# Patient Record
Sex: MALE | Race: WHITE | NOT HISPANIC OR LATINO
[De-identification: names, ages, dates, MRNs, and addresses within clinical notes are randomized per-mention and may not be internally consistent; named-entity substitution may affect disease eponyms.]

---

## 2017-01-04 ENCOUNTER — APPOINTMENT (OUTPATIENT)
Dept: CARDIOLOGY | Facility: CLINIC | Age: 57
End: 2017-01-04

## 2017-01-04 VITALS
BODY MASS INDEX: 40.65 KG/M2 | HEIGHT: 65 IN | HEART RATE: 93 BPM | WEIGHT: 244 LBS | SYSTOLIC BLOOD PRESSURE: 130 MMHG | DIASTOLIC BLOOD PRESSURE: 80 MMHG

## 2017-04-17 ENCOUNTER — APPOINTMENT (OUTPATIENT)
Dept: CARDIOLOGY | Facility: CLINIC | Age: 57
End: 2017-04-17

## 2017-04-24 ENCOUNTER — OUTPATIENT (OUTPATIENT)
Dept: OUTPATIENT SERVICES | Facility: HOSPITAL | Age: 57
LOS: 1 days | Discharge: HOME | End: 2017-04-24

## 2017-05-04 ENCOUNTER — APPOINTMENT (OUTPATIENT)
Dept: CARDIOLOGY | Facility: CLINIC | Age: 57
End: 2017-05-04

## 2017-05-15 ENCOUNTER — APPOINTMENT (OUTPATIENT)
Dept: CARDIOLOGY | Facility: CLINIC | Age: 57
End: 2017-05-15

## 2017-05-15 VITALS
HEIGHT: 65 IN | HEART RATE: 107 BPM | SYSTOLIC BLOOD PRESSURE: 122 MMHG | WEIGHT: 245 LBS | DIASTOLIC BLOOD PRESSURE: 78 MMHG | BODY MASS INDEX: 40.82 KG/M2

## 2017-06-27 DIAGNOSIS — I10 ESSENTIAL (PRIMARY) HYPERTENSION: ICD-10-CM

## 2017-06-27 DIAGNOSIS — E78.4 OTHER HYPERLIPIDEMIA: ICD-10-CM

## 2017-06-27 DIAGNOSIS — E11.65 TYPE 2 DIABETES MELLITUS WITH HYPERGLYCEMIA: ICD-10-CM

## 2017-08-29 ENCOUNTER — OUTPATIENT (OUTPATIENT)
Dept: OUTPATIENT SERVICES | Facility: HOSPITAL | Age: 57
LOS: 1 days | Discharge: HOME | End: 2017-08-29

## 2017-08-29 DIAGNOSIS — E78.4 OTHER HYPERLIPIDEMIA: ICD-10-CM

## 2017-08-29 DIAGNOSIS — E11.65 TYPE 2 DIABETES MELLITUS WITH HYPERGLYCEMIA: ICD-10-CM

## 2017-08-29 DIAGNOSIS — I10 ESSENTIAL (PRIMARY) HYPERTENSION: ICD-10-CM

## 2017-09-28 ENCOUNTER — APPOINTMENT (OUTPATIENT)
Dept: CARDIOLOGY | Facility: CLINIC | Age: 57
End: 2017-09-28

## 2017-09-28 VITALS
SYSTOLIC BLOOD PRESSURE: 104 MMHG | DIASTOLIC BLOOD PRESSURE: 60 MMHG | HEIGHT: 65 IN | HEART RATE: 90 BPM | BODY MASS INDEX: 41.48 KG/M2 | WEIGHT: 249 LBS

## 2017-09-28 RX ORDER — LOSARTAN POTASSIUM AND HYDROCHLOROTHIAZIDE 12.5; 5 MG/1; MG/1
50-12.5 TABLET ORAL TWICE DAILY
Refills: 0 | Status: ACTIVE | COMMUNITY

## 2017-09-29 ENCOUNTER — OUTPATIENT (OUTPATIENT)
Dept: OUTPATIENT SERVICES | Facility: HOSPITAL | Age: 57
LOS: 1 days | Discharge: HOME | End: 2017-09-29

## 2017-09-29 DIAGNOSIS — M79.606 PAIN IN LEG, UNSPECIFIED: ICD-10-CM

## 2017-09-29 DIAGNOSIS — M54.5 LOW BACK PAIN: ICD-10-CM

## 2017-10-20 ENCOUNTER — OUTPATIENT (OUTPATIENT)
Dept: OUTPATIENT SERVICES | Facility: HOSPITAL | Age: 57
LOS: 1 days | Discharge: HOME | End: 2017-10-20

## 2017-10-20 DIAGNOSIS — M25.559 PAIN IN UNSPECIFIED HIP: ICD-10-CM

## 2017-10-20 DIAGNOSIS — R05 COUGH: ICD-10-CM

## 2017-10-26 ENCOUNTER — OUTPATIENT (OUTPATIENT)
Dept: OUTPATIENT SERVICES | Facility: HOSPITAL | Age: 57
LOS: 1 days | Discharge: HOME | End: 2017-10-26

## 2017-10-26 DIAGNOSIS — M16.12 UNILATERAL PRIMARY OSTEOARTHRITIS, LEFT HIP: ICD-10-CM

## 2017-10-26 DIAGNOSIS — Z01.812 ENCOUNTER FOR PREPROCEDURAL LABORATORY EXAMINATION: ICD-10-CM

## 2017-10-26 DIAGNOSIS — D68.9 COAGULATION DEFECT, UNSPECIFIED: ICD-10-CM

## 2017-11-01 ENCOUNTER — OTHER (OUTPATIENT)
Age: 57
End: 2017-11-01

## 2017-11-07 ENCOUNTER — OUTPATIENT (OUTPATIENT)
Dept: OUTPATIENT SERVICES | Facility: HOSPITAL | Age: 57
LOS: 1 days | Discharge: HOME | End: 2017-11-07

## 2017-11-07 DIAGNOSIS — I35.0 NONRHEUMATIC AORTIC (VALVE) STENOSIS: ICD-10-CM

## 2017-11-16 ENCOUNTER — OUTPATIENT (OUTPATIENT)
Dept: OUTPATIENT SERVICES | Facility: HOSPITAL | Age: 57
LOS: 1 days | Discharge: HOME | End: 2017-11-16

## 2017-11-16 DIAGNOSIS — Z79.01 LONG TERM (CURRENT) USE OF ANTICOAGULANTS: ICD-10-CM

## 2017-11-16 DIAGNOSIS — R79.9 ABNORMAL FINDING OF BLOOD CHEMISTRY, UNSPECIFIED: ICD-10-CM

## 2017-11-18 ENCOUNTER — OUTPATIENT (OUTPATIENT)
Dept: OUTPATIENT SERVICES | Facility: HOSPITAL | Age: 57
LOS: 1 days | Discharge: HOME | End: 2017-11-18

## 2017-11-18 DIAGNOSIS — I48.91 UNSPECIFIED ATRIAL FIBRILLATION: ICD-10-CM

## 2017-11-21 ENCOUNTER — OUTPATIENT (OUTPATIENT)
Dept: OUTPATIENT SERVICES | Facility: HOSPITAL | Age: 57
LOS: 1 days | Discharge: HOME | End: 2017-11-21

## 2017-11-21 DIAGNOSIS — Z79.01 LONG TERM (CURRENT) USE OF ANTICOAGULANTS: ICD-10-CM

## 2017-11-24 ENCOUNTER — OUTPATIENT (OUTPATIENT)
Dept: OUTPATIENT SERVICES | Facility: HOSPITAL | Age: 57
LOS: 1 days | Discharge: HOME | End: 2017-11-24

## 2017-11-24 DIAGNOSIS — Z79.01 LONG TERM (CURRENT) USE OF ANTICOAGULANTS: ICD-10-CM

## 2017-11-28 ENCOUNTER — OUTPATIENT (OUTPATIENT)
Dept: OUTPATIENT SERVICES | Facility: HOSPITAL | Age: 57
LOS: 1 days | Discharge: HOME | End: 2017-11-28

## 2017-11-28 DIAGNOSIS — Z79.01 LONG TERM (CURRENT) USE OF ANTICOAGULANTS: ICD-10-CM

## 2017-12-05 ENCOUNTER — OUTPATIENT (OUTPATIENT)
Dept: OUTPATIENT SERVICES | Facility: HOSPITAL | Age: 57
LOS: 1 days | Discharge: HOME | End: 2017-12-05

## 2017-12-05 DIAGNOSIS — Z79.01 LONG TERM (CURRENT) USE OF ANTICOAGULANTS: ICD-10-CM

## 2017-12-12 ENCOUNTER — OUTPATIENT (OUTPATIENT)
Dept: OUTPATIENT SERVICES | Facility: HOSPITAL | Age: 57
LOS: 1 days | Discharge: HOME | End: 2017-12-12

## 2017-12-12 DIAGNOSIS — Z79.01 LONG TERM (CURRENT) USE OF ANTICOAGULANTS: ICD-10-CM

## 2017-12-19 ENCOUNTER — OUTPATIENT (OUTPATIENT)
Dept: OUTPATIENT SERVICES | Facility: HOSPITAL | Age: 57
LOS: 1 days | Discharge: HOME | End: 2017-12-19

## 2017-12-19 DIAGNOSIS — Z79.01 LONG TERM (CURRENT) USE OF ANTICOAGULANTS: ICD-10-CM

## 2017-12-22 ENCOUNTER — OUTPATIENT (OUTPATIENT)
Dept: OUTPATIENT SERVICES | Facility: HOSPITAL | Age: 57
LOS: 1 days | Discharge: HOME | End: 2017-12-22

## 2017-12-22 DIAGNOSIS — Z79.01 LONG TERM (CURRENT) USE OF ANTICOAGULANTS: ICD-10-CM

## 2017-12-26 ENCOUNTER — OUTPATIENT (OUTPATIENT)
Dept: OUTPATIENT SERVICES | Facility: HOSPITAL | Age: 57
LOS: 1 days | Discharge: HOME | End: 2017-12-26

## 2017-12-26 DIAGNOSIS — E78.4 OTHER HYPERLIPIDEMIA: ICD-10-CM

## 2017-12-26 DIAGNOSIS — E11.65 TYPE 2 DIABETES MELLITUS WITH HYPERGLYCEMIA: ICD-10-CM

## 2017-12-26 DIAGNOSIS — I10 ESSENTIAL (PRIMARY) HYPERTENSION: ICD-10-CM

## 2018-01-11 ENCOUNTER — APPOINTMENT (OUTPATIENT)
Dept: CARDIOLOGY | Facility: CLINIC | Age: 58
End: 2018-01-11

## 2018-01-11 VITALS
DIASTOLIC BLOOD PRESSURE: 84 MMHG | HEART RATE: 100 BPM | SYSTOLIC BLOOD PRESSURE: 132 MMHG | HEIGHT: 65 IN | WEIGHT: 256 LBS | BODY MASS INDEX: 42.65 KG/M2

## 2018-03-20 ENCOUNTER — OUTPATIENT (OUTPATIENT)
Dept: OUTPATIENT SERVICES | Facility: HOSPITAL | Age: 58
LOS: 1 days | Discharge: HOME | End: 2018-03-20

## 2018-03-20 DIAGNOSIS — E55.9 VITAMIN D DEFICIENCY, UNSPECIFIED: ICD-10-CM

## 2018-03-20 DIAGNOSIS — M06.9 RHEUMATOID ARTHRITIS, UNSPECIFIED: ICD-10-CM

## 2018-03-20 DIAGNOSIS — A69.20 LYME DISEASE, UNSPECIFIED: ICD-10-CM

## 2018-03-20 DIAGNOSIS — E11.9 TYPE 2 DIABETES MELLITUS WITHOUT COMPLICATIONS: ICD-10-CM

## 2018-03-20 DIAGNOSIS — N42.9 DISORDER OF PROSTATE, UNSPECIFIED: ICD-10-CM

## 2018-03-20 DIAGNOSIS — I10 ESSENTIAL (PRIMARY) HYPERTENSION: ICD-10-CM

## 2018-03-28 ENCOUNTER — APPOINTMENT (OUTPATIENT)
Dept: CARDIOLOGY | Facility: CLINIC | Age: 58
End: 2018-03-28

## 2018-03-28 VITALS — HEART RATE: 94 BPM | OXYGEN SATURATION: 95 %

## 2018-03-28 VITALS
HEART RATE: 96 BPM | BODY MASS INDEX: 42.65 KG/M2 | WEIGHT: 256 LBS | DIASTOLIC BLOOD PRESSURE: 84 MMHG | HEIGHT: 65 IN | SYSTOLIC BLOOD PRESSURE: 142 MMHG

## 2018-04-02 ENCOUNTER — OUTPATIENT (OUTPATIENT)
Dept: OUTPATIENT SERVICES | Facility: HOSPITAL | Age: 58
LOS: 1 days | Discharge: HOME | End: 2018-04-02

## 2018-04-02 VITALS
HEART RATE: 80 BPM | WEIGHT: 262.35 LBS | DIASTOLIC BLOOD PRESSURE: 76 MMHG | TEMPERATURE: 98 F | OXYGEN SATURATION: 97 % | SYSTOLIC BLOOD PRESSURE: 150 MMHG | RESPIRATION RATE: 20 BRPM

## 2018-04-02 DIAGNOSIS — S02.30XA FRACTURE OF ORBITAL FLOOR, UNSPECIFIED SIDE, INITIAL ENCOUNTER FOR CLOSED FRACTURE: Chronic | ICD-10-CM

## 2018-04-02 DIAGNOSIS — Z01.818 ENCOUNTER FOR OTHER PREPROCEDURAL EXAMINATION: ICD-10-CM

## 2018-04-02 DIAGNOSIS — R07.9 CHEST PAIN, UNSPECIFIED: ICD-10-CM

## 2018-04-02 DIAGNOSIS — Z86.018 PERSONAL HISTORY OF OTHER BENIGN NEOPLASM: Chronic | ICD-10-CM

## 2018-04-02 DIAGNOSIS — I35.0 NONRHEUMATIC AORTIC (VALVE) STENOSIS: ICD-10-CM

## 2018-04-02 DIAGNOSIS — Z12.11 ENCOUNTER FOR SCREENING FOR MALIGNANT NEOPLASM OF COLON: Chronic | ICD-10-CM

## 2018-04-02 DIAGNOSIS — Z96.643 PRESENCE OF ARTIFICIAL HIP JOINT, BILATERAL: Chronic | ICD-10-CM

## 2018-04-02 LAB
ALBUMIN SERPL ELPH-MCNC: 4 G/DL — SIGNIFICANT CHANGE UP (ref 3.5–5.2)
ALP SERPL-CCNC: 46 U/L — SIGNIFICANT CHANGE UP (ref 30–115)
ALT FLD-CCNC: 17 U/L — SIGNIFICANT CHANGE UP (ref 0–41)
ANION GAP SERPL CALC-SCNC: 15 MMOL/L — HIGH (ref 7–14)
APTT BLD: 29.9 SEC — SIGNIFICANT CHANGE UP (ref 27–39.2)
AST SERPL-CCNC: 14 U/L — SIGNIFICANT CHANGE UP (ref 0–41)
BASOPHILS # BLD AUTO: 0.04 K/UL — SIGNIFICANT CHANGE UP (ref 0–0.2)
BASOPHILS NFR BLD AUTO: 0.6 % — SIGNIFICANT CHANGE UP (ref 0–1)
BILIRUB SERPL-MCNC: 0.3 MG/DL — SIGNIFICANT CHANGE UP (ref 0.2–1.2)
BUN SERPL-MCNC: 20 MG/DL — SIGNIFICANT CHANGE UP (ref 10–20)
CALCIUM SERPL-MCNC: 8.9 MG/DL — SIGNIFICANT CHANGE UP (ref 8.5–10.1)
CHLORIDE SERPL-SCNC: 97 MMOL/L — LOW (ref 98–110)
CO2 SERPL-SCNC: 28 MMOL/L — SIGNIFICANT CHANGE UP (ref 17–32)
CREAT SERPL-MCNC: 1 MG/DL — SIGNIFICANT CHANGE UP (ref 0.7–1.5)
EOSINOPHIL # BLD AUTO: 0.15 K/UL — SIGNIFICANT CHANGE UP (ref 0–0.7)
EOSINOPHIL NFR BLD AUTO: 2.3 % — SIGNIFICANT CHANGE UP (ref 0–8)
GLUCOSE SERPL-MCNC: 131 MG/DL — HIGH (ref 70–99)
HCT VFR BLD CALC: 40.2 % — LOW (ref 42–52)
HGB BLD-MCNC: 12.9 G/DL — LOW (ref 14–18)
IMM GRANULOCYTES NFR BLD AUTO: 0.3 % — SIGNIFICANT CHANGE UP (ref 0.1–0.3)
INR BLD: 0.9 RATIO — SIGNIFICANT CHANGE UP (ref 0.65–1.3)
LYMPHOCYTES # BLD AUTO: 1.73 K/UL — SIGNIFICANT CHANGE UP (ref 1.2–3.4)
LYMPHOCYTES # BLD AUTO: 26.1 % — SIGNIFICANT CHANGE UP (ref 20.5–51.1)
MCHC RBC-ENTMCNC: 27.9 PG — SIGNIFICANT CHANGE UP (ref 27–31)
MCHC RBC-ENTMCNC: 32.1 G/DL — SIGNIFICANT CHANGE UP (ref 32–37)
MCV RBC AUTO: 87 FL — SIGNIFICANT CHANGE UP (ref 80–94)
MONOCYTES # BLD AUTO: 0.64 K/UL — HIGH (ref 0.1–0.6)
MONOCYTES NFR BLD AUTO: 9.7 % — HIGH (ref 1.7–9.3)
NEUTROPHILS # BLD AUTO: 4.04 K/UL — SIGNIFICANT CHANGE UP (ref 1.4–6.5)
NEUTROPHILS NFR BLD AUTO: 61 % — SIGNIFICANT CHANGE UP (ref 42.2–75.2)
NRBC # BLD: 0 /100 WBCS — SIGNIFICANT CHANGE UP (ref 0–0)
PLATELET # BLD AUTO: 174 K/UL — SIGNIFICANT CHANGE UP (ref 130–400)
POTASSIUM SERPL-MCNC: 4.1 MMOL/L — SIGNIFICANT CHANGE UP (ref 3.5–5)
POTASSIUM SERPL-SCNC: 4.1 MMOL/L — SIGNIFICANT CHANGE UP (ref 3.5–5)
PROT SERPL-MCNC: 6.5 G/DL — SIGNIFICANT CHANGE UP (ref 6–8)
PROTHROM AB SERPL-ACNC: 9.7 SEC — LOW (ref 9.95–12.87)
RBC # BLD: 4.62 M/UL — LOW (ref 4.7–6.1)
RBC # FLD: 15.8 % — HIGH (ref 11.5–14.5)
SODIUM SERPL-SCNC: 140 MMOL/L — SIGNIFICANT CHANGE UP (ref 135–146)
WBC # BLD: 6.62 K/UL — SIGNIFICANT CHANGE UP (ref 4.8–10.8)
WBC # FLD AUTO: 6.62 K/UL — SIGNIFICANT CHANGE UP (ref 4.8–10.8)

## 2018-04-02 NOTE — H&P PST ADULT - REASON FOR ADMISSION
57 year old male here for left heart catherization 57 year old male here for left heart catheterization, pt states he has a problem with his valves, + sob, + tadeo and fatigue, needs procedure, pt denies cp or palpitations  pt has urinary frequency secondary to diuretic, no urgency or burning  ex marci 1 fos slowly

## 2018-04-02 NOTE — H&P PST ADULT - MALLAMPATI CLASS
Class III - visualization of the soft palate and the base of the uvula/large uvula + redundant neck tissue, large lipoma posterior neck

## 2018-04-02 NOTE — H&P PST ADULT - FAMILY HISTORY
Mother  Still living? No  Family history of diabetes mellitus, Age at diagnosis: 61-70  CAD (coronary artery disease), Age at diagnosis: 61-70     Sibling  Still living? No  Family history of diabetes mellitus, Age at diagnosis: 51-60     Father  Still living? Unknown  CAD (coronary artery disease), Age at diagnosis: 71-80

## 2018-04-02 NOTE — H&P PST ADULT - PSH
Encounter for screening colonoscopy    Fracture of orbital floor    H/O lipoma  times 2-3  History of hip replacement, total, bilateral  two separate times '17, '07

## 2018-04-02 NOTE — H&P PST ADULT - PMH
Asthma    Diabetes  20 yrs  HTN (hypertension)    Obstructive sleep apnea on CPAP    Obstructive sleep apnea on CPAP Arthritis    Asthma    Diabetes  20 yrs  HTN (hypertension)    Obesity    Obstructive sleep apnea on CPAP    Peripheral neuropathy  related to diabetes

## 2018-04-10 ENCOUNTER — OUTPATIENT (OUTPATIENT)
Dept: OUTPATIENT SERVICES | Facility: HOSPITAL | Age: 58
LOS: 1 days | Discharge: HOME | End: 2018-04-10

## 2018-04-10 DIAGNOSIS — S02.30XA FRACTURE OF ORBITAL FLOOR, UNSPECIFIED SIDE, INITIAL ENCOUNTER FOR CLOSED FRACTURE: Chronic | ICD-10-CM

## 2018-04-10 DIAGNOSIS — Z12.11 ENCOUNTER FOR SCREENING FOR MALIGNANT NEOPLASM OF COLON: Chronic | ICD-10-CM

## 2018-04-10 DIAGNOSIS — Z86.018 PERSONAL HISTORY OF OTHER BENIGN NEOPLASM: Chronic | ICD-10-CM

## 2018-04-10 DIAGNOSIS — Z96.643 PRESENCE OF ARTIFICIAL HIP JOINT, BILATERAL: Chronic | ICD-10-CM

## 2018-04-10 NOTE — PROGRESS NOTE ADULT - SUBJECTIVE AND OBJECTIVE BOX
PRE-OPERATIVE DAY OF PROCEDURE EVALUATION NOTE:  I have personally seen and examined the patient. I agree with the history and physicial which I have reviewed and noted any changes below. signed electronically by Dr Robbi Argueta 04-10-18 @ 08:20                                         POST OPERATIVE PROCEDURAL DOCUMENTATION   Preliminary Cardiac Catherization Post-Procedure Report:    PRE-OP DIAGNOSIS:     PROCEDURE:     Left Heart Catheterization     LV Angiogram     Selective Coronary Angiogram     Peripheral Angiogram    Primary Physician:  Robbi Argueta M.D.  Cardiac Fellow:      ANESTHESIA TYPE: Sedation/Local/Regional    ESTIMATED BLOOD LOSS:  Less than 10 cc    CONDITION: Good    SPECIMENS REMOVED (IF APPLICABLE): Not Applicable    IMPLANTS (IF APPLICABLE): SHAWN    DESCRIPTION OF PROCEDURRE:  The right and left femoral groins, as well as the right radial artery were prepped and draped in the usual sterile fashion. Following local instillation of lidocane, a 6Fr introducer was inserted percutaneously into the ______ using the seldinger technique. Following this, multiple guidewires and pre shaped catheters were used to adequately opacifiy the coronary arteries and perform left heart catheterization as well as an LV angiogram, using the Judkin's approach. Left heart catheterization and left ventricular angiogram was performed in the BLACK projection. Following this, selective coronary angiography was performed in multiple obliquities. At the end of the procedure, all guidewires and catheters were removed. Homostasis was obtained by manual compression. There were no complications.                                 FINDINGS OF PROCEDURE:  1. Left Heart Catheterization: LVEDP:                                                        LV Pressure:                                                        CA Pressure:                   2. LV Angiogram:  The LV is _______ in size.                                    Overall, there is normal LV systolic function.                                   No significant segmental wall contraction abnormalities seen.                                   The EF is approx. _______                                   No mitral valve prolapse, mitral insufficiency or mitral annular calcification seen.   3. SELECTIVE CORONARY ANGIOGRAM:                           LEFT MAIN: Normal                           LAD: Normal                           DIAGONAL: Normal                           LCX: Normal                           OBTUSE MARGINAL: Normal                           RCA: Normal                           PDA: Normal                           PLV: Normal  4. The coronary circulation was _____ dominant.    5.  PERIPHERAL ANGIOGRAM was performed, which revealed no significant aorta-illiac disease. A closure device using ___ was done.        Post Procedure Diagnosis/Impression:                         Complications:  None    PLAN OF CARE:  D/C Home today                               D/C in AM                               Return to In-patient bed                               Return for staged procedure:                              CT Surgery consult called                               Continue DAPT, B-blocker, Statin therapy and ace inhibitor                              Continue medical therapy                              Office visit in 2-3 weeks PRE-OPERATIVE DAY OF PROCEDURE EVALUATION NOTE:  I have personally seen and examined the patient. I agree with the history and physicial which I have reviewed and noted any changes below. signed electronically by Dr Robbi Argueta 04-10-18 @ 08:20                                         POST OPERATIVE PROCEDURAL DOCUMENTATION   Preliminary Cardiac Catherization Post-Procedure Report:    PRE-OP DIAGNOSIS: Aortic Stenosis    PROCEDURE:     Right Heart Catheterization     Left Heart Catheterization     LV Angiogram     Selective Coronary Angiogram    Primary Physician:  Robbi Argueta M.D.  Cardiac Fellow:  Dr. Wren    ANESTHESIA TYPE: Sedation/Local/Regional    ESTIMATED BLOOD LOSS:  Less than 10 cc    CONDITION: Good    SPECIMENS REMOVED (IF APPLICABLE): Not Applicable    IMPLANTS (IF APPLICABLE): NA    DESCRIPTION OF PROCEDURE  The right and left femoral groins, as well as the right radial artery were prepped and draped in the usual sterile fashion. Following local instillation of lidocaine a 6Fr introducer was inserted percutaneously into the right radial artery using the Seldinger technique. Following this, multiple guidewires and pre shaped catheters were used to adequately opacify the coronary arteries and perform left heart catheterization as well as an LV angiogram, using the Daija approach. A supra aortic angiogram was done in the Tongan projection. Left heart catheterization and left ventricular angiogram was performed in the BLACK projection. Right heart catheterization was then performed.  Following this, selective coronary angiography was performed in multiple obliquities. At the end of the procedure, all guidewires and catheters were removed. Homo stasis was obtained by manual compression. There were no complications.                                 FINDINGS OF PROCEDURE:  1. Left Heart Catheterization:            LVEDP: 20-24                                                       LV Pressure: 168-170/24                                                       CA Pressure: 130/80                                                       Mean aortic gradient of approx. 35-40mmHg, using a Elmendorf catheter  2. LV Angiogram:  The LV is normal in size.                                    Overall, there is normal LV systolic function.                                   No significant segmental wall contraction abnormalities seen.                                   The EF is approx. 60%                                   No mitral valve prolapse, mitral insufficiency or mitral annular calcification seen.   3. SELECTIVE CORONARY ANGIOGRAM:                           LEFT MAIN: Normal                           LAD: Normal                           DIAGONAL: Normal                           LCX: Normal                           OBTUSE MARGINAL: Normal                           RCA: Normal                           PDA: Normal                           PLV: Normal  4. The coronary circulation was right dominant.    5.   Right Heart Catheterization showed moderate pulmonary hypertension with a PA pressure of 45-50/24    Post Procedure Diagnosis/Impression: Moderate to severe Aortic Stenosis                                                           No CAD                                                           Moderate Pulmonary Hypertension    Complications:  None    PLAN OF CARE         D/C Home today                               Return to In-patient bed                              Continue medical therapy                              Office visit in 2-3 weeks

## 2018-04-16 DIAGNOSIS — I10 ESSENTIAL (PRIMARY) HYPERTENSION: ICD-10-CM

## 2018-04-16 DIAGNOSIS — R07.89 OTHER CHEST PAIN: ICD-10-CM

## 2018-04-16 DIAGNOSIS — E11.9 TYPE 2 DIABETES MELLITUS WITHOUT COMPLICATIONS: ICD-10-CM

## 2018-04-16 DIAGNOSIS — I35.0 NONRHEUMATIC AORTIC (VALVE) STENOSIS: ICD-10-CM

## 2018-04-16 DIAGNOSIS — R06.02 SHORTNESS OF BREATH: ICD-10-CM

## 2018-04-16 DIAGNOSIS — I27.20 PULMONARY HYPERTENSION, UNSPECIFIED: ICD-10-CM

## 2018-05-07 ENCOUNTER — APPOINTMENT (OUTPATIENT)
Dept: CARDIOLOGY | Facility: CLINIC | Age: 58
End: 2018-05-07

## 2018-06-18 ENCOUNTER — APPOINTMENT (OUTPATIENT)
Dept: SURGERY | Facility: CLINIC | Age: 58
End: 2018-06-18
Payer: COMMERCIAL

## 2018-06-18 VITALS
DIASTOLIC BLOOD PRESSURE: 89 MMHG | BODY MASS INDEX: 42.49 KG/M2 | SYSTOLIC BLOOD PRESSURE: 140 MMHG | HEART RATE: 98 BPM | HEIGHT: 65 IN | TEMPERATURE: 98.2 F | WEIGHT: 255 LBS

## 2018-06-18 DIAGNOSIS — E88.89 OTHER SPECIFIED METABOLIC DISORDERS: ICD-10-CM

## 2018-06-18 PROCEDURE — 99204 OFFICE O/P NEW MOD 45 MIN: CPT

## 2018-06-20 ENCOUNTER — OUTPATIENT (OUTPATIENT)
Dept: OUTPATIENT SERVICES | Facility: HOSPITAL | Age: 58
LOS: 1 days | Discharge: HOME | End: 2018-06-20

## 2018-06-20 DIAGNOSIS — Z12.11 ENCOUNTER FOR SCREENING FOR MALIGNANT NEOPLASM OF COLON: Chronic | ICD-10-CM

## 2018-06-20 DIAGNOSIS — Z96.643 PRESENCE OF ARTIFICIAL HIP JOINT, BILATERAL: Chronic | ICD-10-CM

## 2018-06-20 DIAGNOSIS — M19.90 UNSPECIFIED OSTEOARTHRITIS, UNSPECIFIED SITE: ICD-10-CM

## 2018-06-20 DIAGNOSIS — Z86.018 PERSONAL HISTORY OF OTHER BENIGN NEOPLASM: Chronic | ICD-10-CM

## 2018-06-20 DIAGNOSIS — S02.30XA FRACTURE OF ORBITAL FLOOR, UNSPECIFIED SIDE, INITIAL ENCOUNTER FOR CLOSED FRACTURE: Chronic | ICD-10-CM

## 2018-06-20 DIAGNOSIS — Z96.642 PRESENCE OF LEFT ARTIFICIAL HIP JOINT: ICD-10-CM

## 2018-08-02 ENCOUNTER — LABORATORY RESULT (OUTPATIENT)
Age: 58
End: 2018-08-02

## 2018-08-02 ENCOUNTER — OUTPATIENT (OUTPATIENT)
Dept: OUTPATIENT SERVICES | Facility: HOSPITAL | Age: 58
LOS: 1 days | Discharge: HOME | End: 2018-08-02

## 2018-08-02 DIAGNOSIS — I10 ESSENTIAL (PRIMARY) HYPERTENSION: ICD-10-CM

## 2018-08-02 DIAGNOSIS — E11.40 TYPE 2 DIABETES MELLITUS WITH DIABETIC NEUROPATHY, UNSPECIFIED: ICD-10-CM

## 2018-08-02 DIAGNOSIS — Z86.018 PERSONAL HISTORY OF OTHER BENIGN NEOPLASM: Chronic | ICD-10-CM

## 2018-08-02 DIAGNOSIS — Z12.11 ENCOUNTER FOR SCREENING FOR MALIGNANT NEOPLASM OF COLON: Chronic | ICD-10-CM

## 2018-08-02 DIAGNOSIS — E11.65 TYPE 2 DIABETES MELLITUS WITH HYPERGLYCEMIA: ICD-10-CM

## 2018-08-02 DIAGNOSIS — Z96.643 PRESENCE OF ARTIFICIAL HIP JOINT, BILATERAL: Chronic | ICD-10-CM

## 2018-08-02 DIAGNOSIS — E78.4 OTHER HYPERLIPIDEMIA: ICD-10-CM

## 2018-08-02 DIAGNOSIS — S02.30XA FRACTURE OF ORBITAL FLOOR, UNSPECIFIED SIDE, INITIAL ENCOUNTER FOR CLOSED FRACTURE: Chronic | ICD-10-CM

## 2018-08-02 PROBLEM — G62.9 POLYNEUROPATHY, UNSPECIFIED: Chronic | Status: ACTIVE | Noted: 2018-04-02

## 2018-08-02 PROBLEM — G47.33 OBSTRUCTIVE SLEEP APNEA (ADULT) (PEDIATRIC): Chronic | Status: ACTIVE | Noted: 2018-04-02

## 2018-08-02 PROBLEM — E66.9 OBESITY, UNSPECIFIED: Chronic | Status: ACTIVE | Noted: 2018-04-02

## 2018-08-02 PROBLEM — E11.9 TYPE 2 DIABETES MELLITUS WITHOUT COMPLICATIONS: Chronic | Status: ACTIVE | Noted: 2018-04-02

## 2018-08-02 PROBLEM — J45.909 UNSPECIFIED ASTHMA, UNCOMPLICATED: Chronic | Status: ACTIVE | Noted: 2018-04-02

## 2018-08-02 PROBLEM — M19.90 UNSPECIFIED OSTEOARTHRITIS, UNSPECIFIED SITE: Chronic | Status: ACTIVE | Noted: 2018-04-02

## 2018-08-02 PROBLEM — E78.00 PURE HYPERCHOLESTEROLEMIA, UNSPECIFIED: Chronic | Status: ACTIVE | Noted: 2018-04-02

## 2018-08-08 PROBLEM — E11.65 CONTROLLED DIABETES MELLITUS WITH HYPERGLYCEMIA: Status: ACTIVE | Noted: 2018-08-08

## 2018-09-03 ENCOUNTER — RX RENEWAL (OUTPATIENT)
Age: 58
End: 2018-09-03

## 2018-10-01 ENCOUNTER — OUTPATIENT (OUTPATIENT)
Dept: OUTPATIENT SERVICES | Facility: HOSPITAL | Age: 58
LOS: 1 days | Discharge: HOME | End: 2018-10-01

## 2018-10-01 DIAGNOSIS — Z96.643 PRESENCE OF ARTIFICIAL HIP JOINT, BILATERAL: Chronic | ICD-10-CM

## 2018-10-01 DIAGNOSIS — Z86.018 PERSONAL HISTORY OF OTHER BENIGN NEOPLASM: Chronic | ICD-10-CM

## 2018-10-01 DIAGNOSIS — S02.30XA FRACTURE OF ORBITAL FLOOR, UNSPECIFIED SIDE, INITIAL ENCOUNTER FOR CLOSED FRACTURE: Chronic | ICD-10-CM

## 2018-10-01 DIAGNOSIS — Z12.11 ENCOUNTER FOR SCREENING FOR MALIGNANT NEOPLASM OF COLON: Chronic | ICD-10-CM

## 2018-10-01 DIAGNOSIS — T56.2X4A TOXIC EFFECT OF CHROMIUM AND ITS COMPOUNDS, UNDETERMINED, INITIAL ENCOUNTER: ICD-10-CM

## 2018-10-01 DIAGNOSIS — T56.894A TOXIC EFFECT OF OTHER METALS, UNDETERMINED, INITIAL ENCOUNTER: ICD-10-CM

## 2018-10-26 ENCOUNTER — OUTPATIENT (OUTPATIENT)
Dept: OUTPATIENT SERVICES | Facility: HOSPITAL | Age: 58
LOS: 1 days | Discharge: HOME | End: 2018-10-26

## 2018-10-26 DIAGNOSIS — Z86.018 PERSONAL HISTORY OF OTHER BENIGN NEOPLASM: Chronic | ICD-10-CM

## 2018-10-26 DIAGNOSIS — D50.9 IRON DEFICIENCY ANEMIA, UNSPECIFIED: ICD-10-CM

## 2018-10-26 DIAGNOSIS — Z96.643 PRESENCE OF ARTIFICIAL HIP JOINT, BILATERAL: Chronic | ICD-10-CM

## 2018-10-26 DIAGNOSIS — S02.30XA FRACTURE OF ORBITAL FLOOR, UNSPECIFIED SIDE, INITIAL ENCOUNTER FOR CLOSED FRACTURE: Chronic | ICD-10-CM

## 2018-10-26 DIAGNOSIS — K71.0 TOXIC LIVER DISEASE WITH CHOLESTASIS: ICD-10-CM

## 2018-10-26 DIAGNOSIS — I10 ESSENTIAL (PRIMARY) HYPERTENSION: ICD-10-CM

## 2018-10-26 DIAGNOSIS — Z12.11 ENCOUNTER FOR SCREENING FOR MALIGNANT NEOPLASM OF COLON: Chronic | ICD-10-CM

## 2018-11-01 ENCOUNTER — OUTPATIENT (OUTPATIENT)
Dept: OUTPATIENT SERVICES | Facility: HOSPITAL | Age: 58
LOS: 1 days | Discharge: HOME | End: 2018-11-01

## 2018-11-01 DIAGNOSIS — Z12.11 ENCOUNTER FOR SCREENING FOR MALIGNANT NEOPLASM OF COLON: Chronic | ICD-10-CM

## 2018-11-01 DIAGNOSIS — R10.9 UNSPECIFIED ABDOMINAL PAIN: ICD-10-CM

## 2018-11-01 DIAGNOSIS — Z86.018 PERSONAL HISTORY OF OTHER BENIGN NEOPLASM: Chronic | ICD-10-CM

## 2018-11-01 DIAGNOSIS — Z96.643 PRESENCE OF ARTIFICIAL HIP JOINT, BILATERAL: Chronic | ICD-10-CM

## 2018-11-01 DIAGNOSIS — S02.30XA FRACTURE OF ORBITAL FLOOR, UNSPECIFIED SIDE, INITIAL ENCOUNTER FOR CLOSED FRACTURE: Chronic | ICD-10-CM

## 2018-11-13 ENCOUNTER — OUTPATIENT (OUTPATIENT)
Dept: OUTPATIENT SERVICES | Facility: HOSPITAL | Age: 58
LOS: 1 days | Discharge: HOME | End: 2018-11-13

## 2018-11-13 DIAGNOSIS — R42 DIZZINESS AND GIDDINESS: ICD-10-CM

## 2018-11-13 DIAGNOSIS — R51 HEADACHE: ICD-10-CM

## 2018-11-13 DIAGNOSIS — Z86.018 PERSONAL HISTORY OF OTHER BENIGN NEOPLASM: Chronic | ICD-10-CM

## 2018-11-13 DIAGNOSIS — M54.5 LOW BACK PAIN: ICD-10-CM

## 2018-11-13 DIAGNOSIS — S02.30XA FRACTURE OF ORBITAL FLOOR, UNSPECIFIED SIDE, INITIAL ENCOUNTER FOR CLOSED FRACTURE: Chronic | ICD-10-CM

## 2018-11-13 DIAGNOSIS — Z96.643 PRESENCE OF ARTIFICIAL HIP JOINT, BILATERAL: Chronic | ICD-10-CM

## 2018-11-13 DIAGNOSIS — Z12.11 ENCOUNTER FOR SCREENING FOR MALIGNANT NEOPLASM OF COLON: Chronic | ICD-10-CM

## 2018-12-29 ENCOUNTER — RX RENEWAL (OUTPATIENT)
Age: 58
End: 2018-12-29

## 2019-01-28 ENCOUNTER — RX RENEWAL (OUTPATIENT)
Age: 59
End: 2019-01-28

## 2019-02-08 ENCOUNTER — RX RENEWAL (OUTPATIENT)
Age: 59
End: 2019-02-08

## 2019-05-28 ENCOUNTER — OUTPATIENT (OUTPATIENT)
Dept: OUTPATIENT SERVICES | Facility: HOSPITAL | Age: 59
LOS: 1 days | Discharge: HOME | End: 2019-05-28

## 2019-05-28 DIAGNOSIS — Z86.018 PERSONAL HISTORY OF OTHER BENIGN NEOPLASM: Chronic | ICD-10-CM

## 2019-05-28 DIAGNOSIS — E11.40 TYPE 2 DIABETES MELLITUS WITH DIABETIC NEUROPATHY, UNSPECIFIED: ICD-10-CM

## 2019-05-28 DIAGNOSIS — Z96.643 PRESENCE OF ARTIFICIAL HIP JOINT, BILATERAL: Chronic | ICD-10-CM

## 2019-05-28 DIAGNOSIS — S02.30XA FRACTURE OF ORBITAL FLOOR, UNSPECIFIED SIDE, INITIAL ENCOUNTER FOR CLOSED FRACTURE: Chronic | ICD-10-CM

## 2019-05-28 DIAGNOSIS — Z12.11 ENCOUNTER FOR SCREENING FOR MALIGNANT NEOPLASM OF COLON: Chronic | ICD-10-CM

## 2019-06-12 ENCOUNTER — APPOINTMENT (OUTPATIENT)
Dept: CARDIOLOGY | Facility: CLINIC | Age: 59
End: 2019-06-12
Payer: COMMERCIAL

## 2019-06-12 VITALS
WEIGHT: 264 LBS | HEIGHT: 65 IN | HEART RATE: 79 BPM | DIASTOLIC BLOOD PRESSURE: 78 MMHG | BODY MASS INDEX: 43.99 KG/M2 | SYSTOLIC BLOOD PRESSURE: 138 MMHG

## 2019-06-12 PROCEDURE — 93000 ELECTROCARDIOGRAM COMPLETE: CPT

## 2019-06-12 PROCEDURE — 99214 OFFICE O/P EST MOD 30 MIN: CPT

## 2019-06-17 NOTE — HISTORY OF PRESENT ILLNESS
[FreeTextEntry1] : JAZ GAVIN is being seen for a clinic follow-up of aortic stenosis, HTN and chest pain. \par The patient has been experiencing increase in exertional SOB as an anginal equivalent.\par In addition, he c/o chest pressure with exertion relieved by rest.\par He has aortic stenosis by physical exam and by 2d echo. \par However, due to his body habitus, difficult to adequately assess the severity of his aortic stenosis\par Because of the above, cardiac cath. recommended.\par The risks and benefits explained to the patient, and he is in total agreement.\par 4/2018 cardiac cath revealed no cad, moderate aortic stenosis and mild pulmonary htn\par since his last visit, his cardiac status is unchanged\par class 2 dyspnea, no angina and no syncope\par \par The patient is compliant with his medications. \par Patient is obese. \par \par s/p hip[ replacement\par \par

## 2019-06-17 NOTE — REASON FOR VISIT
[Follow-Up - Clinic] : a clinic follow-up of [Aortic Stenosis] : aortic stenosis [Chest Pain] : chest pain [Hypertension] : hypertension [FreeTextEntry1] : chest pain

## 2019-06-17 NOTE — ASSESSMENT
[FreeTextEntry1] : Chest Pain\par htn \par exertional dyspnea\par aortic stenosis - moderate to severe severe

## 2019-06-17 NOTE — PHYSICAL EXAM
[General Appearance - Well Developed] : well developed [Normal Appearance] : normal appearance [Well Groomed] : well groomed [No Deformities] : no deformities [General Appearance - Well Nourished] : well nourished [Normal Conjunctiva] : the conjunctiva exhibited no abnormalities [General Appearance - In No Acute Distress] : no acute distress [Eyelids - No Xanthelasma] : the eyelids demonstrated no xanthelasmas [Normal Oral Mucosa] : normal oral mucosa [No Oral Pallor] : no oral pallor [No Oral Cyanosis] : no oral cyanosis [Normal Jugular Venous V Waves Present] : normal jugular venous V waves present [Normal Jugular Venous A Waves Present] : normal jugular venous A waves present [No Jugular Venous Beasley A Waves] : no jugular venous beasley A waves [Exaggerated Use Of Accessory Muscles For Inspiration] : no accessory muscle use [Respiration, Rhythm And Depth] : normal respiratory rhythm and effort [Auscultation Breath Sounds / Voice Sounds] : lungs were clear to auscultation bilaterally [Abdomen Soft] : soft [Abdomen Tenderness] : non-tender [Abdomen Mass (___ Cm)] : no abdominal mass palpated [Gait - Sufficient For Exercise Testing] : the gait was sufficient for exercise testing [Abnormal Walk] : normal gait [Cyanosis, Localized] : no localized cyanosis [Nail Clubbing] : no clubbing of the fingernails [Petechial Hemorrhages (___cm)] : no petechial hemorrhages [Skin Color & Pigmentation] : normal skin color and pigmentation [No Skin Ulcers] : no skin ulcer [Skin Lesions] : no skin lesions [] : no rash [No Venous Stasis] : no venous stasis [No Xanthoma] : no  xanthoma was observed [Oriented To Time, Place, And Person] : oriented to person, place, and time [Mood] : the mood was normal [Affect] : the affect was normal [No Anxiety] : not feeling anxious [FreeTextEntry1] : no pedal edema

## 2019-09-29 ENCOUNTER — INPATIENT (INPATIENT)
Facility: HOSPITAL | Age: 59
LOS: 0 days | Discharge: HOME | End: 2019-09-30
Attending: INTERNAL MEDICINE | Admitting: INTERNAL MEDICINE
Payer: COMMERCIAL

## 2019-09-29 VITALS
WEIGHT: 250 LBS | TEMPERATURE: 98 F | HEIGHT: 60 IN | OXYGEN SATURATION: 98 % | DIASTOLIC BLOOD PRESSURE: 85 MMHG | SYSTOLIC BLOOD PRESSURE: 146 MMHG | HEART RATE: 100 BPM | RESPIRATION RATE: 20 BRPM

## 2019-09-29 DIAGNOSIS — S02.30XA FRACTURE OF ORBITAL FLOOR, UNSPECIFIED SIDE, INITIAL ENCOUNTER FOR CLOSED FRACTURE: Chronic | ICD-10-CM

## 2019-09-29 DIAGNOSIS — Z12.11 ENCOUNTER FOR SCREENING FOR MALIGNANT NEOPLASM OF COLON: Chronic | ICD-10-CM

## 2019-09-29 DIAGNOSIS — Z86.018 PERSONAL HISTORY OF OTHER BENIGN NEOPLASM: Chronic | ICD-10-CM

## 2019-09-29 DIAGNOSIS — Z96.643 PRESENCE OF ARTIFICIAL HIP JOINT, BILATERAL: Chronic | ICD-10-CM

## 2019-09-29 PROCEDURE — 71046 X-RAY EXAM CHEST 2 VIEWS: CPT | Mod: 26

## 2019-09-29 PROCEDURE — 93010 ELECTROCARDIOGRAM REPORT: CPT

## 2019-09-29 PROCEDURE — 99285 EMERGENCY DEPT VISIT HI MDM: CPT

## 2019-09-29 RX ORDER — VANCOMYCIN HCL 1 G
2000 VIAL (EA) INTRAVENOUS ONCE
Refills: 0 | Status: COMPLETED | OUTPATIENT
Start: 2019-09-29 | End: 2019-09-29

## 2019-09-29 RX ORDER — TETANUS TOXOID, REDUCED DIPHTHERIA TOXOID AND ACELLULAR PERTUSSIS VACCINE, ADSORBED 5; 2.5; 8; 8; 2.5 [IU]/.5ML; [IU]/.5ML; UG/.5ML; UG/.5ML; UG/.5ML
0.5 SUSPENSION INTRAMUSCULAR ONCE
Refills: 0 | Status: COMPLETED | OUTPATIENT
Start: 2019-09-29 | End: 2019-09-29

## 2019-09-29 RX ORDER — CEFEPIME 1 G/1
2000 INJECTION, POWDER, FOR SOLUTION INTRAMUSCULAR; INTRAVENOUS ONCE
Refills: 0 | Status: COMPLETED | OUTPATIENT
Start: 2019-09-29 | End: 2019-09-29

## 2019-09-29 RX ORDER — SODIUM CHLORIDE 9 MG/ML
1000 INJECTION, SOLUTION INTRAVENOUS ONCE
Refills: 0 | Status: DISCONTINUED | OUTPATIENT
Start: 2019-09-29 | End: 2019-09-30

## 2019-09-29 NOTE — ED PROVIDER NOTE - CLINICAL SUMMARY MEDICAL DECISION MAKING FREE TEXT BOX
No air on XR, labs unremarkable, patient remains non toxic in appearance. Will admit for IV abx, ID assessment.

## 2019-09-29 NOTE — ED PROVIDER NOTE - ATTENDING CONTRIBUTION TO CARE
58 yo M, history of DM II, asthma, HTN, HLD, mobid obesity, JIMMY, OA presents for pain, swelling, redness and warmth to L arm after using leeches as treatment for  OA pain. Sx rather rapid in onset, spreading from leech innoculation site on dorsum of hand up arm.     No fever, chills.    VS notable for slightly elevated BP, , afebrile orally.     Moderate swelling from R forearm to distal hand, soft compartments, generally warm and red, 3 puncture sites to dorsum of hand. Patient can flex digits and wrist but has pain with this movement.   Good cap refill, intact pulses.    Exam otherwise unremarkable.    Sx suggestive of extensive cellulitis, will get XR to rule out gas, cover with broad spectrum abx including pseudomonas coverage given patient is diabetic and wounds are from a leech. Will reassess.

## 2019-09-29 NOTE — ED PROVIDER NOTE - PROGRESS NOTE DETAILS
60 yo M with PMHx of DM, asthma, HTN, HLD, obesity, JIMMY on CPAP, peripheral neuropathy 2/2 DM, arthritis who presents with rapid onset of swelling, erythema, pain to LUE after applying leeches to hand yesterday. No systemic sx. Mildly tachycardic 100 here. No s/sx of compartment syndrome at this time. Ordered sepsis labs, xrays to r/o osteomyelitis or nec fasc. Given IVF, cefepime, vancomycin. Updated tdap. Will reassess. 58 yo M with PMHx of DM, asthma, HTN, HLD, obesity, JIMMY on CPAP, peripheral neuropathy 2/2 DM, arthritis who presents with rapid onset of swelling, erythema, pain to LUE after applying leeches to hand yesterday. No systemic sx. Mildly tachycardic 100 here. No s/sx of compartment syndrome or abscess at this time. Ordered sepsis labs, xrays to r/o osteomyelitis or nec fasc. Given IVF, cefepime, vancomycin. Updated tdap. Will reassess. Labs wnl including normal lactate. Xrays negative for subQ gas or evidence of osteomyelitis. Given rapid progression of cellulitis and hx of DM, will admit for IV abx.

## 2019-09-29 NOTE — ED PROVIDER NOTE - OBJECTIVE STATEMENT
60 yo M with PMHx of DM, asthma, HTN, HLD, obesity, JIMMY on CPAP, peripheral neuropathy 2/2 DM, arthritis who presents with gradual onset of constant, moderate/severe, tight pain to L hand radiating up arm since yesterday after placing leeches on his hand for arthritis pain. Associated with swelling, redness, blisters. No alleviating/aggravating factors. No fever, chills, nausea, vomiting, numbness, tingling. Last tetanus unknown. 58 yo M with PMHx of DM, asthma, HTN, HLD, obesity, JIMMY on CPAP, peripheral neuropathy 2/2 DM, arthritis who presents with rapid onset of constant, moderate/severe, tight pain to L hand radiating up arm since yesterday after placing leeches on his hand for arthritis pain. Associated with swelling, redness, blisters. No alleviating/aggravating factors. No fever, chills, nausea, vomiting, numbness, tingling. Last tetanus unknown.

## 2019-09-29 NOTE — ED PROVIDER NOTE - NS ED ROS FT
GEN:  no fever, no chills  NEURO:  no headache, no dizziness  ENT: no sore throat, no runny nose  CV:  no chest pain, no SOB  RESP:  no dyspnea, no cough  GI:  no nausea, no vomiting, no abdominal pain  :  no dysuria, no urinary frequency, no hematuria  MSK:  + joint pain, + edema  SKIN:  + rash, no bruising  HEME: no hematochezia, no melena

## 2019-09-29 NOTE — ED ADULT TRIAGE NOTE - CHIEF COMPLAINT QUOTE
Pt with c/o of L hand swelling and redness/ after applying leche Pt with c/o of L hand swelling and redness/ after applying leech therapy

## 2019-09-29 NOTE — ED PROVIDER NOTE - PHYSICAL EXAMINATION
CONSTITUTIONAL: obese, well developed, nontoxic appearing, in no acute distress, speaking in full sentences  SKIN: 3 punctate wounds to dorsum of L hand with surrounding bullae, erythema/swelling to L hand extending to L elbow, no streaking, soft compartment, no crepitus, no axillary lymphadenopathy, cap refill < 2 seconds  HEENT: normocephalic, atraumatic, no conjunctival erythema, moist mucous membranes, patent airway  NECK: supple, no masses  CV:  mildly tachycardic rate, regular rhythm, 2+ radial pulses bilaterally  RESP: no wheezes, no rales, no rhonchi, normal work of breathing  ABD: soft, nontender, nondistended, no rebound, no guarding  MSK: normal thumbs up sign, normal OK sign, normal  strength, sensation intact to touch   NEURO: alert, oriented, grossly unremarkable  PSYCH: cooperative, appropriate CONSTITUTIONAL: obese, well developed, nontoxic appearing, in no acute distress, speaking in full sentences  SKIN: 3 punctate wounds to dorsum of L hand with surrounding bullae, erythema/swelling to L hand extending to L elbow with induration, no streaking, soft compartment, no crepitus, no axillary lymphadenopathy, no fluctuance, cap refill < 2 seconds  HEENT: normocephalic, atraumatic, no conjunctival erythema, moist mucous membranes, patent airway  NECK: supple, no masses  CV:  mildly tachycardic rate, regular rhythm, 2+ radial pulses bilaterally  RESP: no wheezes, no rales, no rhonchi, normal work of breathing  ABD: soft, nontender, nondistended, no rebound, no guarding  MSK: normal thumbs up sign, normal OK sign, normal  strength, sensation intact to touch   NEURO: alert, oriented, grossly unremarkable  PSYCH: cooperative, appropriate

## 2019-09-29 NOTE — ED PROVIDER NOTE - PMH
Arthritis    Asthma    Diabetes  20 yrs  HTN (hypertension)    Hypercholesteremia    Obesity    Obstructive sleep apnea on CPAP    Peripheral neuropathy  related to diabetes

## 2019-09-30 ENCOUNTER — TRANSCRIPTION ENCOUNTER (OUTPATIENT)
Age: 59
End: 2019-09-30

## 2019-09-30 VITALS
DIASTOLIC BLOOD PRESSURE: 64 MMHG | OXYGEN SATURATION: 96 % | HEART RATE: 89 BPM | RESPIRATION RATE: 18 BRPM | TEMPERATURE: 98 F | SYSTOLIC BLOOD PRESSURE: 133 MMHG

## 2019-09-30 LAB
ALBUMIN SERPL ELPH-MCNC: 4.2 G/DL — SIGNIFICANT CHANGE UP (ref 3.5–5.2)
ALBUMIN SERPL ELPH-MCNC: 4.5 G/DL — SIGNIFICANT CHANGE UP (ref 3.5–5.2)
ALP SERPL-CCNC: 48 U/L — SIGNIFICANT CHANGE UP (ref 30–115)
ALP SERPL-CCNC: 51 U/L — SIGNIFICANT CHANGE UP (ref 30–115)
ALT FLD-CCNC: 13 U/L — SIGNIFICANT CHANGE UP (ref 0–41)
ALT FLD-CCNC: 14 U/L — SIGNIFICANT CHANGE UP (ref 0–41)
ANION GAP SERPL CALC-SCNC: 12 MMOL/L — SIGNIFICANT CHANGE UP (ref 7–14)
ANION GAP SERPL CALC-SCNC: 14 MMOL/L — SIGNIFICANT CHANGE UP (ref 7–14)
APPEARANCE UR: CLEAR — SIGNIFICANT CHANGE UP
APTT BLD: 31.9 SEC — SIGNIFICANT CHANGE UP (ref 27–39.2)
AST SERPL-CCNC: 10 U/L — SIGNIFICANT CHANGE UP (ref 0–41)
AST SERPL-CCNC: 24 U/L — SIGNIFICANT CHANGE UP (ref 0–41)
BACTERIA # UR AUTO: NEGATIVE — SIGNIFICANT CHANGE UP
BASOPHILS # BLD AUTO: 0.02 K/UL — SIGNIFICANT CHANGE UP (ref 0–0.2)
BASOPHILS # BLD AUTO: 0.03 K/UL — SIGNIFICANT CHANGE UP (ref 0–0.2)
BASOPHILS NFR BLD AUTO: 0.2 % — SIGNIFICANT CHANGE UP (ref 0–1)
BASOPHILS NFR BLD AUTO: 0.4 % — SIGNIFICANT CHANGE UP (ref 0–1)
BILIRUB SERPL-MCNC: 0.7 MG/DL — SIGNIFICANT CHANGE UP (ref 0.2–1.2)
BILIRUB SERPL-MCNC: 0.7 MG/DL — SIGNIFICANT CHANGE UP (ref 0.2–1.2)
BILIRUB UR-MCNC: NEGATIVE — SIGNIFICANT CHANGE UP
BUN SERPL-MCNC: 19 MG/DL — SIGNIFICANT CHANGE UP (ref 10–20)
BUN SERPL-MCNC: 20 MG/DL — SIGNIFICANT CHANGE UP (ref 10–20)
CALCIUM SERPL-MCNC: 10 MG/DL — SIGNIFICANT CHANGE UP (ref 8.5–10.1)
CALCIUM SERPL-MCNC: 9.5 MG/DL — SIGNIFICANT CHANGE UP (ref 8.5–10.1)
CHLORIDE SERPL-SCNC: 101 MMOL/L — SIGNIFICANT CHANGE UP (ref 98–110)
CHLORIDE SERPL-SCNC: 99 MMOL/L — SIGNIFICANT CHANGE UP (ref 98–110)
CK SERPL-CCNC: 154 U/L — SIGNIFICANT CHANGE UP (ref 0–225)
CO2 SERPL-SCNC: 26 MMOL/L — SIGNIFICANT CHANGE UP (ref 17–32)
CO2 SERPL-SCNC: 29 MMOL/L — SIGNIFICANT CHANGE UP (ref 17–32)
COLOR SPEC: SIGNIFICANT CHANGE UP
CREAT SERPL-MCNC: 1 MG/DL — SIGNIFICANT CHANGE UP (ref 0.7–1.5)
CREAT SERPL-MCNC: 1.2 MG/DL — SIGNIFICANT CHANGE UP (ref 0.7–1.5)
DIFF PNL FLD: NEGATIVE — SIGNIFICANT CHANGE UP
EOSINOPHIL # BLD AUTO: 0.18 K/UL — SIGNIFICANT CHANGE UP (ref 0–0.7)
EOSINOPHIL # BLD AUTO: 0.2 K/UL — SIGNIFICANT CHANGE UP (ref 0–0.7)
EOSINOPHIL NFR BLD AUTO: 2.2 % — SIGNIFICANT CHANGE UP (ref 0–8)
EOSINOPHIL NFR BLD AUTO: 2.2 % — SIGNIFICANT CHANGE UP (ref 0–8)
EPI CELLS # UR: 0 /HPF — SIGNIFICANT CHANGE UP (ref 0–5)
GLUCOSE BLDC GLUCOMTR-MCNC: 110 MG/DL — HIGH (ref 70–99)
GLUCOSE BLDC GLUCOMTR-MCNC: 144 MG/DL — HIGH (ref 70–99)
GLUCOSE BLDC GLUCOMTR-MCNC: 180 MG/DL — HIGH (ref 70–99)
GLUCOSE SERPL-MCNC: 137 MG/DL — HIGH (ref 70–99)
GLUCOSE SERPL-MCNC: 171 MG/DL — HIGH (ref 70–99)
GLUCOSE UR QL: ABNORMAL
HCT VFR BLD CALC: 45 % — SIGNIFICANT CHANGE UP (ref 42–52)
HCT VFR BLD CALC: 46.3 % — SIGNIFICANT CHANGE UP (ref 42–52)
HGB BLD-MCNC: 14.7 G/DL — SIGNIFICANT CHANGE UP (ref 14–18)
HGB BLD-MCNC: 14.8 G/DL — SIGNIFICANT CHANGE UP (ref 14–18)
HYALINE CASTS # UR AUTO: 0 /LPF — SIGNIFICANT CHANGE UP (ref 0–7)
IMM GRANULOCYTES NFR BLD AUTO: 0.3 % — SIGNIFICANT CHANGE UP (ref 0.1–0.3)
IMM GRANULOCYTES NFR BLD AUTO: 0.5 % — HIGH (ref 0.1–0.3)
INR BLD: 0.94 RATIO — SIGNIFICANT CHANGE UP (ref 0.65–1.3)
KETONES UR-MCNC: NEGATIVE — SIGNIFICANT CHANGE UP
LACTATE SERPL-SCNC: 1.6 MMOL/L — SIGNIFICANT CHANGE UP (ref 0.5–2.2)
LACTATE SERPL-SCNC: 1.7 MMOL/L — SIGNIFICANT CHANGE UP (ref 0.5–2.2)
LEUKOCYTE ESTERASE UR-ACNC: NEGATIVE — SIGNIFICANT CHANGE UP
LYMPHOCYTES # BLD AUTO: 1.01 K/UL — LOW (ref 1.2–3.4)
LYMPHOCYTES # BLD AUTO: 1.44 K/UL — SIGNIFICANT CHANGE UP (ref 1.2–3.4)
LYMPHOCYTES # BLD AUTO: 12.5 % — LOW (ref 20.5–51.1)
LYMPHOCYTES # BLD AUTO: 15.7 % — LOW (ref 20.5–51.1)
MAGNESIUM SERPL-MCNC: 1.8 MG/DL — SIGNIFICANT CHANGE UP (ref 1.8–2.4)
MAGNESIUM SERPL-MCNC: 1.9 MG/DL — SIGNIFICANT CHANGE UP (ref 1.8–2.4)
MCHC RBC-ENTMCNC: 28.5 PG — SIGNIFICANT CHANGE UP (ref 27–31)
MCHC RBC-ENTMCNC: 29.2 PG — SIGNIFICANT CHANGE UP (ref 27–31)
MCHC RBC-ENTMCNC: 31.7 G/DL — LOW (ref 32–37)
MCHC RBC-ENTMCNC: 32.9 G/DL — SIGNIFICANT CHANGE UP (ref 32–37)
MCV RBC AUTO: 88.9 FL — SIGNIFICANT CHANGE UP (ref 80–94)
MCV RBC AUTO: 89.7 FL — SIGNIFICANT CHANGE UP (ref 80–94)
MONOCYTES # BLD AUTO: 0.66 K/UL — HIGH (ref 0.1–0.6)
MONOCYTES # BLD AUTO: 0.81 K/UL — HIGH (ref 0.1–0.6)
MONOCYTES NFR BLD AUTO: 8.2 % — SIGNIFICANT CHANGE UP (ref 1.7–9.3)
MONOCYTES NFR BLD AUTO: 8.9 % — SIGNIFICANT CHANGE UP (ref 1.7–9.3)
NEUTROPHILS # BLD AUTO: 6.14 K/UL — SIGNIFICANT CHANGE UP (ref 1.4–6.5)
NEUTROPHILS # BLD AUTO: 6.65 K/UL — HIGH (ref 1.4–6.5)
NEUTROPHILS NFR BLD AUTO: 72.7 % — SIGNIFICANT CHANGE UP (ref 42.2–75.2)
NEUTROPHILS NFR BLD AUTO: 76.2 % — HIGH (ref 42.2–75.2)
NITRITE UR-MCNC: NEGATIVE — SIGNIFICANT CHANGE UP
NRBC # BLD: 0 /100 WBCS — SIGNIFICANT CHANGE UP (ref 0–0)
NRBC # BLD: 0 /100 WBCS — SIGNIFICANT CHANGE UP (ref 0–0)
PH UR: 6 — SIGNIFICANT CHANGE UP (ref 5–8)
PHOSPHATE SERPL-MCNC: 3.9 MG/DL — SIGNIFICANT CHANGE UP (ref 2.1–4.9)
PLATELET # BLD AUTO: 141 K/UL — SIGNIFICANT CHANGE UP (ref 130–400)
PLATELET # BLD AUTO: 176 K/UL — SIGNIFICANT CHANGE UP (ref 130–400)
POTASSIUM SERPL-MCNC: 3.8 MMOL/L — SIGNIFICANT CHANGE UP (ref 3.5–5)
POTASSIUM SERPL-MCNC: 5 MMOL/L — SIGNIFICANT CHANGE UP (ref 3.5–5)
POTASSIUM SERPL-SCNC: 3.8 MMOL/L — SIGNIFICANT CHANGE UP (ref 3.5–5)
POTASSIUM SERPL-SCNC: 5 MMOL/L — SIGNIFICANT CHANGE UP (ref 3.5–5)
PROT SERPL-MCNC: 6.8 G/DL — SIGNIFICANT CHANGE UP (ref 6–8)
PROT SERPL-MCNC: 7.1 G/DL — SIGNIFICANT CHANGE UP (ref 6–8)
PROT UR-MCNC: ABNORMAL
PROTHROM AB SERPL-ACNC: 10.8 SEC — SIGNIFICANT CHANGE UP (ref 9.95–12.87)
RBC # BLD: 5.06 M/UL — SIGNIFICANT CHANGE UP (ref 4.7–6.1)
RBC # BLD: 5.16 M/UL — SIGNIFICANT CHANGE UP (ref 4.7–6.1)
RBC # FLD: 15.3 % — HIGH (ref 11.5–14.5)
RBC # FLD: 15.4 % — HIGH (ref 11.5–14.5)
RBC CASTS # UR COMP ASSIST: 1 /HPF — SIGNIFICANT CHANGE UP (ref 0–4)
SODIUM SERPL-SCNC: 139 MMOL/L — SIGNIFICANT CHANGE UP (ref 135–146)
SODIUM SERPL-SCNC: 142 MMOL/L — SIGNIFICANT CHANGE UP (ref 135–146)
SP GR SPEC: 1.02 — SIGNIFICANT CHANGE UP (ref 1.01–1.02)
UROBILINOGEN FLD QL: SIGNIFICANT CHANGE UP
WBC # BLD: 8.06 K/UL — SIGNIFICANT CHANGE UP (ref 4.8–10.8)
WBC # BLD: 9.15 K/UL — SIGNIFICANT CHANGE UP (ref 4.8–10.8)
WBC # FLD AUTO: 8.06 K/UL — SIGNIFICANT CHANGE UP (ref 4.8–10.8)
WBC # FLD AUTO: 9.15 K/UL — SIGNIFICANT CHANGE UP (ref 4.8–10.8)
WBC UR QL: 0 /HPF — SIGNIFICANT CHANGE UP (ref 0–5)

## 2019-09-30 PROCEDURE — 73090 X-RAY EXAM OF FOREARM: CPT | Mod: 26,LT

## 2019-09-30 PROCEDURE — 99223 1ST HOSP IP/OBS HIGH 75: CPT

## 2019-09-30 PROCEDURE — 73130 X-RAY EXAM OF HAND: CPT | Mod: 26,LT

## 2019-09-30 RX ORDER — CHLORHEXIDINE GLUCONATE 213 G/1000ML
1 SOLUTION TOPICAL
Refills: 0 | Status: DISCONTINUED | OUTPATIENT
Start: 2019-09-30 | End: 2019-09-30

## 2019-09-30 RX ORDER — ROSUVASTATIN CALCIUM 5 MG/1
1 TABLET ORAL
Qty: 0 | Refills: 0 | DISCHARGE

## 2019-09-30 RX ORDER — HYDROCHLOROTHIAZIDE 25 MG
12.5 TABLET ORAL
Refills: 0 | Status: DISCONTINUED | OUTPATIENT
Start: 2019-09-30 | End: 2019-09-30

## 2019-09-30 RX ORDER — CEFEPIME 1 G/1
1000 INJECTION, POWDER, FOR SOLUTION INTRAMUSCULAR; INTRAVENOUS EVERY 8 HOURS
Refills: 0 | Status: DISCONTINUED | OUTPATIENT
Start: 2019-09-30 | End: 2019-09-30

## 2019-09-30 RX ORDER — LOSARTAN POTASSIUM 100 MG/1
50 TABLET, FILM COATED ORAL
Refills: 0 | Status: DISCONTINUED | OUTPATIENT
Start: 2019-09-30 | End: 2019-09-30

## 2019-09-30 RX ORDER — ALBUTEROL 90 UG/1
2 AEROSOL, METERED ORAL
Qty: 0 | Refills: 0 | DISCHARGE

## 2019-09-30 RX ORDER — METFORMIN HYDROCHLORIDE 850 MG/1
0 TABLET ORAL
Qty: 0 | Refills: 0 | DISCHARGE

## 2019-09-30 RX ORDER — HEPARIN SODIUM 5000 [USP'U]/ML
5000 INJECTION INTRAVENOUS; SUBCUTANEOUS EVERY 8 HOURS
Refills: 0 | Status: DISCONTINUED | OUTPATIENT
Start: 2019-09-30 | End: 2019-09-30

## 2019-09-30 RX ORDER — ALBUTEROL 90 UG/1
2 AEROSOL, METERED ORAL
Refills: 0 | Status: DISCONTINUED | OUTPATIENT
Start: 2019-09-30 | End: 2019-09-30

## 2019-09-30 RX ORDER — SEMAGLUTIDE 0.68 MG/ML
0 INJECTION, SOLUTION SUBCUTANEOUS
Qty: 0 | Refills: 0 | DISCHARGE

## 2019-09-30 RX ORDER — SODIUM CHLORIDE 9 MG/ML
1000 INJECTION INTRAMUSCULAR; INTRAVENOUS; SUBCUTANEOUS ONCE
Refills: 0 | Status: COMPLETED | OUTPATIENT
Start: 2019-09-30 | End: 2019-09-30

## 2019-09-30 RX ORDER — GABAPENTIN 400 MG/1
0 CAPSULE ORAL
Qty: 0 | Refills: 0 | DISCHARGE

## 2019-09-30 RX ORDER — ASPIRIN/CALCIUM CARB/MAGNESIUM 324 MG
81 TABLET ORAL DAILY
Refills: 0 | Status: DISCONTINUED | OUTPATIENT
Start: 2019-09-30 | End: 2019-09-30

## 2019-09-30 RX ORDER — INSULIN GLARGINE 100 [IU]/ML
80 INJECTION, SOLUTION SUBCUTANEOUS AT BEDTIME
Refills: 0 | Status: DISCONTINUED | OUTPATIENT
Start: 2019-09-30 | End: 2019-09-30

## 2019-09-30 RX ORDER — PIOGLITAZONE HYDROCHLORIDE 15 MG/1
1 TABLET ORAL
Qty: 0 | Refills: 0 | DISCHARGE

## 2019-09-30 RX ORDER — ASPIRIN/CALCIUM CARB/MAGNESIUM 324 MG
1 TABLET ORAL
Qty: 0 | Refills: 0 | DISCHARGE

## 2019-09-30 RX ORDER — ATORVASTATIN CALCIUM 80 MG/1
80 TABLET, FILM COATED ORAL AT BEDTIME
Refills: 0 | Status: DISCONTINUED | OUTPATIENT
Start: 2019-09-30 | End: 2019-09-30

## 2019-09-30 RX ORDER — EMPAGLIFLOZIN AND LINAGLIPTIN 10; 5 MG/1; MG/1
1 TABLET, FILM COATED ORAL
Qty: 0 | Refills: 0 | DISCHARGE

## 2019-09-30 RX ORDER — GABAPENTIN 400 MG/1
100 CAPSULE ORAL THREE TIMES A DAY
Refills: 0 | Status: DISCONTINUED | OUTPATIENT
Start: 2019-09-30 | End: 2019-09-30

## 2019-09-30 RX ORDER — VANCOMYCIN HCL 1 G
1750 VIAL (EA) INTRAVENOUS EVERY 12 HOURS
Refills: 0 | Status: DISCONTINUED | OUTPATIENT
Start: 2019-09-30 | End: 2019-09-30

## 2019-09-30 RX ORDER — LOSARTAN/HYDROCHLOROTHIAZIDE 100MG-25MG
0 TABLET ORAL
Qty: 0 | Refills: 0 | DISCHARGE

## 2019-09-30 RX ORDER — AZTREONAM 2 G
2 VIAL (EA) INJECTION
Qty: 28 | Refills: 0
Start: 2019-09-30 | End: 2019-10-06

## 2019-09-30 RX ORDER — ACETAMINOPHEN 500 MG
975 TABLET ORAL ONCE
Refills: 0 | Status: COMPLETED | OUTPATIENT
Start: 2019-09-30 | End: 2019-09-30

## 2019-09-30 RX ORDER — ALBIGLUTIDE 30 MG/.5ML
1 INJECTION, POWDER, LYOPHILIZED, FOR SOLUTION SUBCUTANEOUS
Qty: 0 | Refills: 0 | DISCHARGE

## 2019-09-30 RX ADMIN — GABAPENTIN 100 MILLIGRAM(S): 400 CAPSULE ORAL at 11:53

## 2019-09-30 RX ADMIN — Medication 250 MILLIGRAM(S): at 16:51

## 2019-09-30 RX ADMIN — LOSARTAN POTASSIUM 50 MILLIGRAM(S): 100 TABLET, FILM COATED ORAL at 16:51

## 2019-09-30 RX ADMIN — Medication 81 MILLIGRAM(S): at 11:53

## 2019-09-30 RX ADMIN — Medication 250 MILLIGRAM(S): at 01:00

## 2019-09-30 RX ADMIN — GABAPENTIN 100 MILLIGRAM(S): 400 CAPSULE ORAL at 06:17

## 2019-09-30 RX ADMIN — CEFEPIME 100 MILLIGRAM(S): 1 INJECTION, POWDER, FOR SOLUTION INTRAMUSCULAR; INTRAVENOUS at 06:00

## 2019-09-30 RX ADMIN — TETANUS TOXOID, REDUCED DIPHTHERIA TOXOID AND ACELLULAR PERTUSSIS VACCINE, ADSORBED 0.5 MILLILITER(S): 5; 2.5; 8; 8; 2.5 SUSPENSION INTRAMUSCULAR at 01:33

## 2019-09-30 RX ADMIN — Medication 250 MILLIGRAM(S): at 06:17

## 2019-09-30 RX ADMIN — CEFEPIME 100 MILLIGRAM(S): 1 INJECTION, POWDER, FOR SOLUTION INTRAMUSCULAR; INTRAVENOUS at 00:53

## 2019-09-30 RX ADMIN — LOSARTAN POTASSIUM 50 MILLIGRAM(S): 100 TABLET, FILM COATED ORAL at 11:55

## 2019-09-30 RX ADMIN — CEFEPIME 100 MILLIGRAM(S): 1 INJECTION, POWDER, FOR SOLUTION INTRAMUSCULAR; INTRAVENOUS at 11:53

## 2019-09-30 RX ADMIN — Medication 12.5 MILLIGRAM(S): at 16:51

## 2019-09-30 RX ADMIN — Medication 12.5 MILLIGRAM(S): at 06:17

## 2019-09-30 RX ADMIN — CEFEPIME 2000 MILLIGRAM(S): 1 INJECTION, POWDER, FOR SOLUTION INTRAMUSCULAR; INTRAVENOUS at 06:18

## 2019-09-30 RX ADMIN — Medication 975 MILLIGRAM(S): at 01:31

## 2019-09-30 RX ADMIN — HEPARIN SODIUM 5000 UNIT(S): 5000 INJECTION INTRAVENOUS; SUBCUTANEOUS at 06:17

## 2019-09-30 RX ADMIN — SODIUM CHLORIDE 1000 MILLILITER(S): 9 INJECTION INTRAMUSCULAR; INTRAVENOUS; SUBCUTANEOUS at 05:15

## 2019-09-30 RX ADMIN — Medication 975 MILLIGRAM(S): at 06:15

## 2019-09-30 RX ADMIN — SODIUM CHLORIDE 2000 MILLILITER(S): 9 INJECTION INTRAMUSCULAR; INTRAVENOUS; SUBCUTANEOUS at 01:14

## 2019-09-30 RX ADMIN — Medication 2000 MILLIGRAM(S): at 05:15

## 2019-09-30 NOTE — DISCHARGE NOTE PROVIDER - CARE PROVIDER_API CALL
Kleiner, Morton J (MD)  Internal Medicine; Nephrology  [address] [place] [address]  Phone: [phone]  Fax: [phone]  Follow Up Time:     Truong Morris)  Infectious Disease; Internal Medicine  [address] [place] [address]  Phone: [phone]  Fax: [phone]  Follow Up Time:

## 2019-09-30 NOTE — ED ADULT NURSE NOTE - OBJECTIVE STATEMENT
pt presents to ed with left hand swelling, pain, and tightness radiating up the arm since yesterday. pt placed leeches and his hand to try to alleviate arthritis pain. pt stated he has used leeches in the past and nothing like this had ever happened. associated symptoms include swelling, redness, and blisters to left hand and arm. No alleviating/aggravating factors. pt denies fever, chills, nausea, vomiting, numbness, tingling.

## 2019-09-30 NOTE — DISCHARGE NOTE NURSING/CASE MANAGEMENT/SOCIAL WORK - PATIENT PORTAL LINK FT
You can access the FollowMyHealth Patient Portal offered by [facility] by registering at the following website: [email or web address]. By joining [facility]’s FollowMyHealth portal, you will also be able to view your health information using other applications (apps) compatible with our system.

## 2019-09-30 NOTE — CONSULT NOTE ADULT - SUBJECTIVE AND OBJECTIVE BOX
MAXIMEJAZ CABRERA    59y Male presenting to the ED for severe swelling and pruritis of his left wrist after leech therapy 2 days ago. Pt had three leeched placed on the dorsum of his left hand 2 days ago. He experienced bleeding and pruritis immediately after the therapy--as per pt this is normal after leech therapy. The site of therapy was covered with gauze and tape. The next morning the pt noticed his LE was severely swollen, painful, and the pruritis had worsened. Currently pt is complaining of 9/10 pruritis, and 7/10 pain. It is associated with redness, warmth, tense feeling in his hand, and vesicles. Pt has a history of OA in his hands, knees and hips. Pt has done leech therapy multiple times for hx of chronic pain related to OA. Previous leech therapy was 8 months ago. Pt has never experienced a similar reaction after leech therapy.  Pt denies fevers, chills, or any other associated symptoms. ROS is otherwise negative.     From ID standpoint, pt is currently on cefepime   IVPB 1000 milliGRAM(s) IV Intermittent every 8 hours and vancomycin  IVPB 1750 milliGRAM(s) IV Intermittent every 12 hours. pts tdap was updated. And x-ray did not demonstrate OM. bcx are pending.    Allergy: No Known Allergies      CHIEF COMPLAINT: pain with swelling and redness of left hand (30 Sep 2019 04:02)      HPI:  58 yo M with PMHx of DM type 2, Mod aortic stenosis, asthma, HTN, HLD, obesity, JIMMY on CPAP, peripheral neuropathy 2/2 DM, Madelung disease, arthritis who presents with rapid onset of constant, moderate/severe, tight pain to L hand radiating up arm since yesterday after placing leeches on his hand for arthritis pain. Pain is associated with swelling, redness, blisters. No alleviating/aggravating factors. No fever, chills, nausea, vomiting, numbness, tingling. He can move his fingers but cannot make a fist. Pt states that his girlfriend is an expert in leech therapy and he tried it to relieve his arthritis. Pt states that w/up has been neg previously for inflammatory arthritis. He denied any hx of fever, chills, nausea, vomiting, diarrhea, constipation, melena, pain in abdomen, sob, chest pain, cough, increased urinary frequency, dysuria, headache, lightheadedness, dizziness, vertigo. (30 Sep 2019 04:02)    FAMILY HISTORY:  CAD (coronary artery disease) (Mother, Father)  Family history of diabetes mellitus (Mother, Sibling)    PAST MEDICAL & SURGICAL HISTORY:  Hypercholesteremia  Arthritis  Peripheral neuropathy: related to diabetes  Obesity  Asthma  Obstructive sleep apnea on CPAP  HTN (hypertension)  Diabetes: 20 yrs  Encounter for screening colonoscopy  Fracture of orbital floor  History of hip replacement, total, bilateral: two separate times &#x27;17, &#x27;07  H/O lipoma: times 2-3      ROS  General: Denies fevers, chills, nightsweats, weight loss  HEENT: Denies headache, rhinorrhea, sore throat, eye pain  CV: Denies CP, palpitations  PULM: Denies SOB, cough  GI: Denies abdominal pain, diarrhea  : Denies dysuria, hematuria  MSK: hx of OA  SKIN: as above   NEURO: hx of diabetic neuropathy      VITALS:  T(F): 96.1, Max: 98.1 (19 @ 22:35)  HR: 80  BP: 139/69  RR: 18Vital Signs Last 24 Hrs  T(C): 35.6 (30 Sep 2019 07:22), Max: 36.7 (29 Sep 2019 22:35)  T(F): 96.1 (30 Sep 2019 07:22), Max: 98.1 (29 Sep 2019 22:35)  HR: 80 (30 Sep 2019 07:22) (79 - 100)  BP: 139/69 (30 Sep 2019 07:22) (139/69 - 166/86)  BP(mean): --  RR: 18 (30 Sep 2019 07:22) (18 - 20)  SpO2: 97% (30 Sep 2019 07:22) (97% - 98%)    PHYSICAL EXAM:  Gen: NAD, resting in bed  HEENT: Normocephalic, atraumatic  Neck: supple, multiple large lipomas felt in the neck anteriorly and posteriorly  CV: Regular rate & regular rhythm, systolic murmur loudest in aortic area.  Lungs: clear to auscultation bilaterally  Abdomen: Soft, BS present  Ext: 2+ pulses b/l. 3 punctate lesions on dorsum of left wrist where leech therapy was performed. Linear vesicles around the therapy area. Multiple excoriations extending from the dorsal hand to the forearm. Tense hand/arm compared to the right. No lymphadenopathy appreciated.       TESTS & MEASUREMENTS:                        14.8   9.15  )-----------( 176      ( 29 Sep 2019 23:50 )             45.0         142  |  101  |  20  ----------------------------<  137<H>  5.0   |  29  |  1.2    Ca    10.0      29 Sep 2019 23:50  Phos  3.9       Mg     1.9         TPro  7.1  /  Alb  4.5  /  TBili  0.7  /  DBili  x   /  AST  24  /  ALT  14  /  AlkPhos  51      eGFR if Non African American: 65 mL/min/1.73M2 (19 @ 23:50)  eGFR if : 75 mL/min/1.73M2 (19 @ 23:50)    LIVER FUNCTIONS - ( 29 Sep 2019 23:50 )  Alb: 4.5 g/dL / Pro: 7.1 g/dL / ALK PHOS: 51 U/L / ALT: 14 U/L / AST: 24 U/L / GGT: x           Urinalysis Basic - ( 30 Sep 2019 00:00 )    Color: Light Yellow / Appearance: Clear / S.020 / pH: x  Gluc: x / Ketone: Negative  / Bili: Negative / Urobili: <2 mg/dL   Blood: x / Protein: 30 mg/dL / Nitrite: Negative   Leuk Esterase: Negative / RBC: 1 /HPF / WBC 0 /HPF   Sq Epi: x / Non Sq Epi: 0 /HPF / Bacteria: Negative          Lactate, Blood: 1.7 mmol/L (19 @ 23:50)      INFECTIOUS DISEASES TESTING      RADIOLOGY & ADDITIONAL TESTS:        CARDIOLOGY TESTING  12 Lead ECG:   Ventricular Rate 97 BPM    Atrial Rate 97 BPM    P-R Interval 154 ms    QRS Duration 82 ms    Q-T Interval 318 ms    QTC Calculation(Bezet) 403 ms    P Axis 29 degrees    R Axis -27 degrees    T Axis 58 degrees    Diagnosis Line Normal sinus rhythm  Normal ECG    Confirmed by Kiki Argueta MD ([id number]) on 2019 8:00:16 AM (19 @ 23:44)      MEDICATIONS  aspirin enteric coated 81  atorvastatin 80  cefepime   IVPB 1000  chlorhexidine 4% Liquid 1  gabapentin 100  heparin  Injectable 5000  hydrochlorothiazide 12.5  losartan 50  vancomycin  IVPB 1750      ANTIBIOTICS:  cefepime   IVPB 1000 milliGRAM(s) IV Intermittent every 8 hours  vancomycin  IVPB 1750 milliGRAM(s) IV Intermittent every 12 hours JAZ GAVIN    59y Male presenting to the ED for severe swelling and pruritis of his left wrist after leech therapy 2 days ago. Pt had three leeches placed on the dorsum of his left hand 2 days ago. He experienced bleeding and pruritis immediately after the therapy--as per pt this is normal after leech therapy. The site of therapy was covered with gauze and tape. The next morning the pt noticed his LE was severely swollen, painful, and the pruritis had worsened. Pt took 500mg of azithromycin prescribed to his girlfriend before presenting to the ED. Currently pt is complaining of 9/10 pruritis, and 7/10 pain. It is associated with redness, warmth, tense feeling in his hand, and vesicles. Pt has a history of OA in his hands, knees and hips. Pt has done leech therapy multiple times for hx of chronic pain related to OA. Previous leech therapy was 8 months ago. Pt has never experienced a similar reaction after leech therapy.  Pt denies fevers, chills, or any other associated symptoms. ROS is otherwise negative.     From ID standpoint, pt is currently on cefepime   IVPB 1000 milliGRAM(s) IV Intermittent every 8 hours and vancomycin  IVPB 1750 milliGRAM(s) IV Intermittent every 12 hours. pts tdap was updated. And x-ray did not demonstrate OM. bcx are pending.    Allergy: No Known Allergies      CHIEF COMPLAINT: pain with swelling and redness of left hand (30 Sep 2019 04:02)      HPI:  58 yo M with PMHx of DM type 2, Mod aortic stenosis, asthma, HTN, HLD, obesity, JIMMY on CPAP, peripheral neuropathy 2/2 DM, Madelung disease, arthritis who presents with rapid onset of constant, moderate/severe, tight pain to L hand radiating up arm since yesterday after placing leeches on his hand for arthritis pain. Pain is associated with swelling, redness, blisters. No alleviating/aggravating factors. No fever, chills, nausea, vomiting, numbness, tingling. He can move his fingers but cannot make a fist. Pt states that his girlfriend is an expert in leech therapy and he tried it to relieve his arthritis. Pt states that w/up has been neg previously for inflammatory arthritis. He denied any hx of fever, chills, nausea, vomiting, diarrhea, constipation, melena, pain in abdomen, sob, chest pain, cough, increased urinary frequency, dysuria, headache, lightheadedness, dizziness, vertigo. (30 Sep 2019 04:02)    FAMILY HISTORY:  CAD (coronary artery disease) (Mother, Father)  Family history of diabetes mellitus (Mother, Sibling)    PAST MEDICAL & SURGICAL HISTORY:  Hypercholesteremia  Arthritis  Peripheral neuropathy: related to diabetes  Obesity  Asthma  Obstructive sleep apnea on CPAP  HTN (hypertension)  Diabetes: 20 yrs  Encounter for screening colonoscopy  Fracture of orbital floor  History of hip replacement, total, bilateral: two separate times &#x27;17, &#x27;07  H/O lipoma: times 2-3      ROS  General: Denies fevers, chills, nightsweats, weight loss  HEENT: Denies headache, rhinorrhea, sore throat, eye pain  CV: Denies CP, palpitations  PULM: Denies SOB, cough  GI: Denies abdominal pain, diarrhea  : Denies dysuria, hematuria  MSK: hx of OA  SKIN: as above   NEURO: hx of diabetic neuropathy      VITALS:  T(F): 96.1, Max: 98.1 (19 @ 22:35)  HR: 80  BP: 139/69  RR: 18Vital Signs Last 24 Hrs  T(C): 35.6 (30 Sep 2019 07:22), Max: 36.7 (29 Sep 2019 22:35)  T(F): 96.1 (30 Sep 2019 07:22), Max: 98.1 (29 Sep 2019 22:35)  HR: 80 (30 Sep 2019 07:22) (79 - 100)  BP: 139/69 (30 Sep 2019 07:22) (139/69 - 166/86)  BP(mean): --  RR: 18 (30 Sep 2019 07:22) (18 - 20)  SpO2: 97% (30 Sep 2019 07:22) (97% - 98%)    PHYSICAL EXAM:  Gen: NAD, resting in bed  HEENT: Normocephalic, atraumatic  Neck: supple, multiple large lipomas felt in the neck anteriorly and posteriorly  CV: Regular rate & regular rhythm, systolic murmur loudest in aortic area.  Lungs: clear to auscultation bilaterally  Abdomen: Soft, BS present  Ext: 2+ pulses b/l. 3 punctate lesions on dorsum of left wrist where leech therapy was performed. Linear vesicles around the therapy area. Multiple excoriations extending from the dorsal hand to the forearm. Tense hand/arm compared to the right. No lymphadenopathy appreciated.       TESTS & MEASUREMENTS:                        14.8   9.15  )-----------( 176      ( 29 Sep 2019 23:50 )             45.0         142  |  101  |  20  ----------------------------<  137<H>  5.0   |  29  |  1.2    Ca    10.0      29 Sep 2019 23:50  Phos  3.9       Mg     1.9         TPro  7.1  /  Alb  4.5  /  TBili  0.7  /  DBili  x   /  AST  24  /  ALT  14  /  AlkPhos  51      eGFR if Non African American: 65 mL/min/1.73M2 (19 @ 23:50)  eGFR if : 75 mL/min/1.73M2 (19 @ 23:50)    LIVER FUNCTIONS - ( 29 Sep 2019 23:50 )  Alb: 4.5 g/dL / Pro: 7.1 g/dL / ALK PHOS: 51 U/L / ALT: 14 U/L / AST: 24 U/L / GGT: x           Urinalysis Basic - ( 30 Sep 2019 00:00 )    Color: Light Yellow / Appearance: Clear / S.020 / pH: x  Gluc: x / Ketone: Negative  / Bili: Negative / Urobili: <2 mg/dL   Blood: x / Protein: 30 mg/dL / Nitrite: Negative   Leuk Esterase: Negative / RBC: 1 /HPF / WBC 0 /HPF   Sq Epi: x / Non Sq Epi: 0 /HPF / Bacteria: Negative          Lactate, Blood: 1.7 mmol/L (19 @ 23:50)      INFECTIOUS DISEASES TESTING      RADIOLOGY & ADDITIONAL TESTS:        CARDIOLOGY TESTING  12 Lead ECG:   Ventricular Rate 97 BPM    Atrial Rate 97 BPM    P-R Interval 154 ms    QRS Duration 82 ms    Q-T Interval 318 ms    QTC Calculation(Bezet) 403 ms    P Axis 29 degrees    R Axis -27 degrees    T Axis 58 degrees    Diagnosis Line Normal sinus rhythm  Normal ECG    Confirmed by Kiki Argueta MD ([id number]) on 2019 8:00:16 AM (19 @ 23:44)      MEDICATIONS  aspirin enteric coated 81  atorvastatin 80  cefepime   IVPB 1000  chlorhexidine 4% Liquid 1  gabapentin 100  heparin  Injectable 5000  hydrochlorothiazide 12.5  losartan 50  vancomycin  IVPB 1750      ANTIBIOTICS:  cefepime   IVPB 1000 milliGRAM(s) IV Intermittent every 8 hours  vancomycin  IVPB 1750 milliGRAM(s) IV Intermittent every 12 hours

## 2019-09-30 NOTE — H&P ADULT - NSICDXPASTSURGICALHX_GEN_ALL_CORE_FT
PAST SURGICAL HISTORY:  Encounter for screening colonoscopy     Fracture of orbital floor     H/O lipoma times 2-3    History of hip replacement, total, bilateral two separate times '17, '07

## 2019-09-30 NOTE — H&P ADULT - ATTENDING COMMENTS
Patient seen and examined independently. I agree with the resident's note, physical exam, and plan except as below.  Vital Signs Last 24 Hrs  T(C): 35.6 (30 Sep 2019 07:22), Max: 36.7 (29 Sep 2019 22:35)  T(F): 96.1 (30 Sep 2019 07:22), Max: 98.1 (29 Sep 2019 22:35)  HR: 80 (30 Sep 2019 07:22) (79 - 100)  BP: 139/69 (30 Sep 2019 07:22) (139/69 - 166/86)  BP(mean): --  RR: 18 (30 Sep 2019 07:22) (18 - 20)  SpO2: 97% (30 Sep 2019 07:22) (97% - 98%)  PE  nad  morbidly obese  [id number]  ctabl  multiple lipomas - post neck  obese, soft ntnd+bs  no cce  Left dorsal hand 3 puture sites with edema that radiates to mid arm , mild surrounding erythema - no pus visualized    #left hand/arm cellulitis with likely lymphangitic spread sp LEECH therapy at wound sites  elevate arm  need gram negative coverage for Aeromonas - common pathogen with leech tx (he is using it to tx OA)  check blood culture  on cefepimine - when clinically improved can likely use bactrim on dc - follow up bcx  ID eval pending - can likely dc vanco - follow up recs    #DM II - insulin basal bolus - hold oral antidiabetics  CAPILLARY BLOOD GLUCOSE      POCT Blood Glucose.: 180 mg/dL (30 Sep 2019 11:34)  POCT Blood Glucose.: 144 mg/dL (30 Sep 2019 08:39)    #shirin dz - lipomatosis - outpt follow up with MRI and sx    #JIMMY on cpap - pt has his own cpap here    dc planning in 24 - 48 hrs -follow up ID recs

## 2019-09-30 NOTE — H&P ADULT - NSICDXPASTMEDICALHX_GEN_ALL_CORE_FT
PAST MEDICAL HISTORY:  Arthritis     Asthma     Diabetes 20 yrs    HTN (hypertension)     Hypercholesteremia     Obesity     Obstructive sleep apnea on CPAP     Peripheral neuropathy related to diabetes

## 2019-09-30 NOTE — DISCHARGE NOTE PROVIDER - NSDCFUSCHEDAPPT_GEN_ALL_CORE_FT
JAZ GAVIN ; 10/17/2019 ; [facility] [address] JAZ GAVIN ; 10/17/2019 ; [facility] [address]

## 2019-09-30 NOTE — DISCHARGE NOTE PROVIDER - NSDCCAREPROVSEEN_GEN_ALL_CORE_FT
FOLLOW-UP:  Pt. was informed of results & all MD's written orders/directives.  He denies taking any laxatives, stated he didn't even take the stool softener (Dulcolax) prior to the colonoscopy.  Reviewed his labs also as requested & informed him to watch the diet & continue with daily exercise. He verbalized understanding.   Benito Squires

## 2019-09-30 NOTE — H&P ADULT - HISTORY OF PRESENT ILLNESS
58 yo M with PMHx of DM type 2, Mod aortic stenosis, asthma, HTN, HLD, obesity, JIMMY on CPAP, peripheral neuropathy 2/2 DM, Madelung disease, arthritis who presents with rapid onset of constant, moderate/severe, tight pain to L hand radiating up arm since yesterday after placing leeches on his hand for arthritis pain. Pain is associated with swelling, redness, blisters. No alleviating/aggravating factors. No fever, chills, nausea, vomiting, numbness, tingling. He can move his fingers but cannot make a fist. Pt states that his girlfriend is an expert in leech therapy and he tried it to relieve his arthritis. Pt states that w/up has been neg previously for inflammatory arthritis. He denied any hx of fever, chills, nausea, vomiting, diarrhea, constipation, melena, pain in abdomen, sob, chest pain, cough, increased urinary frequency, dysuria, headache, lightheadedness, dizziness, vertigo.

## 2019-09-30 NOTE — ED ADULT NURSE REASSESSMENT NOTE - NS ED NURSE REASSESS COMMENT FT1
received pt from previous nurse Adryan , pt no SOB , AO x 4 , denies any pain this pain, left hand redness noted with swelling , MD aware

## 2019-09-30 NOTE — H&P ADULT - NSHPPHYSICALEXAM_GEN_ALL_CORE
PHYSICAL EXAM:  GENERAL: NAD, obese  HEAD:  Atraumatic, Normocephalic  EYES: EOMI, PERRLA, conjunctiva and sclera clear  ENT: Moist mucous membranes  NECK: swelling and distention of neck, No JVD  CHEST/LUNG: Clear to auscultation bilaterally; No rales, rhonchi, wheezing, or rubs. Unlabored respirations  HEART: Regular rate and rhythm; systolic murmur loudest in aortic area, no rubs, or gallops  ABDOMEN: Bowel sounds present; Soft, Nontender, Nondistended. No hepatomegaly  EXTREMITIES:  + Peripheral Pulses, brisk capillary refill. Left hand swelling with erythematous spots corresponding to leech bite marks, redness and swelling or surrounding tissues up to the fore arm.  NERVOUS SYSTEM:  Alert & Oriented X3, speech clear. No deficits   MSK: FROM all 4 extremities, full and equal strength  SKIN: No rashes or lesions

## 2019-09-30 NOTE — H&P ADULT - NSICDXFAMILYHX_GEN_ALL_CORE_FT
FAMILY HISTORY:  Father  Still living? Unknown  CAD (coronary artery disease), Age at diagnosis: 71-80    Mother  Still living? No  CAD (coronary artery disease), Age at diagnosis: 61-70  Family history of diabetes mellitus, Age at diagnosis: 61-70    Sibling  Still living? No  Family history of diabetes mellitus, Age at diagnosis: 51-60

## 2019-09-30 NOTE — H&P ADULT - NSHPLABSRESULTS_GEN_ALL_CORE
14.8   9.15  )-----------( 176      ( 29 Sep 2019 23:50 )             45.0     09-29    142  |  101  |  20  ----------------------------<  137<H>  5.0   |  29  |  1.2    Ca    10.0      29 Sep 2019 23:50  Phos  3.9     09-29  Mg     1.9     09-29    TPro  7.1  /  Alb  4.5  /  TBili  0.7  /  DBili  x   /  AST  24  /  ALT  14  /  AlkPhos  51  09-29  CARDIAC MARKERS ( 29 Sep 2019 23:50 )  x     / x     / 154 U/L / x     / x

## 2019-09-30 NOTE — DISCHARGE NOTE PROVIDER - NSDCCPCAREPLAN_GEN_ALL_CORE_FT
PRINCIPAL DISCHARGE DIAGNOSIS  Diagnosis: Cellulitis  Assessment and Plan of Treatment: due to leech therapy - finish antibioitcs

## 2019-09-30 NOTE — DISCHARGE NOTE PROVIDER - HOSPITAL COURSE
HPI:    60 yo M with PMHx of DM type 2, Mod aortic stenosis, asthma, HTN, HLD, obesity, JIMMY on CPAP, peripheral neuropathy 2/2 DM, Madelung disease, arthritis who presents with rapid onset of constant, moderate/severe, tight pain to L hand radiating up arm since yesterday after placing leeches on his hand for arthritis pain. Pain is associated with swelling, redness, blisters. No alleviating/aggravating factors. No fever, chills, nausea, vomiting, numbness, tingling. He can move his fingers but cannot make a fist. Pt states that his girlfriend is an expert in leech therapy and he tried it to relieve his arthritis. Pt states that w/up has been neg previously for inflammatory arthritis. He denied any hx of fever, chills, nausea, vomiting, diarrhea, constipation, melena, pain in abdomen, sob, chest pain, cough, increased urinary frequency, dysuria, headache, lightheadedness, dizziness, vertigo. (30 Sep 2019 04:02)    pt was started on empiric abxs - cefepime and vanco - no signs of sepsis - left arm is edematous with puncture wounds seen - no pus pr abscess or signs of fascitis     seen by infectious disease - clinically improved - changed to  po bactrim ds 2 tabs q12 and levaquin 750 daily for 7 days with     outpt follow up with pmd- advised to stop leech therapy . return to ER if high fevers, severe pain

## 2019-09-30 NOTE — H&P ADULT - ASSESSMENT
58 yo M with PMHx of DM type 2, Mod aortic stenosis, asthma, HTN, HLD, obesity, JIMMY on CPAP, peripheral neuropathy 2/2 DM, Madelung disease, arthritis who presents with rapid onset of constant, moderate/severe, tight pain to L hand radiating up arm since yesterday after placing leeches on his hand for arthritis pain. Pain is associated with swelling, redness, blisters. No alleviating/aggravating factors.     # Cellulitis dorsum of left hand  - s/p Vanc and Cefepime in ED, continue for now  - will get ID eval and burn eval  - pt might need debridement  - f.u blood cultures    # DM type 2  - hold oral meds;  check fs;  start insulin basal, bolus, s/s prn if finger stick glucose >180 mg persistently    # HTN  - c/w home meds, losartan/HCTZ 50/12.5 BID    # Mod Aortic Stenosis  - outpt f/u    # JIMMY  - c/w CPAP    # Madelung disease  - outp f/u for MRI neck and surgery eval.    # DLD  - c/w statins    DVT PPx: Hep 5000 s/c   GI PPX: not indicated  Diet: DASH/Carb consistent  Activity: Increase as tolerated  Dispo: from home, no needs  Code Status: full code

## 2019-09-30 NOTE — CONSULT NOTE ADULT - ASSESSMENT
ASSESSMENT  59y M admitted with CELLULITIS s/p leech therapy.    No signs of Sepsis (i.e., T<96.8F, T>101F, Pulse>90, Resp Rate>20, WBC>12, wbc<4, Bands>10%), or lactic acidosis (1.7), or FLAKITO (1.2)     Impression:    PLAN  -Attending recommendation to follow ASSESSMENT  59y M admitted with CELLULITIS s/p leech therapy.    No signs of Sepsis (i.e., T<96.8F, T>101F, Pulse>90, Resp Rate>20, WBC>12, wbc<4, Bands>10%), or lactic acidosis (1.7), or FLAKITO (1.2)     Impression:  -Cellulitis with an inflammatory component that is larger than infectious component     ID recommendation  -PO Bactrim 2 double strength every 12 hours  -PO Levaquin 750mg once daily  -re-consult prn ASSESSMENT  59y M admitted with CELLULITIS s/p leech therapy.    No signs of Sepsis (i.e., T<96.8F, T>101F, Pulse>90, Resp Rate>20, WBC>12, wbc<4, Bands>10%), or lactic acidosis (1.7), or FLAKITO (1.2)     Impression:  -Cellulitis with an inflammatory component that is larger than infectious component   -no fascitis/ abscess/ sepsis    ID recommendation  -PO Bactrim 2 double strength every 12 hours  -PO Levaquin 750mg once daily  -re-consult prn

## 2019-09-30 NOTE — ED ADULT NURSE NOTE - NSIMPLEMENTINTERV_GEN_ALL_ED
Implemented All Universal Safety Interventions:  [place] to call system. Call bell, personal items and telephone within reach. Instruct patient to call for assistance. Room bathroom lighting operational. Non-slip footwear when patient is off stretcher. Physically safe environment: no spills, clutter or unnecessary equipment. Stretcher in lowest position, wheels locked, appropriate side rails in place.

## 2019-10-01 LAB
CULTURE RESULTS: SIGNIFICANT CHANGE UP
SPECIMEN SOURCE: SIGNIFICANT CHANGE UP

## 2019-10-03 DIAGNOSIS — E66.9 OBESITY, UNSPECIFIED: ICD-10-CM

## 2019-10-03 DIAGNOSIS — L03.114 CELLULITIS OF LEFT UPPER LIMB: ICD-10-CM

## 2019-10-03 DIAGNOSIS — G47.33 OBSTRUCTIVE SLEEP APNEA (ADULT) (PEDIATRIC): ICD-10-CM

## 2019-10-03 DIAGNOSIS — E11.42 TYPE 2 DIABETES MELLITUS WITH DIABETIC POLYNEUROPATHY: ICD-10-CM

## 2019-10-03 DIAGNOSIS — E78.5 HYPERLIPIDEMIA, UNSPECIFIED: ICD-10-CM

## 2019-10-03 DIAGNOSIS — Z79.4 LONG TERM (CURRENT) USE OF INSULIN: ICD-10-CM

## 2019-10-03 DIAGNOSIS — M19.90 UNSPECIFIED OSTEOARTHRITIS, UNSPECIFIED SITE: ICD-10-CM

## 2019-10-03 DIAGNOSIS — J45.909 UNSPECIFIED ASTHMA, UNCOMPLICATED: ICD-10-CM

## 2019-10-03 DIAGNOSIS — Z99.89 DEPENDENCE ON OTHER ENABLING MACHINES AND DEVICES: ICD-10-CM

## 2019-10-03 DIAGNOSIS — I35.0 NONRHEUMATIC AORTIC (VALVE) STENOSIS: ICD-10-CM

## 2019-10-05 LAB
CULTURE RESULTS: SIGNIFICANT CHANGE UP
CULTURE RESULTS: SIGNIFICANT CHANGE UP
SPECIMEN SOURCE: SIGNIFICANT CHANGE UP
SPECIMEN SOURCE: SIGNIFICANT CHANGE UP

## 2019-10-07 ENCOUNTER — OUTPATIENT (OUTPATIENT)
Dept: OUTPATIENT SERVICES | Facility: HOSPITAL | Age: 59
LOS: 1 days | Discharge: HOME | End: 2019-10-07

## 2019-10-07 DIAGNOSIS — R22.1 LOCALIZED SWELLING, MASS AND LUMP, NECK: ICD-10-CM

## 2019-10-07 DIAGNOSIS — S02.30XA FRACTURE OF ORBITAL FLOOR, UNSPECIFIED SIDE, INITIAL ENCOUNTER FOR CLOSED FRACTURE: Chronic | ICD-10-CM

## 2019-10-07 DIAGNOSIS — E88.89 OTHER SPECIFIED METABOLIC DISORDERS: ICD-10-CM

## 2019-10-07 DIAGNOSIS — Z96.643 PRESENCE OF ARTIFICIAL HIP JOINT, BILATERAL: Chronic | ICD-10-CM

## 2019-10-07 DIAGNOSIS — Z86.018 PERSONAL HISTORY OF OTHER BENIGN NEOPLASM: Chronic | ICD-10-CM

## 2019-10-07 DIAGNOSIS — Z12.11 ENCOUNTER FOR SCREENING FOR MALIGNANT NEOPLASM OF COLON: Chronic | ICD-10-CM

## 2019-10-17 ENCOUNTER — APPOINTMENT (OUTPATIENT)
Dept: CARDIOLOGY | Facility: CLINIC | Age: 59
End: 2019-10-17
Payer: COMMERCIAL

## 2019-10-17 VITALS
DIASTOLIC BLOOD PRESSURE: 70 MMHG | HEART RATE: 81 BPM | SYSTOLIC BLOOD PRESSURE: 134 MMHG | HEIGHT: 65 IN | WEIGHT: 258 LBS | BODY MASS INDEX: 42.99 KG/M2

## 2019-10-17 PROCEDURE — 99214 OFFICE O/P EST MOD 30 MIN: CPT

## 2019-10-17 PROCEDURE — 93000 ELECTROCARDIOGRAM COMPLETE: CPT

## 2019-10-17 NOTE — HISTORY OF PRESENT ILLNESS
[FreeTextEntry1] : JAZ GAVIN is being seen for a clinic follow-up of aortic stenosis, HTN and chest pain. \par The patient has been experiencing increase in exertional SOB as an anginal equivalent.\par In addition, he c/o chest pressure with exertion relieved by rest.\par He has aortic stenosis by physical exam and by 2d echo. \par However, due to his body habitus, difficult to adequately assess the severity of his aortic stenosis\par Because of the above, cardiac cath. recommended.\par The risks and benefits explained to the patient, and he is in total agreement.\par This will be scheduled in a week or two.\par \par The patient is compliant with his medications. \par Patient is obese. \par \par s/p hip[ replacement\par doing well since his last visit\par no new complaints\par \par

## 2019-10-17 NOTE — PHYSICAL EXAM
[General Appearance - Well Developed] : well developed [Normal Appearance] : normal appearance [Well Groomed] : well groomed [General Appearance - Well Nourished] : well nourished [General Appearance - In No Acute Distress] : no acute distress [No Deformities] : no deformities [Normal Conjunctiva] : the conjunctiva exhibited no abnormalities [Eyelids - No Xanthelasma] : the eyelids demonstrated no xanthelasmas [Normal Oral Mucosa] : normal oral mucosa [No Oral Pallor] : no oral pallor [No Oral Cyanosis] : no oral cyanosis [Normal Jugular Venous V Waves Present] : normal jugular venous V waves present [Normal Jugular Venous A Waves Present] : normal jugular venous A waves present [No Jugular Venous Beasley A Waves] : no jugular venous beasley A waves [Heart Rate And Rhythm] : heart rate and rhythm were normal [Murmurs] : no murmurs present [Heart Sounds] : normal S1 and S2 [Respiration, Rhythm And Depth] : normal respiratory rhythm and effort [Exaggerated Use Of Accessory Muscles For Inspiration] : no accessory muscle use [Auscultation Breath Sounds / Voice Sounds] : lungs were clear to auscultation bilaterally [Abdomen Soft] : soft [Abdomen Tenderness] : non-tender [Abdomen Mass (___ Cm)] : no abdominal mass palpated [Abnormal Walk] : normal gait [Gait - Sufficient For Exercise Testing] : the gait was sufficient for exercise testing [Nail Clubbing] : no clubbing of the fingernails [Cyanosis, Localized] : no localized cyanosis [Petechial Hemorrhages (___cm)] : no petechial hemorrhages [] : no rash [Skin Color & Pigmentation] : normal skin color and pigmentation [No Venous Stasis] : no venous stasis [No Xanthoma] : no  xanthoma was observed [Skin Lesions] : no skin lesions [No Skin Ulcers] : no skin ulcer [Affect] : the affect was normal [Oriented To Time, Place, And Person] : oriented to person, place, and time [No Anxiety] : not feeling anxious [Mood] : the mood was normal [FreeTextEntry1] : no pedal edema

## 2019-11-04 ENCOUNTER — OUTPATIENT (OUTPATIENT)
Dept: OUTPATIENT SERVICES | Facility: HOSPITAL | Age: 59
LOS: 1 days | Discharge: HOME | End: 2019-11-04

## 2019-11-04 DIAGNOSIS — S02.30XA FRACTURE OF ORBITAL FLOOR, UNSPECIFIED SIDE, INITIAL ENCOUNTER FOR CLOSED FRACTURE: Chronic | ICD-10-CM

## 2019-11-04 DIAGNOSIS — E06.3 AUTOIMMUNE THYROIDITIS: ICD-10-CM

## 2019-11-04 DIAGNOSIS — Z12.11 ENCOUNTER FOR SCREENING FOR MALIGNANT NEOPLASM OF COLON: Chronic | ICD-10-CM

## 2019-11-04 DIAGNOSIS — Z86.018 PERSONAL HISTORY OF OTHER BENIGN NEOPLASM: Chronic | ICD-10-CM

## 2019-11-04 DIAGNOSIS — Z96.643 PRESENCE OF ARTIFICIAL HIP JOINT, BILATERAL: Chronic | ICD-10-CM

## 2019-11-04 DIAGNOSIS — E11.65 TYPE 2 DIABETES MELLITUS WITH HYPERGLYCEMIA: ICD-10-CM

## 2019-11-10 ENCOUNTER — OUTPATIENT (OUTPATIENT)
Dept: OUTPATIENT SERVICES | Facility: HOSPITAL | Age: 59
LOS: 1 days | Discharge: HOME | End: 2019-11-10
Payer: COMMERCIAL

## 2019-11-10 DIAGNOSIS — E88.89 OTHER SPECIFIED METABOLIC DISORDERS: ICD-10-CM

## 2019-11-10 DIAGNOSIS — Z12.11 ENCOUNTER FOR SCREENING FOR MALIGNANT NEOPLASM OF COLON: Chronic | ICD-10-CM

## 2019-11-10 DIAGNOSIS — R22.1 LOCALIZED SWELLING, MASS AND LUMP, NECK: ICD-10-CM

## 2019-11-10 DIAGNOSIS — Z86.018 PERSONAL HISTORY OF OTHER BENIGN NEOPLASM: Chronic | ICD-10-CM

## 2019-11-10 DIAGNOSIS — S02.30XA FRACTURE OF ORBITAL FLOOR, UNSPECIFIED SIDE, INITIAL ENCOUNTER FOR CLOSED FRACTURE: Chronic | ICD-10-CM

## 2019-11-10 DIAGNOSIS — Z96.643 PRESENCE OF ARTIFICIAL HIP JOINT, BILATERAL: Chronic | ICD-10-CM

## 2019-11-10 PROCEDURE — 70491 CT SOFT TISSUE NECK W/DYE: CPT | Mod: 26

## 2019-11-13 ENCOUNTER — RX RENEWAL (OUTPATIENT)
Age: 59
End: 2019-11-13

## 2019-12-18 ENCOUNTER — OUTPATIENT (OUTPATIENT)
Dept: OUTPATIENT SERVICES | Facility: HOSPITAL | Age: 59
LOS: 1 days | Discharge: HOME | End: 2019-12-18
Payer: COMMERCIAL

## 2019-12-18 DIAGNOSIS — Z86.018 PERSONAL HISTORY OF OTHER BENIGN NEOPLASM: Chronic | ICD-10-CM

## 2019-12-18 DIAGNOSIS — S02.30XA FRACTURE OF ORBITAL FLOOR, UNSPECIFIED SIDE, INITIAL ENCOUNTER FOR CLOSED FRACTURE: Chronic | ICD-10-CM

## 2019-12-18 DIAGNOSIS — Z12.11 ENCOUNTER FOR SCREENING FOR MALIGNANT NEOPLASM OF COLON: Chronic | ICD-10-CM

## 2019-12-18 DIAGNOSIS — I10 ESSENTIAL (PRIMARY) HYPERTENSION: ICD-10-CM

## 2019-12-18 DIAGNOSIS — E11.40 TYPE 2 DIABETES MELLITUS WITH DIABETIC NEUROPATHY, UNSPECIFIED: ICD-10-CM

## 2019-12-18 DIAGNOSIS — Z96.643 PRESENCE OF ARTIFICIAL HIP JOINT, BILATERAL: Chronic | ICD-10-CM

## 2019-12-18 PROCEDURE — 76700 US EXAM ABDOM COMPLETE: CPT | Mod: 26

## 2019-12-24 ENCOUNTER — RX RENEWAL (OUTPATIENT)
Age: 59
End: 2019-12-24

## 2019-12-27 ENCOUNTER — RX RENEWAL (OUTPATIENT)
Age: 59
End: 2019-12-27

## 2019-12-30 ENCOUNTER — RX RENEWAL (OUTPATIENT)
Age: 59
End: 2019-12-30

## 2020-02-04 ENCOUNTER — RX RENEWAL (OUTPATIENT)
Age: 60
End: 2020-02-04

## 2020-03-18 DIAGNOSIS — T56.2X1A TOXIC EFFECT OF CHROMIUM AND ITS COMPOUNDS, ACCIDENTAL (UNINTENTIONAL), INITIAL ENCOUNTER: ICD-10-CM

## 2020-03-18 DIAGNOSIS — R79.0 ABNORMAL LVL OF BLOOD MINERAL: ICD-10-CM

## 2020-05-20 ENCOUNTER — APPOINTMENT (OUTPATIENT)
Dept: CARDIOLOGY | Facility: CLINIC | Age: 60
End: 2020-05-20
Payer: COMMERCIAL

## 2020-05-20 VITALS
BODY MASS INDEX: 44.27 KG/M2 | TEMPERATURE: 97.4 F | DIASTOLIC BLOOD PRESSURE: 82 MMHG | SYSTOLIC BLOOD PRESSURE: 130 MMHG | HEART RATE: 90 BPM | WEIGHT: 266 LBS

## 2020-05-20 PROCEDURE — 93000 ELECTROCARDIOGRAM COMPLETE: CPT

## 2020-05-20 PROCEDURE — 99214 OFFICE O/P EST MOD 30 MIN: CPT

## 2020-05-20 NOTE — PHYSICAL EXAM
[General Appearance - Well Developed] : well developed [Normal Appearance] : normal appearance [Well Groomed] : well groomed [General Appearance - Well Nourished] : well nourished [No Deformities] : no deformities [General Appearance - In No Acute Distress] : no acute distress [Eyelids - No Xanthelasma] : the eyelids demonstrated no xanthelasmas [Normal Oral Mucosa] : normal oral mucosa [Normal Conjunctiva] : the conjunctiva exhibited no abnormalities [No Oral Pallor] : no oral pallor [No Oral Cyanosis] : no oral cyanosis [Normal Jugular Venous A Waves Present] : normal jugular venous A waves present [No Jugular Venous Beasley A Waves] : no jugular venous beasley A waves [Normal Jugular Venous V Waves Present] : normal jugular venous V waves present [Respiration, Rhythm And Depth] : normal respiratory rhythm and effort [Exaggerated Use Of Accessory Muscles For Inspiration] : no accessory muscle use [Auscultation Breath Sounds / Voice Sounds] : lungs were clear to auscultation bilaterally [Murmurs] : no murmurs present [Heart Sounds] : normal S1 and S2 [Heart Rate And Rhythm] : heart rate and rhythm were normal [Abdomen Tenderness] : non-tender [Abdomen Soft] : soft [Abdomen Mass (___ Cm)] : no abdominal mass palpated [Abnormal Walk] : normal gait [Gait - Sufficient For Exercise Testing] : the gait was sufficient for exercise testing [Nail Clubbing] : no clubbing of the fingernails [Cyanosis, Localized] : no localized cyanosis [Petechial Hemorrhages (___cm)] : no petechial hemorrhages [] : no rash [Skin Color & Pigmentation] : normal skin color and pigmentation [No Venous Stasis] : no venous stasis [Skin Lesions] : no skin lesions [No Skin Ulcers] : no skin ulcer [No Xanthoma] : no  xanthoma was observed [Oriented To Time, Place, And Person] : oriented to person, place, and time [Affect] : the affect was normal [Mood] : the mood was normal [No Anxiety] : not feeling anxious [FreeTextEntry1] : no pedal edema

## 2020-10-15 ENCOUNTER — APPOINTMENT (OUTPATIENT)
Dept: CARDIOLOGY | Facility: CLINIC | Age: 60
End: 2020-10-15
Payer: COMMERCIAL

## 2020-10-15 VITALS
TEMPERATURE: 96.7 F | HEART RATE: 92 BPM | WEIGHT: 267 LBS | BODY MASS INDEX: 44.48 KG/M2 | SYSTOLIC BLOOD PRESSURE: 126 MMHG | DIASTOLIC BLOOD PRESSURE: 82 MMHG | HEIGHT: 65 IN

## 2020-10-15 PROCEDURE — 93000 ELECTROCARDIOGRAM COMPLETE: CPT

## 2020-10-15 PROCEDURE — 99214 OFFICE O/P EST MOD 30 MIN: CPT

## 2020-10-15 NOTE — PHYSICAL EXAM
[General Appearance - Well Developed] : well developed [Normal Appearance] : normal appearance [Well Groomed] : well groomed [General Appearance - Well Nourished] : well nourished [No Deformities] : no deformities [Eyelids - No Xanthelasma] : the eyelids demonstrated no xanthelasmas [Normal Conjunctiva] : the conjunctiva exhibited no abnormalities [General Appearance - In No Acute Distress] : no acute distress [No Oral Pallor] : no oral pallor [Normal Oral Mucosa] : normal oral mucosa [Normal Jugular Venous A Waves Present] : normal jugular venous A waves present [No Oral Cyanosis] : no oral cyanosis [No Jugular Venous Beasley A Waves] : no jugular venous beasley A waves [Normal Jugular Venous V Waves Present] : normal jugular venous V waves present [Exaggerated Use Of Accessory Muscles For Inspiration] : no accessory muscle use [Respiration, Rhythm And Depth] : normal respiratory rhythm and effort [Auscultation Breath Sounds / Voice Sounds] : lungs were clear to auscultation bilaterally [Heart Rate And Rhythm] : heart rate and rhythm were normal [Heart Sounds] : normal S1 and S2 [Abdomen Soft] : soft [Murmurs] : no murmurs present [Abdomen Tenderness] : non-tender [Abnormal Walk] : normal gait [Abdomen Mass (___ Cm)] : no abdominal mass palpated [Nail Clubbing] : no clubbing of the fingernails [Gait - Sufficient For Exercise Testing] : the gait was sufficient for exercise testing [Petechial Hemorrhages (___cm)] : no petechial hemorrhages [Cyanosis, Localized] : no localized cyanosis [] : no rash [FreeTextEntry1] : no pedal edema [Skin Color & Pigmentation] : normal skin color and pigmentation [No Venous Stasis] : no venous stasis [Skin Lesions] : no skin lesions [No Skin Ulcers] : no skin ulcer [No Xanthoma] : no  xanthoma was observed [Oriented To Time, Place, And Person] : oriented to person, place, and time [Affect] : the affect was normal [Mood] : the mood was normal [No Anxiety] : not feeling anxious

## 2020-11-19 ENCOUNTER — APPOINTMENT (OUTPATIENT)
Dept: CARDIOLOGY | Facility: CLINIC | Age: 60
End: 2020-11-19

## 2021-04-07 ENCOUNTER — APPOINTMENT (OUTPATIENT)
Dept: CARDIOLOGY | Facility: CLINIC | Age: 61
End: 2021-04-07
Payer: COMMERCIAL

## 2021-04-07 VITALS
WEIGHT: 269 LBS | HEIGHT: 65 IN | DIASTOLIC BLOOD PRESSURE: 82 MMHG | SYSTOLIC BLOOD PRESSURE: 124 MMHG | TEMPERATURE: 97.4 F | HEART RATE: 94 BPM | BODY MASS INDEX: 44.82 KG/M2

## 2021-04-07 PROCEDURE — 99072 ADDL SUPL MATRL&STAF TM PHE: CPT

## 2021-04-07 PROCEDURE — 99214 OFFICE O/P EST MOD 30 MIN: CPT

## 2021-04-07 PROCEDURE — 93000 ELECTROCARDIOGRAM COMPLETE: CPT

## 2021-04-07 NOTE — ASSESSMENT
[FreeTextEntry1] : Chest Pain\par htn \par exertional dyspnea\par aortic stenosis  How Severe Are Your Spot(S)?: mild Have Your Spot(S) Been Treated In The Past?: has not been treated Hpi Title: Evaluation of a Skin Lesion

## 2021-04-07 NOTE — PHYSICAL EXAM
[General Appearance - Well Developed] : well developed [Normal Appearance] : normal appearance [Well Groomed] : well groomed [General Appearance - Well Nourished] : well nourished [No Deformities] : no deformities [General Appearance - In No Acute Distress] : no acute distress [Normal Conjunctiva] : the conjunctiva exhibited no abnormalities [Eyelids - No Xanthelasma] : the eyelids demonstrated no xanthelasmas [Normal Oral Mucosa] : normal oral mucosa [No Oral Pallor] : no oral pallor [No Oral Cyanosis] : no oral cyanosis [Normal Jugular Venous A Waves Present] : normal jugular venous A waves present [Normal Jugular Venous V Waves Present] : normal jugular venous V waves present [No Jugular Venous Beasley A Waves] : no jugular venous beasley A waves [Respiration, Rhythm And Depth] : normal respiratory rhythm and effort [Exaggerated Use Of Accessory Muscles For Inspiration] : no accessory muscle use [Auscultation Breath Sounds / Voice Sounds] : lungs were clear to auscultation bilaterally [Heart Rate And Rhythm] : heart rate and rhythm were normal [Heart Sounds] : normal S1 and S2 [Murmurs] : no murmurs present [Abdomen Soft] : soft [Abdomen Tenderness] : non-tender [Abdomen Mass (___ Cm)] : no abdominal mass palpated [Abnormal Walk] : normal gait [Gait - Sufficient For Exercise Testing] : the gait was sufficient for exercise testing [Nail Clubbing] : no clubbing of the fingernails [Cyanosis, Localized] : no localized cyanosis [Petechial Hemorrhages (___cm)] : no petechial hemorrhages [Skin Color & Pigmentation] : normal skin color and pigmentation [] : no rash [No Venous Stasis] : no venous stasis [Skin Lesions] : no skin lesions [No Skin Ulcers] : no skin ulcer [No Xanthoma] : no  xanthoma was observed [Oriented To Time, Place, And Person] : oriented to person, place, and time [Affect] : the affect was normal [Mood] : the mood was normal [No Anxiety] : not feeling anxious [FreeTextEntry1] : no pedal edema

## 2021-04-21 ENCOUNTER — APPOINTMENT (OUTPATIENT)
Dept: CARDIOLOGY | Facility: CLINIC | Age: 61
End: 2021-04-21
Payer: COMMERCIAL

## 2021-04-21 PROCEDURE — 99072 ADDL SUPL MATRL&STAF TM PHE: CPT

## 2021-04-21 PROCEDURE — 93306 TTE W/DOPPLER COMPLETE: CPT

## 2021-05-06 ENCOUNTER — APPOINTMENT (OUTPATIENT)
Age: 61
End: 2021-05-06

## 2021-05-17 ENCOUNTER — APPOINTMENT (OUTPATIENT)
Dept: PLASTIC SURGERY | Facility: CLINIC | Age: 61
End: 2021-05-17

## 2021-06-28 ENCOUNTER — APPOINTMENT (OUTPATIENT)
Dept: ORTHOPEDIC SURGERY | Facility: CLINIC | Age: 61
End: 2021-06-28

## 2021-07-21 ENCOUNTER — RX RENEWAL (OUTPATIENT)
Age: 61
End: 2021-07-21

## 2021-08-18 ENCOUNTER — APPOINTMENT (OUTPATIENT)
Dept: CARDIOLOGY | Facility: CLINIC | Age: 61
End: 2021-08-18
Payer: COMMERCIAL

## 2021-08-18 VITALS
DIASTOLIC BLOOD PRESSURE: 80 MMHG | BODY MASS INDEX: 43.99 KG/M2 | SYSTOLIC BLOOD PRESSURE: 140 MMHG | HEART RATE: 95 BPM | WEIGHT: 264 LBS | TEMPERATURE: 97.8 F | HEIGHT: 65 IN

## 2021-08-18 DIAGNOSIS — D17.1 BENIGN LIPOMATOUS NEOPLASM OF SKIN AND SUBCUTANEOUS TISSUE OF TRUNK: ICD-10-CM

## 2021-08-18 PROCEDURE — 93000 ELECTROCARDIOGRAM COMPLETE: CPT

## 2021-08-18 PROCEDURE — 99214 OFFICE O/P EST MOD 30 MIN: CPT

## 2021-08-18 NOTE — PHYSICAL EXAM
[Well Developed] : well developed [Well Nourished] : well nourished [No Acute Distress] : no acute distress [Normal Venous Pressure] : normal venous pressure [No Carotid Bruit] : no carotid bruit [Normal S1, S2] : normal S1, S2 [No Murmur] : no murmur [No Rub] : no rub [No Gallop] : no gallop [Clear Lung Fields] : clear lung fields [Good Air Entry] : good air entry [No Respiratory Distress] : no respiratory distress  [Soft] : abdomen soft [No Masses/organomegaly] : no masses/organomegaly [Non Tender] : non-tender [Normal Bowel Sounds] : normal bowel sounds [Normal Gait] : normal gait [No Edema] : no edema [No Cyanosis] : no cyanosis [No Clubbing] : no clubbing [No Varicosities] : no varicosities [No Rash] : no rash [No Skin Lesions] : no skin lesions [Moves all extremities] : moves all extremities [No Focal Deficits] : no focal deficits [Normal Speech] : normal speech [Alert and Oriented] : alert and oriented [Normal memory] : normal memory [General Appearance - Well Developed] : well developed [Normal Appearance] : normal appearance [Well Groomed] : well groomed [General Appearance - Well Nourished] : well nourished [No Deformities] : no deformities [General Appearance - In No Acute Distress] : no acute distress [Normal Conjunctiva] : the conjunctiva exhibited no abnormalities [Eyelids - No Xanthelasma] : the eyelids demonstrated no xanthelasmas [Normal Oral Mucosa] : normal oral mucosa [No Oral Pallor] : no oral pallor [No Oral Cyanosis] : no oral cyanosis [Normal Jugular Venous A Waves Present] : normal jugular venous A waves present [Normal Jugular Venous V Waves Present] : normal jugular venous V waves present [No Jugular Venous Beasley A Waves] : no jugular venous beasley A waves [Respiration, Rhythm And Depth] : normal respiratory rhythm and effort [Exaggerated Use Of Accessory Muscles For Inspiration] : no accessory muscle use [Auscultation Breath Sounds / Voice Sounds] : lungs were clear to auscultation bilaterally [Heart Rate And Rhythm] : heart rate and rhythm were normal [Heart Sounds] : normal S1 and S2 [Murmurs] : no murmurs present [Abdomen Soft] : soft [Abdomen Tenderness] : non-tender [Abdomen Mass (___ Cm)] : no abdominal mass palpated [Gait - Sufficient For Exercise Testing] : the gait was sufficient for exercise testing [Abnormal Walk] : normal gait [Nail Clubbing] : no clubbing of the fingernails [Cyanosis, Localized] : no localized cyanosis [Petechial Hemorrhages (___cm)] : no petechial hemorrhages [] : no rash [Skin Color & Pigmentation] : normal skin color and pigmentation [No Venous Stasis] : no venous stasis [Skin Lesions] : no skin lesions [No Skin Ulcers] : no skin ulcer [No Xanthoma] : no  xanthoma was observed [Oriented To Time, Place, And Person] : oriented to person, place, and time [Affect] : the affect was normal [Mood] : the mood was normal [No Anxiety] : not feeling anxious [FreeTextEntry1] : no pedal edema

## 2021-12-16 ENCOUNTER — RX RENEWAL (OUTPATIENT)
Age: 61
End: 2021-12-16

## 2021-12-23 ENCOUNTER — APPOINTMENT (OUTPATIENT)
Age: 61
End: 2021-12-23

## 2022-01-12 ENCOUNTER — APPOINTMENT (OUTPATIENT)
Dept: CARDIOLOGY | Facility: CLINIC | Age: 62
End: 2022-01-12
Payer: COMMERCIAL

## 2022-01-12 VITALS
BODY MASS INDEX: 44.82 KG/M2 | WEIGHT: 269 LBS | SYSTOLIC BLOOD PRESSURE: 160 MMHG | TEMPERATURE: 96.9 F | HEART RATE: 90 BPM | HEIGHT: 65 IN | DIASTOLIC BLOOD PRESSURE: 100 MMHG

## 2022-01-12 PROCEDURE — 99214 OFFICE O/P EST MOD 30 MIN: CPT

## 2022-01-12 PROCEDURE — 93000 ELECTROCARDIOGRAM COMPLETE: CPT

## 2022-01-12 NOTE — HISTORY OF PRESENT ILLNESS
[FreeTextEntry1] : JAZ GAVIN is being seen for a clinic follow-up of aortic stenosis, HTN and chest pain. \par The patient has been experiencing increase in exertional SOB as an anginal equivalent.\par In addition, he c/o chest pressure with exertion relieved by rest.\par He has aortic stenosis by physical exam and by 2d echo. \par However, due to his body habitus, difficult to adequately assess the severity of his aortic stenosis\par Because of the above, cardiac cath. recommended.\par The risks and benefits explained to the patient, and he is in total agreement.\par This will be scheduled in a week or two.\par \par The patient is compliant with his medications. \par Patient is obese. \par \par s/p hip[ replacement\par doing well since his last visit\par no new complaints\par \par echo 2021 showed mild aortic stenosis with normal ef\par mild pulmonary htn\par \par

## 2022-01-12 NOTE — PHYSICAL EXAM
[Well Developed] : well developed [Well Nourished] : well nourished [No Acute Distress] : no acute distress [Normal Venous Pressure] : normal venous pressure [No Carotid Bruit] : no carotid bruit [Normal S1, S2] : normal S1, S2 [No Murmur] : no murmur [No Rub] : no rub [No Gallop] : no gallop [Clear Lung Fields] : clear lung fields [Good Air Entry] : good air entry [No Respiratory Distress] : no respiratory distress  [Soft] : abdomen soft [Non Tender] : non-tender [No Masses/organomegaly] : no masses/organomegaly [Normal Bowel Sounds] : normal bowel sounds [Normal Gait] : normal gait [No Edema] : no edema [No Cyanosis] : no cyanosis [No Clubbing] : no clubbing [No Varicosities] : no varicosities [No Rash] : no rash [No Skin Lesions] : no skin lesions [Moves all extremities] : moves all extremities [No Focal Deficits] : no focal deficits [Normal Speech] : normal speech [Alert and Oriented] : alert and oriented [Normal memory] : normal memory [General Appearance - Well Developed] : well developed [Normal Appearance] : normal appearance [Well Groomed] : well groomed [General Appearance - Well Nourished] : well nourished [No Deformities] : no deformities [General Appearance - In No Acute Distress] : no acute distress [Normal Conjunctiva] : the conjunctiva exhibited no abnormalities [Eyelids - No Xanthelasma] : the eyelids demonstrated no xanthelasmas [Normal Oral Mucosa] : normal oral mucosa [No Oral Pallor] : no oral pallor [No Oral Cyanosis] : no oral cyanosis [Normal Jugular Venous A Waves Present] : normal jugular venous A waves present [Normal Jugular Venous V Waves Present] : normal jugular venous V waves present [No Jugular Venous Beasley A Waves] : no jugular venous beasley A waves [Respiration, Rhythm And Depth] : normal respiratory rhythm and effort [Exaggerated Use Of Accessory Muscles For Inspiration] : no accessory muscle use [Auscultation Breath Sounds / Voice Sounds] : lungs were clear to auscultation bilaterally [Heart Rate And Rhythm] : heart rate and rhythm were normal [Heart Sounds] : normal S1 and S2 [Murmurs] : no murmurs present [Abdomen Soft] : soft [Abdomen Tenderness] : non-tender [Abdomen Mass (___ Cm)] : no abdominal mass palpated [Abnormal Walk] : normal gait [Gait - Sufficient For Exercise Testing] : the gait was sufficient for exercise testing [Nail Clubbing] : no clubbing of the fingernails [Cyanosis, Localized] : no localized cyanosis [Petechial Hemorrhages (___cm)] : no petechial hemorrhages [Skin Color & Pigmentation] : normal skin color and pigmentation [] : no rash [No Venous Stasis] : no venous stasis [Skin Lesions] : no skin lesions [No Skin Ulcers] : no skin ulcer [No Xanthoma] : no  xanthoma was observed [Oriented To Time, Place, And Person] : oriented to person, place, and time [Affect] : the affect was normal [Mood] : the mood was normal [No Anxiety] : not feeling anxious [FreeTextEntry1] : no pedal edema

## 2022-01-27 ENCOUNTER — RX RENEWAL (OUTPATIENT)
Age: 62
End: 2022-01-27

## 2022-02-10 ENCOUNTER — APPOINTMENT (OUTPATIENT)
Age: 62
End: 2022-02-10
Payer: COMMERCIAL

## 2022-02-10 VITALS
BODY MASS INDEX: 44.15 KG/M2 | DIASTOLIC BLOOD PRESSURE: 96 MMHG | SYSTOLIC BLOOD PRESSURE: 154 MMHG | HEART RATE: 113 BPM | OXYGEN SATURATION: 94 % | HEIGHT: 65 IN | RESPIRATION RATE: 16 BRPM | WEIGHT: 265 LBS

## 2022-02-10 PROCEDURE — 71046 X-RAY EXAM CHEST 2 VIEWS: CPT

## 2022-02-10 PROCEDURE — 99214 OFFICE O/P EST MOD 30 MIN: CPT | Mod: 25

## 2022-02-10 NOTE — REASON FOR VISIT
[Follow-Up] : a follow-up visit [Asthma] : asthma [Sleep Apnea] : sleep apnea [Obesity] : obesity [TextBox_44] : Patient has a history of obstructive sleep apnea syndrome is doing fairly well from that standpoint.  However he is becoming more short of breath.  He has been diagnosed with Madelungs disease and he feels that this is affecting him.  He is quite a significant amount of upper thoracic fatty depositions.  He also has not been taking his asthma inhaler regularly.  He states that he misses quite frequently.  He uses his albuterol multiple times per week with good effect.

## 2022-02-10 NOTE — PHYSICAL EXAM
[No Acute Distress] : no acute distress [Normal Oropharynx] : normal oropharynx [Normal Appearance] : normal appearance [No Neck Mass] : no neck mass [Normal Rate/Rhythm] : normal rate/rhythm [Normal S1, S2] : normal s1, s2 [No Murmurs] : no murmurs [No Resp Distress] : no resp distress [No Abnormalities] : no abnormalities [Benign] : benign [Normal Gait] : normal gait [No Clubbing] : no clubbing [No Cyanosis] : no cyanosis [No Edema] : no edema [FROM] : FROM [Normal Color/ Pigmentation] : normal color/ pigmentation [No Focal Deficits] : no focal deficits [Oriented x3] : oriented x3 [Normal Affect] : normal affect [TextBox_68] : harsh breath sounds with scattered wheeze

## 2022-02-10 NOTE — ASSESSMENT
[FreeTextEntry1] : Assessment:\par JIMMY\par Obesity\par \par PLAN:\par The patient is benefitting from the PAP device .\par New supplies ordered \par Weight loss discussed\par I stressed the need maintain compliance  with the PAP device.\par The patient is not to use an Ozone or UV sterilizer. \par F/U in 6 months\par \par Assessment:\par Asthma:\par \par plan:\par pred bolus 40mg x 5 days\par Stress compliance with inhalers. Renewed today.\par cont PRN albuterol\par cont ICS/LABA\par needs PFT\par weight loss\par \par F/U 6 months\par

## 2022-02-10 NOTE — PROCEDURE
[FreeTextEntry1] : PA and LAT CXR Report\par \par PA and LAT CXR was ordered to evaluate for causes of dyspnea.\par  \par The films demonstrates:\par The heart and mediastinal structures are normal\par There are no increased interstitial markings.\par There are no infiltrates present\par There are no nodules or  masses present \par \par \par Impression:\par NAD\par

## 2022-04-27 ENCOUNTER — RESULT CHARGE (OUTPATIENT)
Age: 62
End: 2022-04-27

## 2022-04-27 ENCOUNTER — APPOINTMENT (OUTPATIENT)
Dept: CARDIOLOGY | Facility: CLINIC | Age: 62
End: 2022-04-27
Payer: COMMERCIAL

## 2022-04-27 VITALS
HEART RATE: 91 BPM | WEIGHT: 263 LBS | BODY MASS INDEX: 43.82 KG/M2 | SYSTOLIC BLOOD PRESSURE: 126 MMHG | HEIGHT: 65 IN | DIASTOLIC BLOOD PRESSURE: 80 MMHG

## 2022-04-27 DIAGNOSIS — D17.0 BENIGN LIPOMATOUS NEOPLASM OF SKIN AND SUBCUTANEOUS TISSUE OF HEAD, FACE AND NECK: ICD-10-CM

## 2022-04-27 PROCEDURE — 99214 OFFICE O/P EST MOD 30 MIN: CPT

## 2022-04-27 PROCEDURE — 93000 ELECTROCARDIOGRAM COMPLETE: CPT

## 2022-04-27 RX ORDER — PIOGLITAZONE HYDROCHLORIDE 30 MG/1
30 TABLET ORAL DAILY
Refills: 0 | Status: ACTIVE | COMMUNITY

## 2022-04-27 NOTE — HISTORY OF PRESENT ILLNESS
[FreeTextEntry1] : JAZ GAVIN is being seen for a clinic follow-up of aortic stenosis, HTN and chest pain. \par The patient has been experiencing increase in exertional SOB as an anginal equivalent.\par In addition, he c/o chest pressure with exertion relieved by rest.\par He has aortic stenosis by physical exam and by 2d echo. \par However, due to his body habitus, difficult to adequately assess the severity of his aortic stenosis\par Because of the above, cardiac cath. recommended.\par The risks and benefits explained to the patient, and he is in total agreement.\par This will be scheduled in a week or two.\par \par The patient is compliant with his medications. \par Patient is obese. \par \par s/p hip[ replacement\par doing well since his last visit\par no new complaints\par \par echo 2021 showed mild aortic stenosis with normal ef\par mild pulmonary htn\par \par patient is pre-op for knee replacement\par no further work up needed\par \par

## 2022-04-27 NOTE — PHYSICAL EXAM
[Well Developed] : well developed [Well Nourished] : well nourished [No Acute Distress] : no acute distress [Normal Venous Pressure] : normal venous pressure [No Carotid Bruit] : no carotid bruit [Normal S1, S2] : normal S1, S2 [No Murmur] : no murmur [No Rub] : no rub [No Gallop] : no gallop [Clear Lung Fields] : clear lung fields [Good Air Entry] : good air entry [No Respiratory Distress] : no respiratory distress  [Soft] : abdomen soft [Non Tender] : non-tender [No Masses/organomegaly] : no masses/organomegaly [Normal Bowel Sounds] : normal bowel sounds [Normal Gait] : normal gait [No Edema] : no edema [No Cyanosis] : no cyanosis [No Clubbing] : no clubbing [No Varicosities] : no varicosities [No Rash] : no rash [No Skin Lesions] : no skin lesions [Moves all extremities] : moves all extremities [No Focal Deficits] : no focal deficits [Normal Speech] : normal speech [Alert and Oriented] : alert and oriented [Normal memory] : normal memory [General Appearance - Well Developed] : well developed [Normal Appearance] : normal appearance [Well Groomed] : well groomed [General Appearance - Well Nourished] : well nourished [No Deformities] : no deformities [General Appearance - In No Acute Distress] : no acute distress [Normal Conjunctiva] : the conjunctiva exhibited no abnormalities [Eyelids - No Xanthelasma] : the eyelids demonstrated no xanthelasmas [Normal Oral Mucosa] : normal oral mucosa [No Oral Pallor] : no oral pallor [No Oral Cyanosis] : no oral cyanosis [Normal Jugular Venous A Waves Present] : normal jugular venous A waves present [Normal Jugular Venous V Waves Present] : normal jugular venous V waves present [No Jugular Venous Beasley A Waves] : no jugular venous beasley A waves [Respiration, Rhythm And Depth] : normal respiratory rhythm and effort [Exaggerated Use Of Accessory Muscles For Inspiration] : no accessory muscle use [Auscultation Breath Sounds / Voice Sounds] : lungs were clear to auscultation bilaterally [Heart Rate And Rhythm] : heart rate and rhythm were normal [Heart Sounds] : normal S1 and S2 [Murmurs] : no murmurs present [Abdomen Soft] : soft [Abdomen Tenderness] : non-tender [Abdomen Mass (___ Cm)] : no abdominal mass palpated [Abnormal Walk] : normal gait [Gait - Sufficient For Exercise Testing] : the gait was sufficient for exercise testing [Nail Clubbing] : no clubbing of the fingernails [Cyanosis, Localized] : no localized cyanosis [Petechial Hemorrhages (___cm)] : no petechial hemorrhages [FreeTextEntry1] : no pedal edema [Skin Color & Pigmentation] : normal skin color and pigmentation [] : no rash [No Venous Stasis] : no venous stasis [Skin Lesions] : no skin lesions [No Skin Ulcers] : no skin ulcer [No Xanthoma] : no  xanthoma was observed [Oriented To Time, Place, And Person] : oriented to person, place, and time [Affect] : the affect was normal [Mood] : the mood was normal [No Anxiety] : not feeling anxious

## 2022-05-11 ENCOUNTER — APPOINTMENT (OUTPATIENT)
Dept: ORTHOPEDIC SURGERY | Facility: CLINIC | Age: 62
End: 2022-05-11
Payer: COMMERCIAL

## 2022-05-11 DIAGNOSIS — T56.891A TOXIC EFFECT OF OTHER METALS, ACCIDENTAL (UNINTENTIONAL), INITIAL ENCOUNTER: ICD-10-CM

## 2022-05-11 PROCEDURE — 73521 X-RAY EXAM HIPS BI 2 VIEWS: CPT

## 2022-05-11 PROCEDURE — 99203 OFFICE O/P NEW LOW 30 MIN: CPT

## 2022-05-11 PROCEDURE — 73562 X-RAY EXAM OF KNEE 3: CPT | Mod: LT

## 2022-05-11 NOTE — HISTORY OF PRESENT ILLNESS
[de-identified] : 61 year old male s/p left total hip replacement and right hip resurfacing presents to the office today complaining of bilateral knee pain, left knee more painful than right. Patient reports doing well in regard to his hips. He reports buckling in the left knee and clicking in both knees. He states his pain is activity related. There is pain with just a few feet of ambulation and pain and difficulty worse with ascending stairs. Patient also reports difficulty with standing from a seated position. He reports taking Advil PM for pain but is unsure if this is helpful. He has received gel and cortisone injections in the past with only minimal relief with another orthopedist. Patient is here today to discuss his next options for pain relief.

## 2022-05-11 NOTE — REASON FOR VISIT
[Initial Visit] : an initial visit for [Artificial Hip Joint] : artificial hip joint [Knee Pain] : knee pain

## 2022-05-11 NOTE — PHYSICAL EXAM
[de-identified] : \par Left knee\par Inspection: no effusion or erythema.\par Wounds: none\par Alignment: normal.\par Palpation: medial joint line tenderness \par ROM: 0-100 degrees \par Ligamentous laxity: all ligaments appear stable\par Meniscal Test: negative McMurrays\par Muscle Test: good quad strength.\par \par Left hip\par Inspection: No swelling or ecchymosis.\par Wounds: well healed incision \par Palpation: non-tender.\par Stability: no instability.\par Strength: 5/5 all motor groups.\par ROM: Flexion to 60, ER 30, ABD 30 \par Leg length: equal.\par \par Right hip\par Inspection: No swelling or ecchymosis.\par Wounds: well healed incision \par Palpation: non-tender.\par Stability: no instability.\par Strength: 5/5 all motor groups.\par ROM: Flexion to 60, ER 30, ABD 30 \par Leg length: equal.\par \par   [de-identified] : Radiographs done today AP lateral and skyline of knees shows bone on bone in the medial compartment of the left knee , the right knee shows well maintained TF joitn spaces with PF joint space narrowing on skyline view.\par \par Radiographs done today AP pelvis and lateral of hips shows well aligned BHR on the right and well aligned cemented THR on the left.

## 2022-05-11 NOTE — ASSESSMENT
[FreeTextEntry1] : S/p right hip resurfacing , LTH , presents with severe left knee pain due to end stage OA. Pt has gained a significant amount of weight. His BMI is 43. He has DM with hemoglobin A1C of 8. And he has sleep apnea. 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 Before having a knee replacement, he would need to have his blood sugar better controlled and get his hemoglobin A1C below 7.5

## 2022-06-22 ENCOUNTER — APPOINTMENT (OUTPATIENT)
Dept: CARDIOLOGY | Facility: CLINIC | Age: 62
End: 2022-06-22

## 2022-07-29 LAB — COBALT: <1 UG/L

## 2022-08-02 LAB — CR BLD-MCNC: 2.2 UG/L

## 2022-08-25 ENCOUNTER — RESULT CHARGE (OUTPATIENT)
Age: 62
End: 2022-08-25

## 2022-08-25 ENCOUNTER — APPOINTMENT (OUTPATIENT)
Dept: CARDIOLOGY | Facility: CLINIC | Age: 62
End: 2022-08-25

## 2022-08-25 VITALS
WEIGHT: 263 LBS | HEIGHT: 65 IN | SYSTOLIC BLOOD PRESSURE: 134 MMHG | DIASTOLIC BLOOD PRESSURE: 78 MMHG | BODY MASS INDEX: 43.82 KG/M2 | HEART RATE: 99 BPM

## 2022-08-25 DIAGNOSIS — M25.562 PAIN IN LEFT KNEE: ICD-10-CM

## 2022-08-25 PROCEDURE — 93000 ELECTROCARDIOGRAM COMPLETE: CPT

## 2022-08-25 PROCEDURE — 99214 OFFICE O/P EST MOD 30 MIN: CPT | Mod: 25

## 2022-08-25 RX ORDER — ROSUVASTATIN CALCIUM 20 MG/1
20 TABLET, FILM COATED ORAL DAILY
Refills: 0 | Status: ACTIVE | COMMUNITY

## 2022-08-25 RX ORDER — TADALAFIL 20 MG/1
20 TABLET, FILM COATED ORAL
Refills: 0 | Status: ACTIVE | COMMUNITY

## 2022-08-25 RX ORDER — INSULIN GLARGINE 300 U/ML
INJECTION, SOLUTION SUBCUTANEOUS
Refills: 0 | Status: ACTIVE | COMMUNITY

## 2022-08-25 NOTE — DISCUSSION/SUMMARY
[FreeTextEntry1] : Patient is stable for surgery\par Patient is low risk for this surgery at [facility]\par No further cardiac work-up warranted\par

## 2022-08-25 NOTE — ASSESSMENT
[FreeTextEntry1] : Preop clearance for tumor removal at [facility] \par chest Pain\par htn \par exertional dyspnea\par aortic stenosis

## 2022-08-25 NOTE — HISTORY OF PRESENT ILLNESS
[FreeTextEntry1] : JAZ GAVIN is being seen for a clinic follow-up of aortic stenosis, HTN and chest pain. \par The patient has been experiencing increase in exertional SOB as an anginal equivalent.\par In addition, he c/o chest pressure with exertion relieved by rest.\par He has aortic stenosis by physical exam and by 2d echo. \par However, due to his body habitus, difficult to adequately assess the severity of his aortic stenosis\par Because of the above, cardiac cath. recommended.\par The risks and benefits explained to the patient, and he is in total agreement.\par This will be scheduled in a week or two.\par \par The patient is compliant with his medications. \par Patient is obese. \par \par s/p hip[ replacement\par doing well since his last visit\par no new complaints\par \par echo 2021 showed mild aortic stenosis with normal ef\par mild pulmonary htn\par \par patient is pre-op tumor removal at [facility]\par no further work up needed\par \par

## 2022-09-28 ENCOUNTER — APPOINTMENT (OUTPATIENT)
Dept: ORTHOPEDIC SURGERY | Facility: CLINIC | Age: 62
End: 2022-09-28

## 2022-09-28 PROCEDURE — 20610 DRAIN/INJ JOINT/BURSA W/O US: CPT | Mod: LT

## 2022-09-28 RX ADMIN — Medication 0 MG/3ML: at 00:00

## 2022-09-28 NOTE — HISTORY OF PRESENT ILLNESS
[de-identified] : 62 year old male presents to the office today for a gel injection into his arthritic bilateral knees.

## 2023-03-01 ENCOUNTER — APPOINTMENT (OUTPATIENT)
Dept: CARDIOLOGY | Facility: CLINIC | Age: 63
End: 2023-03-01
Payer: COMMERCIAL

## 2023-03-01 VITALS
HEART RATE: 96 BPM | SYSTOLIC BLOOD PRESSURE: 144 MMHG | BODY MASS INDEX: 43.32 KG/M2 | WEIGHT: 260 LBS | HEIGHT: 65 IN | DIASTOLIC BLOOD PRESSURE: 92 MMHG

## 2023-03-01 DIAGNOSIS — R07.9 CHEST PAIN, UNSPECIFIED: ICD-10-CM

## 2023-03-01 PROCEDURE — 99214 OFFICE O/P EST MOD 30 MIN: CPT | Mod: 25

## 2023-03-01 PROCEDURE — 93000 ELECTROCARDIOGRAM COMPLETE: CPT

## 2023-03-01 NOTE — DISCUSSION/SUMMARY
[FreeTextEntry1] : Patient is stable for surgery\par Patient is low risk for this surgery at [facility]\par No further cardiac work-up warranted\par

## 2023-03-01 NOTE — ASSESSMENT
[FreeTextEntry1] : Preop clearance for tumor removal at [facility] \par chest Pain\par htn \par exertional dyspnea\par aortic stenosis

## 2023-03-01 NOTE — HISTORY OF PRESENT ILLNESS
[FreeTextEntry1] : JAZ GAVIN is being seen for a clinic follow-up of aortic stenosis, HTN and chest pain. \par The patient has been experiencing increase in exertional SOB as an anginal equivalent.\par In addition, he c/o chest pressure with exertion relieved by rest.\par He has aortic stenosis by physical exam and by 2d echo. \par However, due to his body habitus, difficult to adequately assess the severity of his aortic stenosis\par Because of the above, cardiac cath. recommended.\par The risks and benefits explained to the patient, and he is in total agreement.\par This will be scheduled in a week or two.\par \par The patient is compliant with his medications. \par Patient is obese. \par \par s/p hip[ replacement\par doing well since his last visit\par no new complaints\par \par echo 2021 showed mild aortic stenosis with normal ef\par mild pulmonary htn\par \par patient is pre-op tumor removal at [facility]\par no further work up needed\par \par

## 2023-03-15 ENCOUNTER — APPOINTMENT (OUTPATIENT)
Dept: ORTHOPEDIC SURGERY | Facility: CLINIC | Age: 63
End: 2023-03-15

## 2023-03-23 ENCOUNTER — OUTPATIENT (OUTPATIENT)
Dept: OUTPATIENT SERVICES | Facility: HOSPITAL | Age: 63
LOS: 1 days | End: 2023-03-23
Payer: COMMERCIAL

## 2023-03-23 ENCOUNTER — RESULT REVIEW (OUTPATIENT)
Age: 63
End: 2023-03-23

## 2023-03-23 DIAGNOSIS — Z96.643 PRESENCE OF ARTIFICIAL HIP JOINT, BILATERAL: Chronic | ICD-10-CM

## 2023-03-23 DIAGNOSIS — R93.2 ABNORMAL FINDINGS ON DIAGNOSTIC IMAGING OF LIVER AND BILIARY TRACT: ICD-10-CM

## 2023-03-23 DIAGNOSIS — Z86.018 PERSONAL HISTORY OF OTHER BENIGN NEOPLASM: Chronic | ICD-10-CM

## 2023-03-23 DIAGNOSIS — S02.30XA FRACTURE OF ORBITAL FLOOR, UNSPECIFIED SIDE, INITIAL ENCOUNTER FOR CLOSED FRACTURE: Chronic | ICD-10-CM

## 2023-03-23 DIAGNOSIS — Z12.11 ENCOUNTER FOR SCREENING FOR MALIGNANT NEOPLASM OF COLON: Chronic | ICD-10-CM

## 2023-03-23 PROCEDURE — 73562 X-RAY EXAM OF KNEE 3: CPT | Mod: 26,50

## 2023-03-23 PROCEDURE — 76700 US EXAM ABDOM COMPLETE: CPT

## 2023-03-23 PROCEDURE — 73522 X-RAY EXAM HIPS BI 3-4 VIEWS: CPT

## 2023-03-23 PROCEDURE — 73562 X-RAY EXAM OF KNEE 3: CPT | Mod: 50

## 2023-03-23 PROCEDURE — 73522 X-RAY EXAM HIPS BI 3-4 VIEWS: CPT | Mod: 26

## 2023-03-23 PROCEDURE — 76700 US EXAM ABDOM COMPLETE: CPT | Mod: 26

## 2023-03-24 DIAGNOSIS — R93.2 ABNORMAL FINDINGS ON DIAGNOSTIC IMAGING OF LIVER AND BILIARY TRACT: ICD-10-CM

## 2023-04-06 ENCOUNTER — APPOINTMENT (OUTPATIENT)
Dept: CARDIOLOGY | Facility: CLINIC | Age: 63
End: 2023-04-06
Payer: COMMERCIAL

## 2023-04-06 PROCEDURE — 93306 TTE W/DOPPLER COMPLETE: CPT

## 2023-05-23 ENCOUNTER — APPOINTMENT (OUTPATIENT)
Dept: UROLOGY | Facility: CLINIC | Age: 63
End: 2023-05-23
Payer: MEDICARE

## 2023-05-23 VITALS
DIASTOLIC BLOOD PRESSURE: 87 MMHG | SYSTOLIC BLOOD PRESSURE: 144 MMHG | TEMPERATURE: 97.4 F | HEIGHT: 65 IN | HEART RATE: 93 BPM | WEIGHT: 245 LBS | OXYGEN SATURATION: 98 % | BODY MASS INDEX: 40.82 KG/M2 | RESPIRATION RATE: 18 BRPM

## 2023-05-23 DIAGNOSIS — N52.01 ERECTILE DYSFUNCTION DUE TO ARTERIAL INSUFFICIENCY: ICD-10-CM

## 2023-05-23 DIAGNOSIS — R35.0 FREQUENCY OF MICTURITION: ICD-10-CM

## 2023-05-23 DIAGNOSIS — R35.1 NOCTURIA: ICD-10-CM

## 2023-05-23 DIAGNOSIS — N52.9 MALE ERECTILE DYSFUNCTION, UNSPECIFIED: ICD-10-CM

## 2023-05-23 PROCEDURE — 99204 OFFICE O/P NEW MOD 45 MIN: CPT

## 2023-05-23 NOTE — HISTORY OF PRESENT ILLNESS
[FreeTextEntry1] : Patient is a 62-year-old male with history of diabetes on multiple medications including Jardiance.  He is also on losartan hydrochlorothiazide for high blood pressure.  Patient also has a history of obstructive sleep apnea and uses CPAP machine.  He presents to office today with chief complaint of nocturia.  Patient states that he wakes up every 1-2 hours to urinate.  He does report drinking 2 to 3 cups of coffee a day and drinks water at night.  He denies any dysuria and gross hematuria.\par \par Recent lab work and imaging:\par Creatinine from April 2023 is 1.1 EGFR 75.  PSA January 2023 0.68 ng/mL.  Abdominal sonogram for liver lesion revealed no hydronephrosis of either kidney bilaterally.\par Patient continues to follow-up with Dr. Doan for liver lesion.\par \par Patient also complains of erectile dysfunction.  He currently uses tadalafil 20 mg as needed with mild to moderate results.  He requested trial sildenafil.  He denies history of heart attack.  He denies being prescribed any nitroglycerin medication.

## 2023-05-23 NOTE — END OF VISIT
[FreeTextEntry3] : I participated in obtaining history, formulated treatment plan which I discussed with the patient agree with the above transcription by the physicians assistant

## 2023-05-23 NOTE — ASSESSMENT
[FreeTextEntry1] : 62-year-old male with history of diabetes, elevated A1c.  Obstructive sleep apnea, and high blood pressure on diuretic.  Chief complaint of nocturia.  He does drink coffee 2 to 3 cups a day and water at night.\par \par Discussed behavioral modifications.  Decrease caffeine and evening water.  Improved blood sugar control.  Patient declines medication for urination\par Recommend bladder sonogram to evaluate PVR and prostate size.\par \par Also with erectile dysfunction.  Discussed causes of erectile dysfunction.  He currently uses tadalafil 20 mg as needed.  He request to trial sildenafil.  Prescriptions for both sent to pharmacy per patient's request.  He understands to not take these medications together due to risks of side effects and risks of priapism.  Patient understands to wait at least 3 days in between taking medications.\par Side effects of PDE 5 inhibitors were reviewed with patient in detail.  Patient verbalized understanding.\par Emergency precautions reviewed with patient in detail.  Patient verbalized understanding.\par \par Follow-up 6 weeks to reassess [Urinary Symptom or Sign (788.99\R39.89)] : implantation

## 2023-06-23 ENCOUNTER — RESULT REVIEW (OUTPATIENT)
Age: 63
End: 2023-06-23

## 2023-06-23 ENCOUNTER — OUTPATIENT (OUTPATIENT)
Dept: OUTPATIENT SERVICES | Facility: HOSPITAL | Age: 63
LOS: 1 days | End: 2023-06-23
Payer: MEDICARE

## 2023-06-23 DIAGNOSIS — M19.072 PRIMARY OSTEOARTHRITIS, LEFT ANKLE AND FOOT: ICD-10-CM

## 2023-06-23 DIAGNOSIS — Z96.643 PRESENCE OF ARTIFICIAL HIP JOINT, BILATERAL: Chronic | ICD-10-CM

## 2023-06-23 DIAGNOSIS — S02.30XA FRACTURE OF ORBITAL FLOOR, UNSPECIFIED SIDE, INITIAL ENCOUNTER FOR CLOSED FRACTURE: Chronic | ICD-10-CM

## 2023-06-23 DIAGNOSIS — Z12.11 ENCOUNTER FOR SCREENING FOR MALIGNANT NEOPLASM OF COLON: Chronic | ICD-10-CM

## 2023-06-23 DIAGNOSIS — Z86.018 PERSONAL HISTORY OF OTHER BENIGN NEOPLASM: Chronic | ICD-10-CM

## 2023-06-23 DIAGNOSIS — Z96.643 PRESENCE OF ARTIFICIAL HIP JOINT, BILATERAL: ICD-10-CM

## 2023-06-23 DIAGNOSIS — Z00.8 ENCOUNTER FOR OTHER GENERAL EXAMINATION: ICD-10-CM

## 2023-06-23 PROCEDURE — 73630 X-RAY EXAM OF FOOT: CPT | Mod: 50

## 2023-06-23 PROCEDURE — 73610 X-RAY EXAM OF ANKLE: CPT | Mod: 50

## 2023-06-23 PROCEDURE — 73721 MRI JNT OF LWR EXTRE W/O DYE: CPT | Mod: 26,RT,MH

## 2023-06-23 PROCEDURE — 73721 MRI JNT OF LWR EXTRE W/O DYE: CPT | Mod: RT

## 2023-06-23 PROCEDURE — 73630 X-RAY EXAM OF FOOT: CPT | Mod: 26,50

## 2023-06-23 PROCEDURE — 73610 X-RAY EXAM OF ANKLE: CPT | Mod: 26,50

## 2023-06-24 DIAGNOSIS — M19.072 PRIMARY OSTEOARTHRITIS, LEFT ANKLE AND FOOT: ICD-10-CM

## 2023-06-24 DIAGNOSIS — Z96.643 PRESENCE OF ARTIFICIAL HIP JOINT, BILATERAL: ICD-10-CM

## 2023-07-12 ENCOUNTER — OUTPATIENT (OUTPATIENT)
Dept: OUTPATIENT SERVICES | Facility: HOSPITAL | Age: 63
LOS: 1 days | End: 2023-07-12
Payer: MEDICARE

## 2023-07-12 DIAGNOSIS — Z86.018 PERSONAL HISTORY OF OTHER BENIGN NEOPLASM: Chronic | ICD-10-CM

## 2023-07-12 DIAGNOSIS — Z00.8 ENCOUNTER FOR OTHER GENERAL EXAMINATION: ICD-10-CM

## 2023-07-12 DIAGNOSIS — Z12.11 ENCOUNTER FOR SCREENING FOR MALIGNANT NEOPLASM OF COLON: Chronic | ICD-10-CM

## 2023-07-12 DIAGNOSIS — Z96.643 PRESENCE OF ARTIFICIAL HIP JOINT, BILATERAL: Chronic | ICD-10-CM

## 2023-07-12 DIAGNOSIS — N20.0 CALCULUS OF KIDNEY: ICD-10-CM

## 2023-07-12 DIAGNOSIS — S02.30XA FRACTURE OF ORBITAL FLOOR, UNSPECIFIED SIDE, INITIAL ENCOUNTER FOR CLOSED FRACTURE: Chronic | ICD-10-CM

## 2023-07-12 PROCEDURE — 76857 US EXAM PELVIC LIMITED: CPT | Mod: 26

## 2023-07-12 PROCEDURE — 76857 US EXAM PELVIC LIMITED: CPT

## 2023-07-13 DIAGNOSIS — N20.0 CALCULUS OF KIDNEY: ICD-10-CM

## 2023-07-19 ENCOUNTER — APPOINTMENT (OUTPATIENT)
Dept: UROLOGY | Facility: CLINIC | Age: 63
End: 2023-07-19
Payer: MEDICARE

## 2023-07-19 VITALS
SYSTOLIC BLOOD PRESSURE: 153 MMHG | RESPIRATION RATE: 18 BRPM | TEMPERATURE: 97.2 F | HEIGHT: 65 IN | WEIGHT: 248 LBS | DIASTOLIC BLOOD PRESSURE: 95 MMHG | HEART RATE: 90 BPM | OXYGEN SATURATION: 99 % | BODY MASS INDEX: 41.32 KG/M2

## 2023-07-19 DIAGNOSIS — N40.1 BENIGN PROSTATIC HYPERPLASIA WITH LOWER URINARY TRACT SYMPMS: ICD-10-CM

## 2023-07-19 DIAGNOSIS — N13.8 BENIGN PROSTATIC HYPERPLASIA WITH LOWER URINARY TRACT SYMPMS: ICD-10-CM

## 2023-07-19 DIAGNOSIS — R39.9 UNSPECIFIED SYMPTOMS AND SIGNS INVOLVING THE GENITOURINARY SYSTEM: ICD-10-CM

## 2023-07-19 PROCEDURE — 99213 OFFICE O/P EST LOW 20 MIN: CPT

## 2023-07-19 NOTE — ASSESSMENT
[FreeTextEntry1] : Nocturia likely multifactorial and may not be improved with prostate medication.  Patient declines trial of prostate medication.\par \par Continue tadalafil as needed for ED. [Urinary Symptom or Sign (788.99\R39.89)] : implantation

## 2023-07-19 NOTE — HISTORY OF PRESENT ILLNESS
[FreeTextEntry1] : 63-year-old with nocturia.  He has sleep apnea and uses a CPAP machine, he is on losartan with hydrochlorothiazide.  He is on Jardiance.  He has decreased his coffee intake to 1/day.  He still has some nocturia x2.\par \par Bladder sonogram shows a 96 cc PVR, 54 cc prostate.\par \par He tried sildenafil for ED but prefers tadalafil secondary to fleshy feeling with Kianna

## 2023-08-17 ENCOUNTER — APPOINTMENT (OUTPATIENT)
Dept: CARDIOLOGY | Facility: CLINIC | Age: 63
End: 2023-08-17

## 2023-09-15 LAB
ALBUMIN SERPL ELPH-MCNC: 4.4 G/DL
ALP BLD-CCNC: 50 U/L
ALT SERPL-CCNC: 14 U/L
ANION GAP SERPL CALC-SCNC: 16 MMOL/L
AST SERPL-CCNC: 14 U/L
BILIRUB SERPL-MCNC: 0.5 MG/DL
BUN SERPL-MCNC: 24 MG/DL
CALCIUM SERPL-MCNC: 9.6 MG/DL
CHLORIDE SERPL-SCNC: 103 MMOL/L
CHOLEST SERPL-MCNC: 159 MG/DL
CO2 SERPL-SCNC: 24 MMOL/L
CREAT SERPL-MCNC: 1.1 MG/DL
CREAT SPEC-SCNC: 63 MG/DL
EGFR: 74 ML/MIN/1.73M2
ESTIMATED AVERAGE GLUCOSE: 148 MG/DL
GLUCOSE SERPL-MCNC: 112 MG/DL
HBA1C MFR BLD HPLC: 6.8 %
HDLC SERPL-MCNC: 47 MG/DL
LDLC SERPL CALC-MCNC: 77 MG/DL
MICROALBUMIN 24H UR DL<=1MG/L-MCNC: 69.9 MG/DL
MICROALBUMIN/CREAT 24H UR-RTO: 1118 MG/G
NONHDLC SERPL-MCNC: 112 MG/DL
POTASSIUM SERPL-SCNC: 4.4 MMOL/L
PROT SERPL-MCNC: 6.8 G/DL
SODIUM SERPL-SCNC: 143 MMOL/L
TRIGL SERPL-MCNC: 174 MG/DL
TSH SERPL-ACNC: 1.98 UIU/ML

## 2023-11-01 ENCOUNTER — APPOINTMENT (OUTPATIENT)
Dept: CARDIOLOGY | Facility: CLINIC | Age: 63
End: 2023-11-01
Payer: MEDICARE

## 2023-11-01 VITALS
BODY MASS INDEX: 44.65 KG/M2 | HEART RATE: 98 BPM | WEIGHT: 268 LBS | SYSTOLIC BLOOD PRESSURE: 118 MMHG | HEIGHT: 65 IN | DIASTOLIC BLOOD PRESSURE: 82 MMHG

## 2023-11-01 DIAGNOSIS — I10 ESSENTIAL (PRIMARY) HYPERTENSION: ICD-10-CM

## 2023-11-01 DIAGNOSIS — I35.0 NONRHEUMATIC AORTIC (VALVE) STENOSIS: ICD-10-CM

## 2023-11-01 PROCEDURE — 93000 ELECTROCARDIOGRAM COMPLETE: CPT

## 2023-11-01 PROCEDURE — 99214 OFFICE O/P EST MOD 30 MIN: CPT

## 2024-01-08 ENCOUNTER — APPOINTMENT (OUTPATIENT)
Dept: PULMONOLOGY | Facility: CLINIC | Age: 64
End: 2024-01-08
Payer: MEDICARE

## 2024-01-08 VITALS
DIASTOLIC BLOOD PRESSURE: 70 MMHG | SYSTOLIC BLOOD PRESSURE: 130 MMHG | RESPIRATION RATE: 12 BRPM | WEIGHT: 260 LBS | HEART RATE: 104 BPM | BODY MASS INDEX: 43.32 KG/M2 | OXYGEN SATURATION: 96 % | HEIGHT: 65 IN

## 2024-01-08 DIAGNOSIS — J45.909 UNSPECIFIED ASTHMA, UNCOMPLICATED: ICD-10-CM

## 2024-01-08 DIAGNOSIS — G47.33 OBSTRUCTIVE SLEEP APNEA (ADULT) (PEDIATRIC): ICD-10-CM

## 2024-01-08 PROCEDURE — 99213 OFFICE O/P EST LOW 20 MIN: CPT

## 2024-01-12 ENCOUNTER — RESULT REVIEW (OUTPATIENT)
Age: 64
End: 2024-01-12

## 2024-01-12 ENCOUNTER — OUTPATIENT (OUTPATIENT)
Dept: OUTPATIENT SERVICES | Facility: HOSPITAL | Age: 64
LOS: 1 days | End: 2024-01-12
Payer: MEDICARE

## 2024-01-12 DIAGNOSIS — Z12.11 ENCOUNTER FOR SCREENING FOR MALIGNANT NEOPLASM OF COLON: Chronic | ICD-10-CM

## 2024-01-12 DIAGNOSIS — M17.0 BILATERAL PRIMARY OSTEOARTHRITIS OF KNEE: ICD-10-CM

## 2024-01-12 DIAGNOSIS — Z86.018 PERSONAL HISTORY OF OTHER BENIGN NEOPLASM: Chronic | ICD-10-CM

## 2024-01-12 DIAGNOSIS — S02.30XA FRACTURE OF ORBITAL FLOOR, UNSPECIFIED SIDE, INITIAL ENCOUNTER FOR CLOSED FRACTURE: Chronic | ICD-10-CM

## 2024-01-12 DIAGNOSIS — Z96.643 PRESENCE OF ARTIFICIAL HIP JOINT, BILATERAL: Chronic | ICD-10-CM

## 2024-01-12 PROCEDURE — 73562 X-RAY EXAM OF KNEE 3: CPT | Mod: 50

## 2024-01-12 PROCEDURE — 73522 X-RAY EXAM HIPS BI 3-4 VIEWS: CPT

## 2024-01-12 PROCEDURE — 73522 X-RAY EXAM HIPS BI 3-4 VIEWS: CPT | Mod: 26

## 2024-01-12 PROCEDURE — 73562 X-RAY EXAM OF KNEE 3: CPT | Mod: 26,50

## 2024-01-13 DIAGNOSIS — M17.0 BILATERAL PRIMARY OSTEOARTHRITIS OF KNEE: ICD-10-CM

## 2024-01-26 PROBLEM — G47.33 OBSTRUCTIVE SLEEP APNEA, ADULT: Status: ACTIVE | Noted: 2022-02-10

## 2024-01-26 PROBLEM — J45.909 ASTHMA: Status: ACTIVE | Noted: 2022-02-10

## 2024-01-26 NOTE — HISTORY OF PRESENT ILLNESS
[TextBox_4] : I reviewed all the previous sleep test that are available on file. I reviewed the previous office notes.

## 2024-01-26 NOTE — REASON FOR VISIT
[Follow-Up] : a follow-up visit [Asthma] : asthma [TextBox_44] : doing OK [Sleep Apnea] : sleep apnea

## 2024-01-26 NOTE — ASSESSMENT
[FreeTextEntry1] : Obstructive sleep apnea, adult (327.23) (G47.33) Obesity (BMI 30-39.9) (278.00) (E66.9) Asthma (493.90) (J45.909) Assessment: JIMMY Obesity PLAN:  The patient is benefitting from the PAP device.  New supplies ordered  Weight loss discussed  I stressed the need maintain compliance with the PAP device.  The patient is not to use an Ozone or UV sterilizer.  F/U in 6 months    Assessment:  Asthma:  plan:  pred bolus 40mg x 5 days Stress compliance with inhalers. Renewed today. cont PRN albuterol cont ICS/LABA needs PFT weight loss    F/U 6 months

## 2024-01-26 NOTE — PHYSICAL EXAM
[No Acute Distress] : no acute distress [Normal Oropharynx] : normal oropharynx [Normal Appearance] : normal appearance [No Neck Mass] : no neck mass [Normal Rate/Rhythm] : normal rate/rhythm [Normal S1, S2] : normal s1, s2 [No Murmurs] : no murmurs [No Resp Distress] : no resp distress [No Abnormalities] : no abnormalities [Benign] : benign [Normal Gait] : normal gait [No Cyanosis] : no cyanosis [No Clubbing] : no clubbing [No Edema] : no edema [FROM] : FROM [Normal Color/ Pigmentation] : normal color/ pigmentation [No Focal Deficits] : no focal deficits [Oriented x3] : oriented x3 [Normal Affect] : normal affect [TextBox_68] : harsh breath sounds with scattered wheeze

## 2024-02-21 ENCOUNTER — RESULT REVIEW (OUTPATIENT)
Age: 64
End: 2024-02-21

## 2024-02-21 ENCOUNTER — OUTPATIENT (OUTPATIENT)
Dept: OUTPATIENT SERVICES | Facility: HOSPITAL | Age: 64
LOS: 1 days | End: 2024-02-21
Payer: MEDICARE

## 2024-02-21 DIAGNOSIS — Z12.11 ENCOUNTER FOR SCREENING FOR MALIGNANT NEOPLASM OF COLON: Chronic | ICD-10-CM

## 2024-02-21 DIAGNOSIS — Z96.643 PRESENCE OF ARTIFICIAL HIP JOINT, BILATERAL: Chronic | ICD-10-CM

## 2024-02-21 DIAGNOSIS — M25.519 PAIN IN UNSPECIFIED SHOULDER: ICD-10-CM

## 2024-02-21 DIAGNOSIS — S02.30XA FRACTURE OF ORBITAL FLOOR, UNSPECIFIED SIDE, INITIAL ENCOUNTER FOR CLOSED FRACTURE: Chronic | ICD-10-CM

## 2024-02-21 DIAGNOSIS — Z86.018 PERSONAL HISTORY OF OTHER BENIGN NEOPLASM: Chronic | ICD-10-CM

## 2024-02-21 PROCEDURE — 73030 X-RAY EXAM OF SHOULDER: CPT | Mod: 26,50

## 2024-02-21 PROCEDURE — 73030 X-RAY EXAM OF SHOULDER: CPT | Mod: 50

## 2024-02-22 DIAGNOSIS — M25.519 PAIN IN UNSPECIFIED SHOULDER: ICD-10-CM

## 2024-04-09 RX ORDER — FLUTICASONE FUROATE AND VILANTEROL TRIFENATATE 200; 25 UG/1; UG/1
200-25 POWDER RESPIRATORY (INHALATION) DAILY
Qty: 3 | Refills: 3 | Status: ACTIVE | COMMUNITY
Start: 2021-12-16 | End: 1900-01-01

## 2024-04-09 RX ORDER — AZELASTINE HYDROCHLORIDE 137 UG/1
0.1 SPRAY, METERED NASAL DAILY
Qty: 3 | Refills: 3 | Status: ACTIVE | COMMUNITY
Start: 2024-04-09 | End: 1900-01-01

## 2024-04-15 ENCOUNTER — RX RENEWAL (OUTPATIENT)
Age: 64
End: 2024-04-15

## 2024-04-15 RX ORDER — ALBUTEROL SULFATE 90 UG/1
108 (90 BASE) INHALANT RESPIRATORY (INHALATION)
Qty: 1 | Refills: 5 | Status: ACTIVE | COMMUNITY
Start: 2021-07-21 | End: 1900-01-01

## 2024-04-24 ENCOUNTER — APPOINTMENT (OUTPATIENT)
Dept: CARDIOLOGY | Facility: CLINIC | Age: 64
End: 2024-04-24
Payer: MEDICARE

## 2024-04-24 VITALS
HEIGHT: 65 IN | BODY MASS INDEX: 44.98 KG/M2 | WEIGHT: 270 LBS | SYSTOLIC BLOOD PRESSURE: 132 MMHG | DIASTOLIC BLOOD PRESSURE: 80 MMHG

## 2024-04-24 DIAGNOSIS — E66.9 OBESITY, UNSPECIFIED: ICD-10-CM

## 2024-04-24 DIAGNOSIS — E78.5 HYPERLIPIDEMIA, UNSPECIFIED: ICD-10-CM

## 2024-04-24 PROCEDURE — 99204 OFFICE O/P NEW MOD 45 MIN: CPT

## 2024-04-24 PROCEDURE — 93000 ELECTROCARDIOGRAM COMPLETE: CPT

## 2024-04-24 PROCEDURE — 99214 OFFICE O/P EST MOD 30 MIN: CPT

## 2024-04-24 RX ORDER — MELOXICAM 10 MG/1
CAPSULE ORAL
Refills: 0 | Status: ACTIVE | COMMUNITY

## 2024-04-24 RX ORDER — TIRZEPATIDE 15 MG/.5ML
15 INJECTION, SOLUTION SUBCUTANEOUS
Refills: 0 | Status: ACTIVE | COMMUNITY

## 2024-04-24 NOTE — PHYSICAL EXAM
[Well Developed] : well developed [Well Nourished] : well nourished [No Acute Distress] : no acute distress [Normal Venous Pressure] : normal venous pressure [No Carotid Bruit] : no carotid bruit [Normal S1, S2] : normal S1, S2 [No Murmur] : no murmur [No Rub] : no rub [No Gallop] : no gallop [Clear Lung Fields] : clear lung fields [Good Air Entry] : good air entry [No Respiratory Distress] : no respiratory distress  [Soft] : abdomen soft [Non Tender] : non-tender [No Masses/organomegaly] : no masses/organomegaly [Normal Bowel Sounds] : normal bowel sounds [Normal Gait] : normal gait [No Edema] : no edema [No Cyanosis] : no cyanosis [No Clubbing] : no clubbing [No Varicosities] : no varicosities [No Rash] : no rash [No Skin Lesions] : no skin lesions [Moves all extremities] : moves all extremities [No Focal Deficits] : no focal deficits [Normal Speech] : normal speech [Alert and Oriented] : alert and oriented [Normal memory] : normal memory [Normal Appearance] : normal appearance [General Appearance - Well Developed] : well developed [Well Groomed] : well groomed [General Appearance - Well Nourished] : well nourished [No Deformities] : no deformities [General Appearance - In No Acute Distress] : no acute distress [Normal Conjunctiva] : the conjunctiva exhibited no abnormalities [Eyelids - No Xanthelasma] : the eyelids demonstrated no xanthelasmas [Normal Oral Mucosa] : normal oral mucosa [No Oral Pallor] : no oral pallor [No Oral Cyanosis] : no oral cyanosis [Normal Jugular Venous A Waves Present] : normal jugular venous A waves present [Normal Jugular Venous V Waves Present] : normal jugular venous V waves present [No Jugular Venous Beasley A Waves] : no jugular venous beasley A waves [Respiration, Rhythm And Depth] : normal respiratory rhythm and effort [Exaggerated Use Of Accessory Muscles For Inspiration] : no accessory muscle use [Auscultation Breath Sounds / Voice Sounds] : lungs were clear to auscultation bilaterally [Heart Rate And Rhythm] : heart rate and rhythm were normal [Heart Sounds] : normal S1 and S2 [Murmurs] : no murmurs present [Abdomen Soft] : soft [Abdomen Tenderness] : non-tender [Abdomen Mass (___ Cm)] : no abdominal mass palpated [Abnormal Walk] : normal gait [Gait - Sufficient For Exercise Testing] : the gait was sufficient for exercise testing [Nail Clubbing] : no clubbing of the fingernails [Cyanosis, Localized] : no localized cyanosis [Petechial Hemorrhages (___cm)] : no petechial hemorrhages [Skin Color & Pigmentation] : normal skin color and pigmentation [] : no rash [No Venous Stasis] : no venous stasis [Skin Lesions] : no skin lesions [No Skin Ulcers] : no skin ulcer [No Xanthoma] : no  xanthoma was observed [Oriented To Time, Place, And Person] : oriented to person, place, and time [Affect] : the affect was normal [Mood] : the mood was normal [No Anxiety] : not feeling anxious [FreeTextEntry1] : no pedal edema

## 2024-04-24 NOTE — ASSESSMENT
[FreeTextEntry1] : Aortic stenosis  Chest pain  Obstructive sleep apnea, adult  chest Pain htn exertional dyspnea aortic stenosis.

## 2024-04-24 NOTE — DISCUSSION/SUMMARY
[FreeTextEntry1] : Patient is stable for surgery\par  Patient is low risk for this surgery at [facility]\par  No further cardiac work-up warranted\par

## 2024-06-17 ENCOUNTER — APPOINTMENT (OUTPATIENT)
Dept: ORTHOPEDIC SURGERY | Facility: CLINIC | Age: 64
End: 2024-06-17
Payer: MEDICARE

## 2024-06-17 VITALS — HEIGHT: 65 IN | BODY MASS INDEX: 42.49 KG/M2 | WEIGHT: 255 LBS

## 2024-06-17 DIAGNOSIS — M17.11 UNILATERAL PRIMARY OSTEOARTHRITIS, RIGHT KNEE: ICD-10-CM

## 2024-06-17 DIAGNOSIS — Z96.641 PRESENCE OF RIGHT ARTIFICIAL HIP JOINT: ICD-10-CM

## 2024-06-17 DIAGNOSIS — Z96.642 PRESENCE OF LEFT ARTIFICIAL HIP JOINT: ICD-10-CM

## 2024-06-17 DIAGNOSIS — Z78.9 OTHER SPECIFIED HEALTH STATUS: ICD-10-CM

## 2024-06-17 DIAGNOSIS — M17.12 UNILATERAL PRIMARY OSTEOARTHRITIS, LEFT KNEE: ICD-10-CM

## 2024-06-17 DIAGNOSIS — S83.281A OTHER TEAR OF LATERAL MENISCUS, CURRENT INJURY, RIGHT KNEE, INITIAL ENCOUNTER: ICD-10-CM

## 2024-06-17 PROCEDURE — 73522 X-RAY EXAM HIPS BI 3-4 VIEWS: CPT

## 2024-06-17 PROCEDURE — 99203 OFFICE O/P NEW LOW 30 MIN: CPT

## 2024-06-17 PROCEDURE — 73564 X-RAY EXAM KNEE 4 OR MORE: CPT | Mod: RT

## 2024-06-17 RX ORDER — SILDENAFIL 100 MG/1
100 TABLET, FILM COATED ORAL
Qty: 30 | Refills: 1 | Status: COMPLETED | COMMUNITY
Start: 2023-05-23 | End: 2024-06-17

## 2024-06-17 RX ORDER — TADALAFIL 20 MG/1
20 TABLET ORAL
Qty: 15 | Refills: 1 | Status: COMPLETED | COMMUNITY
Start: 2023-05-23 | End: 2024-06-17

## 2024-06-17 RX ORDER — SEMAGLUTIDE 1.34 MG/ML
2 INJECTION, SOLUTION SUBCUTANEOUS WEEKLY
Refills: 0 | Status: COMPLETED | COMMUNITY
End: 2024-06-17

## 2024-06-21 NOTE — HISTORY OF PRESENT ILLNESS
[Constant] : ~He/She~ states the symptoms seem to be constant [Acetaminophen] : relieved by acetaminophen [Heat] : relieved by heat [Ice] : relieved by ice [NSAIDs] : relieved by nonsteroidal anti-inflammatory drugs [Rest] : relieved by rest [de-identified] : 64-year-old gentleman presents for follow-up.  He was last in my previous practice.  He was worked up for history of total hip arthroplasty and hip resurfacing.  He was also treated for knee osteoarthritis.  Now he presents with new increase in right knee pain.  Pain is severe with walking, and gets better with ice, rest and medications.  Pain is also worse with standing and stairs.  He now mostly walks indoors with a cane. [de-identified] : standing, stairs

## 2024-06-21 NOTE — PHYSICAL EXAM
[de-identified] : Patient walks with a notable limp.  Bilateral knees demonstrate crepitus on patellar grind test.  There is no instability.  There is severe pain with range of motion range of motion of the right knee. There is positive Zack test. [de-identified] :  bilateral knee xrays were obtained in office today show: Severe osteoarthritis of the left knee and moderate to severe osteoarthritis of the right knee.  There is slight loss of joint space of the medial and lateral compartments of the right knee.  There are osteophytes.  There is bone-on-bone articulation of the patellofemoral compartment of the right knee.  Left knee demonstrates severe osteoarthritis with complete loss of joint space medially.

## 2024-06-21 NOTE — DISCUSSION/SUMMARY
[de-identified] : We discussed that osteoarthritis of the left knee is much more pronounced on the right.  However, pain is mostly present in the right knee and is associated with locking.  I am ordering an MRI to rule out bucket-handle meniscal tear.  I will follow-up with the patient when results are received.

## 2024-07-12 RX ORDER — MELOXICAM 15 MG/1
15 TABLET ORAL DAILY
Qty: 30 | Refills: 1 | Status: ACTIVE | COMMUNITY
Start: 2024-07-12 | End: 1900-01-01

## 2024-08-16 ENCOUNTER — APPOINTMENT (OUTPATIENT)
Dept: ORTHOPEDIC SURGERY | Facility: CLINIC | Age: 64
End: 2024-08-16

## 2024-08-16 DIAGNOSIS — M17.12 UNILATERAL PRIMARY OSTEOARTHRITIS, LEFT KNEE: ICD-10-CM

## 2024-08-16 DIAGNOSIS — M17.11 UNILATERAL PRIMARY OSTEOARTHRITIS, RIGHT KNEE: ICD-10-CM

## 2024-08-16 PROCEDURE — 20610 DRAIN/INJ JOINT/BURSA W/O US: CPT | Mod: 50

## 2024-08-20 NOTE — PROCEDURE
[de-identified] :   Discussed again with the patient the planned euflexxa injection. The risks, benefits, convalescence and alternatives were reviewed. The possible side effects discussed included but were not limited to pain, swelling, heat and redness. There symptoms are generally mild but if they are extensive then contact the office.    After discussion of the risks and benefits, the patient agreed to their euflexxa injection into BILATERAL knees. Confirmed that the patient does not have a history of prior adverse reactions, active infections or relevant allergies. There was no effusion, erythema or warmth present, and the skin was clear. The skin was sterilized with alcohol. Topical anesthesia was achieved with ethyl chloride. Chlorhexidine was applied. A 22-gauge needle was inserted into the RIGHT knee via a lateral approach without ultrasound guidance. The injection was completed without complication and a bandage was applied.  The same procedure was repeated for the LEFT side.   The patient tolerated the procedure well and was instructed to avoid strenuous activities for the next 24-48 hours and to use ice, NSAIDs, or Tylenol for pain as needed. The patient will call immediately with any signs of infection or allergic reaction.   LOT:[id number]  EXP:08/18/2025

## 2024-08-20 NOTE — PROCEDURE
[de-identified] :   Discussed again with the patient the planned euflexxa injection. The risks, benefits, convalescence and alternatives were reviewed. The possible side effects discussed included but were not limited to pain, swelling, heat and redness. There symptoms are generally mild but if they are extensive then contact the office.    After discussion of the risks and benefits, the patient agreed to their euflexxa injection into BILATERAL knees. Confirmed that the patient does not have a history of prior adverse reactions, active infections or relevant allergies. There was no effusion, erythema or warmth present, and the skin was clear. The skin was sterilized with alcohol. Topical anesthesia was achieved with ethyl chloride. Chlorhexidine was applied. A 22-gauge needle was inserted into the RIGHT knee via a lateral approach without ultrasound guidance. The injection was completed without complication and a bandage was applied.  The same procedure was repeated for the LEFT side.   The patient tolerated the procedure well and was instructed to avoid strenuous activities for the next 24-48 hours and to use ice, NSAIDs, or Tylenol for pain as needed. The patient will call immediately with any signs of infection or allergic reaction.   LOT:[id number]  EXP:08/18/2025

## 2024-08-26 ENCOUNTER — APPOINTMENT (OUTPATIENT)
Dept: ORTHOPEDIC SURGERY | Facility: CLINIC | Age: 64
End: 2024-08-26
Payer: MEDICARE

## 2024-08-26 PROCEDURE — 20610 DRAIN/INJ JOINT/BURSA W/O US: CPT | Mod: 50

## 2024-08-26 NOTE — PROCEDURE
[de-identified] :  Discussed again with the patient the planned     EUFLEXXA       injection. The risks, benefits, convalescence and alternatives were reviewed. The possible side effects discussed included but were not limited to: pain, swelling, heat and redness. There symptoms are generally mild but if they are extensive then contact the office.    After discussion of the risks and benefits, the patient agreed to their     EUFLEXXA           injection into the RIGHT knee. Confirmed that the patient does not have a history of prior adverse reactions, active infections or relevant allergies. There was no effusion, erythema or warmth present, and the skin was clear. The skin was sterilized with alcohol. Topical anesthesia was achieved with ethyl chloride. Clorhexidine was applied. A 22 gauge needle was inserted into the RIGHT knee via a lateral approach without ultrasound guidance. The injection was completed without complication and a bandage was applied.  The same was applied to the LEFT side.    The patient tolerated the procedure well and was instructed to avoid strenuous activities for the next 24-48 hours and to use ice, NSAIDs, or Tylenol for pain as needed. The patient will call immediately with any signs of infection or allergic reaction.   Procedure: Injection of the RIGHT knee joint Used to inject     EUFLEXXA         - Specialty Pharmacy Filled. LOT:  [id number]        EXP: 2025-08-18  Procedure: Injection of the LEFT knee joint Used to inject     EUFLEXXA         - Specialty Pharmacy Filled. LOT:  [id number]     EXP: 2025-10-23

## 2024-08-26 NOTE — PROCEDURE
[de-identified] :  Discussed again with the patient the planned     EUFLEXXA       injection. The risks, benefits, convalescence and alternatives were reviewed. The possible side effects discussed included but were not limited to: pain, swelling, heat and redness. There symptoms are generally mild but if they are extensive then contact the office.    After discussion of the risks and benefits, the patient agreed to their     EUFLEXXA           injection into the RIGHT knee. Confirmed that the patient does not have a history of prior adverse reactions, active infections or relevant allergies. There was no effusion, erythema or warmth present, and the skin was clear. The skin was sterilized with alcohol. Topical anesthesia was achieved with ethyl chloride. Clorhexidine was applied. A 22 gauge needle was inserted into the RIGHT knee via a lateral approach without ultrasound guidance. The injection was completed without complication and a bandage was applied.  The same was applied to the LEFT side.    The patient tolerated the procedure well and was instructed to avoid strenuous activities for the next 24-48 hours and to use ice, NSAIDs, or Tylenol for pain as needed. The patient will call immediately with any signs of infection or allergic reaction.   Procedure: Injection of the RIGHT knee joint Used to inject     EUFLEXXA         - Specialty Pharmacy Filled. LOT:  [id number]        EXP: 2025-08-18  Procedure: Injection of the LEFT knee joint Used to inject     EUFLEXXA         - Specialty Pharmacy Filled. LOT:  [id number]     EXP: 2025-10-23

## 2024-08-28 ENCOUNTER — APPOINTMENT (OUTPATIENT)
Dept: CARDIOLOGY | Facility: CLINIC | Age: 64
End: 2024-08-28
Payer: MEDICARE

## 2024-08-28 ENCOUNTER — NON-APPOINTMENT (OUTPATIENT)
Age: 64
End: 2024-08-28

## 2024-08-28 VITALS
SYSTOLIC BLOOD PRESSURE: 128 MMHG | HEIGHT: 65 IN | HEART RATE: 100 BPM | WEIGHT: 260 LBS | BODY MASS INDEX: 43.32 KG/M2 | DIASTOLIC BLOOD PRESSURE: 72 MMHG

## 2024-08-28 DIAGNOSIS — G47.33 OBSTRUCTIVE SLEEP APNEA (ADULT) (PEDIATRIC): ICD-10-CM

## 2024-08-28 PROCEDURE — 99214 OFFICE O/P EST MOD 30 MIN: CPT

## 2024-08-28 PROCEDURE — 93000 ELECTROCARDIOGRAM COMPLETE: CPT

## 2024-08-28 NOTE — OBJECTIVE
[History reviewed] : History reviewed. [Medications and Allergies reviewed] : Medications and allergies reviewed. Griseofulvin Counseling:  I discussed with the patient the risks of griseofulvin including but not limited to photosensitivity, cytopenia, liver damage, nausea/vomiting and severe allergy.  The patient understands that this medication is best absorbed when taken with a fatty meal (e.g., ice cream or french fries).

## 2024-08-28 NOTE — DISCUSSION/SUMMARY
[FreeTextEntry1] : Patient is stable for surgery\par  Patient is low risk for this surgery at [facility]\par  No further cardiac work-up warranted\par

## 2024-08-28 NOTE — HISTORY OF PRESENT ILLNESS
[FreeTextEntry1] : JAZ GAVIN is being seen for a clinic follow-up of aortic stenosis, HTN and chest pain.  The patient has been experiencing increase in exertional SOB as an anginal equivalent. In addition, he c/o chest pressure with exertion relieved by rest. He has aortic stenosis by physical exam and by 2d echo.  However, due to his body habitus, difficult to adequately assess the severity of his aortic stenosis Because of the above, cardiac cath. recommended. The risks and benefits explained to the patient, and he is in total agreement. This will be scheduled in a week or two.  The patient is compliant with his medications.  Patient is obese.   s/p hip[ replacement doing well since his last visit no new complaints  echo 2021 showed mild aortic stenosis with normal ef mild pulmonary htn  patient is pre-op tumor removal at [facility] no further work up needed  echo 2023  1. The left ventricular systolic function is normal with an ejection fraction visually estimated at 60-65 %. 2. There is mild (grade 1) left ventricular diastolic dysfunction with normal left atrial filling pressure. 3. Normal right ventricular cavity size, normal wall thickness and normal systolic function. 4. Aortic valve was not well visualized. 5. Mild aortic stenosis. 6. Trace aortic regurgitation.

## 2024-09-06 ENCOUNTER — APPOINTMENT (OUTPATIENT)
Dept: ORTHOPEDIC SURGERY | Facility: CLINIC | Age: 64
End: 2024-09-06
Payer: MEDICARE

## 2024-09-06 DIAGNOSIS — M17.12 UNILATERAL PRIMARY OSTEOARTHRITIS, LEFT KNEE: ICD-10-CM

## 2024-09-06 DIAGNOSIS — M17.11 UNILATERAL PRIMARY OSTEOARTHRITIS, RIGHT KNEE: ICD-10-CM

## 2024-09-06 PROCEDURE — 20610 DRAIN/INJ JOINT/BURSA W/O US: CPT | Mod: 50

## 2024-09-07 NOTE — PROCEDURE
[de-identified] :   Discussed again with the patient the planned euflexxa injection. The risks, benefits, convalescence and alternatives were reviewed. The possible side effects discussed included but were not limited to pain, swelling, heat and redness. There symptoms are generally mild but if they are extensive then contact the office.    After discussion of the risks and benefits, the patient agreed to their euflexxa injection into BILATERAL knees. Confirmed that the patient does not have a history of prior adverse reactions, active infections or relevant allergies. There was no effusion, erythema or warmth present, and the skin was clear. The skin was sterilized with alcohol. Topical anesthesia was achieved with ethyl chloride. Chlorhexidine was applied. A 22-gauge needle was inserted into the RIGHT knee via a lateral approach without ultrasound guidance. The injection was completed without complication and a bandage was applied.  The same procedure was repeated for the LEFT side.   The patient tolerated the procedure well and was instructed to avoid strenuous activities for the next 24-48 hours and to use ice, NSAIDs, or Tylenol for pain as needed. The patient will call immediately with any signs of infection or allergic reaction.  LOT: [id number] EXP:10-

## 2024-09-07 NOTE — PROCEDURE
[de-identified] :   Discussed again with the patient the planned euflexxa injection. The risks, benefits, convalescence and alternatives were reviewed. The possible side effects discussed included but were not limited to pain, swelling, heat and redness. There symptoms are generally mild but if they are extensive then contact the office.    After discussion of the risks and benefits, the patient agreed to their euflexxa injection into BILATERAL knees. Confirmed that the patient does not have a history of prior adverse reactions, active infections or relevant allergies. There was no effusion, erythema or warmth present, and the skin was clear. The skin was sterilized with alcohol. Topical anesthesia was achieved with ethyl chloride. Chlorhexidine was applied. A 22-gauge needle was inserted into the RIGHT knee via a lateral approach without ultrasound guidance. The injection was completed without complication and a bandage was applied.  The same procedure was repeated for the LEFT side.   The patient tolerated the procedure well and was instructed to avoid strenuous activities for the next 24-48 hours and to use ice, NSAIDs, or Tylenol for pain as needed. The patient will call immediately with any signs of infection or allergic reaction.  LOT: [id number] EXP:10-

## 2024-09-17 NOTE — H&P PST ADULT - DENTITION
Azithromycin Counseling:  I discussed with the patient the risks of azithromycin including but not limited to GI upset, allergic reaction, drug rash, diarrhea, and yeast infections. Spironolactone Pregnancy And Lactation Text: This medication can cause feminization of the male fetus and should be avoided during pregnancy. The active metabolite is also found in breast milk. High Dose Vitamin A Counseling: Side effects reviewed, pt to contact office should one occur. Topical Clindamycin Pregnancy And Lactation Text: This medication is Pregnancy Category B and is considered safe during pregnancy. It is unknown if it is excreted in breast milk. Benzoyl Peroxide Counseling: Patient counseled that medicine may cause skin irritation and bleach clothing.  In the event of skin irritation, the patient was advised to reduce the amount of the drug applied or use it less frequently.   The patient verbalized understanding of the proper use and possible adverse effects of benzoyl peroxide.  All of the patient's questions and concerns were addressed. Doxycycline Pregnancy And Lactation Text: This medication is Pregnancy Category D and not consider safe during pregnancy. It is also excreted in breast milk but is considered safe for shorter treatment courses. normal Bactrim Counseling:  I discussed with the patient the risks of sulfa antibiotics including but not limited to GI upset, allergic reaction, drug rash, diarrhea, dizziness, photosensitivity, and yeast infections.  Rarely, more serious reactions can occur including but not limited to aplastic anemia, agranulocytosis, methemoglobinemia, blood dyscrasias, liver or kidney failure, lung infiltrates or desquamative/blistering drug rashes. Sarecycline Pregnancy And Lactation Text: This medication is Pregnancy Category D and not consider safe during pregnancy. It is also excreted in breast milk. Azelaic Acid Counseling: Patient counseled that medicine may cause skin irritation and to avoid applying near the eyes.  In the event of skin irritation, the patient was advised to reduce the amount of the drug applied or use it less frequently.   The patient verbalized understanding of the proper use and possible adverse effects of azelaic acid.  All of the patient's questions and concerns were addressed. Erythromycin Pregnancy And Lactation Text: This medication is Pregnancy Category B and is considered safe during pregnancy. It is also excreted in breast milk. Tazorac Pregnancy And Lactation Text: This medication is not safe during pregnancy. It is unknown if this medication is excreted in breast milk. Birth Control Pills Counseling: Birth Control Pill Counseling: I discussed with the patient the potential side effects of OCPs including but not limited to increased risk of stroke, heart attack, thrombophlebitis, deep venous thrombosis, hepatic adenomas, breast changes, GI upset, headaches, and depression.  The patient verbalized understanding of the proper use and possible adverse effects of OCPs. All of the patient's questions and concerns were addressed. Topical Retinoid Pregnancy And Lactation Text: This medication is Pregnancy Category C. It is unknown if this medication is excreted in breast milk. Aklief counseling:  Patient advised to apply a pea-sized amount only at bedtime and wait 30 minutes after washing their face before applying.  If too drying, patient may add a non-comedogenic moisturizer.  The most commonly reported side effects including irritation, redness, scaling, dryness, stinging, burning, itching, and increased risk of sunburn.  The patient verbalized understanding of the proper use and possible adverse effects of retinoids.  All of the patient's questions and concerns were addressed. Dapsone Counseling: I discussed with the patient the risks of dapsone including but not limited to hemolytic anemia, agranulocytosis, rashes, methemoglobinemia, kidney failure, peripheral neuropathy, headaches, GI upset, and liver toxicity.  Patients who start dapsone require monitoring including baseline LFTs and weekly CBCs for the first month, then every month thereafter.  The patient verbalized understanding of the proper use and possible adverse effects of dapsone.  All of the patient's questions and concerns were addressed. Isotretinoin Pregnancy And Lactation Text: This medication is Pregnancy Category X and is considered extremely dangerous during pregnancy. It is unknown if it is excreted in breast milk. High Dose Vitamin A Pregnancy And Lactation Text: High dose vitamin A therapy is contraindicated during pregnancy and breast feeding. Winlevi Counseling:  I discussed with the patient the risks of topical clascoterone including but not limited to erythema, scaling, itching, and stinging. Patient voiced their understanding. Benzoyl Peroxide Pregnancy And Lactation Text: This medication is Pregnancy Category C. It is unknown if benzoyl peroxide is excreted in breast milk. Tetracycline Counseling: Patient counseled regarding possible photosensitivity and increased risk for sunburn.  Patient instructed to avoid sunlight, if possible.  When exposed to sunlight, patients should wear protective clothing, sunglasses, and sunscreen.  The patient was instructed to call the office immediately if the following severe adverse effects occur:  hearing changes, easy bruising/bleeding, severe headache, or vision changes.  The patient verbalized understanding of the proper use and possible adverse effects of tetracycline.  All of the patient's questions and concerns were addressed. Patient understands to avoid pregnancy while on therapy due to potential birth defects. Topical Sulfur Applications Counseling: Topical Sulfur Counseling: Patient counseled that this medication may cause skin irritation or allergic reactions.  In the event of skin irritation, the patient was advised to reduce the amount of the drug applied or use it less frequently.   The patient verbalized understanding of the proper use and possible adverse effects of topical sulfur application.  All of the patient's questions and concerns were addressed. Doxycycline Counseling:  Patient counseled regarding possible photosensitivity and increased risk for sunburn.  Patient instructed to avoid sunlight, if possible.  When exposed to sunlight, patients should wear protective clothing, sunglasses, and sunscreen.  The patient was instructed to call the office immediately if the following severe adverse effects occur:  hearing changes, easy bruising/bleeding, severe headache, or vision changes.  The patient verbalized understanding of the proper use and possible adverse effects of doxycycline.  All of the patient's questions and concerns were addressed. Azithromycin Pregnancy And Lactation Text: This medication is considered safe during pregnancy and is also secreted in breast milk. Spironolactone Counseling: Patient advised regarding risks of diarrhea, abdominal pain, hyperkalemia, birth defects (for female patients), liver toxicity and renal toxicity. The patient may need blood work to monitor liver and kidney function and potassium levels while on therapy. The patient verbalized understanding of the proper use and possible adverse effects of spironolactone.  All of the patient's questions and concerns were addressed. Azelaic Acid Pregnancy And Lactation Text: This medication is considered safe during pregnancy and breast feeding. Topical Clindamycin Counseling: Patient counseled that this medication may cause skin irritation or allergic reactions.  In the event of skin irritation, the patient was advised to reduce the amount of the drug applied or use it less frequently.   The patient verbalized understanding of the proper use and possible adverse effects of clindamycin.  All of the patient's questions and concerns were addressed. Include Pregnancy/Lactation Warning?: No Bactrim Pregnancy And Lactation Text: This medication is Pregnancy Category D and is known to cause fetal risk.  It is also excreted in breast milk. Erythromycin Counseling:  I discussed with the patient the risks of erythromycin including but not limited to GI upset, allergic reaction, drug rash, diarrhea, increase in liver enzymes, and yeast infections. Tazorac Counseling:  Patient advised that medication is irritating and drying.  Patient may need to apply sparingly and wash off after an hour before eventually leaving it on overnight.  The patient verbalized understanding of the proper use and possible adverse effects of tazorac.  All of the patient's questions and concerns were addressed. Sarecycline Counseling: Patient advised regarding possible photosensitivity and discoloration of the teeth, skin, lips, tongue and gums.  Patient instructed to avoid sunlight, if possible.  When exposed to sunlight, patients should wear protective clothing, sunglasses, and sunscreen.  The patient was instructed to call the office immediately if the following severe adverse effects occur:  hearing changes, easy bruising/bleeding, severe headache, or vision changes.  The patient verbalized understanding of the proper use and possible adverse effects of sarecycline.  All of the patient's questions and concerns were addressed. Aklief Pregnancy And Lactation Text: It is unknown if this medication is safe to use during pregnancy.  It is unknown if this medication is excreted in breast milk.  Breastfeeding women should use the topical cream on the smallest area of the skin for the shortest time needed while breastfeeding.  Do not apply to nipple and areola. Birth Control Pills Pregnancy And Lactation Text: This medication should be avoided if pregnant and for the first 30 days post-partum. Isotretinoin Counseling: Patient should get monthly blood tests, not donate blood, not drive at night if vision affected, not share medication, and not undergo elective surgery for 6 months after tx completed. Side effects reviewed, pt to contact office should one occur. Detail Level: Detailed Winlevi Pregnancy And Lactation Text: This medication is considered safe during pregnancy and breastfeeding. Topical Sulfur Applications Pregnancy And Lactation Text: This medication is Pregnancy Category C and has an unknown safety profile during pregnancy. It is unknown if this topical medication is excreted in breast milk. Topical Retinoid counseling:  Patient advised to apply a pea-sized amount only at bedtime and wait 30 minutes after washing their face before applying.  If too drying, patient may add a non-comedogenic moisturizer. The patient verbalized understanding of the proper use and possible adverse effects of retinoids.  All of the patient's questions and concerns were addressed. Minocycline Counseling: Patient advised regarding possible photosensitivity and discoloration of the teeth, skin, lips, tongue and gums.  Patient instructed to avoid sunlight, if possible.  When exposed to sunlight, patients should wear protective clothing, sunglasses, and sunscreen.  The patient was instructed to call the office immediately if the following severe adverse effects occur:  hearing changes, easy bruising/bleeding, severe headache, or vision changes.  The patient verbalized understanding of the proper use and possible adverse effects of minocycline.  All of the patient's questions and concerns were addressed. Dapsone Pregnancy And Lactation Text: This medication is Pregnancy Category C and is not considered safe during pregnancy or breast feeding.

## 2024-09-18 ENCOUNTER — APPOINTMENT (OUTPATIENT)
Dept: UROLOGY | Facility: CLINIC | Age: 64
End: 2024-09-18
Payer: MEDICARE

## 2024-09-18 VITALS
SYSTOLIC BLOOD PRESSURE: 135 MMHG | HEART RATE: 100 BPM | WEIGHT: 260 LBS | HEIGHT: 65 IN | DIASTOLIC BLOOD PRESSURE: 84 MMHG | RESPIRATION RATE: 18 BRPM | BODY MASS INDEX: 43.32 KG/M2 | OXYGEN SATURATION: 93 %

## 2024-09-18 DIAGNOSIS — N13.8 BENIGN PROSTATIC HYPERPLASIA WITH LOWER URINARY TRACT SYMPMS: ICD-10-CM

## 2024-09-18 DIAGNOSIS — N40.1 BENIGN PROSTATIC HYPERPLASIA WITH LOWER URINARY TRACT SYMPMS: ICD-10-CM

## 2024-09-18 DIAGNOSIS — R39.9 UNSPECIFIED SYMPTOMS AND SIGNS INVOLVING THE GENITOURINARY SYSTEM: ICD-10-CM

## 2024-09-18 DIAGNOSIS — N52.9 MALE ERECTILE DYSFUNCTION, UNSPECIFIED: ICD-10-CM

## 2024-09-18 LAB
BILIRUB UR QL STRIP: NORMAL
COLLECTION METHOD: NORMAL
GLUCOSE UR-MCNC: 1000
HCG UR QL: 0.2 EU/DL
HGB UR QL STRIP.AUTO: NORMAL
KETONES UR-MCNC: NORMAL
LEUKOCYTE ESTERASE UR QL STRIP: NORMAL
NITRITE UR QL STRIP: NORMAL
PH UR STRIP: 5.5
PROT UR STRIP-MCNC: 100
SP GR UR STRIP: 1.02

## 2024-09-18 PROCEDURE — G2211 COMPLEX E/M VISIT ADD ON: CPT

## 2024-09-18 PROCEDURE — 81003 URINALYSIS AUTO W/O SCOPE: CPT | Mod: QW

## 2024-09-18 PROCEDURE — 99214 OFFICE O/P EST MOD 30 MIN: CPT

## 2024-09-18 NOTE — ASSESSMENT
[FreeTextEntry1] : BPH.  There is chronic condition requiring longitudinal follow-up.  Repeat PSA in 1 year.  Lower urinary tract symptoms.  This is likely multifactorial.  Offered alpha-blocker and risk benefits alternatives discussed including stuffy nose, dry ejaculation, dizziness.  He declines this.  Discussed better control of blood sugar  Continuing tadalafil and sildenafil for erectile dysfunction..   [Urinary Symptom or Sign (788.99\R39.89)] : implantation

## 2024-09-18 NOTE — HISTORY OF PRESENT ILLNESS
[FreeTextEntry1] : History of nocturia which is likely multifactorial.  He has sleep apnea and continues with the CPAP machine.  He is on losartan with hydrochlorothiazide.  He is on Jardiance.  He continues to have nocturia.  Hemoglobin A1c 7.5.  History of BPH.  54 cc prostate on previous ultrasound.  Uses tadalafil and sildenafil intermittently for erectile dysfunction.

## 2024-09-20 ENCOUNTER — APPOINTMENT (OUTPATIENT)
Dept: CARDIOLOGY | Facility: CLINIC | Age: 64
End: 2024-09-20
Payer: MEDICARE

## 2024-09-20 DIAGNOSIS — I35.0 NONRHEUMATIC AORTIC (VALVE) STENOSIS: ICD-10-CM

## 2024-09-20 PROCEDURE — 93306 TTE W/DOPPLER COMPLETE: CPT

## 2024-10-01 ENCOUNTER — APPOINTMENT (OUTPATIENT)
Dept: CARDIOLOGY | Facility: CLINIC | Age: 64
End: 2024-10-01
Payer: MEDICARE

## 2024-10-01 VITALS
WEIGHT: 260 LBS | BODY MASS INDEX: 43.32 KG/M2 | DIASTOLIC BLOOD PRESSURE: 90 MMHG | HEIGHT: 65 IN | SYSTOLIC BLOOD PRESSURE: 140 MMHG

## 2024-10-01 DIAGNOSIS — I08.0 RHEUMATIC DISORDERS OF BOTH MITRAL AND AORTIC VALVES: ICD-10-CM

## 2024-10-01 LAB
ANION GAP SERPL CALC-SCNC: 15 MMOL/L
BASOPHILS # BLD AUTO: 0.04 K/UL
BASOPHILS NFR BLD AUTO: 0.4 %
BUN SERPL-MCNC: 28 MG/DL
CALCIUM SERPL-MCNC: 10.1 MG/DL
CHLORIDE SERPL-SCNC: 100 MMOL/L
CO2 SERPL-SCNC: 24 MMOL/L
CREAT SERPL-MCNC: 1.2 MG/DL
EGFR: 67 ML/MIN/1.73M2
EOSINOPHIL # BLD AUTO: 0.13 K/UL
EOSINOPHIL NFR BLD AUTO: 1.4 %
GLUCOSE SERPL-MCNC: 129 MG/DL
HCT VFR BLD CALC: 47.2 %
HGB BLD-MCNC: 14.7 G/DL
IMM GRANULOCYTES NFR BLD AUTO: 0.6 %
INR PPP: 0.83 RATIO
LYMPHOCYTES # BLD AUTO: 1.97 K/UL
LYMPHOCYTES NFR BLD AUTO: 20.7 %
MAN DIFF?: NORMAL
MCHC RBC-ENTMCNC: 28.4 PG
MCHC RBC-ENTMCNC: 31.1 G/DL
MCV RBC AUTO: 91.3 FL
MONOCYTES # BLD AUTO: 0.88 K/UL
MONOCYTES NFR BLD AUTO: 9.3 %
NEUTROPHILS # BLD AUTO: 6.43 K/UL
NEUTROPHILS NFR BLD AUTO: 67.6 %
PLATELET # BLD AUTO: 198 K/UL
PMV BLD AUTO: 0 /100 WBCS
POTASSIUM SERPL-SCNC: 4.9 MMOL/L
PT BLD: 9.5 SEC
RBC # BLD: 5.17 M/UL
RBC # FLD: 15 %
SODIUM SERPL-SCNC: 139 MMOL/L
WBC # FLD AUTO: 9.51 K/UL

## 2024-10-01 PROCEDURE — 93000 ELECTROCARDIOGRAM COMPLETE: CPT

## 2024-10-01 PROCEDURE — 99213 OFFICE O/P EST LOW 20 MIN: CPT

## 2024-10-01 NOTE — HISTORY OF PRESENT ILLNESS
[FreeTextEntry1] : JAZ GAVIN is being seen for a clinic follow-up of aortic stenosis, HTN and chest pain.  The patient has been experiencing increase in exertional SOB as an anginal equivalent. In addition, he c/o chest pressure with exertion relieved by rest. He has aortic stenosis by physical exam and by 2d echo.  However, due to his body habitus, difficult to adequately assess the severity of his aortic stenosis Because of the above, cardiac cath. recommended. The risks and benefits explained to the patient, and he is in total agreement. This will be scheduled in a week or two.  The patient is compliant with his medications.  Patient is obese.   s/p hip[ replacement doing well since his last visit no new complaints  echo 2021 showed mild aortic stenosis with normal ef mild pulmonary htn  patient is pre-op tumor removal at [facility] no further work up needed  echo 2023  1. The left ventricular systolic function is normal with an ejection fraction visually estimated at 60-65 %. 2. There is mild (grade 1) left ventricular diastolic dysfunction with normal left atrial filling pressure. 3. Normal right ventricular cavity size, normal wall thickness and normal systolic function. 4. Aortic valve was not well visualized. 5. Mild aortic stenosis. 6. Trace aortic regurgitation.  echo 2024 CONCLUSIONS:  1. Technically difficult study. 2. Left ventricular systolic function is normal with an ejection fraction visually estimated at 60 to 65 %. 3. Moderate (grade 2) left ventricular diastolic dysfunction. 4. Left atrial enlargement. 5. Mild aortic stenosis. 6. Anterior mitral leaflet prolapse / possible partial flail. 7. Moderate-to-severe mitral regurgitation. The mitral regurgitant jet is horizontally-directed. 8. Mild tricuspid regurgitation. 9. Borderline pulmonary hypertension. 10. Compared to the transthoracic echocardiogram performed on 4/6/2023, mitral regurgitation is new. 11. Recommend JASMINA for further evaluation.

## 2024-10-01 NOTE — ASSESSMENT
[FreeTextEntry1] : Mitral Regurgitation  - new Aortic stenosis  Chest pain  Obstructive sleep apnea, adult  chest Pain htn exertional dyspnea aortic stenosis.

## 2024-10-04 NOTE — CARDIOLOGY SUMMARY
Fax confirmation received.   [Normal] : normal [___] : [unfilled] [LVEF ___%] : LVEF [unfilled]% [None] : normal LV function

## 2024-10-14 LAB — GLUCOSE BLDC GLUCOMTR-MCNC: 109 MG/DL — HIGH (ref 70–99)

## 2024-10-14 NOTE — H&P CARDIOLOGY - PULMONARY EMBOLUS
Take the medication as directed.   Follow up in 3 days.   Please give with food and if get him on probiotics as well.   
no

## 2024-10-14 NOTE — CHART NOTE - NSCHARTNOTEFT_GEN_A_CORE
On pre-op evaluation, pt reported last use of Mounjaro last Wednesday. Given elevated risk of aspiration, will delay procedure.  Pt rescheduled to Friday, 10/18.

## 2024-10-14 NOTE — ASU PATIENT PROFILE, ADULT - FALL HARM RISK - UNIVERSAL INTERVENTIONS
Bed in lowest position, wheels locked, appropriate side rails in place/Call bell, personal items and telephone in reach/Instruct patient to call for assistance before getting out of bed or chair/Non-slip footwear when patient is out of bed/Elsie to call system/Physically safe environment - no spills, clutter or unnecessary equipment/Purposeful Proactive Rounding/Room/bathroom lighting operational, light cord in reach

## 2024-10-14 NOTE — H&P CARDIOLOGY - HISTORY OF PRESENT ILLNESS
63 yo M with PMHx of  HTN, mild AS presents for JASMINA. Pt with dyspnea on exertion, had findings of moderate to severe mitral regurgitation on TTE with anterior leaflet prolapse, possible flail. 63 yo M with PMHx of HTN, mild AS referred for elective JASMINA to evaluate MR.  Moderate to severe MR on outpatient TTE with anterior leaflet prolapse, possible flail.  + SAAB.  No chest pain.

## 2024-10-16 ENCOUNTER — APPOINTMENT (OUTPATIENT)
Dept: CARDIOLOGY | Facility: CLINIC | Age: 64
End: 2024-10-16

## 2024-10-18 ENCOUNTER — OUTPATIENT (OUTPATIENT)
Dept: OUTPATIENT SERVICES | Facility: HOSPITAL | Age: 64
LOS: 1 days | End: 2024-10-18
Payer: MEDICARE

## 2024-10-18 ENCOUNTER — RESULT REVIEW (OUTPATIENT)
Age: 64
End: 2024-10-18

## 2024-10-18 ENCOUNTER — NON-APPOINTMENT (OUTPATIENT)
Age: 64
End: 2024-10-18

## 2024-10-18 VITALS — WEIGHT: 259.93 LBS | HEIGHT: 65 IN

## 2024-10-18 DIAGNOSIS — Z86.018 PERSONAL HISTORY OF OTHER BENIGN NEOPLASM: Chronic | ICD-10-CM

## 2024-10-18 DIAGNOSIS — Z12.11 ENCOUNTER FOR SCREENING FOR MALIGNANT NEOPLASM OF COLON: Chronic | ICD-10-CM

## 2024-10-18 DIAGNOSIS — I34.0 NONRHEUMATIC MITRAL (VALVE) INSUFFICIENCY: ICD-10-CM

## 2024-10-18 DIAGNOSIS — Z96.643 PRESENCE OF ARTIFICIAL HIP JOINT, BILATERAL: Chronic | ICD-10-CM

## 2024-10-18 DIAGNOSIS — S02.30XA FRACTURE OF ORBITAL FLOOR, UNSPECIFIED SIDE, INITIAL ENCOUNTER FOR CLOSED FRACTURE: Chronic | ICD-10-CM

## 2024-10-18 LAB — GLUCOSE BLDC GLUCOMTR-MCNC: 144 MG/DL — HIGH (ref 70–99)

## 2024-10-18 PROCEDURE — 93312 ECHO TRANSESOPHAGEAL: CPT | Mod: 26,XU

## 2024-10-18 PROCEDURE — 93320 DOPPLER ECHO COMPLETE: CPT | Mod: 26

## 2024-10-18 PROCEDURE — 93312 ECHO TRANSESOPHAGEAL: CPT

## 2024-10-18 PROCEDURE — 93320 DOPPLER ECHO COMPLETE: CPT

## 2024-10-18 PROCEDURE — 76376 3D RENDER W/INTRP POSTPROCES: CPT | Mod: 26

## 2024-10-18 PROCEDURE — 82962 GLUCOSE BLOOD TEST: CPT

## 2024-10-18 PROCEDURE — 93325 DOPPLER ECHO COLOR FLOW MAPG: CPT | Mod: 26

## 2024-10-18 PROCEDURE — 93325 DOPPLER ECHO COLOR FLOW MAPG: CPT

## 2024-10-18 RX ORDER — HYDROCODONE BITARTRATE 15 MG/1
0 CAPSULE, EXTENDED RELEASE ORAL
Refills: 0 | DISCHARGE

## 2024-10-18 NOTE — CHART NOTE - NSCHARTNOTEFT_GEN_A_CORE
Procedure:  JASMINA  Indication:  MR    Pre-procedure Assessment:  Chart reviewed.  No contraindications.  I agree with the assessment and plan and noted any changes below.  10-18-24 @ 07:14

## 2024-10-18 NOTE — CHART NOTE - NSCHARTNOTEFT_GEN_A_CORE
PACU ANESTHESIA ADMISSION NOTE      Procedure:   Post op diagnosis:      ____  Intubated  TV:______       Rate: ______      FiO2: ______    __x__  Patent Airway    __x__  Full return of protective reflexes    __x__  Full recovery from anesthesia / back to baseline status    Vitals:  T(C): --  HR: --  BP: --  RR: --  SpO2: --    Mental Status:  __x__ Awake   ___x__ Alert   _____ Drowsy   _____ Sedated    Nausea/Vomiting:  __x__ NO  ______Yes,   See Post - Op Orders          Pain Scale (0-10):  _____    Treatment: ____ None    __x__ See Post - Op/PCA Orders    Post - Operative Fluids:   ____ Oral   __x__ See Post - Op Orders    Plan: Discharge:   ____Home       _____Floor     _____Critical Care    _____  Other:_________________    Comments: Patient had smooth intraoperative event, no anesthesia complication.  PACU Vital signs: HR:   80 BP:  140/84     RR: 16             O2 Sat:  100     %     Temp 37

## 2024-10-19 DIAGNOSIS — I34.0 NONRHEUMATIC MITRAL (VALVE) INSUFFICIENCY: ICD-10-CM

## 2024-10-23 DIAGNOSIS — I34.0 NONRHEUMATIC MITRAL (VALVE) INSUFFICIENCY: ICD-10-CM

## 2024-10-25 ENCOUNTER — RX RENEWAL (OUTPATIENT)
Age: 64
End: 2024-10-25

## 2024-10-28 VITALS
SYSTOLIC BLOOD PRESSURE: 166 MMHG | HEART RATE: 92 BPM | RESPIRATION RATE: 16 BRPM | WEIGHT: 265 LBS | DIASTOLIC BLOOD PRESSURE: 90 MMHG | OXYGEN SATURATION: 95 % | HEIGHT: 65 IN

## 2024-10-28 NOTE — H&P CARDIOLOGY - HISTORY OF PRESENT ILLNESS
58 yo M with PMHx of DM type 2, asthma, HTN, HLD, obesity, JIMMY on CPAP, peripheral neuropathy, who has been experiencing SOB on minimal exertion, and underwent a recent JASMINA on 10/18/24 revealing severe MR.  Denies any chest pain, dizziness, n/v, diaphoresis, orthopnea, PND, LE edema, palpitations, syncope.   Pt is now referred for [facility] with possible intervention if clinically indicated, prior to possible mitral valve repair.    JASMINA 10/18/24:  Summary:   1. Left ventricular ejection fraction, by visual estimation, is 55 to 60%.   2. Normal global left ventricular systolic function.   3. Left atrial enlargement.   4. P2 flail. Severe mitral regurgitation. Eccentric, anterior-directed jet.   5. Mild tricuspid regurgitation.    Pre cath note:    indication:  [ ] STEMI                [ ] NSTEMI                 [ ] Acute coronary syndrome                     [ ]Unstable Angina   [ ] high risk  [ ] intermediate risk  [ ] low risk                     [ ] Stable Angina     non-invasive testing:  JASMINA               Date:  10/18/24            result: [ ] high risk  [x ] intermediate risk  [ ] low risk    Anti- Anginal medications:                    [ ] not used                       [ ] used                   [ ] not used but strong indication not to use    Ejection Fraction                   [ ] <29            [ ] 30-39%   [ ] 40-49%     [x ]>50%    CHF                   [ ] active (within last 14 days on meds   [ ] Chronic (on meds but no exacerbation)    COPD                   [ ] mild (on chronic bronchodilators)  [ ] moderate (on chronic steroid therapy)      [ ] severe (indication for home O2 or PACO2 >50)    Other risk factors:                       [ ] Previous MI                     [ ] CVA/ stroke                    [ ] carotid stent/ CEA                    [ ] PVD/PAD- (arterial aneurysm, non-palpable pulses, tortuous vessel with inability to insert catheter, infra-renal dissection, renal or subclavian artery stenosis)                    [ ] diabetic                    [ ] previous CABG                    [ ] Renal Failure       Right Len Test:    Adjusted Cath Bleeding Risk: 0.85    Pre-hydration:     58 yo M with PMHx of DM type 2, asthma, HTN, HLD, obesity, JIMMY on CPAP, peripheral neuropathy, who has been experiencing SOB on minimal exertion, and underwent a recent JASMINA on 10/18/24 revealing severe MR.  Denies any chest pain, dizziness, n/v, diaphoresis, orthopnea, PND, LE edema, palpitations, syncope.   Pt is now referred for [facility] with possible intervention if clinically indicated, prior to possible mitral valve repair.    JASMINA 10/18/24:  Summary:   1. Left ventricular ejection fraction, by visual estimation, is 55 to 60%.   2. Normal global left ventricular systolic function.   3. Left atrial enlargement.   4. P2 flail. Severe mitral regurgitation. Eccentric, anterior-directed jet.   5. Mild tricuspid regurgitation.    Pre cath note:    indication:  [ ] STEMI                [ ] NSTEMI                 [ ] Acute coronary syndrome                     [ ]Unstable Angina   [ ] high risk  [ ] intermediate risk  [ ] low risk                     [x ] Stable Angina     non-invasive testing:  JASMINA               Date:  10/18/24            result: [ ] high risk  [x ] intermediate risk  [ ] low risk    Anti- Anginal medications:                    [ x] not used                       [ ] used                   [ ] not used but strong indication not to use  -Metoprolol 12.5mg po x 1  -Amlodipine 2.5mg po x 1    Ejection Fraction                   [ ] <29            [ ] 30-39%   [ ] 40-49%     [x ]>50%    CHF                   [ ] active (within last 14 days on meds   [ ] Chronic (on meds but no exacerbation)    COPD                   [ x] mild (on chronic bronchodilators)  [ ] moderate (on chronic steroid therapy)      [ ] severe (indication for home O2 or PACO2 >50)    Other risk factors:                       [ ] Previous MI                     [ ] CVA/ stroke                    [ ] carotid stent/ CEA                    [ ] PVD/PAD- (arterial aneurysm, non-palpable pulses, tortuous vessel with inability to insert catheter, infra-renal dissection, renal or subclavian artery stenosis)                    [ ] diabetic                    [ ] previous CABG                    [ ] Renal Failure       Right Len Test: positive    Adjusted Cath Bleeding Risk: 0.85    Pre-hydration: NS @ 75/hr due to severe MR and SOB     60 yo M with PMHx of DM type 2, asthma, HTN, HLD, obesity, JIMMY on CPAP, peripheral neuropathy, who has been experiencing SOB on minimal exertion, and underwent a recent JASMINA on 10/18/24 revealing severe MR.  Denies any chest pain, dizziness, n/v, diaphoresis, orthopnea, PND, LE edema, palpitations, syncope.   Pt is now referred for RHC/LHC with possible intervention if clinically indicated, prior to possible mitral valve repair.    JASMINA 10/18/24:  Summary:   1. Left ventricular ejection fraction, by visual estimation, is 55 to 60%.   2. Normal global left ventricular systolic function.   3. Left atrial enlargement.   4. P2 flail. Severe mitral regurgitation. Eccentric, anterior-directed jet.   5. Mild tricuspid regurgitation.    Pre cath note:    indication:  [ ] STEMI                [ ] NSTEMI                 [ ] Acute coronary syndrome                     [ ]Unstable Angina   [ ] high risk  [ ] intermediate risk  [ ] low risk                     [x ] Stable Angina     non-invasive testing:  JASMINA               Date:  10/18/24            result: [ ] high risk  [x ] intermediate risk  [ ] low risk    Anti- Anginal medications:                    [ x] not used                       [ ] used                   [ ] not used but strong indication not to use  -Metoprolol 12.5mg po x 1  -Amlodipine 2.5mg po x 1    Ejection Fraction                   [ ] <29            [ ] 30-39%   [ ] 40-49%     [x ]>50%    CHF                   [ ] active (within last 14 days on meds   [ ] Chronic (on meds but no exacerbation)    COPD                   [ x] mild (on chronic bronchodilators)  [ ] moderate (on chronic steroid therapy)      [ ] severe (indication for home O2 or PACO2 >50)    Other risk factors:                       [ ] Previous MI                     [ ] CVA/ stroke                    [ ] carotid stent/ CEA                    [ ] PVD/PAD- (arterial aneurysm, non-palpable pulses, tortuous vessel with inability to insert catheter, infra-renal dissection, renal or subclavian artery stenosis)                    [ ] diabetic                    [ ] previous CABG                    [ ] Renal Failure       Right Len Test: positive    Adjusted Cath Bleeding Risk: 0.85    Pre-hydration: NS @ 75/hr due to severe MR and SOB

## 2024-10-28 NOTE — H&P CARDIOLOGY - COMMENTS
58 yo M with PMHx of DM type 2, asthma, HTN, HLD, obesity, JIMMY on CPAP, peripheral neuropathy, who has been experiencing SOB on minimal exertion, and underwent a recent JASMINA on 10/18/24 revealing severe MR.  Pt is now referred for [facility] with possible intervention if clinically indicated, prior to possible mitral valve repair. 60 yo M with PMHx of DM type 2, asthma, HTN, HLD, obesity, JIMMY on CPAP, peripheral neuropathy, who has been experiencing SOB on minimal exertion, and underwent a recent JASMINA on 10/18/24 revealing severe MR.  Pt is now referred for RHC/LHC with possible intervention if clinically indicated, prior to possible mitral valve repair.

## 2024-10-31 ENCOUNTER — TRANSCRIPTION ENCOUNTER (OUTPATIENT)
Age: 64
End: 2024-10-31

## 2024-10-31 ENCOUNTER — OUTPATIENT (OUTPATIENT)
Dept: OUTPATIENT SERVICES | Facility: HOSPITAL | Age: 64
LOS: 1 days | Discharge: HOME | End: 2024-10-31
Payer: MEDICARE

## 2024-10-31 ENCOUNTER — APPOINTMENT (OUTPATIENT)
Dept: PULMONOLOGY | Facility: CLINIC | Age: 64
End: 2024-10-31

## 2024-10-31 VITALS
DIASTOLIC BLOOD PRESSURE: 90 MMHG | OXYGEN SATURATION: 97 % | SYSTOLIC BLOOD PRESSURE: 158 MMHG | RESPIRATION RATE: 18 BRPM | HEART RATE: 82 BPM

## 2024-10-31 DIAGNOSIS — Z86.018 PERSONAL HISTORY OF OTHER BENIGN NEOPLASM: Chronic | ICD-10-CM

## 2024-10-31 DIAGNOSIS — S02.30XA FRACTURE OF ORBITAL FLOOR, UNSPECIFIED SIDE, INITIAL ENCOUNTER FOR CLOSED FRACTURE: Chronic | ICD-10-CM

## 2024-10-31 DIAGNOSIS — Z96.643 PRESENCE OF ARTIFICIAL HIP JOINT, BILATERAL: Chronic | ICD-10-CM

## 2024-10-31 DIAGNOSIS — I34.0 NONRHEUMATIC MITRAL (VALVE) INSUFFICIENCY: ICD-10-CM

## 2024-10-31 DIAGNOSIS — Z12.11 ENCOUNTER FOR SCREENING FOR MALIGNANT NEOPLASM OF COLON: Chronic | ICD-10-CM

## 2024-10-31 LAB
ANION GAP SERPL CALC-SCNC: 13 MMOL/L — SIGNIFICANT CHANGE UP (ref 7–14)
BUN SERPL-MCNC: 22 MG/DL — HIGH (ref 10–20)
CALCIUM SERPL-MCNC: 9.4 MG/DL — SIGNIFICANT CHANGE UP (ref 8.4–10.5)
CHLORIDE SERPL-SCNC: 107 MMOL/L — SIGNIFICANT CHANGE UP (ref 98–110)
CO2 SERPL-SCNC: 23 MMOL/L — SIGNIFICANT CHANGE UP (ref 17–32)
CREAT SERPL-MCNC: 0.9 MG/DL — SIGNIFICANT CHANGE UP (ref 0.7–1.5)
EGFR: 94 ML/MIN/1.73M2 — SIGNIFICANT CHANGE UP
GLUCOSE SERPL-MCNC: 104 MG/DL — HIGH (ref 70–99)
HCT VFR BLD CALC: 44 % — SIGNIFICANT CHANGE UP (ref 42–52)
HGB BLD-MCNC: 14.2 G/DL — SIGNIFICANT CHANGE UP (ref 14–18)
MCHC RBC-ENTMCNC: 29.1 PG — SIGNIFICANT CHANGE UP (ref 27–31)
MCHC RBC-ENTMCNC: 32.3 G/DL — SIGNIFICANT CHANGE UP (ref 32–37)
MCV RBC AUTO: 90.2 FL — SIGNIFICANT CHANGE UP (ref 80–94)
NRBC # BLD: 0 /100 WBCS — SIGNIFICANT CHANGE UP (ref 0–0)
PLATELET # BLD AUTO: 176 K/UL — SIGNIFICANT CHANGE UP (ref 130–400)
PMV BLD: 11.6 FL — HIGH (ref 7.4–10.4)
POTASSIUM SERPL-MCNC: 4.9 MMOL/L — SIGNIFICANT CHANGE UP (ref 3.5–5)
POTASSIUM SERPL-SCNC: 4.9 MMOL/L — SIGNIFICANT CHANGE UP (ref 3.5–5)
RBC # BLD: 4.88 M/UL — SIGNIFICANT CHANGE UP (ref 4.7–6.1)
RBC # FLD: 15.1 % — HIGH (ref 11.5–14.5)
SODIUM SERPL-SCNC: 143 MMOL/L — SIGNIFICANT CHANGE UP (ref 135–146)
WBC # BLD: 8.98 K/UL — SIGNIFICANT CHANGE UP (ref 4.8–10.8)
WBC # FLD AUTO: 8.98 K/UL — SIGNIFICANT CHANGE UP (ref 4.8–10.8)

## 2024-10-31 PROCEDURE — 85027 COMPLETE CBC AUTOMATED: CPT

## 2024-10-31 PROCEDURE — C1894: CPT

## 2024-10-31 PROCEDURE — 36415 COLL VENOUS BLD VENIPUNCTURE: CPT

## 2024-10-31 PROCEDURE — 80048 BASIC METABOLIC PNL TOTAL CA: CPT

## 2024-10-31 PROCEDURE — 93460 R&L HRT ART/VENTRICLE ANGIO: CPT | Mod: 26

## 2024-10-31 PROCEDURE — 93460 R&L HRT ART/VENTRICLE ANGIO: CPT

## 2024-10-31 PROCEDURE — C1887: CPT

## 2024-10-31 PROCEDURE — 93005 ELECTROCARDIOGRAM TRACING: CPT

## 2024-10-31 PROCEDURE — 93010 ELECTROCARDIOGRAM REPORT: CPT

## 2024-10-31 PROCEDURE — C1769: CPT

## 2024-10-31 RX ORDER — HYDROCODONE BITARTRATE 40 MG/1
1 CAPSULE, EXTENDED RELEASE ORAL
Refills: 0 | DISCHARGE

## 2024-10-31 RX ORDER — EMPAGLIFLOZIN 25 MG/1
1 TABLET, FILM COATED ORAL
Refills: 0 | DISCHARGE

## 2024-10-31 RX ORDER — MELOXICAM 7.5 MG/1
1 TABLET ORAL
Refills: 0 | DISCHARGE

## 2024-10-31 RX ORDER — ROSUVASTATIN CALCIUM 10 MG
1 TABLET ORAL
Refills: 0 | DISCHARGE

## 2024-10-31 RX ORDER — PIOGLITAZONE 45 MG/1
1 TABLET ORAL
Refills: 0 | DISCHARGE

## 2024-10-31 RX ORDER — CHLORHEXIDINE GLUCONATE 40 MG/ML
1 SOLUTION TOPICAL ONCE
Refills: 0 | Status: DISCONTINUED | OUTPATIENT
Start: 2024-10-31 | End: 2024-10-31

## 2024-10-31 RX ORDER — METOPROLOL TARTRATE 50 MG
12.5 TABLET ORAL ONCE
Refills: 0 | Status: COMPLETED | OUTPATIENT
Start: 2024-10-31 | End: 2024-10-31

## 2024-10-31 RX ORDER — SODIUM CHLORIDE 9 MG/ML
1000 INJECTION, SOLUTION INTRAMUSCULAR; INTRAVENOUS; SUBCUTANEOUS
Refills: 0 | Status: DISCONTINUED | OUTPATIENT
Start: 2024-10-31 | End: 2024-10-31

## 2024-10-31 RX ORDER — FLUTICASONE FUROATE AND VILANTEROL 100; 25 UG/1; UG/1
1 POWDER RESPIRATORY (INHALATION)
Refills: 0 | DISCHARGE

## 2024-10-31 RX ORDER — AMLODIPINE BESYLATE 10 MG
2.5 TABLET ORAL ONCE
Refills: 0 | Status: COMPLETED | OUTPATIENT
Start: 2024-10-31 | End: 2024-10-31

## 2024-10-31 RX ORDER — GABAPENTIN 300 MG/1
1 CAPSULE ORAL
Refills: 0 | DISCHARGE

## 2024-10-31 RX ORDER — EMPAGLIFLOZIN 25 MG/1
20 TABLET, FILM COATED ORAL
Refills: 0 | DISCHARGE

## 2024-10-31 RX ORDER — ASPIRIN/MAG CARB/ALUMINUM AMIN 325 MG
81 TABLET ORAL ONCE
Refills: 0 | Status: COMPLETED | OUTPATIENT
Start: 2024-10-31 | End: 2024-10-31

## 2024-10-31 RX ADMIN — Medication 81 MILLIGRAM(S): at 09:29

## 2024-10-31 RX ADMIN — Medication 12.5 MILLIGRAM(S): at 09:30

## 2024-10-31 RX ADMIN — Medication 2.5 MILLIGRAM(S): at 09:29

## 2024-10-31 NOTE — ASU DISCHARGE PLAN (ADULT/PEDIATRIC) - FINANCIAL ASSISTANCE
[facility] provides services at a reduced cost to those who are determined to be eligible through [facility]’s financial assistance program. Information regarding [facility]’s financial assistance program can be found by going to [email or web address] or by calling [phone].

## 2024-10-31 NOTE — ASU DISCHARGE PLAN (ADULT/PEDIATRIC) - CARE PROVIDER_API CALL
Kiki Argueta  Cardiovascular Disease  [address] [place] [address], [facility] [address]  [place] [address]  Phone: [phone]  Fax: [phone]  Follow Up Time: 2 weeks

## 2024-10-31 NOTE — ASU DISCHARGE PLAN (ADULT/PEDIATRIC) - ASU DC SPECIAL INSTRUCTIONSFT
Discharge Instructions as follows:  - Continue medical regimen as prescribed to prevent chest pain.  - If you are diabetic and taking medication containing Metformin, do not take them for 48 hours after the procedure. Resume on 11/3 in AM.  - Continue dual anti-platelet therapy, beta blocker, Statin   - Instructed to call 911 if chest pain, shortness of breath or bleeding from access site.  - No heavy lifting > 10lbs x 1 week.  - No driving x 24 hours.  - No baths, swimming pools x 1 week, may shower  - Low sodium low fat low cholesterol diet  - Follow-up with Cardiologist in 1-2 weeks after discharge  - Soreness or tenderness at the site is possible, it will diminish over time. You may take Tylenol every 4-6 hours as needed. Nothing stronger is needed. NO Motrin/Ibuprofen  - Any questions call cardiac cath lab [phone]

## 2024-10-31 NOTE — CHART NOTE - NSCHARTNOTEFT_GEN_A_CORE
PRE-OP DIAGNOSIS:        PROCEDURE:     [x] Coronary Angiogram     [x] LHC     [] LVG     [] RHC     [] Intervention (see below)         PHYSICIAN:      ASSISTANT: Dr MINA Hammond     PROCEDURE DESCRIPTION:     Consent:      [x] Patient     [] Family Member     []  Used        Anesthesia:     [] General     [x] Sedation     [x] Local        Access & Closure:     [x] 6 Fr Right Radial Artery (D stat closure)     [] Fr Femoral Artery     [] Fr Femoral Vein     [] Fr Brachial Vein       IV Contrast: mL        Intervention: Desicion for intervention was based on the findings of diagnostic angiography  AUC Score =       Implants:        FINDINGS:     Coronary Dominance: Right      LM:     LAD:   D1:     CX:   OM1:    Ramus:     RCA:   RPDA:    LIMA:     SVG:        LVEDP: mmHg     EF: %        ESTIMATED BLOOD LOSS: < 10 mL        CONDITION:     [x] Good     [] Fair     [] Critical        SPECIMEN REMOVED: N/A       POST-OP DIAGNOSIS:      [] Normal Coronary Angiogram     [] Mild Coronary Artery Disease (< 50% stenosis)     [] Non Obstructive Coronary Artery Disease     [] __ Vessel Coronary Artery Disease        PLAN OF CARE:     [] D/C Home Today     [] Return to In-patient bed     [] Admit for observation     [] Return for Staged Procedure     [] CT Surgery Consult     [x] Medications:     [] IV Fluids: IV Fluids:   3cc/kg/h x 4h (edp < 13)  [] IV Fluids:   2cc/kg/h x 4h (edp 13-18)  [] IV Fluids:   1cc/kg/h x 4h (edp >18)  [] Encourage PO hydration/additional fluid bolus PRE-OP DIAGNOSIS:    Severe MR    PROCEDURE:     [x] Coronary Angiogram     [x] LHC     [] LVG     [] RHC     [] Intervention (see below)         PHYSICIAN:  Dr Up    ASSISTANT: Dr MINA Hammond     PROCEDURE DESCRIPTION:     Consent:      [x] Patient     [] Family Member     []  Used        Anesthesia:     [] General     [x] Sedation     [x] Local        Access & Closure:     [x] 6 Fr Right Radial Artery (D stat closure)     [] Fr Femoral Artery     [] Fr Femoral Vein     [x] 6 Fr Right Brachial Vein manual compression      IV Contrast: 25 mL        Intervention: None      Implants: None       FINDINGS:     Coronary Dominance: Right      LM: Minor luminal irregularities    LAD: Mild atherosclerosis  D1: Mild atherosclerosis    CX: Mild atherosclerosis  OM1: Mild atherosclerosis    RCA: Moderate atherosclerosis with 40% stenosis in the proximal segment  RPDA: Mild atherosclerosis    RHC Findings  HR 92/min  /75    RA 21/17/16  RV 60/19  PA 64/33/47  PCWP 34/43/34    Stephan CO/CI: 4.94/2.75   MVO2: 74     LVEDP: 33 mmHg        ESTIMATED BLOOD LOSS: < 10 mL        CONDITION:     [x] Good     [] Fair     [] Critical        SPECIMEN REMOVED: N/A       POST-OP DIAGNOSIS:      [x] Non Obstructive Coronary Artery Disease     [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    [x] IV Fluids: No IV Fluids due to elevated LVEDP PRE-OP DIAGNOSIS:    Severe MR    PROCEDURE:     [x] Coronary Angiogram     [x] LHC     [] LVG     [] RHC     [] Intervention (see below)         PHYSICIAN:  Dr Up    ASSISTANT: Dr MINA Hammond     PROCEDURE DESCRIPTION:     Consent:      [x] Patient     [] Family Member     []  Used        Anesthesia:     [] General     [x] Sedation     [x] Local        Access & Closure:     [x] 6 Fr Right Radial Artery (D stat closure)     [] Fr Femoral Artery     [] Fr Femoral Vein     [x] 6 Fr Right Brachial Vein manual compression      IV Contrast: 25 mL        Intervention: None      Implants: None       FINDINGS:     Coronary Dominance: Right      LM: Minor luminal irregularities    LAD: Mild atherosclerosis  D1: Mild atherosclerosis    CX: Mild atherosclerosis  OM1: Mild atherosclerosis    RCA: Moderate atherosclerosis with 40% stenosis in the proximal segment  RPDA: Mild atherosclerosis    RHC Findings  HR 92/min  /75    RA 21/17/16  RV 60/19  PA 64/33/47  PCWP 34/43/34    Stephan CO/CI: 4.94/2.75   MVO2: 74     LVEDP: 33 mmHg        ESTIMATED BLOOD LOSS: < 10 mL        CONDITION:     [x] Good     [] Fair     [] Critical        SPECIMEN REMOVED: N/A       POST-OP DIAGNOSIS:      [x] Non Obstructive Coronary Artery Disease     [x] Post Capillary Pulmonary Hypertension        PLAN OF CARE:     [x] D/C Home     [x] CT Surgery/Structural Heart Team f/u for possible MV Repair     [x] Medications: c/w home medications.    [x] IV Fluids: No IV Fluids due to elevated LVEDP

## 2024-11-01 DIAGNOSIS — I25.10 ATHEROSCLEROTIC HEART DISEASE OF NATIVE CORONARY ARTERY WITHOUT ANGINA PECTORIS: ICD-10-CM

## 2024-11-01 DIAGNOSIS — E78.5 HYPERLIPIDEMIA, UNSPECIFIED: ICD-10-CM

## 2024-11-01 DIAGNOSIS — I27.20 PULMONARY HYPERTENSION, UNSPECIFIED: ICD-10-CM

## 2024-11-01 DIAGNOSIS — I10 ESSENTIAL (PRIMARY) HYPERTENSION: ICD-10-CM

## 2024-11-19 DIAGNOSIS — I34.0 NONRHEUMATIC MITRAL (VALVE) INSUFFICIENCY: ICD-10-CM

## 2024-11-19 DIAGNOSIS — I34.1 NONRHEUMATIC MITRAL (VALVE) PROLAPSE: ICD-10-CM

## 2024-11-22 RX ORDER — PREDNISONE 10 MG/1
10 TABLET ORAL
Qty: 60 | Refills: 0 | Status: ACTIVE | COMMUNITY
Start: 1900-01-01 | End: 1900-01-01

## 2024-12-03 ENCOUNTER — NON-APPOINTMENT (OUTPATIENT)
Age: 64
End: 2024-12-03

## 2024-12-03 ENCOUNTER — INPATIENT (INPATIENT)
Facility: HOSPITAL | Age: 64
LOS: 6 days | Discharge: HOME | DRG: 308 | End: 2024-12-10
Attending: THORACIC SURGERY (CARDIOTHORACIC VASCULAR SURGERY) | Admitting: THORACIC SURGERY (CARDIOTHORACIC VASCULAR SURGERY)
Payer: MEDICARE

## 2024-12-03 ENCOUNTER — APPOINTMENT (OUTPATIENT)
Dept: CARDIOTHORACIC SURGERY | Facility: CLINIC | Age: 64
End: 2024-12-03
Payer: MEDICARE

## 2024-12-03 VITALS
SYSTOLIC BLOOD PRESSURE: 122 MMHG | WEIGHT: 255.07 LBS | HEART RATE: 134 BPM | HEIGHT: 65 IN | RESPIRATION RATE: 15 BRPM | TEMPERATURE: 98 F | OXYGEN SATURATION: 98 % | DIASTOLIC BLOOD PRESSURE: 87 MMHG

## 2024-12-03 VITALS
BODY MASS INDEX: 40.98 KG/M2 | HEART RATE: 129 BPM | HEIGHT: 66 IN | OXYGEN SATURATION: 97 % | DIASTOLIC BLOOD PRESSURE: 72 MMHG | TEMPERATURE: 96.9 F | WEIGHT: 255 LBS | SYSTOLIC BLOOD PRESSURE: 150 MMHG

## 2024-12-03 DIAGNOSIS — S02.30XA FRACTURE OF ORBITAL FLOOR, UNSPECIFIED SIDE, INITIAL ENCOUNTER FOR CLOSED FRACTURE: Chronic | ICD-10-CM

## 2024-12-03 DIAGNOSIS — I34.0 NONRHEUMATIC MITRAL (VALVE) INSUFFICIENCY: ICD-10-CM

## 2024-12-03 DIAGNOSIS — E66.9 OBESITY, UNSPECIFIED: ICD-10-CM

## 2024-12-03 DIAGNOSIS — Z86.018 PERSONAL HISTORY OF OTHER BENIGN NEOPLASM: Chronic | ICD-10-CM

## 2024-12-03 DIAGNOSIS — I48.91 UNSPECIFIED ATRIAL FIBRILLATION: ICD-10-CM

## 2024-12-03 DIAGNOSIS — I34.1 NONRHEUMATIC MITRAL (VALVE) PROLAPSE: ICD-10-CM

## 2024-12-03 DIAGNOSIS — Z12.11 ENCOUNTER FOR SCREENING FOR MALIGNANT NEOPLASM OF COLON: Chronic | ICD-10-CM

## 2024-12-03 DIAGNOSIS — Z96.643 PRESENCE OF ARTIFICIAL HIP JOINT, BILATERAL: Chronic | ICD-10-CM

## 2024-12-03 LAB
ALBUMIN SERPL ELPH-MCNC: 4.1 G/DL — SIGNIFICANT CHANGE UP (ref 3.5–5.2)
ALP SERPL-CCNC: 57 U/L — SIGNIFICANT CHANGE UP (ref 30–115)
ALT FLD-CCNC: 26 U/L — SIGNIFICANT CHANGE UP (ref 0–41)
ANION GAP SERPL CALC-SCNC: 15 MMOL/L — HIGH (ref 7–14)
AST SERPL-CCNC: 27 U/L — SIGNIFICANT CHANGE UP (ref 0–41)
BASOPHILS # BLD AUTO: 0.04 K/UL — SIGNIFICANT CHANGE UP (ref 0–0.2)
BASOPHILS NFR BLD AUTO: 0.3 % — SIGNIFICANT CHANGE UP (ref 0–1)
BILIRUB SERPL-MCNC: 0.4 MG/DL — SIGNIFICANT CHANGE UP (ref 0.2–1.2)
BUN SERPL-MCNC: 40 MG/DL — HIGH (ref 10–20)
CALCIUM SERPL-MCNC: 9.3 MG/DL — SIGNIFICANT CHANGE UP (ref 8.4–10.4)
CHLORIDE SERPL-SCNC: 101 MMOL/L — SIGNIFICANT CHANGE UP (ref 98–110)
CO2 SERPL-SCNC: 21 MMOL/L — SIGNIFICANT CHANGE UP (ref 17–32)
CREAT SERPL-MCNC: 1.3 MG/DL — SIGNIFICANT CHANGE UP (ref 0.7–1.5)
EGFR: 61 ML/MIN/1.73M2 — SIGNIFICANT CHANGE UP
EOSINOPHIL # BLD AUTO: 0.01 K/UL — SIGNIFICANT CHANGE UP (ref 0–0.7)
EOSINOPHIL NFR BLD AUTO: 0.1 % — SIGNIFICANT CHANGE UP (ref 0–8)
GLUCOSE BLDC GLUCOMTR-MCNC: 115 MG/DL — HIGH (ref 70–99)
GLUCOSE SERPL-MCNC: 252 MG/DL — HIGH (ref 70–99)
HCT VFR BLD CALC: 47.5 % — SIGNIFICANT CHANGE UP (ref 42–52)
HGB BLD-MCNC: 15.3 G/DL — SIGNIFICANT CHANGE UP (ref 14–18)
IMM GRANULOCYTES NFR BLD AUTO: 1.2 % — HIGH (ref 0.1–0.3)
LYMPHOCYTES # BLD AUTO: 1.17 K/UL — LOW (ref 1.2–3.4)
LYMPHOCYTES # BLD AUTO: 9.4 % — LOW (ref 20.5–51.1)
MCHC RBC-ENTMCNC: 28.8 PG — SIGNIFICANT CHANGE UP (ref 27–31)
MCHC RBC-ENTMCNC: 32.2 G/DL — SIGNIFICANT CHANGE UP (ref 32–37)
MCV RBC AUTO: 89.3 FL — SIGNIFICANT CHANGE UP (ref 80–94)
MONOCYTES # BLD AUTO: 0.36 K/UL — SIGNIFICANT CHANGE UP (ref 0.1–0.6)
MONOCYTES NFR BLD AUTO: 2.9 % — SIGNIFICANT CHANGE UP (ref 1.7–9.3)
NEUTROPHILS # BLD AUTO: 10.72 K/UL — HIGH (ref 1.4–6.5)
NEUTROPHILS NFR BLD AUTO: 86.1 % — HIGH (ref 42.2–75.2)
NRBC # BLD: 0 /100 WBCS — SIGNIFICANT CHANGE UP (ref 0–0)
NT-PROBNP SERPL-SCNC: 550 PG/ML — HIGH (ref 0–300)
PLATELET # BLD AUTO: 212 K/UL — SIGNIFICANT CHANGE UP (ref 130–400)
PMV BLD: 11.5 FL — HIGH (ref 7.4–10.4)
POTASSIUM SERPL-MCNC: 5.7 MMOL/L — HIGH (ref 3.5–5)
POTASSIUM SERPL-SCNC: 5.7 MMOL/L — HIGH (ref 3.5–5)
PROT SERPL-MCNC: 7 G/DL — SIGNIFICANT CHANGE UP (ref 6–8)
RBC # BLD: 5.32 M/UL — SIGNIFICANT CHANGE UP (ref 4.7–6.1)
RBC # FLD: 15.8 % — HIGH (ref 11.5–14.5)
SODIUM SERPL-SCNC: 137 MMOL/L — SIGNIFICANT CHANGE UP (ref 135–146)
TROPONIN T, HIGH SENSITIVITY RESULT: 30 NG/L — HIGH (ref 6–21)
WBC # BLD: 12.45 K/UL — HIGH (ref 4.8–10.8)
WBC # FLD AUTO: 12.45 K/UL — HIGH (ref 4.8–10.8)

## 2024-12-03 PROCEDURE — 81001 URINALYSIS AUTO W/SCOPE: CPT

## 2024-12-03 PROCEDURE — 85027 COMPLETE CBC AUTOMATED: CPT

## 2024-12-03 PROCEDURE — 99285 EMERGENCY DEPT VISIT HI MDM: CPT

## 2024-12-03 PROCEDURE — D0330: CPT

## 2024-12-03 PROCEDURE — 71045 X-RAY EXAM CHEST 1 VIEW: CPT

## 2024-12-03 PROCEDURE — 93880 EXTRACRANIAL BILAT STUDY: CPT

## 2024-12-03 PROCEDURE — 93010 ELECTROCARDIOGRAM REPORT: CPT

## 2024-12-03 PROCEDURE — 83735 ASSAY OF MAGNESIUM: CPT

## 2024-12-03 PROCEDURE — 97110 THERAPEUTIC EXERCISES: CPT | Mod: GP

## 2024-12-03 PROCEDURE — 71045 X-RAY EXAM CHEST 1 VIEW: CPT | Mod: 26

## 2024-12-03 PROCEDURE — 82962 GLUCOSE BLOOD TEST: CPT

## 2024-12-03 PROCEDURE — 87206 SMEAR FLUORESCENT/ACID STAI: CPT

## 2024-12-03 PROCEDURE — 74176 CT ABD & PELVIS W/O CONTRAST: CPT | Mod: MC

## 2024-12-03 PROCEDURE — 80053 COMPREHEN METABOLIC PANEL: CPT

## 2024-12-03 PROCEDURE — 86850 RBC ANTIBODY SCREEN: CPT

## 2024-12-03 PROCEDURE — 85025 COMPLETE CBC W/AUTO DIFF WBC: CPT

## 2024-12-03 PROCEDURE — 85610 PROTHROMBIN TIME: CPT

## 2024-12-03 PROCEDURE — 71250 CT THORAX DX C-: CPT | Mod: MC

## 2024-12-03 PROCEDURE — 84156 ASSAY OF PROTEIN URINE: CPT

## 2024-12-03 PROCEDURE — 93010 ELECTROCARDIOGRAM REPORT: CPT | Mod: 77

## 2024-12-03 PROCEDURE — 87640 STAPH A DNA AMP PROBE: CPT

## 2024-12-03 PROCEDURE — 97162 PT EVAL MOD COMPLEX 30 MIN: CPT | Mod: GP

## 2024-12-03 PROCEDURE — 80048 BASIC METABOLIC PNL TOTAL CA: CPT

## 2024-12-03 PROCEDURE — 36415 COLL VENOUS BLD VENIPUNCTURE: CPT

## 2024-12-03 PROCEDURE — 84443 ASSAY THYROID STIM HORMONE: CPT

## 2024-12-03 PROCEDURE — 99205 OFFICE O/P NEW HI 60 MIN: CPT

## 2024-12-03 PROCEDURE — 83036 HEMOGLOBIN GLYCOSYLATED A1C: CPT

## 2024-12-03 PROCEDURE — 86900 BLOOD TYPING SEROLOGIC ABO: CPT

## 2024-12-03 PROCEDURE — 87641 MR-STAPH DNA AMP PROBE: CPT

## 2024-12-03 PROCEDURE — 87116 MYCOBACTERIA CULTURE: CPT

## 2024-12-03 PROCEDURE — 84100 ASSAY OF PHOSPHORUS: CPT

## 2024-12-03 PROCEDURE — P9047: CPT

## 2024-12-03 PROCEDURE — D0150: CPT

## 2024-12-03 PROCEDURE — 71046 X-RAY EXAM CHEST 2 VIEWS: CPT

## 2024-12-03 PROCEDURE — 94010 BREATHING CAPACITY TEST: CPT

## 2024-12-03 PROCEDURE — 94640 AIRWAY INHALATION TREATMENT: CPT

## 2024-12-03 PROCEDURE — 84300 ASSAY OF URINE SODIUM: CPT

## 2024-12-03 PROCEDURE — 85730 THROMBOPLASTIN TIME PARTIAL: CPT

## 2024-12-03 PROCEDURE — 76770 US EXAM ABDO BACK WALL COMP: CPT

## 2024-12-03 PROCEDURE — 83880 ASSAY OF NATRIURETIC PEPTIDE: CPT

## 2024-12-03 PROCEDURE — 97530 THERAPEUTIC ACTIVITIES: CPT | Mod: GP

## 2024-12-03 PROCEDURE — 93005 ELECTROCARDIOGRAM TRACING: CPT

## 2024-12-03 PROCEDURE — 83935 ASSAY OF URINE OSMOLALITY: CPT

## 2024-12-03 PROCEDURE — 87015 SPECIMEN INFECT AGNT CONCNTJ: CPT

## 2024-12-03 PROCEDURE — 86901 BLOOD TYPING SEROLOGIC RH(D): CPT

## 2024-12-03 PROCEDURE — 82570 ASSAY OF URINE CREATININE: CPT

## 2024-12-03 RX ORDER — AMIODARONE HYDROCHLORIDE 200 MG/1
150 TABLET ORAL ONCE
Refills: 0 | Status: COMPLETED | OUTPATIENT
Start: 2024-12-03 | End: 2024-12-03

## 2024-12-03 RX ORDER — CELECOXIB 200 MG/1
200 CAPSULE ORAL DAILY
Refills: 0 | Status: DISCONTINUED | OUTPATIENT
Start: 2024-12-04 | End: 2024-12-05

## 2024-12-03 RX ORDER — 0.9 % SODIUM CHLORIDE 0.9 %
1000 INTRAVENOUS SOLUTION INTRAVENOUS
Refills: 0 | Status: DISCONTINUED | OUTPATIENT
Start: 2024-12-03 | End: 2024-12-10

## 2024-12-03 RX ORDER — LOSARTAN POTASSIUM 100 MG/1
100 TABLET, FILM COATED ORAL DAILY
Refills: 0 | Status: DISCONTINUED | OUTPATIENT
Start: 2024-12-03 | End: 2024-12-05

## 2024-12-03 RX ORDER — PANTOPRAZOLE SODIUM 40 MG/1
40 TABLET, DELAYED RELEASE ORAL
Refills: 0 | Status: DISCONTINUED | OUTPATIENT
Start: 2024-12-04 | End: 2024-12-10

## 2024-12-03 RX ORDER — SODIUM CHLORIDE 9 MG/ML
3 INJECTION, SOLUTION INTRAMUSCULAR; INTRAVENOUS; SUBCUTANEOUS EVERY 8 HOURS
Refills: 0 | Status: DISCONTINUED | OUTPATIENT
Start: 2024-12-03 | End: 2024-12-10

## 2024-12-03 RX ORDER — HEPARIN SODIUM,PORCINE 1000/ML
5000 VIAL (ML) INJECTION EVERY 8 HOURS
Refills: 0 | Status: DISCONTINUED | OUTPATIENT
Start: 2024-12-03 | End: 2024-12-04

## 2024-12-03 RX ORDER — OXYCODONE HYDROCHLORIDE 30 MG/1
10 TABLET ORAL EVERY 6 HOURS
Refills: 0 | Status: DISCONTINUED | OUTPATIENT
Start: 2024-12-03 | End: 2024-12-10

## 2024-12-03 RX ORDER — FLUTICASONE PROPIONATE AND SALMETEROL XINAFOATE 45; 21 UG/1; UG/1
1 AEROSOL, METERED RESPIRATORY (INHALATION)
Refills: 0 | Status: DISCONTINUED | OUTPATIENT
Start: 2024-12-03 | End: 2024-12-10

## 2024-12-03 RX ORDER — GLUCAGON INJECTION, SOLUTION 0.5 MG/.1ML
1 INJECTION, SOLUTION SUBCUTANEOUS ONCE
Refills: 0 | Status: DISCONTINUED | OUTPATIENT
Start: 2024-12-03 | End: 2024-12-10

## 2024-12-03 RX ORDER — ACETAMINOPHEN 500MG 500 MG/1
1000 TABLET, COATED ORAL ONCE
Refills: 0 | Status: COMPLETED | OUTPATIENT
Start: 2024-12-03 | End: 2024-12-09

## 2024-12-03 RX ORDER — GABAPENTIN 300 MG/1
300 CAPSULE ORAL THREE TIMES A DAY
Refills: 0 | Status: DISCONTINUED | OUTPATIENT
Start: 2024-12-03 | End: 2024-12-10

## 2024-12-03 RX ORDER — INSULIN GLARGINE 100 [IU]/ML
80 INJECTION, SOLUTION SUBCUTANEOUS EVERY MORNING
Refills: 0 | Status: DISCONTINUED | OUTPATIENT
Start: 2024-12-04 | End: 2024-12-06

## 2024-12-03 RX ORDER — ALBUTEROL 90 MCG
2 AEROSOL (GRAM) INHALATION EVERY 6 HOURS
Refills: 0 | Status: DISCONTINUED | OUTPATIENT
Start: 2024-12-03 | End: 2024-12-05

## 2024-12-03 RX ORDER — DILTIAZEM HYDROCHLORIDE 240 MG/1
29 CAPSULE, COATED, EXTENDED RELEASE ORAL ONCE
Refills: 0 | Status: COMPLETED | OUTPATIENT
Start: 2024-12-03 | End: 2024-12-03

## 2024-12-03 RX ORDER — AMIODARONE HYDROCHLORIDE 200 MG/1
1 TABLET ORAL
Qty: 450 | Refills: 0 | Status: DISCONTINUED | OUTPATIENT
Start: 2024-12-03 | End: 2024-12-05

## 2024-12-03 RX ORDER — ROSUVASTATIN CALCIUM 5 MG/1
20 TABLET, FILM COATED ORAL AT BEDTIME
Refills: 0 | Status: DISCONTINUED | OUTPATIENT
Start: 2024-12-03 | End: 2024-12-10

## 2024-12-03 RX ADMIN — Medication 1000 MILLIGRAM(S): at 23:11

## 2024-12-03 RX ADMIN — OXYCODONE HYDROCHLORIDE 10 MILLIGRAM(S): 30 TABLET ORAL at 23:40

## 2024-12-03 RX ADMIN — GABAPENTIN 300 MILLIGRAM(S): 300 CAPSULE ORAL at 23:10

## 2024-12-03 RX ADMIN — DILTIAZEM HYDROCHLORIDE 29 MILLIGRAM(S): 240 CAPSULE, COATED, EXTENDED RELEASE ORAL at 17:06

## 2024-12-03 RX ADMIN — Medication 5000 UNIT(S): at 23:10

## 2024-12-03 RX ADMIN — AMIODARONE HYDROCHLORIDE 600 MILLIGRAM(S): 200 TABLET ORAL at 23:01

## 2024-12-03 RX ADMIN — OXYCODONE HYDROCHLORIDE 10 MILLIGRAM(S): 30 TABLET ORAL at 23:10

## 2024-12-03 RX ADMIN — ROSUVASTATIN CALCIUM 20 MILLIGRAM(S): 5 TABLET, FILM COATED ORAL at 23:10

## 2024-12-03 NOTE — H&P ADULT - NSHPPHYSICALEXAM_GEN_ALL_CORE
General-awake and alert in NAD  HEENTPERRLA, EOMI, no nasal or ear drainage, oral mucosa moist no lesions, large soft masses both side of neck unable to assess for goiter or nodes, good ROM  Chest-bilateral breath sounds, decreased bs at bases, but clear  Coronary- irreg regular rate and rhythme, + 2/6 murmur appreciated  Abd- soft nontender, multiple large soft tissue masses  Extremities: +1 edema, fair ROM lower due to arthritic pain   Neuro: Alert and oriented, sensation and muscle tone intact all extremities  Vascular" radials 2+ bilateral, hands and feet warm, no venous stasis changed    ICU Vital Signs Last 24 Hrs  T(C): 36.7 (03 Dec 2024 16:34), Max: 36.7 (03 Dec 2024 16:34)  T(F): 98 (03 Dec 2024 16:34), Max: 98 (03 Dec 2024 16:34)  HR: 117 (03 Dec 2024 17:30) (92 - 134)  BP: 102/53 (03 Dec 2024 17:30) (100/58 - 122/87)  BP(mean): 66 (03 Dec 2024 17:30) (66 - 66)  RR: 18 (03 Dec 2024 17:30) (15 - 18)  SpO2: 95% (03 Dec 2024 17:30) (95% - 98%)

## 2024-12-03 NOTE — ED PROVIDER NOTE - OBJECTIVE STATEMENT
64-year-old male past medical history notable for hypertension, DM and mitral valve regurgitation, with complaints of atrial fibrillation.  Patient reports that he was at outpatient cardiology getting worked up for a new mitral valve replacement when they noticed that he was in atrial fibrillation with RVR, denies any new symptoms.  Reports has been short of breath after exertion for the past year or so which he always attributed to his MVR. Denies any fever, chills, nausea, vomiting, CP, changes in urination, or changes in bowel movements.

## 2024-12-03 NOTE — ED PROVIDER NOTE - CLINICAL SUMMARY MEDICAL DECISION MAKING FREE TEXT BOX
Labs and EKG were ordered and reviewed. cardizem given for patient's presenting complaints of afib  were ordered and effects were reassessed.  Patient's records (prior hospital, ED visit, and/or nursing home notes if available) were reviewed.  Additional history was obtained from cardiologist . Escalation to admission/observation was considered.  Patient requires inpatient hospitalization - monitored setting.

## 2024-12-03 NOTE — H&P ADULT - NSHPLABSRESULTS_GEN_ALL_CORE
LABS:  CAPILLARY BLOOD GLUCOSE                        15.3   12.45 )-----------( 212      ( 03 Dec 2024 17:00 )             47.5       12-03    137  |  101  |  40[H]  ----------------------------<  252[H]  5.7[H]   |  21  |  1.3    Ca    9.3      03 Dec 2024 17:00    TPro  7.0  /  Alb  4.1  /  TBili  0.4  /  DBili  x   /  AST  27  /  ALT  26  /  AlkPhos  57  12-03      Urinalysis Basic - ( 03 Dec 2024 17:00 )    Color: x / Appearance: x / SG: x / pH: x  Gluc: 252 mg/dL / Ketone: x  / Bili: x / Urobili: x   Blood: x / Protein: x / Nitrite: x   Leuk Esterase: x / RBC: x / WBC x   Sq Epi: x / Non Sq Epi: x / Bacteria: x     10/ 31/24  Left and right Cardiac cath Coronary Dominance: Right  LM: Minor luminal irregularities  LAD: Mild atherosclerosis  D1: Mild atherosclerosis  CX: Mild atherosclerosis  OM1: Mild atherosclerosis  RCA: Moderate atherosclerosis with 40% stenosis in the proximal segment  RPDA: Mild atherosclerosis    RHC Findings  HR 92/min  /75    RA 21/17/16  RV 60/19  PA 64/33/47  PCWP 34/43/34    Stephan CO/CI: 4.94/2.75   MVO2: 74     JASMINA October 2024< from: 12 Lead ECG (12.03.24 @ 16:37) >  LVEDP: 33 mmHg   LVEF >55%  No SHEILA thrombus  Severe MR secondary to P2 flail (EROA 0.53, MR volume 95 ml)  Mild TR  IAS: No R-> L shunt by color doppler    < from: 12 Lead ECG (12.03.24 @ 16:37) >    EKG:  Ventricular Rate 142 BPM    QRS Duration 66 ms    Q-T Interval 278 ms    QTC Calculation(Bazett) 427 ms    R Axis 59 degrees    T Axis 3 degrees    Diagnosis Line Atrial fibrillation with rapid ventricular response  Abnormal ECG

## 2024-12-03 NOTE — ED ADULT NURSE NOTE - NSICDXPASTMEDICALHX_GEN_ALL_CORE_FT
PAST MEDICAL HISTORY:  Arthritis     Asthma     Diabetes 20 yrs    HTN (hypertension)     Hypercholesteremia     Madelung's disease     Obesity     Obstructive sleep apnea on CPAP     Peripheral neuropathy related to diabetes

## 2024-12-03 NOTE — H&P ADULT - ASSESSMENT
64-year-old morbidly obese male non smoker with past medical history of hypertension, DM, peripheral neuropathy, sciatica, bilateral hip replacements, degenerative arthritis, , BPH (nocturia x 6), asthma, JIMMY (on CPAP), HLD, Madelung's disease, and mitral valve regurgitation, was sent to ED from CTS office due to new onset of afib with rapid ventricular response.  Patient reports recent outpatient cardiology work up for recent progression of MR.. Had recent cath and JASMINA which demonstrated NO significant CAD and severe MR from flail P2 with EF of 55-60%. Work up revealed  minimally invasive approach not feasible due to body habitus. Patient denies chest pain an leg swelling but complaints of dyspnea with minimal exertion. Patient ambulates with a can, needs both knees replaced and reop on right hip.  Denies any fever, chills, nausea, vomiting, CP, changes in urination, or changes in bowel movements. Was seen by dentist 3 weeks ago and everything was fine. HR now 130.     Severe MR  New Onset of a fib with rapid ventricular response

## 2024-12-03 NOTE — ED ADULT NURSE NOTE - CAS TRG GENERAL AIRWAY, MLM
Scheduled procedure with Patient at      Telephone Information:   Mobile [phone]      Scheduled Via: Case Request Order for  [facility]  Before selecting Facility was BMI double checked Yes  Did patient request a different provider then the referred to:No  Procedure date: 09-27   Procedure time: NA ; Did patient request this specific time? n/a  -If a MAC pt is scheduled, pt was notified a family member/friend is need to stay with the patient over night after their procedure: Yes            Notified patient about receiving an animated Zoey program? No    Was letter mailed Yes  Was the letter sent thru [employer]? No. If yes was the patient to to look under letters for prep instructions?  No  Was patient told to contact us back if not received  Yes    The following have been confirmed:  Was patients demographics verified, (address,phone number email)  Yes  Insurance name confirmed as [employer], will be the same at time of procedure?: Yes Ins Accepted at Facility? Yes    Does patient take medication bellow:   Semaglutide: Wegovy (SubQ Weekly), Ozempic (SubQ weekly), Rybelsus (Oral tablet daily)  No  Dulaglutide-Trulicity (SubQ weekly)  No  Exenatide: Byetta (SubQ twice daily), Bydureon BCise (SubQ weekly)  No  Liraglutide- Saxenda or Victoza (SubQ daily)  No  Tirzepatide - Mounjaro (SubQ weekly)  No  Combination Products (Contact Prescriber for bridge insulin):   No  Liraglutide and insulin degludec: Xultophy (SubQ daily)  No  Lixisenatide and insulin glargine: Soliqua (SubQ daily)  No      Latex Allergy No  Diabetic Yes  Sleep Apnea Yes if yes CPAP  Yes  Diuretic/Water pill No  Defibrillator/Pacemaker No  MRSA hx No  On Blood thinners and told to hold : Coumadin (Warfarin) or Plavix No   Phentermine (diet pill) No      Prep required? Yes, Pharmacy is [facility]/ The patient did receive the prep instructions in the office from Dr. Luis Alberto Kulkarni/ 2 DAY W/ FRANKY ; was request sent to nurse to send prep to  pharmacy? Yes; Briefly reviewed? Yes;   If procedure is scheduled 7 days or less, patient was told to  prep letter?: HOME     Patent

## 2024-12-03 NOTE — H&P ADULT - HISTORY OF PRESENT ILLNESS
64-year-old morbidly obese male non smoker with past medical history of hypertension, DM, peripheral neuropathy, sciatica, bilateral hip replacements, degenerative arthritis, , BPH (nocturia x 6), asthma, JIMMY (on CPAP), HLD, Madelung's disease, and mitral valve regurgitation, was sent to ED from CTS office due to new onset of afib with rapid ventricular response.  Patient reports recent outpatient cardiology work up for recent progression of MR.. Had recent cath and JASMINA which demonstrated NO significant CAD and severe MR from flail P2 with EF of 55-60%. Work up revealed he is not a candidate for clipping and minimally invasive approach not feasible due to body habitus. Patient denies chest pain an leg swelling but complaints of dyspnea with minimal exertion. Patient ambulates with a can, needs both knees replaced and reop on right hip.  Denies any fever, chills, nausea, vomiting, CP, changes in urination, or changes in bowel movements. Was seen by dentist 3 weeks ago and everything was fine. HR now 130.

## 2024-12-03 NOTE — ED PROVIDER NOTE - PHYSICAL EXAMINATION
CONSTITUTIONAL: NAD obese  SKIN: Warm dry  HEAD: NCAT  EYES: NL inspection  ENT: MMM  CARD: irregularly irregular  RESP: CTAB  ABD: Soft, non tender, no rebound or guarding   EXT: no pedal edema  NEURO: Grossly unremarkable  PSYCH: Cooperative, appropriate.

## 2024-12-04 LAB
A1C WITH ESTIMATED AVERAGE GLUCOSE RESULT: 7.1 % — HIGH (ref 4–5.6)
ALBUMIN SERPL ELPH-MCNC: 3.6 G/DL — SIGNIFICANT CHANGE UP (ref 3.5–5.2)
ALP SERPL-CCNC: 47 U/L — SIGNIFICANT CHANGE UP (ref 30–115)
ALT FLD-CCNC: 19 U/L — SIGNIFICANT CHANGE UP (ref 0–41)
ANION GAP SERPL CALC-SCNC: 12 MMOL/L — SIGNIFICANT CHANGE UP (ref 7–14)
APPEARANCE UR: CLEAR — SIGNIFICANT CHANGE UP
APTT BLD: 28 SEC — SIGNIFICANT CHANGE UP (ref 27–39.2)
APTT BLD: 42.7 SEC — HIGH (ref 27–39.2)
AST SERPL-CCNC: 11 U/L — SIGNIFICANT CHANGE UP (ref 0–41)
BACTERIA # UR AUTO: NEGATIVE /HPF — SIGNIFICANT CHANGE UP
BASOPHILS # BLD AUTO: 0.07 K/UL — SIGNIFICANT CHANGE UP (ref 0–0.2)
BASOPHILS NFR BLD AUTO: 0.6 % — SIGNIFICANT CHANGE UP (ref 0–1)
BILIRUB SERPL-MCNC: 0.3 MG/DL — SIGNIFICANT CHANGE UP (ref 0.2–1.2)
BILIRUB UR-MCNC: NEGATIVE — SIGNIFICANT CHANGE UP
BLD GP AB SCN SERPL QL: SIGNIFICANT CHANGE UP
BUN SERPL-MCNC: 36 MG/DL — HIGH (ref 10–20)
CALCIUM SERPL-MCNC: 8.6 MG/DL — SIGNIFICANT CHANGE UP (ref 8.4–10.4)
CAST: 32 /LPF — HIGH (ref 0–4)
CHLORIDE SERPL-SCNC: 104 MMOL/L — SIGNIFICANT CHANGE UP (ref 98–110)
CO2 SERPL-SCNC: 24 MMOL/L — SIGNIFICANT CHANGE UP (ref 17–32)
COLOR SPEC: YELLOW — SIGNIFICANT CHANGE UP
CREAT SERPL-MCNC: 1 MG/DL — SIGNIFICANT CHANGE UP (ref 0.7–1.5)
DIFF PNL FLD: NEGATIVE — SIGNIFICANT CHANGE UP
EGFR: 83 ML/MIN/1.73M2 — SIGNIFICANT CHANGE UP
EOSINOPHIL # BLD AUTO: 0.13 K/UL — SIGNIFICANT CHANGE UP (ref 0–0.7)
EOSINOPHIL NFR BLD AUTO: 1.1 % — SIGNIFICANT CHANGE UP (ref 0–8)
ESTIMATED AVERAGE GLUCOSE: 157 MG/DL — HIGH (ref 68–114)
GLUCOSE BLDC GLUCOMTR-MCNC: 101 MG/DL — HIGH (ref 70–99)
GLUCOSE BLDC GLUCOMTR-MCNC: 101 MG/DL — HIGH (ref 70–99)
GLUCOSE BLDC GLUCOMTR-MCNC: 117 MG/DL — HIGH (ref 70–99)
GLUCOSE BLDC GLUCOMTR-MCNC: 91 MG/DL — SIGNIFICANT CHANGE UP (ref 70–99)
GLUCOSE SERPL-MCNC: 78 MG/DL — SIGNIFICANT CHANGE UP (ref 70–99)
GLUCOSE UR QL: >=1000 MG/DL
HCT VFR BLD CALC: 43.1 % — SIGNIFICANT CHANGE UP (ref 42–52)
HGB BLD-MCNC: 13.7 G/DL — LOW (ref 14–18)
IMM GRANULOCYTES NFR BLD AUTO: 1 % — HIGH (ref 0.1–0.3)
INR BLD: 0.85 RATIO — SIGNIFICANT CHANGE UP (ref 0.65–1.3)
KETONES UR-MCNC: NEGATIVE MG/DL — SIGNIFICANT CHANGE UP
LEUKOCYTE ESTERASE UR-ACNC: NEGATIVE — SIGNIFICANT CHANGE UP
LYMPHOCYTES # BLD AUTO: 27.9 % — SIGNIFICANT CHANGE UP (ref 20.5–51.1)
LYMPHOCYTES # BLD AUTO: 3.45 K/UL — HIGH (ref 1.2–3.4)
MAGNESIUM SERPL-MCNC: 2 MG/DL — SIGNIFICANT CHANGE UP (ref 1.8–2.4)
MCHC RBC-ENTMCNC: 28.5 PG — SIGNIFICANT CHANGE UP (ref 27–31)
MCHC RBC-ENTMCNC: 31.8 G/DL — LOW (ref 32–37)
MCV RBC AUTO: 89.8 FL — SIGNIFICANT CHANGE UP (ref 80–94)
MONOCYTES # BLD AUTO: 1.01 K/UL — HIGH (ref 0.1–0.6)
MONOCYTES NFR BLD AUTO: 8.2 % — SIGNIFICANT CHANGE UP (ref 1.7–9.3)
MRSA PCR RESULT.: POSITIVE
NEUTROPHILS # BLD AUTO: 7.59 K/UL — HIGH (ref 1.4–6.5)
NEUTROPHILS NFR BLD AUTO: 61.2 % — SIGNIFICANT CHANGE UP (ref 42.2–75.2)
NITRITE UR-MCNC: NEGATIVE — SIGNIFICANT CHANGE UP
NRBC # BLD: 0 /100 WBCS — SIGNIFICANT CHANGE UP (ref 0–0)
NT-PROBNP SERPL-SCNC: 635 PG/ML — HIGH (ref 0–300)
PH UR: 5.5 — SIGNIFICANT CHANGE UP (ref 5–8)
PLATELET # BLD AUTO: 205 K/UL — SIGNIFICANT CHANGE UP (ref 130–400)
PMV BLD: 11.1 FL — HIGH (ref 7.4–10.4)
POTASSIUM SERPL-MCNC: 4 MMOL/L — SIGNIFICANT CHANGE UP (ref 3.5–5)
POTASSIUM SERPL-SCNC: 4 MMOL/L — SIGNIFICANT CHANGE UP (ref 3.5–5)
PROT SERPL-MCNC: 5.9 G/DL — LOW (ref 6–8)
PROT UR-MCNC: 30 MG/DL
PROTHROM AB SERPL-ACNC: 10 SEC — SIGNIFICANT CHANGE UP (ref 9.95–12.87)
RBC # BLD: 4.8 M/UL — SIGNIFICANT CHANGE UP (ref 4.7–6.1)
RBC # FLD: 15.9 % — HIGH (ref 11.5–14.5)
RBC CASTS # UR COMP ASSIST: 0 /HPF — SIGNIFICANT CHANGE UP (ref 0–4)
SODIUM SERPL-SCNC: 140 MMOL/L — SIGNIFICANT CHANGE UP (ref 135–146)
SP GR SPEC: 1.02 — SIGNIFICANT CHANGE UP (ref 1–1.03)
SQUAMOUS # UR AUTO: 1 /HPF — SIGNIFICANT CHANGE UP (ref 0–5)
TSH SERPL-MCNC: 2.72 UIU/ML — SIGNIFICANT CHANGE UP (ref 0.27–4.2)
UROBILINOGEN FLD QL: 0.2 MG/DL — SIGNIFICANT CHANGE UP (ref 0.2–1)
WBC # BLD: 12.37 K/UL — HIGH (ref 4.8–10.8)
WBC # FLD AUTO: 12.37 K/UL — HIGH (ref 4.8–10.8)
WBC UR QL: 1 /HPF — SIGNIFICANT CHANGE UP (ref 0–5)

## 2024-12-04 PROCEDURE — 71045 X-RAY EXAM CHEST 1 VIEW: CPT | Mod: 26

## 2024-12-04 PROCEDURE — 74176 CT ABD & PELVIS W/O CONTRAST: CPT | Mod: 26

## 2024-12-04 PROCEDURE — 99291 CRITICAL CARE FIRST HOUR: CPT

## 2024-12-04 PROCEDURE — 93010 ELECTROCARDIOGRAM REPORT: CPT

## 2024-12-04 PROCEDURE — 71250 CT THORAX DX C-: CPT | Mod: 26

## 2024-12-04 RX ORDER — FUROSEMIDE 40 MG/1
20 TABLET ORAL ONCE
Refills: 0 | Status: COMPLETED | OUTPATIENT
Start: 2024-12-04 | End: 2024-12-04

## 2024-12-04 RX ORDER — AMIODARONE HYDROCHLORIDE 200 MG/1
1 TABLET ORAL
Qty: 450 | Refills: 0 | Status: DISCONTINUED | OUTPATIENT
Start: 2024-12-04 | End: 2024-12-05

## 2024-12-04 RX ORDER — ACETAMINOPHEN, DIPHENHYDRAMINE HCL, PHENYLEPHRINE HCL 325; 25; 5 MG/1; MG/1; MG/1
5 TABLET ORAL AT BEDTIME
Refills: 0 | Status: COMPLETED | OUTPATIENT
Start: 2024-12-04 | End: 2024-12-04

## 2024-12-04 RX ORDER — DILTIAZEM HYDROCHLORIDE 240 MG/1
30 CAPSULE, COATED, EXTENDED RELEASE ORAL EVERY 6 HOURS
Refills: 0 | Status: DISCONTINUED | OUTPATIENT
Start: 2024-12-04 | End: 2024-12-05

## 2024-12-04 RX ORDER — METOPROLOL TARTRATE 100 MG/1
25 TABLET, FILM COATED ORAL
Refills: 0 | Status: DISCONTINUED | OUTPATIENT
Start: 2024-12-04 | End: 2024-12-04

## 2024-12-04 RX ORDER — HEPARIN SODIUM,PORCINE 1000/ML
1000 VIAL (ML) INJECTION
Qty: 25000 | Refills: 0 | Status: DISCONTINUED | OUTPATIENT
Start: 2024-12-04 | End: 2024-12-05

## 2024-12-04 RX ORDER — ALPRAZOLAM 0.5 MG
0.5 TABLET ORAL ONCE
Refills: 0 | Status: DISCONTINUED | OUTPATIENT
Start: 2024-12-04 | End: 2024-12-04

## 2024-12-04 RX ADMIN — Medication 10 UNIT(S)/HR: at 13:47

## 2024-12-04 RX ADMIN — Medication 12 UNIT(S)/HR: at 23:05

## 2024-12-04 RX ADMIN — INSULIN GLARGINE 80 UNIT(S): 100 INJECTION, SOLUTION SUBCUTANEOUS at 07:20

## 2024-12-04 RX ADMIN — SODIUM CHLORIDE 3 MILLILITER(S): 9 INJECTION, SOLUTION INTRAMUSCULAR; INTRAVENOUS; SUBCUTANEOUS at 13:57

## 2024-12-04 RX ADMIN — FLUTICASONE PROPIONATE AND SALMETEROL XINAFOATE 1 DOSE(S): 45; 21 AEROSOL, METERED RESPIRATORY (INHALATION) at 21:33

## 2024-12-04 RX ADMIN — OXYCODONE HYDROCHLORIDE 10 MILLIGRAM(S): 30 TABLET ORAL at 09:50

## 2024-12-04 RX ADMIN — CELECOXIB 200 MILLIGRAM(S): 200 CAPSULE ORAL at 17:04

## 2024-12-04 RX ADMIN — CELECOXIB 200 MILLIGRAM(S): 200 CAPSULE ORAL at 14:41

## 2024-12-04 RX ADMIN — Medication 1000 MILLIGRAM(S): at 09:20

## 2024-12-04 RX ADMIN — GABAPENTIN 300 MILLIGRAM(S): 300 CAPSULE ORAL at 21:20

## 2024-12-04 RX ADMIN — OXYCODONE HYDROCHLORIDE 10 MILLIGRAM(S): 30 TABLET ORAL at 21:20

## 2024-12-04 RX ADMIN — OXYCODONE HYDROCHLORIDE 10 MILLIGRAM(S): 30 TABLET ORAL at 09:20

## 2024-12-04 RX ADMIN — Medication 5000 UNIT(S): at 06:58

## 2024-12-04 RX ADMIN — ROSUVASTATIN CALCIUM 20 MILLIGRAM(S): 5 TABLET, FILM COATED ORAL at 21:21

## 2024-12-04 RX ADMIN — OXYCODONE HYDROCHLORIDE 10 MILLIGRAM(S): 30 TABLET ORAL at 22:10

## 2024-12-04 RX ADMIN — FUROSEMIDE 20 MILLIGRAM(S): 40 TABLET ORAL at 09:20

## 2024-12-04 RX ADMIN — GABAPENTIN 300 MILLIGRAM(S): 300 CAPSULE ORAL at 06:57

## 2024-12-04 RX ADMIN — GABAPENTIN 300 MILLIGRAM(S): 300 CAPSULE ORAL at 13:47

## 2024-12-04 RX ADMIN — AMIODARONE HYDROCHLORIDE 33.3 MG/MIN: 200 TABLET ORAL at 20:08

## 2024-12-04 RX ADMIN — LOSARTAN POTASSIUM 100 MILLIGRAM(S): 100 TABLET, FILM COATED ORAL at 06:57

## 2024-12-04 RX ADMIN — DILTIAZEM HYDROCHLORIDE 30 MILLIGRAM(S): 240 CAPSULE, COATED, EXTENDED RELEASE ORAL at 17:36

## 2024-12-04 RX ADMIN — Medication 81 MILLIGRAM(S): at 13:02

## 2024-12-04 RX ADMIN — ACETAMINOPHEN, DIPHENHYDRAMINE HCL, PHENYLEPHRINE HCL 5 MILLIGRAM(S): 325; 25; 5 TABLET ORAL at 21:06

## 2024-12-04 RX ADMIN — Medication 0.5 MILLIGRAM(S): at 18:48

## 2024-12-04 RX ADMIN — DILTIAZEM HYDROCHLORIDE 30 MILLIGRAM(S): 240 CAPSULE, COATED, EXTENDED RELEASE ORAL at 23:09

## 2024-12-04 RX ADMIN — DILTIAZEM HYDROCHLORIDE 30 MILLIGRAM(S): 240 CAPSULE, COATED, EXTENDED RELEASE ORAL at 13:01

## 2024-12-04 RX ADMIN — PANTOPRAZOLE SODIUM 40 MILLIGRAM(S): 40 TABLET, DELAYED RELEASE ORAL at 06:57

## 2024-12-04 NOTE — PROGRESS NOTE ADULT - SUBJECTIVE AND OBJECTIVE BOX
OPERATIVE PROCEDURE(s):      pre-op MV repair                           64yMale  SURGEON(s): ISELA Blakely  SUBJECTIVE ASSESSMENT: pt seen and examined.   Vital Signs Last 24 Hrs  T(F): 97.6 (04 Dec 2024 04:00), Max: 98.1 (03 Dec 2024 21:30)  HR: 127 (04 Dec 2024 07:00) (92 - 145)  BP: 120/67 (04 Dec 2024 07:00) (99/56 - 145/68)  BP(mean): 87 (04 Dec 2024 07:00) (65 - 104)  RR: 22 (04 Dec 2024 07:00) (11 - 40)  SpO2: 97% (04 Dec 2024 07:00) (91% - 98%)      I&O's Detail    03 Dec 2024 07:01  -  04 Dec 2024 07:00  --------------------------------------------------------  IN:    Amiodarone: 333 mL    IV PiggyBack: 100 mL    Oral Fluid: 360 mL  Total IN: 793 mL    OUT:    Voided (mL): 200 mL  Total OUT: 200 mL        Net:   I&O's Detail    03 Dec 2024 07:01  -  04 Dec 2024 07:00  --------------------------------------------------------  Total NET: 593 mL        CAPILLARY BLOOD GLUCOSE      POCT Blood Glucose.: 101 mg/dL (04 Dec 2024 07:03)  POCT Blood Glucose.: 115 mg/dL (03 Dec 2024 21:45)    Physical Exam:  General: NAD; A&Ox3/Patient is intubated and sedated  Cardiac: S1/S2, RRR, no murmur, no rubs  Lungs: unlabored respirations, CTA b/l, no wheeze, no rales, no crackles  Abdomen: Soft/NT/ND; positive bowel sounds x 4  Sternum: Intact, no click, incision healing well with no drainage  Incisions: Incisions clean/dry/intact  Extremities: No edema b/l lower extremities; good capillary refill; no cyanosis; palpable 1+ pedal pulses b/l    Central Venous Catheter: Yes[]  No[] , If Yes indication:           Day #  Caceres Catheter: Yes  [] , No  [] , If yes indication:                      Day #  NGT: Yes [] No [] ,    If Yes Placement:                                     Day #  EPICARDIAL WIRES:  [] YES [] NO                                              Day #  BOWEL MOVEMENT:  [] YES [] NO, If No, Timing since last BM:      Day #  CHEST TUBE(Left/Right):  [] YES [] NO, If yes -  AIR LEAKS:  [] YES [] NO        LABS:                        13.7[L]  12.37[H] )-----------( 205      ( 04 Dec 2024 04:45 )             43.1                         15.3   12.45[H] )-----------( 212      ( 03 Dec 2024 17:00 )             47.5     12-04    140  |  104  |  36[H]  ----------------------------<  78  4.0   |  24  |  1.0  12-03    137  |  101  |  40[H]  ----------------------------<  252[H]  5.7[H]   |  21  |  1.3    Ca    8.6      04 Dec 2024 04:45  Mg     2.0     12-04    TPro  5.9[L] [6.0 - 8.0]  /  Alb  3.6 [3.5 - 5.2]  /  TBili  0.3 [0.2 - 1.2]  /  DBili  x   /  AST  11 [0 - 41]  /  ALT  19 [0 - 41]  /  AlkPhos  47 [30 - 115]  12-04    PT/INR - ( 04 Dec 2024 04:45 )   PT: ;   INR: 0.85 ratio         PTT - ( 04 Dec 2024 04:45 )  PTT:28.0 sec  Urinalysis Basic - ( 04 Dec 2024 04:45 )    Color: x / Appearance: x / SG: x / pH: x  Gluc: 78 mg/dL / Ketone: x  / Bili: x / Urobili: x   Blood: x / Protein: x / Nitrite: x   Leuk Esterase: x / RBC: x / WBC x   Sq Epi: x / Non Sq Epi: x / Bacteria: x        RADIOLOGY & ADDITIONAL TESTS:  CXR:  EKG:  MEDICATIONS  (STANDING):  aMIOdarone Infusion 1 mG/Min (33.3 mL/Hr) IV Continuous <Continuous>  aspirin enteric coated 81 milliGRAM(s) Oral daily  celecoxib 200 milliGRAM(s) Oral daily  dextrose 5%. 1000 milliLiter(s) (50 mL/Hr) IV Continuous <Continuous>  dextrose 5%. 1000 milliLiter(s) (100 mL/Hr) IV Continuous <Continuous>  dextrose 50% Injectable 25 Gram(s) IV Push once  dextrose 50% Injectable 12.5 Gram(s) IV Push once  dextrose 50% Injectable 25 Gram(s) IV Push once  fluticasone propionate/ salmeterol 250-50 MICROgram(s) Diskus 1 Dose(s) Inhalation two times a day  gabapentin 300 milliGRAM(s) Oral three times a day  glucagon  Injectable 1 milliGRAM(s) IntraMuscular once  heparin   Injectable 5000 Unit(s) SubCutaneous every 8 hours  hydrochlorothiazide 25 milliGRAM(s) Oral daily  insulin glargine Injectable (LANTUS) 80 Unit(s) SubCutaneous every morning  insulin lispro (ADMELOG) corrective regimen sliding scale   SubCutaneous three times a day before meals  insulin lispro (ADMELOG) corrective regimen sliding scale   SubCutaneous at bedtime  insulin lispro Injectable (ADMELOG) 5 Unit(s) SubCutaneous three times a day before meals  losartan 100 milliGRAM(s) Oral daily  metFORMIN 1000 milliGRAM(s) Oral two times a day with meals  pantoprazole    Tablet 40 milliGRAM(s) Oral before breakfast  rosuvastatin 20 milliGRAM(s) Oral at bedtime  sodium chloride 0.9% lock flush 3 milliLiter(s) IV Push every 8 hours    MEDICATIONS  (PRN):  acetaminophen   IVPB .. 1000 milliGRAM(s) IV Intermittent once PRN Moderate Pain (4 - 6)  albuterol    90 MICROgram(s) HFA Inhaler 2 Puff(s) Inhalation every 6 hours PRN Shortness of Breath and/or Wheezing  dextrose Oral Gel 15 Gram(s) Oral once PRN Blood Glucose LESS THAN 70 milliGRAM(s)/deciliter  oxyCODONE    IR 10 milliGRAM(s) Oral every 6 hours PRN Severe Pain (7 - 10)    HEPARIN:  [] YES [] NO  Dose: XX UNITS/HR UNITS Q8H  LOVENOX:[] YES [] NO  Dose: XX mg Q24H  COUMADIN: []  YES [] NO  Dose: XX mg  Q24H  SCD's: YES b/l  GI Prophylaxis: Protonix [], Pepcid [], None [], (Contra-indication:.....)    Post-Op Beta-Blockers: Yes [], No[], If No, then contraindication:  Post-Op Aspirin: Yes [],  No [], If No, then contraindication:  Post-Op Statin: Yes [], No[], If No, then contraindication:  Allergies    No Known Allergies    Intolerances      Ambulation/Activity Status:    Assessment/Plan:  64y Male status-post .....  - Case and plan discussed with CTU Intensivist and CT Surgeon - Dr. Blakely/Mauri/Laron  - Continue CTU supportive care    - Continue DVT/GI prophylaxis  - Incentive Spirometry 10 times an hour  - Continue to advance physical activity as tolerated and continue PT/OT as directed  1. CAD: Continue ASA, statin, BB  2. HTN:   3. A. Fib:   4. COPD/Hypoxia:   5. DM/Glucose Control:     Social Service Disposition:     OPERATIVE PROCEDURE(s):      pre-op MV repair                           64yMale  SURGEON(s): ISELA Blakely  SUBJECTIVE ASSESSMENT: pt seen and examined.   Vital Signs Last 24 Hrs  T(F): 97.6 (04 Dec 2024 04:00), Max: 98.1 (03 Dec 2024 21:30)  HR: 127 (04 Dec 2024 07:00) (92 - 145)  BP: 120/67 (04 Dec 2024 07:00) (99/56 - 145/68)  BP(mean): 87 (04 Dec 2024 07:00) (65 - 104)  RR: 22 (04 Dec 2024 07:00) (11 - 40)  SpO2: 97% (04 Dec 2024 07:00) (91% - 98%)      I&O's Detail    03 Dec 2024 07:01  -  04 Dec 2024 07:00  --------------------------------------------------------  IN:    Amiodarone: 333 mL    IV PiggyBack: 100 mL    Oral Fluid: 360 mL  Total IN: 793 mL    OUT:    Voided (mL): 200 mL  Total OUT: 200 mL        Net:   I&O's Detail    03 Dec 2024 07:01  -  04 Dec 2024 07:00  --------------------------------------------------------  Total NET: 593 mL        CAPILLARY BLOOD GLUCOSE      POCT Blood Glucose.: 101 mg/dL (04 Dec 2024 07:03)  POCT Blood Glucose.: 115 mg/dL (03 Dec 2024 21:45)    Physical Exam:  General: NAD; A&Ox3, no focal deficits   Cardiac: S1/S2, RRR, no murmur, no rubs  Lungs: unlabored respirations, CTA b/l, no wheeze, no rales, no crackles  Abdomen: Soft/NT/ND; positive bowel sounds x 4  Sternum: Intact, no click, incision healing well with no drainage  Incisions: Incisions clean/dry/intact  Extremities: No edema b/l lower extremities; good capillary refill; no cyanosis; palpable 1+ pedal pulses b/l    Central Venous Catheter: Yes[]  No[x] , If Yes indication:           Day #  Caceres Catheter: Yes  [] , No  [x] , If yes indication:                      Day #  NGT: Yes [] No [x] ,    If Yes Placement:                                     Day #  EPICARDIAL WIRES:  [] YES [x] NO                                              Day #  BOWEL MOVEMENT:  [] YES [x] NO, If No, Timing since last BM:      Day #  CHEST TUBE(Left/Right):  [] YES [x] NO, If yes -  AIR LEAKS:  [] YES [] NO        LABS:                        13.7[L]  12.37[H] )-----------( 205      ( 04 Dec 2024 04:45 )             43.1                         15.3   12.45[H] )-----------( 212      ( 03 Dec 2024 17:00 )             47.5     12-04    140  |  104  |  36[H]  ----------------------------<  78  4.0   |  24  |  1.0  12-03    137  |  101  |  40[H]  ----------------------------<  252[H]  5.7[H]   |  21  |  1.3    Ca    8.6      04 Dec 2024 04:45  Mg     2.0     12-04    TPro  5.9[L] [6.0 - 8.0]  /  Alb  3.6 [3.5 - 5.2]  /  TBili  0.3 [0.2 - 1.2]  /  DBili  x   /  AST  11 [0 - 41]  /  ALT  19 [0 - 41]  /  AlkPhos  47 [30 - 115]  12-04    PT/INR - ( 04 Dec 2024 04:45 )   PT: ;   INR: 0.85 ratio         PTT - ( 04 Dec 2024 04:45 )  PTT:28.0 sec  Urinalysis Basic - ( 04 Dec 2024 04:45 )    Color: x / Appearance: x / SG: x / pH: x  Gluc: 78 mg/dL / Ketone: x  / Bili: x / Urobili: x   Blood: x / Protein: x / Nitrite: x   Leuk Esterase: x / RBC: x / WBC x   Sq Epi: x / Non Sq Epi: x / Bacteria: x        RADIOLOGY & ADDITIONAL TESTS:  CXR:  EKG:  MEDICATIONS  (STANDING):  aMIOdarone Infusion 1 mG/Min (33.3 mL/Hr) IV Continuous <Continuous>  aspirin enteric coated 81 milliGRAM(s) Oral daily  celecoxib 200 milliGRAM(s) Oral daily  dextrose 5%. 1000 milliLiter(s) (50 mL/Hr) IV Continuous <Continuous>  dextrose 5%. 1000 milliLiter(s) (100 mL/Hr) IV Continuous <Continuous>  dextrose 50% Injectable 25 Gram(s) IV Push once  dextrose 50% Injectable 12.5 Gram(s) IV Push once  dextrose 50% Injectable 25 Gram(s) IV Push once  fluticasone propionate/ salmeterol 250-50 MICROgram(s) Diskus 1 Dose(s) Inhalation two times a day  gabapentin 300 milliGRAM(s) Oral three times a day  glucagon  Injectable 1 milliGRAM(s) IntraMuscular once  heparin   Injectable 5000 Unit(s) SubCutaneous every 8 hours  hydrochlorothiazide 25 milliGRAM(s) Oral daily  insulin glargine Injectable (LANTUS) 80 Unit(s) SubCutaneous every morning  insulin lispro (ADMELOG) corrective regimen sliding scale   SubCutaneous three times a day before meals  insulin lispro (ADMELOG) corrective regimen sliding scale   SubCutaneous at bedtime  insulin lispro Injectable (ADMELOG) 5 Unit(s) SubCutaneous three times a day before meals  losartan 100 milliGRAM(s) Oral daily  metFORMIN 1000 milliGRAM(s) Oral two times a day with meals  pantoprazole    Tablet 40 milliGRAM(s) Oral before breakfast  rosuvastatin 20 milliGRAM(s) Oral at bedtime  sodium chloride 0.9% lock flush 3 milliLiter(s) IV Push every 8 hours    MEDICATIONS  (PRN):  acetaminophen   IVPB .. 1000 milliGRAM(s) IV Intermittent once PRN Moderate Pain (4 - 6)  albuterol    90 MICROgram(s) HFA Inhaler 2 Puff(s) Inhalation every 6 hours PRN Shortness of Breath and/or Wheezing  dextrose Oral Gel 15 Gram(s) Oral once PRN Blood Glucose LESS THAN 70 milliGRAM(s)/deciliter  oxyCODONE    IR 10 milliGRAM(s) Oral every 6 hours PRN Severe Pain (7 - 10)    HEPARIN:  [x] YES [] NO  Dose: 5000 UNITS/HR UNITS SQ Q8H  LOVENOX:[] YES [x] NO  Dose: XX mg Q24H  COUMADIN: []  YES [x] NO  Dose: XX mg  Q24H  SCD's: YES b/l  GI Prophylaxis: Protonix [x], Pepcid [], None [], (Contra-indication:.....)    Allergies    No Known Allergies    Intolerances      Ambulation/Activity Status:    Assessment/Plan:  64y Male pre op MV repair   - Case and plan discussed with CTU Intensivist and CT Surgeon - Dr. Blakely/Mauri/Laron  - Continue CTU supportive care    - Continue DVT/GI prophylaxis  - continue ASA, statin   - AFIB with RVR - continue amiodarone 450 mg IV   - start Cardizem 30 mg Q6H   - B/L carotid US ordered pre op   - pre op MVR - dental consult pending   4. COPD/Hypoxia: albuterol, advair nebs Q6H   5. DM/Glucose Control: continue Lantus, Metformin, ISS     Social Service Disposition:     OPERATIVE PROCEDURE(s):      pre-op MV repair                           64yMale  SURGEON(s): ISELA Blakely  SUBJECTIVE ASSESSMENT: pt seen and examined. no acute events  Vital Signs Last 24 Hrs  T(F): 97.6 (04 Dec 2024 04:00), Max: 98.1 (03 Dec 2024 21:30)  HR: 127 (04 Dec 2024 07:00) (92 - 145)  BP: 120/67 (04 Dec 2024 07:00) (99/56 - 145/68)  BP(mean): 87 (04 Dec 2024 07:00) (65 - 104)  RR: 22 (04 Dec 2024 07:00) (11 - 40)  SpO2: 97% (04 Dec 2024 07:00) (91% - 98%)      I&O's Detail    03 Dec 2024 07:01  -  04 Dec 2024 07:00  --------------------------------------------------------  IN:    Amiodarone: 333 mL    IV PiggyBack: 100 mL    Oral Fluid: 360 mL  Total IN: 793 mL    OUT:    Voided (mL): 200 mL  Total OUT: 200 mL        Net:   I&O's Detail    03 Dec 2024 07:01  -  04 Dec 2024 07:00  --------------------------------------------------------  Total NET: 593 mL        CAPILLARY BLOOD GLUCOSE      POCT Blood Glucose.: 101 mg/dL (04 Dec 2024 07:03)  POCT Blood Glucose.: 115 mg/dL (03 Dec 2024 21:45)    Physical Exam:  General: NAD; A&Ox3, no focal deficits  Cardiac: S1/S2, RRR, no murmur, no rubs  Lungs: unlabored respirations, CTA b/l, no wheeze, no rales, no crackles  Abdomen: Soft/NT/ND; positive bowel sounds x 4  Extremities: No edema b/l lower extremities; good capillary refill; no cyanosis; palpable 1+ pedal pulses b/l      LABS:                        13.7[L]  12.37[H] )-----------( 205      ( 04 Dec 2024 04:45 )             43.1                         15.3   12.45[H] )-----------( 212      ( 03 Dec 2024 17:00 )             47.5     12-04    140  |  104  |  36[H]  ----------------------------<  78  4.0   |  24  |  1.0  12-03    137  |  101  |  40[H]  ----------------------------<  252[H]  5.7[H]   |  21  |  1.3    Ca    8.6      04 Dec 2024 04:45  Mg     2.0     12-04    TPro  5.9[L] [6.0 - 8.0]  /  Alb  3.6 [3.5 - 5.2]  /  TBili  0.3 [0.2 - 1.2]  /  DBili  x   /  AST  11 [0 - 41]  /  ALT  19 [0 - 41]  /  AlkPhos  47 [30 - 115]  12-04    PT/INR - ( 04 Dec 2024 04:45 )   PT: ;   INR: 0.85 ratio         PTT - ( 04 Dec 2024 04:45 )  PTT:28.0 sec  Urinalysis Basic - ( 04 Dec 2024 04:45 )    Color: x / Appearance: x / SG: x / pH: x  Gluc: 78 mg/dL / Ketone: x  / Bili: x / Urobili: x   Blood: x / Protein: x / Nitrite: x   Leuk Esterase: x / RBC: x / WBC x   Sq Epi: x / Non Sq Epi: x / Bacteria: x        RADIOLOGY & ADDITIONAL TESTS:  CXR: < from: Xray Chest 1 View- PORTABLE-Routine (12.04.24 @ 04:14) >  impression:    EKG leads overlie thorax, obscuring anatomy.    Support devices: None    Cardiac/ Mediastinum: Stable    Lungs/ Pleura: Stable reticular densities left lung base. No   consolidation or pneumothorax.    Skeletal/soft tissues: Stable    < end of copied text >    EKG: < from: 12 Lead ECG (12.03.24 @ 19:25) >    Ventricular Rate 139 BPM    Atrial Rate 127 BPM    QRS Duration 80 ms    Q-T Interval 276 ms    QTC Calculation(Bazett) 419 ms    R Axis -33 degrees    T Axis 66 degrees    Diagnosis Line Atrial fibrillation with rapid ventricular response  Left axis deviation  Anterior infarct , age undetermined  Abnormal ECG    < end of copied text >    MEDICATIONS  (STANDING):  aMIOdarone Infusion 1 mG/Min (33.3 mL/Hr) IV Continuous <Continuous>  aspirin enteric coated 81 milliGRAM(s) Oral daily  celecoxib 200 milliGRAM(s) Oral daily  dextrose 5%. 1000 milliLiter(s) (50 mL/Hr) IV Continuous <Continuous>  dextrose 5%. 1000 milliLiter(s) (100 mL/Hr) IV Continuous <Continuous>  dextrose 50% Injectable 25 Gram(s) IV Push once  dextrose 50% Injectable 12.5 Gram(s) IV Push once  dextrose 50% Injectable 25 Gram(s) IV Push once  fluticasone propionate/ salmeterol 250-50 MICROgram(s) Diskus 1 Dose(s) Inhalation two times a day  gabapentin 300 milliGRAM(s) Oral three times a day  glucagon  Injectable 1 milliGRAM(s) IntraMuscular once  heparin   Injectable 5000 Unit(s) SubCutaneous every 8 hours  hydrochlorothiazide 25 milliGRAM(s) Oral daily  insulin glargine Injectable (LANTUS) 80 Unit(s) SubCutaneous every morning  insulin lispro (ADMELOG) corrective regimen sliding scale   SubCutaneous three times a day before meals  insulin lispro (ADMELOG) corrective regimen sliding scale   SubCutaneous at bedtime  insulin lispro Injectable (ADMELOG) 5 Unit(s) SubCutaneous three times a day before meals  losartan 100 milliGRAM(s) Oral daily  metFORMIN 1000 milliGRAM(s) Oral two times a day with meals  pantoprazole    Tablet 40 milliGRAM(s) Oral before breakfast  rosuvastatin 20 milliGRAM(s) Oral at bedtime  sodium chloride 0.9% lock flush 3 milliLiter(s) IV Push every 8 hours    MEDICATIONS  (PRN):  acetaminophen   IVPB .. 1000 milliGRAM(s) IV Intermittent once PRN Moderate Pain (4 - 6)  albuterol    90 MICROgram(s) HFA Inhaler 2 Puff(s) Inhalation every 6 hours PRN Shortness of Breath and/or Wheezing  dextrose Oral Gel 15 Gram(s) Oral once PRN Blood Glucose LESS THAN 70 milliGRAM(s)/deciliter  oxyCODONE    IR 10 milliGRAM(s) Oral every 6 hours PRN Severe Pain (7 - 10)    HEPARIN:  [x] YES [] NO  Dose: 1000 UNITS/HR   SCD's: YES b/l  GI Prophylaxis: Protonix [x], Pepcid [], None [], (Contra-indication:.....)    Allergies    No Known Allergies    Intolerances      Ambulation/Activity Status: ambulate     Assessment/Plan:  64y Male pre op MV repair presented with rapid afib   - Case and plan discussed with CTU Intensivist and CT Surgeon - Dr. Blakely/Mauri/Laron  - Continue CTU supportive care    - Continue DVT/GI prophylaxis  - continue ASA, statin   - AFIB with RVR - continue amiodarone gtt, hep gtt for ac; start Cardizem 30 mg Q6H   - B/L carotid US ordered pre op   - pre op MVR - dental consult pending   4. COPD/Hypoxia: albuterol, advair nebs Q6H   5. DM/Glucose Control: continue Lantus, Metformin, ISS     Social Service Disposition:  pt will be discharged home once a fib is under control and will come back for elective Mitral Valve Repair

## 2024-12-04 NOTE — PATIENT PROFILE ADULT - TRANSPORTATION
Patient reassessed, NAD, non-toxic appearing. results dw pt/family, questions answered. ambulated pt, hr went form 105 to 115. and from 98% to 95%. pt appeared increasingly dyspnic. endorsed to hospitlaist  - Alonso Frey D.O. PGY2
no

## 2024-12-04 NOTE — PROGRESS NOTE ADULT - SUBJECTIVE AND OBJECTIVE BOX
JAZ GAVIN  MRN-[id number]  Patient is a 64y old  Male who presents with a chief complaint of New onset of atrial fibrillation with rapid ventricular response in setting of severe MR (04 Dec 2024 08:21)    HPI:   64-year-old morbidly obese male non smoker with past medical history of hypertension, DM, peripheral neuropathy, sciatica, bilateral hip replacements, degenerative arthritis, , BPH (nocturia x 6), asthma, JIMMY (on CPAP), HLD, Madelung's disease, and mitral valve regurgitation, was sent to ED from CTS office due to new onset of afib with rapid ventricular response.  Patient reports recent outpatient cardiology work up for recent progression of MR.. Had recent cath and JASMINA which demonstrated NO significant CAD and severe MR from flail P2 with EF of 55-60%. Work up revealed he is not a candidate for clipping and minimally invasive approach not feasible due to body habitus. Patient denies chest pain an leg swelling but complaints of dyspnea with minimal exertion. Patient ambulates with a can, needs both knees replaced and reop on right hip.  Denies any fever, chills, nausea, vomiting, CP, changes in urination, or changes in bowel movements. Was seen by dentist 3 weeks ago and everything was fine. HR now 130.  (03 Dec 2024 18:17)      CHIEF COMPLAINT: Follow up in ICU  for postoperative care of patient who is s/p lung surgery    Surgery/Procedure/Hospital course:  date/OR    day 0 :brought to icu from OR , intubated. full vent support.  sedated on precedex  drips insuline and norepinephrine   BP labile, fluid resuscitated , titrate pressors     post op course :     today:    Physical Exam:  Gen: A,Ox3  / intubated, full vent support  CNS: Non focal / sedated post anesthesia  Neck: no JVD  RES : clear , no wheezing    Chest:   + chest tubes                     CVS: Regular  rhythm. Normal S1/S2  Abd: Soft, non-distended. Bowel sounds present.  Skin: No rash.  Ext:  no edema    Vital Signs Last 24 Hrs  T(C): 36.8 (04 Dec 2024 20:00), Max: 36.8 (04 Dec 2024 20:00)  T(F): 98.2 (04 Dec 2024 20:00), Max: 98.2 (04 Dec 2024 20:00)  HR: 127 (04 Dec 2024 20:00) (105 - 142)  BP: 138/61 (04 Dec 2024 20:00) (81/61 - 145/68)  BP(mean): 88 (04 Dec 2024 20:00) (66 - 103)  RR: 17 (04 Dec 2024 20:00) (11 - 40)  SpO2: 96% (04 Dec 2024 20:00) (91% - 97%)    Parameters below as of 04 Dec 2024 21:00  Patient On (Oxygen Delivery Method): room air      ============================I/O===========================   I&O's Detail    03 Dec 2024 07:01  -  04 Dec 2024 07:00  --------------------------------------------------------  IN:    Amiodarone: 333 mL    IV PiggyBack: 100 mL    Oral Fluid: 360 mL  Total IN: 793 mL    OUT:    Voided (mL): 200 mL  Total OUT: 200 mL    Total NET: 593 mL      04 Dec 2024 07:01  -  04 Dec 2024 22:38  --------------------------------------------------------  IN:    Amiodarone: 399.6 mL    Amiodarone: 33.3 mL    Heparin: 70 mL    Oral Fluid: 900 mL  Total IN: 1402.9 mL    OUT:    Voided (mL): 920 mL  Total OUT: 920 mL    Total NET: 482.9 mL          =====================Rad/Cardio/cultures =================  CXR: Reviewed  Culture: Reviewed    ======================================================  Home Medications:  Aspir 81 oral delayed release tablet: 1 tab(s) orally once a day (31 Oct 2024 09:30)  Breo Ellipta 200 mcg-25 mcg/inh inhalation powder: 1 inhaled once a day (31 Oct 2024 09:30)  Crestor 20 mg oral tablet: 1 tab(s) orally once a day (at bedtime) (31 Oct 2024 09:30)  gabapentin 300 mg oral capsule: 1 cap(s) orally 3 times a day (31 Oct 2024 09:30)  HYDROcodone 10 mg oral capsule, extended release: 1 cap(s) orally 2 times a day (31 Oct 2024 09:30)  Jardiance 25 mg oral tablet: 1 tab(s) orally once a day (31 Oct 2024 09:30)  losartan-hydrochlorothiazide 100mg-12.5mg oral tablet: orally 2 times a day (31 Oct 2024 09:30)  meloxicam 15 mg oral tablet: 1 tab(s) orally once a day (31 Oct 2024 09:30)  metFORMIN 1000 mg oral tablet, extended release: orally 2 times a day (31 Oct 2024 09:30)  Mounjaro 15 mg/0.5 mL subcutaneous solution: 15 milligram(s) subcutaneously once a week on Thur (31 Oct 2024 09:30)  pioglitazone 15 mg oral tablet: 1 tab(s) orally once a day (31 Oct 2024 09:30)  ProAir HFA 90 mcg/inh inhalation aerosol: 2 puff(s) inhaled 2 times a day (31 Oct 2024 09:30)  tujeo: 80 unit(s) subcutaneous once a day (31 Oct 2024 09:30)    PAST MEDICAL & SURGICAL HISTORY:  Diabetes  18 yrs      HTN (hypertension)      Obstructive sleep apnea on CPAP      Asthma      Obesity      Peripheral neuropathy  related to diabetes      Arthritis      Hypercholesteremia      Madelung's disease      H/O lipoma  times 2-3      History of hip replacement, total, bilateral  two separate times '17, '07      Fracture of orbital floor      Encounter for screening colonoscopy        ====================ASSESMENT/MDM ==============  Dx:  OR  at risk for respiratory failure  post op pain  chest tube in place  pneumothorax air leak  pleural effusion    encounter weaning from and management of  respirator    Plan:  ====================== NEUROLOGY============  pain control:     acetaminophen   IVPB .. 1000 milliGRAM(s) IV Intermittent once PRN Moderate Pain (4 - 6)  celecoxib 200 milliGRAM(s) Oral daily  gabapentin 300 milliGRAM(s) Oral three times a day  melatonin 5 milliGRAM(s) Oral at bedtime  oxyCODONE    IR 10 milliGRAM(s) Oral every 6 hours PRN Severe Pain (7 - 10)    ====================== RESPIRATORY============  O2: NC CPAP BIPAP HF VENT / settings    CXR:  Mechanical Ventilation:      incentive spirometry  titrate FiO2 , keep sat above 92%  Monitor chest tube: outputs , Airleak,   continue chest tube to  suction / water seal , f/u cxr  chest tube clamp trial  bronchodilators , inhaled dornase alpha and hypertonic saline    albuterol    90 MICROgram(s) HFA Inhaler 2 Puff(s) Inhalation every 6 hours PRN Shortness of Breath and/or Wheezing  fluticasone propionate/ salmeterol 250-50 MICROgram(s) Diskus 1 Dose(s) Inhalation two times a day    ======================CARDIOVASCULAR =========  hemodynamics :   telemetry:   Lactate :                  , vo2   management for :  pressors: Levophed  Vasopressin Neosynphrine infusion, Midodrine , Droxidopa  inotropes: Epinephrine  Dobutamine  Primacor     aMIOdarone Infusion 1 mG/Min (33.3 mL/Hr) IV Continuous <Continuous>  aMIOdarone Infusion 1 mG/Min (33.3 mL/Hr) IV Continuous <Continuous>  diltiazem    Tablet 30 milliGRAM(s) Oral every 6 hours  hydrochlorothiazide 25 milliGRAM(s) Oral daily  losartan 100 milliGRAM(s) Oral daily    titrate pressors for mean BP above 60  titrate inotropes   Monitor Hemodynamic, Telemetry  =======================Hematology===============                        13.7   12.37 )-----------( 205      ( 04 Dec 2024 04:45 )             43.1        PT/INR - ( 04 Dec 2024 04:45 )   PT: 10.00 sec;   INR: 0.85 ratio         PTT - ( 04 Dec 2024 20:27 )  PTT:42.7 sec   Hct:  Plts:  INR    antiplatets: ASA, Plavix iv Cangelor  Anticoagulation:  Heparin Argatroban Angiomax Lovenox  VTE PPX: Heparin Sq    aspirin enteric coated 81 milliGRAM(s) Oral daily  heparin  Infusion 1000 Unit(s)/Hr (12 mL/Hr) IV Continuous <Continuous>      monitor  Hb/Hct ,plts ,coags     ========================RENAL ===================  12-04    140  |  104  |  36[H]  ----------------------------<  78  4.0   |  24  |  1.0    Ca    8.6      04 Dec 2024 04:45  Mg     2.0     12-04    TPro  5.9[L]  /  Alb  3.6  /  TBili  0.3  /  DBili  x   /  AST  11  /  ALT  19  /  AlkPhos  47  12-04    Diuresis  Iv Fluids:  Monitor I/Os, BUN/Creatinine.   Replete lytes PRN  Caceres Void     ======================== GASTROINTESTINAL========  Diet: NPO / Reg  GI prophylaxis :PPI  Pepcid  Bowel regimen:    LIVER FUNCTIONS - ( 04 Dec 2024 04:45 )  Alb: 3.6 g/dL / Pro: 5.9 g/dL / ALK PHOS: 47 U/L / ALT: 19 U/L / AST: 11 U/L / GGT: x             dextrose 5%. 1000 milliLiter(s) (50 mL/Hr) IV Continuous <Continuous>  dextrose 5%. 1000 milliLiter(s) (100 mL/Hr) IV Continuous <Continuous>  pantoprazole    Tablet 40 milliGRAM(s) Oral before breakfast  sodium chloride 0.9% lock flush 3 milliLiter(s) IV Push every 8 hours    ======================= ENDOCRINE  =============  HbA1c:          TSH:  Glycemic control : insulin drip  , Lantus/NPH, Lispro sliding scale  statin   Hypothyroid : synthroid     dextrose 50% Injectable 25 Gram(s) IV Push once  dextrose 50% Injectable 12.5 Gram(s) IV Push once  dextrose 50% Injectable 25 Gram(s) IV Push once  dextrose Oral Gel 15 Gram(s) Oral once PRN Blood Glucose LESS THAN 70 milliGRAM(s)/deciliter  glucagon  Injectable 1 milliGRAM(s) IntraMuscular once  insulin glargine Injectable (LANTUS) 80 Unit(s) SubCutaneous every morning  insulin lispro (ADMELOG) corrective regimen sliding scale   SubCutaneous three times a day before meals  insulin lispro (ADMELOG) corrective regimen sliding scale   SubCutaneous at bedtime  insulin lispro Injectable (ADMELOG) 5 Unit(s) SubCutaneous three times a day before meals  metFORMIN 1000 milliGRAM(s) Oral two times a day with meals  rosuvastatin 20 milliGRAM(s) Oral at bedtime    ========================INFECTIOUS DISEASE========  No active antibiotic coverage    prophylactic antibiotics:  treatment antibiotics:  blood cx:  sputum cx:  urine cx:        -----------------------------------------------------------------------------------------------------------  ALL MEDICATIONS  MEDICATIONS  (STANDING):  aMIOdarone Infusion 1 mG/Min (33.3 mL/Hr) IV Continuous <Continuous>  aMIOdarone Infusion 1 mG/Min (33.3 mL/Hr) IV Continuous <Continuous>  aspirin enteric coated 81 milliGRAM(s) Oral daily  celecoxib 200 milliGRAM(s) Oral daily  dextrose 5%. 1000 milliLiter(s) (50 mL/Hr) IV Continuous <Continuous>  dextrose 5%. 1000 milliLiter(s) (100 mL/Hr) IV Continuous <Continuous>  dextrose 50% Injectable 25 Gram(s) IV Push once  dextrose 50% Injectable 12.5 Gram(s) IV Push once  dextrose 50% Injectable 25 Gram(s) IV Push once  diltiazem    Tablet 30 milliGRAM(s) Oral every 6 hours  fluticasone propionate/ salmeterol 250-50 MICROgram(s) Diskus 1 Dose(s) Inhalation two times a day  gabapentin 300 milliGRAM(s) Oral three times a day  glucagon  Injectable 1 milliGRAM(s) IntraMuscular once  heparin  Infusion 1000 Unit(s)/Hr (12 mL/Hr) IV Continuous <Continuous>  hydrochlorothiazide 25 milliGRAM(s) Oral daily  insulin glargine Injectable (LANTUS) 80 Unit(s) SubCutaneous every morning  insulin lispro (ADMELOG) corrective regimen sliding scale   SubCutaneous three times a day before meals  insulin lispro (ADMELOG) corrective regimen sliding scale   SubCutaneous at bedtime  insulin lispro Injectable (ADMELOG) 5 Unit(s) SubCutaneous three times a day before meals  losartan 100 milliGRAM(s) Oral daily  melatonin 5 milliGRAM(s) Oral at bedtime  metFORMIN 1000 milliGRAM(s) Oral two times a day with meals  pantoprazole    Tablet 40 milliGRAM(s) Oral before breakfast  rosuvastatin 20 milliGRAM(s) Oral at bedtime  sodium chloride 0.9% lock flush 3 milliLiter(s) IV Push every 8 hours    MEDICATIONS  (PRN):  acetaminophen   IVPB .. 1000 milliGRAM(s) IV Intermittent once PRN Moderate Pain (4 - 6)  albuterol    90 MICROgram(s) HFA Inhaler 2 Puff(s) Inhalation every 6 hours PRN Shortness of Breath and/or Wheezing  dextrose Oral Gel 15 Gram(s) Oral once PRN Blood Glucose LESS THAN 70 milliGRAM(s)/deciliter  oxyCODONE    IR 10 milliGRAM(s) Oral every 6 hours PRN Severe Pain (7 - 10)    ------------------------------------------------------------------------------------------------------    Pertinent clinical, laboratory, radiographic, hemodynamic, echocardiographic, respiratory data, microbiologic data and chart were reviewed and analyzed frequently throughout the course of the day and night. Patient seen, examined and plan discussed with CT Surgeon Dr. Monae  / CTICU team during rounds.  OOB to chair and ambulate with physical therapy as tolerated.     Transfer to floor when bed available    Patient requires continuous monitoring with:  bedside rhythm monitoring, arterial line, pulse oximetry,; intermittent blood gas analysis.Care plan discussed with ICU care team.  complex MDM; required more than usual post op care (including management , monitoring  and  coordinating care in the ICU); I have spent 35 minutes providing non routine post op care, revaluated multiple times.    .crit  continue ICU care

## 2024-12-05 LAB
ALBUMIN SERPL ELPH-MCNC: 3.9 G/DL — SIGNIFICANT CHANGE UP (ref 3.5–5.2)
ALP SERPL-CCNC: 55 U/L — SIGNIFICANT CHANGE UP (ref 30–115)
ALT FLD-CCNC: 23 U/L — SIGNIFICANT CHANGE UP (ref 0–41)
ANION GAP SERPL CALC-SCNC: 11 MMOL/L — SIGNIFICANT CHANGE UP (ref 7–14)
APTT BLD: 44.3 SEC — HIGH (ref 27–39.2)
APTT BLD: 87 SEC — CRITICAL HIGH (ref 27–39.2)
APTT BLD: >200 SEC — CRITICAL HIGH (ref 27–39.2)
AST SERPL-CCNC: 18 U/L — SIGNIFICANT CHANGE UP (ref 0–41)
BASOPHILS # BLD AUTO: 0.06 K/UL — SIGNIFICANT CHANGE UP (ref 0–0.2)
BASOPHILS NFR BLD AUTO: 0.5 % — SIGNIFICANT CHANGE UP (ref 0–1)
BILIRUB SERPL-MCNC: 0.5 MG/DL — SIGNIFICANT CHANGE UP (ref 0.2–1.2)
BUN SERPL-MCNC: 47 MG/DL — HIGH (ref 10–20)
CALCIUM SERPL-MCNC: 8.9 MG/DL — SIGNIFICANT CHANGE UP (ref 8.4–10.5)
CHLORIDE SERPL-SCNC: 102 MMOL/L — SIGNIFICANT CHANGE UP (ref 98–110)
CO2 SERPL-SCNC: 25 MMOL/L — SIGNIFICANT CHANGE UP (ref 17–32)
CREAT SERPL-MCNC: 1.8 MG/DL — HIGH (ref 0.7–1.5)
EGFR: 41 ML/MIN/1.73M2 — LOW
EOSINOPHIL # BLD AUTO: 0.26 K/UL — SIGNIFICANT CHANGE UP (ref 0–0.7)
EOSINOPHIL NFR BLD AUTO: 2.1 % — SIGNIFICANT CHANGE UP (ref 0–8)
GLUCOSE BLDC GLUCOMTR-MCNC: 118 MG/DL — HIGH (ref 70–99)
GLUCOSE BLDC GLUCOMTR-MCNC: 121 MG/DL — HIGH (ref 70–99)
GLUCOSE BLDC GLUCOMTR-MCNC: 129 MG/DL — HIGH (ref 70–99)
GLUCOSE SERPL-MCNC: 73 MG/DL — SIGNIFICANT CHANGE UP (ref 70–99)
HCT VFR BLD CALC: 43.4 % — SIGNIFICANT CHANGE UP (ref 42–52)
HGB BLD-MCNC: 13.8 G/DL — LOW (ref 14–18)
IMM GRANULOCYTES NFR BLD AUTO: 0.9 % — HIGH (ref 0.1–0.3)
INR BLD: 0.84 RATIO — SIGNIFICANT CHANGE UP (ref 0.65–1.3)
LYMPHOCYTES # BLD AUTO: 29.1 % — SIGNIFICANT CHANGE UP (ref 20.5–51.1)
LYMPHOCYTES # BLD AUTO: 3.63 K/UL — HIGH (ref 1.2–3.4)
MAGNESIUM SERPL-MCNC: 2 MG/DL — SIGNIFICANT CHANGE UP (ref 1.8–2.4)
MCHC RBC-ENTMCNC: 28.8 PG — SIGNIFICANT CHANGE UP (ref 27–31)
MCHC RBC-ENTMCNC: 31.8 G/DL — LOW (ref 32–37)
MCV RBC AUTO: 90.6 FL — SIGNIFICANT CHANGE UP (ref 80–94)
MONOCYTES # BLD AUTO: 1 K/UL — HIGH (ref 0.1–0.6)
MONOCYTES NFR BLD AUTO: 8 % — SIGNIFICANT CHANGE UP (ref 1.7–9.3)
NEUTROPHILS # BLD AUTO: 7.42 K/UL — HIGH (ref 1.4–6.5)
NEUTROPHILS NFR BLD AUTO: 59.4 % — SIGNIFICANT CHANGE UP (ref 42.2–75.2)
NRBC # BLD: 0 /100 WBCS — SIGNIFICANT CHANGE UP (ref 0–0)
PLATELET # BLD AUTO: 200 K/UL — SIGNIFICANT CHANGE UP (ref 130–400)
PMV BLD: 11.3 FL — HIGH (ref 7.4–10.4)
POTASSIUM SERPL-MCNC: 4.4 MMOL/L — SIGNIFICANT CHANGE UP (ref 3.5–5)
POTASSIUM SERPL-SCNC: 4.4 MMOL/L — SIGNIFICANT CHANGE UP (ref 3.5–5)
PROT SERPL-MCNC: 5.9 G/DL — LOW (ref 6–8)
PROTHROM AB SERPL-ACNC: 9.9 SEC — LOW (ref 9.95–12.87)
RBC # BLD: 4.79 M/UL — SIGNIFICANT CHANGE UP (ref 4.7–6.1)
RBC # FLD: 16.1 % — HIGH (ref 11.5–14.5)
SODIUM SERPL-SCNC: 138 MMOL/L — SIGNIFICANT CHANGE UP (ref 135–146)
WBC # BLD: 12.48 K/UL — HIGH (ref 4.8–10.8)
WBC # FLD AUTO: 12.48 K/UL — HIGH (ref 4.8–10.8)

## 2024-12-05 PROCEDURE — 93010 ELECTROCARDIOGRAM REPORT: CPT

## 2024-12-05 PROCEDURE — 99223 1ST HOSP IP/OBS HIGH 75: CPT

## 2024-12-05 RX ORDER — LACTULOSE 10 G/15ML
15 SOLUTION ORAL ONCE
Refills: 0 | Status: COMPLETED | OUTPATIENT
Start: 2024-12-05 | End: 2024-12-05

## 2024-12-05 RX ORDER — APIXABAN 2.5 MG/1
5 TABLET, FILM COATED ORAL EVERY 12 HOURS
Refills: 0 | Status: DISCONTINUED | OUTPATIENT
Start: 2024-12-05 | End: 2024-12-06

## 2024-12-05 RX ORDER — AMIODARONE HYDROCHLORIDE 200 MG/1
0.5 TABLET ORAL
Qty: 450 | Refills: 0 | Status: DISCONTINUED | OUTPATIENT
Start: 2024-12-05 | End: 2024-12-07

## 2024-12-05 RX ORDER — LOSARTAN POTASSIUM 100 MG/1
50 TABLET, FILM COATED ORAL DAILY
Refills: 0 | Status: DISCONTINUED | OUTPATIENT
Start: 2024-12-05 | End: 2024-12-05

## 2024-12-05 RX ORDER — METOPROLOL TARTRATE 100 MG/1
25 TABLET, FILM COATED ORAL EVERY 12 HOURS
Refills: 0 | Status: DISCONTINUED | OUTPATIENT
Start: 2024-12-05 | End: 2024-12-07

## 2024-12-05 RX ORDER — AMIODARONE HYDROCHLORIDE 200 MG/1
400 TABLET ORAL EVERY 12 HOURS
Refills: 0 | Status: DISCONTINUED | OUTPATIENT
Start: 2024-12-05 | End: 2024-12-06

## 2024-12-05 RX ORDER — DILTIAZEM HYDROCHLORIDE 240 MG/1
120 CAPSULE, COATED, EXTENDED RELEASE ORAL DAILY
Refills: 0 | Status: DISCONTINUED | OUTPATIENT
Start: 2024-12-05 | End: 2024-12-06

## 2024-12-05 RX ADMIN — GABAPENTIN 300 MILLIGRAM(S): 300 CAPSULE ORAL at 05:05

## 2024-12-05 RX ADMIN — APIXABAN 5 MILLIGRAM(S): 2.5 TABLET, FILM COATED ORAL at 17:01

## 2024-12-05 RX ADMIN — GABAPENTIN 300 MILLIGRAM(S): 300 CAPSULE ORAL at 21:42

## 2024-12-05 RX ADMIN — DILTIAZEM HYDROCHLORIDE 30 MILLIGRAM(S): 240 CAPSULE, COATED, EXTENDED RELEASE ORAL at 05:06

## 2024-12-05 RX ADMIN — LOSARTAN POTASSIUM 100 MILLIGRAM(S): 100 TABLET, FILM COATED ORAL at 05:08

## 2024-12-05 RX ADMIN — METOPROLOL TARTRATE 25 MILLIGRAM(S): 100 TABLET, FILM COATED ORAL at 17:02

## 2024-12-05 RX ADMIN — OXYCODONE HYDROCHLORIDE 10 MILLIGRAM(S): 30 TABLET ORAL at 14:39

## 2024-12-05 RX ADMIN — Medication 5 UNIT(S): at 16:55

## 2024-12-05 RX ADMIN — Medication 1 APPLICATION(S): at 05:07

## 2024-12-05 RX ADMIN — AMIODARONE HYDROCHLORIDE 400 MILLIGRAM(S): 200 TABLET ORAL at 17:04

## 2024-12-05 RX ADMIN — Medication 1 APPLICATION(S): at 17:02

## 2024-12-05 RX ADMIN — OXYCODONE HYDROCHLORIDE 10 MILLIGRAM(S): 30 TABLET ORAL at 05:15

## 2024-12-05 RX ADMIN — GABAPENTIN 300 MILLIGRAM(S): 300 CAPSULE ORAL at 14:39

## 2024-12-05 RX ADMIN — INSULIN GLARGINE 80 UNIT(S): 100 INJECTION, SOLUTION SUBCUTANEOUS at 12:52

## 2024-12-05 RX ADMIN — DILTIAZEM HYDROCHLORIDE 120 MILLIGRAM(S): 240 CAPSULE, COATED, EXTENDED RELEASE ORAL at 13:39

## 2024-12-05 RX ADMIN — PANTOPRAZOLE SODIUM 40 MILLIGRAM(S): 40 TABLET, DELAYED RELEASE ORAL at 05:05

## 2024-12-05 RX ADMIN — Medication 2 PUFF(S): at 06:47

## 2024-12-05 RX ADMIN — OXYCODONE HYDROCHLORIDE 10 MILLIGRAM(S): 30 TABLET ORAL at 22:52

## 2024-12-05 RX ADMIN — LACTULOSE 15 GRAM(S): 10 SOLUTION ORAL at 04:58

## 2024-12-05 RX ADMIN — FLUTICASONE PROPIONATE AND SALMETEROL XINAFOATE 1 DOSE(S): 45; 21 AEROSOL, METERED RESPIRATORY (INHALATION) at 21:45

## 2024-12-05 RX ADMIN — OXYCODONE HYDROCHLORIDE 10 MILLIGRAM(S): 30 TABLET ORAL at 04:58

## 2024-12-05 RX ADMIN — Medication 81 MILLIGRAM(S): at 12:27

## 2024-12-05 RX ADMIN — OXYCODONE HYDROCHLORIDE 10 MILLIGRAM(S): 30 TABLET ORAL at 15:09

## 2024-12-05 RX ADMIN — SODIUM CHLORIDE 3 MILLILITER(S): 9 INJECTION, SOLUTION INTRAMUSCULAR; INTRAVENOUS; SUBCUTANEOUS at 22:02

## 2024-12-05 RX ADMIN — ROSUVASTATIN CALCIUM 20 MILLIGRAM(S): 5 TABLET, FILM COATED ORAL at 21:42

## 2024-12-05 RX ADMIN — OXYCODONE HYDROCHLORIDE 10 MILLIGRAM(S): 30 TABLET ORAL at 23:45

## 2024-12-05 RX ADMIN — Medication 15 UNIT(S)/HR: at 04:58

## 2024-12-05 RX ADMIN — Medication 5 UNIT(S): at 12:30

## 2024-12-05 NOTE — CONSULT NOTE ADULT - SUBJECTIVE AND OBJECTIVE BOX
Date of Admission: 24    HISTORY OF PRESENT ILLNESS:     64-year-old morbidly obese male non smoker with past medical history of hypertension, DM, peripheral neuropathy, sciatica, bilateral hip replacements, degenerative arthritis, , BPH (nocturia x 6), asthma, JIMMY (on CPAP), HLD, Madelung's disease, and mitral valve regurgitation, was sent to ED from CTS office due to new onset of afib with rapid ventricular response.  Patient reports recent outpatient cardiology work up for recent progression of MR.. Had recent cath and JASMINA which demonstrated NO significant CAD and severe MR from flail P2 with EF of 55-60%. Work up revealed he is not a candidate for clipping and minimally invasive approach not feasible due to body habitus. Patient denies chest pain an leg swelling but complaints of dyspnea with minimal exertion. Patient ambulates with a can, needs both knees replaced and reop on right hip.  Denies any fever, chills, nausea, vomiting, CP, changes in urination, or changes in bowel movements. Was seen by dentist 3 weeks ago and everything was fine. HR now 130.  (03 Dec 2024 18:17)    PAST MEDICAL & SURGICAL HISTORY  Diabetes  20 yrs    HTN (hypertension)    Obstructive sleep apnea on CPAP    Asthma    Obesity    Peripheral neuropathy  related to diabetes    Arthritis    Hypercholesteremia    Madelung's disease    H/O lipoma  times 2-3    History of hip replacement, total, bilateral  two separate times '17, 07    Fracture of orbital floor    Encounter for screening colonoscopy        FAMILY HISTORY:  Family history of diabetes mellitus (Mother, Sibling)    CAD (coronary artery disease) (Mother, Father)    SOCIAL HISTORY:   - Non-smoker    REVIEW OF SYMPTOMS:  CONSTITUTIONAL: No fevers or chills.  EYES: No visual changes, eye pain, or discharge  ENT: No vertigo; No ear pain or change in hearing; No sore throat or difficulty swallowing  NECK: No pain or stiffness  RESPIRATORY: No cough, wheezing, or hemoptysis; No shortness of breath  CARDIOVASCULAR: No chest pain, chest pressure or palpitations  GASTROINTESTINAL: No abdominal or epigastric pain; No nausea, vomiting, or hematemesis; No diarrhea or constipation; No melena or hematochezia  GENITOURINARY: No dysuria, frequency or hematuria  MUSCULOSKELETAL: No joint pain, no muscle pain  NEUROLOGICAL: No numbness or weakness  SKIN: No itching or rashes    VITALS:  T(C): 36.9 (24 @ 04:00), Max: 36.9 (24 @ 04:00)  HR: 115 (24 @ 07:00) (101 - 133)  BP: 121/86 (24 @ 06:00) (76/53 - 144/85)  RR: 16 (24 @ 07:00) (15 - 40)  SpO2: 95% (24 @ 07:00) (89% - 96%)  Wt(kg): --  Daily Height in cm: 165.1 (04 Dec 2024 22:37)    Daily Weight in k.7 (05 Dec 2024 06:00)    PHYSICAL EXAM:  GEN: Not in acute distress  HEENT: EOMI  LUNGS: Dec BS   CARDIOVASCULAR: RRR, S1/S2 present, no murmurs, rubs or gallops, no JVD  ABD: Soft, non-tender, non-distended  EXT: No NADIR  SKIN: Warm  NEURO: AAOx3    LABS:	 	                        13.8   12.48 )-----------( 200      ( 05 Dec 2024 04:05 )             43.4         138  |  102  |  47[H]  ----------------------------<  73  4.4   |  25  |  1.8[H]    Ca    8.9      05 Dec 2024 04:05  Mg     2.0         TPro  5.9[L]  /  Alb  3.9  /  TBili  0.5  /  DBili  x   /  AST  18  /  ALT  23  /  AlkPhos  55          PT/INR - ( 05 Dec 2024 04:05 )   PT: 9.90 sec;   INR: 0.84 ratio         PTT - ( 05 Dec 2024 04:05 )  PTT:44.3 sec    Intake and Output:    24 @ 07:01  -  24 @ 07:00  --------------------------------------------------------  IN: 2388.2 mL / OUT: 1570 mL / NET: 818.2 mL        CARDIAC MARKERS:  Troponin T, High Sensitivity Result: 30 ng/L (24 @ 17:00)      TELEMETRY EVENTS:    ECG:    RADIOLOGY:    OTHER:    Echocardiogram:  TTE  CONCLUSIONS:  1. Technically difficult study.  2. Left ventricular systolic function is normal with an ejection fraction visually estimated at 60 to 65 %.  3. Moderate (grade 2) left ventricular diastolic dysfunction.  4. Left atrial enlargement.  5. Mild aortic stenosis.  6. Anterior mitral leaflet prolapse / possible partial flail.  7. Moderate-to-severe mitral regurgitation. The mitral regurgitant jet is horizontally-directed.  8. Mild tricuspid regurgitation.  9. Borderline pulmonary hypertension.  10. Compared to the transthoracic echocardiogram performed on 2023, mitral regurgitation is new.  11. Recommend JASMINA for further evaluation.    < from: Transesophageal Echocardiogram (10.18.24 @ 08:15) >   1. Left ventricular ejection fraction, by visual estimation, is 55 to   60%.   2. Normal global left ventricular systolic function.   3. Left atrial enlargement.   4. P2 flail. Severe mitral regurgitation. Eccentric, anterior-directed   jet.   5. Mild tricuspid regurgitation.    < end of copied text >      Catheterization:    Stress Test: 	    Home Medications:  Aspir 81 oral delayed release tablet: 1 tab(s) orally once a day (31 Oct 2024 09:30)  Breo Ellipta 200 mcg-25 mcg/inh inhalation powder: 1 inhaled once a day (31 Oct 2024 09:30)  Crestor 20 mg oral tablet: 1 tab(s) orally once a day (at bedtime) (31 Oct 2024 09:30)  gabapentin 300 mg oral capsule: 1 cap(s) orally 3 times a day (31 Oct 2024 09:30)  HYDROcodone 10 mg oral capsule, extended release: 1 cap(s) orally 2 times a day (31 Oct 2024 09:30)  Jardiance 25 mg oral tablet: 1 tab(s) orally once a day (31 Oct 2024 09:30)  losartan-hydrochlorothiazide 100mg-12.5mg oral tablet: orally 2 times a day (31 Oct 2024 09:30)  meloxicam 15 mg oral tablet: 1 tab(s) orally once a day (31 Oct 2024 09:30)  metFORMIN 1000 mg oral tablet, extended release: orally 2 times a day (31 Oct 2024 09:30)  Mounjaro 15 mg/0.5 mL subcutaneous solution: 15 milligram(s) subcutaneously once a week on Thur (31 Oct 2024 09:30)  pioglitazone 15 mg oral tablet: 1 tab(s) orally once a day (31 Oct 2024 09:30)  ProAir HFA 90 mcg/inh inhalation aerosol: 2 puff(s) inhaled 2 times a day (31 Oct 2024 09:30)  tujeo: 80 unit(s) subcutaneous once a day (31 Oct 2024 09:30)    MEDICATIONS  (STANDING):  aMIOdarone Infusion 0.5 mG/Min (16.7 mL/Hr) IV Continuous <Continuous>  aspirin enteric coated 81 milliGRAM(s) Oral daily  bisacodyl Suppository 10 milliGRAM(s) Rectal once  dextrose 5%. 1000 milliLiter(s) (50 mL/Hr) IV Continuous <Continuous>  dextrose 5%. 1000 milliLiter(s) (100 mL/Hr) IV Continuous <Continuous>  dextrose 50% Injectable 25 Gram(s) IV Push once  dextrose 50% Injectable 12.5 Gram(s) IV Push once  dextrose 50% Injectable 25 Gram(s) IV Push once  diltiazem    Tablet 30 milliGRAM(s) Oral every 6 hours  fluticasone propionate/ salmeterol 250-50 MICROgram(s) Diskus 1 Dose(s) Inhalation two times a day  gabapentin 300 milliGRAM(s) Oral three times a day  glucagon  Injectable 1 milliGRAM(s) IntraMuscular once  heparin  Infusion 1000 Unit(s)/Hr (15 mL/Hr) IV Continuous <Continuous>  insulin glargine Injectable (LANTUS) 80 Unit(s) SubCutaneous every morning  insulin lispro (ADMELOG) corrective regimen sliding scale   SubCutaneous three times a day before meals  insulin lispro (ADMELOG) corrective regimen sliding scale   SubCutaneous at bedtime  insulin lispro Injectable (ADMELOG) 5 Unit(s) SubCutaneous three times a day before meals  mupirocin 2% Nasal 1 Application(s) Both Nostrils two times a day  pantoprazole    Tablet 40 milliGRAM(s) Oral before breakfast  rosuvastatin 20 milliGRAM(s) Oral at bedtime  sodium chloride 0.9% lock flush 3 milliLiter(s) IV Push every 8 hours    MEDICATIONS  (PRN):  acetaminophen   IVPB .. 1000 milliGRAM(s) IV Intermittent once PRN Moderate Pain (4 - 6)  albuterol    90 MICROgram(s) HFA Inhaler 2 Puff(s) Inhalation every 6 hours PRN Shortness of Breath and/or Wheezing  dextrose Oral Gel 15 Gram(s) Oral once PRN Blood Glucose LESS THAN 70 milliGRAM(s)/deciliter  oxyCODONE    IR 10 milliGRAM(s) Oral every 6 hours PRN Severe Pain (7 - 10)         Date of Admission: 24    HISTORY OF PRESENT ILLNESS:     64-year-old morbidly obese male non smoker with past medical history of hypertension, DM, peripheral neuropathy, sciatica, bilateral hip replacements, degenerative arthritis, , BPH (nocturia x 6), asthma, JIMMY (on CPAP), HLD, Madelung's disease, and mitral valve regurgitation, was sent to ED from CTS office due to new onset of afib with rapid ventricular response.  Patient reports recent outpatient cardiology work up for recent progression of MR.. Had recent cath and JASMINA which demonstrated NO significant CAD and severe MR from flail P2 with EF of 55-60%. Work up revealed he is not a candidate for clipping and minimally invasive approach not feasible due to body habitus. Patient denies chest pain an leg swelling but complaints of dyspnea with minimal exertion. Patient ambulates with a can, needs both knees replaced and reop on right hip.  Denies any fever, chills, nausea, vomiting, CP, changes in urination, or changes in bowel movements. Was seen by dentist 3 weeks ago and everything was fine. HR now 130.  (03 Dec 2024 18:17)    PAST MEDICAL & SURGICAL HISTORY  Diabetes  20 yrs    HTN (hypertension)    Obstructive sleep apnea on CPAP    Asthma    Obesity    Peripheral neuropathy  related to diabetes    Arthritis    Hypercholesteremia    Madelung's disease    H/O lipoma  times 2-3    History of hip replacement, total, bilateral  two separate times '17, 07    Fracture of orbital floor    Encounter for screening colonoscopy        FAMILY HISTORY:  Family history of diabetes mellitus (Mother, Sibling)    CAD (coronary artery disease) (Mother, Father)    SOCIAL HISTORY:   - Non-smoker    REVIEW OF SYMPTOMS:  CONSTITUTIONAL: No fevers or chills.  EYES: No visual changes, eye pain, or discharge  ENT: No vertigo; No ear pain or change in hearing; No sore throat or difficulty swallowing  NECK: No pain or stiffness  RESPIRATORY: Shortness of breath  CARDIOVASCULAR: Palpitations  GASTROINTESTINAL: No abdominal or epigastric pain; No nausea, vomiting, or hematemesis; No diarrhea or constipation; No melena or hematochezia  GENITOURINARY: No dysuria, frequency or hematuria  MUSCULOSKELETAL: No joint pain, no muscle pain  NEUROLOGICAL: Weakness  SKIN: No itching or rashes    VITALS:  T(C): 36.9 (24 @ 04:00), Max: 36.9 (24 @ 04:00)  HR: 115 (24 @ 07:00) (101 - 133)  BP: 121/86 (24 @ 06:00) (76/53 - 144/85)  RR: 16 (24 @ 07:00) (15 - 40)  SpO2: 95% (24 @ 07:00) (89% - 96%)  Wt(kg): --  Daily Height in cm: 165.1 (04 Dec 2024 22:37)    Daily Weight in k.7 (05 Dec 2024 06:00)    PHYSICAL EXAM:  GEN: Not in acute distress  HEENT: EOMI  LUNGS: Dec BS   CARDIOVASCULAR: Irregular. 3/6 systolic murmur  ABD: Soft, non-tender  EXT: No NADIR  SKIN: Warm  NEURO: AAOx3    LABS:	 	                        13.8   12.48 )-----------( 200      ( 05 Dec 2024 04:05 )             43.4         138  |  102  |  47[H]  ----------------------------<  73  4.4   |  25  |  1.8[H]    Ca    8.9      05 Dec 2024 04:05  Mg     2.0         TPro  5.9[L]  /  Alb  3.9  /  TBili  0.5  /  DBili  x   /  AST  18  /  ALT  23  /  AlkPhos  55  12-        PT/INR - ( 05 Dec 2024 04:05 )   PT: 9.90 sec;   INR: 0.84 ratio         PTT - ( 05 Dec 2024 04:05 )  PTT:44.3 sec    Intake and Output:    24 @ 07:01  -  24 @ 07:00  --------------------------------------------------------  IN: 2388.2 mL / OUT: 1570 mL / NET: 818.2 mL        CARDIAC MARKERS:  Troponin T, High Sensitivity Result: 30 ng/L (24 @ 17:00)      TELEMETRY EVENTS:    ECG:    RADIOLOGY:    OTHER:    Echocardiogram:  TTE  CONCLUSIONS:  1. Technically difficult study.  2. Left ventricular systolic function is normal with an ejection fraction visually estimated at 60 to 65 %.  3. Moderate (grade 2) left ventricular diastolic dysfunction.  4. Left atrial enlargement.  5. Mild aortic stenosis.  6. Anterior mitral leaflet prolapse / possible partial flail.  7. Moderate-to-severe mitral regurgitation. The mitral regurgitant jet is horizontally-directed.  8. Mild tricuspid regurgitation.  9. Borderline pulmonary hypertension.  10. Compared to the transthoracic echocardiogram performed on 2023, mitral regurgitation is new.  11. Recommend JASMINA for further evaluation.    < from: Transesophageal Echocardiogram (10.18.24 @ 08:15) >   1. Left ventricular ejection fraction, by visual estimation, is 55 to   60%.   2. Normal global left ventricular systolic function.   3. Left atrial enlargement.   4. P2 flail. Severe mitral regurgitation. Eccentric, anterior-directed   jet.   5. Mild tricuspid regurgitation.    < end of copied text >      Catheterization:    Stress Test: 	    Home Medications:  Aspir 81 oral delayed release tablet: 1 tab(s) orally once a day (31 Oct 2024 09:30)  Breo Ellipta 200 mcg-25 mcg/inh inhalation powder: 1 inhaled once a day (31 Oct 2024 09:30)  Crestor 20 mg oral tablet: 1 tab(s) orally once a day (at bedtime) (31 Oct 2024 09:30)  gabapentin 300 mg oral capsule: 1 cap(s) orally 3 times a day (31 Oct 2024 09:30)  HYDROcodone 10 mg oral capsule, extended release: 1 cap(s) orally 2 times a day (31 Oct 2024 09:30)  Jardiance 25 mg oral tablet: 1 tab(s) orally once a day (31 Oct 2024 09:30)  losartan-hydrochlorothiazide 100mg-12.5mg oral tablet: orally 2 times a day (31 Oct 2024 09:30)  meloxicam 15 mg oral tablet: 1 tab(s) orally once a day (31 Oct 2024 09:30)  metFORMIN 1000 mg oral tablet, extended release: orally 2 times a day (31 Oct 2024 09:30)  Mounjaro 15 mg/0.5 mL subcutaneous solution: 15 milligram(s) subcutaneously once a week on Thur (31 Oct 2024 09:30)  pioglitazone 15 mg oral tablet: 1 tab(s) orally once a day (31 Oct 2024 09:30)  ProAir HFA 90 mcg/inh inhalation aerosol: 2 puff(s) inhaled 2 times a day (31 Oct 2024 09:30)  tujeo: 80 unit(s) subcutaneous once a day (31 Oct 2024 09:30)    MEDICATIONS  (STANDING):  aMIOdarone Infusion 0.5 mG/Min (16.7 mL/Hr) IV Continuous <Continuous>  aspirin enteric coated 81 milliGRAM(s) Oral daily  bisacodyl Suppository 10 milliGRAM(s) Rectal once  dextrose 5%. 1000 milliLiter(s) (50 mL/Hr) IV Continuous <Continuous>  dextrose 5%. 1000 milliLiter(s) (100 mL/Hr) IV Continuous <Continuous>  dextrose 50% Injectable 25 Gram(s) IV Push once  dextrose 50% Injectable 12.5 Gram(s) IV Push once  dextrose 50% Injectable 25 Gram(s) IV Push once  diltiazem    Tablet 30 milliGRAM(s) Oral every 6 hours  fluticasone propionate/ salmeterol 250-50 MICROgram(s) Diskus 1 Dose(s) Inhalation two times a day  gabapentin 300 milliGRAM(s) Oral three times a day  glucagon  Injectable 1 milliGRAM(s) IntraMuscular once  heparin  Infusion 1000 Unit(s)/Hr (15 mL/Hr) IV Continuous <Continuous>  insulin glargine Injectable (LANTUS) 80 Unit(s) SubCutaneous every morning  insulin lispro (ADMELOG) corrective regimen sliding scale   SubCutaneous three times a day before meals  insulin lispro (ADMELOG) corrective regimen sliding scale   SubCutaneous at bedtime  insulin lispro Injectable (ADMELOG) 5 Unit(s) SubCutaneous three times a day before meals  mupirocin 2% Nasal 1 Application(s) Both Nostrils two times a day  pantoprazole    Tablet 40 milliGRAM(s) Oral before breakfast  rosuvastatin 20 milliGRAM(s) Oral at bedtime  sodium chloride 0.9% lock flush 3 milliLiter(s) IV Push every 8 hours    MEDICATIONS  (PRN):  acetaminophen   IVPB .. 1000 milliGRAM(s) IV Intermittent once PRN Moderate Pain (4 - 6)  albuterol    90 MICROgram(s) HFA Inhaler 2 Puff(s) Inhalation every 6 hours PRN Shortness of Breath and/or Wheezing  dextrose Oral Gel 15 Gram(s) Oral once PRN Blood Glucose LESS THAN 70 milliGRAM(s)/deciliter  oxyCODONE    IR 10 milliGRAM(s) Oral every 6 hours PRN Severe Pain (7 - 10)

## 2024-12-05 NOTE — PROGRESS NOTE ADULT - SUBJECTIVE AND OBJECTIVE BOX
OPERATIVE PROCEDURE(s):  Pre-op MVR, AFIB with RVR                       SURGEON(s): ISELA Blakely    HPI:   64-year-old morbidly obese male non smoker with past medical history of hypertension, DM, peripheral neuropathy, sciatica, bilateral hip replacements, degenerative arthritis, , BPH (nocturia x 6), asthma, JIMMY (on CPAP), HLD, Madelung's disease, and mitral valve regurgitation, was sent to ED from CTS office due to new onset of afib with rapid ventricular response.  Patient reports recent outpatient cardiology work up for recent progression of MR.. Had recent cath and JASMINA which demonstrated NO significant CAD and severe MR from flail P2 with EF of 55-60%. Work up revealed he is not a candidate for clipping and minimally invasive approach not feasible due to body habitus. Patient denies chest pain an leg swelling but complaints of dyspnea with minimal exertion. Patient ambulates with a can, needs both knees replaced and reop on right hip.  Denies any fever, chills, nausea, vomiting, CP, changes in urination, or changes in bowel movements. Was seen by dentist 3 weeks ago and everything was fine. HR now 130.  (03 Dec 2024 18:17)      SUBJECTIVE ASSESSMENT:64yMale patient seen and examined at bedside.    Vital Signs Last 24 Hrs  T(F): 98.4 (05 Dec 2024 04:00), Max: 98.4 (05 Dec 2024 04:00)  HR: 115 (05 Dec 2024 07:00) (101 - 133)  BP: 121/86 (05 Dec 2024 06:00) (76/53 - 144/85)  BP(mean): 99 (05 Dec 2024 06:00) (61 - 102)  ABP: --  ABP(mean): --  RR: 16 (05 Dec 2024 07:00) (15 - 40)  SpO2: 95% (05 Dec 2024 07:00) (89% - 96%)  CVP(mm Hg): --  CVP(cm H2O): --  CO: --  CI: --  PA: --  SVR: --    I&O's Detail    04 Dec 2024 07:01  -  05 Dec 2024 07:00  --------------------------------------------------------  IN:    Amiodarone: 399.6 mL    Amiodarone: 399.6 mL    Heparin: 209 mL    Oral Fluid: 1380 mL  Total IN: 2388.2 mL    OUT:    Voided (mL): 1570 mL  Total OUT: 1570 mL        Net: I&O's Detail    03 Dec 2024 07:01  -  04 Dec 2024 07:00  --------------------------------------------------------  Total NET: 593 mL      04 Dec 2024 07:01  -  05 Dec 2024 07:00  --------------------------------------------------------  Total NET: 818.2 mL        CAPILLARY BLOOD GLUCOSE      POCT Blood Glucose.: 121 mg/dL (05 Dec 2024 06:41)  POCT Blood Glucose.: 117 mg/dL (04 Dec 2024 20:02)  POCT Blood Glucose.: 91 mg/dL (04 Dec 2024 16:44)  POCT Blood Glucose.: 101 mg/dL (04 Dec 2024 11:33)    A1C with Estimated Average Glucose Result: 7.1 % (12-04-24 @ 04:45)      Physical Exam:  General: NAD; A&Ox3  Cardiac: S1/S2, RRR, no murmur, no rubs  Lungs: unlabored respirations, CTA b/l, no wheeze, no rales, no crackles  Abdomen: Soft/NT/ND; positive bowel sounds x 4  Sternum: Intact, no click, incision healing well with no drainage  Incisions: Incisions clean/dry/intact  Extremities: No edema b/l lower extremities; good capillary refill; no cyanosis; palpable 1+ pedal pulses b/l      LABS:                        13.8[L]  12.48[H] )-----------( 200      ( 05 Dec 2024 04:05 )             43.4                         13.7[L]  12.37[H] )-----------( 205      ( 04 Dec 2024 04:45 )             43.1     12-05    138  |  102  |  47[H]  ----------------------------<  73  4.4   |  25  |  1.8[H]  12-04    140  |  104  |  36[H]  ----------------------------<  78  4.0   |  24  |  1.0    Ca    8.9      05 Dec 2024 04:05  Mg     2.0     12-05    TPro  5.9[L] [6.0 - 8.0]  /  Alb  3.9 [3.5 - 5.2]  /  TBili  0.5 [0.2 - 1.2]  /  DBili  x   /  AST  18 [0 - 41]  /  ALT  23 [0 - 41]  /  AlkPhos  55 [30 - 115]  12-05    PT/INR - ( 05 Dec 2024 04:05 )   PT: ;   INR: 0.84 ratio         PT/INR - ( 04 Dec 2024 04:45 )   PT: ;   INR: 0.85 ratio         PTT - ( 05 Dec 2024 04:05 )  PTT:44.3 sec, PTT - ( 04 Dec 2024 20:27 )  PTT:42.7 sec  Urinalysis Basic - ( 05 Dec 2024 04:05 )    Color: x / Appearance: x / SG: x / pH: x  Gluc: 73 mg/dL / Ketone: x  / Bili: x / Urobili: x   Blood: x / Protein: x / Nitrite: x   Leuk Esterase: x / RBC: x / WBC x   Sq Epi: x / Non Sq Epi: x / Bacteria: x        RADIOLOGY & ADDITIONAL TESTS:  CXR: < from: Xray Chest 1 View- PORTABLE-Routine (12.04.24 @ 04:14) >  impression:    EKG leads overlie thorax, obscuring anatomy.    Support devices: None    Cardiac/ Mediastinum: Stable    Lungs/ Pleura: Stable reticular densities left lung base. No   consolidation or pneumothorax.    Skeletal/soft tissues: Stable    < end of copied text >    EKG: < from: 12 Lead ECG (12.03.24 @ 19:25) >    Ventricular Rate 139 BPM    Atrial Rate 127 BPM    QRS Duration 80 ms    Q-T Interval 276 ms    QTC Calculation(Bazett) 419 ms    R Axis -33 degrees    T Axis 66 degrees    Diagnosis Line Atrial fibrillation with rapid ventricular response  Left axis deviation  Anterior infarct , age undetermined  Abnormal ECG    < end of copied text >    Allergies    No Known Allergies    Intolerances      MEDICATIONS  (STANDING):  aMIOdarone Infusion 0.5 mG/Min (16.7 mL/Hr) IV Continuous <Continuous>  aspirin enteric coated 81 milliGRAM(s) Oral daily  bisacodyl Suppository 10 milliGRAM(s) Rectal once  dextrose 5%. 1000 milliLiter(s) (50 mL/Hr) IV Continuous <Continuous>  dextrose 5%. 1000 milliLiter(s) (100 mL/Hr) IV Continuous <Continuous>  dextrose 50% Injectable 25 Gram(s) IV Push once  dextrose 50% Injectable 12.5 Gram(s) IV Push once  dextrose 50% Injectable 25 Gram(s) IV Push once  diltiazem    Tablet 30 milliGRAM(s) Oral every 6 hours  fluticasone propionate/ salmeterol 250-50 MICROgram(s) Diskus 1 Dose(s) Inhalation two times a day  gabapentin 300 milliGRAM(s) Oral three times a day  glucagon  Injectable 1 milliGRAM(s) IntraMuscular once  heparin  Infusion 1000 Unit(s)/Hr (15 mL/Hr) IV Continuous <Continuous>  insulin glargine Injectable (LANTUS) 80 Unit(s) SubCutaneous every morning  insulin lispro (ADMELOG) corrective regimen sliding scale   SubCutaneous three times a day before meals  insulin lispro (ADMELOG) corrective regimen sliding scale   SubCutaneous at bedtime  insulin lispro Injectable (ADMELOG) 5 Unit(s) SubCutaneous three times a day before meals  mupirocin 2% Nasal 1 Application(s) Both Nostrils two times a day  pantoprazole    Tablet 40 milliGRAM(s) Oral before breakfast  rosuvastatin 20 milliGRAM(s) Oral at bedtime  sodium chloride 0.9% lock flush 3 milliLiter(s) IV Push every 8 hours    MEDICATIONS  (PRN):  acetaminophen   IVPB .. 1000 milliGRAM(s) IV Intermittent once PRN Moderate Pain (4 - 6)  albuterol    90 MICROgram(s) HFA Inhaler 2 Puff(s) Inhalation every 6 hours PRN Shortness of Breath and/or Wheezing  dextrose Oral Gel 15 Gram(s) Oral once PRN Blood Glucose LESS THAN 70 milliGRAM(s)/deciliter  oxyCODONE    IR 10 milliGRAM(s) Oral every 6 hours PRN Severe Pain (7 - 10)    Home Medications:  Aspir 81 oral delayed release tablet: 1 tab(s) orally once a day (31 Oct 2024 09:30)  Breo Ellipta 200 mcg-25 mcg/inh inhalation powder: 1 inhaled once a day (31 Oct 2024 09:30)  Crestor 20 mg oral tablet: 1 tab(s) orally once a day (at bedtime) (31 Oct 2024 09:30)  gabapentin 300 mg oral capsule: 1 cap(s) orally 3 times a day (31 Oct 2024 09:30)  HYDROcodone 10 mg oral capsule, extended release: 1 cap(s) orally 2 times a day (31 Oct 2024 09:30)  Jardiance 25 mg oral tablet: 1 tab(s) orally once a day (31 Oct 2024 09:30)  losartan-hydrochlorothiazide 100mg-12.5mg oral tablet: orally 2 times a day (31 Oct 2024 09:30)  meloxicam 15 mg oral tablet: 1 tab(s) orally once a day (31 Oct 2024 09:30)  metFORMIN 1000 mg oral tablet, extended release: orally 2 times a day (31 Oct 2024 09:30)  Mounjaro 15 mg/0.5 mL subcutaneous solution: 15 milligram(s) subcutaneously once a week on Thur (31 Oct 2024 09:30)  pioglitazone 15 mg oral tablet: 1 tab(s) orally once a day (31 Oct 2024 09:30)  ProAir HFA 90 mcg/inh inhalation aerosol: 2 puff(s) inhaled 2 times a day (31 Oct 2024 09:30)  tujeo: 80 unit(s) subcutaneous once a day (31 Oct 2024 09:30)      Pharmacologic DVT Prophylaxis [x] YES, [] NO: Contraindication:  SCD's: YES b/l    GI Prophylaxis: Protonix 40 mg PO Q24h     Ambulation/Activity Status: Ambulate     Assessment/Plan:  64y Male pre-op MVR with AFIB with RVR   - Case and plan discussed with CTU Intensivist and CT Surgeon - Dr. Blakely/Mauri/Laron/Rian  - Continue CTU supportive care and ongoing plan of care as per continuing CTU rounds.   - Continue DVT/GI prophylaxis  - Incentive Spirometry 10 times an hour  - Continue to advance physical activity as tolerated and continue PT/OT as directed  1. continue ASA, statin   2. AFIB with RVR - continue amiodarone, heparin for ac- ptt at 12 pm; continue with Cardizem 30 mg Q6H   3. Cr 1.8 -- hold Losartan, lasix, celebrex, hctz, metformin   4. COPD/Hypoxia: albuterol, advair nebs Q6H   5. DM/Glucose Control: continue Lantus 80 U and ISS  6. preop MVR- dental consult pending   7. Pt scheduled for JASMINA/Cardioversion with Dr. Paris- cancelled due to pt on GLP1 (Mounjaro) last dose Tuesday 12/3     Social Service Disposition:     OPERATIVE PROCEDURE(s):  Pre-op MVR, AFIB with RVR                       SURGEON(s): ISELA Blakely    HPI:   64-year-old morbidly obese male non smoker with past medical history of hypertension, DM, peripheral neuropathy, sciatica, bilateral hip replacements, degenerative arthritis, , BPH (nocturia x 6), asthma, JIMMY (on CPAP), HLD, Madelung's disease, and mitral valve regurgitation, was sent to ED from CTS office due to new onset of afib with rapid ventricular response.  Patient reports recent outpatient cardiology work up for recent progression of MR.. Had recent cath and JASMINA which demonstrated NO significant CAD and severe MR from flail P2 with EF of 55-60%. Work up revealed he is not a candidate for clipping and minimally invasive approach not feasible due to body habitus. Patient denies chest pain an leg swelling but complaints of dyspnea with minimal exertion. Patient ambulates with a can, needs both knees replaced and reop on right hip.  Denies any fever, chills, nausea, vomiting, CP, changes in urination, or changes in bowel movements. Was seen by dentist 3 weeks ago and everything was fine. HR now 130.  (03 Dec 2024 18:17)      SUBJECTIVE ASSESSMENT:64yMale patient seen and examined at bedside.    Vital Signs Last 24 Hrs  T(F): 98.4 (05 Dec 2024 04:00), Max: 98.4 (05 Dec 2024 04:00)  HR: 115 (05 Dec 2024 07:00) (101 - 133)  BP: 121/86 (05 Dec 2024 06:00) (76/53 - 144/85)  BP(mean): 99 (05 Dec 2024 06:00) (61 - 102)  ABP: --  ABP(mean): --  RR: 16 (05 Dec 2024 07:00) (15 - 40)  SpO2: 95% (05 Dec 2024 07:00) (89% - 96%)  CVP(mm Hg): --  CVP(cm H2O): --  CO: --  CI: --  PA: --  SVR: --    I&O's Detail    04 Dec 2024 07:01  -  05 Dec 2024 07:00  --------------------------------------------------------  IN:    Amiodarone: 399.6 mL    Amiodarone: 399.6 mL    Heparin: 209 mL    Oral Fluid: 1380 mL  Total IN: 2388.2 mL    OUT:    Voided (mL): 1570 mL  Total OUT: 1570 mL        Net: I&O's Detail    03 Dec 2024 07:01  -  04 Dec 2024 07:00  --------------------------------------------------------  Total NET: 593 mL      04 Dec 2024 07:01  -  05 Dec 2024 07:00  --------------------------------------------------------  Total NET: 818.2 mL        CAPILLARY BLOOD GLUCOSE      POCT Blood Glucose.: 121 mg/dL (05 Dec 2024 06:41)  POCT Blood Glucose.: 117 mg/dL (04 Dec 2024 20:02)  POCT Blood Glucose.: 91 mg/dL (04 Dec 2024 16:44)  POCT Blood Glucose.: 101 mg/dL (04 Dec 2024 11:33)    A1C with Estimated Average Glucose Result: 7.1 % (12-04-24 @ 04:45)      Physical Exam:  General: NAD; A&Ox3  Cardiac: S1/S2, RRR, no murmur, no rubs  Lungs: unlabored respirations, CTA b/l, no wheeze, no rales, no crackles  Abdomen: Soft/NT/ND; positive bowel sounds x 4  Sternum: Intact, no click, incision healing well with no drainage  Incisions: Incisions clean/dry/intact  Extremities: No edema b/l lower extremities; good capillary refill; no cyanosis; palpable 1+ pedal pulses b/l      LABS:                        13.8[L]  12.48[H] )-----------( 200      ( 05 Dec 2024 04:05 )             43.4                         13.7[L]  12.37[H] )-----------( 205      ( 04 Dec 2024 04:45 )             43.1     12-05    138  |  102  |  47[H]  ----------------------------<  73  4.4   |  25  |  1.8[H]  12-04    140  |  104  |  36[H]  ----------------------------<  78  4.0   |  24  |  1.0    Ca    8.9      05 Dec 2024 04:05  Mg     2.0     12-05    TPro  5.9[L] [6.0 - 8.0]  /  Alb  3.9 [3.5 - 5.2]  /  TBili  0.5 [0.2 - 1.2]  /  DBili  x   /  AST  18 [0 - 41]  /  ALT  23 [0 - 41]  /  AlkPhos  55 [30 - 115]  12-05    PT/INR - ( 05 Dec 2024 04:05 )   PT: ;   INR: 0.84 ratio         PT/INR - ( 04 Dec 2024 04:45 )   PT: ;   INR: 0.85 ratio         PTT - ( 05 Dec 2024 04:05 )  PTT:44.3 sec, PTT - ( 04 Dec 2024 20:27 )  PTT:42.7 sec  Urinalysis Basic - ( 05 Dec 2024 04:05 )    Color: x / Appearance: x / SG: x / pH: x  Gluc: 73 mg/dL / Ketone: x  / Bili: x / Urobili: x   Blood: x / Protein: x / Nitrite: x   Leuk Esterase: x / RBC: x / WBC x   Sq Epi: x / Non Sq Epi: x / Bacteria: x        RADIOLOGY & ADDITIONAL TESTS:  CXR: < from: Xray Chest 1 View- PORTABLE-Routine (12.04.24 @ 04:14) >  impression:    EKG leads overlie thorax, obscuring anatomy.    Support devices: None    Cardiac/ Mediastinum: Stable    Lungs/ Pleura: Stable reticular densities left lung base. No   consolidation or pneumothorax.    Skeletal/soft tissues: Stable    < end of copied text >    EKG: < from: 12 Lead ECG (12.03.24 @ 19:25) >    Ventricular Rate 139 BPM    Atrial Rate 127 BPM    QRS Duration 80 ms    Q-T Interval 276 ms    QTC Calculation(Bazett) 419 ms    R Axis -33 degrees    T Axis 66 degrees    Diagnosis Line Atrial fibrillation with rapid ventricular response  Left axis deviation  Anterior infarct , age undetermined  Abnormal ECG    < end of copied text >    Allergies    No Known Allergies    Intolerances      MEDICATIONS  (STANDING):  aMIOdarone Infusion 0.5 mG/Min (16.7 mL/Hr) IV Continuous <Continuous>  aspirin enteric coated 81 milliGRAM(s) Oral daily  bisacodyl Suppository 10 milliGRAM(s) Rectal once  dextrose 5%. 1000 milliLiter(s) (50 mL/Hr) IV Continuous <Continuous>  dextrose 5%. 1000 milliLiter(s) (100 mL/Hr) IV Continuous <Continuous>  dextrose 50% Injectable 25 Gram(s) IV Push once  dextrose 50% Injectable 12.5 Gram(s) IV Push once  dextrose 50% Injectable 25 Gram(s) IV Push once  diltiazem    Tablet 30 milliGRAM(s) Oral every 6 hours  fluticasone propionate/ salmeterol 250-50 MICROgram(s) Diskus 1 Dose(s) Inhalation two times a day  gabapentin 300 milliGRAM(s) Oral three times a day  glucagon  Injectable 1 milliGRAM(s) IntraMuscular once  heparin  Infusion 1000 Unit(s)/Hr (15 mL/Hr) IV Continuous <Continuous>  insulin glargine Injectable (LANTUS) 80 Unit(s) SubCutaneous every morning  insulin lispro (ADMELOG) corrective regimen sliding scale   SubCutaneous three times a day before meals  insulin lispro (ADMELOG) corrective regimen sliding scale   SubCutaneous at bedtime  insulin lispro Injectable (ADMELOG) 5 Unit(s) SubCutaneous three times a day before meals  mupirocin 2% Nasal 1 Application(s) Both Nostrils two times a day  pantoprazole    Tablet 40 milliGRAM(s) Oral before breakfast  rosuvastatin 20 milliGRAM(s) Oral at bedtime  sodium chloride 0.9% lock flush 3 milliLiter(s) IV Push every 8 hours    MEDICATIONS  (PRN):  acetaminophen   IVPB .. 1000 milliGRAM(s) IV Intermittent once PRN Moderate Pain (4 - 6)  albuterol    90 MICROgram(s) HFA Inhaler 2 Puff(s) Inhalation every 6 hours PRN Shortness of Breath and/or Wheezing  dextrose Oral Gel 15 Gram(s) Oral once PRN Blood Glucose LESS THAN 70 milliGRAM(s)/deciliter  oxyCODONE    IR 10 milliGRAM(s) Oral every 6 hours PRN Severe Pain (7 - 10)    Home Medications:  Aspir 81 oral delayed release tablet: 1 tab(s) orally once a day (31 Oct 2024 09:30)  Breo Ellipta 200 mcg-25 mcg/inh inhalation powder: 1 inhaled once a day (31 Oct 2024 09:30)  Crestor 20 mg oral tablet: 1 tab(s) orally once a day (at bedtime) (31 Oct 2024 09:30)  gabapentin 300 mg oral capsule: 1 cap(s) orally 3 times a day (31 Oct 2024 09:30)  HYDROcodone 10 mg oral capsule, extended release: 1 cap(s) orally 2 times a day (31 Oct 2024 09:30)  Jardiance 25 mg oral tablet: 1 tab(s) orally once a day (31 Oct 2024 09:30)  losartan-hydrochlorothiazide 100mg-12.5mg oral tablet: orally 2 times a day (31 Oct 2024 09:30)  meloxicam 15 mg oral tablet: 1 tab(s) orally once a day (31 Oct 2024 09:30)  metFORMIN 1000 mg oral tablet, extended release: orally 2 times a day (31 Oct 2024 09:30)  Mounjaro 15 mg/0.5 mL subcutaneous solution: 15 milligram(s) subcutaneously once a week on Thur (31 Oct 2024 09:30)  pioglitazone 15 mg oral tablet: 1 tab(s) orally once a day (31 Oct 2024 09:30)  ProAir HFA 90 mcg/inh inhalation aerosol: 2 puff(s) inhaled 2 times a day (31 Oct 2024 09:30)  tujeo: 80 unit(s) subcutaneous once a day (31 Oct 2024 09:30)      Pharmacologic DVT Prophylaxis [x] YES, [] NO: Contraindication:  SCD's: YES b/l    GI Prophylaxis: Protonix 40 mg PO Q24h     Ambulation/Activity Status: Ambulate     Assessment/Plan:  64y Male pre-op MVR with AFIB with RVR   - Case and plan discussed with CTU Intensivist and CT Surgeon - Dr. Blakely/Mauri/Laron/Rian  - Continue CTU supportive care and ongoing plan of care as per continuing CTU rounds.   - Continue DVT/GI prophylaxis  - Incentive Spirometry 10 times an hour  - Continue to advance physical activity as tolerated and continue PT/OT as directed  1. continue ASA, statin   2. AFIB with RVR - continue amiodarone, load and change to po; add b blocker for rate control and start anticoagulation with eliquis  3. Cr 1.8 -- hold Losartan, lasix, celebrex, hctz, metformin   4. COPD/Hypoxia: albuterol, advair nebs Q6H   5. DM/Glucose Control: continue Lantus 80 U and ISS  6. preop MVR- dental consult pending   7. Pt scheduled for JASMINA/Cardioversion with Dr. Paris- cancelled due to pt on GLP1 (Mounjaro) last dose Tuesday 12/3     Social Service Disposition:  home once creat improves and will plan for sx in 1-2 weeks

## 2024-12-05 NOTE — PHYSICAL THERAPY INITIAL EVALUATION ADULT - SPECIFY REASON(S)
930 am Pt still has bed rest orders, , currently in CTU, pre op  for MV repair. PT will f/u as appropriate.
Kyra LAZARO cleared pt for ambulation (pt is pre op for MVR). Pt however needed to remain within the room due to
855 am Pending PT initial evaluation due to bed rest orders. Will f/u once pt is cleared for ambulation / out of bed activity.

## 2024-12-05 NOTE — CONSULT NOTE ADULT - ASSESSMENT
**INCOMPLETE NOTE**     65 YO M, morbidly obese (BMI 42.4), non smoker, with PMHx of HTN, DM, peripheral neuropathy, sciatica, bilateral hip replacements, degenerative arthritis, BPH, asthma, JIMMY (on CPAP), Madelung's disease was sent in from CTS office for new onset Afib with RVR. He is following with CTS for severe MR due to flail P2.     TTE 2024: EF 60-65%. GIIDD. Mild AS. Mod-severe MVR. Mild TR.  JASMINA 10/18/24: EF 55-60%. Left atrial enlargement. Flail P2. Severe MR, eccentric anterior directed jet. Mild TR.    IMPRESSION  #New onset Afib with RVR  - Currently on Amio gtt and Cardizem 30mg PO Q6  - TSH 2.72  - [id number] 3  #Severe MR, Flail P2  #Morbid obesity; BMI (kg/m2): 42.4  #JIMMY  #HTN  #DM; HbA1C 7.1 % [12-04-24]    63 YO M, morbidly obese (BMI 42.4), non smoker, with PMHx of HTN, DM, peripheral neuropathy, sciatica, bilateral hip replacements, degenerative arthritis, BPH, asthma, JIMMY (on CPAP), Madelung's disease was sent in from CTS office for new onset Afib with RVR. He is following with CTS for severe MR due to flail P2.     TTE 2024: EF 60-65%. GIIDD. Mild AS. Mod-severe MVR. Mild TR.  JASMINA 10/18/24: EF 55-60%. Left atrial enlargement. Flail P2. Severe MR, eccentric anterior directed jet. Mild TR.    IMPRESSION  #New onset Afib with RVR  - Currently on Amio gtt and Cardizem 30mg PO Q6  - TSH 2.72  - [id number] 3  #Severe MR, Flail P2  #FLAKITO  #Morbid obesity; BMI (kg/m2): 42.4  #JIMMY  #HTN  #DM; HbA1C 7.1 % [12-04-24]     PLAN  - Continue Cardizem.  - Switch to Amiodarone 400mg PO BID x 7 days. Then 200mg PO BID x 7 days. Then 200mg PO daily.   - Start metoprolol tartrate 25mg PO BID with holding parameters; hold for SBP < 100mmHg. Increase as tolerated.   - Can be discharged on Eliquis 5mg PO BID.  - FLAKITO management as per primary team  - Monitor electrolytes. Keep K > 4 and Mg > 2.2.     Outpatient follow up with Dr. Proctor.

## 2024-12-06 LAB
ALBUMIN SERPL ELPH-MCNC: 3.5 G/DL — SIGNIFICANT CHANGE UP (ref 3.5–5.2)
ALP SERPL-CCNC: 44 U/L — SIGNIFICANT CHANGE UP (ref 30–115)
ALT FLD-CCNC: 20 U/L — SIGNIFICANT CHANGE UP (ref 0–41)
ANION GAP SERPL CALC-SCNC: 12 MMOL/L — SIGNIFICANT CHANGE UP (ref 7–14)
ANION GAP SERPL CALC-SCNC: 13 MMOL/L — SIGNIFICANT CHANGE UP (ref 7–14)
APPEARANCE UR: CLEAR — SIGNIFICANT CHANGE UP
APTT BLD: 48.8 SEC — HIGH (ref 27–39.2)
AST SERPL-CCNC: 11 U/L — SIGNIFICANT CHANGE UP (ref 0–41)
BASOPHILS # BLD AUTO: 0.03 K/UL — SIGNIFICANT CHANGE UP (ref 0–0.2)
BASOPHILS NFR BLD AUTO: 0.3 % — SIGNIFICANT CHANGE UP (ref 0–1)
BILIRUB SERPL-MCNC: 0.5 MG/DL — SIGNIFICANT CHANGE UP (ref 0.2–1.2)
BILIRUB UR-MCNC: NEGATIVE — SIGNIFICANT CHANGE UP
BUN SERPL-MCNC: 60 MG/DL — HIGH (ref 10–20)
BUN SERPL-MCNC: 61 MG/DL — CRITICAL HIGH (ref 10–20)
CALCIUM SERPL-MCNC: 8 MG/DL — LOW (ref 8.4–10.4)
CALCIUM SERPL-MCNC: 8.2 MG/DL — LOW (ref 8.4–10.5)
CHLORIDE SERPL-SCNC: 102 MMOL/L — SIGNIFICANT CHANGE UP (ref 98–110)
CHLORIDE SERPL-SCNC: 102 MMOL/L — SIGNIFICANT CHANGE UP (ref 98–110)
CO2 SERPL-SCNC: 21 MMOL/L — SIGNIFICANT CHANGE UP (ref 17–32)
CO2 SERPL-SCNC: 21 MMOL/L — SIGNIFICANT CHANGE UP (ref 17–32)
COLOR SPEC: YELLOW — SIGNIFICANT CHANGE UP
CREAT ?TM UR-MCNC: 75 MG/DL — SIGNIFICANT CHANGE UP
CREAT SERPL-MCNC: 1.9 MG/DL — HIGH (ref 0.7–1.5)
CREAT SERPL-MCNC: 2.4 MG/DL — HIGH (ref 0.7–1.5)
DIFF PNL FLD: NEGATIVE — SIGNIFICANT CHANGE UP
EGFR: 29 ML/MIN/1.73M2 — LOW
EGFR: 38 ML/MIN/1.73M2 — LOW
EOSINOPHIL # BLD AUTO: 0.24 K/UL — SIGNIFICANT CHANGE UP (ref 0–0.7)
EOSINOPHIL NFR BLD AUTO: 2.3 % — SIGNIFICANT CHANGE UP (ref 0–8)
GLUCOSE BLDC GLUCOMTR-MCNC: 110 MG/DL — HIGH (ref 70–99)
GLUCOSE BLDC GLUCOMTR-MCNC: 131 MG/DL — HIGH (ref 70–99)
GLUCOSE BLDC GLUCOMTR-MCNC: 180 MG/DL — HIGH (ref 70–99)
GLUCOSE BLDC GLUCOMTR-MCNC: 76 MG/DL — SIGNIFICANT CHANGE UP (ref 70–99)
GLUCOSE SERPL-MCNC: 142 MG/DL — HIGH (ref 70–99)
GLUCOSE SERPL-MCNC: 81 MG/DL — SIGNIFICANT CHANGE UP (ref 70–99)
GLUCOSE UR QL: 500 MG/DL
HCT VFR BLD CALC: 40.1 % — LOW (ref 42–52)
HGB BLD-MCNC: 12.6 G/DL — LOW (ref 14–18)
IMM GRANULOCYTES NFR BLD AUTO: 0.9 % — HIGH (ref 0.1–0.3)
KETONES UR-MCNC: NEGATIVE MG/DL — SIGNIFICANT CHANGE UP
LEUKOCYTE ESTERASE UR-ACNC: NEGATIVE — SIGNIFICANT CHANGE UP
LYMPHOCYTES # BLD AUTO: 1.89 K/UL — SIGNIFICANT CHANGE UP (ref 1.2–3.4)
LYMPHOCYTES # BLD AUTO: 17.9 % — LOW (ref 20.5–51.1)
MAGNESIUM SERPL-MCNC: 2.3 MG/DL — SIGNIFICANT CHANGE UP (ref 1.8–2.4)
MAGNESIUM SERPL-MCNC: 2.3 MG/DL — SIGNIFICANT CHANGE UP (ref 1.8–2.4)
MCHC RBC-ENTMCNC: 28.4 PG — SIGNIFICANT CHANGE UP (ref 27–31)
MCHC RBC-ENTMCNC: 31.4 G/DL — LOW (ref 32–37)
MCV RBC AUTO: 90.3 FL — SIGNIFICANT CHANGE UP (ref 80–94)
MONOCYTES # BLD AUTO: 0.93 K/UL — HIGH (ref 0.1–0.6)
MONOCYTES NFR BLD AUTO: 8.8 % — SIGNIFICANT CHANGE UP (ref 1.7–9.3)
NEUTROPHILS # BLD AUTO: 7.35 K/UL — HIGH (ref 1.4–6.5)
NEUTROPHILS NFR BLD AUTO: 69.8 % — SIGNIFICANT CHANGE UP (ref 42.2–75.2)
NITRITE UR-MCNC: NEGATIVE — SIGNIFICANT CHANGE UP
NRBC # BLD: 0 /100 WBCS — SIGNIFICANT CHANGE UP (ref 0–0)
OSMOLALITY UR: 485 MOS/KG — SIGNIFICANT CHANGE UP (ref 50–1200)
PH UR: 5.5 — SIGNIFICANT CHANGE UP (ref 5–8)
PLATELET # BLD AUTO: 165 K/UL — SIGNIFICANT CHANGE UP (ref 130–400)
PMV BLD: 11.4 FL — HIGH (ref 7.4–10.4)
POTASSIUM SERPL-MCNC: 4.4 MMOL/L — SIGNIFICANT CHANGE UP (ref 3.5–5)
POTASSIUM SERPL-MCNC: 4.7 MMOL/L — SIGNIFICANT CHANGE UP (ref 3.5–5)
POTASSIUM SERPL-SCNC: 4.4 MMOL/L — SIGNIFICANT CHANGE UP (ref 3.5–5)
POTASSIUM SERPL-SCNC: 4.7 MMOL/L — SIGNIFICANT CHANGE UP (ref 3.5–5)
PROT ?TM UR-MCNC: 20 MG/DLG/24H — SIGNIFICANT CHANGE UP
PROT SERPL-MCNC: 5.7 G/DL — LOW (ref 6–8)
PROT UR-MCNC: 30 MG/DL
PROT/CREAT UR-RTO: 0.3 RATIO — HIGH (ref 0–0.2)
RBC # BLD: 4.44 M/UL — LOW (ref 4.7–6.1)
RBC # FLD: 16 % — HIGH (ref 11.5–14.5)
SODIUM SERPL-SCNC: 135 MMOL/L — SIGNIFICANT CHANGE UP (ref 135–146)
SODIUM SERPL-SCNC: 136 MMOL/L — SIGNIFICANT CHANGE UP (ref 135–146)
SODIUM UR-SCNC: 38 MMOL/L — SIGNIFICANT CHANGE UP
SP GR SPEC: 1.02 — SIGNIFICANT CHANGE UP (ref 1–1.03)
UROBILINOGEN FLD QL: 0.2 MG/DL — SIGNIFICANT CHANGE UP (ref 0.2–1)
WBC # BLD: 10.53 K/UL — SIGNIFICANT CHANGE UP (ref 4.8–10.8)
WBC # FLD AUTO: 10.53 K/UL — SIGNIFICANT CHANGE UP (ref 4.8–10.8)

## 2024-12-06 PROCEDURE — 93880 EXTRACRANIAL BILAT STUDY: CPT | Mod: 26

## 2024-12-06 PROCEDURE — 76770 US EXAM ABDO BACK WALL COMP: CPT | Mod: 26

## 2024-12-06 PROCEDURE — 71045 X-RAY EXAM CHEST 1 VIEW: CPT | Mod: 26

## 2024-12-06 PROCEDURE — 99233 SBSQ HOSP IP/OBS HIGH 50: CPT

## 2024-12-06 PROCEDURE — 93010 ELECTROCARDIOGRAM REPORT: CPT

## 2024-12-06 RX ORDER — METOPROLOL TARTRATE 100 MG/1
25 TABLET, FILM COATED ORAL ONCE
Refills: 0 | Status: COMPLETED | OUTPATIENT
Start: 2024-12-06 | End: 2024-12-06

## 2024-12-06 RX ORDER — HEPARIN SODIUM,PORCINE 1000/ML
1300 VIAL (ML) INJECTION
Qty: 25000 | Refills: 0 | Status: DISCONTINUED | OUTPATIENT
Start: 2024-12-06 | End: 2024-12-08

## 2024-12-06 RX ORDER — 0.9 % SODIUM CHLORIDE 0.9 %
1000 INTRAVENOUS SOLUTION INTRAVENOUS
Refills: 0 | Status: DISCONTINUED | OUTPATIENT
Start: 2024-12-06 | End: 2024-12-10

## 2024-12-06 RX ORDER — DIGOXIN 250 MCG
500 TABLET ORAL ONCE
Refills: 0 | Status: COMPLETED | OUTPATIENT
Start: 2024-12-06 | End: 2024-12-06

## 2024-12-06 RX ORDER — INSULIN GLARGINE 100 [IU]/ML
60 INJECTION, SOLUTION SUBCUTANEOUS EVERY MORNING
Refills: 0 | Status: DISCONTINUED | OUTPATIENT
Start: 2024-12-06 | End: 2024-12-10

## 2024-12-06 RX ORDER — CHLORHEXIDINE GLUCONATE 1.2 MG/ML
1 RINSE ORAL
Refills: 0 | Status: DISCONTINUED | OUTPATIENT
Start: 2024-12-06 | End: 2024-12-10

## 2024-12-06 RX ORDER — AMIODARONE HYDROCHLORIDE 200 MG/1
1 TABLET ORAL
Qty: 450 | Refills: 0 | Status: DISCONTINUED | OUTPATIENT
Start: 2024-12-06 | End: 2024-12-07

## 2024-12-06 RX ORDER — TAMSULOSIN HYDROCHLORIDE 0.4 MG/1
0.4 CAPSULE ORAL AT BEDTIME
Refills: 0 | Status: DISCONTINUED | OUTPATIENT
Start: 2024-12-06 | End: 2024-12-10

## 2024-12-06 RX ADMIN — AMIODARONE HYDROCHLORIDE 33.3 MG/MIN: 200 TABLET ORAL at 22:16

## 2024-12-06 RX ADMIN — Medication 13 UNIT(S)/HR: at 12:00

## 2024-12-06 RX ADMIN — Medication 1 APPLICATION(S): at 17:41

## 2024-12-06 RX ADMIN — Medication 5 UNIT(S): at 11:59

## 2024-12-06 RX ADMIN — AMIODARONE HYDROCHLORIDE 400 MILLIGRAM(S): 200 TABLET ORAL at 05:17

## 2024-12-06 RX ADMIN — Medication 0: at 12:00

## 2024-12-06 RX ADMIN — GABAPENTIN 300 MILLIGRAM(S): 300 CAPSULE ORAL at 14:05

## 2024-12-06 RX ADMIN — SODIUM CHLORIDE 3 MILLILITER(S): 9 INJECTION, SOLUTION INTRAMUSCULAR; INTRAVENOUS; SUBCUTANEOUS at 06:26

## 2024-12-06 RX ADMIN — OXYCODONE HYDROCHLORIDE 10 MILLIGRAM(S): 30 TABLET ORAL at 23:14

## 2024-12-06 RX ADMIN — Medication 1 APPLICATION(S): at 06:28

## 2024-12-06 RX ADMIN — ROSUVASTATIN CALCIUM 20 MILLIGRAM(S): 5 TABLET, FILM COATED ORAL at 21:30

## 2024-12-06 RX ADMIN — INSULIN GLARGINE 60 UNIT(S): 100 INJECTION, SOLUTION SUBCUTANEOUS at 11:56

## 2024-12-06 RX ADMIN — CHLORHEXIDINE GLUCONATE 1 APPLICATION(S): 1.2 RINSE ORAL at 12:04

## 2024-12-06 RX ADMIN — Medication 520 MILLIGRAM(S): at 12:02

## 2024-12-06 RX ADMIN — GABAPENTIN 300 MILLIGRAM(S): 300 CAPSULE ORAL at 05:17

## 2024-12-06 RX ADMIN — Medication 5 UNIT(S): at 16:51

## 2024-12-06 RX ADMIN — Medication 81 MILLIGRAM(S): at 11:55

## 2024-12-06 RX ADMIN — Medication 500 MICROGRAM(S): at 12:54

## 2024-12-06 RX ADMIN — GABAPENTIN 300 MILLIGRAM(S): 300 CAPSULE ORAL at 21:29

## 2024-12-06 RX ADMIN — AMIODARONE HYDROCHLORIDE 33.3 MG/MIN: 200 TABLET ORAL at 14:03

## 2024-12-06 RX ADMIN — Medication 2: at 16:52

## 2024-12-06 RX ADMIN — METOPROLOL TARTRATE 25 MILLIGRAM(S): 100 TABLET, FILM COATED ORAL at 14:04

## 2024-12-06 RX ADMIN — TAMSULOSIN HYDROCHLORIDE 0.4 MILLIGRAM(S): 0.4 CAPSULE ORAL at 21:29

## 2024-12-06 RX ADMIN — Medication 50 MILLILITER(S): at 06:28

## 2024-12-06 RX ADMIN — FLUTICASONE PROPIONATE AND SALMETEROL XINAFOATE 1 DOSE(S): 45; 21 AEROSOL, METERED RESPIRATORY (INHALATION) at 21:39

## 2024-12-06 RX ADMIN — SODIUM CHLORIDE 3 MILLILITER(S): 9 INJECTION, SOLUTION INTRAMUSCULAR; INTRAVENOUS; SUBCUTANEOUS at 14:00

## 2024-12-06 RX ADMIN — PANTOPRAZOLE SODIUM 40 MILLIGRAM(S): 40 TABLET, DELAYED RELEASE ORAL at 05:16

## 2024-12-06 RX ADMIN — Medication 500 MICROGRAM(S): at 20:45

## 2024-12-06 NOTE — CONSULT NOTE ADULT - SUBJECTIVE AND OBJECTIVE BOX
Patient is a 64y old  Male who presents with a chief complaint of New onset of atrial fibrillation with rapid ventricular response in setting of severe MR (06 Dec 2024 08:53)      HPI:   64-year-old morbidly obese male non smoker with past medical history of hypertension, DM, peripheral neuropathy, sciatica, bilateral hip replacements, degenerative arthritis, , BPH (nocturia x 6), asthma, JIMMY (on CPAP), HLD, Madelung's disease, and mitral valve regurgitation, was sent to ED from CTS office due to new onset of afib with rapid ventricular response.  Patient reports recent outpatient cardiology work up for recent progression of MR.. Had recent cath and JASMINA which demonstrated NO significant CAD and severe MR from flail P2 with EF of 55-60%. Work up revealed he is not a candidate for clipping and minimally invasive approach not feasible due to body habitus. Patient denies chest pain an leg swelling but complaints of dyspnea with minimal exertion. Patient ambulates with a can, needs both knees replaced and reop on right hip.  Denies any fever, chills, nausea, vomiting, CP, changes in urination, or changes in bowel movements. Was seen by dentist 3 weeks ago and everything was fine. HR now 130.  (03 Dec 2024 18:17)      PAST MEDICAL & SURGICAL HISTORY:  Diabetes  20 yrs      HTN (hypertension)      Obstructive sleep apnea on CPAP      Asthma      Obesity      Peripheral neuropathy  related to diabetes      Arthritis      Hypercholesteremia      Madelung's disease      H/O lipoma  times 2-3      History of hip replacement, total, bilateral  two separate times '17, '07      Fracture of orbital floor      Encounter for screening colonoscopy        ( - ) heart valve replacement  ( - ) joint replacement  ( - ) pregnancy    MEDICATIONS  (STANDING):  aMIOdarone    Tablet 400 milliGRAM(s) Oral every 12 hours  aMIOdarone Infusion 0.5 mG/Min (16.7 mL/Hr) IV Continuous <Continuous>  aspirin enteric coated 81 milliGRAM(s) Oral daily  bisacodyl Suppository 10 milliGRAM(s) Rectal once  chlorhexidine 2% Cloths 1 Application(s) Topical <User Schedule>  dextrose 5%. 1000 milliLiter(s) (50 mL/Hr) IV Continuous <Continuous>  dextrose 5%. 1000 milliLiter(s) (100 mL/Hr) IV Continuous <Continuous>  dextrose 50% Injectable 25 Gram(s) IV Push once  dextrose 50% Injectable 12.5 Gram(s) IV Push once  dextrose 50% Injectable 25 Gram(s) IV Push once  diltiazem    milliGRAM(s) Oral daily  fluticasone propionate/ salmeterol 250-50 MICROgram(s) Diskus 1 Dose(s) Inhalation two times a day  gabapentin 300 milliGRAM(s) Oral three times a day  glucagon  Injectable 1 milliGRAM(s) IntraMuscular once  heparin  Infusion 1300 Unit(s)/Hr (13 mL/Hr) IV Continuous <Continuous>  insulin glargine Injectable (LANTUS) 60 Unit(s) SubCutaneous every morning  insulin lispro (ADMELOG) corrective regimen sliding scale   SubCutaneous three times a day before meals  insulin lispro (ADMELOG) corrective regimen sliding scale   SubCutaneous at bedtime  insulin lispro Injectable (ADMELOG) 5 Unit(s) SubCutaneous three times a day before meals  ipratropium    for Nebulization 500 MICROGram(s) Nebulizer every 6 hours  lactated ringers. 1000 milliLiter(s) (50 mL/Hr) IV Continuous <Continuous>  metoprolol tartrate 25 milliGRAM(s) Oral every 12 hours  mupirocin 2% Nasal 1 Application(s) Both Nostrils two times a day  pantoprazole    Tablet 40 milliGRAM(s) Oral before breakfast  rosuvastatin 20 milliGRAM(s) Oral at bedtime  sodium chloride 0.9% lock flush 3 milliLiter(s) IV Push every 8 hours    MEDICATIONS  (PRN):  acetaminophen   IVPB .. 1000 milliGRAM(s) IV Intermittent once PRN Moderate Pain (4 - 6)  dextrose Oral Gel 15 Gram(s) Oral once PRN Blood Glucose LESS THAN 70 milliGRAM(s)/deciliter  oxyCODONE    IR 10 milliGRAM(s) Oral every 6 hours PRN Severe Pain (7 - 10)      Allergies    No Known Allergies    Intolerances        FAMILY HISTORY:  Family history of diabetes mellitus (Mother, Sibling)    CAD (coronary artery disease) (Mother, Father)        *SOCIAL HISTORY: (   ) Tobacco; (   ) ETOH    *Last Dental Visit:    Vital Signs Last 24 Hrs  T(C): 36.4 (06 Dec 2024 04:00), Max: 36.8 (05 Dec 2024 12:00)  T(F): 97.5 (06 Dec 2024 04:00), Max: 98.3 (05 Dec 2024 12:00)  HR: 99 (06 Dec 2024 09:00) (79 - 99)  BP: 141/70 (06 Dec 2024 08:00) (88/60 - 141/70)  BP(mean): 100 (06 Dec 2024 08:00) (72 - 100)  RR: 15 (06 Dec 2024 08:00) (14 - 32)  SpO2: 93% (06 Dec 2024 08:00) (93% - 98%)    Parameters below as of 06 Dec 2024 08:00  Patient On (Oxygen Delivery Method): nasal cannula        LABS:                        12.6   10.53 )-----------( 165      ( 06 Dec 2024 04:40 )             40.1     12-06    135  |  102  |  61[HH]  ----------------------------<  81  4.4   |  21  |  2.4[H]    Ca    8.0[L]      06 Dec 2024 04:40  Mg     2.3     12-06    TPro  5.7[L]  /  Alb  3.5  /  TBili  0.5  /  DBili  x   /  AST  11  /  ALT  20  /  AlkPhos  44  12-06    WBC Count: 10.53 K/uL [4.80 - 10.80] (12-06 @ 04:40)  Platelet Count - Automated: 165 K/uL [130 - 400] (12-06 @ 04:40)  INR: 0.84 ratio [0.65 - 1.30] (12-05 @ 04:05)  WBC Count: 12.48 K/uL *H* [4.80 - 10.80] (12-05 @ 04:05)  Platelet Count - Automated: 200 K/uL [130 - 400] (12-05 @ 04:05)  INR: 0.85 ratio [0.65 - 1.30] (12-04 @ 04:45)  WBC Count: 12.37 K/uL *H* [4.80 - 10.80] (12-04 @ 04:45)  Platelet Count - Automated: 205 K/uL [130 - 400] (12-04 @ 04:45)  WBC Count: 12.45 K/uL *H* [4.80 - 10.80] (12-03 @ 17:00)  Platelet Count - Automated: 212 K/uL [130 - 400] (12-03 @ 17:00)    Urinalysis Basic - ( 06 Dec 2024 04:40 )    Color: x / Appearance: x / SG: x / pH: x  Gluc: 81 mg/dL / Ketone: x  / Bili: x / Urobili: x   Blood: x / Protein: x / Nitrite: x   Leuk Esterase: x / RBC: x / WBC x   Sq Epi: x / Non Sq Epi: x / Bacteria: x        PT/INR - ( 05 Dec 2024 04:05 )   PT: 9.90 sec;   INR: 0.84 ratio         PTT - ( 05 Dec 2024 15:03 )  PTT:87.0 sec  EOE:  TMJ ( - ) clicks                     ( - ) pops                     ( - ) crepitus             Mandible <<FROM>>             Facial bones and MOM <<grossly intact>>             ( - ) trismus             ( - ) lymphadenopathy             ( - ) swelling             ( - ) asymmetry             ( - ) palpation             ( - ) dyspnea             ( - ) dysphagia             ( - ) loss of consciousness    IOE:  <<permanent>> dentition: <<grossly intact>> OR <<multiple carious teeth>> OR <<multiple missing teeth>>           hard/soft palate: <<No pathology noted>>           tongue/FOM <<No pathology noted>>           labial/buccal mucosa <<No pathology noted>>           ( - ) percussion           ( - ) palpation           ( - ) swelling            ( - ) abscess           ( - ) sinus tract    Dentition present: <<y>>  Mobility: <<n>>  Caries: << y>>         *DENTAL RADIOGRAPHS: Panoramic radiograph and 2 periapical radiographs    RADIOLOGY & ADDITIONAL STUDIES: No abnormal findings.    *ASSESSMENT: dental clearance for TVR procedure. Clinical and radiographic examination reveals there is no abnormal findings.    *PLAN: No emergent dental care needed at this time. Examination reveals patient is dentally cleared for the indicated medical procedure.    PROCEDURE: Clinical and radiographic oral examination.    Treatment: No treatments needed.    Resident Name: Frederick Jones DMD (resident)

## 2024-12-06 NOTE — CONSULT NOTE ADULT - ASSESSMENT
#FLAKITO likely prerenal 2/2 episodic hypotension vs ATN   # Type II DM    -Baseline Cr. of 0.9 patient is above their baseline, creatine trending up at 2.4  -Patient mildly hypervolemic on exam, give 40 IVP Lasix BID   Recommend nutritional management with renal diet,  - Adjust dosing of renally cleared meds to creatinine clearance of 30 ml/min  -Avoid nephrotoxic medications, except when benefits outweigh risk  -CMP daily, trend creatine   - Strict I/O  - Daily weights  - Urinalysis, Urine lytes, Urine Protein, Urine Creatinine  - HGBA1C  - Uosm, Posm  - Iron studies  - Renal/ bladder US  - LR at 100 mL/hr  -No indication for urgent hemodialysis  -No indication for kidney biopsy   -Follow-up outpatient with Nephrology, referral given.    Thank you for the pleasure of this consultation. Nephrology will continue to follow.       ***NOTE NOT FINAL PLEASE WAIT ON ATTENDING NOTE  #FLAKITO likely prerenal 2/2 episodic hypotension vs ATN   # Type II DM    -Baseline Cr. of 0.9 patient is above their baseline, creatine trending up at 2.4  -Continue to hold Metformin until GFR >30  -Continue to hold hydrochlorthiazide   -Recommend nutritional management with renal diet,  - Adjust dosing of renally cleared meds to creatinine clearance of 30 ml/min  -Avoid nephrotoxic medications, except when benefits outweigh risk  -CMP daily, trend creatine   - Strict I/O  - Daily weights  - Urinalysis, Urine lytes, Urine Protein, Urine Creatinine  - HGBA1C  - Renal/ bladder US reviewed, patient retaining, recommend Caceres catheter  -No indication for urgent hemodialysis    Thank you for the pleasure of this consultation. Nephrology will continue to follow.    #FLAKITO likely prerenal 2/2 episodic hypotension with ATN component  # Type II DM  #Urinary Retention    -Baseline Cr. of 0.9 patient is above their baseline, creatine trending up at 2.4  -Continue to hold Metformin until GFR >30  -Continue to hold hydrochlorthiazide   -Recommend nutritional management with renal diet,  - Adjust dosing of renally cleared meds to creatinine clearance of 30 ml/min  -Avoid nephrotoxic medications, except when benefits outweigh risk  -CMP daily, trend creatine   - Strict I/O  - Daily weights  - Urinalysis, Urine lytes, Urine Protein, Urine Creatinine  - HGBA1C  - Renal/ bladder US reviewed, patient retaining, recommend Caceres catheter  -No indication for urgent hemodialysis    Thank you for the pleasure of this consultation. Nephrology will continue to follow.    #FLAKITO likely prerenal 2/2 episodic hypotension with ATN component  #Type II DM  #Urinary Retention    -Baseline Cr. of 0.9 patient is above their baseline, creatine trending up at 2.4  -Continue to hold Metformin until GFR >30  -Continue to hold hydrochlorthiazide   -Recommend nutritional management with renal diet,  - Adjust dosing of renally cleared meds to creatinine clearance of 30 ml/min  -Avoid nephrotoxic medications, except when benefits outweigh risk  -CMP daily, trend creatine   - Strict I/O  - Daily weights  - Urinalysis, Urine lytes, Urine Protein, Urine Creatinine  - HGBA1C  - Renal/ bladder US reviewed, patient retaining, recommend Caceres catheter  -No indication for urgent hemodialysis    Thank you for the pleasure of this consultation. Nephrology will continue to follow.

## 2024-12-06 NOTE — PROGRESS NOTE ADULT - SUBJECTIVE AND OBJECTIVE BOX
CTU Attending Progress Daily Note     06 Dec 2024 12:18   Pre op for MV replacment   AFIB  was on eliquis  chanegd to heprin drip due to renal insuff  for now     He has history of Diabetes    HTN (hypertension)    Obstructive sleep apnea on CPAP    Asthma    Obesity    Peripheral neuropathy    Arthritis    Hypercholesteremia    Madelung's disease      Interval event for past 24 hr:  JAZ GAVIN  64y had no event.   Current Complains:  JAZ GAVIN has no new complains  HPI:   64-year-old morbidly obese male non smoker with past medical history of hypertension, DM, peripheral neuropathy, sciatica, bilateral hip replacements, degenerative arthritis, , BPH (nocturia x 6), asthma, JIMMY (on CPAP), HLD, Madelung's disease, and mitral valve regurgitation, was sent to ED from CTS office due to new onset of afib with rapid ventricular response.  Patient reports recent outpatient cardiology work up for recent progression of MR.. Had recent cath and JASMINA which demonstrated NO significant CAD and severe MR from flail P2 with EF of 55-60%. Work up revealed he is not a candidate for clipping and minimally invasive approach not feasible due to body habitus. Patient denies chest pain an leg swelling but complaints of dyspnea with minimal exertion. Patient ambulates with a can, needs both knees replaced and reop on right hip.  Denies any fever, chills, nausea, vomiting, CP, changes in urination, or changes in bowel movements. Was seen by dentist 3 weeks ago and everything was fine. HR now 130.  (03 Dec 2024 18:17)    OBJECTIVE:  ICU Vital Signs Last 24 Hrs  T(C): 36.4 (06 Dec 2024 04:00), Max: 36.6 (05 Dec 2024 16:00)  T(F): 97.5 (06 Dec 2024 04:00), Max: 97.8 (05 Dec 2024 16:00)  HR: 99 (06 Dec 2024 09:00) (79 - 99)  BP: 141/70 (06 Dec 2024 08:00) (88/60 - 141/70)  BP(mean): 100 (06 Dec 2024 08:00) (72 - 100)  ABP: --  ABP(mean): --  RR: 15 (06 Dec 2024 08:00) (14 - 32)  SpO2: 93% (06 Dec 2024 08:00) (93% - 97%)    O2 Parameters below as of 06 Dec 2024 08:00  Patient On (Oxygen Delivery Method): nasal cannula          I&O's Summary    05 Dec 2024 07:01  -  06 Dec 2024 07:00  --------------------------------------------------------  IN: 375 mL / OUT: 0 mL / NET: 375 mL    06 Dec 2024 07:01  -  06 Dec 2024 12:18  --------------------------------------------------------  IN: 200 mL / OUT: 0 mL / NET: 200 mL      I&O's Detail    05 Dec 2024 07:01  -  06 Dec 2024 07:00  --------------------------------------------------------  IN:    Oral Fluid: 375 mL  Total IN: 375 mL    OUT:  Total OUT: 0 mL    Total NET: 375 mL      06 Dec 2024 07:01  -  06 Dec 2024 12:18  --------------------------------------------------------  IN:    Lactated Ringers: 50 mL    Oral Fluid: 150 mL  Total IN: 200 mL    OUT:  Total OUT: 0 mL    Total NET: 200 mL        Adult Advanced Hemodynamics Last 24 Hrs  CVP(mm Hg): --  CVP(cm H2O): --  CO: --  CI: --  PA: --  PA(mean): --  PCWP: --  SVR: --  SVRI: --  PVR: --  PVRI: --    CAPILLARY BLOOD GLUCOSE      POCT Blood Glucose.: 131 mg/dL (06 Dec 2024 11:37)  POCT Blood Glucose.: 76 mg/dL (06 Dec 2024 07:03)  POCT Blood Glucose.: 118 mg/dL (05 Dec 2024 20:26)  POCT Blood Glucose.: 129 mg/dL (05 Dec 2024 12:21)    LABS:                          12.6   10.53 )-----------( 165      ( 06 Dec 2024 04:40 )             40.1     12-06    135  |  102  |  61[HH]  ----------------------------<  81  4.4   |  21  |  2.4[H]    Ca    8.0[L]      06 Dec 2024 04:40  Mg     2.3     12-06    TPro  5.7[L]  /  Alb  3.5  /  TBili  0.5  /  DBili  x   /  AST  11  /  ALT  20  /  AlkPhos  44  12-06    PT/INR - ( 05 Dec 2024 04:05 )   PT: 9.90 sec;   INR: 0.84 ratio         PTT - ( 05 Dec 2024 15:03 )  PTT:87.0 sec  Urinalysis Basic - ( 06 Dec 2024 04:40 )    Color: x / Appearance: x / SG: x / pH: x  Gluc: 81 mg/dL / Ketone: x  / Bili: x / Urobili: x   Blood: x / Protein: x / Nitrite: x   Leuk Esterase: x / RBC: x / WBC x   Sq Epi: x / Non Sq Epi: x / Bacteria: x        Home Medications:  Aspir 81 oral delayed release tablet: 1 tab(s) orally once a day (31 Oct 2024 09:30)  Breo Ellipta 200 mcg-25 mcg/inh inhalation powder: 1 inhaled once a day (31 Oct 2024 09:30)  Crestor 20 mg oral tablet: 1 tab(s) orally once a day (at bedtime) (31 Oct 2024 09:30)  gabapentin 300 mg oral capsule: 1 cap(s) orally 3 times a day (31 Oct 2024 09:30)  HYDROcodone 10 mg oral capsule, extended release: 1 cap(s) orally 2 times a day (31 Oct 2024 09:30)  Jardiance 25 mg oral tablet: 1 tab(s) orally once a day (31 Oct 2024 09:30)  losartan-hydrochlorothiazide 100mg-12.5mg oral tablet: orally 2 times a day (31 Oct 2024 09:30)  meloxicam 15 mg oral tablet: 1 tab(s) orally once a day (31 Oct 2024 09:30)  metFORMIN 1000 mg oral tablet, extended release: orally 2 times a day (31 Oct 2024 09:30)  Mounjaro 15 mg/0.5 mL subcutaneous solution: 15 milligram(s) subcutaneously once a week on Thur (31 Oct 2024 09:30)  pioglitazone 15 mg oral tablet: 1 tab(s) orally once a day (31 Oct 2024 09:30)  ProAir HFA 90 mcg/inh inhalation aerosol: 2 puff(s) inhaled 2 times a day (31 Oct 2024 09:30)  tujeo: 80 unit(s) subcutaneous once a day (31 Oct 2024 09:30)    HOSPITAL MEDICATIONS:  MEDICATIONS  (STANDING):  aMIOdarone    Tablet 400 milliGRAM(s) Oral every 12 hours  aMIOdarone Infusion 0.5 mG/Min (16.7 mL/Hr) IV Continuous <Continuous>  aspirin enteric coated 81 milliGRAM(s) Oral daily  bisacodyl Suppository 10 milliGRAM(s) Rectal once  chlorhexidine 2% Cloths 1 Application(s) Topical <User Schedule>  dextrose 5%. 1000 milliLiter(s) (50 mL/Hr) IV Continuous <Continuous>  dextrose 5%. 1000 milliLiter(s) (100 mL/Hr) IV Continuous <Continuous>  dextrose 50% Injectable 25 Gram(s) IV Push once  dextrose 50% Injectable 12.5 Gram(s) IV Push once  dextrose 50% Injectable 25 Gram(s) IV Push once  diltiazem    milliGRAM(s) Oral daily  fluticasone propionate/ salmeterol 250-50 MICROgram(s) Diskus 1 Dose(s) Inhalation two times a day  gabapentin 300 milliGRAM(s) Oral three times a day  glucagon  Injectable 1 milliGRAM(s) IntraMuscular once  heparin  Infusion 1300 Unit(s)/Hr (13 mL/Hr) IV Continuous <Continuous>  insulin glargine Injectable (LANTUS) 60 Unit(s) SubCutaneous every morning  insulin lispro (ADMELOG) corrective regimen sliding scale   SubCutaneous three times a day before meals  insulin lispro (ADMELOG) corrective regimen sliding scale   SubCutaneous at bedtime  insulin lispro Injectable (ADMELOG) 5 Unit(s) SubCutaneous three times a day before meals  ipratropium    for Nebulization 500 MICROGram(s) Nebulizer every 6 hours  lactated ringers. 1000 milliLiter(s) (50 mL/Hr) IV Continuous <Continuous>  metoprolol tartrate 25 milliGRAM(s) Oral every 12 hours  mupirocin 2% Nasal 1 Application(s) Both Nostrils two times a day  pantoprazole    Tablet 40 milliGRAM(s) Oral before breakfast  rosuvastatin 20 milliGRAM(s) Oral at bedtime  sodium chloride 0.9% lock flush 3 milliLiter(s) IV Push every 8 hours  tamsulosin 0.4 milliGRAM(s) Oral at bedtime    MEDICATIONS  (PRN):  acetaminophen   IVPB .. 1000 milliGRAM(s) IV Intermittent once PRN Moderate Pain (4 - 6)  dextrose Oral Gel 15 Gram(s) Oral once PRN Blood Glucose LESS THAN 70 milliGRAM(s)/deciliter  oxyCODONE    IR 10 milliGRAM(s) Oral every 6 hours PRN Severe Pain (7 - 10)      REVIEW OF SYSTEMS:  CONSTITUTIONAL: [X] all negative; [ ] weakness, [ ] fevers, [ ] chills  EYES/ENT: [X] all negative; [ ] visual changes, [ ] vertigo, [ ] throat pain   NECK: [X] all negative; [ ] pain, [ ] stiffness  RESPIRATORY: [] all negative, [ ] cough, [ ] wheezing, [ ] hemoptysis, [ ] shortness of breath  CARDIOVASCULAR: [] all negative; [ ] chest pain, [ ] palpitations, [ ] orthopnea  GASTROINTESTINAL: [X] all negative; [ ]abdominal pain, [ ] nausea, [ ] vomiting, [ ] hematemesis, [ ] diarrhea, [ ] constipation, [ ] melena, [ ] hematochezia.  GENITOURINARY: [X] all negative; [ ] dysuria, [ ] frequency, [ ] hematuria  NEUROLOGICAL: [X] all negative; [ ] numbness, [ ] weakness  SKIN: [X] all negative; [ ] itching, [ ] burning, [ ] rashes, [ ] lesions   All other review of systems is negative unless indicated above.    [  ] Unable to assess ROS because     PHYSICAL EXAM:          CONSTITUTIONAL: Well-developed; well-nourished; in no acute distress.   	SKIN: warm, dry  	HEAD: Normocephalic; atraumatic.  	EYES: PERRL, EOM, no conj injection, sclera clear  	ENT: No nasal discharge; airway clear.  	NECK: Supple; non tender.  No midline ttp ctls  	CARD: S1, S2 normal; no murmurs, gallops, or rubs. Regular rate and rhythm. 2+ RPs and DPs bilat, no carotid bruits, no pedal   edema, no calf pain b/l  	RESP: CTA  bilat good air movement No wheezes, rales or rhonchi.  	ABD: Soft, not tender, not distended, no CVA ttp no rebound or guarding, bowel sounds present  	EXT: Normal ROM.  No clubbing, cyanosis or edema.   	  	NEURO: Alert, awake, motor 5/5 R, 5/5 L        RADIOLOGY:  xray  < from: US Kidney and Bladder (12.06.24 @ 07:01) >    IMPRESSION:  No hydronephrosis.    Bladder wall thickening. Correlate with urinalysis.    --- End of Report ---    < end of copied text >  < from: Xray Chest 1 View- PORTABLE-Routine (Xray Chest 1 View- PORTABLE-Routine in AM.) (12.06.24 @ 05:22) >      IMPRESSION:    Stable left basilar opacity. No pneumothorax.    --- End of Report ---    < end of copied text >    I spent 45 minutes of critical care time ; more than 50% of visit was spent counseling and/or examining patient, reviewing vitals, labs, medications, imaging and discussing with the team goals of care to prevent life-threatening in this patient who is at high risk for deterioration or death due to:
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
## 2024-12-06 NOTE — CONSULT NOTE ADULT - SUBJECTIVE AND OBJECTIVE BOX
NEPHROLOGY CONSULTATION NOTE    Patient is a 64y Male whom presented to the hospital with     PAST MEDICAL & SURGICAL HISTORY:  Diabetes  20 yrs  HTN (hypertension)  Obstructive sleep apnea on CPAP  Asthma  Obesity  Peripheral neuropathy  related to diabetes  Arthritis  Hypercholesteremia  Madelung's disease  H/O lipoma  times 2-3  History of hip replacement, total, bilateral  two separate times '17, '07  Fracture of orbital floor  Encounter for screening colonoscopy    Allergies:  No Known Allergies    Home Medications Reviewed    Hospital Medications:   MEDICATIONS  (STANDING):  aMIOdarone    Tablet 400 milliGRAM(s) Oral every 12 hours  aMIOdarone Infusion 0.5 mG/Min (16.7 mL/Hr) IV Continuous <Continuous>  aspirin enteric coated 81 milliGRAM(s) Oral daily  bisacodyl Suppository 10 milliGRAM(s) Rectal once  dextrose 5%. 1000 milliLiter(s) (50 mL/Hr) IV Continuous <Continuous>  dextrose 5%. 1000 milliLiter(s) (100 mL/Hr) IV Continuous <Continuous>  dextrose 50% Injectable 25 Gram(s) IV Push once  dextrose 50% Injectable 12.5 Gram(s) IV Push once  dextrose 50% Injectable 25 Gram(s) IV Push once  diltiazem    milliGRAM(s) Oral daily  fluticasone propionate/ salmeterol 250-50 MICROgram(s) Diskus 1 Dose(s) Inhalation two times a day  gabapentin 300 milliGRAM(s) Oral three times a day  glucagon  Injectable 1 milliGRAM(s) IntraMuscular once  heparin  Infusion 1300 Unit(s)/Hr (13 mL/Hr) IV Continuous <Continuous>  insulin glargine Injectable (LANTUS) 60 Unit(s) SubCutaneous every morning  insulin lispro (ADMELOG) corrective regimen sliding scale   SubCutaneous three times a day before meals  insulin lispro (ADMELOG) corrective regimen sliding scale   SubCutaneous at bedtime  insulin lispro Injectable (ADMELOG) 5 Unit(s) SubCutaneous three times a day before meals  ipratropium    for Nebulization 500 MICROGram(s) Nebulizer every 6 hours  lactated ringers. 1000 milliLiter(s) (50 mL/Hr) IV Continuous <Continuous>  metoprolol tartrate 25 milliGRAM(s) Oral every 12 hours  mupirocin 2% Nasal 1 Application(s) Both Nostrils two times a day  pantoprazole    Tablet 40 milliGRAM(s) Oral before breakfast  rosuvastatin 20 milliGRAM(s) Oral at bedtime  sodium chloride 0.9% lock flush 3 milliLiter(s) IV Push every 8 hours    SOCIAL HISTORY:  Denies ETOH,Smoking,     FAMILY HISTORY:  Family history of diabetes mellitus (Mother, Sibling)  CAD (coronary artery disease) (Mother, Father)    REVIEW OF SYSTEMS:  CONSTITUTIONAL: No weakness, fevers or chills  EYES/ENT: No visual changes;  No vertigo or throat pain   NECK: No pain or stiffness  RESPIRATORY: No cough, wheezing, hemoptysis; No shortness of breath  CARDIOVASCULAR: No chest pain or palpitations.  GASTROINTESTINAL: No abdominal or epigastric pain. No nausea, vomiting, or hematemesis; No diarrhea or constipation. No melena or hematochezia.  GENITOURINARY: No dysuria, frequency, foamy urine, urinary urgency, incontinence or hematuria  NEUROLOGICAL: No numbness or weakness  SKIN: No itching, burning, rashes, or lesions   VASCULAR: No bilateral lower extremity edema.   All other review of systems is negative unless indicated above.    VITALS:  Vital Signs Last 24 Hrs  T(C): 36.4 (06 Dec 2024 04:00), Max: 36.8 (05 Dec 2024 12:00)  T(F): 97.5 (06 Dec 2024 04:00), Max: 98.3 (05 Dec 2024 12:00)  HR: 99 (06 Dec 2024 09:00) (79 - 99)  BP: 141/70 (06 Dec 2024 08:00) (88/60 - 141/70)  BP(mean): 100 (06 Dec 2024 08:00) (72 - 100)  RR: 15 (06 Dec 2024 08:00) (14 - 32)  SpO2: 93% (06 Dec 2024 08:00) (93% - 98%)    Parameters below as of 06 Dec 2024 08:00  Patient On (Oxygen Delivery Method): nasal cannula        12-05 @ 07:01  -  12-06 @ 07:00  --------------------------------------------------------  IN: 375 mL / OUT: 0 mL / NET: 375 mL    PHYSICAL EXAM:  Constitutional: NAD  HEENT: anicteric sclera, oropharynx clear, MMM  Neck: No JVD  Respiratory: CTAB, no wheezes, rales or rhonchi  Cardiovascular: S1, S2, RRR  Gastrointestinal: BS+, soft, NT/ND  Extremities: No cyanosis or clubbing. No peripheral edema  Neurological: A/O x 3, no focal deficits  Psychiatric: Normal mood, normal affect  : No CVA tenderness. No prince.   Skin: No rashes  Vascular Access:    LABS:  12-06    135  |  102  |  61[HH]  ----------------------------<  81  4.4   |  21  |  2.4[H]    Ca    8.0[L]      06 Dec 2024 04:40  Mg     2.3     12-06    TPro  5.7[L]  /  Alb  3.5  /  TBili  0.5  /  DBili      /  AST  11  /  ALT  20  /  AlkPhos  44  12-06    Creatinine Trend: 2.4 <--, 1.8 <--, 1.0 <--, 1.3 <--                        12.6   10.53 )-----------( 165      ( 06 Dec 2024 04:40 )             40.1     Urine Studies:  Urinalysis Basic - ( 06 Dec 2024 04:40 )    Color:  / Appearance:  / SG:  / pH:   Gluc: 81 mg/dL / Ketone:   / Bili:  / Urobili:    Blood:  / Protein:  / Nitrite:    Leuk Esterase:  / RBC:  / WBC    Sq Epi:  / Non Sq Epi:  / Bacteria:       RADIOLOGY & ADDITIONAL STUDIES:      ACC: [id number] EXAM:  XR CHEST PORTABLE ROUTINE 1V   ORDERED BY: PIPPA ALEJANDRE     PROCEDURE DATE:  12/04/2024          INTERPRETATION:  CLINICAL HISTORY: Shortness of breath    COMPARISON: Chest x-ray December 3, 2024.    TECHNIQUE:     Frontal view chest    Findings/  impression:    EKG leads overlie thorax, obscuring anatomy.    Support devices: None    Cardiac/ Mediastinum: Stable    Lungs/ Pleura: Stable reticular densities left lung base. No   consolidation or pneumothorax.    Skeletal/soft tissues: Stable          --- End of Report ---      ACC: [id number] EXAM:  CT ABDOMEN AND PELVIS   ORDERED BY: ANMOL MENDEZ     PROCEDURE DATE:  12/04/2024          INTERPRETATION:  CLINICAL STATEMENT: Preop  for MVR.    TECHNIQUE: Contiguous axial CT images were obtained from the lower chest   to the pubic symphysis without intravenous contrast.  Oral contrast was   not administered.  Reformatted images in the coronal and sagittal planes   were acquired.    COMPARISON CT: None.    OTHER STUDIES USED FOR CORRELATION: None.      FINDINGS:  Limited evaluation of solid organs and vascular structures secondary to   lack of intravenous contrast.    HEPATOBILIARY: Unremarkable.    SPLEEN: Unremarkable.    PANCREAS: Unremarkable.    ADRENAL GLANDS: Thickened bilateral adrenal gland.    KIDNEYS: No nephroureteral calculi or hydronephrosis.    ABDOMINOPELVIC NODES: No lymphadenopathy.    PELVIC ORGANS: Limited evaluation secondary to streak artifact from hip   hardware.    PERITONEUM/MESENTERY/BOWEL: No bowel obstruction, ascites or   pneumoperitoneum. Normal appendix. Scattered colonic diverticulosis.    BONES/SOFT TISSUES: Post bilateral hip arthroplasties. Degenerative   changes of the spine noted.    OTHER: Atherosclerotic vascular calcifications.      IMPRESSION:  No CT evidence of an acute abdominopelvic pathology.  See separately dictated CT chest report for intrathoracic findings.    --- End of Report ---            SHIRIN LEROY MD; Attending Radiologist  This document has been electronically signed. Dec  4 2024  8:08AM               NEPHROLOGY CONSULTATION NOTE    64 year old male with a past medical history of:    ·	Severe mitral valve regurgitation (JASMINA on 10/18/24 55-60% LVEF)  ·	Hypertension on home losartan-hydrochlorothiazide 100mg-12.5mg BID; past outpatient clinic visits hypertensive to 160's  ·	Hyperlipidemia on home Aspirin 81 and Crestor 20 mg oral tablet  ·	Type II DM on home metFORMIN 1000 mg oral tablet BID; pioglitazone 15 mg; Jardiance 25 mg; Mounjaro 15 mg/0.5 mL subcutaneous solution; tujeo 80 units daily  ·	Peripheral neuropathy on home gabapentin 300 mg oral capsule TID, follows with outpatient endocrinology  ·	Sciatica  ·	Degenerative arthritis s/p bilateral hip replacements on home HYDROcodone 10 mg BID and meloxicam 15 mg   ·	Asthma on ProAir HFA 90 mcg/inh inhalation and Breo Ellipta 200 mcg-25 mcg/inh inhalation powder follows with outpatient pulmonology  ·	JIMMY, on CPAP  ·	Madelung's disease  ·	Severe mitral valve regurgitation     Presented to ED from [facility] office on 12/3/2024 due to observation of atrial fibrilation with rapid ventricular response at [facility] office where he was being evaluated for severe MR. Cardiac catherization 10/18/2024 and JASMINA which demonstrated no significant CAD and severe MR from flail P2 with EF of 55-60%. In ED patient complaining of dyspnea with minimal exertion. In ED, HR in a-fib with RVR to 130's, normotensive, eupneic, and afebrile, started on a Cardizem.     Nephrology consulted for FLAKITO. Creatinine of 2.4. Baseline creatinine of 0.9 on 10/31/2024    Upon nephrology's evaluation of the patient, the patient states he has been urinating a lot less than normal since he has been in hospital. States he typically drinks a lot of water and has not lately. The patient states he previously followed with nephrologist Dr. Kleiner for proteinuria; however, has not followed up in at least 5 years.      Patient denies any edema, nausea, vomiting,   flank pain, chest pain, confusion. States he has severe dyspnea on exertion. Patient denies any dysuria, hematuria, or urinary incontinence. States he is urinating a lot less frequently since in hospital. Patient denies any uremic symptoms such as loss of appetite, nausea, vomiting, metallic taste, chronic LE edema, or poorly controlled blood pressure.    Upon physical exam, the patient is normotensive, in a-fib to 130's eupneic, saturating at 95% on room air.  Patient appears mildly hypervolemic on exam with mild +1 lower extremity pitting edema, no pulmonary crackles, no jugular venous distension. The patient has normal capillary refill and skin turgor. Patient has had several episodes of hypotension to 76/53. It is noted patient has has bp of 150-160 systolic in outpatient setting.    PAST MEDICAL & SURGICAL HISTORY:  Diabetes  20 yrs  HTN (hypertension)  Obstructive sleep apnea on CPAP  Asthma  Obesity  Peripheral neuropathy  related to diabetes  Arthritis  Hypercholesteremia  Madelung's disease  H/O lipoma  times 2-3  History of hip replacement, total, bilateral  two separate times '17, '07  Fracture of orbital floor  Encounter for screening colonoscopy    Allergies:  No Known Allergies    Home Medications Reviewed    Hospital Medications:   MEDICATIONS  (STANDING):  aMIOdarone    Tablet 400 milliGRAM(s) Oral every 12 hours  aMIOdarone Infusion 0.5 mG/Min (16.7 mL/Hr) IV Continuous <Continuous>  aspirin enteric coated 81 milliGRAM(s) Oral daily  bisacodyl Suppository 10 milliGRAM(s) Rectal once  dextrose 5%. 1000 milliLiter(s) (50 mL/Hr) IV Continuous <Continuous>  dextrose 5%. 1000 milliLiter(s) (100 mL/Hr) IV Continuous <Continuous>  dextrose 50% Injectable 25 Gram(s) IV Push once  dextrose 50% Injectable 12.5 Gram(s) IV Push once  dextrose 50% Injectable 25 Gram(s) IV Push once  diltiazem    milliGRAM(s) Oral daily  fluticasone propionate/ salmeterol 250-50 MICROgram(s) Diskus 1 Dose(s) Inhalation two times a day  gabapentin 300 milliGRAM(s) Oral three times a day  glucagon  Injectable 1 milliGRAM(s) IntraMuscular once  heparin  Infusion 1300 Unit(s)/Hr (13 mL/Hr) IV Continuous <Continuous>  insulin glargine Injectable (LANTUS) 60 Unit(s) SubCutaneous every morning  insulin lispro (ADMELOG) corrective regimen sliding scale   SubCutaneous three times a day before meals  insulin lispro (ADMELOG) corrective regimen sliding scale   SubCutaneous at bedtime  insulin lispro Injectable (ADMELOG) 5 Unit(s) SubCutaneous three times a day before meals  ipratropium    for Nebulization 500 MICROGram(s) Nebulizer every 6 hours  lactated ringers. 1000 milliLiter(s) (50 mL/Hr) IV Continuous <Continuous>  metoprolol tartrate 25 milliGRAM(s) Oral every 12 hours  mupirocin 2% Nasal 1 Application(s) Both Nostrils two times a day  pantoprazole    Tablet 40 milliGRAM(s) Oral before breakfast  rosuvastatin 20 milliGRAM(s) Oral at bedtime  sodium chloride 0.9% lock flush 3 milliLiter(s) IV Push every 8 hours    SOCIAL HISTORY:  Denies ETOH,Smoking,     FAMILY HISTORY:  Family history of diabetes mellitus (Mother, Sibling)  CAD (coronary artery disease) (Mother, Father)    REVIEW OF SYSTEMS:  CONSTITUTIONAL: No weakness, fevers or chills  EYES/ENT: No visual changes;  No vertigo or throat pain   NECK: No pain or stiffness  RESPIRATORY: No cough, wheezing, hemoptysis; No shortness of breath  CARDIOVASCULAR: No chest pain or palpitations.  GASTROINTESTINAL: No abdominal or epigastric pain. No nausea, vomiting, or hematemesis; No diarrhea or constipation. No melena or hematochezia.  GENITOURINARY: No dysuria, frequency, foamy urine, urinary urgency, incontinence or hematuria  NEUROLOGICAL: No numbness or weakness  SKIN: No itching, burning, rashes, or lesions   VASCULAR: +1 pitting lower extremity edema.   All other review of systems is negative unless indicated above.    VITALS:  Vital Signs Last 24 Hrs  T(C): 36.4 (06 Dec 2024 04:00), Max: 36.8 (05 Dec 2024 12:00)  T(F): 97.5 (06 Dec 2024 04:00), Max: 98.3 (05 Dec 2024 12:00)  HR: 99 (06 Dec 2024 09:00) (79 - 99)  BP: 141/70 (06 Dec 2024 08:00) (88/60 - 141/70)  BP(mean): 100 (06 Dec 2024 08:00) (72 - 100)  RR: 15 (06 Dec 2024 08:00) (14 - 32)  SpO2: 93% (06 Dec 2024 08:00) (93% - 98%)    Parameters below as of 06 Dec 2024 08:00  Patient On (Oxygen Delivery Method): nasal cannula        12-05 @ 07:01  -  12-06 @ 07:00  --------------------------------------------------------  IN: 375 mL / OUT: 0 mL / NET: 375 mL    PHYSICAL EXAM:  Constitutional: NAD  HEENT: anicteric sclera, oropharynx clear, MMM  Neck: No JVD  Respiratory: CTAB, no wheezes, rales or rhonchi  Cardiovascular: S1, S2, RRR  Gastrointestinal: BS+, soft, NT/ND  Extremities: No cyanosis or clubbing. No peripheral edema  Neurological: A/O x 3, no focal deficits  Psychiatric: Normal mood, normal affect  : No CVA tenderness. No prince.   Skin: No rashes  Vascular Access:    LABS:  12-06    135  |  102  |  61[HH]  ----------------------------<  81  4.4   |  21  |  2.4[H]    Ca    8.0[L]      06 Dec 2024 04:40  Mg     2.3     12-06    TPro  5.7[L]  /  Alb  3.5  /  TBili  0.5  /  DBili      /  AST  11  /  ALT  20  /  AlkPhos  44  12-06    Creatinine Trend: 2.4 <--, 1.8 <--, 1.0 <--, 1.3 <--                        12.6   10.53 )-----------( 165      ( 06 Dec 2024 04:40 )             40.1     Urine Studies:  Urinalysis Basic - ( 06 Dec 2024 04:40 )    Color:  / Appearance:  / SG:  / pH:   Gluc: 81 mg/dL / Ketone:   / Bili:  / Urobili:    Blood:  / Protein:  / Nitrite:    Leuk Esterase:  / RBC:  / WBC    Sq Epi:  / Non Sq Epi:  / Bacteria:       RADIOLOGY & ADDITIONAL STUDIES:      ACC: [id number] EXAM:  XR CHEST PORTABLE ROUTINE 1V   ORDERED BY: PIPPA ALEJANDRE     PROCEDURE DATE:  12/04/2024          INTERPRETATION:  CLINICAL HISTORY: Shortness of breath    COMPARISON: Chest x-ray December 3, 2024.    TECHNIQUE:     Frontal view chest    Findings/  impression:    EKG leads overlie thorax, obscuring anatomy.    Support devices: None    Cardiac/ Mediastinum: Stable    Lungs/ Pleura: Stable reticular densities left lung base. No   consolidation or pneumothorax.    Skeletal/soft tissues: Stable          --- End of Report ---      ACC: [id number] EXAM:  CT ABDOMEN AND PELVIS   ORDERED BY: ANMOL MENDEZ     PROCEDURE DATE:  12/04/2024          INTERPRETATION:  CLINICAL STATEMENT: Preop  for MVR.    TECHNIQUE: Contiguous axial CT images were obtained from the lower chest   to the pubic symphysis without intravenous contrast.  Oral contrast was   not administered.  Reformatted images in the coronal and sagittal planes   were acquired.    COMPARISON CT: None.    OTHER STUDIES USED FOR CORRELATION: None.      FINDINGS:  Limited evaluation of solid organs and vascular structures secondary to   lack of intravenous contrast.    HEPATOBILIARY: Unremarkable.    SPLEEN: Unremarkable.    PANCREAS: Unremarkable.    ADRENAL GLANDS: Thickened bilateral adrenal gland.    KIDNEYS: No nephroureteral calculi or hydronephrosis.    ABDOMINOPELVIC NODES: No lymphadenopathy.    PELVIC ORGANS: Limited evaluation secondary to streak artifact from hip   hardware.    PERITONEUM/MESENTERY/BOWEL: No bowel obstruction, ascites or   pneumoperitoneum. Normal appendix. Scattered colonic diverticulosis.    BONES/SOFT TISSUES: Post bilateral hip arthroplasties. Degenerative   changes of the spine noted.    OTHER: Atherosclerotic vascular calcifications.      IMPRESSION:  No CT evidence of an acute abdominopelvic pathology.  See separately dictated CT chest report for intrathoracic findings.    --- End of Report ---            SHIRIN LEROY MD; Attending Radiologist  This document has been electronically signed. Dec  4 2024  8:08AM               NEPHROLOGY CONSULTATION NOTE    64 year old male with a past medical history of:    ·	Severe mitral valve regurgitation (JASMINA on 10/18/24 55-60% LVEF)  ·	Hypertension on home losartan-hydrochlorothiazide 100mg-12.5mg BID; past outpatient clinic visits hypertensive to 160's  ·	Hyperlipidemia on home Aspirin 81 and Crestor 20 mg oral tablet  ·	Type II DM on home metFORMIN 1000 mg oral tablet BID; pioglitazone 15 mg; Jardiance 25 mg; Mounjaro 15 mg/0.5 mL subcutaneous solution; tujeo 80 units daily  ·	Peripheral neuropathy on home gabapentin 300 mg oral capsule TID, follows with outpatient endocrinology  ·	Sciatica  ·	Degenerative arthritis s/p bilateral hip replacements on home HYDROcodone 10 mg BID and meloxicam 15 mg   ·	Asthma on ProAir HFA 90 mcg/inh inhalation and Breo Ellipta 200 mcg-25 mcg/inh inhalation powder follows with outpatient pulmonology  ·	JIMMY, on CPAP  ·	Madelung's disease  ·	Severe mitral valve regurgitation     Presented to ED from [facility] office on 12/3/2024 due to observation of atrial fibrilation with rapid ventricular response at [facility] office where he was being evaluated for severe MR. Cardiac catherization 10/18/2024 and JASMINA which demonstrated no significant CAD and severe MR from flail P2 with EF of 55-60%. In ED patient complaining of dyspnea with minimal exertion. In ED, HR in a-fib with RVR to 130's, normotensive, eupneic, and afebrile, started on a Cardizem.     Nephrology consulted for FLAKITO. Creatinine of 2.4. Baseline creatinine of 0.9 on 10/31/2024    Upon nephrology's evaluation of the patient, the patient states he has been urinating a lot less than normal since he has been in hospital. States he typically drinks a lot of water and has not lately. The patient states he previously followed with nephrologist Dr. Kleiner for proteinuria; however, has not followed up in at least 5 years.      Patient denies any edema, nausea, vomiting,   flank pain, chest pain, confusion. States he has severe dyspnea on exertion. Patient denies any dysuria, hematuria, or urinary incontinence. States he is urinating a lot less frequently since in hospital. Patient denies any uremic symptoms such as loss of appetite, nausea, vomiting, metallic taste, chronic LE edema, or poorly controlled blood pressure.    Upon physical exam, the patient is normotensive, in a-fib to 130's eupneic, saturating at 95% on room air.  Patient appears mildly hypervolemic on exam with mild +1 lower extremity pitting edema, no pulmonary crackles, no jugular venous distension. The patient has normal capillary refill and skin turgor. Patient has had several episodes of hypotension to 76/53. It is noted patient has has bp of 150-160 systolic in outpatient setting.    PAST MEDICAL & SURGICAL HISTORY:  Diabetes  20 yrs  HTN (hypertension)  Obstructive sleep apnea on CPAP  Asthma  Obesity  Peripheral neuropathy  related to diabetes  Arthritis  Hypercholesteremia  Madelung's disease  H/O lipoma  times 2-3  History of hip replacement, total, bilateral  two separate times '17, '07  Fracture of orbital floor  Encounter for screening colonoscopy    Allergies:  No Known Allergies    Home Medications Reviewed    Hospital Medications:   MEDICATIONS  (STANDING):  aMIOdarone    Tablet 400 milliGRAM(s) Oral every 12 hours  aMIOdarone Infusion 0.5 mG/Min (16.7 mL/Hr) IV Continuous <Continuous>  aspirin enteric coated 81 milliGRAM(s) Oral daily  bisacodyl Suppository 10 milliGRAM(s) Rectal once  dextrose 5%. 1000 milliLiter(s) (50 mL/Hr) IV Continuous <Continuous>  dextrose 5%. 1000 milliLiter(s) (100 mL/Hr) IV Continuous <Continuous>  dextrose 50% Injectable 25 Gram(s) IV Push once  dextrose 50% Injectable 12.5 Gram(s) IV Push once  dextrose 50% Injectable 25 Gram(s) IV Push once  diltiazem    milliGRAM(s) Oral daily  fluticasone propionate/ salmeterol 250-50 MICROgram(s) Diskus 1 Dose(s) Inhalation two times a day  gabapentin 300 milliGRAM(s) Oral three times a day  glucagon  Injectable 1 milliGRAM(s) IntraMuscular once  heparin  Infusion 1300 Unit(s)/Hr (13 mL/Hr) IV Continuous <Continuous>  insulin glargine Injectable (LANTUS) 60 Unit(s) SubCutaneous every morning  insulin lispro (ADMELOG) corrective regimen sliding scale   SubCutaneous three times a day before meals  insulin lispro (ADMELOG) corrective regimen sliding scale   SubCutaneous at bedtime  insulin lispro Injectable (ADMELOG) 5 Unit(s) SubCutaneous three times a day before meals  ipratropium    for Nebulization 500 MICROGram(s) Nebulizer every 6 hours  lactated ringers. 1000 milliLiter(s) (50 mL/Hr) IV Continuous <Continuous>  metoprolol tartrate 25 milliGRAM(s) Oral every 12 hours  mupirocin 2% Nasal 1 Application(s) Both Nostrils two times a day  pantoprazole    Tablet 40 milliGRAM(s) Oral before breakfast  rosuvastatin 20 milliGRAM(s) Oral at bedtime  sodium chloride 0.9% lock flush 3 milliLiter(s) IV Push every 8 hours    SOCIAL HISTORY:  Denies ETOH,Smoking,     FAMILY HISTORY:  Family history of diabetes mellitus (Mother, Sibling)  CAD (coronary artery disease) (Mother, Father)    REVIEW OF SYSTEMS:  CONSTITUTIONAL: No weakness, fevers or chills  EYES/ENT: No visual changes;  No vertigo or throat pain   NECK: No pain or stiffness  RESPIRATORY: No cough, wheezing, hemoptysis; No shortness of breath  CARDIOVASCULAR: No chest pain or palpitations.  GASTROINTESTINAL: No abdominal or epigastric pain. No nausea, vomiting, or hematemesis; No diarrhea or constipation. No melena or hematochezia.  GENITOURINARY: No dysuria, frequency, foamy urine, urinary urgency, incontinence or hematuria  NEUROLOGICAL: No numbness or weakness  SKIN: No itching, burning, rashes, or lesions   VASCULAR: +1 pitting lower extremity edema.   All other review of systems is negative unless indicated above.    VITALS:  Vital Signs Last 24 Hrs  T(C): 36.4 (06 Dec 2024 04:00), Max: 36.8 (05 Dec 2024 12:00)  T(F): 97.5 (06 Dec 2024 04:00), Max: 98.3 (05 Dec 2024 12:00)  HR: 99 (06 Dec 2024 09:00) (79 - 99)  BP: 141/70 (06 Dec 2024 08:00) (88/60 - 141/70)  BP(mean): 100 (06 Dec 2024 08:00) (72 - 100)  RR: 15 (06 Dec 2024 08:00) (14 - 32)  SpO2: 93% (06 Dec 2024 08:00) (93% - 98%)    Parameters below as of 06 Dec 2024 08:00  Patient On (Oxygen Delivery Method): nasal cannula        12-05 @ 07:01  -  12-06 @ 07:00  --------------------------------------------------------  IN: 375 mL / OUT: 0 mL / NET: 375 mL    PHYSICAL EXAM:  Constitutional: NAD  HEENT: anicteric sclera, oropharynx clear, MMM  Neck: No JVD  Respiratory: CTAB, no wheezes, rales or rhonchi  Cardiovascular: S1, S2, RRR  Gastrointestinal: BS+, soft, NT/ND  Extremities: No cyanosis or clubbing. No peripheral edema  Neurological: A/O x 3, no focal deficits  Psychiatric: Normal mood, normal affect  : No CVA tenderness. No prince.   Skin: No rashes  Vascular Access:    LABS:  12-06    135  |  102  |  61[HH]  ----------------------------<  81  4.4   |  21  |  2.4[H]    Ca    8.0[L]      06 Dec 2024 04:40  Mg     2.3     12-06    TPro  5.7[L]  /  Alb  3.5  /  TBili  0.5  /  DBili      /  AST  11  /  ALT  20  /  AlkPhos  44  12-06    Creatinine Trend: 2.4 <--, 1.8 <--, 1.0 <--, 1.3 <--                        12.6   10.53 )-----------( 165      ( 06 Dec 2024 04:40 )             40.1     Urine Studies:  Urinalysis Basic - ( 06 Dec 2024 04:40 )    Color:  / Appearance:  / SG:  / pH:   Gluc: 81 mg/dL / Ketone:   / Bili:  / Urobili:    Blood:  / Protein:  / Nitrite:    Leuk Esterase:  / RBC:  / WBC    Sq Epi:  / Non Sq Epi:  / Bacteria:       RADIOLOGY & ADDITIONAL STUDIES:      ACC: [id number] EXAM:  XR CHEST PORTABLE ROUTINE 1V   ORDERED BY: PIPPA ALEJANDRE     PROCEDURE DATE:  12/04/2024          INTERPRETATION:  CLINICAL HISTORY: Shortness of breath    COMPARISON: Chest x-ray December 3, 2024.    TECHNIQUE:     Frontal view chest    Findings/  impression:    EKG leads overlie thorax, obscuring anatomy.    Support devices: None    Cardiac/ Mediastinum: Stable    Lungs/ Pleura: Stable reticular densities left lung base. No   consolidation or pneumothorax.    Skeletal/soft tissues: Stable          --- End of Report ---      ACC: [id number] EXAM:  CT ABDOMEN AND PELVIS   ORDERED BY: ANMOL MENDEZ     PROCEDURE DATE:  12/04/2024          INTERPRETATION:  CLINICAL STATEMENT: Preop  for MVR.    TECHNIQUE: Contiguous axial CT images were obtained from the lower chest   to the pubic symphysis without intravenous contrast.  Oral contrast was   not administered.  Reformatted images in the coronal and sagittal planes   were acquired.    COMPARISON CT: None.    OTHER STUDIES USED FOR CORRELATION: None.      FINDINGS:  Limited evaluation of solid organs and vascular structures secondary to   lack of intravenous contrast.    HEPATOBILIARY: Unremarkable.    SPLEEN: Unremarkable.    PANCREAS: Unremarkable.    ADRENAL GLANDS: Thickened bilateral adrenal gland.    KIDNEYS: No nephroureteral calculi or hydronephrosis.    ABDOMINOPELVIC NODES: No lymphadenopathy.    PELVIC ORGANS: Limited evaluation secondary to streak artifact from hip   hardware.    PERITONEUM/MESENTERY/BOWEL: No bowel obstruction, ascites or   pneumoperitoneum. Normal appendix. Scattered colonic diverticulosis.    BONES/SOFT TISSUES: Post bilateral hip arthroplasties. Degenerative   changes of the spine noted.    OTHER: Atherosclerotic vascular calcifications.      IMPRESSION:  No CT evidence of an acute abdominopelvic pathology.  See separately dictated CT chest report for intrathoracic findings.    --- End of Report ---            SHIRIN LEROY MD; Attending Radiologist  This document has been electronically signed. Dec  4 2024  8:08AM        ACC: [id number] EXAM:  US KIDNEYS AND BLADDER   ORDERED BY: DAE VICENTE     PROCEDURE DATE:  12/06/2024          INTERPRETATION:  CLINICAL INFORMATION: Acute kidney injury    COMPARISON: CT abdomen pelvis from 12/4/2024.    TECHNIQUE: Sonography of the kidneys and bladder.    FINDINGS:  Right kidney: 12.2 cm. No hydronephrosis    Left kidney: 12.6 cm. No hydronephrosis    Urinary bladder: Bladder wall thickening. Bladder volume of 331 mL.   Patient did not have urge to void at time of exam.    IMPRESSION:  No hydronephrosis.    Bladder wall thickening. Correlate with urinalysis.           NEPHROLOGY CONSULTATION NOTE    64 year old male with a past medical history of:    ·	Severe mitral valve regurgitation (JASMINA on 10/18/24 55-60% LVEF)  ·	Hypertension on home losartan-hydrochlorothiazide 100mg-12.5mg BID; past outpatient clinic visits hypertensive to 160's  ·	Hyperlipidemia on home Aspirin 81 and Crestor 20 mg oral tablet  ·	Type II DM on home metFORMIN 1000 mg oral tablet BID; pioglitazone 15 mg; Jardiance 25 mg; Mounjaro 15 mg/0.5 mL subcutaneous solution; tujeo 80 units daily  ·	Peripheral neuropathy on home gabapentin 300 mg oral capsule TID, follows with outpatient endocrinology  ·	Sciatica  ·	Degenerative arthritis s/p bilateral hip replacements on home HYDROcodone 10 mg BID and meloxicam 15 mg   ·	Asthma on ProAir HFA 90 mcg/inh inhalation and Breo Ellipta 200 mcg-25 mcg/inh inhalation powder follows with outpatient pulmonology  ·	JIMMY, on CPAP  ·	Madelung's disease  ·	Severe mitral valve regurgitation     Presented to ED from [facility] office on 12/3/2024 due to observation of atrial fibrilation with rapid ventricular response at [facility] office where he was being evaluated for severe MR. Cardiac catherization 10/18/2024 and JASMINA which demonstrated no significant CAD and severe MR from flail P2 with EF of 55-60%. In ED patient complaining of dyspnea with minimal exertion. In ED, HR in a-fib with RVR to 130's, normotensive, eupneic, and afebrile, started on a Cardizem.     Nephrology consulted for FLAKITO. Creatinine of 2.4. Baseline creatinine of 0.9 on 10/31/2024    Upon nephrology's evaluation of the patient, the patient states he has been urinating a lot less than normal since he has been in hospital. States he typically drinks a lot of water and has not lately. The patient states he previously followed with nephrologist Dr. Kleiner for proteinuria; however, has not followed up in at least 5 years.      Patient denies any edema, nausea, vomiting,   flank pain, chest pain, confusion. States he has severe dyspnea on exertion. Patient denies any dysuria, hematuria, or urinary incontinence. States he is urinating a lot less frequently since in hospital. Patient denies any uremic symptoms such as loss of appetite, nausea, vomiting, metallic taste, chronic LE edema, or poorly controlled blood pressure.    Upon physical exam, the patient is normotensive, in a-fib to 130's eupneic, saturating at 95% on room air.  Patient appears mildly hypervolemic on exam with mild +1 lower extremity pitting edema, no pulmonary crackles, no jugular venous distension. The patient has normal capillary refill and skin turgor. Patient has had several episodes of hypotension to 76/53. It is noted patient has has bp of 150-160 systolic in outpatient setting.    PAST MEDICAL & SURGICAL HISTORY:  Diabetes  20 yrs  HTN (hypertension)  Obstructive sleep apnea on CPAP  Asthma  Obesity  Peripheral neuropathy  related to diabetes  Arthritis  Hypercholesteremia  Madelung's disease  H/O lipoma  times 2-3  History of hip replacement, total, bilateral  two separate times '17, '07  Fracture of orbital floor  Encounter for screening colonoscopy    Allergies:  No Known Allergies    Home Medications Reviewed    Hospital Medications:   MEDICATIONS  (STANDING):  aMIOdarone    Tablet 400 milliGRAM(s) Oral every 12 hours  aMIOdarone Infusion 0.5 mG/Min (16.7 mL/Hr) IV Continuous <Continuous>  aspirin enteric coated 81 milliGRAM(s) Oral daily  bisacodyl Suppository 10 milliGRAM(s) Rectal once  diltiazem    milliGRAM(s) Oral daily  fluticasone propionate/ salmeterol 250-50 MICROgram(s) Diskus 1 Dose(s) Inhalation two times a day  gabapentin 300 milliGRAM(s) Oral three times a day  glucagon  Injectable 1 milliGRAM(s) IntraMuscular once  heparin  Infusion 1300 Unit(s)/Hr (13 mL/Hr) IV Continuous <Continuous>  insulin glargine Injectable (LANTUS) 60 Unit(s) SubCutaneous every morning  insulin lispro (ADMELOG) corrective regimen sliding scale   SubCutaneous three times a day before meals  insulin lispro (ADMELOG) corrective regimen sliding scale   SubCutaneous at bedtime  insulin lispro Injectable (ADMELOG) 5 Unit(s) SubCutaneous three times a day before meals  ipratropium    for Nebulization 500 MICROGram(s) Nebulizer every 6 hours  lactated ringers. 1000 milliLiter(s) (50 mL/Hr) IV Continuous <Continuous>  metoprolol tartrate 25 milliGRAM(s) Oral every 12 hours  mupirocin 2% Nasal 1 Application(s) Both Nostrils two times a day  pantoprazole    Tablet 40 milliGRAM(s) Oral before breakfast  rosuvastatin 20 milliGRAM(s) Oral at bedtime  sodium chloride 0.9% lock flush 3 milliLiter(s) IV Push every 8 hours    SOCIAL HISTORY:  Denies ETOH,Smoking,     FAMILY HISTORY:  Family history of diabetes mellitus (Mother, Sibling)  CAD (coronary artery disease) (Mother, Father)    REVIEW OF SYSTEMS:  CONSTITUTIONAL: No weakness, fevers or chills  EYES/ENT: No visual changes;  No vertigo or throat pain   NECK: No pain or stiffness  RESPIRATORY: No cough, wheezing, hemoptysis; No shortness of breath  CARDIOVASCULAR: No chest pain or palpitations.  GASTROINTESTINAL: No abdominal or epigastric pain. No nausea, vomiting, or hematemesis; No diarrhea or constipation. No melena or hematochezia.  GENITOURINARY: No dysuria, frequency, foamy urine, urinary urgency, incontinence or hematuria  NEUROLOGICAL: No numbness or weakness  SKIN: No itching, burning, rashes, or lesions   VASCULAR: +1 pitting lower extremity edema.   All other review of systems is negative unless indicated above.    VITALS:  Vital Signs Last 24 Hrs  T(C): 36.4 (06 Dec 2024 04:00), Max: 36.8 (05 Dec 2024 12:00)  T(F): 97.5 (06 Dec 2024 04:00), Max: 98.3 (05 Dec 2024 12:00)  HR: 99 (06 Dec 2024 09:00) (79 - 99)  BP: 141/70 (06 Dec 2024 08:00) (88/60 - 141/70)  BP(mean): 100 (06 Dec 2024 08:00) (72 - 100)  RR: 15 (06 Dec 2024 08:00) (14 - 32)  SpO2: 93% (06 Dec 2024 08:00) (93% - 98%)    Parameters below as of 06 Dec 2024 08:00  Patient On (Oxygen Delivery Method): nasal cannula        12-05 @ 07:01  -  12-06 @ 07:00  --------------------------------------------------------  IN: 375 mL / OUT: 0 mL / NET: 375 mL    PHYSICAL EXAM:  Constitutional: NAD  HEENT: anicteric sclera, oropharynx clear, MMM  Neck: No JVD  Respiratory: CTAB, no wheezes, rales or rhonchi  Cardiovascular: S1, S2, RRR  Gastrointestinal: BS+, soft, NT/ND  Extremities: No cyanosis or clubbing. No peripheral edema  Neurological: A/O x 3, no focal deficits  Psychiatric: Normal mood, normal affect  : No CVA tenderness. No prince.   Skin: No rashes  Vascular Access:    LABS:  12-06    135  |  102  |  61[HH]  ----------------------------<  81  4.4   |  21  |  2.4[H]    Ca    8.0[L]      06 Dec 2024 04:40  Mg     2.3     12-06    TPro  5.7[L]  /  Alb  3.5  /  TBili  0.5  /  DBili      /  AST  11  /  ALT  20  /  AlkPhos  44  12-06    Creatinine Trend: 2.4 <--, 1.8 <--, 1.0 <--, 1.3 <--                        12.6   10.53 )-----------( 165      ( 06 Dec 2024 04:40 )             40.1     Urine Studies:  Urinalysis Basic - ( 06 Dec 2024 04:40 )    Color:  / Appearance:  / SG:  / pH:   Gluc: 81 mg/dL / Ketone:   / Bili:  / Urobili:    Blood:  / Protein:  / Nitrite:    Leuk Esterase:  / RBC:  / WBC    Sq Epi:  / Non Sq Epi:  / Bacteria:       RADIOLOGY & ADDITIONAL STUDIES:      ACC: [id number] EXAM:  XR CHEST PORTABLE ROUTINE 1V   ORDERED BY: PIPPA ALEJANDRE     PROCEDURE DATE:  12/04/2024          INTERPRETATION:  CLINICAL HISTORY: Shortness of breath    COMPARISON: Chest x-ray December 3, 2024.    TECHNIQUE:     Frontal view chest    Findings/  impression:    EKG leads overlie thorax, obscuring anatomy.    Support devices: None    Cardiac/ Mediastinum: Stable    Lungs/ Pleura: Stable reticular densities left lung base. No   consolidation or pneumothorax.    Skeletal/soft tissues: Stable          --- End of Report ---      ACC: [id number] EXAM:  CT ABDOMEN AND PELVIS   ORDERED BY: ANMOL MENDEZ     PROCEDURE DATE:  12/04/2024          INTERPRETATION:  CLINICAL STATEMENT: Preop  for MVR.    TECHNIQUE: Contiguous axial CT images were obtained from the lower chest   to the pubic symphysis without intravenous contrast.  Oral contrast was   not administered.  Reformatted images in the coronal and sagittal planes   were acquired.    COMPARISON CT: None.    OTHER STUDIES USED FOR CORRELATION: None.      FINDINGS:  Limited evaluation of solid organs and vascular structures secondary to   lack of intravenous contrast.    HEPATOBILIARY: Unremarkable.    SPLEEN: Unremarkable.    PANCREAS: Unremarkable.    ADRENAL GLANDS: Thickened bilateral adrenal gland.    KIDNEYS: No nephroureteral calculi or hydronephrosis.    ABDOMINOPELVIC NODES: No lymphadenopathy.    PELVIC ORGANS: Limited evaluation secondary to streak artifact from hip   hardware.    PERITONEUM/MESENTERY/BOWEL: No bowel obstruction, ascites or   pneumoperitoneum. Normal appendix. Scattered colonic diverticulosis.    BONES/SOFT TISSUES: Post bilateral hip arthroplasties. Degenerative   changes of the spine noted.    OTHER: Atherosclerotic vascular calcifications.      IMPRESSION:  No CT evidence of an acute abdominopelvic pathology.  See separately dictated CT chest report for intrathoracic findings.    --- End of Report ---            SHIRIN LEROY MD; Attending Radiologist  This document has been electronically signed. Dec  4 2024  8:08AM        ACC: [id number] EXAM:  US KIDNEYS AND BLADDER   ORDERED BY: DAE VICENTE     PROCEDURE DATE:  12/06/2024          INTERPRETATION:  CLINICAL INFORMATION: Acute kidney injury    COMPARISON: CT abdomen pelvis from 12/4/2024.    TECHNIQUE: Sonography of the kidneys and bladder.    FINDINGS:  Right kidney: 12.2 cm. No hydronephrosis    Left kidney: 12.6 cm. No hydronephrosis    Urinary bladder: Bladder wall thickening. Bladder volume of 331 mL.   Patient did not have urge to void at time of exam.    IMPRESSION:  No hydronephrosis.    Bladder wall thickening. Correlate with urinalysis.

## 2024-12-06 NOTE — CONSULT NOTE ADULT - ATTENDING COMMENTS
Complex patient  AF with RVR  recommend complete Amio IV  then Amiodarone PO  Add metoprolol 25 mg q12h for rate control  At time of valve repair, recommend Atriclip and LA epicardial ablation including vein of Mehran ablation
63 yo man with extensive PMH presenting to the hospital for A fib with RVR   Renal called for FLAKITO     # FLAKITO multifactorial obstructive with hx of BPH and ATN ( hypotension)  # A fib with RVR     - sono noted with bladder volume of 300 cc  - history of BPH - suggest prince catheter if agreeable/ keep prince if UOP > 300 cc   - keep MAP > 65  - on diltiazem metoprolol  - keep hctz / metoprolol on hold   - decrease gabapentin to 100 mg q8h   - follow BMP IP     will follow

## 2024-12-07 LAB
ALBUMIN SERPL ELPH-MCNC: 3.8 G/DL — SIGNIFICANT CHANGE UP (ref 3.5–5.2)
ALP SERPL-CCNC: 54 U/L — SIGNIFICANT CHANGE UP (ref 30–115)
ALT FLD-CCNC: 18 U/L — SIGNIFICANT CHANGE UP (ref 0–41)
ANION GAP SERPL CALC-SCNC: 12 MMOL/L — SIGNIFICANT CHANGE UP (ref 7–14)
ANION GAP SERPL CALC-SCNC: 12 MMOL/L — SIGNIFICANT CHANGE UP (ref 7–14)
APTT BLD: 36.2 SEC — SIGNIFICANT CHANGE UP (ref 27–39.2)
APTT BLD: 50.9 SEC — HIGH (ref 27–39.2)
APTT BLD: 62.5 SEC — HIGH (ref 27–39.2)
AST SERPL-CCNC: 14 U/L — SIGNIFICANT CHANGE UP (ref 0–41)
BILIRUB SERPL-MCNC: 0.4 MG/DL — SIGNIFICANT CHANGE UP (ref 0.2–1.2)
BUN SERPL-MCNC: 47 MG/DL — HIGH (ref 10–20)
BUN SERPL-MCNC: 52 MG/DL — HIGH (ref 10–20)
CALCIUM SERPL-MCNC: 7.1 MG/DL — LOW (ref 8.4–10.5)
CALCIUM SERPL-MCNC: 8.1 MG/DL — LOW (ref 8.4–10.5)
CHLORIDE SERPL-SCNC: 102 MMOL/L — SIGNIFICANT CHANGE UP (ref 98–110)
CHLORIDE SERPL-SCNC: 89 MMOL/L — LOW (ref 98–110)
CO2 SERPL-SCNC: 18 MMOL/L — SIGNIFICANT CHANGE UP (ref 17–32)
CO2 SERPL-SCNC: 20 MMOL/L — SIGNIFICANT CHANGE UP (ref 17–32)
CREAT SERPL-MCNC: 1.7 MG/DL — HIGH (ref 0.7–1.5)
CREAT SERPL-MCNC: 1.7 MG/DL — HIGH (ref 0.7–1.5)
EGFR: 44 ML/MIN/1.73M2 — LOW
EGFR: 44 ML/MIN/1.73M2 — LOW
GLUCOSE BLDC GLUCOMTR-MCNC: 105 MG/DL — HIGH (ref 70–99)
GLUCOSE BLDC GLUCOMTR-MCNC: 113 MG/DL — HIGH (ref 70–99)
GLUCOSE BLDC GLUCOMTR-MCNC: 159 MG/DL — HIGH (ref 70–99)
GLUCOSE BLDC GLUCOMTR-MCNC: 85 MG/DL — SIGNIFICANT CHANGE UP (ref 70–99)
GLUCOSE BLDC GLUCOMTR-MCNC: 99 MG/DL — SIGNIFICANT CHANGE UP (ref 70–99)
GLUCOSE SERPL-MCNC: 153 MG/DL — HIGH (ref 70–99)
GLUCOSE SERPL-MCNC: 651 MG/DL — CRITICAL HIGH (ref 70–99)
MAGNESIUM SERPL-MCNC: 2.3 MG/DL — SIGNIFICANT CHANGE UP (ref 1.8–2.4)
MAGNESIUM SERPL-MCNC: 2.6 MG/DL — HIGH (ref 1.8–2.4)
NIGHT BLUE STAIN TISS: SIGNIFICANT CHANGE UP
POTASSIUM SERPL-MCNC: 4.1 MMOL/L — SIGNIFICANT CHANGE UP (ref 3.5–5)
POTASSIUM SERPL-MCNC: 4.3 MMOL/L — SIGNIFICANT CHANGE UP (ref 3.5–5)
POTASSIUM SERPL-SCNC: 4.1 MMOL/L — SIGNIFICANT CHANGE UP (ref 3.5–5)
POTASSIUM SERPL-SCNC: 4.3 MMOL/L — SIGNIFICANT CHANGE UP (ref 3.5–5)
PROT SERPL-MCNC: 6 G/DL — SIGNIFICANT CHANGE UP (ref 6–8)
SODIUM SERPL-SCNC: 120 MMOL/L — LOW (ref 135–146)
SODIUM SERPL-SCNC: 134 MMOL/L — LOW (ref 135–146)
SPECIMEN SOURCE: SIGNIFICANT CHANGE UP

## 2024-12-07 PROCEDURE — 93010 ELECTROCARDIOGRAM REPORT: CPT

## 2024-12-07 PROCEDURE — 99232 SBSQ HOSP IP/OBS MODERATE 35: CPT

## 2024-12-07 PROCEDURE — 99291 CRITICAL CARE FIRST HOUR: CPT

## 2024-12-07 RX ORDER — METOPROLOL TARTRATE 100 MG/1
50 TABLET, FILM COATED ORAL
Refills: 0 | Status: DISCONTINUED | OUTPATIENT
Start: 2024-12-07 | End: 2024-12-10

## 2024-12-07 RX ORDER — AMIODARONE HYDROCHLORIDE 200 MG/1
0.5 TABLET ORAL
Qty: 450 | Refills: 0 | Status: DISCONTINUED | OUTPATIENT
Start: 2024-12-07 | End: 2024-12-10

## 2024-12-07 RX ORDER — METOPROLOL TARTRATE 100 MG/1
37.5 TABLET, FILM COATED ORAL EVERY 12 HOURS
Refills: 0 | Status: DISCONTINUED | OUTPATIENT
Start: 2024-12-07 | End: 2024-12-07

## 2024-12-07 RX ORDER — ACETAMINOPHEN, DIPHENHYDRAMINE HCL, PHENYLEPHRINE HCL 325; 25; 5 MG/1; MG/1; MG/1
10 TABLET ORAL AT BEDTIME
Refills: 0 | Status: DISCONTINUED | OUTPATIENT
Start: 2024-12-07 | End: 2024-12-10

## 2024-12-07 RX ORDER — METOPROLOL TARTRATE 100 MG/1
12.5 TABLET, FILM COATED ORAL ONCE
Refills: 0 | Status: COMPLETED | OUTPATIENT
Start: 2024-12-07 | End: 2024-12-07

## 2024-12-07 RX ORDER — SENNOSIDES 8.6 MG
2 TABLET ORAL AT BEDTIME
Refills: 0 | Status: DISCONTINUED | OUTPATIENT
Start: 2024-12-07 | End: 2024-12-08

## 2024-12-07 RX ORDER — FUROSEMIDE 40 MG/1
40 TABLET ORAL ONCE
Refills: 0 | Status: COMPLETED | OUTPATIENT
Start: 2024-12-07 | End: 2024-12-07

## 2024-12-07 RX ORDER — AMIODARONE HYDROCHLORIDE 200 MG/1
400 TABLET ORAL EVERY 8 HOURS
Refills: 0 | Status: DISCONTINUED | OUTPATIENT
Start: 2024-12-07 | End: 2024-12-10

## 2024-12-07 RX ADMIN — Medication 500 MICROGRAM(S): at 09:00

## 2024-12-07 RX ADMIN — Medication 50 MILLILITER(S): at 17:40

## 2024-12-07 RX ADMIN — OXYCODONE HYDROCHLORIDE 10 MILLIGRAM(S): 30 TABLET ORAL at 06:19

## 2024-12-07 RX ADMIN — AMIODARONE HYDROCHLORIDE 400 MILLIGRAM(S): 200 TABLET ORAL at 13:07

## 2024-12-07 RX ADMIN — METOPROLOL TARTRATE 25 MILLIGRAM(S): 100 TABLET, FILM COATED ORAL at 06:19

## 2024-12-07 RX ADMIN — CHLORHEXIDINE GLUCONATE 1 APPLICATION(S): 1.2 RINSE ORAL at 06:48

## 2024-12-07 RX ADMIN — Medication 5 UNIT(S): at 16:43

## 2024-12-07 RX ADMIN — TAMSULOSIN HYDROCHLORIDE 0.4 MILLIGRAM(S): 0.4 CAPSULE ORAL at 22:34

## 2024-12-07 RX ADMIN — Medication 2: at 11:49

## 2024-12-07 RX ADMIN — Medication 14 UNIT(S)/HR: at 06:59

## 2024-12-07 RX ADMIN — Medication 81 MILLIGRAM(S): at 11:48

## 2024-12-07 RX ADMIN — FLUTICASONE PROPIONATE AND SALMETEROL XINAFOATE 1 DOSE(S): 45; 21 AEROSOL, METERED RESPIRATORY (INHALATION) at 08:50

## 2024-12-07 RX ADMIN — Medication 1 APPLICATION(S): at 17:50

## 2024-12-07 RX ADMIN — FUROSEMIDE 40 MILLIGRAM(S): 40 TABLET ORAL at 16:30

## 2024-12-07 RX ADMIN — AMIODARONE HYDROCHLORIDE 16.7 MG/MIN: 200 TABLET ORAL at 09:03

## 2024-12-07 RX ADMIN — Medication 5 UNIT(S): at 07:50

## 2024-12-07 RX ADMIN — Medication 50 MILLILITER(S): at 12:10

## 2024-12-07 RX ADMIN — GABAPENTIN 300 MILLIGRAM(S): 300 CAPSULE ORAL at 13:07

## 2024-12-07 RX ADMIN — AMIODARONE HYDROCHLORIDE 400 MILLIGRAM(S): 200 TABLET ORAL at 22:34

## 2024-12-07 RX ADMIN — OXYCODONE HYDROCHLORIDE 10 MILLIGRAM(S): 30 TABLET ORAL at 17:12

## 2024-12-07 RX ADMIN — Medication 500 MICROGRAM(S): at 14:02

## 2024-12-07 RX ADMIN — Medication 5 UNIT(S): at 11:49

## 2024-12-07 RX ADMIN — GABAPENTIN 300 MILLIGRAM(S): 300 CAPSULE ORAL at 06:19

## 2024-12-07 RX ADMIN — OXYCODONE HYDROCHLORIDE 10 MILLIGRAM(S): 30 TABLET ORAL at 01:55

## 2024-12-07 RX ADMIN — Medication 1 APPLICATION(S): at 06:19

## 2024-12-07 RX ADMIN — SODIUM CHLORIDE 3 MILLILITER(S): 9 INJECTION, SOLUTION INTRAMUSCULAR; INTRAVENOUS; SUBCUTANEOUS at 00:37

## 2024-12-07 RX ADMIN — OXYCODONE HYDROCHLORIDE 10 MILLIGRAM(S): 30 TABLET ORAL at 16:42

## 2024-12-07 RX ADMIN — INSULIN GLARGINE 60 UNIT(S): 100 INJECTION, SOLUTION SUBCUTANEOUS at 07:49

## 2024-12-07 RX ADMIN — OXYCODONE HYDROCHLORIDE 10 MILLIGRAM(S): 30 TABLET ORAL at 07:41

## 2024-12-07 RX ADMIN — Medication 14 UNIT(S)/HR: at 23:35

## 2024-12-07 RX ADMIN — Medication 14 UNIT(S)/HR: at 01:54

## 2024-12-07 RX ADMIN — METOPROLOL TARTRATE 12.5 MILLIGRAM(S): 100 TABLET, FILM COATED ORAL at 09:04

## 2024-12-07 RX ADMIN — Medication 2 TABLET(S): at 17:50

## 2024-12-07 RX ADMIN — Medication 500 MICROGRAM(S): at 19:45

## 2024-12-07 RX ADMIN — GABAPENTIN 300 MILLIGRAM(S): 300 CAPSULE ORAL at 22:34

## 2024-12-07 RX ADMIN — SODIUM CHLORIDE 3 MILLILITER(S): 9 INJECTION, SOLUTION INTRAMUSCULAR; INTRAVENOUS; SUBCUTANEOUS at 13:22

## 2024-12-07 RX ADMIN — Medication 50 MILLILITER(S): at 23:35

## 2024-12-07 RX ADMIN — ACETAMINOPHEN, DIPHENHYDRAMINE HCL, PHENYLEPHRINE HCL 10 MILLIGRAM(S): 325; 25; 5 TABLET ORAL at 22:35

## 2024-12-07 RX ADMIN — METOPROLOL TARTRATE 50 MILLIGRAM(S): 100 TABLET, FILM COATED ORAL at 17:50

## 2024-12-07 RX ADMIN — SODIUM CHLORIDE 3 MILLILITER(S): 9 INJECTION, SOLUTION INTRAMUSCULAR; INTRAVENOUS; SUBCUTANEOUS at 22:05

## 2024-12-07 RX ADMIN — SODIUM CHLORIDE 3 MILLILITER(S): 9 INJECTION, SOLUTION INTRAMUSCULAR; INTRAVENOUS; SUBCUTANEOUS at 07:15

## 2024-12-07 RX ADMIN — PANTOPRAZOLE SODIUM 40 MILLIGRAM(S): 40 TABLET, DELAYED RELEASE ORAL at 06:19

## 2024-12-07 RX ADMIN — ROSUVASTATIN CALCIUM 20 MILLIGRAM(S): 5 TABLET, FILM COATED ORAL at 22:34

## 2024-12-07 NOTE — PROGRESS NOTE ADULT - SUBJECTIVE AND OBJECTIVE BOX
PREOPERATIVE PROCEDURE(s): MVR                HD # 4                      SURGEON(s): ISELA Blakely      SUBJECTIVE ASSESSMENT:64yMale patient seen and examined at bedside. No complaints at this time    Vital Signs Last 24 Hrs  T(F): 97.2 (07 Dec 2024 04:00), Max: 98 (06 Dec 2024 16:00)  HR: 71 (07 Dec 2024 06:00) (65 - 116)  BP: 115/61 (07 Dec 2024 06:00) (94/64 - 181/90)  BP(mean): 79 (07 Dec 2024 06:00) (70 - 125)  RR: 20 (06 Dec 2024 18:00) (15 - 21)  SpO2: 93% (07 Dec 2024 06:00) (92% - 97%)      I&O's Detail    05 Dec 2024 07:01  -  06 Dec 2024 07:00  --------------------------------------------------------  IN:    Oral Fluid: 375 mL  Total IN: 375 mL    OUT:  Total OUT: 0 mL        Net: I&O's Detail    04 Dec 2024 07:01  -  05 Dec 2024 07:00  --------------------------------------------------------  Total NET: 818.2 mL      05 Dec 2024 07:01  -  06 Dec 2024 07:00  --------------------------------------------------------  Total NET: 375 mL        CAPILLARY BLOOD GLUCOSE      POCT Blood Glucose.: 110 mg/dL (06 Dec 2024 21:28)  POCT Blood Glucose.: 180 mg/dL (06 Dec 2024 16:33)  POCT Blood Glucose.: 131 mg/dL (06 Dec 2024 11:37)  POCT Blood Glucose.: 76 mg/dL (06 Dec 2024 07:03)    A1C with Estimated Average Glucose Result: 7.1 % (12-04-24 @ 04:45)      Physical Exam:  General: NAD; A&Ox3  Neuro: pupils equal and reactive, speech clear, no overt sensory or motor deficts  Cardiac: S1/S2, RRR, (+) murmur, no rubs  Lungs: unlabored respirations, CTA b/l, no wheeze, no rales, no crackles  Abdomen: Soft/NT/ND; positive bowel sounds x 4  Extremities: No edema b/l lower extremities; good capillary refill; no cyanosis; palpable 1+ pedal pulses b/l      BOWEL MOVEMENT:  [] YES [] NO, If No, Timing since last BM Day #         LABS:                        12.6[L]  10.53 )-----------( 165      ( 06 Dec 2024 04:40 )             40.1[L]                        13.8[L]  12.48[H] )-----------( 200      ( 05 Dec 2024 04:05 )             43.4     12-06    136  |  102  |  60[H]  ----------------------------<  142[H]  4.7   |  21  |  1.9[H]  12-06    135  |  102  |  61[HH]  ----------------------------<  81  4.4   |  21  |  2.4[H]    Ca    8.2[L]      06 Dec 2024 14:20  Mg     2.3     12-06    TPro  5.7[L] [6.0 - 8.0]  /  Alb  3.5 [3.5 - 5.2]  /  TBili  0.5 [0.2 - 1.2]  /  DBili  x   /  AST  11 [0 - 41]  /  ALT  20 [0 - 41]  /  AlkPhos  44 [30 - 115]  12-06    PTT - ( 07 Dec 2024 00:15 )  PTT:36.2 sec, PTT - ( 06 Dec 2024 18:00 )  PTT:48.8 sec      RADIOLOGY & ADDITIONAL TESTS:  CXR:   < from: Xray Chest 1 View- PORTABLE-Routine (Xray Chest 1 View- PORTABLE-Routine in AM.) (12.06.24 @ 05:22) >  INTERPRETATION:  CLINICAL HISTORY: Shortness of breath.    COMPARISON: Chest radiograph 12/4/2024.    TECHNIQUE: Portable frontal chest radiograph.    FINDINGS:    Support devices: None.    Cardiac/mediastinum/hilum: Stable.    Lung parenchyma/Pleura: Stable left basilar opacity. No pneumothorax.    Skeleton/soft tissues: Stable.      IMPRESSION:    Stable left basilar opacity. No pneumothorax    Allergies    No Known Allergies    Intolerances      MEDICATIONS  (STANDING):  aMIOdarone Infusion 1 mG/Min (33.3 mL/Hr) IV Continuous <Continuous>  aMIOdarone Infusion 0.5 mG/Min (16.7 mL/Hr) IV Continuous <Continuous>  aspirin enteric coated 81 milliGRAM(s) Oral daily  bisacodyl Suppository 10 milliGRAM(s) Rectal once  chlorhexidine 2% Cloths 1 Application(s) Topical <User Schedule>  dextrose 5%. 1000 milliLiter(s) (50 mL/Hr) IV Continuous <Continuous>  dextrose 5%. 1000 milliLiter(s) (100 mL/Hr) IV Continuous <Continuous>  dextrose 50% Injectable 25 Gram(s) IV Push once  dextrose 50% Injectable 12.5 Gram(s) IV Push once  dextrose 50% Injectable 25 Gram(s) IV Push once  fluticasone propionate/ salmeterol 250-50 MICROgram(s) Diskus 1 Dose(s) Inhalation two times a day  gabapentin 300 milliGRAM(s) Oral three times a day  glucagon  Injectable 1 milliGRAM(s) IntraMuscular once  heparin  Infusion 1300 Unit(s)/Hr (14 mL/Hr) IV Continuous <Continuous>  insulin glargine Injectable (LANTUS) 60 Unit(s) SubCutaneous every morning  insulin lispro (ADMELOG) corrective regimen sliding scale   SubCutaneous at bedtime  insulin lispro (ADMELOG) corrective regimen sliding scale   SubCutaneous three times a day before meals  insulin lispro Injectable (ADMELOG) 5 Unit(s) SubCutaneous three times a day before meals  ipratropium    for Nebulization 500 MICROGram(s) Nebulizer every 6 hours  lactated ringers. 1000 milliLiter(s) (50 mL/Hr) IV Continuous <Continuous>  metoprolol tartrate 25 milliGRAM(s) Oral every 12 hours  mupirocin 2% Nasal 1 Application(s) Both Nostrils two times a day  pantoprazole    Tablet 40 milliGRAM(s) Oral before breakfast  rosuvastatin 20 milliGRAM(s) Oral at bedtime  sodium chloride 0.9% lock flush 3 milliLiter(s) IV Push every 8 hours  tamsulosin 0.4 milliGRAM(s) Oral at bedtime    MEDICATIONS  (PRN):  acetaminophen   IVPB .. 1000 milliGRAM(s) IV Intermittent once PRN Moderate Pain (4 - 6)  dextrose Oral Gel 15 Gram(s) Oral once PRN Blood Glucose LESS THAN 70 milliGRAM(s)/deciliter  oxyCODONE    IR 10 milliGRAM(s) Oral every 6 hours PRN Severe Pain (7 - 10)    Home Medications:  Aspir 81 oral delayed release tablet: 1 tab(s) orally once a day (31 Oct 2024 09:30)  Breo Ellipta 200 mcg-25 mcg/inh inhalation powder: 1 inhaled once a day (31 Oct 2024 09:30)  Crestor 20 mg oral tablet: 1 tab(s) orally once a day (at bedtime) (31 Oct 2024 09:30)  gabapentin 300 mg oral capsule: 1 cap(s) orally 3 times a day (31 Oct 2024 09:30)  HYDROcodone 10 mg oral capsule, extended release: 1 cap(s) orally 2 times a day (31 Oct 2024 09:30)  Jardiance 25 mg oral tablet: 1 tab(s) orally once a day (31 Oct 2024 09:30)  losartan-hydrochlorothiazide 100mg-12.5mg oral tablet: orally 2 times a day (31 Oct 2024 09:30)  meloxicam 15 mg oral tablet: 1 tab(s) orally once a day (31 Oct 2024 09:30)  metFORMIN 1000 mg oral tablet, extended release: orally 2 times a day (31 Oct 2024 09:30)  Mounjaro 15 mg/0.5 mL subcutaneous solution: 15 milligram(s) subcutaneously once a week on Thur (31 Oct 2024 09:30)  pioglitazone 15 mg oral tablet: 1 tab(s) orally once a day (31 Oct 2024 09:30)  ProAir HFA 90 mcg/inh inhalation aerosol: 2 puff(s) inhaled 2 times a day (31 Oct 2024 09:30)  tujeo: 80 unit(s) subcutaneous once a day (31 Oct 2024 09:30)      Pharmacologic DVT Prophylaxis [X] YES, [] NO: Contraindication:  SCD's: YES b/l    GI Prophylaxis: protonix      Ambulation/Activity Status: ambulate as tolerated    Assessment/Plan:  64y Male pre-op MVR  - Case and plan discussed with CTU Intensivist and CT Surgeon - Dr. Blakely/Mauri/Laron/Rian  - Continue CTU supportive care and ongoing plan of care as per continuing CTU rounds.   - Continue DVT/GI prophylaxis  - Incentive Spirometry 10 times an hour  - Continue to advance physical activity as tolerated and continue PT/OT as directed  1. continue ASA, statin   2. AFIB with RVR - continue amiodarone, load and change to po; add b blocker for rate control and d/c anticoagulation with eliquis and resume heparin  3. Cr 1.9 -- hold Losartan, lasix, celebrex, hctz, metformin and obtain urine lytes  4. COPD/Hypoxia: albuterol, advair nebs Q6H   5. DM/Glucose Control: continue Lantus 80 U and ISS  6. preop MVR- dental consult appreciated- pt is cleared from sx  7. Pt scheduled for JASMINA/Cardioversion with Dr. Paris- cancelled due to pt on GLP1 (Mounjaro) last dose Tuesday 12/3   start flomax for urinary retention    Social Service Disposition:  home once creat improves and will plan for sx in 1-2 weeks   PREOPERATIVE PROCEDURE(s): MVR                HD # 4                      SURGEON(s): ISELA Blakely      SUBJECTIVE ASSESSMENT:64yMale patient seen and examined at bedside. No complaints at this time    Vital Signs Last 24 Hrs  T(F): 97.2 (07 Dec 2024 04:00), Max: 98 (06 Dec 2024 16:00)  HR: 71 (07 Dec 2024 06:00) (65 - 116)  BP: 115/61 (07 Dec 2024 06:00) (94/64 - 181/90)  BP(mean): 79 (07 Dec 2024 06:00) (70 - 125)  RR: 20 (06 Dec 2024 18:00) (15 - 21)  SpO2: 93% (07 Dec 2024 06:00) (92% - 97%)      I&O's Detail    05 Dec 2024 07:01  -  06 Dec 2024 07:00  --------------------------------------------------------  IN:    Oral Fluid: 375 mL  Total IN: 375 mL    OUT:  Total OUT: 0 mL        Net: I&O's Detail    04 Dec 2024 07:01  -  05 Dec 2024 07:00  --------------------------------------------------------  Total NET: 818.2 mL      05 Dec 2024 07:01  -  06 Dec 2024 07:00  --------------------------------------------------------  Total NET: 375 mL        CAPILLARY BLOOD GLUCOSE      POCT Blood Glucose.: 110 mg/dL (06 Dec 2024 21:28)  POCT Blood Glucose.: 180 mg/dL (06 Dec 2024 16:33)  POCT Blood Glucose.: 131 mg/dL (06 Dec 2024 11:37)  POCT Blood Glucose.: 76 mg/dL (06 Dec 2024 07:03)    A1C with Estimated Average Glucose Result: 7.1 % (12-04-24 @ 04:45)      Physical Exam:  General: NAD; A&Ox3  Neuro: pupils equal and reactive, speech clear, no overt sensory or motor deficts  Cardiac: S1/S2, RRR, (+) murmur, no rubs  Lungs: unlabored respirations, CTA b/l, no wheeze, no rales, no crackles  Abdomen: Soft/NT/ND; positive bowel sounds x 4  Extremities: Moderate edema b/l lower extremities; good capillary refill; no cyanosis; palpable 1+ pedal pulses b/l      BOWEL MOVEMENT:  X YES [] NO, If No, Timing since last BM Day #         LABS:                        12.6[L]  10.53 )-----------( 165      ( 06 Dec 2024 04:40 )             40.1[L]                        13.8[L]  12.48[H] )-----------( 200      ( 05 Dec 2024 04:05 )             43.4     12-06    136  |  102  |  60[H]  ----------------------------<  142[H]  4.7   |  21  |  1.9[H]  12-06    135  |  102  |  61[HH]  ----------------------------<  81  4.4   |  21  |  2.4[H]    Ca    8.2[L]      06 Dec 2024 14:20  Mg     2.3     12-06    TPro  5.7[L] [6.0 - 8.0]  /  Alb  3.5 [3.5 - 5.2]  /  TBili  0.5 [0.2 - 1.2]  /  DBili  x   /  AST  11 [0 - 41]  /  ALT  20 [0 - 41]  /  AlkPhos  44 [30 - 115]  12-06    PTT - ( 07 Dec 2024 00:15 )  PTT:36.2 sec, PTT - ( 06 Dec 2024 18:00 )  PTT:48.8 sec      RADIOLOGY & ADDITIONAL TESTS:  CXR:   < from: Xray Chest 1 View- PORTABLE-Routine (Xray Chest 1 View- PORTABLE-Routine in AM.) (12.06.24 @ 05:22) >  INTERPRETATION:  CLINICAL HISTORY: Shortness of breath.    COMPARISON: Chest radiograph 12/4/2024.    TECHNIQUE: Portable frontal chest radiograph.    FINDINGS:    Support devices: None.    Cardiac/mediastinum/hilum: Stable.    Lung parenchyma/Pleura: Stable left basilar opacity. No pneumothorax.    Skeleton/soft tissues: Stable.      IMPRESSION:    Stable left basilar opacity. No pneumothorax    Allergies    No Known Allergies    Intolerances      MEDICATIONS  (STANDING):  aMIOdarone Infusion 1 mG/Min (33.3 mL/Hr) IV Continuous <Continuous>  aMIOdarone Infusion 0.5 mG/Min (16.7 mL/Hr) IV Continuous <Continuous>  aspirin enteric coated 81 milliGRAM(s) Oral daily  bisacodyl Suppository 10 milliGRAM(s) Rectal once  chlorhexidine 2% Cloths 1 Application(s) Topical <User Schedule>  dextrose 5%. 1000 milliLiter(s) (50 mL/Hr) IV Continuous <Continuous>  dextrose 5%. 1000 milliLiter(s) (100 mL/Hr) IV Continuous <Continuous>  dextrose 50% Injectable 25 Gram(s) IV Push once  dextrose 50% Injectable 12.5 Gram(s) IV Push once  dextrose 50% Injectable 25 Gram(s) IV Push once  fluticasone propionate/ salmeterol 250-50 MICROgram(s) Diskus 1 Dose(s) Inhalation two times a day  gabapentin 300 milliGRAM(s) Oral three times a day  glucagon  Injectable 1 milliGRAM(s) IntraMuscular once  heparin  Infusion 1300 Unit(s)/Hr (14 mL/Hr) IV Continuous <Continuous>  insulin glargine Injectable (LANTUS) 60 Unit(s) SubCutaneous every morning  insulin lispro (ADMELOG) corrective regimen sliding scale   SubCutaneous at bedtime  insulin lispro (ADMELOG) corrective regimen sliding scale   SubCutaneous three times a day before meals  insulin lispro Injectable (ADMELOG) 5 Unit(s) SubCutaneous three times a day before meals  ipratropium    for Nebulization 500 MICROGram(s) Nebulizer every 6 hours  lactated ringers. 1000 milliLiter(s) (50 mL/Hr) IV Continuous <Continuous>  metoprolol tartrate 25 milliGRAM(s) Oral every 12 hours  mupirocin 2% Nasal 1 Application(s) Both Nostrils two times a day  pantoprazole    Tablet 40 milliGRAM(s) Oral before breakfast  rosuvastatin 20 milliGRAM(s) Oral at bedtime  sodium chloride 0.9% lock flush 3 milliLiter(s) IV Push every 8 hours  tamsulosin 0.4 milliGRAM(s) Oral at bedtime    MEDICATIONS  (PRN):  acetaminophen   IVPB .. 1000 milliGRAM(s) IV Intermittent once PRN Moderate Pain (4 - 6)  dextrose Oral Gel 15 Gram(s) Oral once PRN Blood Glucose LESS THAN 70 milliGRAM(s)/deciliter  oxyCODONE    IR 10 milliGRAM(s) Oral every 6 hours PRN Severe Pain (7 - 10)    Home Medications:  Aspir 81 oral delayed release tablet: 1 tab(s) orally once a day (31 Oct 2024 09:30)  Breo Ellipta 200 mcg-25 mcg/inh inhalation powder: 1 inhaled once a day (31 Oct 2024 09:30)  Crestor 20 mg oral tablet: 1 tab(s) orally once a day (at bedtime) (31 Oct 2024 09:30)  gabapentin 300 mg oral capsule: 1 cap(s) orally 3 times a day (31 Oct 2024 09:30)  HYDROcodone 10 mg oral capsule, extended release: 1 cap(s) orally 2 times a day (31 Oct 2024 09:30)  Jardiance 25 mg oral tablet: 1 tab(s) orally once a day (31 Oct 2024 09:30)  losartan-hydrochlorothiazide 100mg-12.5mg oral tablet: orally 2 times a day (31 Oct 2024 09:30)  meloxicam 15 mg oral tablet: 1 tab(s) orally once a day (31 Oct 2024 09:30)  metFORMIN 1000 mg oral tablet, extended release: orally 2 times a day (31 Oct 2024 09:30)  Mounjaro 15 mg/0.5 mL subcutaneous solution: 15 milligram(s) subcutaneously once a week on Thur (31 Oct 2024 09:30)  pioglitazone 15 mg oral tablet: 1 tab(s) orally once a day (31 Oct 2024 09:30)  ProAir HFA 90 mcg/inh inhalation aerosol: 2 puff(s) inhaled 2 times a day (31 Oct 2024 09:30)  tujeo: 80 unit(s) subcutaneous once a day (31 Oct 2024 09:30)      Pharmacologic DVT Prophylaxis [X] YES, [] NO: Contraindication:  SCD's: YES b/l    GI Prophylaxis: protonix      Ambulation/Activity Status: ambulate as tolerated    Assessment/Plan:  64y Male pre-op MVR  - Case and plan discussed with CTU Intensivist and CT Surgeon - Dr. Blakely/Mauri/Laron/Rian  - Continue CTU supportive care and ongoing plan of care as per continuing CTU rounds.   - Continue DVT/GI prophylaxis  - Incentive Spirometry 10 times an hour  - Continue to advance physical activity as tolerated and continue PT/OT as directed  1. continue ASA, statin   2. AFIB with RVR - continue amiodarone, load and change to po; add b blocker for rate control and d/c anticoagulation with eliquis and resume heparin  3. Cr 1.9 -- hold Losartan, lasix, celebrex, hctz, metformin. Urine lytes negative, repeat Cr 1.7, trending down  4. COPD/Hypoxia: albuterol, advair nebs Q6H   5. DM/Glucose Control: continue Lantus 80 U and ISS  6. preop MVR- dental consult appreciated- pt is cleared from sx  7. Pt scheduled for JASMINA/Cardioversion with Dr. Paris- cancelled due to pt on GLP1 (Mounjaro) last dose Tuesday 12/3   start flomax for urinary retention    Social Service Disposition:  home once creat improves and will plan for sx in 1-2 weeks

## 2024-12-07 NOTE — PROGRESS NOTE ADULT - SUBJECTIVE AND OBJECTIVE BOX
seen and examined  24 h events noted   no distress         PAST HISTORY  --------------------------------------------------------------------------------  No significant changes to PMH, PSH, FHx, SHx, unless otherwise noted    ALLERGIES & MEDICATIONS  --------------------------------------------------------------------------------  Allergies    No Known Allergies    Intolerances      Standing Inpatient Medications  albumin human 25% IVPB 50 milliLiter(s) IV Intermittent every 6 hours  aMIOdarone    Tablet 400 milliGRAM(s) Oral every 8 hours  aMIOdarone Infusion 0.5 mG/Min IV Continuous <Continuous>  aspirin enteric coated 81 milliGRAM(s) Oral daily  bisacodyl Suppository 10 milliGRAM(s) Rectal once  chlorhexidine 2% Cloths 1 Application(s) Topical <User Schedule>  dextrose 5%. 1000 milliLiter(s) IV Continuous <Continuous>  dextrose 5%. 1000 milliLiter(s) IV Continuous <Continuous>  dextrose 50% Injectable 25 Gram(s) IV Push once  dextrose 50% Injectable 12.5 Gram(s) IV Push once  dextrose 50% Injectable 25 Gram(s) IV Push once  fluticasone propionate/ salmeterol 250-50 MICROgram(s) Diskus 1 Dose(s) Inhalation two times a day  gabapentin 300 milliGRAM(s) Oral three times a day  glucagon  Injectable 1 milliGRAM(s) IntraMuscular once  heparin  Infusion 1300 Unit(s)/Hr IV Continuous <Continuous>  insulin glargine Injectable (LANTUS) 60 Unit(s) SubCutaneous every morning  insulin lispro (ADMELOG) corrective regimen sliding scale   SubCutaneous three times a day before meals  insulin lispro (ADMELOG) corrective regimen sliding scale   SubCutaneous at bedtime  insulin lispro Injectable (ADMELOG) 5 Unit(s) SubCutaneous three times a day before meals  ipratropium    for Nebulization 500 MICROGram(s) Nebulizer every 6 hours  lactated ringers. 1000 milliLiter(s) IV Continuous <Continuous>  metoprolol tartrate 37.5 milliGRAM(s) Oral every 12 hours  mupirocin 2% Nasal 1 Application(s) Both Nostrils two times a day  pantoprazole    Tablet 40 milliGRAM(s) Oral before breakfast  rosuvastatin 20 milliGRAM(s) Oral at bedtime  sodium chloride 0.9% lock flush 3 milliLiter(s) IV Push every 8 hours  tamsulosin 0.4 milliGRAM(s) Oral at bedtime    PRN Inpatient Medications  acetaminophen   IVPB .. 1000 milliGRAM(s) IV Intermittent once PRN  dextrose Oral Gel 15 Gram(s) Oral once PRN  oxyCODONE    IR 10 milliGRAM(s) Oral every 6 hours PRN      VITALS/PHYSICAL EXAM  --------------------------------------------------------------------------------  T(C): 35.8 (12-07-24 @ 08:15), Max: 36.7 (12-06-24 @ 16:00)  HR: 69 (12-07-24 @ 10:26) (65 - 116)  BP: 110/55 (12-07-24 @ 10:26) (94/64 - 181/90)  RR: 20 (12-07-24 @ 10:26) (15 - 21)  SpO2: 96% (12-07-24 @ 10:26) (92% - 97%)  Wt(kg): --        12-06-24 @ 07:01  -  12-07-24 @ 07:00  --------------------------------------------------------  IN: 2729.1 mL / OUT: 1525 mL / NET: 1204.1 mL    12-07-24 @ 07:01  -  12-07-24 @ 11:35  --------------------------------------------------------  IN: 485.4 mL / OUT: 351 mL / NET: 134.4 mL      Physical Exam:  	Gen: NAD  	Pulm: decrease BS B/L  	CV:  S1S2; no rub  	Abd: +distended  	LE: edema  	    LABS/STUDIES  --------------------------------------------------------------------------------              12.6   10.53 >-----------<  165      [12-06-24 @ 04:40]              40.1     136  |  102  |  60  ----------------------------<  142      [12-06-24 @ 14:20]  4.7   |  21  |  1.9        Ca     8.2     [12-06-24 @ 14:20]      Mg     2.3     [12-07-24 @ 09:44]    TPro  5.7  /  Alb  3.5  /  TBili  0.5  /  DBili  x   /  AST  11  /  ALT  20  /  AlkPhos  44  [12-06-24 @ 04:40]      PTT: 62.5       [12-07-24 @ 09:44]      Creatinine Trend:  SCr 1.9 [12-06 @ 14:20]  SCr 2.4 [12-06 @ 04:40]  SCr 1.8 [12-05 @ 04:05]  SCr 1.0 [12-04 @ 04:45]  SCr 1.3 [12-03 @ 17:00]    Urinalysis - [12-06-24 @ 14:20]      Color  / Appearance  / SG  / pH       Gluc 142 / Ketone   / Bili  / Urobili        Blood  / Protein  / Leuk Est  / Nitrite       RBC  / WBC  / Hyaline  / Gran  / Sq Epi  / Non Sq Epi  / Bacteria     Urine Creatinine 75      [12-06-24 @ 12:11]  Urine Protein 20      [12-06-24 @ 12:11]  Urine Sodium 38.0      [12-06-24 @ 12:11]  Urine Osmolality 485      [12-06-24 @ 14:14]    TSH 2.72      [12-04-24 @ 04:45]

## 2024-12-07 NOTE — PROGRESS NOTE ADULT - SUBJECTIVE AND OBJECTIVE BOX
CTU Attending Progress Daily Note       Procedure:  POD#:    HPI:   64-year-old morbidly obese male non smoker with past medical history of hypertension, DM, peripheral neuropathy, sciatica, bilateral hip replacements, degenerative arthritis, , BPH (nocturia x 6), asthma, JIMMY (on CPAP), HLD, Madelung's disease, and mitral valve regurgitation, was sent to ED from CTS office due to new onset of afib with rapid ventricular response.  Patient reports recent outpatient cardiology work up for recent progression of MR.. Had recent cath and JASMINA which demonstrated NO significant CAD and severe MR from flail P2 with EF of 55-60%. Work up revealed he is not a candidate for clipping and minimally invasive approach not feasible due to body habitus. Patient denies chest pain an leg swelling but complaints of dyspnea with minimal exertion. Patient ambulates with a can, needs both knees replaced and reop on right hip.  Denies any fever, chills, nausea, vomiting, CP, changes in urination, or changes in bowel movements. Was seen by dentist 3 weeks ago and everything was fine. HR now 130.  (03 Dec 2024 18:17)    Hospital Course:  12/4 Admitted with AF with RVR, given amio and cardizem, low BP  12/6 FLAKITO, started fluids, renal consulted, converted with procainamide  12/7 Amio po transition, metop increased, Lasix with albumin 25%    OBJECTIVE:  ICU Vital Signs Last 24 Hrs  T(C): 35.8 (07 Dec 2024 08:15), Max: 36.7 (06 Dec 2024 16:00)  T(F): 96.4 (07 Dec 2024 08:15), Max: 98 (06 Dec 2024 16:00)  HR: 63 (07 Dec 2024 14:00) (63 - 80)  BP: 124/56 (07 Dec 2024 14:00) (103/57 - 181/90)  BP(mean): 80 (07 Dec 2024 14:00) (72 - 125)  ABP: --  ABP(mean): --  RR: 20 (07 Dec 2024 14:00) (20 - 21)  SpO2: 95% (07 Dec 2024 14:00) (92% - 97%)    O2 Parameters below as of 07 Dec 2024 14:00  Patient On (Oxygen Delivery Method): room air          I&O's Summary    06 Dec 2024 07:01  -  07 Dec 2024 07:00  --------------------------------------------------------  IN: 2729.1 mL / OUT: 1525 mL / NET: 1204.1 mL    07 Dec 2024 07:01  -  07 Dec 2024 15:35  --------------------------------------------------------  IN: 1022.2 mL / OUT: 351 mL / NET: 671.2 mL      I&O's Detail    06 Dec 2024 07:01  -  07 Dec 2024 07:00  --------------------------------------------------------  IN:    Amiodarone: 566.1 mL    Heparin: 253 mL    IV PiggyBack: 250 mL    Lactated Ringers: 1000 mL    Oral Fluid: 660 mL  Total IN: 2729.1 mL    OUT:    Voided (mL): 1525 mL  Total OUT: 1525 mL    Total NET: 1204.1 mL      07 Dec 2024 07:01  -  07 Dec 2024 15:35  --------------------------------------------------------  IN:    Albumin 25%  -  50 mL: 50 mL    Amiodarone: 33.3 mL    Amiodarone: 116.9 mL    Heparin: 112 mL    Oral Fluid: 710 mL  Total IN: 1022.2 mL    OUT:    Stool (mL): 1 mL    Voided (mL): 350 mL  Total OUT: 351 mL    Total NET: 671.2 mL        Adult Advanced Hemodynamics Last 24 Hrs  CVP(mm Hg): --  CVP(cm H2O): --  CO: --  CI: --  PA: --  PA(mean): --  PCWP: --  SVR: --  SVRI: --  PVR: --  PVRI: --    CAPILLARY BLOOD GLUCOSE      POCT Blood Glucose.: 159 mg/dL (07 Dec 2024 11:35)  POCT Blood Glucose.: 85 mg/dL (07 Dec 2024 06:52)  POCT Blood Glucose.: 110 mg/dL (06 Dec 2024 21:28)  POCT Blood Glucose.: 180 mg/dL (06 Dec 2024 16:33)    LABS:                          12.6   10.53 )-----------( 165      ( 06 Dec 2024 04:40 )             40.1     12-07    134[L]  |  102  |  52[H]  ----------------------------<  153[H]  4.3   |  20  |  1.7[H]    Ca    8.1[L]      07 Dec 2024 11:56  Mg     2.6     12-07    TPro  6.0  /  Alb  3.8  /  TBili  0.4  /  DBili  x   /  AST  14  /  ALT  18  /  AlkPhos  54  12-07    PTT - ( 07 Dec 2024 09:44 )  PTT:62.5 sec  Urinalysis Basic - ( 07 Dec 2024 11:56 )    Color: x / Appearance: x / SG: x / pH: x  Gluc: 153 mg/dL / Ketone: x  / Bili: x / Urobili: x   Blood: x / Protein: x / Nitrite: x   Leuk Esterase: x / RBC: x / WBC x   Sq Epi: x / Non Sq Epi: x / Bacteria: x        Culture - Acid Fast - Urine (collected 06 Dec 2024 14:20)  Source: .Urine Urine      Home Medications:  Aspir 81 oral delayed release tablet: 1 tab(s) orally once a day (31 Oct 2024 09:30)  Breo Ellipta 200 mcg-25 mcg/inh inhalation powder: 1 inhaled once a day (31 Oct 2024 09:30)  Crestor 20 mg oral tablet: 1 tab(s) orally once a day (at bedtime) (31 Oct 2024 09:30)  gabapentin 300 mg oral capsule: 1 cap(s) orally 3 times a day (31 Oct 2024 09:30)  HYDROcodone 10 mg oral capsule, extended release: 1 cap(s) orally 2 times a day (31 Oct 2024 09:30)  Jardiance 25 mg oral tablet: 1 tab(s) orally once a day (31 Oct 2024 09:30)  losartan-hydrochlorothiazide 100mg-12.5mg oral tablet: orally 2 times a day (31 Oct 2024 09:30)  meloxicam 15 mg oral tablet: 1 tab(s) orally once a day (31 Oct 2024 09:30)  metFORMIN 1000 mg oral tablet, extended release: orally 2 times a day (31 Oct 2024 09:30)  Mounjaro 15 mg/0.5 mL subcutaneous solution: 15 milligram(s) subcutaneously once a week on Thur (31 Oct 2024 09:30)  pioglitazone 15 mg oral tablet: 1 tab(s) orally once a day (31 Oct 2024 09:30)  ProAir HFA 90 mcg/inh inhalation aerosol: 2 puff(s) inhaled 2 times a day (31 Oct 2024 09:30)  tujeo: 80 unit(s) subcutaneous once a day (31 Oct 2024 09:30)    HOSPITAL MEDICATIONS:  MEDICATIONS  (STANDING):  albumin human 25% IVPB 50 milliLiter(s) IV Intermittent every 6 hours  aMIOdarone    Tablet 400 milliGRAM(s) Oral every 8 hours  aMIOdarone Infusion 0.5 mG/Min (16.7 mL/Hr) IV Continuous <Continuous>  aspirin enteric coated 81 milliGRAM(s) Oral daily  bisacodyl Suppository 10 milliGRAM(s) Rectal once  chlorhexidine 2% Cloths 1 Application(s) Topical <User Schedule>  dextrose 5%. 1000 milliLiter(s) (50 mL/Hr) IV Continuous <Continuous>  dextrose 5%. 1000 milliLiter(s) (100 mL/Hr) IV Continuous <Continuous>  dextrose 50% Injectable 25 Gram(s) IV Push once  dextrose 50% Injectable 12.5 Gram(s) IV Push once  dextrose 50% Injectable 25 Gram(s) IV Push once  fluticasone propionate/ salmeterol 250-50 MICROgram(s) Diskus 1 Dose(s) Inhalation two times a day  gabapentin 300 milliGRAM(s) Oral three times a day  glucagon  Injectable 1 milliGRAM(s) IntraMuscular once  heparin  Infusion 1300 Unit(s)/Hr (14 mL/Hr) IV Continuous <Continuous>  insulin glargine Injectable (LANTUS) 60 Unit(s) SubCutaneous every morning  insulin lispro (ADMELOG) corrective regimen sliding scale   SubCutaneous three times a day before meals  insulin lispro (ADMELOG) corrective regimen sliding scale   SubCutaneous at bedtime  insulin lispro Injectable (ADMELOG) 5 Unit(s) SubCutaneous three times a day before meals  ipratropium    for Nebulization 500 MICROGram(s) Nebulizer every 6 hours  lactated ringers. 1000 milliLiter(s) (50 mL/Hr) IV Continuous <Continuous>  metoprolol tartrate 50 milliGRAM(s) Oral two times a day  mupirocin 2% Nasal 1 Application(s) Both Nostrils two times a day  pantoprazole    Tablet 40 milliGRAM(s) Oral before breakfast  rosuvastatin 20 milliGRAM(s) Oral at bedtime  sodium chloride 0.9% lock flush 3 milliLiter(s) IV Push every 8 hours  tamsulosin 0.4 milliGRAM(s) Oral at bedtime    MEDICATIONS  (PRN):  acetaminophen   IVPB .. 1000 milliGRAM(s) IV Intermittent once PRN Moderate Pain (4 - 6)  dextrose Oral Gel 15 Gram(s) Oral once PRN Blood Glucose LESS THAN 70 milliGRAM(s)/deciliter  oxyCODONE    IR 10 milliGRAM(s) Oral every 6 hours PRN Severe Pain (7 - 10)    RADIOLOGY:  Chest X-ray Reviewed    REVIEW OF SYSTEMS:  CONSTITUTIONAL: [X] all negative; [ ] weakness, [ ] fevers, [ ] chills  EYES/ENT: [X] all negative; [ ] visual changes, [ ] vertigo, [ ] throat pain   NECK: [X] all negative; [ ] pain, [ ] stiffness  RESPIRATORY: [] all negative, [ ] cough, [ ] wheezing, [ ] hemoptysis, [ ] shortness of breath  CARDIOVASCULAR: [] all negative; [ ] chest pain, [ ] palpitations, [ ] orthopnea  GASTROINTESTINAL: [X] all negative; [ ]abdominal pain, [ ] nausea, [ ] vomiting, [ ] hematemesis, [ ] diarrhea, [ ] constipation, [ ] melena, [ ] hematochezia.  GENITOURINARY: [X] all negative; [ ] dysuria, [ ] frequency, [ ] hematuria  NEUROLOGICAL: [X] all negative; [ ] numbness, [ ] weakness  SKIN: [X] all negative; [ ] itching, [ ] burning, [ ] rashes, [ ] lesions   All other review of systems is negative unless indicated above.  [  ] Unable to assess ROS because         PHYSICAL EXAM:          CONSTITUTIONAL: Well-developed; well-nourished; in no acute distress.   	SKIN: warm, dry  	HEAD: Normocephalic; atraumatic.  	EYES: PERRL, EOM, no conj injection, sclera clear  	ENT: No nasal discharge; airway clear.  	NECK: Supple; non tender.   	CARD: S1, S2 normal; no murmurs, gallops, or rubs. Regular rate and rhythm.  no carotid bruits  	RESP: CTA B/L; good air movement No wheezes, rales or rhonchi.  	ABD: Soft, not tender, not distended,  no rebound or guarding, bowel sounds present  	EXT: Normal ROM.  No clubbing, cyanosis or edema.   warm and well perfused.  pulses palpable  	NEURO: Alert, awake, motor 5/5 R, 5/5 L

## 2024-12-08 LAB
ABO RH CONFIRMATION: SIGNIFICANT CHANGE UP
ALBUMIN SERPL ELPH-MCNC: 3.9 G/DL — SIGNIFICANT CHANGE UP (ref 3.5–5.2)
ALP SERPL-CCNC: 51 U/L — SIGNIFICANT CHANGE UP (ref 30–115)
ALT FLD-CCNC: 18 U/L — SIGNIFICANT CHANGE UP (ref 0–41)
ANION GAP SERPL CALC-SCNC: 15 MMOL/L — HIGH (ref 7–14)
APTT BLD: 127 SEC — CRITICAL HIGH (ref 27–39.2)
APTT BLD: 52 SEC — HIGH (ref 27–39.2)
AST SERPL-CCNC: 15 U/L — SIGNIFICANT CHANGE UP (ref 0–41)
BASOPHILS # BLD AUTO: 0.02 K/UL — SIGNIFICANT CHANGE UP (ref 0–0.2)
BASOPHILS NFR BLD AUTO: 0.2 % — SIGNIFICANT CHANGE UP (ref 0–1)
BILIRUB SERPL-MCNC: 0.6 MG/DL — SIGNIFICANT CHANGE UP (ref 0.2–1.2)
BUN SERPL-MCNC: 51 MG/DL — HIGH (ref 10–20)
CALCIUM SERPL-MCNC: 8 MG/DL — LOW (ref 8.4–10.5)
CHLORIDE SERPL-SCNC: 104 MMOL/L — SIGNIFICANT CHANGE UP (ref 98–110)
CO2 SERPL-SCNC: 21 MMOL/L — SIGNIFICANT CHANGE UP (ref 17–32)
CREAT SERPL-MCNC: 1.6 MG/DL — HIGH (ref 0.7–1.5)
EGFR: 47 ML/MIN/1.73M2 — LOW
EOSINOPHIL # BLD AUTO: 0.13 K/UL — SIGNIFICANT CHANGE UP (ref 0–0.7)
EOSINOPHIL NFR BLD AUTO: 1.5 % — SIGNIFICANT CHANGE UP (ref 0–8)
GLUCOSE BLDC GLUCOMTR-MCNC: 103 MG/DL — HIGH (ref 70–99)
GLUCOSE BLDC GLUCOMTR-MCNC: 109 MG/DL — HIGH (ref 70–99)
GLUCOSE BLDC GLUCOMTR-MCNC: 115 MG/DL — HIGH (ref 70–99)
GLUCOSE BLDC GLUCOMTR-MCNC: 131 MG/DL — HIGH (ref 70–99)
GLUCOSE SERPL-MCNC: 106 MG/DL — HIGH (ref 70–99)
HCT VFR BLD CALC: 36.6 % — LOW (ref 42–52)
HGB BLD-MCNC: 11.9 G/DL — LOW (ref 14–18)
IMM GRANULOCYTES NFR BLD AUTO: 0.6 % — HIGH (ref 0.1–0.3)
LYMPHOCYTES # BLD AUTO: 1.11 K/UL — LOW (ref 1.2–3.4)
LYMPHOCYTES # BLD AUTO: 12.4 % — LOW (ref 20.5–51.1)
MAGNESIUM SERPL-MCNC: 2.3 MG/DL — SIGNIFICANT CHANGE UP (ref 1.8–2.4)
MCHC RBC-ENTMCNC: 29.1 PG — SIGNIFICANT CHANGE UP (ref 27–31)
MCHC RBC-ENTMCNC: 32.5 G/DL — SIGNIFICANT CHANGE UP (ref 32–37)
MCV RBC AUTO: 89.5 FL — SIGNIFICANT CHANGE UP (ref 80–94)
MONOCYTES # BLD AUTO: 0.8 K/UL — HIGH (ref 0.1–0.6)
MONOCYTES NFR BLD AUTO: 8.9 % — SIGNIFICANT CHANGE UP (ref 1.7–9.3)
NEUTROPHILS # BLD AUTO: 6.85 K/UL — HIGH (ref 1.4–6.5)
NEUTROPHILS NFR BLD AUTO: 76.4 % — HIGH (ref 42.2–75.2)
NRBC # BLD: 0 /100 WBCS — SIGNIFICANT CHANGE UP (ref 0–0)
PHOSPHATE SERPL-MCNC: 3.3 MG/DL — SIGNIFICANT CHANGE UP (ref 2.1–4.9)
PLATELET # BLD AUTO: 148 K/UL — SIGNIFICANT CHANGE UP (ref 130–400)
PMV BLD: 11.8 FL — HIGH (ref 7.4–10.4)
POTASSIUM SERPL-MCNC: 4 MMOL/L — SIGNIFICANT CHANGE UP (ref 3.5–5)
POTASSIUM SERPL-SCNC: 4 MMOL/L — SIGNIFICANT CHANGE UP (ref 3.5–5)
PROT SERPL-MCNC: 6 G/DL — SIGNIFICANT CHANGE UP (ref 6–8)
RBC # BLD: 4.09 M/UL — LOW (ref 4.7–6.1)
RBC # FLD: 15.9 % — HIGH (ref 11.5–14.5)
SODIUM SERPL-SCNC: 140 MMOL/L — SIGNIFICANT CHANGE UP (ref 135–146)
WBC # BLD: 8.96 K/UL — SIGNIFICANT CHANGE UP (ref 4.8–10.8)
WBC # FLD AUTO: 8.96 K/UL — SIGNIFICANT CHANGE UP (ref 4.8–10.8)

## 2024-12-08 PROCEDURE — 99232 SBSQ HOSP IP/OBS MODERATE 35: CPT

## 2024-12-08 PROCEDURE — 93010 ELECTROCARDIOGRAM REPORT: CPT

## 2024-12-08 PROCEDURE — 71046 X-RAY EXAM CHEST 2 VIEWS: CPT | Mod: 26

## 2024-12-08 PROCEDURE — 99233 SBSQ HOSP IP/OBS HIGH 50: CPT

## 2024-12-08 RX ORDER — HEPARIN SODIUM,PORCINE 1000/ML
1400 VIAL (ML) INJECTION
Qty: 25000 | Refills: 0 | Status: DISCONTINUED | OUTPATIENT
Start: 2024-12-08 | End: 2024-12-09

## 2024-12-08 RX ADMIN — CHLORHEXIDINE GLUCONATE 1 APPLICATION(S): 1.2 RINSE ORAL at 06:11

## 2024-12-08 RX ADMIN — Medication 81 MILLIGRAM(S): at 11:45

## 2024-12-08 RX ADMIN — Medication 5 UNIT(S): at 11:44

## 2024-12-08 RX ADMIN — OXYCODONE HYDROCHLORIDE 10 MILLIGRAM(S): 30 TABLET ORAL at 10:57

## 2024-12-08 RX ADMIN — Medication 1 APPLICATION(S): at 16:50

## 2024-12-08 RX ADMIN — OXYCODONE HYDROCHLORIDE 10 MILLIGRAM(S): 30 TABLET ORAL at 04:46

## 2024-12-08 RX ADMIN — TAMSULOSIN HYDROCHLORIDE 0.4 MILLIGRAM(S): 0.4 CAPSULE ORAL at 21:04

## 2024-12-08 RX ADMIN — Medication 5 UNIT(S): at 16:52

## 2024-12-08 RX ADMIN — OXYCODONE HYDROCHLORIDE 10 MILLIGRAM(S): 30 TABLET ORAL at 10:27

## 2024-12-08 RX ADMIN — METOPROLOL TARTRATE 50 MILLIGRAM(S): 100 TABLET, FILM COATED ORAL at 05:04

## 2024-12-08 RX ADMIN — SODIUM CHLORIDE 3 MILLILITER(S): 9 INJECTION, SOLUTION INTRAMUSCULAR; INTRAVENOUS; SUBCUTANEOUS at 06:11

## 2024-12-08 RX ADMIN — OXYCODONE HYDROCHLORIDE 10 MILLIGRAM(S): 30 TABLET ORAL at 21:34

## 2024-12-08 RX ADMIN — METOPROLOL TARTRATE 50 MILLIGRAM(S): 100 TABLET, FILM COATED ORAL at 16:50

## 2024-12-08 RX ADMIN — OXYCODONE HYDROCHLORIDE 10 MILLIGRAM(S): 30 TABLET ORAL at 04:16

## 2024-12-08 RX ADMIN — SODIUM CHLORIDE 3 MILLILITER(S): 9 INJECTION, SOLUTION INTRAMUSCULAR; INTRAVENOUS; SUBCUTANEOUS at 14:24

## 2024-12-08 RX ADMIN — INSULIN GLARGINE 60 UNIT(S): 100 INJECTION, SOLUTION SUBCUTANEOUS at 08:30

## 2024-12-08 RX ADMIN — GABAPENTIN 300 MILLIGRAM(S): 300 CAPSULE ORAL at 06:26

## 2024-12-08 RX ADMIN — GABAPENTIN 300 MILLIGRAM(S): 300 CAPSULE ORAL at 21:04

## 2024-12-08 RX ADMIN — ACETAMINOPHEN, DIPHENHYDRAMINE HCL, PHENYLEPHRINE HCL 10 MILLIGRAM(S): 325; 25; 5 TABLET ORAL at 21:04

## 2024-12-08 RX ADMIN — ROSUVASTATIN CALCIUM 20 MILLIGRAM(S): 5 TABLET, FILM COATED ORAL at 21:03

## 2024-12-08 RX ADMIN — AMIODARONE HYDROCHLORIDE 400 MILLIGRAM(S): 200 TABLET ORAL at 05:04

## 2024-12-08 RX ADMIN — SODIUM CHLORIDE 3 MILLILITER(S): 9 INJECTION, SOLUTION INTRAMUSCULAR; INTRAVENOUS; SUBCUTANEOUS at 22:05

## 2024-12-08 RX ADMIN — Medication 5 UNIT(S): at 07:50

## 2024-12-08 RX ADMIN — Medication 14 UNIT(S)/HR: at 17:31

## 2024-12-08 RX ADMIN — OXYCODONE HYDROCHLORIDE 10 MILLIGRAM(S): 30 TABLET ORAL at 21:04

## 2024-12-08 RX ADMIN — AMIODARONE HYDROCHLORIDE 400 MILLIGRAM(S): 200 TABLET ORAL at 13:15

## 2024-12-08 RX ADMIN — Medication 2 MILLIGRAM(S): at 04:13

## 2024-12-08 RX ADMIN — GABAPENTIN 300 MILLIGRAM(S): 300 CAPSULE ORAL at 13:16

## 2024-12-08 RX ADMIN — Medication 1 APPLICATION(S): at 05:05

## 2024-12-08 RX ADMIN — Medication 50 MILLILITER(S): at 06:27

## 2024-12-08 RX ADMIN — AMIODARONE HYDROCHLORIDE 400 MILLIGRAM(S): 200 TABLET ORAL at 21:04

## 2024-12-08 RX ADMIN — PANTOPRAZOLE SODIUM 40 MILLIGRAM(S): 40 TABLET, DELAYED RELEASE ORAL at 05:04

## 2024-12-08 NOTE — DIETITIAN INITIAL EVALUATION ADULT - LITERATURE/VIDEOS GIVEN
Nutrition education is deferred since the patient is asleep. When/if the patient is awake, nutrition education will be provided.

## 2024-12-08 NOTE — DIETITIAN INITIAL EVALUATION ADULT - NAME AND PHONE
Goal: 1.Continue to consume/tolerate 75%-100% of meals in 7-10 days (Low Risk)  Intervention: 1.Meals and Snacks  Monitor/Evaluate: Diet order, energy intake, nutrition focused physical findings, renal and anemia profile

## 2024-12-08 NOTE — PROGRESS NOTE ADULT - SUBJECTIVE AND OBJECTIVE BOX
Nephrology progress note    Patient is seen and examined, events over the last 24 h noted.  Nonoliguric, renal function improving.    Allergies:  No Known Allergies    Hospital Medications:   MEDICATIONS  (STANDING):  aMIOdarone    Tablet 400 milliGRAM(s) Oral every 8 hours  aMIOdarone Infusion 0.5 mG/Min (16.7 mL/Hr) IV Continuous <Continuous>  aspirin enteric coated 81 milliGRAM(s) Oral daily  bisacodyl Suppository 10 milliGRAM(s) Rectal once  chlorhexidine 2% Cloths 1 Application(s) Topical <User Schedule>  dextrose 5%. 1000 milliLiter(s) (50 mL/Hr) IV Continuous <Continuous>  dextrose 5%. 1000 milliLiter(s) (100 mL/Hr) IV Continuous <Continuous>  dextrose 50% Injectable 25 Gram(s) IV Push once  dextrose 50% Injectable 12.5 Gram(s) IV Push once  dextrose 50% Injectable 25 Gram(s) IV Push once  fluticasone propionate/ salmeterol 250-50 MICROgram(s) Diskus 1 Dose(s) Inhalation two times a day  gabapentin 300 milliGRAM(s) Oral three times a day  glucagon  Injectable 1 milliGRAM(s) IntraMuscular once  heparin  Infusion 1400 Unit(s)/Hr (14 mL/Hr) IV Continuous <Continuous>  insulin glargine Injectable (LANTUS) 60 Unit(s) SubCutaneous every morning  insulin lispro (ADMELOG) corrective regimen sliding scale   SubCutaneous three times a day before meals  insulin lispro (ADMELOG) corrective regimen sliding scale   SubCutaneous at bedtime  insulin lispro Injectable (ADMELOG) 5 Unit(s) SubCutaneous three times a day before meals  ipratropium    for Nebulization 500 MICROGram(s) Nebulizer every 6 hours  lactated ringers. 1000 milliLiter(s) (50 mL/Hr) IV Continuous <Continuous>  melatonin 10 milliGRAM(s) Oral at bedtime  metoprolol tartrate 50 milliGRAM(s) Oral two times a day  mupirocin 2% Nasal 1 Application(s) Both Nostrils two times a day  pantoprazole    Tablet 40 milliGRAM(s) Oral before breakfast  rosuvastatin 20 milliGRAM(s) Oral at bedtime  sodium chloride 0.9% lock flush 3 milliLiter(s) IV Push every 8 hours  tamsulosin 0.4 milliGRAM(s) Oral at bedtime        VITALS:  T(F): 97.5 (12-08-24 @ 08:00), Max: 97.5 (12-08-24 @ 08:00)  HR: 66 (12-08-24 @ 08:00)  BP: 110/55 (12-08-24 @ 08:00)  RR: 20 (12-08-24 @ 08:00)  SpO2: 94% (12-08-24 @ 08:00)  Wt(kg): --    12-06 @ 07:01  -  12-07 @ 07:00  --------------------------------------------------------  IN: 2729.1 mL / OUT: 1525 mL / NET: 1204.1 mL    12-07 @ 07:01  -  12-08 @ 07:00  --------------------------------------------------------  IN: 2239.7 mL / OUT: 3981 mL / NET: -1741.3 mL          PHYSICAL EXAM:  	Gen: NAD  	Pulm: decrease BS B/L  	CV:  S1S2; no rub  	Abd: +distended  	LE: edema      LABS:  12-08    140  |  104  |  51[H]  ----------------------------<  106[H]  4.0   |  21  |  1.6[H]    Ca    8.0[L]      08 Dec 2024 00:13  Phos  3.3     12-08  Mg     2.3     12-08    TPro  6.0  /  Alb  3.9  /  TBili  0.6  /  DBili      /  AST  15  /  ALT  18  /  AlkPhos  51  12-08                          11.9   8.96  )-----------( 148      ( 08 Dec 2024 00:13 )             36.6       Urine Studies:  Urinalysis Basic - ( 08 Dec 2024 00:13 )    Color:  / Appearance:  / SG:  / pH:   Gluc: 106 mg/dL / Ketone:   / Bili:  / Urobili:    Blood:  / Protein:  / Nitrite:    Leuk Esterase:  / RBC:  / WBC    Sq Epi:  / Non Sq Epi:  / Bacteria:       Osmolality, Random Urine: 485 mos/kg (12-06 @ 14:14)  Creatinine, Random Urine: 75 mg/dL (12-06 @ 12:11)  Protein/Creatinine Ratio Calculation: 0.3 Ratio (12-06 @ 12:11)  Sodium, Random Urine: 38.0 mmoL/L (12-06 @ 12:11)    RADIOLOGY & ADDITIONAL STUDIES:

## 2024-12-08 NOTE — DIETITIAN INITIAL EVALUATION ADULT - OTHER CALCULATIONS
Estimated Calorie Needs: [id number] x AF-1= 1868kcal/day -Due to morbid obesity   Estimated Protein Needs: 112-124grams/day (1.8-2grams/kg of IBW-62kg) -Due to morbid obesity  Estimated Fluid Needs: 1868mL/day (1mL/kcal) vs Fluids per CTU Team

## 2024-12-08 NOTE — PROGRESS NOTE ADULT - SUBJECTIVE AND OBJECTIVE BOX
CTU Attending Progress Daily Note     08 Dec 2024 18:04  Pre op MVR  A fib control    · Subjective and Objective:   CTU Attending Progress Daily Note       HPI:   64-year-old morbidly obese male non smoker with past medical history of hypertension, DM, peripheral neuropathy, sciatica, bilateral hip replacements, degenerative arthritis, , BPH (nocturia x 6), asthma, JIMMY (on CPAP), HLD, Madelung's disease, and mitral valve regurgitation, was sent to ED from CTS office due to new onset of afib with rapid ventricular response.  Patient reports recent outpatient cardiology work up for recent progression of MR.. Had recent cath and JASMINA which demonstrated NO significant CAD and severe MR from flail P2 with EF of 55-60%. Work up revealed he is not a candidate for clipping and minimally invasive approach not feasible due to body habitus. Patient denies chest pain an leg swelling but complaints of dyspnea with minimal exertion. Patient ambulates with a can, needs both knees replaced and reop on right hip.  Denies any fever, chills, nausea, vomiting, CP, changes in urination, or changes in bowel movements. Was seen by dentist 3 weeks ago and everything was fine. HR now 130.  (03 Dec 2024 18:17)    Hospital Course:  12/4 Admitted with AF with RVR, given amio and cardizem, low BP  12/6 FLAKITO, started fluids, renal consulted, converted with procainamide  12/7 Amio po transition, metop increased, Lasix with albumin 25%  He has history of Diabetes    HTN (hypertension)    Obstructive sleep apnea on CPAP    Asthma    Obesity    Peripheral neuropathy    Arthritis    Hypercholesteremia    Madelung's disease      HPI:   64-year-old morbidly obese male non smoker with past medical history of hypertension, DM, peripheral neuropathy, sciatica, bilateral hip replacements, degenerative arthritis, , BPH (nocturia x 6), asthma, JIMMY (on CPAP), HLD, Madelung's disease, and mitral valve regurgitation, was sent to ED from CTS office due to new onset of afib with rapid ventricular response.  Patient reports recent outpatient cardiology work up for recent progression of MR.. Had recent cath and JASMINA which demonstrated NO significant CAD and severe MR from flail P2 with EF of 55-60%. Work up revealed he is not a candidate for clipping and minimally invasive approach not feasible due to body habitus. Patient denies chest pain an leg swelling but complaints of dyspnea with minimal exertion. Patient ambulates with a can, needs both knees replaced and reop on right hip.  Denies any fever, chills, nausea, vomiting, CP, changes in urination, or changes in bowel movements. Was seen by dentist 3 weeks ago and everything was fine. HR now 130.  (03 Dec 2024 18:17)      Hospital Course:    OBJECTIVE:  ICU Vital Signs Last 24 Hrs  T(C): 36.1 (08 Dec 2024 16:00), Max: 36.4 (08 Dec 2024 08:00)  T(F): 97 (08 Dec 2024 16:00), Max: 97.5 (08 Dec 2024 08:00)  HR: 84 (08 Dec 2024 17:00) (66 - 84)  BP: 131/97 (08 Dec 2024 17:00) (110/55 - 139/91)  BP(mean): 84 (08 Dec 2024 17:00) (77 - 106)  ABP: --  ABP(mean): --  RR: 18 (08 Dec 2024 17:00) (17 - 40)  SpO2: 96% (08 Dec 2024 17:00) (92% - 96%)    O2 Parameters below as of 08 Dec 2024 17:00  Patient On (Oxygen Delivery Method): room air          I&O's Summary    07 Dec 2024 07:01  -  08 Dec 2024 07:00  --------------------------------------------------------  IN: 2239.7 mL / OUT: 3981 mL / NET: -1741.3 mL    08 Dec 2024 07:01  -  08 Dec 2024 18:04  --------------------------------------------------------  IN: 112 mL / OUT: 300 mL / NET: -188 mL      I&O's Detail    07 Dec 2024 07:01  -  08 Dec 2024 07:00  --------------------------------------------------------  IN:    Albumin 25%  -  50 mL: 200 mL    Amiodarone: 33.3 mL    Amiodarone: 200.4 mL    Heparin: 336 mL    Oral Fluid: 1470 mL  Total IN: 2239.7 mL    OUT:    Stool (mL): 6 mL    Voided (mL): 3975 mL  Total OUT: 3981 mL    Total NET: -1741.3 mL      08 Dec 2024 07:01  -  08 Dec 2024 18:04  --------------------------------------------------------  IN:    Heparin: 14 mL    Heparin: 98 mL  Total IN: 112 mL    OUT:    Voided (mL): 300 mL  Total OUT: 300 mL    Total NET: -188 mL        Adult Advanced Hemodynamics Last 24 Hrs  CVP(mm Hg): --  CVP(cm H2O): --  CO: --  CI: --  PA: --  PA(mean): --  PCWP: --  SVR: --  SVRI: --  PVR: --  PVRI: --    CAPILLARY BLOOD GLUCOSE  131 (08 Dec 2024 08:00)      POCT Blood Glucose.: 109 mg/dL (08 Dec 2024 11:42)  POCT Blood Glucose.: 105 mg/dL (07 Dec 2024 21:47)  POCT Blood Glucose.: 99 mg/dL (07 Dec 2024 21:45)    LABS:                          11.9   8.96  )-----------( 148      ( 08 Dec 2024 00:13 )             36.6     12-08    140  |  104  |  51[H]  ----------------------------<  106[H]  4.0   |  21  |  1.6[H]    Ca    8.0[L]      08 Dec 2024 00:13  Phos  3.3     12-08  Mg     2.3     12-08    TPro  6.0  /  Alb  3.9  /  TBili  0.6  /  DBili  x   /  AST  15  /  ALT  18  /  AlkPhos  51  12-08    PTT - ( 08 Dec 2024 04:05 )  PTT:52.0 sec  Urinalysis Basic - ( 08 Dec 2024 00:13 )    Color: x / Appearance: x / SG: x / pH: x  Gluc: 106 mg/dL / Ketone: x  / Bili: x / Urobili: x   Blood: x / Protein: x / Nitrite: x   Leuk Esterase: x / RBC: x / WBC x   Sq Epi: x / Non Sq Epi: x / Bacteria: x        Culture - Acid Fast - Urine (collected 06 Dec 2024 14:20)  Source: .Urine Urine  Preliminary Report (07 Dec 2024 23:06):    Culture is being performed.      Home Medications:  Aspir 81 oral delayed release tablet: 1 tab(s) orally once a day (31 Oct 2024 09:30)  Breo Ellipta 200 mcg-25 mcg/inh inhalation powder: 1 inhaled once a day (31 Oct 2024 09:30)  Crestor 20 mg oral tablet: 1 tab(s) orally once a day (at bedtime) (31 Oct 2024 09:30)  gabapentin 300 mg oral capsule: 1 cap(s) orally 3 times a day (31 Oct 2024 09:30)  HYDROcodone 10 mg oral capsule, extended release: 1 cap(s) orally 2 times a day (31 Oct 2024 09:30)  Jardiance 25 mg oral tablet: 1 tab(s) orally once a day (31 Oct 2024 09:30)  losartan-hydrochlorothiazide 100mg-12.5mg oral tablet: orally 2 times a day (31 Oct 2024 09:30)  meloxicam 15 mg oral tablet: 1 tab(s) orally once a day (31 Oct 2024 09:30)  metFORMIN 1000 mg oral tablet, extended release: orally 2 times a day (31 Oct 2024 09:30)  Mounjaro 15 mg/0.5 mL subcutaneous solution: 15 milligram(s) subcutaneously once a week on Thur (31 Oct 2024 09:30)  pioglitazone 15 mg oral tablet: 1 tab(s) orally once a day (31 Oct 2024 09:30)  ProAir HFA 90 mcg/inh inhalation aerosol: 2 puff(s) inhaled 2 times a day (31 Oct 2024 09:30)  tujeo: 80 unit(s) subcutaneous once a day (31 Oct 2024 09:30)    HOSPITAL MEDICATIONS:  MEDICATIONS  (STANDING):  aMIOdarone    Tablet 400 milliGRAM(s) Oral every 8 hours  aMIOdarone Infusion 0.5 mG/Min (16.7 mL/Hr) IV Continuous <Continuous>  aspirin enteric coated 81 milliGRAM(s) Oral daily  bisacodyl Suppository 10 milliGRAM(s) Rectal once  chlorhexidine 2% Cloths 1 Application(s) Topical <User Schedule>  dextrose 5%. 1000 milliLiter(s) (50 mL/Hr) IV Continuous <Continuous>  dextrose 5%. 1000 milliLiter(s) (100 mL/Hr) IV Continuous <Continuous>  dextrose 50% Injectable 25 Gram(s) IV Push once  dextrose 50% Injectable 12.5 Gram(s) IV Push once  dextrose 50% Injectable 25 Gram(s) IV Push once  fluticasone propionate/ salmeterol 250-50 MICROgram(s) Diskus 1 Dose(s) Inhalation two times a day  gabapentin 300 milliGRAM(s) Oral three times a day  glucagon  Injectable 1 milliGRAM(s) IntraMuscular once  heparin  Infusion 1400 Unit(s)/Hr (14 mL/Hr) IV Continuous <Continuous>  insulin glargine Injectable (LANTUS) 60 Unit(s) SubCutaneous every morning  insulin lispro (ADMELOG) corrective regimen sliding scale   SubCutaneous three times a day before meals  insulin lispro (ADMELOG) corrective regimen sliding scale   SubCutaneous at bedtime  insulin lispro Injectable (ADMELOG) 5 Unit(s) SubCutaneous three times a day before meals  ipratropium    for Nebulization 500 MICROGram(s) Nebulizer every 6 hours  lactated ringers. 1000 milliLiter(s) (50 mL/Hr) IV Continuous <Continuous>  melatonin 10 milliGRAM(s) Oral at bedtime  metoprolol tartrate 50 milliGRAM(s) Oral two times a day  mupirocin 2% Nasal 1 Application(s) Both Nostrils two times a day  pantoprazole    Tablet 40 milliGRAM(s) Oral before breakfast  rosuvastatin 20 milliGRAM(s) Oral at bedtime  sodium chloride 0.9% lock flush 3 milliLiter(s) IV Push every 8 hours  tamsulosin 0.4 milliGRAM(s) Oral at bedtime    MEDICATIONS  (PRN):  acetaminophen   IVPB .. 1000 milliGRAM(s) IV Intermittent once PRN Moderate Pain (4 - 6)  dextrose Oral Gel 15 Gram(s) Oral once PRN Blood Glucose LESS THAN 70 milliGRAM(s)/deciliter  oxyCODONE    IR 10 milliGRAM(s) Oral every 6 hours PRN Severe Pain (7 - 10)    RADIOLOGY:  Chest X-ray Reviewed    REVIEW OF SYSTEMS:  CONSTITUTIONAL: [X] all negative; [ ] weakness, [ ] fevers, [ ] chills  EYES/ENT: [X] all negative; [ ] visual changes, [ ] vertigo, [ ] throat pain   NECK: [X] all negative; [ ] pain, [ ] stiffness  RESPIRATORY: [] all negative, [ ] cough, [ ] wheezing, [ ] hemoptysis, [ ] shortness of breath  CARDIOVASCULAR: [] all negative; [ ] chest pain, [ ] palpitations, [ ] orthopnea  GASTROINTESTINAL: [X] all negative; [ ]abdominal pain, [ ] nausea, [ ] vomiting, [ ] hematemesis, [ ] diarrhea, [ ] constipation, [ ] melena, [ ] hematochezia.  GENITOURINARY: [X] all negative; [ ] dysuria, [ ] frequency, [ ] hematuria  NEUROLOGICAL: [X] all negative; [ ] numbness, [ ] weakness  SKIN: [X] all negative; [ ] itching, [ ] burning, [ ] rashes, [ ] lesions   All other review of systems is negative unless indicated above.  [  ] Unable to assess ROS because     PHYSICAL EXAM:          CONSTITUTIONAL: Well-developed; well-nourished; in no acute distress.   	SKIN: warm, dry  	HEAD: Normocephalic; atraumatic.  	EYES: PERRL, EOM, no conj injection, sclera clear  	ENT: No nasal discharge; airway clear.  	NECK: Supple; non tender.   	CARD: S1, S2 normal; no murmurs, gallops, or rubs. Regular rate and rhythm.  no carotid bruits  	RESP: CTA B/L; good air movement No wheezes, rales or rhonchi.  	ABD: Soft, not tender, not distended,  no rebound or guarding, bowel sounds present  	EXT: Normal ROM.  No clubbing, cyanosis or edema.   warm and well perfused.  pulses palpable  	NEURO: Alert, awake, motor 5/5 R, 5/5 L

## 2024-12-08 NOTE — DIETITIAN INITIAL EVALUATION ADULT - PERTINENT MEDS FT
MEDICATIONS  (STANDING):  aMIOdarone    Tablet 400 milliGRAM(s) Oral every 8 hours  aMIOdarone Infusion 0.5 mG/Min (16.7 mL/Hr) IV Continuous <Continuous>  aspirin enteric coated 81 milliGRAM(s) Oral daily  bisacodyl Suppository 10 milliGRAM(s) Rectal once  chlorhexidine 2% Cloths 1 Application(s) Topical <User Schedule>  dextrose 5%. 1000 milliLiter(s) (50 mL/Hr) IV Continuous <Continuous>  dextrose 5%. 1000 milliLiter(s) (100 mL/Hr) IV Continuous <Continuous>  dextrose 50% Injectable 25 Gram(s) IV Push once  dextrose 50% Injectable 12.5 Gram(s) IV Push once  dextrose 50% Injectable 25 Gram(s) IV Push once  fluticasone propionate/ salmeterol 250-50 MICROgram(s) Diskus 1 Dose(s) Inhalation two times a day  gabapentin 300 milliGRAM(s) Oral three times a day  glucagon  Injectable 1 milliGRAM(s) IntraMuscular once  heparin  Infusion 1400 Unit(s)/Hr (14 mL/Hr) IV Continuous <Continuous>  insulin glargine Injectable (LANTUS) 60 Unit(s) SubCutaneous every morning  insulin lispro (ADMELOG) corrective regimen sliding scale   SubCutaneous three times a day before meals  insulin lispro (ADMELOG) corrective regimen sliding scale   SubCutaneous at bedtime  insulin lispro Injectable (ADMELOG) 5 Unit(s) SubCutaneous three times a day before meals  ipratropium    for Nebulization 500 MICROGram(s) Nebulizer every 6 hours  lactated ringers. 1000 milliLiter(s) (50 mL/Hr) IV Continuous <Continuous>  melatonin 10 milliGRAM(s) Oral at bedtime  metoprolol tartrate 50 milliGRAM(s) Oral two times a day  mupirocin 2% Nasal 1 Application(s) Both Nostrils two times a day  pantoprazole    Tablet 40 milliGRAM(s) Oral before breakfast  rosuvastatin 20 milliGRAM(s) Oral at bedtime  sodium chloride 0.9% lock flush 3 milliLiter(s) IV Push every 8 hours  tamsulosin 0.4 milliGRAM(s) Oral at bedtime    MEDICATIONS  (PRN):  acetaminophen   IVPB .. 1000 milliGRAM(s) IV Intermittent once PRN Moderate Pain (4 - 6)  dextrose Oral Gel 15 Gram(s) Oral once PRN Blood Glucose LESS THAN 70 milliGRAM(s)/deciliter  oxyCODONE    IR 10 milliGRAM(s) Oral every 6 hours PRN Severe Pain (7 - 10)

## 2024-12-08 NOTE — DIETITIAN INITIAL EVALUATION ADULT - [id number]_GEN_A_CORE
Dx: 63y/o male with h/o morbidly obese male, hypertension, DM, peripheral neuropathy, sciatica, bilateral hip replacements, degenerative arthritis, BPH (nocturia x6), asthma, JIMMY (on CPAP), HLD, Madelung's disease and mitral valve regurgitation, was sent to the ED from CTS office due to new onset of atrial fibrillation with rapid ventricular response in setting of severe MR. MAP-87.

## 2024-12-08 NOTE — DIETITIAN INITIAL EVALUATION ADULT - ORAL INTAKE PTA/DIET HISTORY
An interview with the patient could not be completed at this time since they are asleep and unarousable. When/if the patient is awake, an interview will be reattempted.

## 2024-12-08 NOTE — DIETITIAN INITIAL EVALUATION ADULT - PERTINENT LABORATORY DATA
12-08    140  |  104  |  51[H]  ----------------------------<  106[H]  4.0   |  21  |  1.6[H]    Ca    8.0[L]      08 Dec 2024 00:13  Phos  3.3     12-08  Mg     2.3     12-08    TPro  6.0  /  Alb  3.9  /  TBili  0.6  /  DBili  x   /  AST  15  /  ALT  18  /  AlkPhos  51  12-08  POCT Blood Glucose.: 109 mg/dL (12-08-24 @ 11:42)  A1C with Estimated Average Glucose Result: 7.1 % (12-04-24 @ 04:45)

## 2024-12-09 PROBLEM — E88.89 OTHER SPECIFIED METABOLIC DISORDERS: Chronic | Status: ACTIVE | Noted: 2024-12-03

## 2024-12-09 LAB
ALBUMIN SERPL ELPH-MCNC: 3.8 G/DL — SIGNIFICANT CHANGE UP (ref 3.5–5.2)
ALP SERPL-CCNC: 48 U/L — SIGNIFICANT CHANGE UP (ref 30–115)
ALT FLD-CCNC: 16 U/L — SIGNIFICANT CHANGE UP (ref 0–41)
ANION GAP SERPL CALC-SCNC: 12 MMOL/L — SIGNIFICANT CHANGE UP (ref 7–14)
APTT BLD: 39.6 SEC — HIGH (ref 27–39.2)
APTT BLD: 44.5 SEC — HIGH (ref 27–39.2)
AST SERPL-CCNC: 15 U/L — SIGNIFICANT CHANGE UP (ref 0–41)
BASOPHILS # BLD AUTO: 0.02 K/UL — SIGNIFICANT CHANGE UP (ref 0–0.2)
BASOPHILS NFR BLD AUTO: 0.2 % — SIGNIFICANT CHANGE UP (ref 0–1)
BILIRUB SERPL-MCNC: 0.9 MG/DL — SIGNIFICANT CHANGE UP (ref 0.2–1.2)
BUN SERPL-MCNC: 44 MG/DL — HIGH (ref 10–20)
CALCIUM SERPL-MCNC: 8 MG/DL — LOW (ref 8.4–10.5)
CHLORIDE SERPL-SCNC: 103 MMOL/L — SIGNIFICANT CHANGE UP (ref 98–110)
CO2 SERPL-SCNC: 22 MMOL/L — SIGNIFICANT CHANGE UP (ref 17–32)
CREAT SERPL-MCNC: 1.4 MG/DL — SIGNIFICANT CHANGE UP (ref 0.7–1.5)
EGFR: 55 ML/MIN/1.73M2 — LOW
EOSINOPHIL # BLD AUTO: 0.16 K/UL — SIGNIFICANT CHANGE UP (ref 0–0.7)
EOSINOPHIL NFR BLD AUTO: 1.7 % — SIGNIFICANT CHANGE UP (ref 0–8)
GLUCOSE BLDC GLUCOMTR-MCNC: 101 MG/DL — HIGH (ref 70–99)
GLUCOSE BLDC GLUCOMTR-MCNC: 102 MG/DL — HIGH (ref 70–99)
GLUCOSE BLDC GLUCOMTR-MCNC: 111 MG/DL — HIGH (ref 70–99)
GLUCOSE BLDC GLUCOMTR-MCNC: 124 MG/DL — HIGH (ref 70–99)
GLUCOSE BLDC GLUCOMTR-MCNC: 161 MG/DL — HIGH (ref 70–99)
GLUCOSE SERPL-MCNC: 105 MG/DL — HIGH (ref 70–99)
HCT VFR BLD CALC: 35.3 % — LOW (ref 42–52)
HGB BLD-MCNC: 11.6 G/DL — LOW (ref 14–18)
IMM GRANULOCYTES NFR BLD AUTO: 0.7 % — HIGH (ref 0.1–0.3)
INR BLD: 1.02 RATIO — SIGNIFICANT CHANGE UP (ref 0.65–1.3)
LYMPHOCYTES # BLD AUTO: 1.34 K/UL — SIGNIFICANT CHANGE UP (ref 1.2–3.4)
LYMPHOCYTES # BLD AUTO: 14.2 % — LOW (ref 20.5–51.1)
MAGNESIUM SERPL-MCNC: 2.4 MG/DL — SIGNIFICANT CHANGE UP (ref 1.8–2.4)
MCHC RBC-ENTMCNC: 29.1 PG — SIGNIFICANT CHANGE UP (ref 27–31)
MCHC RBC-ENTMCNC: 32.9 G/DL — SIGNIFICANT CHANGE UP (ref 32–37)
MCV RBC AUTO: 88.7 FL — SIGNIFICANT CHANGE UP (ref 80–94)
MONOCYTES # BLD AUTO: 0.97 K/UL — HIGH (ref 0.1–0.6)
MONOCYTES NFR BLD AUTO: 10.3 % — HIGH (ref 1.7–9.3)
NEUTROPHILS # BLD AUTO: 6.88 K/UL — HIGH (ref 1.4–6.5)
NEUTROPHILS NFR BLD AUTO: 72.9 % — SIGNIFICANT CHANGE UP (ref 42.2–75.2)
NRBC # BLD: 0 /100 WBCS — SIGNIFICANT CHANGE UP (ref 0–0)
PLATELET # BLD AUTO: 121 K/UL — LOW (ref 130–400)
PMV BLD: 11.6 FL — HIGH (ref 7.4–10.4)
POTASSIUM SERPL-MCNC: 4.2 MMOL/L — SIGNIFICANT CHANGE UP (ref 3.5–5)
POTASSIUM SERPL-SCNC: 4.2 MMOL/L — SIGNIFICANT CHANGE UP (ref 3.5–5)
PROT SERPL-MCNC: 6 G/DL — SIGNIFICANT CHANGE UP (ref 6–8)
PROTHROM AB SERPL-ACNC: 12 SEC — SIGNIFICANT CHANGE UP (ref 9.95–12.87)
RBC # BLD: 3.98 M/UL — LOW (ref 4.7–6.1)
RBC # FLD: 15.9 % — HIGH (ref 11.5–14.5)
SODIUM SERPL-SCNC: 137 MMOL/L — SIGNIFICANT CHANGE UP (ref 135–146)
WBC # BLD: 9.44 K/UL — SIGNIFICANT CHANGE UP (ref 4.8–10.8)
WBC # FLD AUTO: 9.44 K/UL — SIGNIFICANT CHANGE UP (ref 4.8–10.8)

## 2024-12-09 PROCEDURE — 99291 CRITICAL CARE FIRST HOUR: CPT

## 2024-12-09 PROCEDURE — 93010 ELECTROCARDIOGRAM REPORT: CPT

## 2024-12-09 PROCEDURE — 99232 SBSQ HOSP IP/OBS MODERATE 35: CPT

## 2024-12-09 RX ORDER — HYDROCORTISONE BUTYRATE 0.1 %
1 CREAM (GRAM) TOPICAL
Refills: 0 | Status: DISCONTINUED | OUTPATIENT
Start: 2024-12-09 | End: 2024-12-10

## 2024-12-09 RX ORDER — HEPARIN SODIUM,PORCINE 1000/ML
1500 VIAL (ML) INJECTION
Qty: 25000 | Refills: 0 | Status: DISCONTINUED | OUTPATIENT
Start: 2024-12-09 | End: 2024-12-09

## 2024-12-09 RX ORDER — PE/SHARK LIVER OIL/GLYC/WH.PET
1 CREAM (GRAM) RECTAL
Refills: 0 | Status: DISCONTINUED | OUTPATIENT
Start: 2024-12-09 | End: 2024-12-09

## 2024-12-09 RX ORDER — APIXABAN 2.5 MG/1
5 TABLET, FILM COATED ORAL EVERY 12 HOURS
Refills: 0 | Status: DISCONTINUED | OUTPATIENT
Start: 2024-12-09 | End: 2024-12-09

## 2024-12-09 RX ORDER — INFLUENZA VIRUS VACCINE 15; 15; 15; 15 UG/.5ML; UG/.5ML; UG/.5ML; UG/.5ML
0.5 SUSPENSION INTRAMUSCULAR ONCE
Refills: 0 | Status: DISCONTINUED | OUTPATIENT
Start: 2024-12-09 | End: 2024-12-10

## 2024-12-09 RX ORDER — PE/SHARK LIVER OIL/GLYC/WH.PET
1 CREAM (GRAM) RECTAL
Refills: 0 | Status: DISCONTINUED | OUTPATIENT
Start: 2024-12-09 | End: 2024-12-10

## 2024-12-09 RX ORDER — FUROSEMIDE 40 MG/1
40 TABLET ORAL ONCE
Refills: 0 | Status: COMPLETED | OUTPATIENT
Start: 2024-12-09 | End: 2024-12-09

## 2024-12-09 RX ADMIN — OXYCODONE HYDROCHLORIDE 10 MILLIGRAM(S): 30 TABLET ORAL at 19:44

## 2024-12-09 RX ADMIN — AMIODARONE HYDROCHLORIDE 400 MILLIGRAM(S): 200 TABLET ORAL at 05:09

## 2024-12-09 RX ADMIN — Medication 5 UNIT(S): at 16:58

## 2024-12-09 RX ADMIN — GABAPENTIN 300 MILLIGRAM(S): 300 CAPSULE ORAL at 21:07

## 2024-12-09 RX ADMIN — AMIODARONE HYDROCHLORIDE 400 MILLIGRAM(S): 200 TABLET ORAL at 13:20

## 2024-12-09 RX ADMIN — ACETAMINOPHEN 500MG 400 MILLIGRAM(S): 500 TABLET, COATED ORAL at 09:10

## 2024-12-09 RX ADMIN — OXYCODONE HYDROCHLORIDE 10 MILLIGRAM(S): 30 TABLET ORAL at 05:09

## 2024-12-09 RX ADMIN — Medication 5 UNIT(S): at 07:50

## 2024-12-09 RX ADMIN — SODIUM CHLORIDE 3 MILLILITER(S): 9 INJECTION, SOLUTION INTRAMUSCULAR; INTRAVENOUS; SUBCUTANEOUS at 14:01

## 2024-12-09 RX ADMIN — FUROSEMIDE 40 MILLIGRAM(S): 40 TABLET ORAL at 13:48

## 2024-12-09 RX ADMIN — OXYCODONE HYDROCHLORIDE 10 MILLIGRAM(S): 30 TABLET ORAL at 11:33

## 2024-12-09 RX ADMIN — Medication 500 MICROGRAM(S): at 19:56

## 2024-12-09 RX ADMIN — ACETAMINOPHEN 500MG 1000 MILLIGRAM(S): 500 TABLET, COATED ORAL at 09:40

## 2024-12-09 RX ADMIN — GABAPENTIN 300 MILLIGRAM(S): 300 CAPSULE ORAL at 13:20

## 2024-12-09 RX ADMIN — INSULIN GLARGINE 60 UNIT(S): 100 INJECTION, SOLUTION SUBCUTANEOUS at 09:11

## 2024-12-09 RX ADMIN — OXYCODONE HYDROCHLORIDE 10 MILLIGRAM(S): 30 TABLET ORAL at 18:11

## 2024-12-09 RX ADMIN — Medication 1 SUPPOSITORY(S): at 18:06

## 2024-12-09 RX ADMIN — FLUTICASONE PROPIONATE AND SALMETEROL XINAFOATE 1 DOSE(S): 45; 21 AEROSOL, METERED RESPIRATORY (INHALATION) at 19:58

## 2024-12-09 RX ADMIN — ACETAMINOPHEN, DIPHENHYDRAMINE HCL, PHENYLEPHRINE HCL 10 MILLIGRAM(S): 325; 25; 5 TABLET ORAL at 21:06

## 2024-12-09 RX ADMIN — ROSUVASTATIN CALCIUM 20 MILLIGRAM(S): 5 TABLET, FILM COATED ORAL at 21:06

## 2024-12-09 RX ADMIN — TAMSULOSIN HYDROCHLORIDE 0.4 MILLIGRAM(S): 0.4 CAPSULE ORAL at 21:07

## 2024-12-09 RX ADMIN — AMIODARONE HYDROCHLORIDE 400 MILLIGRAM(S): 200 TABLET ORAL at 21:06

## 2024-12-09 RX ADMIN — PANTOPRAZOLE SODIUM 40 MILLIGRAM(S): 40 TABLET, DELAYED RELEASE ORAL at 05:14

## 2024-12-09 RX ADMIN — Medication 50 MILLILITER(S): at 13:47

## 2024-12-09 RX ADMIN — Medication 81 MILLIGRAM(S): at 11:33

## 2024-12-09 RX ADMIN — SODIUM CHLORIDE 3 MILLILITER(S): 9 INJECTION, SOLUTION INTRAMUSCULAR; INTRAVENOUS; SUBCUTANEOUS at 05:44

## 2024-12-09 RX ADMIN — CHLORHEXIDINE GLUCONATE 1 APPLICATION(S): 1.2 RINSE ORAL at 05:11

## 2024-12-09 RX ADMIN — Medication 15 UNIT(S)/HR: at 11:34

## 2024-12-09 RX ADMIN — METOPROLOL TARTRATE 50 MILLIGRAM(S): 100 TABLET, FILM COATED ORAL at 05:09

## 2024-12-09 RX ADMIN — Medication 5 UNIT(S): at 11:35

## 2024-12-09 RX ADMIN — Medication 1 APPLICATION(S): at 16:59

## 2024-12-09 RX ADMIN — OXYCODONE HYDROCHLORIDE 10 MILLIGRAM(S): 30 TABLET ORAL at 12:03

## 2024-12-09 RX ADMIN — GABAPENTIN 300 MILLIGRAM(S): 300 CAPSULE ORAL at 05:09

## 2024-12-09 RX ADMIN — OXYCODONE HYDROCHLORIDE 10 MILLIGRAM(S): 30 TABLET ORAL at 05:39

## 2024-12-09 RX ADMIN — METOPROLOL TARTRATE 50 MILLIGRAM(S): 100 TABLET, FILM COATED ORAL at 16:57

## 2024-12-09 RX ADMIN — SODIUM CHLORIDE 3 MILLILITER(S): 9 INJECTION, SOLUTION INTRAMUSCULAR; INTRAVENOUS; SUBCUTANEOUS at 23:42

## 2024-12-09 NOTE — PHYSICAL THERAPY INITIAL EVALUATION ADULT - PERTINENT HX OF CURRENT PROBLEM, REHAB EVAL
64M with severe MR and AF with RVR admitted for mitral intervention.    AF with RVR complicating hemodynamics

## 2024-12-09 NOTE — PROGRESS NOTE ADULT - SUBJECTIVE AND OBJECTIVE BOX
PREOPERATIVE PROCEDURE(s):                POD #                       SURGEON(s): ISELA Blakely          SUBJECTIVE ASSESSMENT:64yMale patient seen and examined at bedside.    Vital Signs Last 24 Hrs  T(F): 97.5 (09 Dec 2024 04:00), Max: 97.5 (08 Dec 2024 08:00)  HR: 69 (09 Dec 2024 04:00) (66 - 88)  BP: 114/66 (09 Dec 2024 04:00) (101/58 - 135/68)  BP(mean): 82 (09 Dec 2024 04:00) (75 - 95)  RR: 20 (09 Dec 2024 04:00) (18 - 20)  SpO2: 94% (09 Dec 2024 04:00) (94% - 96%)      I&O's Detail    08 Dec 2024 07:01  -  09 Dec 2024 07:00  --------------------------------------------------------  IN:    Heparin: 14 mL    Heparin: 323 mL    Oral Fluid: 500 mL  Total IN: 837 mL    OUT:    Stool (mL): 1 mL    Voided (mL): 600 mL  Total OUT: 601 mL        Net: I&O's Detail    07 Dec 2024 07:01  -  08 Dec 2024 07:00  --------------------------------------------------------  Total NET: -1741.3 mL      08 Dec 2024 07:01  -  09 Dec 2024 07:00  --------------------------------------------------------  Total NET: 236 mL        CAPILLARY BLOOD GLUCOSE  131 (08 Dec 2024 08:00)      POCT Blood Glucose.: 103 mg/dL (08 Dec 2024 21:09)  POCT Blood Glucose.: 115 mg/dL (08 Dec 2024 16:48)  POCT Blood Glucose.: 109 mg/dL (08 Dec 2024 11:42)  POCT Blood Glucose.: 131 mg/dL (08 Dec 2024 07:40)    A1C with Estimated Average Glucose Result: 7.1 % (12-04-24 @ 04:45)      Physical Exam:  General: NAD; A&Ox3  Neuro: pupils equal and reactive, speech clear, no overt sensory or motor deficts  Cardiac: S1/S2, RRR, no murmur, no rubs  Lungs: unlabored respirations, CTA b/l, no wheeze, no rales, no crackles  Abdomen: Soft/NT/ND; positive bowel sounds x 4  Sternum: Intact, no click, incision healing well with no drainage  Incisions: Incisions clean/dry/intact  Extremities: No edema b/l lower extremities; good capillary refill; no cyanosis; palpable 1+ pedal pulses b/l    Central Venous Catheter: Yes: critical illness, intravenous access  Day #  Caceres Catheter: Yes: critical illness; monitor strict i/o's                    Day #  EPICARDIAL WIRES:  YES                                                                         Day #  BOWEL MOVEMENT:  [] YES [] NO, If No, Timing since last BM Day #  CHEST TUBE(MS/Left/Right):  [] YES [] NO, If yes -  AIR LEAKS:  YES/NO        LABS:                        11.6[L]  9.44  )-----------( 121[L]    ( 09 Dec 2024 04:30 )             35.3[L]                        11.9[L]  8.96  )-----------( 148      ( 08 Dec 2024 00:13 )             36.6[L]    12-09    137  |  103  |  44[H]  ----------------------------<  105[H]  4.2   |  22  |  1.4  12-08    140  |  104  |  51[H]  ----------------------------<  106[H]  4.0   |  21  |  1.6[H]    Ca    8.0[L]      09 Dec 2024 04:30  Phos  3.3     12-08  Mg     2.4     12-09    TPro  6.0 [6.0 - 8.0]  /  Alb  3.8 [3.5 - 5.2]  /  TBili  0.9 [0.2 - 1.2]  /  DBili  x   /  AST  15 [0 - 41]  /  ALT  16 [0 - 41]  /  AlkPhos  48 [30 - 115]  12-09    PT/INR - ( 09 Dec 2024 04:30 )   PT: ;   INR: 1.02 ratio         PTT - ( 09 Dec 2024 04:30 )  PTT:39.6 sec, PTT - ( 08 Dec 2024 04:05 )  PTT:52.0 sec  Urinalysis Basic - ( 09 Dec 2024 04:30 )    Color: x / Appearance: x / SG: x / pH: x  Gluc: 105 mg/dL / Ketone: x  / Bili: x / Urobili: x   Blood: x / Protein: x / Nitrite: x   Leuk Esterase: x / RBC: x / WBC x   Sq Epi: x / Non Sq Epi: x / Bacteria: x        RADIOLOGY & ADDITIONAL TESTS:  CXR:   EKG:    Allergies    No Known Allergies    Intolerances      MEDICATIONS  (STANDING):  aMIOdarone    Tablet 400 milliGRAM(s) Oral every 8 hours  aMIOdarone Infusion 0.5 mG/Min (16.7 mL/Hr) IV Continuous <Continuous>  aspirin enteric coated 81 milliGRAM(s) Oral daily  bisacodyl Suppository 10 milliGRAM(s) Rectal once  chlorhexidine 2% Cloths 1 Application(s) Topical <User Schedule>  dextrose 5%. 1000 milliLiter(s) (100 mL/Hr) IV Continuous <Continuous>  dextrose 5%. 1000 milliLiter(s) (50 mL/Hr) IV Continuous <Continuous>  dextrose 50% Injectable 25 Gram(s) IV Push once  dextrose 50% Injectable 25 Gram(s) IV Push once  dextrose 50% Injectable 12.5 Gram(s) IV Push once  fluticasone propionate/ salmeterol 250-50 MICROgram(s) Diskus 1 Dose(s) Inhalation two times a day  gabapentin 300 milliGRAM(s) Oral three times a day  glucagon  Injectable 1 milliGRAM(s) IntraMuscular once  heparin  Infusion 1500 Unit(s)/Hr (15 mL/Hr) IV Continuous <Continuous>  insulin glargine Injectable (LANTUS) 60 Unit(s) SubCutaneous every morning  insulin lispro (ADMELOG) corrective regimen sliding scale   SubCutaneous three times a day before meals  insulin lispro (ADMELOG) corrective regimen sliding scale   SubCutaneous at bedtime  insulin lispro Injectable (ADMELOG) 5 Unit(s) SubCutaneous three times a day before meals  ipratropium    for Nebulization 500 MICROGram(s) Nebulizer every 6 hours  lactated ringers. 1000 milliLiter(s) (50 mL/Hr) IV Continuous <Continuous>  melatonin 10 milliGRAM(s) Oral at bedtime  metoprolol tartrate 50 milliGRAM(s) Oral two times a day  mupirocin 2% Nasal 1 Application(s) Both Nostrils two times a day  pantoprazole    Tablet 40 milliGRAM(s) Oral before breakfast  rosuvastatin 20 milliGRAM(s) Oral at bedtime  sodium chloride 0.9% lock flush 3 milliLiter(s) IV Push every 8 hours  tamsulosin 0.4 milliGRAM(s) Oral at bedtime    MEDICATIONS  (PRN):  acetaminophen   IVPB .. 1000 milliGRAM(s) IV Intermittent once PRN Moderate Pain (4 - 6)  dextrose Oral Gel 15 Gram(s) Oral once PRN Blood Glucose LESS THAN 70 milliGRAM(s)/deciliter  oxyCODONE    IR 10 milliGRAM(s) Oral every 6 hours PRN Severe Pain (7 - 10)    Home Medications:  Aspir 81 oral delayed release tablet: 1 tab(s) orally once a day (31 Oct 2024 09:30)  Breo Ellipta 200 mcg-25 mcg/inh inhalation powder: 1 inhaled once a day (31 Oct 2024 09:30)  Crestor 20 mg oral tablet: 1 tab(s) orally once a day (at bedtime) (31 Oct 2024 09:30)  gabapentin 300 mg oral capsule: 1 cap(s) orally 3 times a day (31 Oct 2024 09:30)  HYDROcodone 10 mg oral capsule, extended release: 1 cap(s) orally 2 times a day (31 Oct 2024 09:30)  Jardiance 25 mg oral tablet: 1 tab(s) orally once a day (31 Oct 2024 09:30)  losartan-hydrochlorothiazide 100mg-12.5mg oral tablet: orally 2 times a day (31 Oct 2024 09:30)  meloxicam 15 mg oral tablet: 1 tab(s) orally once a day (31 Oct 2024 09:30)  metFORMIN 1000 mg oral tablet, extended release: orally 2 times a day (31 Oct 2024 09:30)  Mounjaro 15 mg/0.5 mL subcutaneous solution: 15 milligram(s) subcutaneously once a week on Thur (31 Oct 2024 09:30)  pioglitazone 15 mg oral tablet: 1 tab(s) orally once a day (31 Oct 2024 09:30)  ProAir HFA 90 mcg/inh inhalation aerosol: 2 puff(s) inhaled 2 times a day (31 Oct 2024 09:30)  tujeo: 80 unit(s) subcutaneous once a day (31 Oct 2024 09:30)      Pharmacologic DVT Prophylaxis [] YES, [] NO: Contraindication:  SCD's: YES b/l    GI Prophylaxis:     Post-Op Beta-Blockers: [] Yes, [] No: contraindication:  Post-Op CCB: [] Yes, [] No: contraindication:  Post-Op Aspirin:  [] Yes, [] No: contraindication:  Post-Op Statin:  [] Yes, [] No: contraindication:    Ambulation/Activity Status:    Assessment/Plan:  64y Male status-post  - Case and plan discussed with CTU Intensivist and CT Surgeon - Dr. Blakely/Mauri/Laron/Rian  - Continue CTU supportive care and ongoing plan of care as per continuing CTU rounds.   - Continue DVT/GI prophylaxis  - Incentive Spirometry 10 times an hour  - Continue to advance physical activity as tolerated and continue PT/OT as directed  1. CAD s/p CABG: Continue ASA, statin, BB  2. HTN:   3. Post-op A. Fib ppx:   4. COPD/Hypoxia:   5. DM/Glucose Control:     Social Service Disposition:

## 2024-12-09 NOTE — PHYSICAL THERAPY INITIAL EVALUATION ADULT - ADDITIONAL COMMENTS
Pt reported lives with family in a pvt house with 4 steps to enter & 1 flight inside the house + stair lift for the inside stairs.

## 2024-12-09 NOTE — PHYSICAL THERAPY INITIAL EVALUATION ADULT - DID THE PATIENT HAVE SURGERY?
heparin drip ongoing, with IV pump plugged in. PT provided therapeutic exercises / stretching ex for LEs to alleviate low back and hip pain.  PT will f/u to complete eval.
n/a

## 2024-12-09 NOTE — PROGRESS NOTE ADULT - SUBJECTIVE AND OBJECTIVE BOX
CTU Attending Progress Daily Note       Procedure:  POD#:    HPI:   64-year-old morbidly obese male non smoker with past medical history of hypertension, DM, peripheral neuropathy, sciatica, bilateral hip replacements, degenerative arthritis, , BPH (nocturia x 6), asthma, JIMMY (on CPAP), HLD, Madelung's disease, and mitral valve regurgitation, was sent to ED from CTS office due to new onset of afib with rapid ventricular response.  Patient reports recent outpatient cardiology work up for recent progression of MR.. Had recent cath and JASMINA which demonstrated NO significant CAD and severe MR from flail P2 with EF of 55-60%. Work up revealed he is not a candidate for clipping and minimally invasive approach not feasible due to body habitus. Patient denies chest pain an leg swelling but complaints of dyspnea with minimal exertion. Patient ambulates with a can, needs both knees replaced and reop on right hip.  Denies any fever, chills, nausea, vomiting, CP, changes in urination, or changes in bowel movements. Was seen by dentist 3 weeks ago and everything was fine. HR now 130.  (03 Dec 2024 18:17)    Hospital Course:  12/4 Admitted with AF with RVR, given amio and cardizem, low BP  12/6 FLAKITO, started fluids, renal consulted, converted with procainamide  12/7 Amio po transition, metop increased, Lasix with albumin 25%  12/8 Creat 1.6, NSR  12/9 Creat 1.4    OBJECTIVE:  ICU Vital Signs Last 24 Hrs  T(C): 36.5 (09 Dec 2024 12:00), Max: 36.5 (09 Dec 2024 12:00)  T(F): 97.7 (09 Dec 2024 12:00), Max: 97.7 (09 Dec 2024 12:00)  HR: 70 (09 Dec 2024 12:00) (68 - 88)  BP: 109/62 (09 Dec 2024 12:00) (101/58 - 135/68)  BP(mean): 80 (09 Dec 2024 12:00) (75 - 86)  ABP: --  ABP(mean): --  RR: 20 (09 Dec 2024 12:00) (18 - 20)  SpO2: 94% (09 Dec 2024 12:00) (94% - 96%)    O2 Parameters below as of 09 Dec 2024 12:00  Patient On (Oxygen Delivery Method): room air          I&O's Summary    08 Dec 2024 07:01  -  09 Dec 2024 07:00  --------------------------------------------------------  IN: 837 mL / OUT: 601 mL / NET: 236 mL    09 Dec 2024 07:01  -  09 Dec 2024 13:22  --------------------------------------------------------  IN: 90 mL / OUT: 0 mL / NET: 90 mL      I&O's Detail    08 Dec 2024 07:01  -  09 Dec 2024 07:00  --------------------------------------------------------  IN:    Heparin: 14 mL    Heparin: 323 mL    Oral Fluid: 500 mL  Total IN: 837 mL    OUT:    Stool (mL): 1 mL    Voided (mL): 600 mL  Total OUT: 601 mL    Total NET: 236 mL      09 Dec 2024 07:01  -  09 Dec 2024 13:22  --------------------------------------------------------  IN:    Heparin: 15 mL    Heparin: 75 mL  Total IN: 90 mL    OUT:  Total OUT: 0 mL    Total NET: 90 mL        Adult Advanced Hemodynamics Last 24 Hrs  CVP(mm Hg): --  CVP(cm H2O): --  CO: --  CI: --  PA: --  PA(mean): --  PCWP: --  SVR: --  SVRI: --  PVR: --  PVRI: --    CAPILLARY BLOOD GLUCOSE      POCT Blood Glucose.: 124 mg/dL (09 Dec 2024 11:33)  POCT Blood Glucose.: 111 mg/dL (09 Dec 2024 09:09)  POCT Blood Glucose.: 102 mg/dL (09 Dec 2024 07:46)  POCT Blood Glucose.: 103 mg/dL (08 Dec 2024 21:09)  POCT Blood Glucose.: 115 mg/dL (08 Dec 2024 16:48)    LABS:                          11.6   9.44  )-----------( 121      ( 09 Dec 2024 04:30 )             35.3     12-09    137  |  103  |  44[H]  ----------------------------<  105[H]  4.2   |  22  |  1.4    Ca    8.0[L]      09 Dec 2024 04:30  Phos  3.3     12-08  Mg     2.4     12-09    TPro  6.0  /  Alb  3.8  /  TBili  0.9  /  DBili  x   /  AST  15  /  ALT  16  /  AlkPhos  48  12-09    PT/INR - ( 09 Dec 2024 04:30 )   PT: 12.00 sec;   INR: 1.02 ratio         PTT - ( 09 Dec 2024 10:50 )  PTT:44.5 sec  Urinalysis Basic - ( 09 Dec 2024 04:30 )    Color: x / Appearance: x / SG: x / pH: x  Gluc: 105 mg/dL / Ketone: x  / Bili: x / Urobili: x   Blood: x / Protein: x / Nitrite: x   Leuk Esterase: x / RBC: x / WBC x   Sq Epi: x / Non Sq Epi: x / Bacteria: x        Culture - Acid Fast - Urine (collected 06 Dec 2024 14:20)  Source: .Urine Urine  Preliminary Report (07 Dec 2024 23:06):    Culture is being performed.      Home Medications:  Aspir 81 oral delayed release tablet: 1 tab(s) orally once a day (31 Oct 2024 09:30)  Breo Ellipta 200 mcg-25 mcg/inh inhalation powder: 1 inhaled once a day (31 Oct 2024 09:30)  Crestor 20 mg oral tablet: 1 tab(s) orally once a day (at bedtime) (31 Oct 2024 09:30)  gabapentin 300 mg oral capsule: 1 cap(s) orally 3 times a day (31 Oct 2024 09:30)  HYDROcodone 10 mg oral capsule, extended release: 1 cap(s) orally 2 times a day (31 Oct 2024 09:30)  Jardiance 25 mg oral tablet: 1 tab(s) orally once a day (31 Oct 2024 09:30)  losartan-hydrochlorothiazide 100mg-12.5mg oral tablet: orally 2 times a day (31 Oct 2024 09:30)  meloxicam 15 mg oral tablet: 1 tab(s) orally once a day (31 Oct 2024 09:30)  metFORMIN 1000 mg oral tablet, extended release: orally 2 times a day (31 Oct 2024 09:30)  Mounjaro 15 mg/0.5 mL subcutaneous solution: 15 milligram(s) subcutaneously once a week on Thur (31 Oct 2024 09:30)  pioglitazone 15 mg oral tablet: 1 tab(s) orally once a day (31 Oct 2024 09:30)  ProAir HFA 90 mcg/inh inhalation aerosol: 2 puff(s) inhaled 2 times a day (31 Oct 2024 09:30)  tujeo: 80 unit(s) subcutaneous once a day (31 Oct 2024 09:30)    HOSPITAL MEDICATIONS:  MEDICATIONS  (STANDING):  albumin human 25% IVPB 50 milliLiter(s) IV Intermittent once  aMIOdarone    Tablet 400 milliGRAM(s) Oral every 8 hours  aMIOdarone Infusion 0.5 mG/Min (16.7 mL/Hr) IV Continuous <Continuous>  aspirin enteric coated 81 milliGRAM(s) Oral daily  bisacodyl Suppository 10 milliGRAM(s) Rectal once  chlorhexidine 2% Cloths 1 Application(s) Topical <User Schedule>  dextrose 5%. 1000 milliLiter(s) (50 mL/Hr) IV Continuous <Continuous>  dextrose 5%. 1000 milliLiter(s) (100 mL/Hr) IV Continuous <Continuous>  dextrose 50% Injectable 25 Gram(s) IV Push once  dextrose 50% Injectable 12.5 Gram(s) IV Push once  dextrose 50% Injectable 25 Gram(s) IV Push once  fluticasone propionate/ salmeterol 250-50 MICROgram(s) Diskus 1 Dose(s) Inhalation two times a day  furosemide   Injectable 40 milliGRAM(s) IV Push once  gabapentin 300 milliGRAM(s) Oral three times a day  glucagon  Injectable 1 milliGRAM(s) IntraMuscular once  heparin  Infusion 1500 Unit(s)/Hr (15 mL/Hr) IV Continuous <Continuous>  insulin glargine Injectable (LANTUS) 60 Unit(s) SubCutaneous every morning  insulin lispro (ADMELOG) corrective regimen sliding scale   SubCutaneous three times a day before meals  insulin lispro (ADMELOG) corrective regimen sliding scale   SubCutaneous at bedtime  insulin lispro Injectable (ADMELOG) 5 Unit(s) SubCutaneous three times a day before meals  ipratropium    for Nebulization 500 MICROGram(s) Nebulizer every 6 hours  lactated ringers. 1000 milliLiter(s) (50 mL/Hr) IV Continuous <Continuous>  melatonin 10 milliGRAM(s) Oral at bedtime  metoprolol tartrate 50 milliGRAM(s) Oral two times a day  mupirocin 2% Nasal 1 Application(s) Both Nostrils two times a day  pantoprazole    Tablet 40 milliGRAM(s) Oral before breakfast  rosuvastatin 20 milliGRAM(s) Oral at bedtime  sodium chloride 0.9% lock flush 3 milliLiter(s) IV Push every 8 hours  tamsulosin 0.4 milliGRAM(s) Oral at bedtime    MEDICATIONS  (PRN):  dextrose Oral Gel 15 Gram(s) Oral once PRN Blood Glucose LESS THAN 70 milliGRAM(s)/deciliter  oxyCODONE    IR 10 milliGRAM(s) Oral every 6 hours PRN Severe Pain (7 - 10)    RADIOLOGY:  Chest X-ray Reviewed    REVIEW OF SYSTEMS:  CONSTITUTIONAL: [X] all negative; [ ] weakness, [ ] fevers, [ ] chills  EYES/ENT: [X] all negative; [ ] visual changes, [ ] vertigo, [ ] throat pain   NECK: [X] all negative; [ ] pain, [ ] stiffness  RESPIRATORY: [] all negative, [ ] cough, [ ] wheezing, [ ] hemoptysis, [ ] shortness of breath  CARDIOVASCULAR: [] all negative; [ ] chest pain, [ ] palpitations, [ ] orthopnea  GASTROINTESTINAL: [X] all negative; [ ]abdominal pain, [ ] nausea, [ ] vomiting, [ ] hematemesis, [ ] diarrhea, [ ] constipation, [ ] melena, [ ] hematochezia.  GENITOURINARY: [X] all negative; [ ] dysuria, [ ] frequency, [ ] hematuria  NEUROLOGICAL: [X] all negative; [ ] numbness, [ ] weakness  SKIN: [X] all negative; [ ] itching, [ ] burning, [ ] rashes, [ ] lesions   All other review of systems is negative unless indicated above.  [  ] Unable to assess ROS because     PHYSICAL EXAM:          CONSTITUTIONAL: Well-developed; well-nourished; in no acute distress.   	SKIN: warm, dry  	HEAD: Normocephalic; atraumatic.  	EYES: PERRL, EOM, no conj injection, sclera clear  	ENT: No nasal discharge; airway clear.  	NECK: Supple; non tender.   	CARD: S1, S2 normal; no murmurs, gallops, or rubs. Regular rate and rhythm.  no carotid bruits  	RESP: CTA B/L; good air movement No wheezes, rales or rhonchi.  	ABD: Soft, not tender, not distended,  no rebound or guarding, bowel sounds present  	EXT: Normal ROM.  No clubbing, cyanosis or edema.   warm and well perfused.  pulses palpable  	NEURO: Alert, awake, motor 5/5 R, 5/5 L

## 2024-12-09 NOTE — PHYSICAL THERAPY INITIAL EVALUATION ADULT - GENERAL OBSERVATIONS, REHAB EVAL
Spoke with patient to let her know medication was refilled and lab orders are available to recheck INR   Pt seen from [id number]. Pt encountered in the b/s recliner, + IV, O2 @ 2l/min viaNC, agreeable for b/s PT.

## 2024-12-09 NOTE — PHYSICAL THERAPY INITIAL EVALUATION ADULT - GAIT TRAINING, PT EVAL
Increase amb to 200ft Independent with RW/AAD by d/c                               Stairs: 6 steps supervision with 1HR by d/c

## 2024-12-09 NOTE — PROGRESS NOTE ADULT - ASSESSMENT
64M with severe MR and AF with RVR admitted for mitral intervention.      AF with RVR complicating hemodynamics    Amio po 400 q8     keep K > 4.2, Mg > 2  Increase lopressor to 50 BID  off diltazm  due to hyptension  Switch amio to PO - wean off gtt    Severe MR - surgical evaluation in progress    Diet  Bowel regimen  PUD ppx    FLAKITO likely cardiorenal  Renal consulted  Avoid hypotension  Lasix + albumin today  Goal negative balance    Heparin gtt for AF   ambulate     Upon my evaluation, the above patient has a high probability of imminent or life threatening deterioration due to a high risk for Shock, Cardiogenic shock, arrythmia, acute blood loss, acute kidney injury and acute pulmonary insufficiency which required my direct attention, intervention, and personal management.  Total critical care time indicated in the note includes review of radiology results, ordering, interpreting and reviewing diagnostic studies / lab tests with immediate assessment and treatment, consultant collaboration on findings and treatment options, monitoring for potential decompensation, obtaining history from family, EMT, nursing home staff and/or treating physicians; directing the titration of pressors, oxygen support devices, fluid resuscitation, interpretation of cardiac output measurements, interpretation of radiological assessments, interpretation of EKG / rhythm strips, telemetry.  This time did not overlap with the management of other patients or performing separately reportable procedures.      Management of this patient was discussed with the CT Surgery/Thoracic surgery/Structural Cardiology attending and mid-level providers.    I acknowledge the use of copied documentation.  All copy forward documentation is my own and has been reviewed and revised as appropriate.  If no changes were made, it is because that information remains the same.
64M with severe MR and AF with RVR admitted for mitral intervention.      AF with RVR complicating hemodynamics  Soft BP with cardizem  Amio IV loading  Given procainamide   keep K > 4.2, Mg > 2  Increase lopressor to 50 BID  Switch amio to PO - wean off gtt    Severe MR - surgical evaluation in progress    Diet  Bowel regimen  PUD ppx    FLAKITO likely cardiorenal  Renal consulted  Avoid hypotension  Lasix + albumin today  Goal negative balance    Heparin gtt for AF   Upon my evaluation, the above patient has a high probability of imminent or life threatening deterioration due to a high risk for Shock, Cardiogenic shock, arrythmia, acute blood loss, acute kidney injury and acute pulmonary insufficiency which required my direct attention, intervention, and personal management.  Total critical care time indicated in the note includes review of radiology results, ordering, interpreting and reviewing diagnostic studies / lab tests with immediate assessment and treatment, consultant collaboration on findings and treatment options, monitoring for potential decompensation, obtaining history from family, EMT, nursing home staff and/or treating physicians; directing the titration of pressors, oxygen support devices, fluid resuscitation, interpretation of cardiac output measurements, interpretation of radiological assessments, interpretation of EKG / rhythm strips, telemetry.  This time did not overlap with the management of other patients or performing separately reportable procedures.      Management of this patient was discussed with the CT Surgery/Thoracic surgery/Structural Cardiology attending and mid-level providers.    I acknowledge the use of copied documentation.  All copy forward documentation is my own and has been reviewed and revised as appropriate.  If no changes were made, it is because that information remains the same.
65 yo man with extensive PMH presenting to the hospital for A fib with RVR.  Renal called for FLAKITO.    # FLAKITO multifactorial obstructive with hx of BPH and ATN ( hypotension)  # A fib with RVR   non oliguric, cr improving daily  - follow bladder scan   - keep MAP > 65  - on diltiazem metoprolol  - dose meds for eGFR <45    Please call back with questions.
A/P    1- Pre Op fro sever MR  2- Afib RVr now rate controlled  on amio  po aLopressor po and cardiazm po      Heprin drip    3- Renal insuffcincy  started on IV fluid f/u Cr this pm and daily  renal f/u  4- BPH hx Rx flomax      ambulate denatl today cleared
63 yo man with extensive PMH presenting to the hospital for A fib with RVR   Renal called for FLAKITO   # FLAKITO multifactorial obstructive with hx of BPH and ATN ( hypotension)  # A fib with RVR   - sono noted with bladder volume of 300 cc  -  non oliguric / follow bladder scan   - keep MAP > 65  - on diltiazem metoprolol  - no diuretics   - decrease gabapentin to 100 mg q8h   -  cr trending down    will follow .
64M with severe MR and AF with RVR admitted for mitral intervention.      AF with RVR complicating hemodynamics  Soft BP with cardizem  Amio IV load completed  Now on 400 TID  Total 3.4gm amio given to date  keep K > 4.2, Mg > 2  Lopressor 50 BID    Severe MR - surgical evaluation in progress    Diet  Bowel regimen  PUD ppx    FLAKITO likely cardiorenal  Renal consulted  Avoid hypotension  Lasix + albumin today  Goal negative balance  Flomax for BPH    Heparin gtt for AF     DM   Holding jardiance / metformin  Lantus 60  SSI    Melatonin for sleep    Upon my evaluation, the above patient has a high probability of imminent or life threatening deterioration due to a high risk for Shock, Cardiogenic shock, arrythmia, acute blood loss, acute kidney injury and acute pulmonary insufficiency which required my direct attention, intervention, and personal management.  Total critical care time indicated in the note includes review of radiology results, ordering, interpreting and reviewing diagnostic studies / lab tests with immediate assessment and treatment, consultant collaboration on findings and treatment options, monitoring for potential decompensation, obtaining history from family, EMT, nursing home staff and/or treating physicians; directing the titration of pressors, oxygen support devices, fluid resuscitation, interpretation of cardiac output measurements, interpretation of radiological assessments, interpretation of EKG / rhythm strips, telemetry.  This time did not overlap with the management of other patients or performing separately reportable procedures.      Management of this patient was discussed with the CT Surgery/Thoracic surgery/Structural Cardiology attending and mid-level providers.    I acknowledge the use of copied documentation.  All copy forward documentation is my own and has been reviewed and revised as appropriate.  If no changes were made, it is because that information remains the same.

## 2024-12-10 ENCOUNTER — TRANSCRIPTION ENCOUNTER (OUTPATIENT)
Age: 64
End: 2024-12-10

## 2024-12-10 VITALS
DIASTOLIC BLOOD PRESSURE: 65 MMHG | TEMPERATURE: 98 F | SYSTOLIC BLOOD PRESSURE: 123 MMHG | RESPIRATION RATE: 20 BRPM | HEART RATE: 83 BPM | OXYGEN SATURATION: 98 %

## 2024-12-10 LAB
ALBUMIN SERPL ELPH-MCNC: 3.8 G/DL — SIGNIFICANT CHANGE UP (ref 3.5–5.2)
ALP SERPL-CCNC: 51 U/L — SIGNIFICANT CHANGE UP (ref 30–115)
ALT FLD-CCNC: 19 U/L — SIGNIFICANT CHANGE UP (ref 0–41)
ANION GAP SERPL CALC-SCNC: 12 MMOL/L — SIGNIFICANT CHANGE UP (ref 7–14)
APTT BLD: 22.6 SEC — CRITICAL LOW (ref 27–39.2)
AST SERPL-CCNC: 16 U/L — SIGNIFICANT CHANGE UP (ref 0–41)
BILIRUB SERPL-MCNC: 0.8 MG/DL — SIGNIFICANT CHANGE UP (ref 0.2–1.2)
BUN SERPL-MCNC: 45 MG/DL — HIGH (ref 10–20)
CALCIUM SERPL-MCNC: 7.6 MG/DL — LOW (ref 8.4–10.4)
CHLORIDE SERPL-SCNC: 106 MMOL/L — SIGNIFICANT CHANGE UP (ref 98–110)
CO2 SERPL-SCNC: 21 MMOL/L — SIGNIFICANT CHANGE UP (ref 17–32)
CREAT SERPL-MCNC: 1.4 MG/DL — SIGNIFICANT CHANGE UP (ref 0.7–1.5)
EGFR: 55 ML/MIN/1.73M2 — LOW
GLUCOSE BLDC GLUCOMTR-MCNC: 118 MG/DL — HIGH (ref 70–99)
GLUCOSE BLDC GLUCOMTR-MCNC: 201 MG/DL — HIGH (ref 70–99)
GLUCOSE SERPL-MCNC: 96 MG/DL — SIGNIFICANT CHANGE UP (ref 70–99)
HCT VFR BLD CALC: 31.6 % — LOW (ref 42–52)
HGB BLD-MCNC: 10.1 G/DL — LOW (ref 14–18)
INR BLD: 1.06 RATIO — SIGNIFICANT CHANGE UP (ref 0.65–1.3)
MAGNESIUM SERPL-MCNC: 2 MG/DL — SIGNIFICANT CHANGE UP (ref 1.8–2.4)
MCHC RBC-ENTMCNC: 28.6 PG — SIGNIFICANT CHANGE UP (ref 27–31)
MCHC RBC-ENTMCNC: 32 G/DL — SIGNIFICANT CHANGE UP (ref 32–37)
MCV RBC AUTO: 89.5 FL — SIGNIFICANT CHANGE UP (ref 80–94)
NRBC # BLD: 0 /100 WBCS — SIGNIFICANT CHANGE UP (ref 0–0)
PLATELET # BLD AUTO: 123 K/UL — LOW (ref 130–400)
PMV BLD: 11.3 FL — HIGH (ref 7.4–10.4)
POTASSIUM SERPL-MCNC: 3.9 MMOL/L — SIGNIFICANT CHANGE UP (ref 3.5–5)
POTASSIUM SERPL-SCNC: 3.9 MMOL/L — SIGNIFICANT CHANGE UP (ref 3.5–5)
PROT SERPL-MCNC: 5.8 G/DL — LOW (ref 6–8)
PROTHROM AB SERPL-ACNC: 12.5 SEC — SIGNIFICANT CHANGE UP (ref 9.95–12.87)
RBC # BLD: 3.53 M/UL — LOW (ref 4.7–6.1)
RBC # FLD: 16 % — HIGH (ref 11.5–14.5)
SODIUM SERPL-SCNC: 139 MMOL/L — SIGNIFICANT CHANGE UP (ref 135–146)
WBC # BLD: 7.95 K/UL — SIGNIFICANT CHANGE UP (ref 4.8–10.8)
WBC # FLD AUTO: 7.95 K/UL — SIGNIFICANT CHANGE UP (ref 4.8–10.8)

## 2024-12-10 PROCEDURE — 71045 X-RAY EXAM CHEST 1 VIEW: CPT | Mod: 26

## 2024-12-10 PROCEDURE — 99223 1ST HOSP IP/OBS HIGH 75: CPT

## 2024-12-10 PROCEDURE — 99232 SBSQ HOSP IP/OBS MODERATE 35: CPT

## 2024-12-10 PROCEDURE — 93010 ELECTROCARDIOGRAM REPORT: CPT

## 2024-12-10 PROCEDURE — 99222 1ST HOSP IP/OBS MODERATE 55: CPT

## 2024-12-10 RX ORDER — TIRZEPATIDE 2.5 MG/.5ML
15 INJECTION, SOLUTION SUBCUTANEOUS
Qty: 2 | Refills: 0
Start: 2024-12-10 | End: 2025-01-08

## 2024-12-10 RX ORDER — EMPAGLIFLOZIN 25 MG/1
1 TABLET, FILM COATED ORAL
Qty: 30 | Refills: 0
Start: 2024-12-10

## 2024-12-10 RX ORDER — APIXABAN 2.5 MG/1
1 TABLET, FILM COATED ORAL
Qty: 60 | Refills: 0
Start: 2024-12-10

## 2024-12-10 RX ORDER — PIOGLITAZONE HCL 15 MG
1 TABLET ORAL
Qty: 30 | Refills: 0
Start: 2024-12-10

## 2024-12-10 RX ORDER — POTASSIUM CHLORIDE 600 MG/1
1 TABLET, EXTENDED RELEASE ORAL
Qty: 30 | Refills: 0
Start: 2024-12-10

## 2024-12-10 RX ORDER — GABAPENTIN 300 MG/1
1 CAPSULE ORAL
Qty: 90 | Refills: 0
Start: 2024-12-10

## 2024-12-10 RX ORDER — POTASSIUM CHLORIDE 600 MG/1
20 TABLET, EXTENDED RELEASE ORAL ONCE
Refills: 0 | Status: COMPLETED | OUTPATIENT
Start: 2024-12-10 | End: 2024-12-10

## 2024-12-10 RX ORDER — PANTOPRAZOLE SODIUM 40 MG/1
1 TABLET, DELAYED RELEASE ORAL
Qty: 30 | Refills: 0
Start: 2024-12-10 | End: 2025-01-08

## 2024-12-10 RX ORDER — HYDROCORTISONE BUTYRATE 0.1 %
1 CREAM (GRAM) TOPICAL
Qty: 1 | Refills: 0
Start: 2024-12-10 | End: 2025-01-08

## 2024-12-10 RX ORDER — TAMSULOSIN HYDROCHLORIDE 0.4 MG/1
1 CAPSULE ORAL
Qty: 30 | Refills: 0
Start: 2024-12-10

## 2024-12-10 RX ORDER — FUROSEMIDE 40 MG/1
1 TABLET ORAL
Qty: 30 | Refills: 0
Start: 2024-12-10 | End: 2025-01-08

## 2024-12-10 RX ORDER — METOPROLOL TARTRATE 100 MG/1
1 TABLET, FILM COATED ORAL
Qty: 60 | Refills: 0
Start: 2024-12-10

## 2024-12-10 RX ORDER — ROSUVASTATIN CALCIUM 5 MG/1
1 TABLET, FILM COATED ORAL
Qty: 30 | Refills: 0
Start: 2024-12-10

## 2024-12-10 RX ORDER — AMIODARONE HYDROCHLORIDE 200 MG/1
1 TABLET ORAL
Qty: 30 | Refills: 0
Start: 2024-12-10

## 2024-12-10 RX ADMIN — OXYCODONE HYDROCHLORIDE 10 MILLIGRAM(S): 30 TABLET ORAL at 12:45

## 2024-12-10 RX ADMIN — Medication 1 APPLICATION(S): at 06:19

## 2024-12-10 RX ADMIN — CHLORHEXIDINE GLUCONATE 1 APPLICATION(S): 1.2 RINSE ORAL at 06:19

## 2024-12-10 RX ADMIN — Medication 4: at 11:14

## 2024-12-10 RX ADMIN — POTASSIUM CHLORIDE 20 MILLIEQUIVALENT(S): 600 TABLET, EXTENDED RELEASE ORAL at 08:36

## 2024-12-10 RX ADMIN — INSULIN GLARGINE 60 UNIT(S): 100 INJECTION, SOLUTION SUBCUTANEOUS at 08:28

## 2024-12-10 RX ADMIN — Medication 81 MILLIGRAM(S): at 11:34

## 2024-12-10 RX ADMIN — OXYCODONE HYDROCHLORIDE 10 MILLIGRAM(S): 30 TABLET ORAL at 11:59

## 2024-12-10 RX ADMIN — METOPROLOL TARTRATE 50 MILLIGRAM(S): 100 TABLET, FILM COATED ORAL at 06:18

## 2024-12-10 RX ADMIN — SODIUM CHLORIDE 3 MILLILITER(S): 9 INJECTION, SOLUTION INTRAMUSCULAR; INTRAVENOUS; SUBCUTANEOUS at 05:28

## 2024-12-10 RX ADMIN — Medication 500 MICROGRAM(S): at 10:54

## 2024-12-10 RX ADMIN — Medication 1 SUPPOSITORY(S): at 06:34

## 2024-12-10 RX ADMIN — AMIODARONE HYDROCHLORIDE 400 MILLIGRAM(S): 200 TABLET ORAL at 06:17

## 2024-12-10 RX ADMIN — Medication 5 UNIT(S): at 11:13

## 2024-12-10 RX ADMIN — OXYCODONE HYDROCHLORIDE 10 MILLIGRAM(S): 30 TABLET ORAL at 04:28

## 2024-12-10 RX ADMIN — PANTOPRAZOLE SODIUM 40 MILLIGRAM(S): 40 TABLET, DELAYED RELEASE ORAL at 06:18

## 2024-12-10 RX ADMIN — GABAPENTIN 300 MILLIGRAM(S): 300 CAPSULE ORAL at 06:18

## 2024-12-10 RX ADMIN — Medication 5 UNIT(S): at 08:27

## 2024-12-10 NOTE — DISCHARGE NOTE PROVIDER - HOSPITAL COURSE
64-year-old morbidly obese male non smoker with past medical history of hypertension, DM, peripheral neuropathy, sciatica, bilateral hip replacements, degenerative arthritis, , BPH (nocturia x 6), asthma, JIMMY (on CPAP), HLD, Madelung's disease, and mitral valve regurgitation, was sent to ED from [facility] office due to new onset of afib with rapid ventricular response.  Patient reports recent outpatient cardiology work up for progression of MR.. Cardiac cath and JASMINA demonstrated NO significant CAD and severe MR from flail P2 with EF of 55-60%. Work up revealed he is not a candidate for clipping and minimally invasive approach not feasible due to body habitus. He denied chest pain an leg swelling but complaints of dyspnea with minimal exertion. Patient ambulates with a can, needs both knees replaced and reop on right hip.  Was seen by dentist 3 weeks ago and everything was fine. Patient admitted for new onset of afib with RVR.        64-year-old morbidly obese male non smoker with past medical history of hypertension, DM, peripheral neuropathy, sciatica, bilateral hip replacements, degenerative arthritis, , BPH (nocturia x 6), asthma, JIMMY (on CPAP), HLD, Madelung's disease, and mitral valve regurgitation, was sent to ED from [facility] office due to new onset of afib with rapid ventricular response.  Patient reports recent outpatient cardiology work up for progression of MR.. Cardiac cath and JASMINA demonstrated NO significant CAD and severe MR from flail P2 with EF of 55-60%. Work up revealed he is not a candidate for clipping and minimally invasive approach not feasible due to body habitus. He denied chest pain an leg swelling but complaints of dyspnea with minimal exertion. Patient ambulates with a can, needs both knees replaced and reop on right hip.  Was seen by dentist 3 weeks ago and everything was fine. Patient admitted for new onset of afib with RVR.     Hospital course:  AF with RVR complicating hemodynamics  Soft BP with cardizem  Amio IV load completed - Now with PO load - Total 4.0 gm amio given to date  Lopressor 50 BID  Eliquis 5mg BID    Severe MR - LA enlargement - surgical evaluation in progress - will follow up in office           HFpEF  will be discharged on diuretic with potassium supplement    FLAKITO likely cardiorenal - peaked at 2.4 - admitted 1.3 - discharged 1.4    Hemmorrhoids - stool softener and monitor -  will need GI f/u    Flomax for BPH    Heparin gtt for AF during hospitalization, discontinued due to bleeding hemorrhoids -  will discharge on Eliquis 5mg BID  and discontinue if bleeding recurrss    DM - Holding jardiance / metformin - resume on discharge - discharge with Lantus -  Hold Mounjaro     Patient ot f/u in office in 2 days -  will be set up for elective MVR             64-year-old morbidly obese male non smoker with past medical history of hypertension, DM, peripheral neuropathy, sciatica, bilateral hip replacements, degenerative arthritis, , BPH (nocturia x 6), asthma, JIMMY (on CPAP), HLD, Madelung's disease, and mitral valve regurgitation, was sent to ED from [facility] office due to new onset of afib with rapid ventricular response.  Patient reports recent outpatient cardiology work up for progression of MR.. Cardiac cath and JASMINA demonstrated NO significant CAD and severe MR from flail P2 with EF of 55-60%. Work up revealed he is not a candidate for clipping and minimally invasive approach not feasible due to body habitus. He denied chest pain an leg swelling but complaints of dyspnea with minimal exertion. Patient ambulates with a can, needs both knees replaced and reop on right hip.  Was seen by dentist 3 weeks ago and everything was fine. Patient admitted for new onset of afib with RVR.     Hospital course:  AF with RVR complicating hemodynamics  Soft BP with cardizem  Amio IV load completed - Now with PO load - Total 4.0 gm amio given to date  Lopressor 50 BID  Eliquis 5mg BID    Severe MR - LA enlargement - surgical evaluation in progress - will follow up in office           HFpEF  will be discharged on diuretic with potassium supplement    FLAKITO likely cardiorenal - peaked at 2.4 - admitted 1.3 - discharged 1.4    Hemmorrhoids - stool softener and monitor -  will need GI f/u    Flomax for BPH    Heparin gtt for AF during hospitalization, discontinued due to bleeding hemorrhoids -  will discharge on Eliquis 5mg BID  and discontinue if bleeding reoccurs    DM - Holding jardiance / metformin - resume on discharge - discharge with pre-op meds -  Hold Mounjaro 5 days prior to any procedure requiring anesthesia    Patient ot f/u in office in 2 days -  will be set up for elective MVR             64-year-old morbidly obese male non smoker with past medical history of hypertension, DM, peripheral neuropathy, sciatica, bilateral hip replacements, degenerative arthritis, , BPH (nocturia x 6), asthma, JIMMY (on CPAP), HLD, Madelung's disease, and mitral valve regurgitation, was sent to ED from [facility] office due to new onset of afib with rapid ventricular response.  Patient reports recent outpatient cardiology work up for progression of MR.. Cardiac cath and JASMINA demonstrated NO significant CAD and severe MR from flail P2 with EF of 55-60%. Work up revealed he is not a candidate for clipping and minimally invasive approach not feasible due to body habitus. He denied chest pain an leg swelling but complaints of dyspnea with minimal exertion. Patient ambulates with a can, needs both knees replaced and reop on right hip.  Was seen by dentist 3 weeks ago and everything was fine. Patient admitted for new onset of afib with RVR.     Hospital course:  AF with RVR complicating hemodynamics  Soft BP with cardizem  Amio IV load completed - Now with PO load - Total 4.0 gm amio given to date  Lopressor 50 BID  Eliquis 5mg BID    Severe MR - LA enlargement - surgical evaluation in progress - will follow up in office           HFpEF  will be discharged on diuretic with potassium supplement    FLAKITO likely cardiorenal - peaked at 2.4 - admitted 1.3 - discharged 1.4    Hemmorrhoids - stool softener and monitor -  will need GI f/u    Flomax for BPH    Heparin gtt for AF during hospitalization, discontinued due to bleeding hemorrhoids -  will discharge on Eliquis 5mg BID  and discontinue if bleeding reoccurs    DM - Holding jardiance / metformin - resume on discharge - discharge with pre-op meds -  Hold Mounjaro 5 days prior to any procedure requiring anesthesia    Patient ot f/u in office in 2 days -  will be set up for elective MVR; pt needs repeat CBC and CMP

## 2024-12-10 NOTE — CONSULT NOTE ADULT - SUBJECTIVE AND OBJECTIVE BOX
Gastroenterology Consultation:    Patient is a 64y old  Male who presents with a chief complaint of New onset of atrial fibrillation with rapid ventricular response in setting of severe MR (10 Dec 2024 09:10)        Admitted on: 12-03-24      HPI:   64-year-old morbidly obese male non smoker with PMHx of hypertension, DM, peripheral neuropathy, sciatica, bilateral hip replacements, degenerative arthritis, , BPH (nocturia x 6), asthma, JIMMY (on CPAP), HLD, Madelung's disease, and mitral valve regurgitation, was sent to ED from [facility] office due to new onset of afib with rapid ventricular response.  GI consulted for an episode of small quantity   bright blood while wiping. Pt with no abdominal pain, diarrhea, nausea, or vomiting       Prior EGD: see below     Prior Colonoscopy: see below       PAST MEDICAL & SURGICAL HISTORY:  Diabetes  20 yrs  HTN (hypertension)  Obstructive sleep apnea on CPAP  Asthma  Obesity  Peripheral neuropathy  related to diabetes  Arthritis  Hypercholesteremia  Madelung's disease  H/O lipoma  times 2-3  History of hip replacement, total, bilateral  two separate times '17, '07  Fracture of orbital floor  Encounter for screening colonoscopy            FAMILY HISTORY:  Family history of diabetes mellitus (Mother, Sibling)    CAD (coronary artery disease) (Mother, Father)    Social History:  neg x 3     Home Medications:  Breo Ellipta 200 mcg-25 mcg/inh inhalation powder: 1 inhaled once a day (31 Oct 2024 09:30)  HYDROcodone 10 mg oral capsule, extended release: 1 cap(s) orally 2 times a day (31 Oct 2024 09:30)  ProAir HFA 90 mcg/inh inhalation aerosol: 2 puff(s) inhaled 2 times a day (31 Oct 2024 09:30)  tujeo: 80 unit(s) subcutaneous once a day (31 Oct 2024 09:30)        MEDICATIONS  (STANDING):  aMIOdarone    Tablet 400 milliGRAM(s) Oral every 8 hours  aMIOdarone Infusion 0.5 mG/Min (16.7 mL/Hr) IV Continuous <Continuous>  aspirin enteric coated 81 milliGRAM(s) Oral daily  bisacodyl Suppository 10 milliGRAM(s) Rectal once  chlorhexidine 2% Cloths 1 Application(s) Topical <User Schedule>  dextrose 5%. 1000 milliLiter(s) (100 mL/Hr) IV Continuous <Continuous>  dextrose 5%. 1000 milliLiter(s) (50 mL/Hr) IV Continuous <Continuous>  dextrose 50% Injectable 25 Gram(s) IV Push once  dextrose 50% Injectable 12.5 Gram(s) IV Push once  dextrose 50% Injectable 25 Gram(s) IV Push once  fluticasone propionate/ salmeterol 250-50 MICROgram(s) Diskus 1 Dose(s) Inhalation two times a day  gabapentin 300 milliGRAM(s) Oral three times a day  glucagon  Injectable 1 milliGRAM(s) IntraMuscular once  hydrocortisone hemorrhoidal Suppository 1 Suppository(s) Rectal two times a day  influenza  Vaccine (HIGH DOSE) 0.5 milliLiter(s) IntraMuscular once  insulin glargine Injectable (LANTUS) 60 Unit(s) SubCutaneous every morning  insulin lispro (ADMELOG) corrective regimen sliding scale   SubCutaneous three times a day before meals  insulin lispro (ADMELOG) corrective regimen sliding scale   SubCutaneous at bedtime  insulin lispro Injectable (ADMELOG) 5 Unit(s) SubCutaneous three times a day before meals  ipratropium    for Nebulization 500 MICROGram(s) Nebulizer every 6 hours  lactated ringers. 1000 milliLiter(s) (50 mL/Hr) IV Continuous <Continuous>  melatonin 10 milliGRAM(s) Oral at bedtime  metoprolol tartrate 50 milliGRAM(s) Oral two times a day  mupirocin 2% Nasal 1 Application(s) Both Nostrils two times a day  pantoprazole    Tablet 40 milliGRAM(s) Oral before breakfast  rosuvastatin 20 milliGRAM(s) Oral at bedtime  sodium chloride 0.9% lock flush 3 milliLiter(s) IV Push every 8 hours  tamsulosin 0.4 milliGRAM(s) Oral at bedtime    MEDICATIONS  (PRN):  dextrose Oral Gel 15 Gram(s) Oral once PRN Blood Glucose LESS THAN 70 milliGRAM(s)/deciliter  oxyCODONE    IR 10 milliGRAM(s) Oral every 6 hours PRN Severe Pain (7 - 10)      Allergies  No Known Allergies      Review of Systems:   Constitutional:  No Fever, No Chills  ENT/Mouth:  No Hearing Changes,  No Difficulty Swallowing  Eyes:  No Eye Pain, No Vision Changes  Cardiovascular:  No Chest Pain, No Palpitations  Respiratory:  No Cough, No Dyspnea  Gastrointestinal:  As described in HPI  Musculoskeletal:  No Joint Swelling, No Back Pain  Skin:  No Skin Lesions, No Jaundice  Neuro:  No Syncope, No Dizziness  Heme/Lymph:  No Bruising, No Bleeding.          Physical Examination:  T(C): 36.7 (12-10-24 @ 12:00), Max: 36.7 (12-10-24 @ 12:00)  HR: 83 (12-10-24 @ 12:00) (66 - 87)  BP: 123/65 (12-10-24 @ 12:00) (93/59 - 142/62)  RR: 20 (12-10-24 @ 12:00) (20 - 23)  SpO2: 98% (12-10-24 @ 12:00) (92% - 98%)      12-08-24 @ 07:01  -  12-09-24 @ 07:00  --------------------------------------------------------  IN: 837 mL / OUT: 601 mL / NET: 236 mL    12-09-24 @ 07:01  -  12-10-24 @ 07:00  --------------------------------------------------------  IN: 435 mL / OUT: 1500 mL / NET: -1065 mL    12-10-24 @ 07:01  -  12-10-24 @ 17:38  --------------------------------------------------------  IN: 200 mL / OUT: 2003 mL / NET: -1803 mL          GENERAL: AAOx3, no acute distress.  HEAD:  Atraumatic, Normocephalic  EYES: conjunctiva and sclera clear  NECK: Supple, no JVD or thyromegaly  CHEST/LUNG: Clear to auscultation bilaterally; No wheeze, rhonchi, or rales  ABDOMEN: Soft, nontender, nondistended; Bowel sounds present  NEUROLOGY: No asterixis or tremor.           Data:                        10.1   7.95  )-----------( 123      ( 10 Dec 2024 06:25 )             31.6     Hgb Trend:  10.1  12-10-24 @ 06:25  11.6  12-09-24 @ 04:30  11.9  12-08-24 @ 00:13      12-07-24 @ 07:01  -  12-08-24 @ 07:00  --------------------------------------------------------  IN: 200 mL    12-09-24 @ 07:01  -  12-10-24 @ 07:00  --------------------------------------------------------  IN: 50 mL      12-10    139  |  106  |  45[H]  ----------------------------<  96  3.9   |  21  |  1.4    Ca    7.6[L]      10 Dec 2024 06:25  Mg     2.0     12-10    TPro  5.8[L]  /  Alb  3.8  /  TBili  0.8  /  DBili  x   /  AST  16  /  ALT  19  /  AlkPhos  51  12-10    Liver panel trend:  TBili 0.8   /   AST 16   /   ALT 19   /   AlkP 51   /   Tptn 5.8   /   Alb 3.8    /   DBili --      12-10  TBili 0.9   /   AST 15   /   ALT 16   /   AlkP 48   /   Tptn 6.0   /   Alb 3.8    /   DBili --      12-09  TBili 0.6   /   AST 15   /   ALT 18   /   AlkP 51   /   Tptn 6.0   /   Alb 3.9    /   DBili --      12-08  TBili 0.4   /   AST 14   /   ALT 18   /   AlkP 54   /   Tptn 6.0   /   Alb 3.8    /   DBili --      12-07  TBili 0.5   /   AST 11   /   ALT 20   /   AlkP 44   /   Tptn 5.7   /   Alb 3.5    /   DBili --      12-06  TBili 0.5   /   AST 18   /   ALT 23   /   AlkP 55   /   Tptn 5.9   /   Alb 3.9    /   DBili --      12-05  TBili 0.3   /   AST 11   /   ALT 19   /   AlkP 47   /   Tptn 5.9   /   Alb 3.6    /   DBili --      12-04  TBili 0.4   /   AST 27   /   ALT 26   /   AlkP 57   /   Tptn 7.0   /   Alb 4.1    /   DBili --      12-03      PT/INR - ( 10 Dec 2024 06:25 )   PT: 12.50 sec;   INR: 1.06 ratio         PTT - ( 10 Dec 2024 06:25 )  PTT:22.6 sec        Radiology:      ACC: [id number] EXAM:  CT ABDOMEN AND PELVIS   ORDERED BY: ANMOL MENDEZ     PROCEDURE DATE:  12/04/2024          INTERPRETATION:  CLINICAL STATEMENT: Preop  for MVR.    TECHNIQUE: Contiguous axial CT images were obtained from the lower chest   to the pubic symphysis without intravenous contrast.  Oral contrast was   not administered.  Reformatted images in the coronal and sagittal planes   were acquired.    COMPARISON CT: None.    OTHER STUDIES USED FOR CORRELATION: None.      FINDINGS:  Limited evaluation of solid organs and vascular structures secondary to   lack of intravenous contrast.    HEPATOBILIARY: Unremarkable.    SPLEEN: Unremarkable.    PANCREAS: Unremarkable.    ADRENAL GLANDS: Thickened bilateral adrenal gland.    KIDNEYS: No nephroureteral calculi or hydronephrosis.    ABDOMINOPELVIC NODES: No lymphadenopathy.    PELVIC ORGANS: Limited evaluation secondary to streak artifact from hip   hardware.    PERITONEUM/MESENTERY/BOWEL: No bowel obstruction, ascites or   pneumoperitoneum. Normal appendix. Scattered colonic diverticulosis.    BONES/SOFT TISSUES: Post bilateral hip arthroplasties. Degenerative   changes of the spine noted.    OTHER: Atherosclerotic vascular calcifications.      IMPRESSION:  No CT evidence of an acute abdominopelvic pathology.  See separately dictated CT chest report for intrathoracic findings.    --- End of Report ---            SHIRIN LEROY MD; Attending Radiologist  This document has been electronically signed. Dec  4 2024  8:08AM

## 2024-12-10 NOTE — DISCHARGE NOTE NURSING/CASE MANAGEMENT/SOCIAL WORK - NSDCVIVACCINE_GEN_ALL_CORE_FT
Tdap; 30-Sep-2019 01:33; Adryan Armstrong); [employer]; [id number] (Exp. Date: 22-Oct-2021); IntraMuscular; Deltoid Right.; 0.5 milliLiter(s); VIS (VIS Published: 09-May-2013, VIS Presented: 30-Sep-2019);

## 2024-12-10 NOTE — DISCHARGE NOTE PROVIDER - NSDCCPCAREPLAN_GEN_ALL_CORE_FT
PRINCIPAL DISCHARGE DIAGNOSIS  Diagnosis: Atrial fibrillation with RVR  Assessment and Plan of Treatment:       SECONDARY DISCHARGE DIAGNOSES  Diagnosis: Severe mitral insufficiency  Assessment and Plan of Treatment:

## 2024-12-10 NOTE — CONSULT NOTE ADULT - REASON FOR ADMISSION
New onset of atrial fibrillation with rapid ventricular response in setting of severe MR

## 2024-12-10 NOTE — PROGRESS NOTE ADULT - SUBJECTIVE AND OBJECTIVE BOX
OPERATIVE PROCEDURE(s):                POD #    SURGEON(s):      SUBJECTIVE ASSESSMENT:    Vital Signs Last 24 Hrs  T(C): 36.1 (10 Dec 2024 03:43), Max: 36.5 (09 Dec 2024 12:00)  T(F): 97 (10 Dec 2024 03:43), Max: 97.7 (09 Dec 2024 12:00)  HR: 68 (10 Dec 2024 06:00) (66 - 79)  BP: 127/63 (10 Dec 2024 03:43) (93/59 - 127/63)  BP(mean): 88 (10 Dec 2024 03:43) (70 - 88)  RR: 23 (10 Dec 2024 03:43) (18 - 23)  SpO2: 92% (10 Dec 2024 03:43) (87% - 97%)    Parameters below as of 10 Dec 2024 03:43  Patient On (Oxygen Delivery Method): room air      12-08-24 @ 07:01  -  12-09-24 @ 07:00  --------------------------------------------------------  IN: 837 mL / OUT: 601 mL / NET: 236 mL    12-09-24 @ 07:01  -  12-10-24 @ 06:57  --------------------------------------------------------  IN: 435 mL / OUT: 1500 mL / NET: -1065 mL        Physical Exam  General:  Chest:  CVS:  Abd:   GI/ :  Ext:    Central Venous Catheter: Yes[ ]  No[ ] , If Yes indication:           Day #    Caceres Catheter: Yes  [ ] , No [ ] : If yes indication:                         Day #    NGT: Yes [ ] No [  ]     If Yes Placement:                                          Day #    Post-Op Beta-Blockers: Yes [ ], No[ ], If No, then contraindication:    CHEST TUBE:  [ ] YES [ ] NO  OUTPUT:     per 24 hours    AIR LEAKS:  [ ] YES [ ] NO      EPICARDIAL WIRES:  [ ] YES [ ] NO      BOWEL MOVEMENT:  [ ] YES [ ] NO          LABS:                        10.1   7.95  )-----------( 123      ( 10 Dec 2024 06:25 )             31.6     COUMADIN:   [ ] YES [ ] NO    PT/INR - ( 09 Dec 2024 04:30 )   PT: 12.00 sec;   INR: 1.02 ratio         PTT - ( 09 Dec 2024 10:50 )  PTT:44.5 sec  12-09    137  |  103  |  44[H]  ----------------------------<  105[H]  4.2   |  22  |  1.4    Ca    8.0[L]      09 Dec 2024 04:30  Mg     2.4     12-09    TPro  6.0  /  Alb  3.8  /  TBili  0.9  /  DBili  x   /  AST  15  /  ALT  16  /  AlkPhos  48  12-09    Urinalysis Basic - ( 09 Dec 2024 04:30 )    Color: x / Appearance: x / SG: x / pH: x  Gluc: 105 mg/dL / Ketone: x  / Bili: x / Urobili: x   Blood: x / Protein: x / Nitrite: x   Leuk Esterase: x / RBC: x / WBC x   Sq Epi: x / Non Sq Epi: x / Bacteria: x        MEDICATIONS  (STANDING):  aMIOdarone    Tablet 400 milliGRAM(s) Oral every 8 hours  aMIOdarone Infusion 0.5 mG/Min (16.7 mL/Hr) IV Continuous <Continuous>  aspirin enteric coated 81 milliGRAM(s) Oral daily  bisacodyl Suppository 10 milliGRAM(s) Rectal once  chlorhexidine 2% Cloths 1 Application(s) Topical <User Schedule>  dextrose 5%. 1000 milliLiter(s) (100 mL/Hr) IV Continuous <Continuous>  dextrose 5%. 1000 milliLiter(s) (50 mL/Hr) IV Continuous <Continuous>  dextrose 50% Injectable 25 Gram(s) IV Push once  dextrose 50% Injectable 12.5 Gram(s) IV Push once  dextrose 50% Injectable 25 Gram(s) IV Push once  fluticasone propionate/ salmeterol 250-50 MICROgram(s) Diskus 1 Dose(s) Inhalation two times a day  gabapentin 300 milliGRAM(s) Oral three times a day  glucagon  Injectable 1 milliGRAM(s) IntraMuscular once  hydrocortisone hemorrhoidal Suppository 1 Suppository(s) Rectal two times a day  influenza  Vaccine (HIGH DOSE) 0.5 milliLiter(s) IntraMuscular once  insulin glargine Injectable (LANTUS) 60 Unit(s) SubCutaneous every morning  insulin lispro (ADMELOG) corrective regimen sliding scale   SubCutaneous three times a day before meals  insulin lispro (ADMELOG) corrective regimen sliding scale   SubCutaneous at bedtime  insulin lispro Injectable (ADMELOG) 5 Unit(s) SubCutaneous three times a day before meals  ipratropium    for Nebulization 500 MICROGram(s) Nebulizer every 6 hours  lactated ringers. 1000 milliLiter(s) (50 mL/Hr) IV Continuous <Continuous>  melatonin 10 milliGRAM(s) Oral at bedtime  metoprolol tartrate 50 milliGRAM(s) Oral two times a day  mupirocin 2% Nasal 1 Application(s) Both Nostrils two times a day  pantoprazole    Tablet 40 milliGRAM(s) Oral before breakfast  rosuvastatin 20 milliGRAM(s) Oral at bedtime  sodium chloride 0.9% lock flush 3 milliLiter(s) IV Push every 8 hours  tamsulosin 0.4 milliGRAM(s) Oral at bedtime    MEDICATIONS  (PRN):  dextrose Oral Gel 15 Gram(s) Oral once PRN Blood Glucose LESS THAN 70 milliGRAM(s)/deciliter  oxyCODONE    IR 10 milliGRAM(s) Oral every 6 hours PRN Severe Pain (7 - 10)      Allergies    No Known Allergies    Intolerances        Ambulation/Activity Status:        RADIOLOGY & ADDITIONAL TESTS:        Assessment/Plan:  heparin on hold due to hemorrhoidal bleed  DM - d/c with insulin ; pt cannot resume Mounjaro being he is going to be scheduled for sx in the next coming weeks  a. fib- cont with Amio load and can be anticoagulated with Eliquis- pt advised to stop anticoagulation if he were to have any other signs of bleeding  cont with Lasix 40mg daily and potassium  d/c home today and follow up with CTS as an outpatient  Social Service Disposition:     OPERATIVE PROCEDURE(s):    pre-op for mvr              SURGEON(s): rich      SUBJECTIVE ASSESSMENT: complains of incisional chest pain that responds to pain meds    Vital Signs Last 24 Hrs  T(C): 36.1 (10 Dec 2024 03:43), Max: 36.5 (09 Dec 2024 12:00)  T(F): 97 (10 Dec 2024 03:43), Max: 97.7 (09 Dec 2024 12:00)  HR: 68 (10 Dec 2024 06:00) (66 - 79)  BP: 127/63 (10 Dec 2024 03:43) (93/59 - 127/63)  BP(mean): 88 (10 Dec 2024 03:43) (70 - 88)  RR: 23 (10 Dec 2024 03:43) (18 - 23)  SpO2: 92% (10 Dec 2024 03:43) (87% - 97%)    Parameters below as of 10 Dec 2024 03:43  Patient On (Oxygen Delivery Method): room air      12-08-24 @ 07:01  -  12-09-24 @ 07:00  --------------------------------------------------------  IN: 837 mL / OUT: 601 mL / NET: 236 mL    12-09-24 @ 07:01  -  12-10-24 @ 06:57  --------------------------------------------------------  IN: 435 mL / OUT: 1500 mL / NET: -1065 mL      Physical Exam:  General: NAD; A&Ox3  Neuro: pupils equal and reactive, speech clear, no overt sensory or motor deficts  Cardiac: S1/S2, RRR, no murmur, no rubs  Lungs: unlabored respirations, CTA b/l, no wheeze, no rales, no crackles  Abdomen: Soft/NT/ND; positive bowel sounds x 4  Sternum: Intact, no click, incision healing well with no drainage  Incisions: Incisions clean/dry/intact  Extremities: No edema b/l lower extremities; good capillary refill; no cyanosis; palpable 1+ pedal pulses b/l    LABS:                        10.1   7.95  )-----------( 123      ( 10 Dec 2024 06:25 )             31.6     COUMADIN:   [ ] YES [ x] NO    PT/INR - ( 09 Dec 2024 04:30 )   PT: 12.00 sec;   INR: 1.02 ratio         PTT - ( 09 Dec 2024 10:50 )  PTT:44.5 sec  12-09    137  |  103  |  44[H]  ----------------------------<  105[H]  4.2   |  22  |  1.4    Ca    8.0[L]      09 Dec 2024 04:30  Mg     2.4     12-09    TPro  6.0  /  Alb  3.8  /  TBili  0.9  /  DBili  x   /  AST  15  /  ALT  16  /  AlkPhos  48  12-09    Urinalysis Basic - ( 09 Dec 2024 04:30 )    Color: x / Appearance: x / SG: x / pH: x  Gluc: 105 mg/dL / Ketone: x  / Bili: x / Urobili: x   Blood: x / Protein: x / Nitrite: x   Leuk Esterase: x / RBC: x / WBC x   Sq Epi: x / Non Sq Epi: x / Bacteria: x        MEDICATIONS  (STANDING):  aMIOdarone    Tablet 400 milliGRAM(s) Oral every 8 hours  aMIOdarone Infusion 0.5 mG/Min (16.7 mL/Hr) IV Continuous <Continuous>  aspirin enteric coated 81 milliGRAM(s) Oral daily  bisacodyl Suppository 10 milliGRAM(s) Rectal once  chlorhexidine 2% Cloths 1 Application(s) Topical <User Schedule>  dextrose 5%. 1000 milliLiter(s) (100 mL/Hr) IV Continuous <Continuous>  dextrose 5%. 1000 milliLiter(s) (50 mL/Hr) IV Continuous <Continuous>  dextrose 50% Injectable 25 Gram(s) IV Push once  dextrose 50% Injectable 12.5 Gram(s) IV Push once  dextrose 50% Injectable 25 Gram(s) IV Push once  fluticasone propionate/ salmeterol 250-50 MICROgram(s) Diskus 1 Dose(s) Inhalation two times a day  gabapentin 300 milliGRAM(s) Oral three times a day  glucagon  Injectable 1 milliGRAM(s) IntraMuscular once  hydrocortisone hemorrhoidal Suppository 1 Suppository(s) Rectal two times a day  influenza  Vaccine (HIGH DOSE) 0.5 milliLiter(s) IntraMuscular once  insulin glargine Injectable (LANTUS) 60 Unit(s) SubCutaneous every morning  insulin lispro (ADMELOG) corrective regimen sliding scale   SubCutaneous three times a day before meals  insulin lispro (ADMELOG) corrective regimen sliding scale   SubCutaneous at bedtime  insulin lispro Injectable (ADMELOG) 5 Unit(s) SubCutaneous three times a day before meals  ipratropium    for Nebulization 500 MICROGram(s) Nebulizer every 6 hours  lactated ringers. 1000 milliLiter(s) (50 mL/Hr) IV Continuous <Continuous>  melatonin 10 milliGRAM(s) Oral at bedtime  metoprolol tartrate 50 milliGRAM(s) Oral two times a day  mupirocin 2% Nasal 1 Application(s) Both Nostrils two times a day  pantoprazole    Tablet 40 milliGRAM(s) Oral before breakfast  rosuvastatin 20 milliGRAM(s) Oral at bedtime  sodium chloride 0.9% lock flush 3 milliLiter(s) IV Push every 8 hours  tamsulosin 0.4 milliGRAM(s) Oral at bedtime    MEDICATIONS  (PRN):  dextrose Oral Gel 15 Gram(s) Oral once PRN Blood Glucose LESS THAN 70 milliGRAM(s)/deciliter  oxyCODONE    IR 10 milliGRAM(s) Oral every 6 hours PRN Severe Pain (7 - 10)      Allergies    No Known Allergies    Intolerances        Ambulation/Activity Status:  amb with pt      RADIOLOGY & ADDITIONAL TESTS:  ACC: [id number] EXAM:  XR CHEST PORTABLE ROUTINE 1V   ORDERED BY: ARCELIA SELLERS     PROCEDURE DATE:  12/10/2024          INTERPRETATION:  CLINICAL HISTORY: Shortness of breath. Preop    COMPARISON: 12/8/2024.    TECHNIQUE: Portable frontal chest radiograph.    FINDINGS:    Support devices: None.    Cardiac/mediastinum/hilum: Stable.    Lung parenchyma/Pleura: No focal parenchymal opacities, pleural   effusions, or pneumothorax. Left mid to lower lung zone linear   atelectatic changes.    Skeleton/soft tissues: Stable.      IMPRESSION:    No radiographic evidence of acute cardiopulmonary disease.    --- End of Report ---    Assessment/Plan: patient is a 65 yo male with new onset a. fib pre-op for mvr  heparin on hold due to hemorrhoidal bleed  DM - d/c with insulin ; pt cannot resume Mounjaro being he is going to be scheduled for sx in the next coming weeks  a. fib- cont with Amio load and can be anticoagulated with Eliquis- pt advised to stop anticoagulation if he were to have any other signs of bleeding and to follow up with Dr. Doan of GI for poss repeat colonoscopy prior to cardiac sx; pt's prior GI reports were obtained and are in the chart. EGD from 2024 was pos for Funk's esophagus and the colonoscopy from 2019 was pos for hemrrhoids and diverticulosis  cont with Lasix 40mg daily and potassium  d/c home today and follow up with CTS as an outpatient in 2 days; pt was advised to have repeat BMP and CBC  Social Service Disposition:  home today

## 2024-12-10 NOTE — DISCHARGE NOTE NURSING/CASE MANAGEMENT/SOCIAL WORK - FINANCIAL ASSISTANCE
[facility] provides services at a reduced cost to those who are determined to be eligible through [facility]’s financial assistance program. Information regarding [facility]’s financial assistance program can be found by going to [email or web address] or by calling [phone].

## 2024-12-10 NOTE — DISCHARGE NOTE PROVIDER - NSDCFUADDINST_GEN_ALL_CORE_FT
Activities/Restrictions  1.	No strenuous activity  2.         Can Shower  3.	Do progressive walking exercises every day, gradually increasing to 15 to 20 minutes/day, five days a week and incentive spirometer 10 times every hour while awake  	  Contact your Physician promptly if:  1.	  2.	Progressive shortness of breath or increasing difficulty with breathing when lying down  3.	Excessive nausea, vomiting, diarrhea or coughing.  4.	Increase swelling of legs that does not resolve with leg elevation.  5.	Chest pain that spreads to arms, jaw or back or sudden development of numbness, weakness, difficulty speaking or facial droop – Call 911.  6.	Diabetics who are unable to keep finger stick glucose under 150 for three consecutive readings.  Instructions:  1.	 Keep a daily log for Temperature, pulse, blood pressure, and weight twice a day and Glucose if diabetic with each meal. Call office for Temp > 101, pulse greater than 120 or less than 55, BP first # greater than 160 or less than 100, 3 pound weight gain in 1 day or 5 pounds in 3 days

## 2024-12-10 NOTE — PROGRESS NOTE ADULT - PROVIDER SPECIALTY LIST ADULT
CT Surgery
CT Surgery
Critical Care
CT Surgery
CT Surgery
Nephrology
Nephrology
CT Surgery
Critical Care

## 2024-12-10 NOTE — DISCHARGE NOTE NURSING/CASE MANAGEMENT/SOCIAL WORK - NSDCPEFALRISK_GEN_ALL_CORE
For information on Fall & Injury Prevention, visit: [email or web address] OR  [email or web address] OR  https://www.cdc.gov/steadi/patient.html

## 2024-12-10 NOTE — PROGRESS NOTE ADULT - REASON FOR ADMISSION
New onset of atrial fibrillation with rapid ventricular response in setting of severe MR

## 2024-12-10 NOTE — DISCHARGE NOTE PROVIDER - NSDCFUADDAPPT_GEN_ALL_CORE_FT
please follow up with Dr Blakely on 12/12 at 9:30 am    please follow up with Dr. Doan of GI to schedule colonoscopy.

## 2024-12-10 NOTE — DISCHARGE NOTE PROVIDER - NPI NUMBER (FOR SYS[employer]MIN USE ONLY) :
[[id number]],[[id number]] [[id number]],[[id number]]

## 2024-12-10 NOTE — DISCHARGE NOTE PROVIDER - NSDCFUSCHEDAPPT_GEN_ALL_CORE_FT
Alex Blakely  [facility]  CTS[address]  Scheduled Appointment: 12/12/2024

## 2024-12-10 NOTE — DISCHARGE NOTE PROVIDER - CARE PROVIDER_API CALL
Alex Blakely  Thoracic and Cardiac Surgery  [address], [address]  [place] [address]  Phone: [phone]  Fax: [phone]  Scheduled Appointment: 12/12/2024    Brodie Proctor  Cardiac Electrophysiology  [address], [address]  [place] [address]  Phone: [phone]  Fax: [phone]  Follow Up Time: 1 month    Marty Edmonds  Nephrology  [address] [place] [address]  Phone: [phone]  Fax: [phone]  Follow Up Time: 1 month    Pao Woods  Endocrinology/Metab/Diabetes  [address] [place] [address]  Phone: [phone]  Fax: [phone]  Follow Up Time: 2 weeks   Alex Blakely  Thoracic and Cardiac Surgery  [address], [address]  [place] [address]  Phone: [phone]  Fax: [phone]  Scheduled Appointment: 12/12/2024    Brodie Proctor  Cardiac Electrophysiology  [address], [address]  [place] [address]  Phone: [phone]  Fax: [phone]  Follow Up Time: 1 month    Marty Edmonds  Nephrology  [address] [place] [address]  Phone: [phone]  Fax: [phone]  Follow Up Time: 1 month    Pao Woods  Endocrinology/Metab/Diabetes  [address] [place] [address]  Phone: [phone]  Fax: [phone]  Follow Up Time: 2 weeks    Zac Doan  Gastroenterology  [address] [place] [address]  Phone: [phone]  Fax: [phone]  Follow Up Time:

## 2024-12-10 NOTE — CONSULT NOTE ADULT - ASSESSMENT
# Bright red per rectum likely hemorrhoidal vs very less likely diverticular  # Normocytic Anemia   - recent EGD/ colonoscopy done as OP with Dr Doan  - HD stable  - Hgb trend noted as above   - CT AP noted as above   - started on AC on this admission for Afib  - pending surgical eval for MVR         Plan   - no current indication for inpatient endoscopy as pt with recent colonoscopy done as OP and currently with minimal bleed upon wiping and HD stability Pt is also currently not clinically optimized for any endoscopic procedure.   - Bowel regimen with Miralax 17g QD to avoid constipation   - GI PPx with Protonix 40 PO QD  - no current C/I from a GI standpoint to be on anticoagulation   - Follow up as OP with Dr Doan

## 2024-12-10 NOTE — DISCHARGE NOTE PROVIDER - NSDCMRMEDTOKEN_GEN_ALL_CORE_FT
Aspir 81 oral delayed release tablet: 1 tab(s) orally once a day  Breo Ellipta 200 mcg-25 mcg/inh inhalation powder: 1 inhaled once a day  Crestor 20 mg oral tablet: 1 tab(s) orally once a day (at bedtime)  gabapentin 300 mg oral capsule: 1 cap(s) orally 3 times a day  HYDROcodone 10 mg oral capsule, extended release: 1 cap(s) orally 2 times a day  Jardiance 25 mg oral tablet: 1 tab(s) orally once a day  losartan-hydrochlorothiazide 100mg-12.5mg oral tablet: orally 2 times a day  meloxicam 15 mg oral tablet: 1 tab(s) orally once a day  metFORMIN 1000 mg oral tablet, extended release: orally 2 times a day  Mounjaro 15 mg/0.5 mL subcutaneous solution: 15 milligram(s) subcutaneously once a week on Thur  pioglitazone 15 mg oral tablet: 1 tab(s) orally once a day  ProAir HFA 90 mcg/inh inhalation aerosol: 2 puff(s) inhaled 2 times a day  tujeo: 80 unit(s) subcutaneous once a day   Actos 15 mg oral tablet: 1 tab(s) orally once a day  amiodarone 200 mg oral tablet: 1 tab(s) orally once a day  Anucort-HC 25 mg rectal suppository: 1 suppository(ies) rectal 2 times a day as needed for  moderate pain  Breo Ellipta 200 mcg-25 mcg/inh inhalation powder: 1 inhaled once a day  Crestor 20 mg oral tablet: 1 tab(s) orally once a day (at bedtime)  Eliquis 5 mg oral tablet: 1 tab(s) orally 2 times a day stop if you begin to have any recurrent signs of GI bleeding  HYDROcodone 10 mg oral capsule, extended release: 1 cap(s) orally 2 times a day  Jardiance 25 mg oral tablet: 1 tab(s) orally once a day  Metoprolol Tartrate 50 mg oral tablet: 1 tab(s) orally 2 times a day  mupirocin 2% topical cream: Apply topically to affected area 2 times a day  Neurontin 300 mg oral capsule: 1 cap(s) orally every 8 hours  ProAir HFA 90 mcg/inh inhalation aerosol: 2 puff(s) inhaled 2 times a day  Protonix 40 mg oral delayed release tablet: 1 tab(s) orally once a day  tamsulosin 0.4 mg oral capsule: 1 cap(s) orally once a day (at bedtime)  tirzepatide 15 mg/0.5 mL subcutaneous solution: 15 milligram(s) subcutaneous once a week  tujeo: 80 unit(s) subcutaneous once a day   Actos 15 mg oral tablet: 1 tab(s) orally once a day  amiodarone 200 mg oral tablet: 1 tab(s) orally once a day  Anucort-HC 25 mg rectal suppository: 1 suppository(ies) rectal 2 times a day as needed for  moderate pain  Breo Ellipta 200 mcg-25 mcg/inh inhalation powder: 1 inhaled once a day  Crestor 20 mg oral tablet: 1 tab(s) orally once a day (at bedtime)  Eliquis 5 mg oral tablet: 1 tab(s) orally 2 times a day stop if you begin to have any recurrent signs of GI bleeding  HYDROcodone 10 mg oral capsule, extended release: 1 cap(s) orally 2 times a day  Jardiance 25 mg oral tablet: 1 tab(s) orally once a day  Lasix 40 mg oral tablet: 1 tab(s) orally once a day  Metoprolol Tartrate 50 mg oral tablet: 1 tab(s) orally 2 times a day  mupirocin 2% topical cream: Apply topically to affected area 2 times a day  Neurontin 300 mg oral capsule: 1 cap(s) orally every 8 hours  Potassium Chloride (Eqv-K-Tab) 20 mEq oral tablet, extended release: 1 tab(s) orally once a day  ProAir HFA 90 mcg/inh inhalation aerosol: 2 puff(s) inhaled 2 times a day  Protonix 40 mg oral delayed release tablet: 1 tab(s) orally once a day  tamsulosin 0.4 mg oral capsule: 1 cap(s) orally once a day (at bedtime)  tirzepatide 15 mg/0.5 mL subcutaneous solution: 15 milligram(s) subcutaneous once a week  tujeo: 80 unit(s) subcutaneous once a day

## 2024-12-10 NOTE — DISCHARGE NOTE PROVIDER - CARE PROVIDERS DIRECT ADDRESSES
,DirectAddress_Unknown,[email or web address] ,[email or web address],[email or web address],DirectAddress_Unknown

## 2024-12-10 NOTE — DISCHARGE NOTE NURSING/CASE MANAGEMENT/SOCIAL WORK - PATIENT PORTAL LINK FT
You can access the FollowMyHealth Patient Portal offered by [facility] by registering at the following website: [email or web address]. By joining [facility]’s FollowMyHealth portal, you will also be able to view your health information using other applications (apps) compatible with our system.

## 2024-12-11 NOTE — CDI QUERY NOTE - NSCDIOTHERTXTBX_GEN_ALL_CORE_HH
Clinical documentation and/or evidence of the patient’s presentation, evaluation, and medical management, as evidenced below, may support a diagnosis that is not documented in the medical record.  In order to ensure accurate coding and accuracy of the clinical record, the documentation in this patient’s medical record requires additional clarification.      If you think the supporting documentation and/or clinical evidence supports a more specific diagnosis, please include more specific documentation of a diagnosis associated with these findings in your progress note and/or discharge summary.    	  Please clarify if the patient was found to have one of the following:      •	Decompensated HfpEF requiring IV lasix for increased pulmonary vascular congestion  •	Other (specify)        Supporting documentation and/or clinical evidence:     12/3 H&P Adult: Had recent cath and JASMINA which demonstrated NO significant CAD and severe MR from flail P2 with EF of 55-60%.    12/10 DN Provider: Severe MR – LA enlargement … HFpEF will be discharged on diuretic with potassium supplement    Radiology:   12/6 CXR: Stable left basilar opacity.  12/8 CXR: Increased pulmonary vascular congestion.  12/10 CXR: No radiographic evidence of acute cardiopulmonary disease.    Orders:   12/4: Lasix 20mg IVP x 1   12/7: Lasix 40mg IVP x 1  11/9: Lasix 40mg IVP x 1 STAT

## 2024-12-12 ENCOUNTER — NON-APPOINTMENT (OUTPATIENT)
Age: 64
End: 2024-12-12

## 2024-12-12 ENCOUNTER — APPOINTMENT (OUTPATIENT)
Dept: CARDIOTHORACIC SURGERY | Facility: CLINIC | Age: 64
End: 2024-12-12

## 2024-12-12 ENCOUNTER — APPOINTMENT (OUTPATIENT)
Dept: CARDIOLOGY | Facility: CLINIC | Age: 64
End: 2024-12-12
Payer: MEDICARE

## 2024-12-12 VITALS
WEIGHT: 265 LBS | BODY MASS INDEX: 42.59 KG/M2 | RESPIRATION RATE: 14 BRPM | OXYGEN SATURATION: 92 % | HEART RATE: 99 BPM | HEIGHT: 66 IN | DIASTOLIC BLOOD PRESSURE: 74 MMHG | SYSTOLIC BLOOD PRESSURE: 118 MMHG

## 2024-12-12 DIAGNOSIS — I08.0 RHEUMATIC DISORDERS OF BOTH MITRAL AND AORTIC VALVES: ICD-10-CM

## 2024-12-12 DIAGNOSIS — I48.0 PAROXYSMAL ATRIAL FIBRILLATION: ICD-10-CM

## 2024-12-12 DIAGNOSIS — F12.90 CANNABIS USE, UNSPECIFIED, UNCOMPLICATED: ICD-10-CM

## 2024-12-12 LAB
ANION GAP SERPL CALC-SCNC: 17 MMOL/L
BUN SERPL-MCNC: 30 MG/DL
CALCIUM SERPL-MCNC: 9.7 MG/DL
CHLORIDE SERPL-SCNC: 102 MMOL/L
CO2 SERPL-SCNC: 23 MMOL/L
CREAT SERPL-MCNC: 1.5 MG/DL
EGFR: 51 ML/MIN/1.73M2
GLUCOSE SERPL-MCNC: 141 MG/DL
HCT VFR BLD CALC: 37.6 %
HGB BLD-MCNC: 12 G/DL
IRON SATN MFR SERPL: 12 %
IRON SERPL-MCNC: 29 UG/DL
MAGNESIUM SERPL-MCNC: 2 MG/DL
MCHC RBC-ENTMCNC: 27.8 PG
MCHC RBC-ENTMCNC: 31.9 G/DL
MCV RBC AUTO: 87.2 FL
PLATELET # BLD AUTO: 165 K/UL
PMV BLD AUTO: 0 /100 WBCS
PMV BLD: 11.3 FL
POTASSIUM SERPL-SCNC: 4.4 MMOL/L
RBC # BLD: 4.31 M/UL
RBC # FLD: 15.9 %
SODIUM SERPL-SCNC: 142 MMOL/L
TIBC SERPL-MCNC: 241 UG/DL
UIBC SERPL-MCNC: 212 UG/DL
WBC # FLD AUTO: 7.66 K/UL

## 2024-12-12 PROCEDURE — 93000 ELECTROCARDIOGRAM COMPLETE: CPT

## 2024-12-12 PROCEDURE — 99214 OFFICE O/P EST MOD 30 MIN: CPT

## 2024-12-12 RX ORDER — APIXABAN 5 MG/1
5 TABLET, FILM COATED ORAL
Refills: 0 | Status: ACTIVE | COMMUNITY

## 2024-12-12 RX ORDER — FUROSEMIDE 40 MG/1
40 TABLET ORAL
Refills: 0 | Status: ACTIVE | COMMUNITY

## 2024-12-12 RX ORDER — FERROUS SULFATE 324(65)MG
324 TABLET, DELAYED RELEASE (ENTERIC COATED) ORAL
Qty: 14 | Refills: 0 | Status: ACTIVE | COMMUNITY
Start: 2024-12-12 | End: 1900-01-01

## 2024-12-12 RX ORDER — TAMSULOSIN HYDROCHLORIDE 0.4 MG/1
0.4 CAPSULE ORAL
Refills: 0 | Status: ACTIVE | COMMUNITY

## 2024-12-12 RX ORDER — PIOGLITAZONE 15 MG/1
15 TABLET ORAL
Refills: 0 | Status: ACTIVE | COMMUNITY

## 2024-12-12 RX ORDER — AMIODARONE HYDROCHLORIDE 200 MG/1
200 TABLET ORAL
Refills: 0 | Status: ACTIVE | COMMUNITY

## 2024-12-12 RX ORDER — PANTOPRAZOLE SODIUM 40 MG/1
40 TABLET, DELAYED RELEASE ORAL
Refills: 0 | Status: ACTIVE | COMMUNITY

## 2024-12-12 RX ORDER — HYDROCORTISONE ACETATE 25 MG/1
25 SUPPOSITORY RECTAL
Refills: 0 | Status: ACTIVE | COMMUNITY

## 2024-12-12 RX ORDER — METOPROLOL TARTRATE 50 MG/1
50 TABLET ORAL
Refills: 0 | Status: ACTIVE | COMMUNITY

## 2024-12-12 RX ORDER — POTASSIUM CHLORIDE 1500 MG/1
20 TABLET, EXTENDED RELEASE ORAL
Refills: 0 | Status: ACTIVE | COMMUNITY

## 2024-12-14 DIAGNOSIS — I50.33 ACUTE ON CHRONIC DIASTOLIC (CONGESTIVE) HEART FAILURE: ICD-10-CM

## 2024-12-14 DIAGNOSIS — N17.0 ACUTE KIDNEY FAILURE WITH TUBULAR NECROSIS: ICD-10-CM

## 2024-12-14 DIAGNOSIS — N40.1 BENIGN PROSTATIC HYPERPLASIA WITH LOWER URINARY TRACT SYMPTOMS: ICD-10-CM

## 2024-12-14 DIAGNOSIS — I95.9 HYPOTENSION, UNSPECIFIED: ICD-10-CM

## 2024-12-14 DIAGNOSIS — I48.91 UNSPECIFIED ATRIAL FIBRILLATION: ICD-10-CM

## 2024-12-14 DIAGNOSIS — K64.9 UNSPECIFIED HEMORRHOIDS: ICD-10-CM

## 2024-12-14 DIAGNOSIS — Z82.49 FAMILY HISTORY OF ISCHEMIC HEART DISEASE AND OTHER DISEASES OF THE CIRCULATORY SYSTEM: ICD-10-CM

## 2024-12-14 DIAGNOSIS — E66.01 MORBID (SEVERE) OBESITY DUE TO EXCESS CALORIES: ICD-10-CM

## 2024-12-14 DIAGNOSIS — J45.909 UNSPECIFIED ASTHMA, UNCOMPLICATED: ICD-10-CM

## 2024-12-14 DIAGNOSIS — Q74.0 OTHER CONGENITAL MALFORMATIONS OF UPPER LIMB(S), INCLUDING SHOULDER GIRDLE: ICD-10-CM

## 2024-12-14 DIAGNOSIS — E11.42 TYPE 2 DIABETES MELLITUS WITH DIABETIC POLYNEUROPATHY: ICD-10-CM

## 2024-12-14 DIAGNOSIS — Z79.85 LONG-TERM (CURRENT) USE OF INJECTABLE NON-INSULIN ANTIDIABETIC DRUGS: ICD-10-CM

## 2024-12-14 DIAGNOSIS — Z96.643 PRESENCE OF ARTIFICIAL HIP JOINT, BILATERAL: ICD-10-CM

## 2024-12-14 DIAGNOSIS — Z79.4 LONG TERM (CURRENT) USE OF INSULIN: ICD-10-CM

## 2024-12-14 DIAGNOSIS — Z53.09 PROCEDURE AND TREATMENT NOT CARRIED OUT BECAUSE OF OTHER CONTRAINDICATION: ICD-10-CM

## 2024-12-14 DIAGNOSIS — G47.33 OBSTRUCTIVE SLEEP APNEA (ADULT) (PEDIATRIC): ICD-10-CM

## 2024-12-14 DIAGNOSIS — Z79.84 LONG TERM (CURRENT) USE OF ORAL HYPOGLYCEMIC DRUGS: ICD-10-CM

## 2024-12-14 DIAGNOSIS — I34.0 NONRHEUMATIC MITRAL (VALVE) INSUFFICIENCY: ICD-10-CM

## 2024-12-14 DIAGNOSIS — R33.8 OTHER RETENTION OF URINE: ICD-10-CM

## 2024-12-14 DIAGNOSIS — E78.00 PURE HYPERCHOLESTEROLEMIA, UNSPECIFIED: ICD-10-CM

## 2024-12-14 DIAGNOSIS — R35.1 NOCTURIA: ICD-10-CM

## 2024-12-14 DIAGNOSIS — I11.0 HYPERTENSIVE HEART DISEASE WITH HEART FAILURE: ICD-10-CM

## 2024-12-14 DIAGNOSIS — Z79.82 LONG TERM (CURRENT) USE OF ASPIRIN: ICD-10-CM

## 2024-12-16 LAB — FOLATE SERPL-MCNC: 7.3 NG/ML

## 2025-01-02 ENCOUNTER — NON-APPOINTMENT (OUTPATIENT)
Age: 65
End: 2025-01-02

## 2025-01-02 ENCOUNTER — APPOINTMENT (OUTPATIENT)
Dept: CARDIOTHORACIC SURGERY | Facility: CLINIC | Age: 65
End: 2025-01-02

## 2025-01-02 ENCOUNTER — APPOINTMENT (OUTPATIENT)
Dept: CARDIOLOGY | Facility: CLINIC | Age: 65
End: 2025-01-02
Payer: MEDICARE

## 2025-01-02 VITALS
HEART RATE: 84 BPM | SYSTOLIC BLOOD PRESSURE: 142 MMHG | RESPIRATION RATE: 12 BRPM | BODY MASS INDEX: 41.99 KG/M2 | DIASTOLIC BLOOD PRESSURE: 97 MMHG | OXYGEN SATURATION: 95 % | WEIGHT: 252 LBS | TEMPERATURE: 98 F | HEIGHT: 65 IN

## 2025-01-02 DIAGNOSIS — I08.0 RHEUMATIC DISORDERS OF BOTH MITRAL AND AORTIC VALVES: ICD-10-CM

## 2025-01-02 DIAGNOSIS — I34.0 NONRHEUMATIC MITRAL (VALVE) INSUFFICIENCY: ICD-10-CM

## 2025-01-02 DIAGNOSIS — I34.1 NONRHEUMATIC MITRAL (VALVE) PROLAPSE: ICD-10-CM

## 2025-01-02 DIAGNOSIS — I48.0 PAROXYSMAL ATRIAL FIBRILLATION: ICD-10-CM

## 2025-01-02 DIAGNOSIS — I10 ESSENTIAL (PRIMARY) HYPERTENSION: ICD-10-CM

## 2025-01-02 PROCEDURE — 99214 OFFICE O/P EST MOD 30 MIN: CPT

## 2025-01-02 PROCEDURE — 99215 OFFICE O/P EST HI 40 MIN: CPT

## 2025-01-03 ENCOUNTER — RX RENEWAL (OUTPATIENT)
Age: 65
End: 2025-01-03

## 2025-01-14 DIAGNOSIS — E55.9 VITAMIN D DEFICIENCY, UNSPECIFIED: ICD-10-CM

## 2025-01-16 ENCOUNTER — APPOINTMENT (OUTPATIENT)
Dept: CARDIOTHORACIC SURGERY | Facility: CLINIC | Age: 65
End: 2025-01-16
Payer: MEDICARE

## 2025-01-16 VITALS
TEMPERATURE: 97.5 F | RESPIRATION RATE: 14 BRPM | HEART RATE: 89 BPM | OXYGEN SATURATION: 94 % | HEIGHT: 65 IN | SYSTOLIC BLOOD PRESSURE: 135 MMHG | WEIGHT: 252 LBS | BODY MASS INDEX: 41.99 KG/M2 | DIASTOLIC BLOOD PRESSURE: 79 MMHG

## 2025-01-16 DIAGNOSIS — I08.0 RHEUMATIC DISORDERS OF BOTH MITRAL AND AORTIC VALVES: ICD-10-CM

## 2025-01-16 PROCEDURE — 99214 OFFICE O/P EST MOD 30 MIN: CPT

## 2025-01-22 ENCOUNTER — RX RENEWAL (OUTPATIENT)
Age: 65
End: 2025-01-22

## 2025-01-23 ENCOUNTER — OUTPATIENT (OUTPATIENT)
Dept: OUTPATIENT SERVICES | Facility: HOSPITAL | Age: 65
LOS: 1 days | End: 2025-01-23
Payer: MEDICARE

## 2025-01-23 ENCOUNTER — RESULT REVIEW (OUTPATIENT)
Age: 65
End: 2025-01-23

## 2025-01-23 VITALS
OXYGEN SATURATION: 94 % | HEIGHT: 62 IN | WEIGHT: 251.99 LBS | DIASTOLIC BLOOD PRESSURE: 56 MMHG | TEMPERATURE: 98 F | HEART RATE: 68 BPM | SYSTOLIC BLOOD PRESSURE: 107 MMHG

## 2025-01-23 DIAGNOSIS — Z96.643 PRESENCE OF ARTIFICIAL HIP JOINT, BILATERAL: Chronic | ICD-10-CM

## 2025-01-23 DIAGNOSIS — I34.1 NONRHEUMATIC MITRAL (VALVE) PROLAPSE: ICD-10-CM

## 2025-01-23 DIAGNOSIS — Z86.018 PERSONAL HISTORY OF OTHER BENIGN NEOPLASM: Chronic | ICD-10-CM

## 2025-01-23 DIAGNOSIS — S02.30XA FRACTURE OF ORBITAL FLOOR, UNSPECIFIED SIDE, INITIAL ENCOUNTER FOR CLOSED FRACTURE: Chronic | ICD-10-CM

## 2025-01-23 DIAGNOSIS — Z00.8 ENCOUNTER FOR OTHER GENERAL EXAMINATION: ICD-10-CM

## 2025-01-23 DIAGNOSIS — Z12.11 ENCOUNTER FOR SCREENING FOR MALIGNANT NEOPLASM OF COLON: Chronic | ICD-10-CM

## 2025-01-23 DIAGNOSIS — Z98.890 OTHER SPECIFIED POSTPROCEDURAL STATES: Chronic | ICD-10-CM

## 2025-01-23 DIAGNOSIS — Z01.818 ENCOUNTER FOR OTHER PREPROCEDURAL EXAMINATION: ICD-10-CM

## 2025-01-23 PROBLEM — E88.89 OTHER SPECIFIED METABOLIC DISORDERS: Chronic | Status: INACTIVE | Noted: 2024-12-03 | Resolved: 2025-01-23

## 2025-01-23 LAB
A1C WITH ESTIMATED AVERAGE GLUCOSE RESULT: 6.2 % — HIGH (ref 4–5.6)
ALBUMIN SERPL ELPH-MCNC: 4.3 G/DL — SIGNIFICANT CHANGE UP (ref 3.5–5.2)
ALP SERPL-CCNC: 79 U/L — SIGNIFICANT CHANGE UP (ref 30–115)
ALT FLD-CCNC: 13 U/L — SIGNIFICANT CHANGE UP (ref 0–41)
ANION GAP SERPL CALC-SCNC: 15 MMOL/L — HIGH (ref 7–14)
APPEARANCE UR: CLEAR — SIGNIFICANT CHANGE UP
APTT BLD: 36.3 SEC — SIGNIFICANT CHANGE UP (ref 27–39.2)
AST SERPL-CCNC: 15 U/L — SIGNIFICANT CHANGE UP (ref 0–41)
BASOPHILS # BLD AUTO: 0.1 K/UL — SIGNIFICANT CHANGE UP (ref 0–0.2)
BASOPHILS NFR BLD AUTO: 1 % — SIGNIFICANT CHANGE UP (ref 0–1)
BILIRUB SERPL-MCNC: 0.6 MG/DL — SIGNIFICANT CHANGE UP (ref 0.2–1.2)
BILIRUB UR-MCNC: NEGATIVE — SIGNIFICANT CHANGE UP
BUN SERPL-MCNC: 33 MG/DL — HIGH (ref 10–20)
CALCIUM SERPL-MCNC: 9.5 MG/DL — SIGNIFICANT CHANGE UP (ref 8.4–10.5)
CHLORIDE SERPL-SCNC: 100 MMOL/L — SIGNIFICANT CHANGE UP (ref 98–110)
CO2 SERPL-SCNC: 26 MMOL/L — SIGNIFICANT CHANGE UP (ref 17–32)
COLOR SPEC: YELLOW — SIGNIFICANT CHANGE UP
CREAT SERPL-MCNC: 1.4 MG/DL — SIGNIFICANT CHANGE UP (ref 0.7–1.5)
DIFF PNL FLD: NEGATIVE — SIGNIFICANT CHANGE UP
EGFR: 55 ML/MIN/1.73M2 — LOW
EOSINOPHIL # BLD AUTO: 0.94 K/UL — HIGH (ref 0–0.7)
EOSINOPHIL NFR BLD AUTO: 8.9 % — HIGH (ref 0–8)
ESTIMATED AVERAGE GLUCOSE: 131 MG/DL — HIGH (ref 68–114)
GLUCOSE SERPL-MCNC: 142 MG/DL — HIGH (ref 70–99)
GLUCOSE UR QL: >=1000 MG/DL
HCT VFR BLD CALC: 36 % — LOW (ref 42–52)
HGB BLD-MCNC: 10.8 G/DL — LOW (ref 14–18)
IMM GRANULOCYTES NFR BLD AUTO: 1 % — HIGH (ref 0.1–0.3)
INR BLD: 0.97 RATIO — SIGNIFICANT CHANGE UP (ref 0.65–1.3)
KETONES UR-MCNC: ABNORMAL MG/DL
LEUKOCYTE ESTERASE UR-ACNC: NEGATIVE — SIGNIFICANT CHANGE UP
LYMPHOCYTES # BLD AUTO: 1.9 K/UL — SIGNIFICANT CHANGE UP (ref 1.2–3.4)
LYMPHOCYTES # BLD AUTO: 18.1 % — LOW (ref 20.5–51.1)
MCHC RBC-ENTMCNC: 27.5 PG — SIGNIFICANT CHANGE UP (ref 27–31)
MCHC RBC-ENTMCNC: 30 G/DL — LOW (ref 32–37)
MCV RBC AUTO: 91.6 FL — SIGNIFICANT CHANGE UP (ref 80–94)
MONOCYTES # BLD AUTO: 0.78 K/UL — HIGH (ref 0.1–0.6)
MONOCYTES NFR BLD AUTO: 7.4 % — SIGNIFICANT CHANGE UP (ref 1.7–9.3)
MRSA PCR RESULT.: POSITIVE
NEUTROPHILS # BLD AUTO: 6.7 K/UL — HIGH (ref 1.4–6.5)
NEUTROPHILS NFR BLD AUTO: 63.6 % — SIGNIFICANT CHANGE UP (ref 42.2–75.2)
NITRITE UR-MCNC: NEGATIVE — SIGNIFICANT CHANGE UP
NRBC # BLD: 0 /100 WBCS — SIGNIFICANT CHANGE UP (ref 0–0)
NRBC BLD-RTO: 0 /100 WBCS — SIGNIFICANT CHANGE UP (ref 0–0)
NT-PROBNP SERPL-SCNC: 702 PG/ML — HIGH (ref 0–300)
PH UR: 5.5 — SIGNIFICANT CHANGE UP (ref 5–8)
PLATELET # BLD AUTO: 255 K/UL — SIGNIFICANT CHANGE UP (ref 130–400)
PMV BLD: 11 FL — HIGH (ref 7.4–10.4)
POTASSIUM SERPL-MCNC: 4.6 MMOL/L — SIGNIFICANT CHANGE UP (ref 3.5–5)
POTASSIUM SERPL-SCNC: 4.6 MMOL/L — SIGNIFICANT CHANGE UP (ref 3.5–5)
PROT SERPL-MCNC: 7.2 G/DL — SIGNIFICANT CHANGE UP (ref 6–8)
PROT UR-MCNC: 100 MG/DL
PROTHROM AB SERPL-ACNC: 11.4 SEC — SIGNIFICANT CHANGE UP (ref 9.95–12.87)
RBC # BLD: 3.93 M/UL — LOW (ref 4.7–6.1)
RBC # FLD: 18.5 % — HIGH (ref 11.5–14.5)
SODIUM SERPL-SCNC: 141 MMOL/L — SIGNIFICANT CHANGE UP (ref 135–146)
SP GR SPEC: 1.02 — SIGNIFICANT CHANGE UP (ref 1–1.03)
UROBILINOGEN FLD QL: 1 MG/DL — SIGNIFICANT CHANGE UP (ref 0.2–1)
WBC # BLD: 10.52 K/UL — SIGNIFICANT CHANGE UP (ref 4.8–10.8)
WBC # FLD AUTO: 10.52 K/UL — SIGNIFICANT CHANGE UP (ref 4.8–10.8)

## 2025-01-23 PROCEDURE — 99214 OFFICE O/P EST MOD 30 MIN: CPT | Mod: 25

## 2025-01-23 PROCEDURE — 87640 STAPH A DNA AMP PROBE: CPT

## 2025-01-23 PROCEDURE — 87641 MR-STAPH DNA AMP PROBE: CPT

## 2025-01-23 PROCEDURE — 85730 THROMBOPLASTIN TIME PARTIAL: CPT

## 2025-01-23 PROCEDURE — 36415 COLL VENOUS BLD VENIPUNCTURE: CPT

## 2025-01-23 PROCEDURE — 86850 RBC ANTIBODY SCREEN: CPT

## 2025-01-23 PROCEDURE — 93010 ELECTROCARDIOGRAM REPORT: CPT

## 2025-01-23 PROCEDURE — 80053 COMPREHEN METABOLIC PANEL: CPT

## 2025-01-23 PROCEDURE — 76705 ECHO EXAM OF ABDOMEN: CPT | Mod: 26

## 2025-01-23 PROCEDURE — 71046 X-RAY EXAM CHEST 2 VIEWS: CPT | Mod: 26

## 2025-01-23 PROCEDURE — 81001 URINALYSIS AUTO W/SCOPE: CPT

## 2025-01-23 PROCEDURE — 83880 ASSAY OF NATRIURETIC PEPTIDE: CPT

## 2025-01-23 PROCEDURE — 85025 COMPLETE CBC W/AUTO DIFF WBC: CPT

## 2025-01-23 PROCEDURE — 93005 ELECTROCARDIOGRAM TRACING: CPT

## 2025-01-23 PROCEDURE — 86900 BLOOD TYPING SEROLOGIC ABO: CPT

## 2025-01-23 PROCEDURE — 87086 URINE CULTURE/COLONY COUNT: CPT

## 2025-01-23 PROCEDURE — 83036 HEMOGLOBIN GLYCOSYLATED A1C: CPT

## 2025-01-23 PROCEDURE — 85610 PROTHROMBIN TIME: CPT

## 2025-01-23 PROCEDURE — 71046 X-RAY EXAM CHEST 2 VIEWS: CPT

## 2025-01-23 PROCEDURE — 86901 BLOOD TYPING SEROLOGIC RH(D): CPT

## 2025-01-23 NOTE — H&P PST ADULT - NSICDXPASTSURGICALHX_GEN_ALL_CORE_FT
PAST SURGICAL HISTORY:  Encounter for screening colonoscopy     Fracture of orbital floor     H/O lipoma times 2-3    History of hip replacement, total, bilateral two separate times '17, '07    History of neck surgery

## 2025-01-23 NOTE — H&P PST ADULT - HISTORY OF PRESENT ILLNESS
PATIENT/GUARDIAN CURRENTLY DENIES CHEST PAIN  SHORTNESS OF BREATH  PALPITATIONS,  DYSURIA, OR UPPER RESPIRATORY INFECTION IN PAST 2 WEEKS  denies travel outside the [place] in the past 30 days    Anesthesia Alert  NO--Difficult Airway; mallinpati III  YES--History of neck surgery or radiation  NO--Limited ROM of neck  NO--History of Malignant hyperthermia  NO--No personal or family history of Pseudocholinesterase deficiency.  NO--Prior Anesthesia Complication  NO--Latex Allergy  NO--Loose teeth  NO--History of Rheumatoid Arthritis  NO--Bleeding risk  YES--JIMMY  MADELUNG disease s/p surgery --Other_____    PT/GUARDIAN DENIES ANY RASHES, ABRASION, OR OPEN WOUNDS OR CUTS    AS PER THE PT/GUARDIAN, THIS IS HIS/HER COMPLETE MEDICAL AND SURGICAL HX, INCLUDING MEDICATIONS PRESCRIBED AND OVER THE COUNTER    Patient/guardian understands the instructions and was given the opportunity to ask questions and have them answered.    pt/guardian denies any suicidal ideation or thoughts, pt states not a threat to self or others      Duke Activity Status Index (DASI): Duke Activity Status Index (DASI) from [employer]  on 1/23/2025  ** All calculations should be rechecked by clinician prior to use **    RESULT SUMMARY:  15.95 points  The higher the score (maximum 58.2), the higher the functional status.    4.70 METs        INPUTS:  Take care of self —> 2.75 = Yes  Walk indoors —> 1.75 = Yes  Walk 1&ndash;2 blocks on level ground —> 0 = No  Climb a flight of stairs or walk up a hill —> 0 = No  Run a short distance —> 0 = No  Do light work around the house —> 2.7 = Yes  Do moderate work around the house —> 3.5 = Yes  Do heavy work around the house —> 0 = No  Do yardwork —> 0 = No  Have sexual relations —> 5.25 = Yes  Participate in moderate recreational activities —> 0 = No  Participate in strenuous sports —> 0 = No        Revised Cardiac Risk Index for Pre-Operative Risk  Revised Cardiac Risk Index for Pre-Operative Risk from [employer]  on 1/23/2025  ** All calculations should be rechecked by clinician prior to use **    RESULT SUMMARY:  3 points  RCRI Score    15.0 %  Risk of major cardiac event      INPUTS:  High-risk surgery —> 1 = Yes  History of ischemic heart disease —> 0 = No  History of congestive heart failure —> 1 = Yes  History of cerebrovascular disease —> 0 = No  Pre-operative treatment with insulin —> 1 = Yes  Pre-operative creatinine >2 mg/dL / 176.8 µmol/L —> 0 = No

## 2025-01-23 NOTE — H&P PST ADULT - REASON FOR ADMISSION
Patient is a  64 year old  male presenting to PAST in preparation for MVP,LEFT ATRIAL APPENDAGE CLOSURE, MAZE   on 1/28/25   under  general anesthesia by Dr. Blakely .  . Pt had c/o SOB in Fall 2024;  He was followed by cardiology; echo showed severe MR  and referred for procedure.    PMHX: of HTN, HLD, obesity, JIMMY (uses CPAP), ,DM (A1C 7.5), BPH, asthma and osteoarthritis (s/p b/l hip replacement). Symptoms include current SOB and b/l LE edema. Can walk < 1 city block before getting short of breath and needing to sit to relieve symptoms.  Per pt,  SOB has been progressively getting worse over the past year. Denies ever having palpitations despite being recently admitted for Afib RVR.  He was evaluated for above surgery.  Underwent echo/ JASMINA, cardiac cath (non obstructive CAD).  Reviewed pre op plan of care.  Will hold eliquis and ASA x5 days pre op.  Hold losartan x48 hours;  Mounjouro on hold; He will hold Jardiance x3 days before.   He will hold lasix day of procedure; last dose metformin is night prior to surgery.   Labs today and EKG.   Will have CXR prior to surgeyr; did see Dental.  He did have neck surgery in the past for Madelung disease; he does have full ROM of the neck and has had procedures w/ anesthesia since surgery w/o difficulty.

## 2025-01-23 NOTE — H&P PST ADULT - NSICDXPASTMEDICALHX_GEN_ALL_CORE_FT
PAST MEDICAL HISTORY:  Arthritis     Asthma     Atrial fibrillation     CHF NYHA class III     Diabetes 20 yrs    FH: mitral regurgitation     HTN (hypertension)     Hypercholesteremia     Madelung's disease     Obesity     Obstructive sleep apnea on CPAP     JIMMY on CPAP     Peripheral neuropathy related to diabetes

## 2025-01-24 ENCOUNTER — OUTPATIENT (OUTPATIENT)
Dept: OUTPATIENT SERVICES | Facility: HOSPITAL | Age: 65
LOS: 1 days | End: 2025-01-24
Payer: MEDICARE

## 2025-01-24 ENCOUNTER — NON-APPOINTMENT (OUTPATIENT)
Age: 65
End: 2025-01-24

## 2025-01-24 ENCOUNTER — APPOINTMENT (OUTPATIENT)
Dept: PULMONOLOGY | Facility: HOSPITAL | Age: 65
End: 2025-01-24

## 2025-01-24 DIAGNOSIS — R10.9 UNSPECIFIED ABDOMINAL PAIN: ICD-10-CM

## 2025-01-24 DIAGNOSIS — Z98.890 OTHER SPECIFIED POSTPROCEDURAL STATES: Chronic | ICD-10-CM

## 2025-01-24 DIAGNOSIS — Z86.018 PERSONAL HISTORY OF OTHER BENIGN NEOPLASM: Chronic | ICD-10-CM

## 2025-01-24 DIAGNOSIS — Z01.818 ENCOUNTER FOR OTHER PREPROCEDURAL EXAMINATION: ICD-10-CM

## 2025-01-24 DIAGNOSIS — I34.0 NONRHEUMATIC MITRAL (VALVE) INSUFFICIENCY: ICD-10-CM

## 2025-01-24 DIAGNOSIS — S02.30XA FRACTURE OF ORBITAL FLOOR, UNSPECIFIED SIDE, INITIAL ENCOUNTER FOR CLOSED FRACTURE: Chronic | ICD-10-CM

## 2025-01-24 DIAGNOSIS — Z96.643 PRESENCE OF ARTIFICIAL HIP JOINT, BILATERAL: Chronic | ICD-10-CM

## 2025-01-24 DIAGNOSIS — I34.1 NONRHEUMATIC MITRAL (VALVE) PROLAPSE: ICD-10-CM

## 2025-01-24 DIAGNOSIS — Z12.11 ENCOUNTER FOR SCREENING FOR MALIGNANT NEOPLASM OF COLON: Chronic | ICD-10-CM

## 2025-01-24 DIAGNOSIS — R06.02 SHORTNESS OF BREATH: ICD-10-CM

## 2025-01-24 LAB
CULTURE RESULTS: SIGNIFICANT CHANGE UP
SPECIMEN SOURCE: SIGNIFICANT CHANGE UP

## 2025-01-24 PROCEDURE — 94060 EVALUATION OF WHEEZING: CPT | Mod: 26

## 2025-01-24 PROCEDURE — 94727 GAS DIL/WSHOT DETER LNG VOL: CPT | Mod: 26

## 2025-01-24 PROCEDURE — 94729 DIFFUSING CAPACITY: CPT

## 2025-01-24 PROCEDURE — 94664 DEMO&/EVAL PT USE INHALER: CPT

## 2025-01-24 PROCEDURE — 94729 DIFFUSING CAPACITY: CPT | Mod: 26

## 2025-01-24 PROCEDURE — 94726 PLETHYSMOGRAPHY LUNG VOLUMES: CPT

## 2025-01-24 PROCEDURE — 94070 EVALUATION OF WHEEZING: CPT

## 2025-01-24 RX ORDER — ALPRAZOLAM 0.25 MG/1
0.25 TABLET ORAL
Qty: 4 | Refills: 0 | Status: ACTIVE | COMMUNITY
Start: 2025-01-24 | End: 1900-01-01

## 2025-01-24 RX ORDER — MUPIROCIN 20 MG/G
2 OINTMENT TOPICAL 3 TIMES DAILY
Qty: 1 | Refills: 0 | Status: ACTIVE | COMMUNITY
Start: 2025-01-24 | End: 1900-01-01

## 2025-01-25 DIAGNOSIS — R06.02 SHORTNESS OF BREATH: ICD-10-CM

## 2025-01-27 VITALS
HEIGHT: 61.81 IN | HEART RATE: 81 BPM | SYSTOLIC BLOOD PRESSURE: 140 MMHG | TEMPERATURE: 99 F | OXYGEN SATURATION: 94 % | DIASTOLIC BLOOD PRESSURE: 80 MMHG | WEIGHT: 251.33 LBS

## 2025-01-27 RX ORDER — INFLUENZA A VIRUS A/IDAHO/07/2018 (H1N1) ANTIGEN (MDCK CELL DERIVED, PROPIOLACTONE INACTIVATED, INFLUENZA A VIRUS A/INDIANA/08/2018 (H3N2) ANTIGEN (MDCK CELL DERIVED, PROPIOLACTONE INACTIVATED), INFLUENZA B VIRUS B/SINGAPORE/INFTT-16-0610/2016 ANTIGEN (MDCK CELL DERIVED, PROPIOLACTONE INACTIVATED), INFLUENZA B VIRUS B/IOWA/06/2017 ANTIGEN (MDCK CELL DERIVED, PROPIOLACTONE INACTIVATED) 15; 15; 15; 15 UG/.5ML; UG/.5ML; UG/.5ML; UG/.5ML
0.5 INJECTION, SUSPENSION INTRAMUSCULAR ONCE
Refills: 0 | Status: DISCONTINUED | OUTPATIENT
Start: 2025-01-28 | End: 2025-02-24

## 2025-01-27 NOTE — PATIENT PROFILE ADULT - FALL HARM RISK - UNIVERSAL INTERVENTIONS
Bed in lowest position, wheels locked, appropriate side rails in place/Call bell, personal items and telephone in reach/Instruct patient to call for assistance before getting out of bed or chair/Non-slip footwear when patient is out of bed/[place] to call system/Physically safe environment - no spills, clutter or unnecessary equipment/Purposeful Proactive Rounding/Room/bathroom lighting operational, light cord in reach

## 2025-01-27 NOTE — PATIENT PROFILE ADULT - CONTRAINDICATIONS & PRECAUTIONS (SELECT ALL THAT APPLY)
Daily Note     Today's date: 2019  Patient name: Garima Lin  : 1966  MRN: [id number]  Referring provider: Corie Hernandez PA-C  Dx:   Encounter Diagnosis     ICD-10-CM    1  Radiculopathy, cervical M54 12    2  Cervicalgia M54 2        Start Time: 0800  Stop Time: 4061  Total time in clinic (min): 55 minutes    Subjective: Patient reports that some numbness continues to be present  However, he was able to paint a couple of rooms yesterday without significant exacerbation of sx, which surprised him  Objective: See treatment diary below      Assessment: Patient tolerated treatment well with decreased numbness reported following today's session  In addition, patient tolerated increased resistance with rowing exercise without exacerbation of sx  However, min cueing provided to avoid excessive cervical protraction with this progression  Plan: Continue per plan of care        Plan: Patient's main goal is to reduce numbness in arm    Precautions: HTN  Dx: Cervical flexion hypomobility- radiculopathy c traction classification      Daily Treatment Diary       Manuals    C ext prom nv c retraction 5' 8' 8'                                           Exercise Diary         Thoracic ext over chair :05 x10  :05 x 10  10x5"  :05 x10 10x :05            Prone ys face in table 2x10 2X 10 towel this visit 2x10  2x10 2x10   Prone ts- face in table 2x10 2X 10 towel this visit 2x10  2x10 2x10   UBE retro 6 min 6 min  6 min  6' 6'   RTB wall walks  7x       Cervical extension 12x 10x 10x  10x 10x   LTR c opp UE x10 10x 10x  x10 10x :05   DNF c lift 10x:05 10x :05 10x5"  10x :05 10x :05   GTB row/LPD 2x10 2x10 BTB 2x10   2x10                                                                                                      Modalities         Traction- static #25  2 step  11' 25# 2 step up/dpown   #25 3 step up/down 10' #25 2 step  11' none...

## 2025-01-27 NOTE — PRE-OP CHECKLIST - WEIGHT IN KG
Patient:   LEIGH ANN LAWRENCE            MRN: [id number]            FIN: [id number]              Age:   21 years     Sex:  FEMALE     :  [birth year]   Associated Diagnoses:   None   Author:   LAURA BISHOP     Results Review   Labor/ Delivery Summary   Results Review   Lab results   19 07:30 CDT WBC 15.9 THOUSAND/mcL  HI    RBC 2.74 MILLION/mcL  LOW    Hemoglobin 6.9 gm/dL  CRIT    Hematocrit 21.6 %  LOW    MCV 78.8 fL    MCH 25.2 pg  LOW    MCHC 31.9 gm/dL  LOW    RDW-CV 15.1 %  HI    Platelet 155 THOUSAND/mcL    Analyzer ANC NOT APPLICABLE    NRBC 0 /100 WBC       Results review       No Qualifying Labs are resulted on this patient in the last 24 hours    Lab results   19 07:30 CDT WBC 15.9 THOUSAND/mcL  HI    RBC 2.74 MILLION/mcL  LOW    Hemoglobin 6.9 gm/dL  CRIT    Hematocrit 21.6 %  LOW    MCV 78.8 fL    MCH 25.2 pg  LOW    MCHC 31.9 gm/dL  LOW    RDW-CV 15.1 %  HI    Platelet 155 THOUSAND/mcL    Analyzer ANC NOT APPLICABLE    NRBC 0 /100 WBC       Discharge Information        The patient is 2  day(s) postpartum.  Delivery date was 2019 vaginally.  Breast feeding of the infant is effective.  May not resume sexual activity.  The contraception plan consists of none.  Coexisting conditions consist of none.  Discharge Summary Information:  Admitted  2019, Discharged  2019.      Histories   Past Medical History: Problem List / Past Medical History   Pregnant  Abdominal Pain, Right Lower Quadrant  Acute UTI  Anxiety  Bipolar 1 disorder  Depression    Family History:    Diabetes mellitus type 1  GRANDMOTHER  GRANDFATHER    Procedure history:    Extraction of wisdom tooth ([id number]) in 2017 at 20 Years.  D&C - Dilatation and curettage ([id number]) in 2017 at 20 Years.     Pregnancy History     Pregnancy History   ,0,0,0       Pregnancy # 1      **Marked as Sensitive**          Baby 1 Outcome Date:         Outcome:  Death  Outcome or Result: Therapeutic  , medical   Gender: -- Gest Age: --                    Wt: --   Hospital: -- [facility]: --   Child's Name: -- Baby's Father: --    Pregnancy # 2      **Marked as Sensitive**          Baby 1 Outcome Date:         Outcome: Fetal Demise  Outcome or Result: Therapeutic , medical   Gender: -- Gest Age: 6 weeks               Wt: --   Hospital: -- [facility]: --   Child's Name: -- Baby's Father: --      Hospital Course   Hospital Course   Admitted from: from home.     Length of stay: days  3.     Delivery: Type of delivery (augmented, SROM @36/5), perineum laceration of the vaginal walls bilaterally.       Discharge Plan   Discharge Summary Plan   Discharge Status: stable.     Discharge disposition: discharge to home self care.     Prescriptions: continue same medications, reviewed with patient.    Discharge instructions given: to patient, verbal discharge instructions (activity level, diet, follow-up appointment, medications, symptoms worsening).    Course   Improving.     Follow-up   Return to office: in 6 weeks, LAURA BISHOP.   114

## 2025-01-27 NOTE — PATIENT PROFILE ADULT - FUNCTIONAL SCREEN CURRENT LEVEL: COMMUNICATION, MLM
Pt arrives to ER c/o left knee pain for 3 weeks. Pt states it is a "popping" sensation when she walks. Pt denies trauma. Negative swelling or deformity noted.
0 = understands/communicates without difficulty

## 2025-01-28 ENCOUNTER — INPATIENT (INPATIENT)
Facility: HOSPITAL | Age: 65
LOS: 26 days | Discharge: HOME HEALTH | DRG: 307 | End: 2025-02-24
Attending: THORACIC SURGERY (CARDIOTHORACIC VASCULAR SURGERY) | Admitting: THORACIC SURGERY (CARDIOTHORACIC VASCULAR SURGERY)
Payer: MEDICARE

## 2025-01-28 ENCOUNTER — APPOINTMENT (OUTPATIENT)
Dept: CARDIOTHORACIC SURGERY | Facility: HOSPITAL | Age: 65
End: 2025-01-28

## 2025-01-28 DIAGNOSIS — I34.0 NONRHEUMATIC MITRAL (VALVE) INSUFFICIENCY: ICD-10-CM

## 2025-01-28 DIAGNOSIS — Z12.11 ENCOUNTER FOR SCREENING FOR MALIGNANT NEOPLASM OF COLON: Chronic | ICD-10-CM

## 2025-01-28 DIAGNOSIS — Z96.643 PRESENCE OF ARTIFICIAL HIP JOINT, BILATERAL: Chronic | ICD-10-CM

## 2025-01-28 DIAGNOSIS — S02.30XA FRACTURE OF ORBITAL FLOOR, UNSPECIFIED SIDE, INITIAL ENCOUNTER FOR CLOSED FRACTURE: Chronic | ICD-10-CM

## 2025-01-28 DIAGNOSIS — Z86.018 PERSONAL HISTORY OF OTHER BENIGN NEOPLASM: Chronic | ICD-10-CM

## 2025-01-28 DIAGNOSIS — Z98.890 OTHER SPECIFIED POSTPROCEDURAL STATES: Chronic | ICD-10-CM

## 2025-01-28 LAB
ALBUMIN SERPL ELPH-MCNC: 4.3 G/DL — SIGNIFICANT CHANGE UP (ref 3.5–5.2)
ALP SERPL-CCNC: 37 U/L — SIGNIFICANT CHANGE UP (ref 30–115)
ALT FLD-CCNC: 12 U/L — SIGNIFICANT CHANGE UP (ref 0–41)
ANION GAP SERPL CALC-SCNC: 15 MMOL/L — HIGH (ref 7–14)
APTT BLD: 26.1 SEC — LOW (ref 27–39.2)
APTT BLD: 28.9 SEC — SIGNIFICANT CHANGE UP (ref 27–39.2)
AST SERPL-CCNC: 60 U/L — HIGH (ref 0–41)
BASOPHILS # BLD AUTO: 0.06 K/UL — SIGNIFICANT CHANGE UP (ref 0–0.2)
BASOPHILS NFR BLD AUTO: 0.5 % — SIGNIFICANT CHANGE UP (ref 0–1)
BILIRUB SERPL-MCNC: 1.4 MG/DL — HIGH (ref 0.2–1.2)
BUN SERPL-MCNC: 31 MG/DL — HIGH (ref 10–20)
CALCIUM SERPL-MCNC: 8.6 MG/DL — SIGNIFICANT CHANGE UP (ref 8.4–10.5)
CHLORIDE SERPL-SCNC: 110 MMOL/L — SIGNIFICANT CHANGE UP (ref 98–110)
CO2 SERPL-SCNC: 22 MMOL/L — SIGNIFICANT CHANGE UP (ref 17–32)
CREAT SERPL-MCNC: 1.4 MG/DL — SIGNIFICANT CHANGE UP (ref 0.7–1.5)
EGFR: 55 ML/MIN/1.73M2 — LOW
EOSINOPHIL # BLD AUTO: 0.48 K/UL — SIGNIFICANT CHANGE UP (ref 0–0.7)
EOSINOPHIL NFR BLD AUTO: 3.7 % — SIGNIFICANT CHANGE UP (ref 0–8)
FIBRINOGEN PPP-MCNC: 308 MG/DL — SIGNIFICANT CHANGE UP (ref 200–435)
FIBRINOGEN PPP-MCNC: 321 MG/DL — SIGNIFICANT CHANGE UP (ref 200–435)
GAS PNL BLDA: SIGNIFICANT CHANGE UP
GLUCOSE BLDC GLUCOMTR-MCNC: 132 MG/DL — HIGH (ref 70–99)
GLUCOSE BLDC GLUCOMTR-MCNC: 136 MG/DL — HIGH (ref 70–99)
GLUCOSE BLDC GLUCOMTR-MCNC: 160 MG/DL — HIGH (ref 70–99)
GLUCOSE BLDC GLUCOMTR-MCNC: 181 MG/DL — HIGH (ref 70–99)
GLUCOSE BLDC GLUCOMTR-MCNC: 182 MG/DL — HIGH (ref 70–99)
GLUCOSE BLDC GLUCOMTR-MCNC: 186 MG/DL — HIGH (ref 70–99)
GLUCOSE BLDC GLUCOMTR-MCNC: 188 MG/DL — HIGH (ref 70–99)
GLUCOSE SERPL-MCNC: 175 MG/DL — HIGH (ref 70–99)
HCT VFR BLD CALC: 23 % — LOW (ref 42–52)
HCT VFR BLD CALC: 29.6 % — LOW (ref 42–52)
HGB BLD-MCNC: 7 G/DL — LOW (ref 14–18)
HGB BLD-MCNC: 9.1 G/DL — LOW (ref 14–18)
IMM GRANULOCYTES NFR BLD AUTO: 0.9 % — HIGH (ref 0.1–0.3)
INR BLD: 1.15 RATIO — SIGNIFICANT CHANGE UP (ref 0.65–1.3)
INR BLD: 1.28 RATIO — SIGNIFICANT CHANGE UP (ref 0.65–1.3)
LYMPHOCYTES # BLD AUTO: 2.72 K/UL — SIGNIFICANT CHANGE UP (ref 1.2–3.4)
LYMPHOCYTES # BLD AUTO: 20.9 % — SIGNIFICANT CHANGE UP (ref 20.5–51.1)
MAGNESIUM SERPL-MCNC: 2.9 MG/DL — HIGH (ref 1.8–2.4)
MCHC RBC-ENTMCNC: 27 PG — SIGNIFICANT CHANGE UP (ref 27–31)
MCHC RBC-ENTMCNC: 27.5 PG — SIGNIFICANT CHANGE UP (ref 27–31)
MCHC RBC-ENTMCNC: 30.4 G/DL — LOW (ref 32–37)
MCHC RBC-ENTMCNC: 30.7 G/DL — LOW (ref 32–37)
MCV RBC AUTO: 87.8 FL — SIGNIFICANT CHANGE UP (ref 80–94)
MCV RBC AUTO: 90.2 FL — SIGNIFICANT CHANGE UP (ref 80–94)
MONOCYTES # BLD AUTO: 0.38 K/UL — SIGNIFICANT CHANGE UP (ref 0.1–0.6)
MONOCYTES NFR BLD AUTO: 2.9 % — SIGNIFICANT CHANGE UP (ref 1.7–9.3)
NEUTROPHILS # BLD AUTO: 9.24 K/UL — HIGH (ref 1.4–6.5)
NEUTROPHILS NFR BLD AUTO: 71.1 % — SIGNIFICANT CHANGE UP (ref 42.2–75.2)
NRBC # BLD: 0 /100 WBCS — SIGNIFICANT CHANGE UP (ref 0–0)
NRBC # BLD: 0 /100 WBCS — SIGNIFICANT CHANGE UP (ref 0–0)
NRBC BLD-RTO: 0 /100 WBCS — SIGNIFICANT CHANGE UP (ref 0–0)
NRBC BLD-RTO: 0 /100 WBCS — SIGNIFICANT CHANGE UP (ref 0–0)
PLATELET # BLD AUTO: 147 K/UL — SIGNIFICANT CHANGE UP (ref 130–400)
PLATELET # BLD AUTO: 161 K/UL — SIGNIFICANT CHANGE UP (ref 130–400)
PMV BLD: 11 FL — HIGH (ref 7.4–10.4)
PMV BLD: 11 FL — HIGH (ref 7.4–10.4)
POTASSIUM SERPL-MCNC: 4.2 MMOL/L — SIGNIFICANT CHANGE UP (ref 3.5–5)
POTASSIUM SERPL-SCNC: 4.2 MMOL/L — SIGNIFICANT CHANGE UP (ref 3.5–5)
PROT SERPL-MCNC: 5.8 G/DL — LOW (ref 6–8)
PROTHROM AB SERPL-ACNC: 13.6 SEC — HIGH (ref 9.95–12.87)
PROTHROM AB SERPL-ACNC: 15.2 SEC — HIGH (ref 9.95–12.87)
RBC # BLD: 2.55 M/UL — LOW (ref 4.7–6.1)
RBC # BLD: 3.37 M/UL — LOW (ref 4.7–6.1)
RBC # FLD: 17.8 % — HIGH (ref 11.5–14.5)
RBC # FLD: 18.9 % — HIGH (ref 11.5–14.5)
SODIUM SERPL-SCNC: 147 MMOL/L — HIGH (ref 135–146)
WBC # BLD: 13 K/UL — HIGH (ref 4.8–10.8)
WBC # BLD: 13.88 K/UL — HIGH (ref 4.8–10.8)
WBC # FLD AUTO: 13 K/UL — HIGH (ref 4.8–10.8)
WBC # FLD AUTO: 13.88 K/UL — HIGH (ref 4.8–10.8)

## 2025-01-28 PROCEDURE — 94660 CPAP INITIATION&MGMT: CPT

## 2025-01-28 PROCEDURE — C1751: CPT

## 2025-01-28 PROCEDURE — 99024 POSTOP FOLLOW-UP VISIT: CPT

## 2025-01-28 PROCEDURE — 83735 ASSAY OF MAGNESIUM: CPT

## 2025-01-28 PROCEDURE — 86850 RBC ANTIBODY SCREEN: CPT

## 2025-01-28 PROCEDURE — 85014 HEMATOCRIT: CPT

## 2025-01-28 PROCEDURE — 82330 ASSAY OF CALCIUM: CPT

## 2025-01-28 PROCEDURE — P9047: CPT | Mod: JZ

## 2025-01-28 PROCEDURE — 33430 REPLACEMENT OF MITRAL VALVE: CPT | Mod: AS

## 2025-01-28 PROCEDURE — 97166 OT EVAL MOD COMPLEX 45 MIN: CPT | Mod: GO

## 2025-01-28 PROCEDURE — 99291 CRITICAL CARE FIRST HOUR: CPT

## 2025-01-28 PROCEDURE — C9399: CPT

## 2025-01-28 PROCEDURE — 80048 BASIC METABOLIC PNL TOTAL CA: CPT

## 2025-01-28 PROCEDURE — 84100 ASSAY OF PHOSPHORUS: CPT

## 2025-01-28 PROCEDURE — 71046 X-RAY EXAM CHEST 2 VIEWS: CPT

## 2025-01-28 PROCEDURE — 36415 COLL VENOUS BLD VENIPUNCTURE: CPT

## 2025-01-28 PROCEDURE — 93005 ELECTROCARDIOGRAM TRACING: CPT

## 2025-01-28 PROCEDURE — 84132 ASSAY OF SERUM POTASSIUM: CPT

## 2025-01-28 PROCEDURE — 97530 THERAPEUTIC ACTIVITIES: CPT | Mod: GP

## 2025-01-28 PROCEDURE — 85018 HEMOGLOBIN: CPT

## 2025-01-28 PROCEDURE — C1889: CPT

## 2025-01-28 PROCEDURE — 33430 REPLACEMENT OF MITRAL VALVE: CPT

## 2025-01-28 PROCEDURE — 85610 PROTHROMBIN TIME: CPT

## 2025-01-28 PROCEDURE — P9045: CPT | Mod: JZ

## 2025-01-28 PROCEDURE — 99292 CRITICAL CARE ADDL 30 MIN: CPT

## 2025-01-28 PROCEDURE — 82803 BLOOD GASES ANY COMBINATION: CPT

## 2025-01-28 PROCEDURE — 93306 TTE W/DOPPLER COMPLETE: CPT

## 2025-01-28 PROCEDURE — 85027 COMPLETE CBC AUTOMATED: CPT

## 2025-01-28 PROCEDURE — 94010 BREATHING CAPACITY TEST: CPT

## 2025-01-28 PROCEDURE — L8699: CPT

## 2025-01-28 PROCEDURE — 84295 ASSAY OF SERUM SODIUM: CPT

## 2025-01-28 PROCEDURE — 94760 N-INVAS EAR/PLS OXIMETRY 1: CPT

## 2025-01-28 PROCEDURE — C1729: CPT

## 2025-01-28 PROCEDURE — 82962 GLUCOSE BLOOD TEST: CPT

## 2025-01-28 PROCEDURE — 80053 COMPREHEN METABOLIC PANEL: CPT

## 2025-01-28 PROCEDURE — 71045 X-RAY EXAM CHEST 1 VIEW: CPT | Mod: 26

## 2025-01-28 PROCEDURE — 85025 COMPLETE CBC W/AUTO DIFF WBC: CPT

## 2025-01-28 PROCEDURE — 97110 THERAPEUTIC EXERCISES: CPT | Mod: GO

## 2025-01-28 PROCEDURE — 86901 BLOOD TYPING SEROLOGIC RH(D): CPT

## 2025-01-28 PROCEDURE — 86900 BLOOD TYPING SEROLOGIC ABO: CPT

## 2025-01-28 PROCEDURE — P9016: CPT

## 2025-01-28 PROCEDURE — 93010 ELECTROCARDIOGRAM REPORT: CPT

## 2025-01-28 PROCEDURE — C9808: CPT

## 2025-01-28 PROCEDURE — 85384 FIBRINOGEN ACTIVITY: CPT

## 2025-01-28 PROCEDURE — 83605 ASSAY OF LACTIC ACID: CPT

## 2025-01-28 PROCEDURE — 97162 PT EVAL MOD COMPLEX 30 MIN: CPT | Mod: GP

## 2025-01-28 PROCEDURE — 71045 X-RAY EXAM CHEST 1 VIEW: CPT

## 2025-01-28 PROCEDURE — 86891 AUTOLOGOUS BLOOD OP SALVAGE: CPT

## 2025-01-28 PROCEDURE — 36430 TRANSFUSION BLD/BLD COMPNT: CPT

## 2025-01-28 PROCEDURE — 97535 SELF CARE MNGMENT TRAINING: CPT | Mod: GO

## 2025-01-28 PROCEDURE — 86923 COMPATIBILITY TEST ELECTRIC: CPT

## 2025-01-28 PROCEDURE — 97116 GAIT TRAINING THERAPY: CPT | Mod: GP

## 2025-01-28 PROCEDURE — 85730 THROMBOPLASTIN TIME PARTIAL: CPT

## 2025-01-28 PROCEDURE — 94640 AIRWAY INHALATION TREATMENT: CPT

## 2025-01-28 RX ORDER — OXYCODONE HYDROCHLORIDE 30 MG/1
10 TABLET ORAL EVERY 4 HOURS
Refills: 0 | Status: DISCONTINUED | OUTPATIENT
Start: 2025-01-28 | End: 2025-02-03

## 2025-01-28 RX ORDER — PROPOFOL 10 MG/ML
15 INJECTION, EMULSION INTRAVENOUS
Qty: 1000 | Refills: 0 | Status: DISCONTINUED | OUTPATIENT
Start: 2025-01-28 | End: 2025-01-29

## 2025-01-28 RX ORDER — MILRINONE LACTATE 1 MG/ML
0.38 INJECTION, SOLUTION INTRAVENOUS
Qty: 20 | Refills: 0 | Status: DISCONTINUED | OUTPATIENT
Start: 2025-01-28 | End: 2025-01-30

## 2025-01-28 RX ORDER — DEXMEDETOMIDINE HYDROCHLORIDE IN SODIUM CHLORIDE 4 UG/ML
0.02 INJECTION INTRAVENOUS
Qty: 200 | Refills: 0 | Status: DISCONTINUED | OUTPATIENT
Start: 2025-01-28 | End: 2025-01-28

## 2025-01-28 RX ORDER — ACETAMINOPHEN 500 MG/5ML
1000 LIQUID (ML) ORAL ONCE
Refills: 0 | Status: COMPLETED | OUTPATIENT
Start: 2025-01-28 | End: 2025-01-28

## 2025-01-28 RX ORDER — NOREPINEPHRINE BITARTRATE 8 MG
0.05 SOLUTION INTRAVENOUS
Qty: 8 | Refills: 0 | Status: DISCONTINUED | OUTPATIENT
Start: 2025-01-28 | End: 2025-01-30

## 2025-01-28 RX ORDER — DEXTROSE 50 % IN WATER 50 %
25 SYRINGE (ML) INTRAVENOUS
Refills: 0 | Status: DISCONTINUED | OUTPATIENT
Start: 2025-01-28 | End: 2025-02-24

## 2025-01-28 RX ORDER — DEXTROSE 50 % IN WATER 50 %
50 SYRINGE (ML) INTRAVENOUS
Refills: 0 | Status: DISCONTINUED | OUTPATIENT
Start: 2025-01-28 | End: 2025-02-24

## 2025-01-28 RX ORDER — LOSARTAN POTASSIUM 100 MG
1 TABLET ORAL
Refills: 0 | DISCHARGE

## 2025-01-28 RX ORDER — ALBUMIN (HUMAN) 12.5 G/50ML
500 INJECTION, SOLUTION INTRAVENOUS ONCE
Refills: 0 | Status: COMPLETED | OUTPATIENT
Start: 2025-01-28 | End: 2025-01-28

## 2025-01-28 RX ORDER — LOSARTAN POTASSIUM AND HYDROCHLOROTHIAZIDE 50; 12.5 MG/1; MG/1
1 TABLET, FILM COATED ORAL
Refills: 0 | DISCHARGE

## 2025-01-28 RX ORDER — POLYETHYLENE GLYCOL 3350 17 G/17G
17 POWDER, FOR SOLUTION ORAL DAILY
Refills: 0 | Status: DISCONTINUED | OUTPATIENT
Start: 2025-01-29 | End: 2025-02-08

## 2025-01-28 RX ORDER — OXYCODONE HYDROCHLORIDE 30 MG/1
5 TABLET ORAL EVERY 4 HOURS
Refills: 0 | Status: DISCONTINUED | OUTPATIENT
Start: 2025-01-28 | End: 2025-02-04

## 2025-01-28 RX ORDER — DEXMEDETOMIDINE HYDROCHLORIDE IN SODIUM CHLORIDE 4 UG/ML
0.3 INJECTION INTRAVENOUS
Qty: 200 | Refills: 0 | Status: DISCONTINUED | OUTPATIENT
Start: 2025-01-28 | End: 2025-01-29

## 2025-01-28 RX ORDER — ONDANSETRON HCL/PF 4 MG/2 ML
4 VIAL (ML) INJECTION EVERY 4 HOURS
Refills: 0 | Status: DISCONTINUED | OUTPATIENT
Start: 2025-01-28 | End: 2025-02-17

## 2025-01-28 RX ORDER — SUGAMMADEX 100 MG/ML
200 INJECTION, SOLUTION INTRAVENOUS ONCE
Refills: 0 | Status: COMPLETED | OUTPATIENT
Start: 2025-01-28 | End: 2025-01-28

## 2025-01-28 RX ORDER — VANCOMYCIN HCL IN 5 % DEXTROSE 1.5G/250ML
1000 PLASTIC BAG, INJECTION (ML) INTRAVENOUS EVERY 12 HOURS
Refills: 0 | Status: COMPLETED | OUTPATIENT
Start: 2025-01-28 | End: 2025-01-30

## 2025-01-28 RX ORDER — VASOPRESSIN 20 [USP'U]/ML
0.04 INJECTION INTRAVENOUS
Qty: 40 | Refills: 0 | Status: DISCONTINUED | OUTPATIENT
Start: 2025-01-28 | End: 2025-01-29

## 2025-01-28 RX ORDER — NITROGLYCERIN 20 MG/G
15 OINTMENT TOPICAL
Qty: 50 | Refills: 0 | Status: DISCONTINUED | OUTPATIENT
Start: 2025-01-28 | End: 2025-01-30

## 2025-01-28 RX ORDER — VASOPRESSIN 20 [USP'U]/ML
0.04 INJECTION INTRAVENOUS
Qty: 40 | Refills: 0 | Status: DISCONTINUED | OUTPATIENT
Start: 2025-01-28 | End: 2025-01-28

## 2025-01-28 RX ORDER — SENNA 187 MG
2 TABLET ORAL AT BEDTIME
Refills: 0 | Status: DISCONTINUED | OUTPATIENT
Start: 2025-01-29 | End: 2025-02-24

## 2025-01-28 RX ORDER — HYDROMORPHONE/SOD CHLOR,ISO/PF 2 MG/10 ML
0.5 SYRINGE (ML) INJECTION EVERY 6 HOURS
Refills: 0 | Status: DISCONTINUED | OUTPATIENT
Start: 2025-01-28 | End: 2025-01-30

## 2025-01-28 RX ORDER — ACETAMINOPHEN 500 MG/5ML
650 LIQUID (ML) ORAL EVERY 6 HOURS
Refills: 0 | Status: DISCONTINUED | OUTPATIENT
Start: 2025-01-28 | End: 2025-01-28

## 2025-01-28 RX ORDER — KETOROLAC TROMETHAMINE 30 MG/ML
15 INJECTION, SOLUTION INTRAMUSCULAR; INTRAVENOUS ONCE
Refills: 0 | Status: DISCONTINUED | OUTPATIENT
Start: 2025-01-28 | End: 2025-01-28

## 2025-01-28 RX ORDER — NICARDIPINE HCL 30 MG
5 CAPSULE ORAL
Qty: 40 | Refills: 0 | Status: DISCONTINUED | OUTPATIENT
Start: 2025-01-28 | End: 2025-01-30

## 2025-01-28 RX ORDER — DEXMEDETOMIDINE HYDROCHLORIDE IN SODIUM CHLORIDE 4 UG/ML
0.2 INJECTION INTRAVENOUS
Qty: 400 | Refills: 0 | Status: DISCONTINUED | OUTPATIENT
Start: 2025-01-28 | End: 2025-01-28

## 2025-01-28 RX ORDER — BISACODYL 5 MG
10 TABLET, DELAYED RELEASE (ENTERIC COATED) ORAL ONCE
Refills: 0 | Status: DISCONTINUED | OUTPATIENT
Start: 2025-01-30 | End: 2025-02-09

## 2025-01-28 RX ORDER — CEFAZOLIN SODIUM IN 0.9 % NACL 3 G/100 ML
2000 INTRAVENOUS SOLUTION, PIGGYBACK (ML) INTRAVENOUS EVERY 8 HOURS
Refills: 0 | Status: COMPLETED | OUTPATIENT
Start: 2025-01-28 | End: 2025-01-30

## 2025-01-28 RX ADMIN — MILRINONE LACTATE 12.8 MICROGRAM(S)/KG/MIN: 1 INJECTION, SOLUTION INTRAVENOUS at 18:40

## 2025-01-28 RX ADMIN — ALBUMIN (HUMAN) 250 MILLILITER(S): 12.5 INJECTION, SOLUTION INTRAVENOUS at 22:00

## 2025-01-28 RX ADMIN — Medication 20 MILLIGRAM(S): at 21:59

## 2025-01-28 RX ADMIN — Medication 1000 MILLIGRAM(S): at 21:30

## 2025-01-28 RX ADMIN — Medication 15 MILLILITER(S): at 21:01

## 2025-01-28 RX ADMIN — VASOPRESSIN 6 UNIT(S)/MIN: 20 INJECTION INTRAVENOUS at 23:47

## 2025-01-28 RX ADMIN — KETOROLAC TROMETHAMINE 15 MILLIGRAM(S): 30 INJECTION, SOLUTION INTRAMUSCULAR; INTRAVENOUS at 22:50

## 2025-01-28 RX ADMIN — Medication 5 UNIT(S)/HR: at 23:48

## 2025-01-28 RX ADMIN — Medication 250 MILLIGRAM(S): at 21:00

## 2025-01-28 RX ADMIN — VASOPRESSIN 6 UNIT(S)/MIN: 20 INJECTION INTRAVENOUS at 18:41

## 2025-01-28 RX ADMIN — ALBUMIN (HUMAN) 250 MILLILITER(S): 12.5 INJECTION, SOLUTION INTRAVENOUS at 16:59

## 2025-01-28 RX ADMIN — KETOROLAC TROMETHAMINE 15 MILLIGRAM(S): 30 INJECTION, SOLUTION INTRAMUSCULAR; INTRAVENOUS at 23:15

## 2025-01-28 RX ADMIN — NOREPINEPHRINE BITARTRATE 10.7 MICROGRAM(S)/KG/MIN: 8 SOLUTION at 18:38

## 2025-01-28 RX ADMIN — Medication 1 APPLICATION(S): at 22:02

## 2025-01-28 RX ADMIN — DEXMEDETOMIDINE HYDROCHLORIDE IN SODIUM CHLORIDE 5.7 MICROGRAM(S)/KG/HR: 4 INJECTION INTRAVENOUS at 18:39

## 2025-01-28 RX ADMIN — Medication 100 MILLIGRAM(S): at 22:00

## 2025-01-28 RX ADMIN — Medication 400 MILLIGRAM(S): at 21:03

## 2025-01-28 RX ADMIN — SUGAMMADEX 200 MILLIGRAM(S): 100 INJECTION, SOLUTION INTRAVENOUS at 21:15

## 2025-01-28 RX ADMIN — Medication 4 MILLIGRAM(S): at 21:57

## 2025-01-28 RX ADMIN — Medication 15 MILLILITER(S): at 21:02

## 2025-01-28 RX ADMIN — PROPOFOL 10.3 MICROGRAM(S)/KG/MIN: 10 INJECTION, EMULSION INTRAVENOUS at 18:58

## 2025-01-28 NOTE — PRE-ANESTHESIA EVALUATION ADULT - NSANTHAIRWAYFT_ENT_ALL_CORE
THICK, SHORT NECK WITH LIMITED ROM; GOOD MOUTH OPENING; NO PRIOR HX OF DIFFICULT INTUBATION DESPITE SEVERAL PROCEDURES WITH GA

## 2025-01-28 NOTE — PROGRESS NOTE ADULT - ASSESSMENT
Assessment/Plan  !!brief history    Neuro:  #requiring postoperative sedation while intubated  - Sedatives: dexmedetomodine/propofol as needed. Goal rass 0 to -1. wean sedation as tolerated  #acute postoperative pain  - Analgesics: acetaminophen, lidocaine patch, opioid prn, !!  #at risk for postoperative/ICU delirium  - frequent re-orientation, good sleep hygiene, avoid medications which can increase delirium risk  - CAM-ICU qShift    CV: severe MR s/p MVReplacement, MAZE, SHEILA ligation  - patient's intrinsic rhythm: sinus. Epicardial pacer settings: DDI 60  #RV/LV heart failure  - Inotropes: milrinone. Titrate for CI >2.2. Last CI in OR 3.2  #acute hypotension from vasodilation and postoperative SIRS  - Vasoactives: norepi, vaso. Titrate for MAP 65-80  - Restart statin once tolerating oral medications  - Beta blocker when hemodynamically stable off inotropic and pressor support    Pulm:  #acute postoperative pulmonary insufficiency  #acute respiratory failure with hypoxemia and hypercapnea  - On mechanical ventilation. ARDSnet protocol. Lung protection strategy. Head of bed elevated whenever possible. High PEEP needed for oxygenation.  - Due to oxygenation issues and body anatomy presenting possible airway difficulty, do not anticipate extubation today.  - aggressive incentive spirometry and chest PT to address atelectasis once extubated    Renal/Fluid/Lytes:  #acute intravascular hypovolemia  - IV fluid resuscitation  - High risk for FLAKITO post surgery  - replete/optimize electrolytes  - preload and after optimization    Heme:  #Acute blood loss anemia from surgery  - transfuse for hemoglobin < 7 or hemodynamic instability due to anemia  - !!blood products since OR: !!  !!#thrombocytopenia  !!- likely from consumption. Unlikely HIT. Continue to monitor  !!- transfuse for plt < 10 or <50 with signs of hemorrhage  #s/p MVReplacement surgery, anticoagulation/antiplatelet plan  - hold until patient proven to have no significant hemorrhage s/p surgery and platelets > 100    ID:  #at risk for surgical infection  - finish cathie-op antibiotics  !!- Leukocytosis likely reactionary, continue to monitor    Endocrine:   #acute postoperative stress hyperglycemia  - insulin therapy to achieve glucose control    GI:  #at risk for malnutrition  - following extubation and swallow study, start diet diet as tolerated  - if unable to extubate within timely manner, will start tube feeds  #at risk for post-operative ileus and opioid-induced constipation  - bowel regimen    Prophylaxis  #at risk of VTE  - SCDs. VTE chemoprophylaxis on hold until patient proven to have no significant hemorrhage s/p surgery and platelets > 80  #at risk of GI stress ulcer  - GI prophylaxis indicated    PT, once extubated  - out of bed to chair  - ambulation as much as possible    Medication list reviewed.    Lines: PAC, arterial lines and prince to remain in until off inotropes, vasoactives and no longer need close monitoring of input/output.    - RIJ large bore access -  - chest tube -  - epicardial wires -    This patient is critically ill and required my dedicated time to evaluate, manage and maintain or prevent deterioration of the organ systems and problems noted above and provide documentation.    Due to a high probability of clinically significant, life threatening deterioration due to high risk of   cardiac failure, circulatory failure, cardiogenic shock, shock, arrhythmias, renal failure, acute blood loss, CNS failure / compromise, acute pulmonary insufficiency, airway compromis, respiratory failure, the patient required my highest level of preparedness to intervene emergently and I personally spent this critical care time directly and personally managing the patient. This critical care time included obtaining a history when possible; examining the patient; review pulse oximetry; order / interpreting / review of laboratory and radiology studies with immediate assessment and treatment as needed; directing the titration of pressors, oxygen support devices, fluid resuscitation, interpretation of cardiac output measurements; interpretation of EKG / rhythm strips / telemetry; arranging urgent treatment with development of a management plan; evaluation of patient's response to treatment; frequent reassessment and monitor for potential decompensation; and discussion with other providers/consultants.  This critical care time was performed to assess and manage the high probability of imminent life threatening deterioration that could result in organ(s) failure. It was exclusive of separately billable procedures and treating other patients and teaching time. please see MDM(medical decision making)/Assessment/Plans sections and the rest of the note for further information on patient assessment and treatment.    Time I spent with family or surrogate(s) is included only if the patient was incapable of providing the necessary information or participating in medical decision making. Time devoted to teaching and to any procedures I billed separately is not included.     Management of this patient was discussed with the surgical/cardiology attending and mid-level providers.    A central line, if present, continues to be necessary for infusion of medications and IV fluids. It is to be removed when other adequate venous access is accomplished.    A restraint, if present, remains because the patient cannot be safely managed without restraints as potential for harm secondary to inadvertent movement and loss of tubes/lines outweighs the burden of restraint.    A prince catheter, if present, remains necessary to monitor urine output continuously, and/or divert urine from the skin. It will be removed per policy when it is not needed for these purposes.    I acknowledge the use of copied documentation.  All copy forward documentation is my own and has been reviewed and revised as appropriate.  If no changes were made, it is because that information remains the same.    Fab Celis MD  Critical Care Medicine     Assessment/Plan  64M w/ afib, nonobstructive CAD, CHF class 3, severe MR, DM, HTN, JIMMY on CPAP, asthma, severe obesity, HL, Madelung's disease, arthritis, arthritis, peripheral neuropathy s/p MVReplacement, SHEILA ligation, MAZE 1/28/25    Neuro:  #requiring postoperative sedation while intubated  - Sedatives: dexmedetomodine/propofol as needed. Goal rass 0 to -1. wean sedation as tolerated  #acute postoperative pain  - Analgesics: acetaminophen, lidocaine patch, opioid prn  #at risk for postoperative/ICU delirium  - frequent re-orientation, good sleep hygiene, avoid medications which can increase delirium risk  - CAM-ICU qShift    CV: severe MR s/p MVReplacement, MAZE, SHEILA ligation  - patient's intrinsic rhythm: sinus. Epicardial pacer settings: DDI 60  #RV/LV heart failure  - Inotropes: milrinone. Titrate for CI >2.2. Last CI in OR 3.2  #acute hypotension from vasodilation and postoperative SIRS  - Vasoactives: norepi, vaso. Titrate for MAP 65-80  - Restart statin once tolerating oral medications  - restart aspirin when patient shows no sign of hemorrhage and platelets > 80  - Beta blocker when hemodynamically stable off inotropic and pressor support    Pulm:  #acute postoperative pulmonary insufficiency  #acute respiratory failure with hypoxemia and hypercapnea  - On mechanical ventilation. ARDSnet protocol. Lung protection strategy. Head of bed elevated whenever possible. High PEEP needed for oxygenation.  - Due to oxygenation issues and body anatomy presenting possible airway difficulty, do not anticipate extubation today.  - aggressive incentive spirometry and chest PT to address atelectasis once extubated    Renal/Fluid/Lytes:  #acute intravascular hypovolemia  - IV fluid resuscitation  - High risk for FLAKITO post surgery  - replete/optimize electrolytes  - preload and after optimization    Heme:  #Acute blood loss anemia from surgery  - transfuse for hemoglobin < 7 or hemodynamic instability due to anemia  !!#thrombocytopenia  !!- likely from consumption. Unlikely HIT. Continue to monitor  !!- transfuse for plt < 10 or <50 with signs of hemorrhage  #s/p MVReplacement surgery, anticoagulation/antiplatelet plan  - hold until patient proven to have no significant hemorrhage s/p surgery and platelets > 80    ID:  #at risk for surgical infection  - finish cathie-op antibiotics  !!- Leukocytosis likely reactionary, continue to monitor    Endocrine:   #acute postoperative stress hyperglycemia  - insulin therapy to achieve glucose control    GI:  #at risk for malnutrition  - following extubation and swallow study, start diet diet as tolerated  - if unable to extubate within timely manner, will start tube feeds  #at risk for post-operative ileus and opioid-induced constipation  - bowel regimen    Prophylaxis  #at risk of VTE  - SCDs. VTE chemoprophylaxis on hold until patient proven to have no significant hemorrhage s/p surgery and platelets > 80  #at risk of GI stress ulcer  - GI prophylaxis indicated    PT, once extubated  - out of bed to chair  - ambulation as much as possible    Medication list reviewed.    Lines: PAC, arterial lines and prince to remain in until off inotropes, vasoactives and no longer need close monitoring of input/output.    - RIJ large bore access -  - chest tube -  - epicardial wires -    This patient is critically ill and required my dedicated time to evaluate, manage and maintain or prevent deterioration of the organ systems and problems noted above and provide documentation.    Due to a high probability of clinically significant, life threatening deterioration due to high risk of   cardiac failure, circulatory failure, cardiogenic shock, shock, arrhythmias, renal failure, acute blood loss, CNS failure / compromise, acute pulmonary insufficiency, airway compromise, respiratory failure, the patient required my highest level of preparedness to intervene emergently and I personally spent this critical care time directly and personally managing the patient. This critical care time included obtaining a history when possible; examining the patient; review pulse oximetry; order / interpreting / review of laboratory and radiology studies with immediate assessment and treatment as needed; directing the titration of pressors, oxygen support devices, fluid resuscitation, interpretation of cardiac output measurements; interpretation of EKG / rhythm strips / telemetry; arranging urgent treatment with development of a management plan; evaluation of patient's response to treatment; frequent reassessment and monitor for potential decompensation; and discussion with other providers/consultants.  This critical care time was performed to assess and manage the high probability of imminent life threatening deterioration that could result in organ(s) failure. It was exclusive of separately billable procedures and treating other patients and teaching time. please see MDM(medical decision making)/Assessment/Plans sections and the rest of the note for further information on patient assessment and treatment.    Time I spent with family or surrogate(s) is included only if the patient was incapable of providing the necessary information or participating in medical decision making. Time devoted to teaching and to any procedures I billed separately is not included.     Management of this patient was discussed with the surgical/cardiology attending and mid-level providers.    A central line, if present, continues to be necessary for infusion of medications and IV fluids. It is to be removed when other adequate venous access is accomplished.    A restraint, if present, remains because the patient cannot be safely managed without restraints as potential for harm secondary to inadvertent movement and loss of tubes/lines outweighs the burden of restraint.    A prince catheter, if present, remains necessary to monitor urine output continuously, and/or divert urine from the skin. It will be removed per policy when it is not needed for these purposes.    I acknowledge the use of copied documentation.  All copy forward documentation is my own and has been reviewed and revised as appropriate.  If no changes were made, it is because that information remains the same.    Fab Celis MD  Critical Care Medicine     Assessment/Plan  64M w/ afib, nonobstructive CAD, CHF class 3, severe MR, DM, HTN, JIMMY on CPAP, asthma, severe obesity, HL, Madelung's disease, arthritis, arthritis, peripheral neuropathy s/p MVReplacement, SHEILA ligation, MAZE 1/28/25    Neuro:  #requiring postoperative sedation while intubated  - Sedatives: dexmedetomodine/propofol as needed. Goal rass 0 to -1. wean sedation as tolerated  #acute postoperative pain  - Analgesics: acetaminophen, lidocaine patch, opioid prn  #at risk for postoperative/ICU delirium  - frequent re-orientation, good sleep hygiene, avoid medications which can increase delirium risk  - CAM-ICU qShift    CV: severe MR s/p MVReplacement, MAZE, SHEILA ligation  - patient's intrinsic rhythm: sinus. Epicardial pacer settings: DDI 60  #RV/LV heart failure  - Inotropes: milrinone. Titrate for CI >2.2. Last CI in OR 3.2  #acute hypotension from vasodilation and postoperative SIRS  - Vasoactives: norepi, vaso. Titrate for MAP 65-80  - Restart statin once tolerating oral medications  - restart aspirin when patient shows no sign of hemorrhage and platelets > 80  - Beta blocker when hemodynamically stable off inotropic and pressor support    Pulm:  #acute postoperative pulmonary insufficiency  #acute respiratory failure with hypoxemia and hypercapnea  - On mechanical ventilation. ARDSnet protocol. Lung protection strategy. Head of bed elevated whenever possible. High PEEP needed for oxygenation.  - Due to oxygenation issues and body anatomy presenting possible airway difficulty, do not anticipate extubation today.  - aggressive incentive spirometry and chest PT to address atelectasis once extubated    Renal/Fluid/Lytes:  #acute intravascular hypovolemia  - IV fluid resuscitation  - High risk for FLAKITO post surgery  - replete/optimize electrolytes  - preload and after optimization    Heme:  #Acute blood loss anemia from surgery  - transfuse for hemoglobin < 7 or hemodynamic instability due to anemia  #s/p MVReplacement surgery, anticoagulation/antiplatelet plan  - hold until patient proven to have no significant hemorrhage s/p surgery and platelets > 80    ID:  #at risk for surgical infection  - finish cathie-op antibiotics  - Leukocytosis likely reactionary, continue to monitor    Endocrine:   #acute postoperative stress hyperglycemia  - insulin therapy to achieve glucose control    GI:  #at risk for malnutrition  - following extubation and swallow study, start diet diet as tolerated  - if unable to extubate within timely manner, will start tube feeds  #at risk for post-operative ileus and opioid-induced constipation  - bowel regimen    Prophylaxis  #at risk of VTE  - SCDs. VTE chemoprophylaxis on hold until patient proven to have no significant hemorrhage s/p surgery and platelets > 80  #at risk of GI stress ulcer  - GI prophylaxis indicated    PT, once extubated  - out of bed to chair  - ambulation as much as possible    Medication list reviewed.    Lines: PAC, arterial lines and prince to remain in until off inotropes, vasoactives and no longer need close monitoring of input/output.    - RIJ large bore access -  - chest tube -  - epicardial wires -    This patient is critically ill and required my dedicated time to evaluate, manage and maintain or prevent deterioration of the organ systems and problems noted above and provide documentation.    Due to a high probability of clinically significant, life threatening deterioration due to high risk of   cardiac failure, circulatory failure, cardiogenic shock, shock, arrhythmias, renal failure, acute blood loss, CNS failure / compromise, acute pulmonary insufficiency, airway compromise, respiratory failure, the patient required my highest level of preparedness to intervene emergently and I personally spent this critical care time directly and personally managing the patient. This critical care time included obtaining a history when possible; examining the patient; review pulse oximetry; order / interpreting / review of laboratory and radiology studies with immediate assessment and treatment as needed; directing the titration of pressors, oxygen support devices, fluid resuscitation, interpretation of cardiac output measurements; interpretation of EKG / rhythm strips / telemetry; arranging urgent treatment with development of a management plan; evaluation of patient's response to treatment; frequent reassessment and monitor for potential decompensation; and discussion with other providers/consultants.  This critical care time was performed to assess and manage the high probability of imminent life threatening deterioration that could result in organ(s) failure. It was exclusive of separately billable procedures and treating other patients and teaching time. please see MDM(medical decision making)/Assessment/Plans sections and the rest of the note for further information on patient assessment and treatment.    Time I spent with family or surrogate(s) is included only if the patient was incapable of providing the necessary information or participating in medical decision making. Time devoted to teaching and to any procedures I billed separately is not included.     Management of this patient was discussed with the surgical/cardiology attending and mid-level providers.    A central line, if present, continues to be necessary for infusion of medications and IV fluids. It is to be removed when other adequate venous access is accomplished.    A restraint, if present, remains because the patient cannot be safely managed without restraints as potential for harm secondary to inadvertent movement and loss of tubes/lines outweighs the burden of restraint.    A prince catheter, if present, remains necessary to monitor urine output continuously, and/or divert urine from the skin. It will be removed per policy when it is not needed for these purposes.    I acknowledge the use of copied documentation.  All copy forward documentation is my own and has been reviewed and revised as appropriate.  If no changes were made, it is because that information remains the same.    Fab Celis MD  Critical Care Medicine

## 2025-01-28 NOTE — DISCHARGE NOTE PROVIDER - CARE PROVIDER_API CALL
Alex Blakely  Thoracic and Cardiac Surgery  [address] [place] [address], [address]  [place] [address]  Phone: [phone]  Fax: [phone]  Follow Up Time:    Alex Blakely  Thoracic and Cardiac Surgery  [address], [address]  [place] [address]  Phone: [phone]  Fax: [phone]  Follow Up Time:     Nawaf Up  Interventional Cardiology  [address], [address]  [place] [address]  Phone: [phone]  Fax: [phone]  Follow Up Time:     Pao Woods  Endocrinology/Metab/Diabetes  [address] [place] [address]  Phone: [phone]  Fax: [phone]  Follow Up Time:

## 2025-01-28 NOTE — DISCHARGE NOTE PROVIDER - PROVIDER TOKENS
PROVIDER:[TOKEN:[[id number]]] PROVIDER:[TOKEN:[[id number]]],PROVIDER:[TOKEN:[[id number]]],PROVIDER:[TOKEN:[[id number]]]

## 2025-01-28 NOTE — BRIEF OPERATIVE NOTE - COMMENTS
I, Dr. Larry, was present for the major duration of the case including the entire duration of cardiopulmonary bypass and cross-clamp, completion of the modified maze procedure with the encompass clamp and then cryo maze, replacement of the mitral valve and then subsequently for weaning from cardiopulmonary bypass.

## 2025-01-28 NOTE — DISCHARGE NOTE PROVIDER - NSDCFUADDAPPT_GEN_ALL_CORE_FT
-[facility] Psychiatry Outpatient Department ([facility]), [address] [place] [address], phone number : [phone] Please follow up with Dr. Blakely on Thurs 2/27 @10am and he is supposed to have repeat bloodwork    Please follow up with cardiology in 2 weeks and with Endocrine in 1-2 weeks/    Please follow up with psych in 2-4 weeks  [facility] Psychiatry Outpatient Department ([facility]), [address] [place] [address], phone number : [phone]

## 2025-01-28 NOTE — DISCHARGE NOTE PROVIDER - HOSPITAL COURSE
Patient is a 64-year-old male, occasional marijuana smoker. with PMHX: of HTN, HLD, obesity, JIMMY (uses CPAP),DM (A1C 7.5), BPH, asthma and osteoarthritis (s/p b/l hip replacement). Symptoms include current SOB and b/l LE edema. Can walk < 1 city block before getting short of breath and needing to sit to relieve it. Patient states SOB has been progressively getting worse over the past year. Denies ever having palpitations despite being recently admitted for Afib RVR. JASMINA revealed severe mitral regurgitation w/flail P2 segment.10/31/24 s/p Cardiac cath that revealed non obstructive CAD    PMD: Dr. Woods  Cardio: Dr. Argueta  Pulmonary: Dr. De La Cruz Patient is a 64-year-old male, occasional marijuana smoker. with PMHX: of HTN, HLD, obesity, JIMMY (uses CPAP),DM (A1C 7.5), BPH, asthma and osteoarthritis (s/p b/l hip replacement). Symptoms include current SOB and b/l LE edema. Can walk < 1 city block before getting short of breath and needing to sit to relieve it. Patient states SOB has been progressively getting worse over the past year. Denies ever having palpitations despite being recently admitted for Afib RVR. JASMINA revealed severe mitral regurgitation w/flail P2 segment.10/31/24 s/p Cardiac cath that revealed non obstructive CAD. On 1/28, the patient underwent a MVR/MAZE/ Ligation of LA.  The patient had a hospital course complicated by second degree heart block and was seen by EP.  No device was recommended at that time.      PMD: Dr. Woods  Cardio: Dr. Argueta  Pulmonary: Dr. De La Cruz Patient is a 64-year-old male, occasional marijuana smoker. with PMHX: of HTN, HLD, obesity, JIMMY (uses CPAP),DM (A1C 7.5), BPH, asthma and osteoarthritis (s/p b/l hip replacement). Symptoms include current SOB and b/l LE edema. Can walk < 1 city block before getting short of breath and needing to sit to relieve it. Patient states SOB has been progressively getting worse over the past year. Denies ever having palpitations despite being recently admitted for Afib RVR. JASMINA revealed severe mitral regurgitation w/flail P2 segment.10/31/24 s/p Cardiac cath that revealed non obstructive CAD. On 1/28, the patient underwent a MVR/MAZE/ Ligation of LA.  The patient had a hospital course complicated by second degree heart block and was seen by EP.  No device was recommended at that time.  He developed FLAKITO which resolved prior to discharge.  He was diuresed with Lasix after being fluid overloaded and was weaned from high flow O2. He still requires CPAP at night. He was seen by psychiatry for anxiety and was started on Zoloft.  He was not restarted on his anticoagulation with Eliquis due to his recent LGIB.  He was then discharged home in stable condition on POD # 27 with instructions to follow up with Endocrine for glycemic control and to check FS at least once daily.  He was instructed to followup with Dr. Blakely this week  and to have repeat blood work.    Pt was educated on the importance of attending cardiac rehab post op and was given materials re cardiac rehab program.  He was instructed to inquire further upon seeing his cardiologist.    PMD/Endo: Dr. Woods  Cardio: Dr. Argueta/ Dr. Up  Psych: Dr. Mckenna  Pulmonary: Dr. De La Cruz

## 2025-01-28 NOTE — PRE-ANESTHESIA EVALUATION ADULT - NSANTHPROCED_GEN_ALL_CORE
184
Arterial Catheter/Central Venous Catheter/Pulmonary Artery Catheter/Transesophageal Echocardiogram

## 2025-01-28 NOTE — DISCHARGE NOTE PROVIDER - DISCHARGE DIET
DASH Diet
Bed in lowest position, wheels locked, appropriate side rails in place/Call bell, personal items and telephone in reach/Instruct patient to call for assistance before getting out of bed or chair/Non-slip footwear when patient is out of bed/[place] to call system/Physically safe environment - no spills, clutter or unnecessary equipment/Purposeful Proactive Rounding/Room/bathroom lighting operational, light cord in reach

## 2025-01-28 NOTE — DISCHARGE NOTE PROVIDER - NSDCCPTREATMENT_GEN_ALL_CORE_FT
PRINCIPAL PROCEDURE  Procedure: Replacement, mitral valve, with JASMINA, adult  Findings and Treatment: Activities/Restrictions  1.	Do not – drive, lift, pull or push anything over 10 pounds for 8 weeks.  2.	Shower every night and carefully wash wound, pat dry.  Cover is wound is draining with dry sterile dressing. No baths or swimming for two months.   3.	Apply support stockings /ace wraps to legs as soon as you get out of bed in the morning, remove in evening.   4.	Do progressive walking exercises every day, gradually increasing to 30 to 40 minutes/day, five days a week and incentive spirometer 10 times every hour while awake  5.	DO NOT DRIVE OR CONSUME ALCOHOL WHILE TAKING PAIN MEDICATION.  Contact your Physician promptly if:  1.	 Signs of wound infection, such as increasing redness, swelling, pain or drainage from incision.  2.	Progressive shortness of breath or increasing difficulty with breathing when lying down.  3.	Excessive nausea, vomiting, diarrhea or coughing.  4.	Increase swelling of legs that does not resolve with leg elevation.  5.	Chest pain that spreads to arms, jaw or back or sudden development of numbness, weakness, difficulty speaking or facial droop – Call 911.  6.	Diabetics who are unable to keep finger stick glucose under 150 for three consecutive readings.  Instructions:  1.	 Keep a daily log for Temperature, pulse, blood pressure, and weight twice a day and Glucose if diabetic with each meal. Call office for Temp > 101, pulse greater than 110 or less than 55, BP first # greater than 160 or less than 100, 3 pound weight gain in 1 day or 5 pounds in 3 days  2.	Hold pillow to chest and grab elbows when you need to cough.

## 2025-01-28 NOTE — DISCHARGE NOTE PROVIDER - CARE PROVIDERS DIRECT ADDRESSES
,DirectAddress_Unknown ,DirectAddress_Unknown,[email or web address]

## 2025-01-28 NOTE — BRIEF OPERATIVE NOTE - NSICDXBRIEFPROCEDURE_GEN_ALL_CORE_FT
PROCEDURES:  Replacement, mitral valve, with JASMINA, adult 28-Jan-2025 16:23:26 27 mm Mitris Bioprosthetic valve Lavon Miles  Modified maze procedure 28-Jan-2025 16:24:28  Lavon Miles  Ligation, left atrial appendage 28-Jan-2025 16:24:49  Lavon Miles

## 2025-01-28 NOTE — PROGRESS NOTE ADULT - SUBJECTIVE AND OBJECTIVE BOX
CTU Attending Progress Daily Note     28 Jan 2025 17:04    Procedure: MVReplacement (bioprosthetic), SHEILA ligation, MAZE  Procedure Day: 2/28/25    Hx: afib, CHF class 3, severe MR, DM, HTN, JIMMY on CPAP, asthma, severe obesity, HL, Madelung's disease, arthritis, arthritis, peripheral neuropathy    HPI:    OR Data  Procedure: Procedure: MVReplacement (bioprosthetic), SHEILA ligation, MAZE  Bypass: [phone]  Cross Clamp: 158  Pre LV Fxn: 50-55%      RV Fxn: normal  Post LV Fxn: 45-50%     RV Fxn: normal    moderate TR, MV gradient 3mmHg  Blood Products: 1uPRBC, ddavp 39mcq  Cell Saver: 698  Fluids: NS 2000, albumin 2000  Pacing Wires: V pacing. sinus rhythm  UO: 1100    SUBJECTIVE:  Unable to assess as patient is intubated and sedated.    ROS:  Unable to assess as patient is intubated and sedated.    Hospital Course:    OBJECTIVE:  ICU Vital Signs Last 24 Hrs  T(C): 37 (28 Jan 2025 06:45), Max: 37 (28 Jan 2025 06:45)  T(F): --  HR: 81 (28 Jan 2025 06:45) (81 - 81)  BP: 140/80 (28 Jan 2025 06:45) (140/80 - 140/80)  BP(mean): --  ABP: --  ABP(mean): --  RR: --  SpO2: 94% (28 Jan 2025 06:45) (94% - 94%)      I&O's Summary    I&O's Detail    Adult Advanced Hemodynamics Last 24 Hrs  CVP(mm Hg): --  CVP(cm H2O): --  CO: --  CI: --  PA: --  PA(mean): --  PCWP: --  SVR: --  SVRI: --  PVR: --  PVRI: --    CAPILLARY BLOOD GLUCOSE      POCT Blood Glucose.: 182 mg/dL (28 Jan 2025 16:37)    LABS:  ABG - ( 28 Jan 2025 16:48 )  pH, Arterial: 7.35  pH, Blood: x     /  pCO2: 41    /  pO2: 139   / HCO3: 23    / Base Excess: -2.8  /  SaO2: 98.5                                    7.0    13.00 )-----------( 161      ( 28 Jan 2025 12:23 )             23.0           PT/INR - ( 28 Jan 2025 14:23 )   PT: 15.20 sec;   INR: 1.28 ratio         PTT - ( 28 Jan 2025 14:23 )  PTT:26.1 sec      Home Medications:  Breo Ellipta 200 mcg-25 mcg/inh inhalation powder: 1 inhaled once a day (28 Jan 2025 06:52)  HYDROcodone 10 mg oral capsule, extended release: 1 cap(s) orally 2 times a day (28 Jan 2025 06:52)  losartan 50 mg oral tablet: 1 tab(s) orally 2 times a day (28 Jan 2025 06:52)  losartan-hydrochlorothiazide 50 mg-12.5 mg oral tablet: 1 tab(s) orally (28 Jan 2025 06:52)  ProAir HFA 90 mcg/inh inhalation aerosol: 2 puff(s) inhaled 2 times a day (28 Jan 2025 06:52)  tujeo: 80 unit(s) subcutaneous once a day (28 Jan 2025 06:52)    HOSPITAL MEDICATIONS:  MEDICATIONS  (STANDING):  acetaminophen     Tablet .. 650 milliGRAM(s) Oral every 6 hours  acetaminophen   IVPB .. 1000 milliGRAM(s) IV Intermittent once  ceFAZolin   IVPB 2000 milliGRAM(s) IV Intermittent every 8 hours  chlorhexidine 0.12% Liquid 15 milliLiter(s) Oral Mucosa every 12 hours  chlorhexidine 0.12% Liquid 15 milliLiter(s) Oral Mucosa every 12 hours  chlorhexidine 2% Cloths 1 Application(s) Topical daily  dexMEDEtomidine Infusion 0.02 MICROgram(s)/kG/Hr (0.57 mL/Hr) IV Continuous <Continuous>  dextrose 50% Injectable 50 milliLiter(s) IV Push every 15 minutes  dextrose 50% Injectable 25 milliLiter(s) IV Push every 15 minutes  famotidine Injectable 20 milliGRAM(s) IV Push every 12 hours  influenza  Vaccine (HIGH DOSE) 0.5 milliLiter(s) IntraMuscular once  insulin regular Infusion 5 Unit(s)/Hr (5 mL/Hr) IV Continuous <Continuous>  meperidine     Injectable 25 milliGRAM(s) IV Push once  niCARdipine Infusion 5 mG/Hr (25 mL/Hr) IV Continuous <Continuous>  nitroglycerin  Infusion 15 MICROgram(s)/Min (4.5 mL/Hr) IV Continuous <Continuous>  norepinephrine Infusion 0.05 MICROgram(s)/kG/Min (10.7 mL/Hr) IV Continuous <Continuous>  propofol Infusion 15 MICROgram(s)/kG/Min (10.3 mL/Hr) IV Continuous <Continuous>  sodium chloride 0.9%. 1000 milliLiter(s) (20 mL/Hr) IV Continuous <Continuous>  vancomycin  IVPB 1000 milliGRAM(s) IV Intermittent every 12 hours  vasopressin Infusion 0.04 Unit(s)/Min (6 mL/Hr) IV Continuous <Continuous>    MEDICATIONS  (PRN):  HYDROmorphone  Injectable 0.5 milliGRAM(s) IV Push every 6 hours PRN Breakthrough Pain  oxyCODONE    IR 5 milliGRAM(s) Oral every 4 hours PRN Moderate Pain (4 - 6)  oxyCODONE    IR 10 milliGRAM(s) Oral every 4 hours PRN Severe Pain (7 - 10)    RADIOLOGY:  Chest X-ray Reviewed    Physical Exam:    GEN: intubated, sedated  HEENT: Orally intubated, No facial asymmetry, neck supple, no epistaxis, moist mucous membranes  CV: regular rate and rhythm, no ectopy  PULMONARY: intubated, bilateral chest rise. Chest/mediastinal tubes in place  ABDOMEN: soft. No distention. No tenderness  EXTREMITIES: warm. good capillary refill. No cyanosis. No erythema  NEURO: sedated   CTU Attending Progress Daily Note     28 Jan 2025 17:04    Procedure: MVReplacement (bioprosthetic), SHEILA ligation, MAZE  Procedure Day: 2/28/25    Hx: afib, nonobstructive CAD, CHF class 3, severe MR, DM, HTN, JIMMY on CPAP, asthma, severe obesity, HL, Madelung's disease, arthritis, arthritis, peripheral neuropathy    HPI: 64M w/ afib, nonobstructive CAD, CHF class 3, severe MR, DM, HTN, JIMMY on CPAP, asthma, severe obesity, HL, Madelung's disease, osteoarthritis s/p bilateral hip replacement, peripheral neuropathy presented with increasing dyspnea with exertion found to have severe MR    OR Data  Procedure: Procedure: MVReplacement (bioprosthetic), SHEILA ligation, MAZE  Bypass: 202  Cross Clamp: 158  Pre LV Fxn: 50-55%      RV Fxn: normal  Post LV Fxn: 45-50%     RV Fxn: normal    moderate TR, MV gradient 3mmHg  Blood Products: 1uPRBC, ddavp 39mcq  Cell Saver: 698  Fluids: NS 2000, albumin 2000  Pacing Wires: V pacing. sinus rhythm  UO: 1100    SUBJECTIVE:  Unable to assess as patient is intubated and sedated.    ROS:  Unable to assess as patient is intubated and sedated.    Hospital Course:    OBJECTIVE:  ICU Vital Signs Last 24 Hrs  T(C): 37 (28 Jan 2025 06:45), Max: 37 (28 Jan 2025 06:45)  T(F): --  HR: 81 (28 Jan 2025 06:45) (81 - 81)  BP: 140/80 (28 Jan 2025 06:45) (140/80 - 140/80)  BP(mean): --  ABP: --  ABP(mean): --  RR: --  SpO2: 94% (28 Jan 2025 06:45) (94% - 94%)      I&O's Summary    I&O's Detail    Adult Advanced Hemodynamics Last 24 Hrs  CVP(mm Hg): --  CVP(cm H2O): --  CO: --  CI: --  PA: --  PA(mean): --  PCWP: --  SVR: --  SVRI: --  PVR: --  PVRI: --    CAPILLARY BLOOD GLUCOSE      POCT Blood Glucose.: 182 mg/dL (28 Jan 2025 16:37)    LABS:  ABG - ( 28 Jan 2025 16:48 )  pH, Arterial: 7.35  pH, Blood: x     /  pCO2: 41    /  pO2: 139   / HCO3: 23    / Base Excess: -2.8  /  SaO2: 98.5                                    7.0    13.00 )-----------( 161      ( 28 Jan 2025 12:23 )             23.0           PT/INR - ( 28 Jan 2025 14:23 )   PT: 15.20 sec;   INR: 1.28 ratio         PTT - ( 28 Jan 2025 14:23 )  PTT:26.1 sec      Home Medications:  Breo Ellipta 200 mcg-25 mcg/inh inhalation powder: 1 inhaled once a day (28 Jan 2025 06:52)  HYDROcodone 10 mg oral capsule, extended release: 1 cap(s) orally 2 times a day (28 Jan 2025 06:52)  losartan 50 mg oral tablet: 1 tab(s) orally 2 times a day (28 Jan 2025 06:52)  losartan-hydrochlorothiazide 50 mg-12.5 mg oral tablet: 1 tab(s) orally (28 Jan 2025 06:52)  ProAir HFA 90 mcg/inh inhalation aerosol: 2 puff(s) inhaled 2 times a day (28 Jan 2025 06:52)  tujeo: 80 unit(s) subcutaneous once a day (28 Jan 2025 06:52)    HOSPITAL MEDICATIONS:  MEDICATIONS  (STANDING):  acetaminophen     Tablet .. 650 milliGRAM(s) Oral every 6 hours  acetaminophen   IVPB .. 1000 milliGRAM(s) IV Intermittent once  ceFAZolin   IVPB 2000 milliGRAM(s) IV Intermittent every 8 hours  chlorhexidine 0.12% Liquid 15 milliLiter(s) Oral Mucosa every 12 hours  chlorhexidine 0.12% Liquid 15 milliLiter(s) Oral Mucosa every 12 hours  chlorhexidine 2% Cloths 1 Application(s) Topical daily  dexMEDEtomidine Infusion 0.02 MICROgram(s)/kG/Hr (0.57 mL/Hr) IV Continuous <Continuous>  dextrose 50% Injectable 50 milliLiter(s) IV Push every 15 minutes  dextrose 50% Injectable 25 milliLiter(s) IV Push every 15 minutes  famotidine Injectable 20 milliGRAM(s) IV Push every 12 hours  influenza  Vaccine (HIGH DOSE) 0.5 milliLiter(s) IntraMuscular once  insulin regular Infusion 5 Unit(s)/Hr (5 mL/Hr) IV Continuous <Continuous>  meperidine     Injectable 25 milliGRAM(s) IV Push once  niCARdipine Infusion 5 mG/Hr (25 mL/Hr) IV Continuous <Continuous>  nitroglycerin  Infusion 15 MICROgram(s)/Min (4.5 mL/Hr) IV Continuous <Continuous>  norepinephrine Infusion 0.05 MICROgram(s)/kG/Min (10.7 mL/Hr) IV Continuous <Continuous>  propofol Infusion 15 MICROgram(s)/kG/Min (10.3 mL/Hr) IV Continuous <Continuous>  sodium chloride 0.9%. 1000 milliLiter(s) (20 mL/Hr) IV Continuous <Continuous>  vancomycin  IVPB 1000 milliGRAM(s) IV Intermittent every 12 hours  vasopressin Infusion 0.04 Unit(s)/Min (6 mL/Hr) IV Continuous <Continuous>    MEDICATIONS  (PRN):  HYDROmorphone  Injectable 0.5 milliGRAM(s) IV Push every 6 hours PRN Breakthrough Pain  oxyCODONE    IR 5 milliGRAM(s) Oral every 4 hours PRN Moderate Pain (4 - 6)  oxyCODONE    IR 10 milliGRAM(s) Oral every 4 hours PRN Severe Pain (7 - 10)    RADIOLOGY:  Chest X-ray Reviewed    Physical Exam:    GEN: intubated, sedated  HEENT: Orally intubated, very large neck,  CV: regular rate and rhythm, no ectopy  PULMONARY: intubated, bilateral chest rise. Chest/mediastinal tubes in place  ABDOMEN: soft. No distention. No tenderness  EXTREMITIES: warm. good capillary refill. No cyanosis. No erythema  NEURO: sedated

## 2025-01-28 NOTE — DISCHARGE NOTE PROVIDER - NSDCMRMEDTOKEN_GEN_ALL_CORE_FT
amiodarone 200 mg oral tablet: 1 tab(s) orally once a day  Anucort-HC 25 mg rectal suppository: 1 suppository(ies) rectal 2 times a day as needed for  moderate pain  Breo Ellipta 200 mcg-25 mcg/inh inhalation powder: 1 inhaled once a day  Crestor 20 mg oral tablet: 1 tab(s) orally once a day (at bedtime)  Eliquis 5 mg oral tablet: 1 tab(s) orally 2 times a day stop if you begin to have any recurrent signs of GI bleeding  HYDROcodone 10 mg oral capsule, extended release: 1 cap(s) orally 2 times a day  Jardiance 25 mg oral tablet: 1 tab(s) orally once a day  Lasix 40 mg oral tablet: 1 tab(s) orally once a day  losartan 50 mg oral tablet: 1 tab(s) orally 2 times a day  losartan-hydrochlorothiazide 50 mg-12.5 mg oral tablet: 1 tab(s) orally  Metoprolol Tartrate 50 mg oral tablet: 1 tab(s) orally 2 times a day  Neurontin 300 mg oral capsule: 1 cap(s) orally every 8 hours  Potassium Chloride (Eqv-K-Tab) 20 mEq oral tablet, extended release: 1 tab(s) orally once a day  ProAir HFA 90 mcg/inh inhalation aerosol: 2 puff(s) inhaled 2 times a day  Protonix 40 mg oral delayed release tablet: 1 tab(s) orally once a day  tirzepatide 15 mg/0.5 mL subcutaneous solution: 15 milligram(s) subcutaneous once a week  tujeo: 80 unit(s) subcutaneous once a day   albuterol 90 mcg/inh inhalation aerosol: 2 puff(s) inhaled every 6 hours  amLODIPine 5 mg oral tablet: 1 tab(s) orally once a day  Anucort-HC 25 mg rectal suppository: 1 suppository(ies) rectal 2 times a day as needed for  moderate pain  Aspirin EC 81 mg oral delayed release tablet: 1 tab(s) orally once a day  Breo Ellipta 200 mcg-25 mcg/inh inhalation powder: 1 inhaled once a day  Crestor 20 mg oral tablet: 1 tab(s) orally once a day (at bedtime)  ferrous sulfate 325 mg (65 mg elemental iron) oral tablet: 1 tab(s) orally once a day  Flomax 0.4 mg oral capsule: 1 cap(s) orally once a day (at bedtime)  fluticasone 50 mcg/inh nasal spray: 1 spray(s) nasal every 12 hours  folic acid 1 mg oral tablet: 1 tab(s) orally once a day  furosemide 40 mg oral tablet: 1 tab(s) orally once a day  ipratropium 17 mcg/inh inhalation aerosol: 1 puff(s) inhaled every 6 hours  Jardiance 25 mg oral tablet: 1 tab(s) orally once a day  magnesium oxide 400 mg oral tablet: 1 tab(s) orally 3 times a day (before meals)  Melatonin 5 mg oral tablet: 1 tab(s) orally once a day (at bedtime) As needed Insomnia  Metoprolol Tartrate 25 mg oral tablet: 0.5 tab(s) orally 2 times a day  Neurontin 300 mg oral capsule: 1 cap(s) orally once a day (at bedtime)  oxycodone-acetaminophen 5 mg-325 mg oral tablet: 1 tab(s) orally every 6 hours as needed for  moderate pain MDD: 6  Potassium Chloride (Eqv-Klor-Con M20) 20 mEq oral tablet, extended release: 1 tab(s) orally once a day  Proscar 5 mg oral tablet: 1 tab(s) orally once a day  Protonix 40 mg oral delayed release tablet: 1 tab(s) orally once a day  psyllium 3.4 g/7 g oral powder for reconstitution: 3.4 gram(s) orally once a day  spironolactone 25 mg oral tablet: 1 tab(s) orally once a day  tirzepatide 15 mg/0.5 mL subcutaneous solution: 7.5 milligram(s) subcutaneous once a week  tujeo: 80 unit(s) subcutaneous once a day  Xanax 0.25 mg oral tablet: 1 tab(s) orally once a day (at bedtime) as needed for  anxiety MDD: 1  Zoloft 25 mg oral tablet: 1 tab(s) orally once a day

## 2025-01-28 NOTE — DISCHARGE NOTE PROVIDER - NSDCFUSCHEDAPPT_GEN_ALL_CORE_FT
Alex Blakely  [facility] Physician [facility]  CTSURG [address]UNCH [address]  Scheduled Appointment: 01/28/2025    [facility]  ORTHOSURG [address]  Scheduled Appointment: 03/11/2025     [facility] Physician [facility]  ORTHOSURG [address]  Scheduled Appointment: 03/11/2025

## 2025-01-29 LAB
ALBUMIN SERPL ELPH-MCNC: 4.2 G/DL — SIGNIFICANT CHANGE UP (ref 3.5–5.2)
ALBUMIN SERPL ELPH-MCNC: 4.4 G/DL — SIGNIFICANT CHANGE UP (ref 3.5–5.2)
ALP SERPL-CCNC: 30 U/L — SIGNIFICANT CHANGE UP (ref 30–115)
ALP SERPL-CCNC: 45 U/L — SIGNIFICANT CHANGE UP (ref 30–115)
ALT FLD-CCNC: 12 U/L — SIGNIFICANT CHANGE UP (ref 0–41)
ALT FLD-CCNC: 13 U/L — SIGNIFICANT CHANGE UP (ref 0–41)
ANION GAP SERPL CALC-SCNC: 12 MMOL/L — SIGNIFICANT CHANGE UP (ref 7–14)
ANION GAP SERPL CALC-SCNC: 12 MMOL/L — SIGNIFICANT CHANGE UP (ref 7–14)
APTT BLD: 27.2 SEC — SIGNIFICANT CHANGE UP (ref 27–39.2)
AST SERPL-CCNC: 72 U/L — HIGH (ref 0–41)
AST SERPL-CCNC: 78 U/L — HIGH (ref 0–41)
BASE EXCESS BLDA CALC-SCNC: -2 MMOL/L — SIGNIFICANT CHANGE UP (ref -2–3)
BASE EXCESS BLDV CALC-SCNC: -1 MMOL/L — SIGNIFICANT CHANGE UP (ref -2–3)
BASOPHILS # BLD AUTO: 0.02 K/UL — SIGNIFICANT CHANGE UP (ref 0–0.2)
BASOPHILS NFR BLD AUTO: 0.2 % — SIGNIFICANT CHANGE UP (ref 0–1)
BILIRUB SERPL-MCNC: 0.5 MG/DL — SIGNIFICANT CHANGE UP (ref 0.2–1.2)
BILIRUB SERPL-MCNC: 0.7 MG/DL — SIGNIFICANT CHANGE UP (ref 0.2–1.2)
BUN SERPL-MCNC: 32 MG/DL — HIGH (ref 10–20)
BUN SERPL-MCNC: 37 MG/DL — HIGH (ref 10–20)
CA-I SERPL-SCNC: 1.19 MMOL/L — SIGNIFICANT CHANGE UP (ref 1.15–1.33)
CALCIUM SERPL-MCNC: 8.3 MG/DL — LOW (ref 8.4–10.5)
CALCIUM SERPL-MCNC: 8.6 MG/DL — SIGNIFICANT CHANGE UP (ref 8.4–10.5)
CHLORIDE SERPL-SCNC: 111 MMOL/L — HIGH (ref 98–110)
CHLORIDE SERPL-SCNC: 111 MMOL/L — HIGH (ref 98–110)
CO2 SERPL-SCNC: 23 MMOL/L — SIGNIFICANT CHANGE UP (ref 17–32)
CO2 SERPL-SCNC: 23 MMOL/L — SIGNIFICANT CHANGE UP (ref 17–32)
CREAT SERPL-MCNC: 1.5 MG/DL — SIGNIFICANT CHANGE UP (ref 0.7–1.5)
CREAT SERPL-MCNC: 1.6 MG/DL — HIGH (ref 0.7–1.5)
EGFR: 47 ML/MIN/1.73M2 — LOW
EGFR: 51 ML/MIN/1.73M2 — LOW
EOSINOPHIL # BLD AUTO: 0 K/UL — SIGNIFICANT CHANGE UP (ref 0–0.7)
EOSINOPHIL NFR BLD AUTO: 0 % — SIGNIFICANT CHANGE UP (ref 0–8)
GAS PNL BLDV: 143 MMOL/L — SIGNIFICANT CHANGE UP (ref 136–145)
GAS PNL BLDV: SIGNIFICANT CHANGE UP
GLUCOSE BLDC GLUCOMTR-MCNC: 101 MG/DL — HIGH (ref 70–99)
GLUCOSE BLDC GLUCOMTR-MCNC: 111 MG/DL — HIGH (ref 70–99)
GLUCOSE BLDC GLUCOMTR-MCNC: 111 MG/DL — HIGH (ref 70–99)
GLUCOSE BLDC GLUCOMTR-MCNC: 120 MG/DL — HIGH (ref 70–99)
GLUCOSE BLDC GLUCOMTR-MCNC: 120 MG/DL — HIGH (ref 70–99)
GLUCOSE BLDC GLUCOMTR-MCNC: 121 MG/DL — HIGH (ref 70–99)
GLUCOSE BLDC GLUCOMTR-MCNC: 131 MG/DL — HIGH (ref 70–99)
GLUCOSE BLDC GLUCOMTR-MCNC: 149 MG/DL — HIGH (ref 70–99)
GLUCOSE BLDC GLUCOMTR-MCNC: 153 MG/DL — HIGH (ref 70–99)
GLUCOSE BLDC GLUCOMTR-MCNC: 156 MG/DL — HIGH (ref 70–99)
GLUCOSE BLDC GLUCOMTR-MCNC: 161 MG/DL — HIGH (ref 70–99)
GLUCOSE BLDC GLUCOMTR-MCNC: 165 MG/DL — HIGH (ref 70–99)
GLUCOSE BLDC GLUCOMTR-MCNC: 290 MG/DL — HIGH (ref 70–99)
GLUCOSE BLDC GLUCOMTR-MCNC: 76 MG/DL — SIGNIFICANT CHANGE UP (ref 70–99)
GLUCOSE BLDC GLUCOMTR-MCNC: 81 MG/DL — SIGNIFICANT CHANGE UP (ref 70–99)
GLUCOSE BLDC GLUCOMTR-MCNC: 95 MG/DL — SIGNIFICANT CHANGE UP (ref 70–99)
GLUCOSE BLDC GLUCOMTR-MCNC: 97 MG/DL — SIGNIFICANT CHANGE UP (ref 70–99)
GLUCOSE SERPL-MCNC: 101 MG/DL — HIGH (ref 70–99)
GLUCOSE SERPL-MCNC: 108 MG/DL — HIGH (ref 70–99)
HCO3 BLDA-SCNC: 22 MMOL/L — SIGNIFICANT CHANGE UP (ref 21–28)
HCO3 BLDV-SCNC: 25 MMOL/L — SIGNIFICANT CHANGE UP (ref 22–29)
HCT VFR BLD CALC: 25.8 % — LOW (ref 42–52)
HCT VFR BLDA CALC: 25 % — LOW (ref 39–51)
HGB BLD CALC-MCNC: 8.4 G/DL — LOW (ref 12.6–17.4)
HGB BLD-MCNC: 7.9 G/DL — LOW (ref 14–18)
HOROWITZ INDEX BLDA+IHG-RTO: 70 — SIGNIFICANT CHANGE UP
HOROWITZ INDEX BLDV+IHG-RTO: 70 — SIGNIFICANT CHANGE UP
IMM GRANULOCYTES NFR BLD AUTO: 0.5 % — HIGH (ref 0.1–0.3)
INR BLD: 1.1 RATIO — SIGNIFICANT CHANGE UP (ref 0.65–1.3)
LACTATE BLDV-MCNC: 0.9 MMOL/L — SIGNIFICANT CHANGE UP (ref 0.5–2)
LYMPHOCYTES # BLD AUTO: 0.98 K/UL — LOW (ref 1.2–3.4)
LYMPHOCYTES # BLD AUTO: 8.5 % — LOW (ref 20.5–51.1)
MAGNESIUM SERPL-MCNC: 2.5 MG/DL — HIGH (ref 1.8–2.4)
MCHC RBC-ENTMCNC: 26.9 PG — LOW (ref 27–31)
MCHC RBC-ENTMCNC: 30.6 G/DL — LOW (ref 32–37)
MCV RBC AUTO: 87.8 FL — SIGNIFICANT CHANGE UP (ref 80–94)
MONOCYTES # BLD AUTO: 0.83 K/UL — HIGH (ref 0.1–0.6)
MONOCYTES NFR BLD AUTO: 7.2 % — SIGNIFICANT CHANGE UP (ref 1.7–9.3)
NEUTROPHILS # BLD AUTO: 9.58 K/UL — HIGH (ref 1.4–6.5)
NEUTROPHILS NFR BLD AUTO: 83.6 % — HIGH (ref 42.2–75.2)
NRBC # BLD: 0 /100 WBCS — SIGNIFICANT CHANGE UP (ref 0–0)
NRBC BLD-RTO: 0 /100 WBCS — SIGNIFICANT CHANGE UP (ref 0–0)
PCO2 BLDA: 36 MMHG — SIGNIFICANT CHANGE UP (ref 35–48)
PCO2 BLDV: 49 MMHG — SIGNIFICANT CHANGE UP (ref 42–55)
PH BLDA: 7.4 — SIGNIFICANT CHANGE UP (ref 7.35–7.45)
PH BLDV: 7.32 — SIGNIFICANT CHANGE UP (ref 7.32–7.43)
PLATELET # BLD AUTO: 135 K/UL — SIGNIFICANT CHANGE UP (ref 130–400)
PMV BLD: 11.6 FL — HIGH (ref 7.4–10.4)
PO2 BLDA: 170 MMHG — HIGH (ref 83–108)
PO2 BLDV: 36 MMHG — SIGNIFICANT CHANGE UP (ref 25–45)
POTASSIUM BLDV-SCNC: 4.3 MMOL/L — SIGNIFICANT CHANGE UP (ref 3.5–5.1)
POTASSIUM SERPL-MCNC: 4.1 MMOL/L — SIGNIFICANT CHANGE UP (ref 3.5–5)
POTASSIUM SERPL-MCNC: 4.3 MMOL/L — SIGNIFICANT CHANGE UP (ref 3.5–5)
POTASSIUM SERPL-SCNC: 4.1 MMOL/L — SIGNIFICANT CHANGE UP (ref 3.5–5)
POTASSIUM SERPL-SCNC: 4.3 MMOL/L — SIGNIFICANT CHANGE UP (ref 3.5–5)
PROT SERPL-MCNC: 5.5 G/DL — LOW (ref 6–8)
PROT SERPL-MCNC: 5.8 G/DL — LOW (ref 6–8)
PROTHROM AB SERPL-ACNC: 13 SEC — HIGH (ref 9.95–12.87)
RBC # BLD: 2.94 M/UL — LOW (ref 4.7–6.1)
RBC # FLD: 19.5 % — HIGH (ref 11.5–14.5)
SAO2 % BLDA: 98.4 % — HIGH (ref 94–98)
SAO2 % BLDV: 56.7 % — LOW (ref 67–88)
SODIUM SERPL-SCNC: 146 MMOL/L — SIGNIFICANT CHANGE UP (ref 135–146)
SODIUM SERPL-SCNC: 146 MMOL/L — SIGNIFICANT CHANGE UP (ref 135–146)
WBC # BLD: 11.47 K/UL — HIGH (ref 4.8–10.8)
WBC # FLD AUTO: 11.47 K/UL — HIGH (ref 4.8–10.8)

## 2025-01-29 PROCEDURE — 71045 X-RAY EXAM CHEST 1 VIEW: CPT | Mod: 26

## 2025-01-29 PROCEDURE — 99292 CRITICAL CARE ADDL 30 MIN: CPT | Mod: 24

## 2025-01-29 PROCEDURE — 93010 ELECTROCARDIOGRAM REPORT: CPT

## 2025-01-29 PROCEDURE — 99291 CRITICAL CARE FIRST HOUR: CPT | Mod: 24

## 2025-01-29 RX ORDER — FOLIC ACID 1 MG/1
1 TABLET ORAL DAILY
Refills: 0 | Status: DISCONTINUED | OUTPATIENT
Start: 2025-01-29 | End: 2025-02-24

## 2025-01-29 RX ORDER — FERROUS SULFATE 137(45) MG
325 TABLET, EXTENDED RELEASE ORAL DAILY
Refills: 0 | Status: DISCONTINUED | OUTPATIENT
Start: 2025-01-29 | End: 2025-02-02

## 2025-01-29 RX ORDER — FENTANYL CITRATE-0.9 % NACL/PF 100MCG/2ML
25 SYRINGE (ML) INTRAVENOUS ONCE
Refills: 0 | Status: DISCONTINUED | OUTPATIENT
Start: 2025-01-29 | End: 2025-01-29

## 2025-01-29 RX ORDER — MUPIROCIN CALCIUM 20 MG/G
1 CREAM TOPICAL EVERY 12 HOURS
Refills: 0 | Status: DISCONTINUED | OUTPATIENT
Start: 2025-01-29 | End: 2025-02-17

## 2025-01-29 RX ORDER — ACETYLCYSTEINE 200 MG/ML
4 INHALANT RESPIRATORY (INHALATION) EVERY 6 HOURS
Refills: 0 | Status: DISCONTINUED | OUTPATIENT
Start: 2025-01-29 | End: 2025-01-30

## 2025-01-29 RX ORDER — FUROSEMIDE 10 MG/ML
20 INJECTION INTRAMUSCULAR; INTRAVENOUS ONCE
Refills: 0 | Status: COMPLETED | OUTPATIENT
Start: 2025-01-29 | End: 2025-01-29

## 2025-01-29 RX ORDER — DEXTROMETHORPHAN HBR, GUAIFENESIN 200 MG/10ML
600 LIQUID ORAL EVERY 12 HOURS
Refills: 0 | Status: DISCONTINUED | OUTPATIENT
Start: 2025-01-29 | End: 2025-02-17

## 2025-01-29 RX ORDER — ALBUMIN (HUMAN) 12.5 G/50ML
500 INJECTION, SOLUTION INTRAVENOUS ONCE
Refills: 0 | Status: COMPLETED | OUTPATIENT
Start: 2025-01-29 | End: 2025-01-28

## 2025-01-29 RX ORDER — HEPARIN SODIUM 1000 [USP'U]/ML
5000 INJECTION INTRAVENOUS; SUBCUTANEOUS EVERY 8 HOURS
Refills: 0 | Status: DISCONTINUED | OUTPATIENT
Start: 2025-01-29 | End: 2025-02-24

## 2025-01-29 RX ORDER — FUROSEMIDE 10 MG/ML
20 INJECTION INTRAMUSCULAR; INTRAVENOUS DAILY
Refills: 0 | Status: DISCONTINUED | OUTPATIENT
Start: 2025-01-29 | End: 2025-01-30

## 2025-01-29 RX ORDER — ASPIRIN 325 MG
81 TABLET ORAL DAILY
Refills: 0 | Status: DISCONTINUED | OUTPATIENT
Start: 2025-01-29 | End: 2025-02-24

## 2025-01-29 RX ORDER — ACETAMINOPHEN 500 MG/5ML
1000 LIQUID (ML) ORAL ONCE
Refills: 0 | Status: COMPLETED | OUTPATIENT
Start: 2025-01-29 | End: 2025-01-29

## 2025-01-29 RX ORDER — FENTANYL CITRATE-0.9 % NACL/PF 100MCG/2ML
25 SYRINGE (ML) INTRAVENOUS EVERY 4 HOURS
Refills: 0 | Status: DISCONTINUED | OUTPATIENT
Start: 2025-01-29 | End: 2025-01-30

## 2025-01-29 RX ORDER — ALPRAZOLAM 0.5 MG
0.25 TABLET, EXTENDED RELEASE 24 HR ORAL AT BEDTIME
Refills: 0 | Status: DISCONTINUED | OUTPATIENT
Start: 2025-01-29 | End: 2025-02-04

## 2025-01-29 RX ORDER — IPRATROPIUM BROMIDE AND ALBUTEROL SULFATE .5; 2.5 MG/3ML; MG/3ML
3 SOLUTION RESPIRATORY (INHALATION) EVERY 6 HOURS
Refills: 0 | Status: DISCONTINUED | OUTPATIENT
Start: 2025-01-29 | End: 2025-02-03

## 2025-01-29 RX ORDER — VASOPRESSIN 20 [USP'U]/ML
0.02 INJECTION INTRAVENOUS
Qty: 40 | Refills: 0 | Status: DISCONTINUED | OUTPATIENT
Start: 2025-01-29 | End: 2025-01-30

## 2025-01-29 RX ADMIN — MILRINONE LACTATE 12.8 MICROGRAM(S)/KG/MIN: 1 INJECTION, SOLUTION INTRAVENOUS at 09:18

## 2025-01-29 RX ADMIN — MUPIROCIN CALCIUM 1 APPLICATION(S): 20 CREAM TOPICAL at 11:21

## 2025-01-29 RX ADMIN — Medication 100 MILLIGRAM(S): at 06:25

## 2025-01-29 RX ADMIN — Medication 25 MICROGRAM(S): at 06:45

## 2025-01-29 RX ADMIN — Medication 10 MILLIGRAM(S): at 18:39

## 2025-01-29 RX ADMIN — POLYETHYLENE GLYCOL 3350 17 GRAM(S): 17 POWDER, FOR SOLUTION ORAL at 12:50

## 2025-01-29 RX ADMIN — Medication 20 MILLIGRAM(S): at 17:16

## 2025-01-29 RX ADMIN — Medication 25 MICROGRAM(S): at 22:30

## 2025-01-29 RX ADMIN — Medication 250 MILLIGRAM(S): at 17:17

## 2025-01-29 RX ADMIN — MILRINONE LACTATE 12.8 MICROGRAM(S)/KG/MIN: 1 INJECTION, SOLUTION INTRAVENOUS at 15:21

## 2025-01-29 RX ADMIN — Medication 25 MICROGRAM(S): at 15:42

## 2025-01-29 RX ADMIN — Medication 25 MICROGRAM(S): at 22:00

## 2025-01-29 RX ADMIN — Medication 2 TABLET(S): at 22:52

## 2025-01-29 RX ADMIN — VASOPRESSIN 6 UNIT(S)/MIN: 20 INJECTION INTRAVENOUS at 12:43

## 2025-01-29 RX ADMIN — Medication 81 MILLIGRAM(S): at 12:50

## 2025-01-29 RX ADMIN — FUROSEMIDE 20 MILLIGRAM(S): 10 INJECTION INTRAMUSCULAR; INTRAVENOUS at 09:18

## 2025-01-29 RX ADMIN — Medication 25 MICROGRAM(S): at 17:05

## 2025-01-29 RX ADMIN — Medication 100 MILLIGRAM(S): at 13:11

## 2025-01-29 RX ADMIN — OXYCODONE HYDROCHLORIDE 5 MILLIGRAM(S): 30 TABLET ORAL at 17:53

## 2025-01-29 RX ADMIN — Medication 0.25 MILLIGRAM(S): at 20:25

## 2025-01-29 RX ADMIN — HEPARIN SODIUM 5000 UNIT(S): 1000 INJECTION INTRAVENOUS; SUBCUTANEOUS at 22:52

## 2025-01-29 RX ADMIN — MUPIROCIN CALCIUM 1 APPLICATION(S): 20 CREAM TOPICAL at 17:17

## 2025-01-29 RX ADMIN — Medication 5 UNIT(S)/HR: at 09:18

## 2025-01-29 RX ADMIN — DEXTROMETHORPHAN HBR, GUAIFENESIN 600 MILLIGRAM(S): 200 LIQUID ORAL at 17:16

## 2025-01-29 RX ADMIN — Medication 1 APPLICATION(S): at 12:24

## 2025-01-29 RX ADMIN — Medication 0.5 MILLIGRAM(S): at 10:44

## 2025-01-29 RX ADMIN — Medication 400 MILLIGRAM(S): at 08:38

## 2025-01-29 RX ADMIN — Medication 1000 MILLIGRAM(S): at 15:15

## 2025-01-29 RX ADMIN — FUROSEMIDE 20 MILLIGRAM(S): 10 INJECTION INTRAMUSCULAR; INTRAVENOUS at 06:46

## 2025-01-29 RX ADMIN — VASOPRESSIN 3 UNIT(S)/MIN: 20 INJECTION INTRAVENOUS at 15:22

## 2025-01-29 RX ADMIN — Medication 325 MILLIGRAM(S): at 12:50

## 2025-01-29 RX ADMIN — Medication 100 MILLIEQUIVALENT(S): at 06:51

## 2025-01-29 RX ADMIN — IPRATROPIUM BROMIDE AND ALBUTEROL SULFATE 3 MILLILITER(S): .5; 2.5 SOLUTION RESPIRATORY (INHALATION) at 13:08

## 2025-01-29 RX ADMIN — FOLIC ACID 1 MILLIGRAM(S): 1 TABLET ORAL at 12:50

## 2025-01-29 RX ADMIN — OXYCODONE HYDROCHLORIDE 5 MILLIGRAM(S): 30 TABLET ORAL at 17:16

## 2025-01-29 RX ADMIN — Medication 400 MILLIGRAM(S): at 15:07

## 2025-01-29 RX ADMIN — Medication 0.5 MILLIGRAM(S): at 10:41

## 2025-01-29 RX ADMIN — Medication 100 MILLIGRAM(S): at 22:53

## 2025-01-29 RX ADMIN — Medication 25 MICROGRAM(S): at 07:00

## 2025-01-29 RX ADMIN — NOREPINEPHRINE BITARTRATE 10.7 MICROGRAM(S)/KG/MIN: 8 SOLUTION at 07:32

## 2025-01-29 RX ADMIN — Medication 250 MILLIGRAM(S): at 06:28

## 2025-01-29 RX ADMIN — Medication 400 MILLIGRAM(S): at 12:30

## 2025-01-29 RX ADMIN — MILRINONE LACTATE 12.8 MICROGRAM(S)/KG/MIN: 1 INJECTION, SOLUTION INTRAVENOUS at 23:44

## 2025-01-29 RX ADMIN — Medication 15 MILLILITER(S): at 06:28

## 2025-01-29 RX ADMIN — HEPARIN SODIUM 5000 UNIT(S): 1000 INJECTION INTRAVENOUS; SUBCUTANEOUS at 13:11

## 2025-01-29 RX ADMIN — IPRATROPIUM BROMIDE AND ALBUTEROL SULFATE 3 MILLILITER(S): .5; 2.5 SOLUTION RESPIRATORY (INHALATION) at 21:07

## 2025-01-29 RX ADMIN — Medication 20 MILLILITER(S): at 07:32

## 2025-01-29 RX ADMIN — Medication 1000 MILLIGRAM(S): at 09:03

## 2025-01-29 RX ADMIN — Medication 20 MILLIGRAM(S): at 06:25

## 2025-01-29 NOTE — PROGRESS NOTE ADULT - SUBJECTIVE AND OBJECTIVE BOX
OPERATIVE PROCEDURE(s):     mvr/ ligation of left atrial appendage / maze           POD # 1    SURGEON(s): rich    SUBJECTIVE ASSESSMENT: no complaints    Vital Signs Last 24 Hrs  T(C): 36.8 (29 Jan 2025 08:00), Max: 37.3 (28 Jan 2025 23:00)  T(F): 98.2 (29 Jan 2025 08:00), Max: 99.1 (28 Jan 2025 23:00)  HR: 69 (29 Jan 2025 11:00) (54 - 79)  BP: 117/65 (29 Jan 2025 08:00) (114/55 - 132/63)  BP(mean): 79 (29 Jan 2025 08:00) (78 - 90)  RR: 16 (29 Jan 2025 11:00) (7 - 33)  SpO2: 97% (29 Jan 2025 11:00) (88% - 99%)    Parameters below as of 29 Jan 2025 11:00  Patient On (Oxygen Delivery Method): BiPAP/CPAP    O2 Concentration (%): 60  01-28-25 @ 07:01 - 01-29-25 @ 07:00  --------------------------------------------------------  IN: 2889.1 mL / OUT: 2070 mL / NET: 819.1 mL    01-29-25 @ 07:01  - 01-29-25 @ 11:11  --------------------------------------------------------  IN: 336.1 mL / OUT: 661 mL / NET: -324.9 mL        Physical Exam  General: alert and oriented x 3 on Bipap  Chest: equal BS BL  CVS: s1s2  Abd: pos bs soft nt  GI/ :  Ext: trace bl effusions  incisions - dressed     Central Venous Catheter: Yes[x ]  No[ ] , If Yes indication:           Day #    Caceres Catheter: Yes  [ x] , No [ ] : If yes indication:                         Day #    NGT: Yes [ ] No [ x ]     If Yes Placement:                                          Day #    Post-Op Beta-Blockers: Yes [ ], No[x ], If No, then contraindication: pt is on inotropes    CHEST TUBE:  [x ] YES [ ] NO      EPICARDIAL WIRES:  [x ] YES [ ] NO      BOWEL MOVEMENT:  [ ] YES [x ] NO      LABS:                        7.9    11.47 )-----------( 135      ( 29 Jan 2025 02:00 )             25.8     COUMADIN:   [ ] YES [ x] NO    PT/INR - ( 29 Jan 2025 02:00 )   PT: 13.00 sec;   INR: 1.10 ratio         PTT - ( 29 Jan 2025 02:00 )  PTT:27.2 sec  01-29    146  |  111[H]  |  32[H]  ----------------------------<  101[H]  4.1   |  23  |  1.5    Ca    8.6      29 Jan 2025 02:00  Mg     2.5     01-29    TPro  5.8[L]  /  Alb  4.4  /  TBili  0.7  /  DBili  x   /  AST  72[H]  /  ALT  12  /  AlkPhos  30  01-29    Urinalysis Basic - ( 29 Jan 2025 02:00 )    Color: x / Appearance: x / SG: x / pH: x  Gluc: 101 mg/dL / Ketone: x  / Bili: x / Urobili: x   Blood: x / Protein: x / Nitrite: x   Leuk Esterase: x / RBC: x / WBC x   Sq Epi: x / Non Sq Epi: x / Bacteria: x        MEDICATIONS  (STANDING):  albuterol/ipratropium for Nebulization 3 milliLiter(s) Nebulizer every 6 hours  aspirin enteric coated 81 milliGRAM(s) Oral daily  ceFAZolin   IVPB 2000 milliGRAM(s) IV Intermittent every 8 hours  chlorhexidine 2% Cloths 1 Application(s) Topical daily  dextrose 50% Injectable 50 milliLiter(s) IV Push every 15 minutes  dextrose 50% Injectable 25 milliLiter(s) IV Push every 15 minutes  famotidine Injectable 20 milliGRAM(s) IV Push every 12 hours  ferrous    sulfate 325 milliGRAM(s) Oral daily  fluticasone propionate/ salmeterol 250-50 MICROgram(s) Diskus 1 Dose(s) Inhalation two times a day  folic acid 1 milliGRAM(s) Oral daily  furosemide   Injectable 20 milliGRAM(s) IV Push daily  guaiFENesin  milliGRAM(s) Oral every 12 hours  heparin   Injectable 5000 Unit(s) SubCutaneous every 8 hours  influenza  Vaccine (HIGH DOSE) 0.5 milliLiter(s) IntraMuscular once  insulin regular Infusion 5 Unit(s)/Hr (5 mL/Hr) IV Continuous <Continuous>  meperidine     Injectable 25 milliGRAM(s) IV Push once  milrinone Infusion 0.375 MICROgram(s)/kG/Min (12.8 mL/Hr) IV Continuous <Continuous>  mupirocin 2% Nasal 1 Application(s) Both Nostrils every 12 hours  niCARdipine Infusion 5 mG/Hr (25 mL/Hr) IV Continuous <Continuous>  nitroglycerin  Infusion 15 MICROgram(s)/Min (4.5 mL/Hr) IV Continuous <Continuous>  norepinephrine Infusion 0.05 MICROgram(s)/kG/Min (10.7 mL/Hr) IV Continuous <Continuous>  polyethylene glycol 3350 17 Gram(s) Oral daily  senna 2 Tablet(s) Oral at bedtime  sodium chloride 0.9%. 1000 milliLiter(s) (20 mL/Hr) IV Continuous <Continuous>  vancomycin  IVPB 1000 milliGRAM(s) IV Intermittent every 12 hours  vasopressin Infusion 0.04 Unit(s)/Min (6 mL/Hr) IV Continuous <Continuous>    MEDICATIONS  (PRN):  hydrALAZINE Injectable 10 milliGRAM(s) IV Push every 2 hours PRN sbp>150  HYDROmorphone  Injectable 0.5 milliGRAM(s) IV Push every 6 hours PRN Breakthrough Pain  ondansetron Injectable 4 milliGRAM(s) IV Push every 4 hours PRN Nausea and/or Vomiting  oxyCODONE    IR 5 milliGRAM(s) Oral every 4 hours PRN Moderate Pain (4 - 6)  oxyCODONE    IR 10 milliGRAM(s) Oral every 4 hours PRN Severe Pain (7 - 10)      Allergies    No Known Allergies    Intolerances      Ambulation/Activity Status:  bedrest      RADIOLOGY & ADDITIONAL TESTS:  ACC: [id number] EXAM:  XR CHEST PORTABLE ROUTINE 1V   ORDERED BY: SILVANA COREY     PROCEDURE DATE:  01/29/2025          INTERPRETATION:  CLINICAL HISTORY:  Post cardiac surgery.    COMPARISON: Chest radiograph dated 1/28/2025.    TECHNIQUE: Portable frontal chest radiograph.    FINDINGS/  IMPRESSION:    Support devices: Stable bilateral chest tubes and right [place]-Randy   catheter.    Cardiac/mediastinum/hilum: Stable.    Lung parenchyma/Pleura: Worsening congestive changes and increased right  lung opacities/pleural effusion. Unchanged left basilar opacity/effusion.   No pneumothorax.    Skeleton/soft tissues: Stable.    --- End of Report ---    Assessment/Plan:    Social Service Disposition:     OPERATIVE PROCEDURE(s):     mvr/ ligation of left atrial appendage / maze           POD # 1    SURGEON(s): rich    SUBJECTIVE ASSESSMENT: no complaints    Vital Signs Last 24 Hrs  T(C): 36.8 (29 Jan 2025 08:00), Max: 37.3 (28 Jan 2025 23:00)  T(F): 98.2 (29 Jan 2025 08:00), Max: 99.1 (28 Jan 2025 23:00)  HR: 69 (29 Jan 2025 11:00) (54 - 79)  BP: 117/65 (29 Jan 2025 08:00) (114/55 - 132/63)  BP(mean): 79 (29 Jan 2025 08:00) (78 - 90)  RR: 16 (29 Jan 2025 11:00) (7 - 33)  SpO2: 97% (29 Jan 2025 11:00) (88% - 99%)    Parameters below as of 29 Jan 2025 11:00  Patient On (Oxygen Delivery Method): BiPAP/CPAP    O2 Concentration (%): 60  01-28-25 @ 07:01 - 01-29-25 @ 07:00  --------------------------------------------------------  IN: 2889.1 mL / OUT: 2070 mL / NET: 819.1 mL    01-29-25 @ 07:01  - 01-29-25 @ 11:11  --------------------------------------------------------  IN: 336.1 mL / OUT: 661 mL / NET: -324.9 mL        Physical Exam  General: alert and oriented x 3 on Bipap  Chest: equal BS BL  CVS: s1s2  Abd: pos bs soft nt  GI/ :  Ext: trace bl effusions  incisions - dressed     Central Venous Catheter: Yes[x ]  No[ ] , If Yes indication:           Day #    Caceres Catheter: Yes  [ x] , No [ ] : If yes indication:                         Day #    NGT: Yes [ ] No [ x ]     If Yes Placement:                                          Day #    Post-Op Beta-Blockers: Yes [ ], No[x ], If No, then contraindication: pt is on inotropes    CHEST TUBE:  [x ] YES [ ] NO      EPICARDIAL WIRES:  [x ] YES [ ] NO      BOWEL MOVEMENT:  [ ] YES [x ] NO      LABS:                        7.9    11.47 )-----------( 135      ( 29 Jan 2025 02:00 )             25.8     COUMADIN:   [ ] YES [ x] NO    PT/INR - ( 29 Jan 2025 02:00 )   PT: 13.00 sec;   INR: 1.10 ratio         PTT - ( 29 Jan 2025 02:00 )  PTT:27.2 sec  01-29    146  |  111[H]  |  32[H]  ----------------------------<  101[H]  4.1   |  23  |  1.5    Ca    8.6      29 Jan 2025 02:00  Mg     2.5     01-29    TPro  5.8[L]  /  Alb  4.4  /  TBili  0.7  /  DBili  x   /  AST  72[H]  /  ALT  12  /  AlkPhos  30  01-29    Urinalysis Basic - ( 29 Jan 2025 02:00 )    Color: x / Appearance: x / SG: x / pH: x  Gluc: 101 mg/dL / Ketone: x  / Bili: x / Urobili: x   Blood: x / Protein: x / Nitrite: x   Leuk Esterase: x / RBC: x / WBC x   Sq Epi: x / Non Sq Epi: x / Bacteria: x        MEDICATIONS  (STANDING):  albuterol/ipratropium for Nebulization 3 milliLiter(s) Nebulizer every 6 hours  aspirin enteric coated 81 milliGRAM(s) Oral daily  ceFAZolin   IVPB 2000 milliGRAM(s) IV Intermittent every 8 hours  chlorhexidine 2% Cloths 1 Application(s) Topical daily  dextrose 50% Injectable 50 milliLiter(s) IV Push every 15 minutes  dextrose 50% Injectable 25 milliLiter(s) IV Push every 15 minutes  famotidine Injectable 20 milliGRAM(s) IV Push every 12 hours  ferrous    sulfate 325 milliGRAM(s) Oral daily  fluticasone propionate/ salmeterol 250-50 MICROgram(s) Diskus 1 Dose(s) Inhalation two times a day  folic acid 1 milliGRAM(s) Oral daily  furosemide   Injectable 20 milliGRAM(s) IV Push daily  guaiFENesin  milliGRAM(s) Oral every 12 hours  heparin   Injectable 5000 Unit(s) SubCutaneous every 8 hours  influenza  Vaccine (HIGH DOSE) 0.5 milliLiter(s) IntraMuscular once  insulin regular Infusion 5 Unit(s)/Hr (5 mL/Hr) IV Continuous <Continuous>  meperidine     Injectable 25 milliGRAM(s) IV Push once  milrinone Infusion 0.375 MICROgram(s)/kG/Min (12.8 mL/Hr) IV Continuous <Continuous>  mupirocin 2% Nasal 1 Application(s) Both Nostrils every 12 hours  niCARdipine Infusion 5 mG/Hr (25 mL/Hr) IV Continuous <Continuous>  nitroglycerin  Infusion 15 MICROgram(s)/Min (4.5 mL/Hr) IV Continuous <Continuous>  norepinephrine Infusion 0.05 MICROgram(s)/kG/Min (10.7 mL/Hr) IV Continuous <Continuous>  polyethylene glycol 3350 17 Gram(s) Oral daily  senna 2 Tablet(s) Oral at bedtime  sodium chloride 0.9%. 1000 milliLiter(s) (20 mL/Hr) IV Continuous <Continuous>  vancomycin  IVPB 1000 milliGRAM(s) IV Intermittent every 12 hours  vasopressin Infusion 0.04 Unit(s)/Min (6 mL/Hr) IV Continuous <Continuous>    MEDICATIONS  (PRN):  hydrALAZINE Injectable 10 milliGRAM(s) IV Push every 2 hours PRN sbp>150  HYDROmorphone  Injectable 0.5 milliGRAM(s) IV Push every 6 hours PRN Breakthrough Pain  ondansetron Injectable 4 milliGRAM(s) IV Push every 4 hours PRN Nausea and/or Vomiting  oxyCODONE    IR 5 milliGRAM(s) Oral every 4 hours PRN Moderate Pain (4 - 6)  oxyCODONE    IR 10 milliGRAM(s) Oral every 4 hours PRN Severe Pain (7 - 10)      Allergies    No Known Allergies    Intolerances      Ambulation/Activity Status:  bedrest      RADIOLOGY & ADDITIONAL TESTS:  ACC: [id number] EXAM:  XR CHEST PORTABLE ROUTINE 1V   ORDERED BY: SILVANA COREY     PROCEDURE DATE:  01/29/2025          INTERPRETATION:  CLINICAL HISTORY:  Post cardiac surgery.    COMPARISON: Chest radiograph dated 1/28/2025.    TECHNIQUE: Portable frontal chest radiograph.    FINDINGS/  IMPRESSION:    Support devices: Stable bilateral chest tubes and right [place]-Randy   catheter.    Cardiac/mediastinum/hilum: Stable.    Lung parenchyma/Pleura: Worsening congestive changes and increased right  lung opacities/pleural effusion. Unchanged left basilar opacity/effusion.   No pneumothorax.    Skeleton/soft tissues: Stable.    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

## 2025-01-29 NOTE — PHYSICAL THERAPY INITIAL EVALUATION ADULT - SPECIFY REASON(S)
Hold PT at this time. Pt. currently with [place] Randy catheter. Will f/u with PT as appropriate.

## 2025-01-29 NOTE — PROGRESS NOTE ADULT - SUBJECTIVE AND OBJECTIVE BOX
CTU Attending Progress Daily Note     28 Jan 2025 17:04    Procedure: MVReplacement (bioprosthetic), SHEILA ligation, MAZE  Procedure Day: 2/28/25    Hx: afib, nonobstructive CAD, CHF class 3, severe MR, DM, HTN, JIMMY on CPAP, asthma, severe obesity, HL, Madelung's disease, arthritis, arthritis, peripheral neuropathy    HPI: 64M w/ afib, nonobstructive CAD, CHF class 3, severe MR, DM, HTN, JIMMY on CPAP, asthma, severe obesity, HL, Madelung's disease, osteoarthritis s/p bilateral hip replacement, peripheral neuropathy presented with increasing dyspnea with exertion found to have severe MR    OR Data  Procedure: Procedure: MVReplacement (bioprosthetic), SHEILA ligation, MAZE  Bypass: 202  Cross Clamp: 158  Pre LV Fxn: 50-55%      RV Fxn: normal  Post LV Fxn: 45-50%     RV Fxn: normal    moderate TR, MV gradient 3mmHg  Blood Products: 1uPRBC, ddavp 39mcq  Cell Saver: 698  Fluids: NS 2000, albumin 2000  Pacing Wires: V pacing. sinus rhythm  UO: 1100    OVERNIGHT EVENT  extubation. bipap    SUBJECTIVE:  difficulty breathing    Hospital Course:      OBJECTIVE:  ICU Vital Signs Last 24 Hrs  T(C): 36.8 (29 Jan 2025 08:00), Max: 37.3 (28 Jan 2025 23:00)  T(F): 98.2 (29 Jan 2025 08:00), Max: 99.1 (28 Jan 2025 23:00)  HR: 69 (29 Jan 2025 09:15) (54 - 79)  BP: 117/65 (29 Jan 2025 08:00) (114/55 - 132/63)  BP(mean): 79 (29 Jan 2025 08:00) (78 - 90)  ABP: 129/51 (29 Jan 2025 09:15) (-1/-1 - 135/54)  ABP(mean): 69 (29 Jan 2025 09:15) (-1 - 82)  RR: 19 (29 Jan 2025 09:15) (7 - 33)  SpO2: 99% (29 Jan 2025 09:15) (88% - 99%)    O2 Parameters below as of 29 Jan 2025 09:00  Patient On (Oxygen Delivery Method): BiPAP/CPAP    O2 Concentration (%): 60      I&O's Summary    28 Jan 2025 07:01  -  29 Jan 2025 07:00  --------------------------------------------------------  IN: 2889.1 mL / OUT: 2070 mL / NET: 819.1 mL    29 Jan 2025 07:01  -  29 Jan 2025 09:41  --------------------------------------------------------  IN: 205.5 mL / OUT: 244 mL / NET: -38.5 mL      I&O's Detail    28 Jan 2025 07:01  -  29 Jan 2025 07:00  --------------------------------------------------------  IN:    Albumin 5%  - 500 mL: 1000 mL    Dexmedetomidine: 28.5 mL    Dexmedetomidine: 57 mL    Dexmedetomidine: 68.8 mL    Insulin: 79 mL    IV PiggyBack: 800 mL    Milrinone: 192 mL    Norepinephrine: 273.8 mL    sodium chloride 0.9%: 300 mL    Vasopressin: 6 mL    Vasopressin: 84 mL  Total IN: 2889.1 mL    OUT:    Chest Tube (mL): 45 mL    Chest Tube (mL): 290 mL    Chest Tube (mL): 130 mL    Indwelling Catheter - Urethral (mL): 1605 mL    Nasogastric/Oral tube (mL): 0 mL  Total OUT: 2070 mL    Total NET: 819.1 mL      29 Jan 2025 07:01  -  29 Jan 2025 09:41  --------------------------------------------------------  IN:    Dexmedetomidine: 8.6 mL    Insulin: 3 mL    IV PiggyBack: 100 mL    Milrinone: 25.6 mL    Norepinephrine: 16.3 mL    sodium chloride 0.9%: 40 mL    Vasopressin: 12 mL  Total IN: 205.5 mL    OUT:    Chest Tube (mL): 2 mL    Chest Tube (mL): 40 mL    Chest Tube (mL): 2 mL    Indwelling Catheter - Urethral (mL): 200 mL  Total OUT: 244 mL    Total NET: -38.5 mL        Adult Advanced Hemodynamics Last 24 Hrs  CVP(mm Hg): 11 (29 Jan 2025 09:15) (8 - 26)  CVP(cm H2O): --  CO: 4.7 (29 Jan 2025 08:00) (4.7 - 6.9)  CI: 2.2 (29 Jan 2025 08:00) (2.2 - 3.2)  PA: 63/19 (29 Jan 2025 09:15) (39/16 - 80/21)  PA(mean): 31 (29 Jan 2025 09:15) (24 - 138)  PCWP: --  SVR: 595 (29 Jan 2025 08:00) (558 - 814)  SVRI: 1271 (29 Jan 2025 08:00) (1212 - 1731)  PVR: --  PVRI: --  Mode: PS (Pressure Support)/ Spontaneous  FiO2: 50  PEEP: 8  MAP: 12  PC: 7  PIP: 19    CAPILLARY BLOOD GLUCOSE      POCT Blood Glucose.: 153 mg/dL (29 Jan 2025 09:12)  POCT Blood Glucose.: 165 mg/dL (29 Jan 2025 08:05)  POCT Blood Glucose.: 161 mg/dL (29 Jan 2025 06:43)  POCT Blood Glucose.: 111 mg/dL (29 Jan 2025 05:04)  POCT Blood Glucose.: 76 mg/dL (29 Jan 2025 04:09)  POCT Blood Glucose.: 81 mg/dL (29 Jan 2025 03:18)  POCT Blood Glucose.: 121 mg/dL (29 Jan 2025 00:40)  POCT Blood Glucose.: 132 mg/dL (28 Jan 2025 23:33)  POCT Blood Glucose.: 136 mg/dL (28 Jan 2025 22:50)  POCT Blood Glucose.: 160 mg/dL (28 Jan 2025 21:13)  POCT Blood Glucose.: 181 mg/dL (28 Jan 2025 20:32)  POCT Blood Glucose.: 186 mg/dL (28 Jan 2025 19:04)  POCT Blood Glucose.: 188 mg/dL (28 Jan 2025 17:55)  POCT Blood Glucose.: 182 mg/dL (28 Jan 2025 16:37)    LABS:  ABG - ( 29 Jan 2025 05:09 )  pH, Arterial: 7.40  pH, Blood: x     /  pCO2: 36    /  pO2: 170   / HCO3: 22    / Base Excess: -2.0  /  SaO2: 98.4                                    7.9    11.47 )-----------( 135      ( 29 Jan 2025 02:00 )             25.8     01-29    146  |  111[H]  |  32[H]  ----------------------------<  101[H]  4.1   |  23  |  1.5    Ca    8.6      29 Jan 2025 02:00  Mg     2.5     01-29    TPro  5.8[L]  /  Alb  4.4  /  TBili  0.7  /  DBili  x   /  AST  72[H]  /  ALT  12  /  AlkPhos  30  01-29    PT/INR - ( 29 Jan 2025 02:00 )   PT: 13.00 sec;   INR: 1.10 ratio         PTT - ( 29 Jan 2025 02:00 )  PTT:27.2 sec  Urinalysis Basic - ( 29 Jan 2025 02:00 )    Color: x / Appearance: x / SG: x / pH: x  Gluc: 101 mg/dL / Ketone: x  / Bili: x / Urobili: x   Blood: x / Protein: x / Nitrite: x   Leuk Esterase: x / RBC: x / WBC x   Sq Epi: x / Non Sq Epi: x / Bacteria: x        Home Medications:  Breo Ellipta 200 mcg-25 mcg/inh inhalation powder: 1 inhaled once a day (28 Jan 2025 06:52)  HYDROcodone 10 mg oral capsule, extended release: 1 cap(s) orally 2 times a day (28 Jan 2025 06:52)  losartan 50 mg oral tablet: 1 tab(s) orally 2 times a day (28 Jan 2025 06:52)  losartan-hydrochlorothiazide 50 mg-12.5 mg oral tablet: 1 tab(s) orally (28 Jan 2025 06:52)  ProAir HFA 90 mcg/inh inhalation aerosol: 2 puff(s) inhaled 2 times a day (28 Jan 2025 06:52)  tujeo: 80 unit(s) subcutaneous once a day (28 Jan 2025 06:52)    HOSPITAL MEDICATIONS:  MEDICATIONS  (STANDING):  albuterol/ipratropium for Nebulization 3 milliLiter(s) Nebulizer every 6 hours  aspirin enteric coated 81 milliGRAM(s) Oral daily  ceFAZolin   IVPB 2000 milliGRAM(s) IV Intermittent every 8 hours  chlorhexidine 2% Cloths 1 Application(s) Topical daily  dextrose 50% Injectable 50 milliLiter(s) IV Push every 15 minutes  dextrose 50% Injectable 25 milliLiter(s) IV Push every 15 minutes  famotidine Injectable 20 milliGRAM(s) IV Push every 12 hours  ferrous    sulfate 325 milliGRAM(s) Oral daily  fluticasone propionate/ salmeterol 250-50 MICROgram(s) Diskus 1 Dose(s) Inhalation two times a day  folic acid 1 milliGRAM(s) Oral daily  furosemide   Injectable 20 milliGRAM(s) IV Push daily  guaiFENesin  milliGRAM(s) Oral every 12 hours  heparin   Injectable 5000 Unit(s) SubCutaneous every 8 hours  influenza  Vaccine (HIGH DOSE) 0.5 milliLiter(s) IntraMuscular once  insulin regular Infusion 5 Unit(s)/Hr (5 mL/Hr) IV Continuous <Continuous>  meperidine     Injectable 25 milliGRAM(s) IV Push once  milrinone Infusion 0.375 MICROgram(s)/kG/Min (12.8 mL/Hr) IV Continuous <Continuous>  mupirocin 2% Nasal 1 Application(s) Both Nostrils every 12 hours  niCARdipine Infusion 5 mG/Hr (25 mL/Hr) IV Continuous <Continuous>  nitroglycerin  Infusion 15 MICROgram(s)/Min (4.5 mL/Hr) IV Continuous <Continuous>  norepinephrine Infusion 0.05 MICROgram(s)/kG/Min (10.7 mL/Hr) IV Continuous <Continuous>  polyethylene glycol 3350 17 Gram(s) Oral daily  senna 2 Tablet(s) Oral at bedtime  sodium chloride 0.9%. 1000 milliLiter(s) (20 mL/Hr) IV Continuous <Continuous>  vancomycin  IVPB 1000 milliGRAM(s) IV Intermittent every 12 hours  vasopressin Infusion 0.04 Unit(s)/Min (6 mL/Hr) IV Continuous <Continuous>    MEDICATIONS  (PRN):  hydrALAZINE Injectable 10 milliGRAM(s) IV Push every 2 hours PRN sbp>150  HYDROmorphone  Injectable 0.5 milliGRAM(s) IV Push every 6 hours PRN Breakthrough Pain  ondansetron Injectable 4 milliGRAM(s) IV Push every 4 hours PRN Nausea and/or Vomiting  oxyCODONE    IR 5 milliGRAM(s) Oral every 4 hours PRN Moderate Pain (4 - 6)  oxyCODONE    IR 10 milliGRAM(s) Oral every 4 hours PRN Severe Pain (7 - 10)    RADIOLOGY:  Chest X-ray Reviewed    !!EXAM CARDIAC SURGERY CRITICAL  GEN: intubated, sedated  HEENT: Orally intubated, No facial asymmetry, neck supple, no epistaxis, moist mucous membranes  CV: regular rate and rhythm, no ectopy  PULMONARY: intubated, bilateral chest rise. !!Chest/mediastinal tubes in place  ABDOMEN: soft. No distention. No tenderness  EXTREMITIES: warm. good capillary refill. No cyanosis. No erythema  NEURO: sedated, moves extremities with stimuli    !!EXAM CARDIAC SURGERY NONCRITICAL  General appearance - wake and alert  Heart - regular rate and rhythm, no ectopy  Pulm - bilateral chest rise. !!Chest/mediastinal tubes in place  Abdomen - soft, non-distended  EXTREMITIES: warm. good capillary refill. No cyanosis. No erythema  Neurological - awake and alert, moves all extremities    !!EXAM CRITICAL  GENERAL: Patient appears to be critically ill. intubated, sedated  HEENT: Orally intubated, No facial asymmetry, neck supple, no epistaxis, moist mucous membranes  PULMONARY: mechanically ventilated. rhonchi bilaterally. bilateral chest rise  CV: regular rate and rhythm, no ectopy  GASTROINTESTINAL: abdomen soft. no distention. no tenderness  EXTREMITIES: warm. good capillary refill. No cyanosis. No erythema  NEURO: sedated, moves extremities with stimuli    !!EXAM NON CRITICAL  GENERAL - awake and alert, in no distress  PULMONARY - no respiratory distress, bilateral chest rise  CV - regular rate and rhythm, no ectopy  GI- soft, non distended, non tender  EXTREMITIES: warm. good capillary refill. No cyanosis. No erythema  NEURO - follows commands, conversant, moves all extremities CTU Attending Progress Daily Note     28 Jan 2025 17:04    Procedure: MVReplacement (bioprosthetic), SHEILA ligation, MAZE  Procedure Day: 2/28/25    Hx: afib, nonobstructive CAD, CHF class 3, severe MR, DM, HTN, JIMMY on CPAP, asthma, severe obesity, HL, Madelung's disease, arthritis, arthritis, peripheral neuropathy    HPI: 64M w/ afib, nonobstructive CAD, CHF class 3, severe MR, DM, HTN, JIMMY on CPAP, asthma, severe obesity, HL, Madelung's disease, osteoarthritis s/p bilateral hip replacement, peripheral neuropathy presented with increasing dyspnea with exertion found to have severe MR    OR Data  Procedure: Procedure: MVReplacement (bioprosthetic), SHEILA ligation, MAZE  Bypass: 202  Cross Clamp: 158  Pre LV Fxn: 50-55%      RV Fxn: normal  Post LV Fxn: 45-50%     RV Fxn: normal    moderate TR, MV gradient 3mmHg  Blood Products: 1uPRBC, ddavp 39mcq  Cell Saver: 698  Fluids: NS 2000, albumin 2000  Pacing Wires: V pacing. sinus rhythm  UO: 1100    OVERNIGHT EVENT  extubation. bipap    SUBJECTIVE:  difficulty breathing    Hospital Course:  1/28 - Fany and poor oxygenation in OR. Milrinone, norepi, vaso. Extubation in evening.  1/29 - start aspirin, diuresis, heparin dvt prophylaxis    OBJECTIVE:  ICU Vital Signs Last 24 Hrs  T(C): 36.8 (29 Jan 2025 08:00), Max: 37.3 (28 Jan 2025 23:00)  T(F): 98.2 (29 Jan 2025 08:00), Max: 99.1 (28 Jan 2025 23:00)  HR: 69 (29 Jan 2025 09:15) (54 - 79)  BP: 117/65 (29 Jan 2025 08:00) (114/55 - 132/63)  BP(mean): 79 (29 Jan 2025 08:00) (78 - 90)  ABP: 129/51 (29 Jan 2025 09:15) (-1/-1 - 135/54)  ABP(mean): 69 (29 Jan 2025 09:15) (-1 - 82)  RR: 19 (29 Jan 2025 09:15) (7 - 33)  SpO2: 99% (29 Jan 2025 09:15) (88% - 99%)    O2 Parameters below as of 29 Jan 2025 09:00  Patient On (Oxygen Delivery Method): BiPAP/CPAP    O2 Concentration (%): 60      I&O's Summary    28 Jan 2025 07:01  -  29 Jan 2025 07:00  --------------------------------------------------------  IN: 2889.1 mL / OUT: 2070 mL / NET: 819.1 mL    29 Jan 2025 07:01  -  29 Jan 2025 09:41  --------------------------------------------------------  IN: 205.5 mL / OUT: 244 mL / NET: -38.5 mL      I&O's Detail    28 Jan 2025 07:01  -  29 Jan 2025 07:00  --------------------------------------------------------  IN:    Albumin 5%  - 500 mL: 1000 mL    Dexmedetomidine: 28.5 mL    Dexmedetomidine: 57 mL    Dexmedetomidine: 68.8 mL    Insulin: 79 mL    IV PiggyBack: 800 mL    Milrinone: 192 mL    Norepinephrine: 273.8 mL    sodium chloride 0.9%: 300 mL    Vasopressin: 6 mL    Vasopressin: 84 mL  Total IN: 2889.1 mL    OUT:    Chest Tube (mL): 45 mL    Chest Tube (mL): 290 mL    Chest Tube (mL): 130 mL    Indwelling Catheter - Urethral (mL): 1605 mL    Nasogastric/Oral tube (mL): 0 mL  Total OUT: 2070 mL    Total NET: 819.1 mL      29 Jan 2025 07:01  -  29 Jan 2025 09:41  --------------------------------------------------------  IN:    Dexmedetomidine: 8.6 mL    Insulin: 3 mL    IV PiggyBack: 100 mL    Milrinone: 25.6 mL    Norepinephrine: 16.3 mL    sodium chloride 0.9%: 40 mL    Vasopressin: 12 mL  Total IN: 205.5 mL    OUT:    Chest Tube (mL): 2 mL    Chest Tube (mL): 40 mL    Chest Tube (mL): 2 mL    Indwelling Catheter - Urethral (mL): 200 mL  Total OUT: 244 mL    Total NET: -38.5 mL        Adult Advanced Hemodynamics Last 24 Hrs  CVP(mm Hg): 11 (29 Jan 2025 09:15) (8 - 26)  CVP(cm H2O): --  CO: 4.7 (29 Jan 2025 08:00) (4.7 - 6.9)  CI: 2.2 (29 Jan 2025 08:00) (2.2 - 3.2)  PA: 63/19 (29 Jan 2025 09:15) (39/16 - 80/21)  PA(mean): 31 (29 Jan 2025 09:15) (24 - 138)  PCWP: --  SVR: 595 (29 Jan 2025 08:00) (558 - 814)  SVRI: 1271 (29 Jan 2025 08:00) (1212 - 1731)  PVR: --  PVRI: --  Mode: PS (Pressure Support)/ Spontaneous  FiO2: 50  PEEP: 8  MAP: 12  PC: 7  PIP: 19    CAPILLARY BLOOD GLUCOSE      POCT Blood Glucose.: 153 mg/dL (29 Jan 2025 09:12)  POCT Blood Glucose.: 165 mg/dL (29 Jan 2025 08:05)  POCT Blood Glucose.: 161 mg/dL (29 Jan 2025 06:43)  POCT Blood Glucose.: 111 mg/dL (29 Jan 2025 05:04)  POCT Blood Glucose.: 76 mg/dL (29 Jan 2025 04:09)  POCT Blood Glucose.: 81 mg/dL (29 Jan 2025 03:18)  POCT Blood Glucose.: 121 mg/dL (29 Jan 2025 00:40)  POCT Blood Glucose.: 132 mg/dL (28 Jan 2025 23:33)  POCT Blood Glucose.: 136 mg/dL (28 Jan 2025 22:50)  POCT Blood Glucose.: 160 mg/dL (28 Jan 2025 21:13)  POCT Blood Glucose.: 181 mg/dL (28 Jan 2025 20:32)  POCT Blood Glucose.: 186 mg/dL (28 Jan 2025 19:04)  POCT Blood Glucose.: 188 mg/dL (28 Jan 2025 17:55)  POCT Blood Glucose.: 182 mg/dL (28 Jan 2025 16:37)    LABS:  ABG - ( 29 Jan 2025 05:09 )  pH, Arterial: 7.40  pH, Blood: x     /  pCO2: 36    /  pO2: 170   / HCO3: 22    / Base Excess: -2.0  /  SaO2: 98.4                                    7.9    11.47 )-----------( 135      ( 29 Jan 2025 02:00 )             25.8     01-29    146  |  111[H]  |  32[H]  ----------------------------<  101[H]  4.1   |  23  |  1.5    Ca    8.6      29 Jan 2025 02:00  Mg     2.5     01-29    TPro  5.8[L]  /  Alb  4.4  /  TBili  0.7  /  DBili  x   /  AST  72[H]  /  ALT  12  /  AlkPhos  30  01-29    PT/INR - ( 29 Jan 2025 02:00 )   PT: 13.00 sec;   INR: 1.10 ratio         PTT - ( 29 Jan 2025 02:00 )  PTT:27.2 sec  Urinalysis Basic - ( 29 Jan 2025 02:00 )    Color: x / Appearance: x / SG: x / pH: x  Gluc: 101 mg/dL / Ketone: x  / Bili: x / Urobili: x   Blood: x / Protein: x / Nitrite: x   Leuk Esterase: x / RBC: x / WBC x   Sq Epi: x / Non Sq Epi: x / Bacteria: x        Home Medications:  Breo Ellipta 200 mcg-25 mcg/inh inhalation powder: 1 inhaled once a day (28 Jan 2025 06:52)  HYDROcodone 10 mg oral capsule, extended release: 1 cap(s) orally 2 times a day (28 Jan 2025 06:52)  losartan 50 mg oral tablet: 1 tab(s) orally 2 times a day (28 Jan 2025 06:52)  losartan-hydrochlorothiazide 50 mg-12.5 mg oral tablet: 1 tab(s) orally (28 Jan 2025 06:52)  ProAir HFA 90 mcg/inh inhalation aerosol: 2 puff(s) inhaled 2 times a day (28 Jan 2025 06:52)  tujeo: 80 unit(s) subcutaneous once a day (28 Jan 2025 06:52)    HOSPITAL MEDICATIONS:  MEDICATIONS  (STANDING):  albuterol/ipratropium for Nebulization 3 milliLiter(s) Nebulizer every 6 hours  aspirin enteric coated 81 milliGRAM(s) Oral daily  ceFAZolin   IVPB 2000 milliGRAM(s) IV Intermittent every 8 hours  chlorhexidine 2% Cloths 1 Application(s) Topical daily  dextrose 50% Injectable 50 milliLiter(s) IV Push every 15 minutes  dextrose 50% Injectable 25 milliLiter(s) IV Push every 15 minutes  famotidine Injectable 20 milliGRAM(s) IV Push every 12 hours  ferrous    sulfate 325 milliGRAM(s) Oral daily  fluticasone propionate/ salmeterol 250-50 MICROgram(s) Diskus 1 Dose(s) Inhalation two times a day  folic acid 1 milliGRAM(s) Oral daily  furosemide   Injectable 20 milliGRAM(s) IV Push daily  guaiFENesin  milliGRAM(s) Oral every 12 hours  heparin   Injectable 5000 Unit(s) SubCutaneous every 8 hours  influenza  Vaccine (HIGH DOSE) 0.5 milliLiter(s) IntraMuscular once  insulin regular Infusion 5 Unit(s)/Hr (5 mL/Hr) IV Continuous <Continuous>  meperidine     Injectable 25 milliGRAM(s) IV Push once  milrinone Infusion 0.375 MICROgram(s)/kG/Min (12.8 mL/Hr) IV Continuous <Continuous>  mupirocin 2% Nasal 1 Application(s) Both Nostrils every 12 hours  niCARdipine Infusion 5 mG/Hr (25 mL/Hr) IV Continuous <Continuous>  nitroglycerin  Infusion 15 MICROgram(s)/Min (4.5 mL/Hr) IV Continuous <Continuous>  norepinephrine Infusion 0.05 MICROgram(s)/kG/Min (10.7 mL/Hr) IV Continuous <Continuous>  polyethylene glycol 3350 17 Gram(s) Oral daily  senna 2 Tablet(s) Oral at bedtime  sodium chloride 0.9%. 1000 milliLiter(s) (20 mL/Hr) IV Continuous <Continuous>  vancomycin  IVPB 1000 milliGRAM(s) IV Intermittent every 12 hours  vasopressin Infusion 0.04 Unit(s)/Min (6 mL/Hr) IV Continuous <Continuous>    MEDICATIONS  (PRN):  hydrALAZINE Injectable 10 milliGRAM(s) IV Push every 2 hours PRN sbp>150  HYDROmorphone  Injectable 0.5 milliGRAM(s) IV Push every 6 hours PRN Breakthrough Pain  ondansetron Injectable 4 milliGRAM(s) IV Push every 4 hours PRN Nausea and/or Vomiting  oxyCODONE    IR 5 milliGRAM(s) Oral every 4 hours PRN Moderate Pain (4 - 6)  oxyCODONE    IR 10 milliGRAM(s) Oral every 4 hours PRN Severe Pain (7 - 10)    RADIOLOGY:  Chest X-ray Reviewed    General appearance - wake and alert  Heart - mildly bradycardic, sinus  Pulm - bilateral chest rise. Chest/mediastinal tubes in place  Abdomen - soft, non-distended  Neurological - awake and alert, moves all extremities

## 2025-01-29 NOTE — PROGRESS NOTE ADULT - ASSESSMENT
Assessment/Plan  64M w/ afib, nonobstructive CAD, CHF class 3, severe MR, DM, HTN, JIMMY on CPAP, asthma, severe obesity, HL, Madelung's disease, arthritis, arthritis, peripheral neuropathy s/p MVReplacement, SHEILA ligation, MAZE 1/28/25    Neuro:  #requiring postoperative sedation while intubated  - Sedatives: dexmedetomodine/propofol as needed. Goal rass 0 to -1. wean sedation as tolerated  #acute postoperative pain  - Analgesics: acetaminophen, lidocaine patch, opioid prn  #at risk for postoperative/ICU delirium  - frequent re-orientation, good sleep hygiene, avoid medications which can increase delirium risk  - CAM-ICU qShift    CV: severe MR s/p MVReplacement, MAZE, SHEILA ligation  - patient's intrinsic rhythm: sinus. Epicardial pacer settings: DDI 60  #RV/LV heart failure  - Inotropes: milrinone. Titrate for CI >2.2. Last CI in OR 3.2  #acute hypotension from vasodilation and postoperative SIRS  - Vasoactives: norepi, vaso. Titrate for MAP 65-80  - Restart statin once tolerating oral medications  - restart aspirin when patient shows no sign of hemorrhage and platelets > 80  - Beta blocker when hemodynamically stable off inotropic and pressor support    Pulm:  #acute postoperative pulmonary insufficiency  #acute respiratory failure with hypoxemia and hypercapnea  - On mechanical ventilation. ARDSnet protocol. Lung protection strategy. Head of bed elevated whenever possible. High PEEP needed for oxygenation.  - Due to oxygenation issues and body anatomy presenting possible airway difficulty, do not anticipate extubation today.  - aggressive incentive spirometry and chest PT to address atelectasis once extubated    Renal/Fluid/Lytes:  #acute intravascular hypovolemia  - IV fluid resuscitation  - High risk for FLAKITO post surgery  - replete/optimize electrolytes  - preload and after optimization    Heme:  #Acute blood loss anemia from surgery  - transfuse for hemoglobin < 7 or hemodynamic instability due to anemia  #s/p MVReplacement surgery, anticoagulation/antiplatelet plan  - hold until patient proven to have no significant hemorrhage s/p surgery and platelets > 80    ID:  #at risk for surgical infection  - finish cathie-op antibiotics  - Leukocytosis likely reactionary, continue to monitor    Endocrine:   #acute postoperative stress hyperglycemia  - insulin therapy to achieve glucose control    GI:  #at risk for malnutrition  - following extubation and swallow study, start diet diet as tolerated  - if unable to extubate within timely manner, will start tube feeds  #at risk for post-operative ileus and opioid-induced constipation  - bowel regimen    Prophylaxis  #at risk of VTE  - SCDs. VTE chemoprophylaxis on hold until patient proven to have no significant hemorrhage s/p surgery and platelets > 80  #at risk of GI stress ulcer  - GI prophylaxis indicated    PT, once extubated  - out of bed to chair  - ambulation as much as possible    Medication list reviewed.    Lines: PAC, arterial lines and prince to remain in until off inotropes, vasoactives and no longer need close monitoring of input/output.    - RIJ large bore access -  - chest tube -  - epicardial wires -    This patient is critically ill and required my dedicated time to evaluate, manage and maintain or prevent deterioration of the organ systems and problems noted above and provide documentation.    Due to a high probability of clinically significant, life threatening deterioration due to high risk of   cardiac failure, circulatory failure, cardiogenic shock, shock, arrhythmias, renal failure, acute blood loss, CNS failure / compromise, acute pulmonary insufficiency, airway compromise, respiratory failure, the patient required my highest level of preparedness to intervene emergently and I personally spent this critical care time directly and personally managing the patient. This critical care time included obtaining a history when possible; examining the patient; review pulse oximetry; order / interpreting / review of laboratory and radiology studies with immediate assessment and treatment as needed; directing the titration of pressors, oxygen support devices, fluid resuscitation, interpretation of cardiac output measurements; interpretation of EKG / rhythm strips / telemetry; arranging urgent treatment with development of a management plan; evaluation of patient's response to treatment; frequent reassessment and monitor for potential decompensation; and discussion with other providers/consultants.  This critical care time was performed to assess and manage the high probability of imminent life threatening deterioration that could result in organ(s) failure. It was exclusive of separately billable procedures and treating other patients and teaching time. please see MDM(medical decision making)/Assessment/Plans sections and the rest of the note for further information on patient assessment and treatment.    Time I spent with family or surrogate(s) is included only if the patient was incapable of providing the necessary information or participating in medical decision making. Time devoted to teaching and to any procedures I billed separately is not included.     Management of this patient was discussed with the surgical/cardiology attending and mid-level providers.    A central line, if present, continues to be necessary for infusion of medications and IV fluids. It is to be removed when other adequate venous access is accomplished.    A restraint, if present, remains because the patient cannot be safely managed without restraints as potential for harm secondary to inadvertent movement and loss of tubes/lines outweighs the burden of restraint.    A prince catheter, if present, remains necessary to monitor urine output continuously, and/or divert urine from the skin. It will be removed per policy when it is not needed for these purposes.    I acknowledge the use of copied documentation.  All copy forward documentation is my own and has been reviewed and revised as appropriate.  If no changes were made, it is because that information remains the same.    Fab Celis MD  Critical Care Medicine   64M w/ afib, nonobstructive CAD, CHF class 3, severe MR, DM, HTN, JIMMY on CPAP, asthma, severe obesity, HL, Madelung's disease, arthritis, arthritis, peripheral neuropathy s/p MVReplacement, SHEILA ligation, MAZE 1/28/25    Assessment/Plan    Neuro:  #acute postoperative pain  - Analgesics: acetaminophen, lidocaine patch, opioid prn  - pain controlled  #at risk for postoperative/ICU delirium  - frequent re-orientation, good sleep hygiene, avoid medications which can increase delirium risk  - CAM-ICU qShift    CV: MVR s/p MVReplacement, SHEILA ligation, MAZE  - patient's intrinsic rhythm: sinus bradycardia. Epicardial pacer settings: ventricular pacing at 70  #RV/LV heart failure  - Inotropes: milrinone. Titrate for CI >2.2  - baseline heart rate 50s. will pace to 70s to help with output.  #acute hypotension from vasodilation and postoperative SIRS  - Vasoactives: norepi, vaso. Titrate for MAP 65-80  - start aspirin  - Restart statin once LFT normalizes  - Beta blocker when hemodynamically stable off inotropic and pressor support    Pulm:  #acute postoperative pulmonary insufficiency  #atelectasis  - continue NIPPV. will try to switch to HFNC if possible  - aggressive incentive spirometry and chest PT to address atelectasis   #acute pulmonary edema  - increase diuresis    Renal/Fluid/Lytes:  #acute fluid overload  - increase diuresis  - replete/optimize electrolytes  - preload and after optimization    Heme:  #Acute blood loss anemia from surgery  - transfuse for hemoglobin < 7 or hemodynamic instability due to anemia  #s/p MVReplacement surgery, anticoagulation/antiplatelet plan  - start aspirin    ID:  #at risk for surgical infection  - finish cathie-op antibiotics  - no signs of acute infection    Endocrine:   #acute postoperative stress hyperglycemia  - insulin therapy to achieve tight glucose control    GI:  #at risk for malnutrition  - enteral diet  #at risk for post-operative ileus and opioid-induced constipation  - bowel regimen    Prophylaxis  #at risk of VTE  - SCDs. VTE chemoprophylaxis start  #at risk of GI stress ulcer  - GI prophylaxis per cardiac surgery    PT  - out of bed to chair  - ambulation as much as possible    Medication list reviewed.    Lines: PAC, arterial line and prince to remain in until off inotropes, vasoactives and no longer need close monitoring of input/output.    - RIJ large bore access -  - chest tube -  - epicardial wires -    This patient is critically ill and required my dedicated time to evaluate, manage and maintain or prevent deterioration of the organ systems and problems noted above and provide documentation.    Due to a high probability of clinically significant, life threatening deterioration due to high risk of   cardiac failure, circulatory failure, cardiogenic shock, shock, arrhythmias, renal failure, acute blood loss, acute pulmonary insufficiency, airway compromise hepatic failure, the patient required my highest level of preparedness to intervene emergently and I personally spent this critical care time directly and personally managing the patient. This critical care time included obtaining a history when possible; examining the patient; review pulse oximetry; order / interpreting / review of laboratory and radiology studies with immediate assessment and treatment as needed; directing the titration of pressors, oxygen support devices, fluid resuscitation, interpretation of cardiac output measurements; interpretation of EKG / rhythm strips / telemetry; arranging urgent treatment with development of a management plan; evaluation of patient's response to treatment; frequent reassessment and monitor for potential decompensation; and discussion with other providers/consultants.  This critical care time was performed to assess and manage the high probability of imminent life threatening deterioration that could result in organ(s) failure. It was exclusive of separately billable procedures and treating other patients and teaching time. please see MDM (medical decision making) / Assessment / Plans sections and the rest of the note for further information on patient assessment and treatment.    Time I spent with family or surrogate(s) is included only if the patient was incapable of providing the necessary information or participating in medical decision making. Time devoted to teaching and to any procedures I billed separately is not included.     Management of this patient was discussed with the surgical attending / cardiologist and mid-level providers.    A central line, if present, continues to be necessary for infusion of medications and IV fluids. It is to be removed when other adequate venous access is accomplished.    A restraint, if present, remains because the patient cannot be safely managed without restraints as potential for harm secondary to inadvertent movement and loss of tubes/lines outweighs the burden of restraint.    A prince catheter, if present, remains necessary to monitor urine output continuously, and/or divert urine from the skin. It will be removed per policy when it is not needed for these purposes.    I acknowledge the use of copied documentation.  All copy forward documentation is my own and has been reviewed and revised as appropriate.  If no changes were made, it is because that information remains the same.    Fab Celis MD  Critical Care Medicine

## 2025-01-30 LAB
ALBUMIN SERPL ELPH-MCNC: 3.8 G/DL — SIGNIFICANT CHANGE UP (ref 3.5–5.2)
ALP SERPL-CCNC: 40 U/L — SIGNIFICANT CHANGE UP (ref 30–115)
ALT FLD-CCNC: 12 U/L — SIGNIFICANT CHANGE UP (ref 0–41)
ANION GAP SERPL CALC-SCNC: 13 MMOL/L — SIGNIFICANT CHANGE UP (ref 7–14)
APTT BLD: 28 SEC — SIGNIFICANT CHANGE UP (ref 27–39.2)
AST SERPL-CCNC: 65 U/L — HIGH (ref 0–41)
BASE EXCESS BLDA CALC-SCNC: -4.7 MMOL/L — LOW (ref -2–3)
BASOPHILS # BLD AUTO: 0.02 K/UL — SIGNIFICANT CHANGE UP (ref 0–0.2)
BASOPHILS NFR BLD AUTO: 0.2 % — SIGNIFICANT CHANGE UP (ref 0–1)
BILIRUB SERPL-MCNC: 0.5 MG/DL — SIGNIFICANT CHANGE UP (ref 0.2–1.2)
BUN SERPL-MCNC: 42 MG/DL — HIGH (ref 10–20)
CALCIUM SERPL-MCNC: 8 MG/DL — LOW (ref 8.4–10.4)
CHLORIDE SERPL-SCNC: 105 MMOL/L — SIGNIFICANT CHANGE UP (ref 98–110)
CO2 SERPL-SCNC: 20 MMOL/L — SIGNIFICANT CHANGE UP (ref 17–32)
CREAT SERPL-MCNC: 2 MG/DL — HIGH (ref 0.7–1.5)
EGFR: 36 ML/MIN/1.73M2 — LOW
EOSINOPHIL # BLD AUTO: 0 K/UL — SIGNIFICANT CHANGE UP (ref 0–0.7)
EOSINOPHIL NFR BLD AUTO: 0 % — SIGNIFICANT CHANGE UP (ref 0–8)
GAS PNL BLDA: SIGNIFICANT CHANGE UP
GLUCOSE BLDC GLUCOMTR-MCNC: 109 MG/DL — HIGH (ref 70–99)
GLUCOSE BLDC GLUCOMTR-MCNC: 128 MG/DL — HIGH (ref 70–99)
GLUCOSE BLDC GLUCOMTR-MCNC: 136 MG/DL — HIGH (ref 70–99)
GLUCOSE BLDC GLUCOMTR-MCNC: 138 MG/DL — HIGH (ref 70–99)
GLUCOSE BLDC GLUCOMTR-MCNC: 142 MG/DL — HIGH (ref 70–99)
GLUCOSE BLDC GLUCOMTR-MCNC: 143 MG/DL — HIGH (ref 70–99)
GLUCOSE BLDC GLUCOMTR-MCNC: 146 MG/DL — HIGH (ref 70–99)
GLUCOSE BLDC GLUCOMTR-MCNC: 154 MG/DL — HIGH (ref 70–99)
GLUCOSE BLDC GLUCOMTR-MCNC: 156 MG/DL — HIGH (ref 70–99)
GLUCOSE BLDC GLUCOMTR-MCNC: 164 MG/DL — HIGH (ref 70–99)
GLUCOSE BLDC GLUCOMTR-MCNC: 174 MG/DL — HIGH (ref 70–99)
GLUCOSE BLDC GLUCOMTR-MCNC: 179 MG/DL — HIGH (ref 70–99)
GLUCOSE SERPL-MCNC: 143 MG/DL — HIGH (ref 70–99)
HCO3 BLDA-SCNC: 20 MMOL/L — LOW (ref 21–28)
HCT VFR BLD CALC: 24.8 % — LOW (ref 42–52)
HGB BLD-MCNC: 7.6 G/DL — LOW (ref 14–18)
HOROWITZ INDEX BLDA+IHG-RTO: 60 — SIGNIFICANT CHANGE UP
IMM GRANULOCYTES NFR BLD AUTO: 0.7 % — HIGH (ref 0.1–0.3)
INR BLD: 1.13 RATIO — SIGNIFICANT CHANGE UP (ref 0.65–1.3)
LYMPHOCYTES # BLD AUTO: 1.14 K/UL — LOW (ref 1.2–3.4)
LYMPHOCYTES # BLD AUTO: 9.3 % — LOW (ref 20.5–51.1)
MAGNESIUM SERPL-MCNC: 2.3 MG/DL — SIGNIFICANT CHANGE UP (ref 1.8–2.4)
MCHC RBC-ENTMCNC: 27 PG — SIGNIFICANT CHANGE UP (ref 27–31)
MCHC RBC-ENTMCNC: 30.6 G/DL — LOW (ref 32–37)
MCV RBC AUTO: 87.9 FL — SIGNIFICANT CHANGE UP (ref 80–94)
MONOCYTES # BLD AUTO: 1.02 K/UL — HIGH (ref 0.1–0.6)
MONOCYTES NFR BLD AUTO: 8.3 % — SIGNIFICANT CHANGE UP (ref 1.7–9.3)
NEUTROPHILS # BLD AUTO: 9.96 K/UL — HIGH (ref 1.4–6.5)
NEUTROPHILS NFR BLD AUTO: 81.5 % — HIGH (ref 42.2–75.2)
NRBC # BLD: 0 /100 WBCS — SIGNIFICANT CHANGE UP (ref 0–0)
NRBC BLD-RTO: 0 /100 WBCS — SIGNIFICANT CHANGE UP (ref 0–0)
PCO2 BLDA: 37 MMHG — SIGNIFICANT CHANGE UP (ref 35–48)
PH BLDA: 7.35 — SIGNIFICANT CHANGE UP (ref 7.35–7.45)
PLATELET # BLD AUTO: 108 K/UL — LOW (ref 130–400)
PMV BLD: 11.5 FL — HIGH (ref 7.4–10.4)
PO2 BLDA: 131 MMHG — HIGH (ref 83–108)
POTASSIUM SERPL-MCNC: 4.1 MMOL/L — SIGNIFICANT CHANGE UP (ref 3.5–5)
POTASSIUM SERPL-SCNC: 4.1 MMOL/L — SIGNIFICANT CHANGE UP (ref 3.5–5)
PROT SERPL-MCNC: 5.3 G/DL — LOW (ref 6–8)
PROTHROM AB SERPL-ACNC: 13.4 SEC — HIGH (ref 9.95–12.87)
RBC # BLD: 2.82 M/UL — LOW (ref 4.7–6.1)
RBC # FLD: 19.8 % — HIGH (ref 11.5–14.5)
SAO2 % BLDA: 98.2 % — HIGH (ref 94–98)
SODIUM SERPL-SCNC: 138 MMOL/L — SIGNIFICANT CHANGE UP (ref 135–146)
WBC # BLD: 12.22 K/UL — HIGH (ref 4.8–10.8)
WBC # FLD AUTO: 12.22 K/UL — HIGH (ref 4.8–10.8)

## 2025-01-30 PROCEDURE — 71045 X-RAY EXAM CHEST 1 VIEW: CPT | Mod: 26

## 2025-01-30 PROCEDURE — 99291 CRITICAL CARE FIRST HOUR: CPT

## 2025-01-30 PROCEDURE — 93010 ELECTROCARDIOGRAM REPORT: CPT

## 2025-01-30 RX ORDER — ACETAMINOPHEN 500 MG/5ML
1000 LIQUID (ML) ORAL ONCE
Refills: 0 | Status: COMPLETED | OUTPATIENT
Start: 2025-01-31 | End: 2025-01-31

## 2025-01-30 RX ORDER — ACETAMINOPHEN 500 MG/5ML
1000 LIQUID (ML) ORAL ONCE
Refills: 0 | Status: COMPLETED | OUTPATIENT
Start: 2025-01-30 | End: 2025-01-30

## 2025-01-30 RX ORDER — LIDOCAINE HYDROCHLORIDE 20 MG/ML
1 JELLY TOPICAL DAILY
Refills: 0 | Status: DISCONTINUED | OUTPATIENT
Start: 2025-01-30 | End: 2025-02-04

## 2025-01-30 RX ORDER — HYDROMORPHONE/SOD CHLOR,ISO/PF 2 MG/10 ML
0.5 SYRINGE (ML) INJECTION ONCE
Refills: 0 | Status: DISCONTINUED | OUTPATIENT
Start: 2025-01-30 | End: 2025-01-30

## 2025-01-30 RX ORDER — GABAPENTIN 400 MG/1
300 CAPSULE ORAL EVERY 8 HOURS
Refills: 0 | Status: DISCONTINUED | OUTPATIENT
Start: 2025-01-30 | End: 2025-02-04

## 2025-01-30 RX ORDER — MILRINONE LACTATE 1 MG/ML
0.25 INJECTION, SOLUTION INTRAVENOUS
Qty: 20 | Refills: 0 | Status: DISCONTINUED | OUTPATIENT
Start: 2025-01-30 | End: 2025-01-31

## 2025-01-30 RX ORDER — FUROSEMIDE 10 MG/ML
20 INJECTION INTRAMUSCULAR; INTRAVENOUS DAILY
Refills: 0 | Status: DISCONTINUED | OUTPATIENT
Start: 2025-01-30 | End: 2025-02-01

## 2025-01-30 RX ORDER — ROSUVASTATIN CALCIUM 20 MG/1
20 TABLET, FILM COATED ORAL AT BEDTIME
Refills: 0 | Status: DISCONTINUED | OUTPATIENT
Start: 2025-01-30 | End: 2025-02-24

## 2025-01-30 RX ORDER — MILRINONE LACTATE 1 MG/ML
0.12 INJECTION, SOLUTION INTRAVENOUS
Qty: 20 | Refills: 0 | Status: DISCONTINUED | OUTPATIENT
Start: 2025-01-31 | End: 2025-02-05

## 2025-01-30 RX ADMIN — IPRATROPIUM BROMIDE AND ALBUTEROL SULFATE 3 MILLILITER(S): .5; 2.5 SOLUTION RESPIRATORY (INHALATION) at 08:21

## 2025-01-30 RX ADMIN — Medication 2 TABLET(S): at 22:07

## 2025-01-30 RX ADMIN — Medication 20 MILLIEQUIVALENT(S): at 06:20

## 2025-01-30 RX ADMIN — Medication 1 APPLICATION(S): at 11:21

## 2025-01-30 RX ADMIN — OXYCODONE HYDROCHLORIDE 10 MILLIGRAM(S): 30 TABLET ORAL at 08:06

## 2025-01-30 RX ADMIN — MILRINONE LACTATE 8.55 MICROGRAM(S)/KG/MIN: 1 INJECTION, SOLUTION INTRAVENOUS at 22:03

## 2025-01-30 RX ADMIN — Medication 81 MILLIGRAM(S): at 11:19

## 2025-01-30 RX ADMIN — HEPARIN SODIUM 5000 UNIT(S): 1000 INJECTION INTRAVENOUS; SUBCUTANEOUS at 14:39

## 2025-01-30 RX ADMIN — Medication 400 MILLIGRAM(S): at 16:16

## 2025-01-30 RX ADMIN — MUPIROCIN CALCIUM 1 APPLICATION(S): 20 CREAM TOPICAL at 05:07

## 2025-01-30 RX ADMIN — MILRINONE LACTATE 12.8 MICROGRAM(S)/KG/MIN: 1 INJECTION, SOLUTION INTRAVENOUS at 08:06

## 2025-01-30 RX ADMIN — GABAPENTIN 300 MILLIGRAM(S): 400 CAPSULE ORAL at 14:39

## 2025-01-30 RX ADMIN — HEPARIN SODIUM 5000 UNIT(S): 1000 INJECTION INTRAVENOUS; SUBCUTANEOUS at 05:07

## 2025-01-30 RX ADMIN — Medication 1000 MILLIGRAM(S): at 22:30

## 2025-01-30 RX ADMIN — IPRATROPIUM BROMIDE AND ALBUTEROL SULFATE 3 MILLILITER(S): .5; 2.5 SOLUTION RESPIRATORY (INHALATION) at 20:43

## 2025-01-30 RX ADMIN — Medication 10 MILLIGRAM(S): at 16:48

## 2025-01-30 RX ADMIN — Medication 1000 MILLIGRAM(S): at 09:01

## 2025-01-30 RX ADMIN — Medication 100 MILLIGRAM(S): at 14:40

## 2025-01-30 RX ADMIN — Medication 250 MILLIGRAM(S): at 05:07

## 2025-01-30 RX ADMIN — Medication 0.5 MILLIGRAM(S): at 15:37

## 2025-01-30 RX ADMIN — DEXTROMETHORPHAN HBR, GUAIFENESIN 600 MILLIGRAM(S): 200 LIQUID ORAL at 17:19

## 2025-01-30 RX ADMIN — Medication 20 MILLIGRAM(S): at 05:08

## 2025-01-30 RX ADMIN — Medication 25 MICROGRAM(S): at 03:30

## 2025-01-30 RX ADMIN — Medication 0.5 MILLIGRAM(S): at 09:01

## 2025-01-30 RX ADMIN — DEXTROMETHORPHAN HBR, GUAIFENESIN 600 MILLIGRAM(S): 200 LIQUID ORAL at 05:11

## 2025-01-30 RX ADMIN — OXYCODONE HYDROCHLORIDE 10 MILLIGRAM(S): 30 TABLET ORAL at 23:59

## 2025-01-30 RX ADMIN — OXYCODONE HYDROCHLORIDE 10 MILLIGRAM(S): 30 TABLET ORAL at 18:43

## 2025-01-30 RX ADMIN — Medication 4 MILLIGRAM(S): at 12:36

## 2025-01-30 RX ADMIN — POLYETHYLENE GLYCOL 3350 17 GRAM(S): 17 POWDER, FOR SOLUTION ORAL at 11:19

## 2025-01-30 RX ADMIN — Medication 1 DOSE(S): at 15:37

## 2025-01-30 RX ADMIN — IPRATROPIUM BROMIDE AND ALBUTEROL SULFATE 3 MILLILITER(S): .5; 2.5 SOLUTION RESPIRATORY (INHALATION) at 14:41

## 2025-01-30 RX ADMIN — ROSUVASTATIN CALCIUM 20 MILLIGRAM(S): 20 TABLET, FILM COATED ORAL at 22:06

## 2025-01-30 RX ADMIN — LIDOCAINE HYDROCHLORIDE 1 PATCH: 20 JELLY TOPICAL at 11:21

## 2025-01-30 RX ADMIN — OXYCODONE HYDROCHLORIDE 10 MILLIGRAM(S): 30 TABLET ORAL at 12:15

## 2025-01-30 RX ADMIN — Medication 0.5 MILLIGRAM(S): at 17:17

## 2025-01-30 RX ADMIN — Medication 400 MILLIGRAM(S): at 08:50

## 2025-01-30 RX ADMIN — MUPIROCIN CALCIUM 1 APPLICATION(S): 20 CREAM TOPICAL at 18:33

## 2025-01-30 RX ADMIN — GABAPENTIN 300 MILLIGRAM(S): 400 CAPSULE ORAL at 08:52

## 2025-01-30 RX ADMIN — Medication 0.5 MILLIGRAM(S): at 08:51

## 2025-01-30 RX ADMIN — LIDOCAINE HYDROCHLORIDE 1 PATCH: 20 JELLY TOPICAL at 23:00

## 2025-01-30 RX ADMIN — Medication 400 MILLIGRAM(S): at 22:07

## 2025-01-30 RX ADMIN — Medication 325 MILLIGRAM(S): at 11:20

## 2025-01-30 RX ADMIN — Medication 40 MILLIGRAM(S): at 08:52

## 2025-01-30 RX ADMIN — LIDOCAINE HYDROCHLORIDE 1 PATCH: 20 JELLY TOPICAL at 18:55

## 2025-01-30 RX ADMIN — Medication 25 MICROGRAM(S): at 03:00

## 2025-01-30 RX ADMIN — OXYCODONE HYDROCHLORIDE 10 MILLIGRAM(S): 30 TABLET ORAL at 08:36

## 2025-01-30 RX ADMIN — GABAPENTIN 300 MILLIGRAM(S): 400 CAPSULE ORAL at 22:07

## 2025-01-30 RX ADMIN — FOLIC ACID 1 MILLIGRAM(S): 1 TABLET ORAL at 11:19

## 2025-01-30 RX ADMIN — OXYCODONE HYDROCHLORIDE 10 MILLIGRAM(S): 30 TABLET ORAL at 22:59

## 2025-01-30 RX ADMIN — Medication 1000 MILLIGRAM(S): at 01:00

## 2025-01-30 RX ADMIN — HEPARIN SODIUM 5000 UNIT(S): 1000 INJECTION INTRAVENOUS; SUBCUTANEOUS at 22:04

## 2025-01-30 RX ADMIN — Medication 100 MILLIGRAM(S): at 05:07

## 2025-01-30 RX ADMIN — IPRATROPIUM BROMIDE AND ALBUTEROL SULFATE 3 MILLILITER(S): .5; 2.5 SOLUTION RESPIRATORY (INHALATION) at 02:42

## 2025-01-30 RX ADMIN — OXYCODONE HYDROCHLORIDE 10 MILLIGRAM(S): 30 TABLET ORAL at 19:18

## 2025-01-30 RX ADMIN — OXYCODONE HYDROCHLORIDE 10 MILLIGRAM(S): 30 TABLET ORAL at 12:35

## 2025-01-30 RX ADMIN — Medication 1000 MILLIGRAM(S): at 17:00

## 2025-01-30 NOTE — PROGRESS NOTE ADULT - SUBJECTIVE AND OBJECTIVE BOX
CTU Attending Progress Daily Note     30 Jan 2025 07:45  Procedure: MVR  POD#: 2    He has history of Diabetes    HTN (hypertension)    Obstructive sleep apnea on CPAP    Asthma    Obesity    Peripheral neuropathy    Arthritis    Hypercholesteremia    Madelung's disease    FH: mitral regurgitation    CHF NYHA class III    Atrial fibrillation    JIMMY on CPAP      HPI:      Hospital Course:    OBJECTIVE:  ICU Vital Signs Last 24 Hrs  T(C): 36.9 (30 Jan 2025 04:00), Max: 36.9 (29 Jan 2025 20:00)  T(F): 98.5 (30 Jan 2025 04:00), Max: 98.5 (30 Jan 2025 04:00)  HR: 69 (30 Jan 2025 06:00) (54 - 77)  BP: 119/56 (29 Jan 2025 12:00) (117/65 - 119/56)  BP(mean): 81 (29 Jan 2025 12:00) (79 - 81)  ABP: 106/44 (30 Jan 2025 06:00) (-1/-1 - 177/55)  ABP(mean): 60 (30 Jan 2025 06:00) (-1 - 152)  RR: 22 (30 Jan 2025 06:00) (10 - 36)  SpO2: 99% (30 Jan 2025 06:00) (92% - 100%)    O2 Parameters below as of 30 Jan 2025 07:00  Patient On (Oxygen Delivery Method): nasal cannula, high flow  O2 Flow (L/min): 50  O2 Concentration (%): 60      I&O's Summary    29 Jan 2025 07:01  -  30 Jan 2025 07:00  --------------------------------------------------------  IN: 3230.5 mL / OUT: 2970 mL / NET: 260.5 mL      I&O's Detail    29 Jan 2025 07:01  -  30 Jan 2025 07:00  --------------------------------------------------------  IN:    Dexmedetomidine: 8.6 mL    Insulin: 83 mL    IV PiggyBack: 150 mL    IV PiggyBack: 300 mL    IV PiggyBack: 500 mL    Milrinone: 307.2 mL    Norepinephrine: 39.7 mL    Oral Fluid: 1320 mL    sodium chloride 0.9%: 480 mL    Vasopressin: 42 mL  Total IN: 3230.5 mL    OUT:    Chest Tube (mL): 205 mL    Chest Tube (mL): 120 mL    Chest Tube (mL): 350 mL    Indwelling Catheter - Urethral (mL): 2295 mL  Total OUT: 2970 mL    Total NET: 260.5 mL        Adult Advanced Hemodynamics Last 24 Hrs  CVP(mm Hg): 22 (29 Jan 2025 16:00) (9 - 41)  CVP(cm H2O): --  CO: 7.6 (29 Jan 2025 12:00) (4.7 - 7.6)  CI: 3.6 (29 Jan 2025 12:00) (2.2 - 3.6)  PA: 83/24 (29 Jan 2025 16:00) (54/16 - 90/24)  PA(mean): 41 (29 Jan 2025 16:00) (27 - 51)  PCWP: --  SVR: 567 (29 Jan 2025 12:00) (567 - 595)  SVRI: 1198 (29 Jan 2025 12:00) (1198 - 1271)  PVR: --  PVRI: --    CAPILLARY BLOOD GLUCOSE      POCT Blood Glucose.: 136 mg/dL (30 Jan 2025 04:07)  POCT Blood Glucose.: 154 mg/dL (30 Jan 2025 01:20)  POCT Blood Glucose.: 164 mg/dL (30 Jan 2025 00:17)  POCT Blood Glucose.: 142 mg/dL (29 Jan 2025 22:15)  POCT Blood Glucose.: 149 mg/dL (29 Jan 2025 20:41)  POCT Blood Glucose.: 290 mg/dL (29 Jan 2025 18:44)  POCT Blood Glucose.: 120 mg/dL (29 Jan 2025 17:50)  POCT Blood Glucose.: 97 mg/dL (29 Jan 2025 17:11)  POCT Blood Glucose.: 95 mg/dL (29 Jan 2025 16:05)  POCT Blood Glucose.: 101 mg/dL (29 Jan 2025 15:14)  POCT Blood Glucose.: 111 mg/dL (29 Jan 2025 12:58)  POCT Blood Glucose.: 120 mg/dL (29 Jan 2025 12:08)  POCT Blood Glucose.: 131 mg/dL (29 Jan 2025 11:00)  POCT Blood Glucose.: 156 mg/dL (29 Jan 2025 09:54)  POCT Blood Glucose.: 153 mg/dL (29 Jan 2025 09:12)  POCT Blood Glucose.: 165 mg/dL (29 Jan 2025 08:05)    LABS:  ABG - ( 30 Jan 2025 04:16 )  pH, Arterial: 7.35  pH, Blood: x     /  pCO2: 37    /  pO2: 131   / HCO3: 20    / Base Excess: -4.7  /  SaO2: 98.2                                    7.6    12.22 )-----------( 108      ( 30 Jan 2025 02:35 )             24.8     01-30    138  |  105  |  42[H]  ----------------------------<  143[H]  4.1   |  20  |  2.0[H]    Ca    8.0[L]      30 Jan 2025 02:35  Mg     2.3     01-30    TPro  5.3[L]  /  Alb  3.8  /  TBili  0.5  /  DBili  x   /  AST  65[H]  /  ALT  12  /  AlkPhos  40  01-30    PT/INR - ( 30 Jan 2025 02:35 )   PT: 13.40 sec;   INR: 1.13 ratio         PTT - ( 30 Jan 2025 02:35 )  PTT:28.0 sec  Urinalysis Basic - ( 30 Jan 2025 02:35 )    Color: x / Appearance: x / SG: x / pH: x  Gluc: 143 mg/dL / Ketone: x  / Bili: x / Urobili: x   Blood: x / Protein: x / Nitrite: x   Leuk Esterase: x / RBC: x / WBC x   Sq Epi: x / Non Sq Epi: x / Bacteria: x        Home Medications:  Breo Ellipta 200 mcg-25 mcg/inh inhalation powder: 1 inhaled once a day (28 Jan 2025 06:52)  HYDROcodone 10 mg oral capsule, extended release: 1 cap(s) orally 2 times a day (28 Jan 2025 06:52)  losartan 50 mg oral tablet: 1 tab(s) orally 2 times a day (28 Jan 2025 06:52)  losartan-hydrochlorothiazide 50 mg-12.5 mg oral tablet: 1 tab(s) orally (28 Jan 2025 06:52)  ProAir HFA 90 mcg/inh inhalation aerosol: 2 puff(s) inhaled 2 times a day (28 Jan 2025 06:52)  tujeo: 80 unit(s) subcutaneous once a day (28 Jan 2025 06:52)    HOSPITAL MEDICATIONS:  MEDICATIONS  (STANDING):  albuterol/ipratropium for Nebulization 3 milliLiter(s) Nebulizer every 6 hours  aspirin enteric coated 81 milliGRAM(s) Oral daily  bisacodyl Suppository 10 milliGRAM(s) Rectal once  ceFAZolin   IVPB 2000 milliGRAM(s) IV Intermittent every 8 hours  chlorhexidine 2% Cloths 1 Application(s) Topical daily  dextrose 50% Injectable 50 milliLiter(s) IV Push every 15 minutes  dextrose 50% Injectable 25 milliLiter(s) IV Push every 15 minutes  famotidine Injectable 20 milliGRAM(s) IV Push every 12 hours  ferrous    sulfate 325 milliGRAM(s) Oral daily  fluticasone propionate/ salmeterol 250-50 MICROgram(s) Diskus 1 Dose(s) Inhalation two times a day  folic acid 1 milliGRAM(s) Oral daily  furosemide   Injectable 20 milliGRAM(s) IV Push daily  guaiFENesin  milliGRAM(s) Oral every 12 hours  heparin   Injectable 5000 Unit(s) SubCutaneous every 8 hours  influenza  Vaccine (HIGH DOSE) 0.5 milliLiter(s) IntraMuscular once  insulin regular Infusion 5 Unit(s)/Hr (5 mL/Hr) IV Continuous <Continuous>  meperidine     Injectable 25 milliGRAM(s) IV Push once  milrinone Infusion 0.375 MICROgram(s)/kG/Min (12.8 mL/Hr) IV Continuous <Continuous>  mupirocin 2% Nasal 1 Application(s) Both Nostrils every 12 hours  niCARdipine Infusion 5 mG/Hr (25 mL/Hr) IV Continuous <Continuous>  nitroglycerin  Infusion 15 MICROgram(s)/Min (4.5 mL/Hr) IV Continuous <Continuous>  norepinephrine Infusion 0.05 MICROgram(s)/kG/Min (10.7 mL/Hr) IV Continuous <Continuous>  polyethylene glycol 3350 17 Gram(s) Oral daily  senna 2 Tablet(s) Oral at bedtime  sodium chloride 0.9%. 1000 milliLiter(s) (20 mL/Hr) IV Continuous <Continuous>  vasopressin Infusion 0.02 Unit(s)/Min (3 mL/Hr) IV Continuous <Continuous>    MEDICATIONS  (PRN):  ALPRAZolam 0.25 milliGRAM(s) Oral at bedtime PRN anxiety  hydrALAZINE Injectable 10 milliGRAM(s) IV Push every 2 hours PRN sbp>150  HYDROmorphone  Injectable 0.5 milliGRAM(s) IV Push every 6 hours PRN Breakthrough Pain  ondansetron Injectable 4 milliGRAM(s) IV Push every 4 hours PRN Nausea and/or Vomiting  oxyCODONE    IR 5 milliGRAM(s) Oral every 4 hours PRN Moderate Pain (4 - 6)  oxyCODONE    IR 10 milliGRAM(s) Oral every 4 hours PRN Severe Pain (7 - 10)    RADIOLOGY:  Chest X-ray Reviewed    REVIEW OF SYSTEMS:  CONSTITUTIONAL: [X] all negative; [ ] weakness, [ ] fevers, [ ] chills  EYES/ENT: [X] all negative; [ ] visual changes, [ ] vertigo, [ ] throat pain   NECK: [X] all negative; [ ] pain, [ ] stiffness  RESPIRATORY: [] all negative, [ ] cough, [ ] wheezing, [ ] hemoptysis, [ ] shortness of breath  CARDIOVASCULAR: [] all negative; [ ] chest pain, [ ] palpitations, [ ] orthopnea  GASTROINTESTINAL: [X] all negative; [ ]abdominal pain, [ ] nausea, [ ] vomiting, [ ] hematemesis, [ ] diarrhea, [ ] constipation, [ ] melena, [ ] hematochezia.  GENITOURINARY: [X] all negative; [ ] dysuria, [ ] frequency, [ ] hematuria  NEUROLOGICAL: [X] all negative; [ ] numbness, [ ] weakness  SKIN: [X] all negative; [ ] itching, [ ] burning, [ ] rashes, [ ] lesions   All other review of systems is negative unless indicated above.  [  ] Unable to assess ROS because     PHYSICAL EXAM:          CONSTITUTIONAL: Well-developed; well-nourished; in no acute distress.   	SKIN: warm, dry  	HEAD: Normocephalic; atraumatic.  	EYES: PERRL, EOM, no conj injection, sclera clear  	ENT: No nasal discharge; airway clear.  	NECK: Supple; non tender.   	CARD: S1, S2 normal; no murmurs, gallops, or rubs. Regular rate and rhythm.  no carotid bruits  	RESP: CTA B/L; good air movement No wheezes, rales or rhonchi.  	ABD: Soft, not tender, not distended,  no rebound or guarding, bowel sounds present  	EXT: Normal ROM.  No clubbing, cyanosis or edema.   warm and well perfused.  pulses palpable  	NEURO: Alert, awake, motor 5/5 R, 5/5 L    Assessment   s/p *** POD *** currently progressing well with ***    Plan:    Neuro:  Multimodal pain management  Tylenol, Lidoderm patch, gabapentin, opiates as needed  Monitor neuro status in the post op period    CV:  S/P ***  Monitor perfusion, , lactate, UOP, index and MVO2 if available  Maintain MAP > 65  ASA, statin  Beta blockers when able  Amio ppx per protocol  Remove chest tubes when able    Resp:    Supplemental oxygen to maintain sats > 92%  Continuous pulse oximetry monitoring  IS / Chest PT  OOBTC and Ambulate  Nebulizers as needed    GI:  Heart healthy diet  Bowel Regimen - escalate as needed  PUD ppx per protocol    Renal  High risk for FLAKITO post surgery  Monitor UOP, lytes, creatinine  Diuretics as needed for negative fluid balance  Keep K > 4, Mg > 2    Endo:  Tight glucose control per CTICU protocol  Insulin gtt as needed  Transition to Lantus / SSI and premeal insulin as needed    Heme:  Acute blood loss anemia post surgery  DVT prophylaxis once bleeding risk post surgery is minimized    ID:  Perioperative prophylactic abx per protocol  Monitor fever curve and WBC count  Remove TLC when no longer indicated        Upon my evaluation, the above patient has a high probability of imminent or life threatening deterioration due to a high risk for Shock, Cardiogenic shock, arrythmias, acute blood loss, acute kidney injury and acute pulmonary insufficiency which required my direct attention, intervention, and personal management.  Total time was 55 minutes which includes review of radiology results, ordering, interpreting and reviewing diagnostic studies / lab tests with immediate assessment and treatment, consultant collaboration on findings and treatment options, monitoring for potential decompensation, obtaining history from family, EMT, nursing home staff and/or treating physicians; directing the titration of pressors, oxygen support devices, fluid resuscitation, interpretation of cardiac output measurements, interpretation of radiological assessments, interpretation of EKG / rhythm strips, telemetry.  This time did not overlap with the management of other patients or performing separately reportable procedures.      Management of this patient was discussed with the CT surgery attending and mid-level providers.    I acknowledge the use of copied documentation.  All copy forward documentation is my own and has been reviewed and revised as appropriate.  If no changes were made, it is because that information remains the same. CTU Attending Progress Daily Note     30 Jan 2025 07:45    Procedure: MVReplacement (bioprosthetic), SHEILA ligation, MAZE  Procedure Day: 2    Hx: afib, nonobstructive CAD, CHF class 3, severe MR, DM, HTN, JIMMY on CPAP, asthma, severe obesity, HL, Madelung's disease, arthritis, arthritis, peripheral neuropathy    HPI: 64M w/ afib, nonobstructive CAD, CHF class 3, severe MR, DM, HTN, JIMMY on CPAP, asthma, severe obesity, HL, Madelung's disease, osteoarthritis s/p bilateral hip replacement, peripheral neuropathy presented with increasing dyspnea with exertion found to have severe MR    OR Data  Procedure: Procedure: MVReplacement (bioprosthetic), SHEILA ligation, MAZE  Bypass: 202  Cross Clamp: 158  Pre LV Fxn: 50-55%      RV Fxn: normal  Post LV Fxn: 45-50%     RV Fxn: normal    moderate TR, MV gradient 3mmHg  Blood Products: 1uPRBC, ddavp 39mcq  Cell Saver: 698  Fluids: NS 2000, albumin 2000  Pacing Wires: V pacing. sinus rhythm  UO: 1100    OBJECTIVE:  ICU Vital Signs Last 24 Hrs  T(C): 36.9 (30 Jan 2025 04:00), Max: 36.9 (29 Jan 2025 20:00)  T(F): 98.5 (30 Jan 2025 04:00), Max: 98.5 (30 Jan 2025 04:00)  HR: 69 (30 Jan 2025 06:00) (54 - 77)  BP: 119/56 (29 Jan 2025 12:00) (117/65 - 119/56)  BP(mean): 81 (29 Jan 2025 12:00) (79 - 81)  ABP: 106/44 (30 Jan 2025 06:00) (-1/-1 - 177/55)  ABP(mean): 60 (30 Jan 2025 06:00) (-1 - 152)  RR: 22 (30 Jan 2025 06:00) (10 - 36)  SpO2: 99% (30 Jan 2025 06:00) (92% - 100%)    O2 Parameters below as of 30 Jan 2025 07:00  Patient On (Oxygen Delivery Method): nasal cannula, high flow  O2 Flow (L/min): 50  O2 Concentration (%): 60      I&O's Summary    29 Jan 2025 07:01  -  30 Jan 2025 07:00  --------------------------------------------------------  IN: 3230.5 mL / OUT: 2970 mL / NET: 260.5 mL      I&O's Detail    29 Jan 2025 07:01  -  30 Jan 2025 07:00  --------------------------------------------------------  IN:    Dexmedetomidine: 8.6 mL    Insulin: 83 mL    IV PiggyBack: 150 mL    IV PiggyBack: 300 mL    IV PiggyBack: 500 mL    Milrinone: 307.2 mL    Norepinephrine: 39.7 mL    Oral Fluid: 1320 mL    sodium chloride 0.9%: 480 mL    Vasopressin: 42 mL  Total IN: 3230.5 mL    OUT:    Chest Tube (mL): 205 mL    Chest Tube (mL): 120 mL    Chest Tube (mL): 350 mL    Indwelling Catheter - Urethral (mL): 2295 mL  Total OUT: 2970 mL    Total NET: 260.5 mL        Adult Advanced Hemodynamics Last 24 Hrs  CVP(mm Hg): 22 (29 Jan 2025 16:00) (9 - 41)  CVP(cm H2O): --  CO: 7.6 (29 Jan 2025 12:00) (4.7 - 7.6)  CI: 3.6 (29 Jan 2025 12:00) (2.2 - 3.6)  PA: 83/24 (29 Jan 2025 16:00) (54/16 - 90/24)  PA(mean): 41 (29 Jan 2025 16:00) (27 - 51)  PCWP: --  SVR: 567 (29 Jan 2025 12:00) (567 - 595)  SVRI: 1198 (29 Jan 2025 12:00) (1198 - 1271)  PVR: --  PVRI: --    CAPILLARY BLOOD GLUCOSE      POCT Blood Glucose.: 136 mg/dL (30 Jan 2025 04:07)  POCT Blood Glucose.: 154 mg/dL (30 Jan 2025 01:20)  POCT Blood Glucose.: 164 mg/dL (30 Jan 2025 00:17)  POCT Blood Glucose.: 142 mg/dL (29 Jan 2025 22:15)  POCT Blood Glucose.: 149 mg/dL (29 Jan 2025 20:41)  POCT Blood Glucose.: 290 mg/dL (29 Jan 2025 18:44)  POCT Blood Glucose.: 120 mg/dL (29 Jan 2025 17:50)  POCT Blood Glucose.: 97 mg/dL (29 Jan 2025 17:11)  POCT Blood Glucose.: 95 mg/dL (29 Jan 2025 16:05)  POCT Blood Glucose.: 101 mg/dL (29 Jan 2025 15:14)  POCT Blood Glucose.: 111 mg/dL (29 Jan 2025 12:58)  POCT Blood Glucose.: 120 mg/dL (29 Jan 2025 12:08)  POCT Blood Glucose.: 131 mg/dL (29 Jan 2025 11:00)  POCT Blood Glucose.: 156 mg/dL (29 Jan 2025 09:54)  POCT Blood Glucose.: 153 mg/dL (29 Jan 2025 09:12)  POCT Blood Glucose.: 165 mg/dL (29 Jan 2025 08:05)    LABS:  ABG - ( 30 Jan 2025 04:16 )  pH, Arterial: 7.35  pH, Blood: x     /  pCO2: 37    /  pO2: 131   / HCO3: 20    / Base Excess: -4.7  /  SaO2: 98.2                                    7.6    12.22 )-----------( 108      ( 30 Jan 2025 02:35 )             24.8     01-30    138  |  105  |  42[H]  ----------------------------<  143[H]  4.1   |  20  |  2.0[H]    Ca    8.0[L]      30 Jan 2025 02:35  Mg     2.3     01-30    TPro  5.3[L]  /  Alb  3.8  /  TBili  0.5  /  DBili  x   /  AST  65[H]  /  ALT  12  /  AlkPhos  40  01-30    PT/INR - ( 30 Jan 2025 02:35 )   PT: 13.40 sec;   INR: 1.13 ratio         PTT - ( 30 Jan 2025 02:35 )  PTT:28.0 sec  Urinalysis Basic - ( 30 Jan 2025 02:35 )    Color: x / Appearance: x / SG: x / pH: x  Gluc: 143 mg/dL / Ketone: x  / Bili: x / Urobili: x   Blood: x / Protein: x / Nitrite: x   Leuk Esterase: x / RBC: x / WBC x   Sq Epi: x / Non Sq Epi: x / Bacteria: x        Home Medications:  Breo Ellipta 200 mcg-25 mcg/inh inhalation powder: 1 inhaled once a day (28 Jan 2025 06:52)  HYDROcodone 10 mg oral capsule, extended release: 1 cap(s) orally 2 times a day (28 Jan 2025 06:52)  losartan 50 mg oral tablet: 1 tab(s) orally 2 times a day (28 Jan 2025 06:52)  losartan-hydrochlorothiazide 50 mg-12.5 mg oral tablet: 1 tab(s) orally (28 Jan 2025 06:52)  ProAir HFA 90 mcg/inh inhalation aerosol: 2 puff(s) inhaled 2 times a day (28 Jan 2025 06:52)  tujeo: 80 unit(s) subcutaneous once a day (28 Jan 2025 06:52)    HOSPITAL MEDICATIONS:  MEDICATIONS  (STANDING):  albuterol/ipratropium for Nebulization 3 milliLiter(s) Nebulizer every 6 hours  aspirin enteric coated 81 milliGRAM(s) Oral daily  bisacodyl Suppository 10 milliGRAM(s) Rectal once  ceFAZolin   IVPB 2000 milliGRAM(s) IV Intermittent every 8 hours  chlorhexidine 2% Cloths 1 Application(s) Topical daily  dextrose 50% Injectable 50 milliLiter(s) IV Push every 15 minutes  dextrose 50% Injectable 25 milliLiter(s) IV Push every 15 minutes  famotidine Injectable 20 milliGRAM(s) IV Push every 12 hours  ferrous    sulfate 325 milliGRAM(s) Oral daily  fluticasone propionate/ salmeterol 250-50 MICROgram(s) Diskus 1 Dose(s) Inhalation two times a day  folic acid 1 milliGRAM(s) Oral daily  furosemide   Injectable 20 milliGRAM(s) IV Push daily  guaiFENesin  milliGRAM(s) Oral every 12 hours  heparin   Injectable 5000 Unit(s) SubCutaneous every 8 hours  influenza  Vaccine (HIGH DOSE) 0.5 milliLiter(s) IntraMuscular once  insulin regular Infusion 5 Unit(s)/Hr (5 mL/Hr) IV Continuous <Continuous>  meperidine     Injectable 25 milliGRAM(s) IV Push once  milrinone Infusion 0.375 MICROgram(s)/kG/Min (12.8 mL/Hr) IV Continuous <Continuous>  mupirocin 2% Nasal 1 Application(s) Both Nostrils every 12 hours  niCARdipine Infusion 5 mG/Hr (25 mL/Hr) IV Continuous <Continuous>  nitroglycerin  Infusion 15 MICROgram(s)/Min (4.5 mL/Hr) IV Continuous <Continuous>  norepinephrine Infusion 0.05 MICROgram(s)/kG/Min (10.7 mL/Hr) IV Continuous <Continuous>  polyethylene glycol 3350 17 Gram(s) Oral daily  senna 2 Tablet(s) Oral at bedtime  sodium chloride 0.9%. 1000 milliLiter(s) (20 mL/Hr) IV Continuous <Continuous>  vasopressin Infusion 0.02 Unit(s)/Min (3 mL/Hr) IV Continuous <Continuous>    MEDICATIONS  (PRN):  ALPRAZolam 0.25 milliGRAM(s) Oral at bedtime PRN anxiety  hydrALAZINE Injectable 10 milliGRAM(s) IV Push every 2 hours PRN sbp>150  HYDROmorphone  Injectable 0.5 milliGRAM(s) IV Push every 6 hours PRN Breakthrough Pain  ondansetron Injectable 4 milliGRAM(s) IV Push every 4 hours PRN Nausea and/or Vomiting  oxyCODONE    IR 5 milliGRAM(s) Oral every 4 hours PRN Moderate Pain (4 - 6)  oxyCODONE    IR 10 milliGRAM(s) Oral every 4 hours PRN Severe Pain (7 - 10)    RADIOLOGY:  Chest X-ray Reviewed    PHYSICAL EXAM:          CONSTITUTIONAL: Well-developed; well-nourished; in no acute distress.   	SKIN: warm, dry  	HEAD: Normocephalic; atraumatic.  	EYES: PERRL, EOM, no conj injection, sclera clear  	ENT: No nasal discharge; airway clear.  	NECK: Supple; non tender.   	CARD: S1, S2 normal; no murmurs, gallops, or rubs. Regular rate and rhythm.  no carotid bruits  	RESP: CTA B/L; good air movement No wheezes, rales or rhonchi.  	ABD: Soft, not tender, not distended,  no rebound or guarding, bowel sounds present  	EXT: Normal ROM.  No clubbing, cyanosis or edema.   warm and well perfused.  pulses palpable  	NEURO: Alert, awake, motor 5/5 R, 5/5 L      Assessment and Plan:   · Assessment	  64M w/ afib, nonobstructive CAD, CHF class 3, severe MR, DM, HTN, JIMMY on CPAP, asthma, severe obesity, HL, Madelung's disease, arthritis, arthritis, peripheral neuropathy s/p MVReplacement, SHEILA ligation, MAZE 1/28/25    Assessment/Plan    Neuro:  #acute postoperative pain  - Analgesics: acetaminophen, lidocaine patch, opioid prn  - pain controlled    CV: MVR s/p MVReplacement, SHEILA ligation, MAZE  #RV/LV heart failure  - Inotropes: milrinone. Titrate for CI >2.2    Pulm:  #acute postoperative pulmonary insufficiency  #atelectasis  - continue NIPPV. alternating with HFNC   - aggressive incentive spirometry and chest PT to address atelectasis   #acute pulmonary edema  - increase diuresis    Renal/Fluid/Lytes:  #acute fluid overload  - increase diuresis  - replete/optimize electrolytes  - preload and after optimization    Heme:  #Acute blood loss anemia from surgery  - transfuse for hemoglobin < 7 or hemodynamic instability due to anemia  #s/p MVReplacement surgery, anticoagulation/antiplatelet plan  - start aspirin    ID:  #at risk for surgical infection  - finish cathie-op antibiotics  - no signs of acute infection    Endocrine:   #acute postoperative stress hyperglycemia  - insulin therapy to achieve tight glucose control    GI:  #at risk for malnutrition  - enteral diet  #at risk for post-operative ileus and opioid-induced constipation  - bowel regimen    Prophylaxis  #at risk of VTE  - SCDs. VTE chemoprophylaxis start  #at risk of GI stress ulcer  - GI prophylaxis per cardiac surgery    PT  - out of bed to chair  - ambulation as much as possible    Medication list reviewed.    Lines: PAC, arterial line and prince to remain in until off inotropes, vasoactives and no longer need close monitoring of input/output.    - RIJ large bore access -  - chest tube -  - epicardial wires -    This patient is critically ill and required my dedicated time to evaluate, manage and maintain or prevent deterioration of the organ systems and problems noted above and provide documentation.    Due to a high probability of clinically significant, life threatening deterioration due to high risk of   cardiac failure, circulatory failure, cardiogenic shock, shock, arrhythmias, renal failure, acute blood loss, acute pulmonary insufficiency, airway compromise hepatic failure, the patient required my highest level of preparedness to intervene emergently and I personally spent this critical care time directly and personally managing the patient. This critical care time included obtaining a history when possible; examining the patient; review pulse oximetry; order / interpreting / review of laboratory and radiology studies with immediate assessment and treatment as needed; directing the titration of pressors, oxygen support devices, fluid resuscitation, interpretation of cardiac output measurements; interpretation of EKG / rhythm strips / telemetry; arranging urgent treatment with development of a management plan; evaluation of patient's response to treatment; frequent reassessment and monitor for potential decompensation; and discussion with other providers/consultants.  This critical care time was performed to assess and manage the high probability of imminent life threatening deterioration that could result in organ(s) failure. It was exclusive of separately billable procedures and treating other patients and teaching time. please see MDM (medical decision making) / Assessment / Plans sections and the rest of the note for further information on patient assessment and treatment.    Time I spent with family or surrogate(s) is included only if the patient was incapable of providing the necessary information or participating in medical decision making. Time devoted to teaching and to any procedures I billed separately is not included.     Management of this patient was discussed with the surgical attending / cardiologist and mid-level providers.    A central line, if present, continues to be necessary for infusion of medications and IV fluids. It is to be removed when other adequate venous access is accomplished.    A restraint, if present, remains because the patient cannot be safely managed without restraints as potential for harm secondary to inadvertent movement and loss of tubes/lines outweighs the burden of restraint.    A prince catheter, if present, remains necessary to monitor urine output continuously, and/or divert urine from the skin. It will be removed per policy when it is not needed for these purposes.    I acknowledge the use of copied documentation.  All copy forward documentation is my own and has been reviewed and revised as appropriate.  If no changes were made, it is because that information remains the same.     CTU Attending Progress Daily Note     30 Jan 2025 07:45    Procedure: MVReplacement (bioprosthetic), SHEILA ligation, MAZE  Procedure Day: 2    Hx: afib, nonobstructive CAD, CHF class 3, severe MR, DM, HTN, JIMMY on CPAP, asthma, severe obesity, HL, Madelung's disease, arthritis, arthritis, peripheral neuropathy    HPI: 64M w/ afib, nonobstructive CAD, CHF class 3, severe MR, DM, HTN, JIMMY on CPAP, asthma, severe obesity, HL, Madelung's disease, osteoarthritis s/p bilateral hip replacement, peripheral neuropathy presented with increasing dyspnea with exertion found to have severe MR    OR Data  Procedure: Procedure: MVReplacement (bioprosthetic), SHEILA ligation, MAZE  Bypass: [id number]  Cross Clamp: 158  Pre LV Fxn: 50-55%      RV Fxn: normal  Post LV Fxn: 45-50%     RV Fxn: normal    moderate TR, MV gradient 3mmHg  Blood Products: 1uPRBC, ddavp 39mcq  Cell Saver: 698  Fluids: NS 2000, albumin 2000  Pacing Wires: V pacing. sinus rhythm  UO: 1100    OBJECTIVE:  ICU Vital Signs Last 24 Hrs  T(C): 36.9 (30 Jan 2025 04:00), Max: 36.9 (29 Jan 2025 20:00)  T(F): 98.5 (30 Jan 2025 04:00), Max: 98.5 (30 Jan 2025 04:00)  HR: 69 (30 Jan 2025 06:00) (54 - 77)  BP: 119/56 (29 Jan 2025 12:00) (117/65 - 119/56)  BP(mean): 81 (29 Jan 2025 12:00) (79 - 81)  ABP: 106/44 (30 Jan 2025 06:00) (-1/-1 - 177/55)  ABP(mean): 60 (30 Jan 2025 06:00) (-1 - 152)  RR: 22 (30 Jan 2025 06:00) (10 - 36)  SpO2: 99% (30 Jan 2025 06:00) (92% - 100%)    O2 Parameters below as of 30 Jan 2025 07:00  Patient On (Oxygen Delivery Method): nasal cannula, high flow  O2 Flow (L/min): 50  O2 Concentration (%): 60      I&O's Summary    29 Jan 2025 07:01  -  30 Jan 2025 07:00  --------------------------------------------------------  IN: 3230.5 mL / OUT: 2970 mL / NET: 260.5 mL      I&O's Detail    29 Jan 2025 07:01  -  30 Jan 2025 07:00  --------------------------------------------------------  IN:    Dexmedetomidine: 8.6 mL    Insulin: 83 mL    IV PiggyBack: 150 mL    IV PiggyBack: 300 mL    IV PiggyBack: 500 mL    Milrinone: 307.2 mL    Norepinephrine: 39.7 mL    Oral Fluid: 1320 mL    sodium chloride 0.9%: 480 mL    Vasopressin: 42 mL  Total IN: 3230.5 mL    OUT:    Chest Tube (mL): 205 mL    Chest Tube (mL): 120 mL    Chest Tube (mL): 350 mL    Indwelling Catheter - Urethral (mL): 2295 mL  Total OUT: 2970 mL    Total NET: 260.5 mL        Adult Advanced Hemodynamics Last 24 Hrs  CVP(mm Hg): 22 (29 Jan 2025 16:00) (9 - 41)  CVP(cm H2O): --  CO: 7.6 (29 Jan 2025 12:00) (4.7 - 7.6)  CI: 3.6 (29 Jan 2025 12:00) (2.2 - 3.6)  PA: 83/24 (29 Jan 2025 16:00) (54/16 - 90/24)  PA(mean): 41 (29 Jan 2025 16:00) (27 - 51)  PCWP: --  SVR: 567 (29 Jan 2025 12:00) (567 - 595)  SVRI: 1198 (29 Jan 2025 12:00) (1198 - 1271)  PVR: --  PVRI: --    CAPILLARY BLOOD GLUCOSE      POCT Blood Glucose.: 136 mg/dL (30 Jan 2025 04:07)  POCT Blood Glucose.: 154 mg/dL (30 Jan 2025 01:20)  POCT Blood Glucose.: 164 mg/dL (30 Jan 2025 00:17)  POCT Blood Glucose.: 142 mg/dL (29 Jan 2025 22:15)  POCT Blood Glucose.: 149 mg/dL (29 Jan 2025 20:41)  POCT Blood Glucose.: 290 mg/dL (29 Jan 2025 18:44)  POCT Blood Glucose.: 120 mg/dL (29 Jan 2025 17:50)  POCT Blood Glucose.: 97 mg/dL (29 Jan 2025 17:11)  POCT Blood Glucose.: 95 mg/dL (29 Jan 2025 16:05)  POCT Blood Glucose.: 101 mg/dL (29 Jan 2025 15:14)  POCT Blood Glucose.: 111 mg/dL (29 Jan 2025 12:58)  POCT Blood Glucose.: 120 mg/dL (29 Jan 2025 12:08)  POCT Blood Glucose.: 131 mg/dL (29 Jan 2025 11:00)  POCT Blood Glucose.: 156 mg/dL (29 Jan 2025 09:54)  POCT Blood Glucose.: 153 mg/dL (29 Jan 2025 09:12)  POCT Blood Glucose.: 165 mg/dL (29 Jan 2025 08:05)    LABS:  ABG - ( 30 Jan 2025 04:16 )  pH, Arterial: 7.35  pH, Blood: x     /  pCO2: 37    /  pO2: 131   / HCO3: 20    / Base Excess: -4.7  /  SaO2: 98.2                                    7.6    12.22 )-----------( 108      ( 30 Jan 2025 02:35 )             24.8     01-30    138  |  105  |  42[H]  ----------------------------<  143[H]  4.1   |  20  |  2.0[H]    Ca    8.0[L]      30 Jan 2025 02:35  Mg     2.3     01-30    TPro  5.3[L]  /  Alb  3.8  /  TBili  0.5  /  DBili  x   /  AST  65[H]  /  ALT  12  /  AlkPhos  40  01-30    PT/INR - ( 30 Jan 2025 02:35 )   PT: 13.40 sec;   INR: 1.13 ratio         PTT - ( 30 Jan 2025 02:35 )  PTT:28.0 sec  Urinalysis Basic - ( 30 Jan 2025 02:35 )    Color: x / Appearance: x / SG: x / pH: x  Gluc: 143 mg/dL / Ketone: x  / Bili: x / Urobili: x   Blood: x / Protein: x / Nitrite: x   Leuk Esterase: x / RBC: x / WBC x   Sq Epi: x / Non Sq Epi: x / Bacteria: x        Home Medications:  Breo Ellipta 200 mcg-25 mcg/inh inhalation powder: 1 inhaled once a day (28 Jan 2025 06:52)  HYDROcodone 10 mg oral capsule, extended release: 1 cap(s) orally 2 times a day (28 Jan 2025 06:52)  losartan 50 mg oral tablet: 1 tab(s) orally 2 times a day (28 Jan 2025 06:52)  losartan-hydrochlorothiazide 50 mg-12.5 mg oral tablet: 1 tab(s) orally (28 Jan 2025 06:52)  ProAir HFA 90 mcg/inh inhalation aerosol: 2 puff(s) inhaled 2 times a day (28 Jan 2025 06:52)  tujeo: 80 unit(s) subcutaneous once a day (28 Jan 2025 06:52)    HOSPITAL MEDICATIONS:  MEDICATIONS  (STANDING):  albuterol/ipratropium for Nebulization 3 milliLiter(s) Nebulizer every 6 hours  aspirin enteric coated 81 milliGRAM(s) Oral daily  bisacodyl Suppository 10 milliGRAM(s) Rectal once  ceFAZolin   IVPB 2000 milliGRAM(s) IV Intermittent every 8 hours  chlorhexidine 2% Cloths 1 Application(s) Topical daily  dextrose 50% Injectable 50 milliLiter(s) IV Push every 15 minutes  dextrose 50% Injectable 25 milliLiter(s) IV Push every 15 minutes  famotidine Injectable 20 milliGRAM(s) IV Push every 12 hours  ferrous    sulfate 325 milliGRAM(s) Oral daily  fluticasone propionate/ salmeterol 250-50 MICROgram(s) Diskus 1 Dose(s) Inhalation two times a day  folic acid 1 milliGRAM(s) Oral daily  furosemide   Injectable 20 milliGRAM(s) IV Push daily  guaiFENesin  milliGRAM(s) Oral every 12 hours  heparin   Injectable 5000 Unit(s) SubCutaneous every 8 hours  influenza  Vaccine (HIGH DOSE) 0.5 milliLiter(s) IntraMuscular once  insulin regular Infusion 5 Unit(s)/Hr (5 mL/Hr) IV Continuous <Continuous>  meperidine     Injectable 25 milliGRAM(s) IV Push once  milrinone Infusion 0.375 MICROgram(s)/kG/Min (12.8 mL/Hr) IV Continuous <Continuous>  mupirocin 2% Nasal 1 Application(s) Both Nostrils every 12 hours  niCARdipine Infusion 5 mG/Hr (25 mL/Hr) IV Continuous <Continuous>  nitroglycerin  Infusion 15 MICROgram(s)/Min (4.5 mL/Hr) IV Continuous <Continuous>  norepinephrine Infusion 0.05 MICROgram(s)/kG/Min (10.7 mL/Hr) IV Continuous <Continuous>  polyethylene glycol 3350 17 Gram(s) Oral daily  senna 2 Tablet(s) Oral at bedtime  sodium chloride 0.9%. 1000 milliLiter(s) (20 mL/Hr) IV Continuous <Continuous>  vasopressin Infusion 0.02 Unit(s)/Min (3 mL/Hr) IV Continuous <Continuous>    MEDICATIONS  (PRN):  ALPRAZolam 0.25 milliGRAM(s) Oral at bedtime PRN anxiety  hydrALAZINE Injectable 10 milliGRAM(s) IV Push every 2 hours PRN sbp>150  HYDROmorphone  Injectable 0.5 milliGRAM(s) IV Push every 6 hours PRN Breakthrough Pain  ondansetron Injectable 4 milliGRAM(s) IV Push every 4 hours PRN Nausea and/or Vomiting  oxyCODONE    IR 5 milliGRAM(s) Oral every 4 hours PRN Moderate Pain (4 - 6)  oxyCODONE    IR 10 milliGRAM(s) Oral every 4 hours PRN Severe Pain (7 - 10)    RADIOLOGY:  Chest X-ray Reviewed    PHYSICAL EXAM:          CONSTITUTIONAL: Well-developed; well-nourished; in no acute distress.   	SKIN: warm, dry  	HEAD: Normocephalic; atraumatic.  	EYES: PERRL, EOM, no conj injection, sclera clear  	ENT: No nasal discharge; airway clear.  	NECK: Supple; non tender.   	CARD: S1, S2 normal; no murmurs, gallops, or rubs. Regular rate and rhythm.  no carotid bruits  	RESP: CTA B/L; good air movement No wheezes, rales or rhonchi.  	ABD: Soft, not tender, not distended,  no rebound or guarding, bowel sounds present  	EXT: Normal ROM.  No clubbing, cyanosis or edema.   warm and well perfused.  pulses palpable  	NEURO: Alert, awake, motor 5/5 R, 5/5 L      Assessment and Plan:   · Assessment	  64M w/ afib, nonobstructive CAD, CHF class 3, severe MR, DM, HTN, JIMMY on CPAP, asthma, severe obesity, HL, Madelung's disease, arthritis, arthritis, peripheral neuropathy s/p MVReplacement, SHEILA ligation, MAZE 1/28/25    Assessment/Plan    Neuro:  #acute postoperative pain  - Analgesics: acetaminophen, lidocaine patch, opioid prn      CV: MVR s/p MV Replacement, SHEILA ligation, MAZE  - Inotropes: milrinone slow wean    Pulm:  #acute postoperative pulmonary insufficiency  #atelectasis  - continue NIPPV. alternating with HFNC   - aggressive incentive spirometry and chest PT to address atelectasis   #acute pulmonary edema on diuretics    Renal/Fluid/Lytes:  #acute fluid overload  - diuresis  - replete/optimize electrolytes      Heme:  #Acute blood loss anemia from surgery  - transfuse for hemoglobin < 7 or hemodynamic instability due to anemia  - Continue aspirin    ID:  [id number]    Endocrine:   #acute postoperative stress hyperglycemia  - insulin therapy to achieve tight glucose control    GI:  #at risk for malnutrition  - enteral diet  #at risk for post-operative ileus and opioid-induced constipation  - bowel regimen    Prophylaxis  #at risk of VTE  - SCDs. VTE chemoprophylaxis start  #at risk of GI stress ulcer  - GI prophylaxis per cardiac surgery    PT  - out of bed to chair  - ambulation as much as possible    Medication list reviewed.    Lines: PAC, arterial line and prince to remain in until off inotropes, vasoactives and no longer need close monitoring of input/output.    - RIJ large bore access -  - chest tube -  - epicardial wires -    This patient is critically ill and required my dedicated time to evaluate, manage and maintain or prevent deterioration of the organ systems and problems noted above and provide documentation.    Due to a high probability of clinically significant, life threatening deterioration due to high risk of   cardiac failure, circulatory failure, cardiogenic shock, shock, arrhythmias, renal failure, acute blood loss, acute pulmonary insufficiency, airway compromise hepatic failure, the patient required my highest level of preparedness to intervene emergently and I personally spent this critical care time directly and personally managing the patient. This critical care time included obtaining a history when possible; examining the patient; review pulse oximetry; order / interpreting / review of laboratory and radiology studies with immediate assessment and treatment as needed; directing the titration of pressors, oxygen support devices, fluid resuscitation, interpretation of cardiac output measurements; interpretation of EKG / rhythm strips / telemetry; arranging urgent treatment with development of a management plan; evaluation of patient's response to treatment; frequent reassessment and monitor for potential decompensation; and discussion with other providers/consultants.  This critical care time was performed to assess and manage the high probability of imminent life threatening deterioration that could result in organ(s) failure. It was exclusive of separately billable procedures and treating other patients and teaching time. please see MDM (medical decision making) / Assessment / Plans sections and the rest of the note for further information on patient assessment and treatment.    Time I spent with family or surrogate(s) is included only if the patient was incapable of providing the necessary information or participating in medical decision making. Time devoted to teaching and to any procedures I billed separately is not included.     Management of this patient was discussed with the surgical attending / cardiologist and mid-level providers.    A central line, if present, continues to be necessary for infusion of medications and IV fluids. It is to be removed when other adequate venous access is accomplished.    A restraint, if present, remains because the patient cannot be safely managed without restraints as potential for harm secondary to inadvertent movement and loss of tubes/lines outweighs the burden of restraint.    A prince catheter, if present, remains necessary to monitor urine output continuously, and/or divert urine from the skin. It will be removed per policy when it is not needed for these purposes.    I acknowledge the use of copied documentation.  All copy forward documentation is my own and has been reviewed and revised as appropriate.  If no changes were made, it is because that information remains the same.     CTU Attending Progress Daily Note     30 Jan 2025 07:45    Procedure: MVReplacement (bioprosthetic), SHEILA ligation, MAZE  Procedure Day: 2    Hx: afib, nonobstructive CAD, CHF class 3, severe MR, DM, HTN, JIMMY on CPAP, asthma, severe obesity, HL, Madelung's disease, arthritis, arthritis, peripheral neuropathy    HPI: 64M w/ afib, nonobstructive CAD, CHF class 3, severe MR, DM, HTN, JIMMY on CPAP, asthma, severe obesity, HL, Madelung's disease, osteoarthritis s/p bilateral hip replacement, peripheral neuropathy presented with increasing dyspnea with exertion found to have severe MR    OR Data  Procedure: Procedure: MVReplacement (bioprosthetic), SHEILA ligation, MAZE  Bypass: 202  Cross Clamp: 158  Pre LV Fxn: 50-55%      RV Fxn: normal  Post LV Fxn: 45-50%     RV Fxn: normal    moderate TR, MV gradient 3mmHg  Blood Products: 1uPRBC, ddavp 39mcq  Cell Saver: 698  Fluids: NS 2000, albumin 2000  Pacing Wires: V pacing. sinus rhythm  UO: 1100    OBJECTIVE:  ICU Vital Signs Last 24 Hrs  T(C): 36.9 (30 Jan 2025 04:00), Max: 36.9 (29 Jan 2025 20:00)  T(F): 98.5 (30 Jan 2025 04:00), Max: 98.5 (30 Jan 2025 04:00)  HR: 69 (30 Jan 2025 06:00) (54 - 77)  BP: 119/56 (29 Jan 2025 12:00) (117/65 - 119/56)  BP(mean): 81 (29 Jan 2025 12:00) (79 - 81)  ABP: 106/44 (30 Jan 2025 06:00) (-1/-1 - 177/55)  ABP(mean): 60 (30 Jan 2025 06:00) (-1 - 152)  RR: 22 (30 Jan 2025 06:00) (10 - 36)  SpO2: 99% (30 Jan 2025 06:00) (92% - 100%)    O2 Parameters below as of 30 Jan 2025 07:00  Patient On (Oxygen Delivery Method): nasal cannula, high flow  O2 Flow (L/min): 50  O2 Concentration (%): 60      I&O's Summary    29 Jan 2025 07:01  -  30 Jan 2025 07:00  --------------------------------------------------------  IN: 3230.5 mL / OUT: 2970 mL / NET: 260.5 mL      I&O's Detail    29 Jan 2025 07:01  -  30 Jan 2025 07:00  --------------------------------------------------------  IN:    Dexmedetomidine: 8.6 mL    Insulin: 83 mL    IV PiggyBack: 150 mL    IV PiggyBack: 300 mL    IV PiggyBack: 500 mL    Milrinone: 307.2 mL    Norepinephrine: 39.7 mL    Oral Fluid: 1320 mL    sodium chloride 0.9%: 480 mL    Vasopressin: 42 mL  Total IN: 3230.5 mL    OUT:    Chest Tube (mL): 205 mL    Chest Tube (mL): 120 mL    Chest Tube (mL): 350 mL    Indwelling Catheter - Urethral (mL): 2295 mL  Total OUT: 2970 mL    Total NET: 260.5 mL        Adult Advanced Hemodynamics Last 24 Hrs  CVP(mm Hg): 22 (29 Jan 2025 16:00) (9 - 41)  CVP(cm H2O): --  CO: 7.6 (29 Jan 2025 12:00) (4.7 - 7.6)  CI: 3.6 (29 Jan 2025 12:00) (2.2 - 3.6)  PA: 83/24 (29 Jan 2025 16:00) (54/16 - 90/24)  PA(mean): 41 (29 Jan 2025 16:00) (27 - 51)  PCWP: --  SVR: 567 (29 Jan 2025 12:00) (567 - 595)  SVRI: 1198 (29 Jan 2025 12:00) (1198 - 1271)  PVR: --  PVRI: --    CAPILLARY BLOOD GLUCOSE      POCT Blood Glucose.: 136 mg/dL (30 Jan 2025 04:07)  POCT Blood Glucose.: 154 mg/dL (30 Jan 2025 01:20)  POCT Blood Glucose.: 164 mg/dL (30 Jan 2025 00:17)  POCT Blood Glucose.: 142 mg/dL (29 Jan 2025 22:15)  POCT Blood Glucose.: 149 mg/dL (29 Jan 2025 20:41)  POCT Blood Glucose.: 290 mg/dL (29 Jan 2025 18:44)  POCT Blood Glucose.: 120 mg/dL (29 Jan 2025 17:50)  POCT Blood Glucose.: 97 mg/dL (29 Jan 2025 17:11)  POCT Blood Glucose.: 95 mg/dL (29 Jan 2025 16:05)  POCT Blood Glucose.: 101 mg/dL (29 Jan 2025 15:14)  POCT Blood Glucose.: 111 mg/dL (29 Jan 2025 12:58)  POCT Blood Glucose.: 120 mg/dL (29 Jan 2025 12:08)  POCT Blood Glucose.: 131 mg/dL (29 Jan 2025 11:00)  POCT Blood Glucose.: 156 mg/dL (29 Jan 2025 09:54)  POCT Blood Glucose.: 153 mg/dL (29 Jan 2025 09:12)  POCT Blood Glucose.: 165 mg/dL (29 Jan 2025 08:05)    LABS:  ABG - ( 30 Jan 2025 04:16 )  pH, Arterial: 7.35  pH, Blood: x     /  pCO2: 37    /  pO2: 131   / HCO3: 20    / Base Excess: -4.7  /  SaO2: 98.2                                    7.6    12.22 )-----------( 108      ( 30 Jan 2025 02:35 )             24.8     01-30    138  |  105  |  42[H]  ----------------------------<  143[H]  4.1   |  20  |  2.0[H]    Ca    8.0[L]      30 Jan 2025 02:35  Mg     2.3     01-30    TPro  5.3[L]  /  Alb  3.8  /  TBili  0.5  /  DBili  x   /  AST  65[H]  /  ALT  12  /  AlkPhos  40  01-30    PT/INR - ( 30 Jan 2025 02:35 )   PT: 13.40 sec;   INR: 1.13 ratio         PTT - ( 30 Jan 2025 02:35 )  PTT:28.0 sec  Urinalysis Basic - ( 30 Jan 2025 02:35 )    Color: x / Appearance: x / SG: x / pH: x  Gluc: 143 mg/dL / Ketone: x  / Bili: x / Urobili: x   Blood: x / Protein: x / Nitrite: x   Leuk Esterase: x / RBC: x / WBC x   Sq Epi: x / Non Sq Epi: x / Bacteria: x        Home Medications:  Breo Ellipta 200 mcg-25 mcg/inh inhalation powder: 1 inhaled once a day (28 Jan 2025 06:52)  HYDROcodone 10 mg oral capsule, extended release: 1 cap(s) orally 2 times a day (28 Jan 2025 06:52)  losartan 50 mg oral tablet: 1 tab(s) orally 2 times a day (28 Jan 2025 06:52)  losartan-hydrochlorothiazide 50 mg-12.5 mg oral tablet: 1 tab(s) orally (28 Jan 2025 06:52)  ProAir HFA 90 mcg/inh inhalation aerosol: 2 puff(s) inhaled 2 times a day (28 Jan 2025 06:52)  tujeo: 80 unit(s) subcutaneous once a day (28 Jan 2025 06:52)    HOSPITAL MEDICATIONS:  MEDICATIONS  (STANDING):  albuterol/ipratropium for Nebulization 3 milliLiter(s) Nebulizer every 6 hours  aspirin enteric coated 81 milliGRAM(s) Oral daily  bisacodyl Suppository 10 milliGRAM(s) Rectal once  ceFAZolin   IVPB 2000 milliGRAM(s) IV Intermittent every 8 hours  chlorhexidine 2% Cloths 1 Application(s) Topical daily  dextrose 50% Injectable 50 milliLiter(s) IV Push every 15 minutes  dextrose 50% Injectable 25 milliLiter(s) IV Push every 15 minutes  famotidine Injectable 20 milliGRAM(s) IV Push every 12 hours  ferrous    sulfate 325 milliGRAM(s) Oral daily  fluticasone propionate/ salmeterol 250-50 MICROgram(s) Diskus 1 Dose(s) Inhalation two times a day  folic acid 1 milliGRAM(s) Oral daily  furosemide   Injectable 20 milliGRAM(s) IV Push daily  guaiFENesin  milliGRAM(s) Oral every 12 hours  heparin   Injectable 5000 Unit(s) SubCutaneous every 8 hours  influenza  Vaccine (HIGH DOSE) 0.5 milliLiter(s) IntraMuscular once  insulin regular Infusion 5 Unit(s)/Hr (5 mL/Hr) IV Continuous <Continuous>  meperidine     Injectable 25 milliGRAM(s) IV Push once  milrinone Infusion 0.375 MICROgram(s)/kG/Min (12.8 mL/Hr) IV Continuous <Continuous>  mupirocin 2% Nasal 1 Application(s) Both Nostrils every 12 hours  niCARdipine Infusion 5 mG/Hr (25 mL/Hr) IV Continuous <Continuous>  nitroglycerin  Infusion 15 MICROgram(s)/Min (4.5 mL/Hr) IV Continuous <Continuous>  norepinephrine Infusion 0.05 MICROgram(s)/kG/Min (10.7 mL/Hr) IV Continuous <Continuous>  polyethylene glycol 3350 17 Gram(s) Oral daily  senna 2 Tablet(s) Oral at bedtime  sodium chloride 0.9%. 1000 milliLiter(s) (20 mL/Hr) IV Continuous <Continuous>  vasopressin Infusion 0.02 Unit(s)/Min (3 mL/Hr) IV Continuous <Continuous>    MEDICATIONS  (PRN):  ALPRAZolam 0.25 milliGRAM(s) Oral at bedtime PRN anxiety  hydrALAZINE Injectable 10 milliGRAM(s) IV Push every 2 hours PRN sbp>150  HYDROmorphone  Injectable 0.5 milliGRAM(s) IV Push every 6 hours PRN Breakthrough Pain  ondansetron Injectable 4 milliGRAM(s) IV Push every 4 hours PRN Nausea and/or Vomiting  oxyCODONE    IR 5 milliGRAM(s) Oral every 4 hours PRN Moderate Pain (4 - 6)  oxyCODONE    IR 10 milliGRAM(s) Oral every 4 hours PRN Severe Pain (7 - 10)    RADIOLOGY:  Chest X-ray Reviewed    PHYSICAL EXAM:          CONSTITUTIONAL: Well-developed; well-nourished; in no acute distress.   	SKIN: warm, dry  	HEAD: Normocephalic; atraumatic.  	EYES: PERRL, EOM, no conj injection, sclera clear  	ENT: No nasal discharge; airway clear.  	NECK: Supple; non tender.   	CARD: S1, S2 normal; no murmurs, gallops, or rubs. Regular rate and rhythm.  no carotid bruits  	RESP: CTA B/L; good air movement No wheezes, rales or rhonchi.  	ABD: Soft, not tender, not distended,  no rebound or guarding, bowel sounds present  	EXT: Normal ROM.  No clubbing, cyanosis or edema.   warm and well perfused.  pulses palpable  	NEURO: Alert, awake, motor 5/5 R, 5/5 L      Assessment and Plan:   · Assessment	  64M w/ afib, nonobstructive CAD, CHF class 3, severe MR, DM, HTN, JIMMY on CPAP, asthma, severe obesity, HL, Madelung's disease, arthritis, arthritis, peripheral neuropathy s/p MVReplacement, SHEILA ligation, MAZE 1/28/25    Assessment/Plan    Neuro:  #acute postoperative pain  - Analgesics: acetaminophen, lidocaine patch, opioid prn      CV: MVR s/p MV Replacement, SHEILA ligation, MAZE  - Inotropes: milrinone slow wean    Pulm:  #acute postoperative pulmonary insufficiency  #atelectasis  - continue NIPPV. alternating with HFNC   - aggressive incentive spirometry and chest PT to address atelectasis   #acute pulmonary edema on diuretics    Renal/Fluid/Lytes:  #acute fluid overload  - diuresis  - replete/optimize electrolytes      Heme:  #Acute blood loss anemia from surgery  - transfuse for hemoglobin < 7 or hemodynamic instability due to anemia  - Continue aspirin    ID:  [id number]    Endocrine:   #acute postoperative stress hyperglycemia  - insulin therapy to achieve tight glucose control    GI:  - bowel regimen    Prophylaxis  - SCDs. VTE chemoprophylaxis start  - GI prophylaxis     PT  - out of bed to chair  - ambulation as much as possible      This patient is critically ill and required my dedicated time to evaluate, manage and maintain or prevent deterioration of the organ systems and problems noted above and provide documentation.    Due to a high probability of clinically significant, life threatening deterioration due to high risk of   cardiac failure, circulatory failure, cardiogenic shock, shock, arrhythmias, renal failure, acute blood loss, acute pulmonary insufficiency, airway compromise hepatic failure, the patient required my highest level of preparedness to intervene emergently and I personally spent this critical care time directly and personally managing the patient. This critical care time included obtaining a history when possible; examining the patient; review pulse oximetry; order / interpreting / review of laboratory and radiology studies with immediate assessment and treatment as needed; directing the titration of pressors, oxygen support devices, fluid resuscitation, interpretation of cardiac output measurements; interpretation of EKG / rhythm strips / telemetry; arranging urgent treatment with development of a management plan; evaluation of patient's response to treatment; frequent reassessment and monitor for potential decompensation; and discussion with other providers/consultants.  This critical care time was performed to assess and manage the high probability of imminent life threatening deterioration that could result in organ(s) failure. It was exclusive of separately billable procedures and treating other patients and teaching time.   Management of this patient was discussed with the surgical attending / cardiologist and mid-level providers.

## 2025-01-30 NOTE — PHYSICAL THERAPY INITIAL EVALUATION ADULT - IMPAIRMENTS CONTRIBUTING TO GAIT DEVIATIONS, PT EVAL
decreased cardiovascular endurance and c/o SOB/impaired balance/pain/decreased strength decreased cardiopulmonary endurance; c/o SOB and chest pain with ambulation/impaired balance/pain/decreased strength

## 2025-01-30 NOTE — PHYSICAL THERAPY INITIAL EVALUATION ADULT - LEVEL OF INDEPENDENCE: BED TO CHAIR, REHAB EVAL
Not assessed 2* to pt. encountered seated in chair by b/s Not performed as pt. was found and left sitting in b/s chair

## 2025-01-30 NOTE — PHYSICAL THERAPY INITIAL EVALUATION ADULT - DIAGNOSIS, PT EVAL
Rehab for s/p elective MV replacement (bioprosthetic), SHEILA ligation, MAZE Rehab for MR now s/p elective MV replacement (bioprosthetic), SHEILA ligation, MAZE

## 2025-01-30 NOTE — PHYSICAL THERAPY INITIAL EVALUATION ADULT - BED MOBILITY TRAINING, PT EVAL
Thank you for coming in today to discuss your Hypertension it was a pleasure meeting with you!        LDL CHOL, CALCULATED   Date Value Ref Range Status   12/14/2018 76 0 - 100 mg/dL Final   06/07/2018 69 0 - 100 mg/dL Final     CHOLESTEROL, TOTAL   Date Value Ref Range Status   12/14/2018 181 140 - 200 mg/dL Final     HDL CHOLESTEROL   Date Value Ref Range Status   12/14/2018 66 >40 mg/dL Final     TRIGLYCERIDES   Date Value Ref Range Status   12/14/2018 195 0 - 200 mg/dL Final     ALANINE AMINOTRANSFERASE(SGPT) (U/L)   Date Value   11/30/2018 17     ASPARTATE AMINOTRNSFRASE(SGOT) (U/L)   Date Value   11/30/2018 19     TSH (WITH REFLEX TO FREE T4)   Date Value Ref Range Status   03/13/2015 < 0.02 (A) 0.30 - 4.82 mIU/mL Final          Diabetes Testing   No results found for: HEMOGLOBINAO  No results found for: MICROALBUMIN  GLUCOSE, RANDOM (mg/dL)   Date Value   11/30/2018 113   06/12/2018 97   05/13/2017 112                              1. Your current Medication are:    Current Outpatient Medications   Medication Sig   • enalapril-hydrochlorothiazide (VASERETIC) 10-25 MG per tablet TAKE 1 TABLET BY MOUTH EVERY MORNING   • bisacodyl (DULCOLAX) 5 MG EC tablet See Colonoscopy Instructions.   • simethicone (MYLICON) 125 MG chewable tablet See Colonoscopy Instructions.   • Na Sulfate-K Sulfate-Mg Sulf (SUPREP BOWEL PREP KIT) 17.5-3.13-1.6 GM/177ML Solution See Colonoscopy Instructions.   • methimazole (TAPAZOLE) 10 MG tablet Take 10mg (1 tablet) ONE day a week and 15mg (1 and 1/2 tablets) SIX days a week   • tamoxifen (NOLVADEX) 20 MG tablet Take 1 tablet by mouth daily.   • fluticasone (FLONASE) 50 MCG/ACT nasal spray Spray 2 sprays in each nostril.         Changes in  Hypertension medication today:none       2. Eat a healthy diet with whole grains, fresh fruits, vegetables and healthy fats/oils  3. Will call with urine results and antibiotic order  4. Please do not hesitate  to call if you have any questions or  problems about your Hypertension      PATIENT INFORMATION   -- Fasting labwork has been ordered for you. General patient information when having a lab test:  · Fasting - No caloric intake (WATER IS OKAY) for a minimum of 8-10 hours before your blood draw:  · Tests that commonly require fasting are:  · Cholesterol (lipid panel)  · Basic chemistry panel  · Comprehensive chemistry panel  · Glucose (blood sugar)   · Medicine - You can take any prescribed or routine medicine during your fast.  · Brushing Teeth - You can brush your teeth even if you are fasting.  · Getting Your Results - Your doctor’s office will notify you of your results within 10 working days.    -- [facility] Lab Hours at [place]    Phone#: [phone]  Mon - Fri: 7:00 am - 5:00 pm  Saturday: 7:00 am - 12:00 noon    Follow-Up  -- Make an appointment with Sarah Light MD in six months    Additional Educational Resources:  For additional resources regarding your symptoms, diagnosis, or further health information, please visit the Health Resources section on [email or web address] or the Online Health Resources section in [employer].      Best of health   Sarah Light MD      TBA.

## 2025-01-30 NOTE — PHYSICAL THERAPY INITIAL EVALUATION ADULT - PERTINENT HX OF CURRENT PROBLEM, REHAB EVAL
Hx: afib, nonobstructive CAD, CHF class 3, severe MR, DM, HTN, JIMMY on CPAP, asthma, severe obesity, HL, Madelung's disease, arthritis, arthritis, peripheral neuropathy.    HPI: 64M w/ afib, nonobstructive CAD, CHF class 3, severe MR, DM, HTN, JIMMY on CPAP, asthma, severe obesity, HL, Madelung's disease, osteoarthritis s/p bilateral hip replacement, peripheral neuropathy presented with increasing dyspnea with exertion found to have severe MR. Patient is a  64 year old  male presenting to PAST in preparation for MVP,LEFT ATRIAL APPENDAGE CLOSURE, MAZE on 1/28/25 under general anesthesia by Dr. Blakely.     Pt had c/o SOB in Fall 2024;  He was followed by cardiology; echo showed severe MR  and referred for procedure.     PMHX: of HTN, HLD, obesity, JIMMY (uses CPAP), ,DM (A1C 7.5), BPH, asthma and osteoarthritis (s/p b/l hip replacement). Symptoms include current SOB and b/l LE edema. Can walk <1 city block before getting short of breath and needing to sit to relieve symptoms.  Per pt,  SOB has been progressively getting worse over the past year. Denies ever having palpitations despite being recently admitted for Afib RVR.

## 2025-01-30 NOTE — PHYSICAL THERAPY INITIAL EVALUATION ADULT - GAIT DEVIATIONS NOTED, PT EVAL
decreased weight-shifting ability decreased step length/decreased stride length/decreased weight-shifting ability

## 2025-01-30 NOTE — PHYSICAL THERAPY INITIAL EVALUATION ADULT - ADDITIONAL COMMENTS
Pt. reports living with his son in a private house with 4 steps to enter. Pt. notes that there is 1 FOS with a chair lift to the second floor and has bathroom and bedroom on the first and second floor. Pt. 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 Pt. notes that he owns a cane, but only uses it for long distance ambulation PTA. Independent at baseline. Pt. reports living with his son in a private house with 4 steps to enter and 1 FOS inside with +chair lift to the second floor. States that there is a bathroom and bedroom on the first and second floor. Pt. reports that he is retired, used to work in a paper [email or web address] company. Pt. notes that he owns a cane, but only used it for long distance ambulation PTA. Independent at baseline.

## 2025-01-30 NOTE — PHYSICAL THERAPY INITIAL EVALUATION ADULT - ASSISTIVE DEVICE FOR TRANSFER: GAIT, REHAB EVAL
B cardiac walker; PT anterolateral; +15L O2 NRM B platform cardiac walker; +15L O2 (via NRB mask) B platform cardiac walker; +15L O2 (via NRB mask); +chair follow (mandatory protocol)

## 2025-01-30 NOTE — PHYSICAL THERAPY INITIAL EVALUATION ADULT - LEVEL OF INDEPENDENCE: STAIR NEGOTIATION, REHAB EVAL
Not assessed 2* to pt.'s decreased cardiovascular endurance and c/o SOB Not performed 2* to multiple lines and decreased overall endurance

## 2025-01-30 NOTE — PHYSICAL THERAPY INITIAL EVALUATION ADULT - IMPAIRED TRANSFERS: SIT/STAND, REHAB EVAL
impaired balance/pain/decreased strength decreased cardiopulmonary endurance/impaired balance/pain/decreased strength

## 2025-01-30 NOTE — PHYSICAL THERAPY INITIAL EVALUATION ADULT - ASR WT BEARING STATUS EVAL
Pt called to cancel their apt for today as pt is in the ED. Pt will call back to reschedule. no weight-bearing restrictions

## 2025-01-30 NOTE — PROGRESS NOTE ADULT - SUBJECTIVE AND OBJECTIVE BOX
OPERATIVE PROCEDURE(s):   mv replacement/ la ligation             POD # 2    SURGEON(s): rich    SUBJECTIVE ASSESSMENT: pt is complaining of incisional chest pain that responds to pain meds    Vital Signs Last 24 Hrs  T(C): 36.8 (30 Jan 2025 12:00), Max: 36.9 (29 Jan 2025 20:00)  T(F): 98.2 (30 Jan 2025 12:00), Max: 98.5 (30 Jan 2025 04:00)  HR: 67 (30 Jan 2025 13:00) (59 - 77)  BP: --  BP(mean): --  RR: 28 (30 Jan 2025 13:00) (10 - 36)  SpO2: 93% (30 Jan 2025 13:00) (93% - 100%)    Parameters below as of 30 Jan 2025 13:00  Patient On (Oxygen Delivery Method): nasal cannula w/ humidification  O2 Flow (L/min): 40  O2 Concentration (%): 40  01-29-25 @ 07:01  -  01-30-25 @ 07:00  --------------------------------------------------------  IN: 3230.5 mL / OUT: 2970 mL / NET: 260.5 mL    01-30-25 @ 07:01  -  01-30-25 @ 13:29  --------------------------------------------------------  IN: 523.8 mL / OUT: 310 mL / NET: 213.8 mL      Physical Exam  General: alert and oriented x 3 on Bipap  Chest: equal BS BL  CVS: s1s2  Abd: pos bs soft nt  GI/ :  Ext: trace bl effusions  incisions - dressed     LABS:                        7.6    12.22 )-----------( 108      ( 30 Jan 2025 02:35 )             24.8     COUMADIN:   [ ] YES [ x] NO    PT/INR - ( 30 Jan 2025 02:35 )   PT: 13.40 sec;   INR: 1.13 ratio         PTT - ( 30 Jan 2025 02:35 )  PTT:28.0 sec  01-30    138  |  105  |  42[H]  ----------------------------<  143[H]  4.1   |  20  |  2.0[H]    Ca    8.0[L]      30 Jan 2025 02:35  Mg     2.3     01-30    TPro  5.3[L]  /  Alb  3.8  /  TBili  0.5  /  DBili  x   /  AST  65[H]  /  ALT  12  /  AlkPhos  40  01-30    Urinalysis Basic - ( 30 Jan 2025 02:35 )    Color: x / Appearance: x / SG: x / pH: x  Gluc: 143 mg/dL / Ketone: x  / Bili: x / Urobili: x   Blood: x / Protein: x / Nitrite: x   Leuk Esterase: x / RBC: x / WBC x   Sq Epi: x / Non Sq Epi: x / Bacteria: x        MEDICATIONS  (STANDING):  albuterol/ipratropium for Nebulization 3 milliLiter(s) Nebulizer every 6 hours  aspirin enteric coated 81 milliGRAM(s) Oral daily  bisacodyl Suppository 10 milliGRAM(s) Rectal once  ceFAZolin   IVPB 2000 milliGRAM(s) IV Intermittent every 8 hours  chlorhexidine 2% Cloths 1 Application(s) Topical daily  dextrose 50% Injectable 50 milliLiter(s) IV Push every 15 minutes  dextrose 50% Injectable 25 milliLiter(s) IV Push every 15 minutes  ferrous    sulfate 325 milliGRAM(s) Oral daily  fluticasone propionate/ salmeterol 250-50 MICROgram(s) Diskus 1 Dose(s) Inhalation two times a day  folic acid 1 milliGRAM(s) Oral daily  furosemide   Injectable 20 milliGRAM(s) IV Push daily  gabapentin 300 milliGRAM(s) Oral every 8 hours  guaiFENesin  milliGRAM(s) Oral every 12 hours  heparin   Injectable 5000 Unit(s) SubCutaneous every 8 hours  influenza  Vaccine (HIGH DOSE) 0.5 milliLiter(s) IntraMuscular once  insulin regular Infusion 5 Unit(s)/Hr (5 mL/Hr) IV Continuous <Continuous>  lidocaine   4% Patch 1 Patch Transdermal daily  meperidine     Injectable 25 milliGRAM(s) IV Push once  milrinone Infusion 0.25 MICROgram(s)/kG/Min (8.55 mL/Hr) IV Continuous <Continuous>  mupirocin 2% Nasal 1 Application(s) Both Nostrils every 12 hours  pantoprazole    Tablet 40 milliGRAM(s) Oral before breakfast  polyethylene glycol 3350 17 Gram(s) Oral daily  rosuvastatin 20 milliGRAM(s) Oral at bedtime  senna 2 Tablet(s) Oral at bedtime  sodium chloride 0.9%. 1000 milliLiter(s) (20 mL/Hr) IV Continuous <Continuous>    MEDICATIONS  (PRN):  ALPRAZolam 0.25 milliGRAM(s) Oral at bedtime PRN anxiety  hydrALAZINE Injectable 10 milliGRAM(s) IV Push every 2 hours PRN sbp>150  HYDROmorphone  Injectable 0.5 milliGRAM(s) IV Push every 6 hours PRN Breakthrough Pain  ondansetron Injectable 4 milliGRAM(s) IV Push every 4 hours PRN Nausea and/or Vomiting  oxyCODONE    IR 5 milliGRAM(s) Oral every 4 hours PRN Moderate Pain (4 - 6)  oxyCODONE    IR 10 milliGRAM(s) Oral every 4 hours PRN Severe Pain (7 - 10)      Allergies    No Known Allergies    Intolerances        Ambulation/Activity Status:    ambulate with pt    RADIOLOGY & ADDITIONAL TESTS:  Diet, DASH/TLC:   Sodium & Cholesterol Restricted  Consistent Carbohydrate Evening Snack (CSTCHOSN) (01-30-25 @ 08:36)  ACC: [id number] EXAM:  XR CHEST PORTABLE ROUTINE 1V   ORDERED BY: SILVANA COREY     PROCEDURE DATE:  01/30/2025      INTERPRETATION:  REASON FOR STUDY: Post cardiac surgery  TECHNIQUE:Frontal view chest    COMPARISON: Chest x-ray-1/29/2025    FINDINGS:    TUBES/LINES/DEVICES: Allen-Randy catheter removed. Stable chest tubes.   Stable right IJ sheath.  MEDIASTINUM/HEART: Stable cardiomegaly. Poststernotomy/valve replacement  LUNGS/PLEURA/AIRWAYS: Stable bilateral opacities/effusions. No   pneumothorax    BONES/ SOFT TISSUES: Stable    IMPRESSION:    Stable bilateral opacities/effusions.    --- End of Report ---    Assessment/Plan: Patient is a 63 yo male s/p MVR / SHEILA ligation and MAZE POD # 2  cont present tx as per ct surgeon  s/p mvr- cont asa and cont primacor and levo for pressor / inotropic support; for this reason treatment with a beta blokcer is contraindicated  dvt/gi ppx  madelung disease - cont to wean Bipap and cont resp tx's   pain management with iv tylenol  acute blood loss anemia secondary to surgery- no need for transfusion at the present time; start iron and folate for supplementation  d/c mediastinal chest tubes  diuresis with lasix    Social Service Disposition:  tbd

## 2025-01-30 NOTE — PHYSICAL THERAPY INITIAL EVALUATION ADULT - GENERAL OBSERVATIONS, REHAB EVAL
10:50-11:05 Pt. encountered sitting in b/s chair in NAD. +3 Chest tubes, +Caceres catheter, +IV, +Temp probe, +15L O2 HFNC, +A-line, +L hand splint, +Tele, +Pulse ox, +BP cuff. VSS. Agreeable to PT. Temitope MANLEY aware. 10:50-11:05 Pt. encountered sitting in b/s chair in NAD. +Prevena, +3 Chest tubes, +Caceres catheter, +IV, +Temp probe, +HFNC, +Radial A-line, +Tele, +Pulse ox, +BP cuff. Agreeable to PT. VSS. Kiki MANLEY aware.

## 2025-01-31 ENCOUNTER — APPOINTMENT (OUTPATIENT)
Dept: PULMONOLOGY | Facility: HOSPITAL | Age: 65
End: 2025-01-31

## 2025-01-31 ENCOUNTER — RESULT REVIEW (OUTPATIENT)
Age: 65
End: 2025-01-31

## 2025-01-31 LAB
ALBUMIN SERPL ELPH-MCNC: 3.5 G/DL — SIGNIFICANT CHANGE UP (ref 3.5–5.2)
ALBUMIN SERPL ELPH-MCNC: 3.5 G/DL — SIGNIFICANT CHANGE UP (ref 3.5–5.2)
ALBUMIN SERPL ELPH-MCNC: 3.9 G/DL — SIGNIFICANT CHANGE UP (ref 3.5–5.2)
ALP SERPL-CCNC: 151 U/L — HIGH (ref 30–115)
ALP SERPL-CCNC: 201 U/L — HIGH (ref 30–115)
ALP SERPL-CCNC: 216 U/L — HIGH (ref 30–115)
ALT FLD-CCNC: 30 U/L — SIGNIFICANT CHANGE UP (ref 0–41)
ALT FLD-CCNC: 32 U/L — SIGNIFICANT CHANGE UP (ref 0–41)
ALT FLD-CCNC: 39 U/L — SIGNIFICANT CHANGE UP (ref 0–41)
ANION GAP SERPL CALC-SCNC: 13 MMOL/L — SIGNIFICANT CHANGE UP (ref 7–14)
ANION GAP SERPL CALC-SCNC: 14 MMOL/L — SIGNIFICANT CHANGE UP (ref 7–14)
ANION GAP SERPL CALC-SCNC: 16 MMOL/L — HIGH (ref 7–14)
AST SERPL-CCNC: 100 U/L — HIGH (ref 0–41)
AST SERPL-CCNC: 70 U/L — HIGH (ref 0–41)
AST SERPL-CCNC: 85 U/L — HIGH (ref 0–41)
BASOPHILS # BLD AUTO: 0.04 K/UL — SIGNIFICANT CHANGE UP (ref 0–0.2)
BASOPHILS NFR BLD AUTO: 0.3 % — SIGNIFICANT CHANGE UP (ref 0–1)
BILIRUB SERPL-MCNC: 0.4 MG/DL — SIGNIFICANT CHANGE UP (ref 0.2–1.2)
BILIRUB SERPL-MCNC: 0.6 MG/DL — SIGNIFICANT CHANGE UP (ref 0.2–1.2)
BILIRUB SERPL-MCNC: 0.7 MG/DL — SIGNIFICANT CHANGE UP (ref 0.2–1.2)
BUN SERPL-MCNC: 54 MG/DL — HIGH (ref 10–20)
BUN SERPL-MCNC: 61 MG/DL — CRITICAL HIGH (ref 10–20)
BUN SERPL-MCNC: 67 MG/DL — CRITICAL HIGH (ref 10–20)
CALCIUM SERPL-MCNC: 7.7 MG/DL — LOW (ref 8.4–10.5)
CALCIUM SERPL-MCNC: 7.9 MG/DL — LOW (ref 8.4–10.4)
CALCIUM SERPL-MCNC: 8.1 MG/DL — LOW (ref 8.4–10.5)
CHLORIDE SERPL-SCNC: 100 MMOL/L — SIGNIFICANT CHANGE UP (ref 98–110)
CHLORIDE SERPL-SCNC: 100 MMOL/L — SIGNIFICANT CHANGE UP (ref 98–110)
CHLORIDE SERPL-SCNC: 101 MMOL/L — SIGNIFICANT CHANGE UP (ref 98–110)
CO2 SERPL-SCNC: 18 MMOL/L — SIGNIFICANT CHANGE UP (ref 17–32)
CO2 SERPL-SCNC: 19 MMOL/L — SIGNIFICANT CHANGE UP (ref 17–32)
CO2 SERPL-SCNC: 19 MMOL/L — SIGNIFICANT CHANGE UP (ref 17–32)
CREAT SERPL-MCNC: 2.5 MG/DL — HIGH (ref 0.7–1.5)
CREAT SERPL-MCNC: 2.7 MG/DL — HIGH (ref 0.7–1.5)
CREAT SERPL-MCNC: 2.9 MG/DL — HIGH (ref 0.7–1.5)
EGFR: 23 ML/MIN/1.73M2 — LOW
EGFR: 25 ML/MIN/1.73M2 — LOW
EGFR: 28 ML/MIN/1.73M2 — LOW
EOSINOPHIL # BLD AUTO: 0.07 K/UL — SIGNIFICANT CHANGE UP (ref 0–0.7)
EOSINOPHIL NFR BLD AUTO: 0.5 % — SIGNIFICANT CHANGE UP (ref 0–8)
GAS PNL BLDA: SIGNIFICANT CHANGE UP
GLUCOSE BLDC GLUCOMTR-MCNC: 105 MG/DL — HIGH (ref 70–99)
GLUCOSE BLDC GLUCOMTR-MCNC: 105 MG/DL — HIGH (ref 70–99)
GLUCOSE BLDC GLUCOMTR-MCNC: 106 MG/DL — HIGH (ref 70–99)
GLUCOSE BLDC GLUCOMTR-MCNC: 109 MG/DL — HIGH (ref 70–99)
GLUCOSE BLDC GLUCOMTR-MCNC: 112 MG/DL — HIGH (ref 70–99)
GLUCOSE BLDC GLUCOMTR-MCNC: 113 MG/DL — HIGH (ref 70–99)
GLUCOSE BLDC GLUCOMTR-MCNC: 114 MG/DL — HIGH (ref 70–99)
GLUCOSE BLDC GLUCOMTR-MCNC: 117 MG/DL — HIGH (ref 70–99)
GLUCOSE BLDC GLUCOMTR-MCNC: 118 MG/DL — HIGH (ref 70–99)
GLUCOSE BLDC GLUCOMTR-MCNC: 120 MG/DL — HIGH (ref 70–99)
GLUCOSE BLDC GLUCOMTR-MCNC: 122 MG/DL — HIGH (ref 70–99)
GLUCOSE BLDC GLUCOMTR-MCNC: 123 MG/DL — HIGH (ref 70–99)
GLUCOSE BLDC GLUCOMTR-MCNC: 144 MG/DL — HIGH (ref 70–99)
GLUCOSE BLDC GLUCOMTR-MCNC: 147 MG/DL — HIGH (ref 70–99)
GLUCOSE BLDC GLUCOMTR-MCNC: 150 MG/DL — HIGH (ref 70–99)
GLUCOSE BLDC GLUCOMTR-MCNC: 153 MG/DL — HIGH (ref 70–99)
GLUCOSE BLDC GLUCOMTR-MCNC: 154 MG/DL — HIGH (ref 70–99)
GLUCOSE BLDC GLUCOMTR-MCNC: 156 MG/DL — HIGH (ref 70–99)
GLUCOSE BLDC GLUCOMTR-MCNC: 90 MG/DL — SIGNIFICANT CHANGE UP (ref 70–99)
GLUCOSE BLDC GLUCOMTR-MCNC: 98 MG/DL — SIGNIFICANT CHANGE UP (ref 70–99)
GLUCOSE SERPL-MCNC: 116 MG/DL — HIGH (ref 70–99)
GLUCOSE SERPL-MCNC: 133 MG/DL — HIGH (ref 70–99)
GLUCOSE SERPL-MCNC: 90 MG/DL — SIGNIFICANT CHANGE UP (ref 70–99)
HCT VFR BLD CALC: 24.9 % — LOW (ref 42–52)
HCT VFR BLD CALC: 25.7 % — LOW (ref 42–52)
HCT VFR BLD CALC: 27.3 % — LOW (ref 42–52)
HGB BLD-MCNC: 7.6 G/DL — LOW (ref 14–18)
HGB BLD-MCNC: 8 G/DL — LOW (ref 14–18)
HGB BLD-MCNC: 8.4 G/DL — LOW (ref 14–18)
IMM GRANULOCYTES NFR BLD AUTO: 0.8 % — HIGH (ref 0.1–0.3)
LYMPHOCYTES # BLD AUTO: 1.47 K/UL — SIGNIFICANT CHANGE UP (ref 1.2–3.4)
LYMPHOCYTES # BLD AUTO: 11.2 % — LOW (ref 20.5–51.1)
MAGNESIUM SERPL-MCNC: 2.3 MG/DL — SIGNIFICANT CHANGE UP (ref 1.8–2.4)
MAGNESIUM SERPL-MCNC: 2.4 MG/DL — SIGNIFICANT CHANGE UP (ref 1.8–2.4)
MAGNESIUM SERPL-MCNC: 2.5 MG/DL — HIGH (ref 1.8–2.4)
MCHC RBC-ENTMCNC: 26.6 PG — LOW (ref 27–31)
MCHC RBC-ENTMCNC: 26.8 PG — LOW (ref 27–31)
MCHC RBC-ENTMCNC: 26.8 PG — LOW (ref 27–31)
MCHC RBC-ENTMCNC: 30.5 G/DL — LOW (ref 32–37)
MCHC RBC-ENTMCNC: 30.8 G/DL — LOW (ref 32–37)
MCHC RBC-ENTMCNC: 31.1 G/DL — LOW (ref 32–37)
MCV RBC AUTO: 86.2 FL — SIGNIFICANT CHANGE UP (ref 80–94)
MCV RBC AUTO: 86.9 FL — SIGNIFICANT CHANGE UP (ref 80–94)
MCV RBC AUTO: 87.1 FL — SIGNIFICANT CHANGE UP (ref 80–94)
MONOCYTES # BLD AUTO: 1.47 K/UL — HIGH (ref 0.1–0.6)
MONOCYTES NFR BLD AUTO: 11.2 % — HIGH (ref 1.7–9.3)
NEUTROPHILS # BLD AUTO: 9.96 K/UL — HIGH (ref 1.4–6.5)
NEUTROPHILS NFR BLD AUTO: 76 % — HIGH (ref 42.2–75.2)
NRBC # BLD: 1 /100 WBCS — HIGH (ref 0–0)
NRBC # BLD: 1 /100 WBCS — HIGH (ref 0–0)
NRBC # BLD: 2 /100 WBCS — HIGH (ref 0–0)
NRBC BLD-RTO: 1 /100 WBCS — HIGH (ref 0–0)
NRBC BLD-RTO: 1 /100 WBCS — HIGH (ref 0–0)
NRBC BLD-RTO: 2 /100 WBCS — HIGH (ref 0–0)
PHOSPHATE SERPL-MCNC: 5.5 MG/DL — HIGH (ref 2.1–4.9)
PLATELET # BLD AUTO: 107 K/UL — LOW (ref 130–400)
PLATELET # BLD AUTO: 116 K/UL — LOW (ref 130–400)
PLATELET # BLD AUTO: 120 K/UL — LOW (ref 130–400)
PMV BLD: 11.5 FL — HIGH (ref 7.4–10.4)
PMV BLD: 11.6 FL — HIGH (ref 7.4–10.4)
PMV BLD: 11.7 FL — HIGH (ref 7.4–10.4)
POTASSIUM SERPL-MCNC: 4.6 MMOL/L — SIGNIFICANT CHANGE UP (ref 3.5–5)
POTASSIUM SERPL-MCNC: 4.7 MMOL/L — SIGNIFICANT CHANGE UP (ref 3.5–5)
POTASSIUM SERPL-MCNC: 4.9 MMOL/L — SIGNIFICANT CHANGE UP (ref 3.5–5)
POTASSIUM SERPL-SCNC: 4.6 MMOL/L — SIGNIFICANT CHANGE UP (ref 3.5–5)
POTASSIUM SERPL-SCNC: 4.7 MMOL/L — SIGNIFICANT CHANGE UP (ref 3.5–5)
POTASSIUM SERPL-SCNC: 4.9 MMOL/L — SIGNIFICANT CHANGE UP (ref 3.5–5)
PROT SERPL-MCNC: 5.3 G/DL — LOW (ref 6–8)
PROT SERPL-MCNC: 5.4 G/DL — LOW (ref 6–8)
PROT SERPL-MCNC: 5.9 G/DL — LOW (ref 6–8)
RBC # BLD: 2.86 M/UL — LOW (ref 4.7–6.1)
RBC # BLD: 2.98 M/UL — LOW (ref 4.7–6.1)
RBC # BLD: 3.14 M/UL — LOW (ref 4.7–6.1)
RBC # FLD: 18.1 % — HIGH (ref 11.5–14.5)
RBC # FLD: 18.4 % — HIGH (ref 11.5–14.5)
RBC # FLD: 19.1 % — HIGH (ref 11.5–14.5)
SODIUM SERPL-SCNC: 132 MMOL/L — LOW (ref 135–146)
SODIUM SERPL-SCNC: 134 MMOL/L — LOW (ref 135–146)
SODIUM SERPL-SCNC: 134 MMOL/L — LOW (ref 135–146)
WBC # BLD: 10.53 K/UL — SIGNIFICANT CHANGE UP (ref 4.8–10.8)
WBC # BLD: 12.28 K/UL — HIGH (ref 4.8–10.8)
WBC # BLD: 13.12 K/UL — HIGH (ref 4.8–10.8)
WBC # FLD AUTO: 10.53 K/UL — SIGNIFICANT CHANGE UP (ref 4.8–10.8)
WBC # FLD AUTO: 12.28 K/UL — HIGH (ref 4.8–10.8)
WBC # FLD AUTO: 13.12 K/UL — HIGH (ref 4.8–10.8)

## 2025-01-31 PROCEDURE — 93306 TTE W/DOPPLER COMPLETE: CPT | Mod: 26

## 2025-01-31 PROCEDURE — 71045 X-RAY EXAM CHEST 1 VIEW: CPT | Mod: 26

## 2025-01-31 PROCEDURE — 93010 ELECTROCARDIOGRAM REPORT: CPT

## 2025-01-31 PROCEDURE — 99223 1ST HOSP IP/OBS HIGH 75: CPT | Mod: FS

## 2025-01-31 PROCEDURE — 99291 CRITICAL CARE FIRST HOUR: CPT

## 2025-01-31 RX ORDER — FUROSEMIDE 10 MG/ML
40 INJECTION INTRAMUSCULAR; INTRAVENOUS ONCE
Refills: 0 | Status: COMPLETED | OUTPATIENT
Start: 2025-01-31 | End: 2025-01-31

## 2025-01-31 RX ORDER — SODIUM BICARBONATE 1 MEQ/ML
50 SYRINGE (ML) INTRAVENOUS ONCE
Refills: 0 | Status: COMPLETED | OUTPATIENT
Start: 2025-01-31 | End: 2025-01-31

## 2025-01-31 RX ORDER — DOBUTAMINE 250 MG/20ML
3.5 INJECTION INTRAVENOUS
Qty: 500 | Refills: 0 | Status: DISCONTINUED | OUTPATIENT
Start: 2025-01-31 | End: 2025-02-02

## 2025-01-31 RX ADMIN — Medication 1000 MILLIGRAM(S): at 04:12

## 2025-01-31 RX ADMIN — HEPARIN SODIUM 5000 UNIT(S): 1000 INJECTION INTRAVENOUS; SUBCUTANEOUS at 14:26

## 2025-01-31 RX ADMIN — Medication 2 TABLET(S): at 21:49

## 2025-01-31 RX ADMIN — OXYCODONE HYDROCHLORIDE 5 MILLIGRAM(S): 30 TABLET ORAL at 09:40

## 2025-01-31 RX ADMIN — DEXTROMETHORPHAN HBR, GUAIFENESIN 600 MILLIGRAM(S): 200 LIQUID ORAL at 17:58

## 2025-01-31 RX ADMIN — IPRATROPIUM BROMIDE AND ALBUTEROL SULFATE 3 MILLILITER(S): .5; 2.5 SOLUTION RESPIRATORY (INHALATION) at 07:38

## 2025-01-31 RX ADMIN — OXYCODONE HYDROCHLORIDE 10 MILLIGRAM(S): 30 TABLET ORAL at 14:26

## 2025-01-31 RX ADMIN — ROSUVASTATIN CALCIUM 20 MILLIGRAM(S): 20 TABLET, FILM COATED ORAL at 21:49

## 2025-01-31 RX ADMIN — Medication 400 MILLIGRAM(S): at 04:01

## 2025-01-31 RX ADMIN — IPRATROPIUM BROMIDE AND ALBUTEROL SULFATE 3 MILLILITER(S): .5; 2.5 SOLUTION RESPIRATORY (INHALATION) at 22:04

## 2025-01-31 RX ADMIN — FUROSEMIDE 40 MILLIGRAM(S): 10 INJECTION INTRAMUSCULAR; INTRAVENOUS at 21:49

## 2025-01-31 RX ADMIN — MUPIROCIN CALCIUM 1 APPLICATION(S): 20 CREAM TOPICAL at 18:01

## 2025-01-31 RX ADMIN — GABAPENTIN 300 MILLIGRAM(S): 400 CAPSULE ORAL at 06:05

## 2025-01-31 RX ADMIN — LIDOCAINE HYDROCHLORIDE 1 PATCH: 20 JELLY TOPICAL at 13:00

## 2025-01-31 RX ADMIN — Medication 1000 MILLIGRAM(S): at 21:30

## 2025-01-31 RX ADMIN — Medication 1 APPLICATION(S): at 12:13

## 2025-01-31 RX ADMIN — Medication 400 MILLIGRAM(S): at 17:58

## 2025-01-31 RX ADMIN — OXYCODONE HYDROCHLORIDE 10 MILLIGRAM(S): 30 TABLET ORAL at 18:28

## 2025-01-31 RX ADMIN — FUROSEMIDE 20 MILLIGRAM(S): 10 INJECTION INTRAMUSCULAR; INTRAVENOUS at 06:05

## 2025-01-31 RX ADMIN — Medication 5 UNIT(S)/HR: at 09:10

## 2025-01-31 RX ADMIN — GABAPENTIN 300 MILLIGRAM(S): 400 CAPSULE ORAL at 14:27

## 2025-01-31 RX ADMIN — Medication 50 MILLIEQUIVALENT(S): at 12:57

## 2025-01-31 RX ADMIN — HEPARIN SODIUM 5000 UNIT(S): 1000 INJECTION INTRAVENOUS; SUBCUTANEOUS at 06:05

## 2025-01-31 RX ADMIN — Medication 81 MILLIGRAM(S): at 13:01

## 2025-01-31 RX ADMIN — OXYCODONE HYDROCHLORIDE 10 MILLIGRAM(S): 30 TABLET ORAL at 07:16

## 2025-01-31 RX ADMIN — FUROSEMIDE 40 MILLIGRAM(S): 10 INJECTION INTRAMUSCULAR; INTRAVENOUS at 14:23

## 2025-01-31 RX ADMIN — Medication 40 MILLIGRAM(S): at 06:05

## 2025-01-31 RX ADMIN — OXYCODONE HYDROCHLORIDE 10 MILLIGRAM(S): 30 TABLET ORAL at 06:05

## 2025-01-31 RX ADMIN — Medication 400 MILLIGRAM(S): at 21:49

## 2025-01-31 RX ADMIN — LIDOCAINE HYDROCHLORIDE 1 PATCH: 20 JELLY TOPICAL at 19:00

## 2025-01-31 RX ADMIN — OXYCODONE HYDROCHLORIDE 5 MILLIGRAM(S): 30 TABLET ORAL at 09:10

## 2025-01-31 RX ADMIN — GABAPENTIN 300 MILLIGRAM(S): 400 CAPSULE ORAL at 21:49

## 2025-01-31 RX ADMIN — OXYCODONE HYDROCHLORIDE 10 MILLIGRAM(S): 30 TABLET ORAL at 18:58

## 2025-01-31 RX ADMIN — Medication 50 MILLIEQUIVALENT(S): at 21:56

## 2025-01-31 RX ADMIN — OXYCODONE HYDROCHLORIDE 10 MILLIGRAM(S): 30 TABLET ORAL at 14:56

## 2025-01-31 RX ADMIN — Medication 1000 MILLIGRAM(S): at 18:28

## 2025-01-31 RX ADMIN — MUPIROCIN CALCIUM 1 APPLICATION(S): 20 CREAM TOPICAL at 07:04

## 2025-01-31 RX ADMIN — Medication 1000 MILLIGRAM(S): at 12:42

## 2025-01-31 RX ADMIN — MILRINONE LACTATE 4.28 MICROGRAM(S)/KG/MIN: 1 INJECTION, SOLUTION INTRAVENOUS at 06:02

## 2025-01-31 RX ADMIN — Medication 400 MILLIGRAM(S): at 12:12

## 2025-01-31 RX ADMIN — DOBUTAMINE 8.55 MICROGRAM(S)/KG/MIN: 250 INJECTION INTRAVENOUS at 11:45

## 2025-01-31 RX ADMIN — DEXTROMETHORPHAN HBR, GUAIFENESIN 600 MILLIGRAM(S): 200 LIQUID ORAL at 06:06

## 2025-01-31 RX ADMIN — HEPARIN SODIUM 5000 UNIT(S): 1000 INJECTION INTRAVENOUS; SUBCUTANEOUS at 21:49

## 2025-01-31 NOTE — CONSULT NOTE ADULT - SUBJECTIVE AND OBJECTIVE BOX
Patient is a 64y old  Male who presents with a chief complaint of mitral regurgitation (2025 11:11)    HPI: Patient is a 64-year-old male, occasional marijuana smoker. with PMHX: of HTN, HLD, obesity, JIMMY (uses CPAP),DM (A1C 7.5), BPH, asthma and osteoarthritis (s/p b/l hip replacement). Symptoms include current SOB and b/l LE edema. Can walk < 1 city block before getting short of breath and needing to sit to relieve it. Patient states SOB has been progressively getting worse over the past year. Denies ever having palpitations despite being recently admitted for Afib RVR. JASMINA revealed severe mitral regurgitation. 10/31/24 s/p Cardiac cath that revealed non obstructive CAD. Patient here for elective MVR with SHEILA ligation and MAZE. EP consulted for junctional bradycardia postop.    PAST MEDICAL & SURGICAL HISTORY:  Diabetes  20 yrs      HTN (hypertension)      Obstructive sleep apnea on CPAP      Asthma      Obesity      Peripheral neuropathy  related to diabetes      Arthritis      Hypercholesteremia      Madelung's disease      FH: mitral regurgitation      CHF NYHA class III      Atrial fibrillation      JIMMY on CPAP      H/O lipoma  times 2-3      History of hip replacement, total, bilateral  two separate times ',       Fracture of orbital floor      Encounter for screening colonoscopy      History of neck surgery                      PREVIOUS DIAGNOSTIC TESTING:      ECHO  FINDINGS:    STRESS  FINDINGS:    CATHETERIZATION  FINDINGS:    ELECTROPHYSIOLOGY STUDY  FINDINGS:    CAROTID ULTRASOUND:  FINDINGS    VENOUS DUPLEX SCAN:  FINDINGS:    CHEST CT PULMONARY ANGIO with IV Contrast:  FINDINGS:    MEDICATIONS  (STANDING):  acetaminophen   IVPB .. 1000 milliGRAM(s) IV Intermittent once  acetaminophen   IVPB .. 1000 milliGRAM(s) IV Intermittent once  albuterol/ipratropium for Nebulization 3 milliLiter(s) Nebulizer every 6 hours  aspirin enteric coated 81 milliGRAM(s) Oral daily  bisacodyl Suppository 10 milliGRAM(s) Rectal once  chlorhexidine 2% Cloths 1 Application(s) Topical daily  dextrose 50% Injectable 50 milliLiter(s) IV Push every 15 minutes  dextrose 50% Injectable 25 milliLiter(s) IV Push every 15 minutes  DOBUTamine Infusion 2.5 MICROgram(s)/kG/Min (8.55 mL/Hr) IV Continuous <Continuous>  ferrous    sulfate 325 milliGRAM(s) Oral daily  fluticasone propionate/ salmeterol 250-50 MICROgram(s) Diskus 1 Dose(s) Inhalation two times a day  folic acid 1 milliGRAM(s) Oral daily  furosemide   Injectable 20 milliGRAM(s) IV Push daily  gabapentin 300 milliGRAM(s) Oral every 8 hours  guaiFENesin  milliGRAM(s) Oral every 12 hours  heparin   Injectable 5000 Unit(s) SubCutaneous every 8 hours  influenza  Vaccine (HIGH DOSE) 0.5 milliLiter(s) IntraMuscular once  insulin regular Infusion 5 Unit(s)/Hr (5 mL/Hr) IV Continuous <Continuous>  lidocaine   4% Patch 1 Patch Transdermal daily  meperidine     Injectable 25 milliGRAM(s) IV Push once  milrinone Infusion 0.125 MICROgram(s)/kG/Min (4.28 mL/Hr) IV Continuous <Continuous>  mupirocin 2% Nasal 1 Application(s) Both Nostrils every 12 hours  pantoprazole    Tablet 40 milliGRAM(s) Oral before breakfast  polyethylene glycol 3350 17 Gram(s) Oral daily  rosuvastatin 20 milliGRAM(s) Oral at bedtime  senna 2 Tablet(s) Oral at bedtime  sodium chloride 0.9%. 1000 milliLiter(s) (20 mL/Hr) IV Continuous <Continuous>    MEDICATIONS  (PRN):  ALPRAZolam 0.25 milliGRAM(s) Oral at bedtime PRN anxiety  ondansetron Injectable 4 milliGRAM(s) IV Push every 4 hours PRN Nausea and/or Vomiting  oxyCODONE    IR 5 milliGRAM(s) Oral every 4 hours PRN Moderate Pain (4 - 6)  oxyCODONE    IR 10 milliGRAM(s) Oral every 4 hours PRN Severe Pain (7 - 10)      FAMILY HISTORY:  Family history of diabetes mellitus (Mother, Sibling)    CAD (coronary artery disease) (Mother, Father)        SOCIAL HISTORY: No significant hx    Past Surgical History: see above    Allergies:  No Known Allergies      REVIEW OF SYSTEMS: No CP, palpitations, dizziness or SOB     Vital Signs Last 24 Hrs  T(C): 37 (2025 12:00), Max: 37.1 (2025 20:00)  T(F): 98.6 (2025 12:00), Max: 98.8 (2025 20:00)  HR: 71 (2025 14:00) (55 - 80)  BP: --  BP(mean): --  RR: 8 (2025 14:00) (8 - 36)  SpO2: 97% (2025 14:00) (92% - 100%)    Parameters below as of 2025 12:00  Patient On (Oxygen Delivery Method): BiPAP/CPAP        PHYSICAL EXAM:  GENERAL: In no apparent distress, well nourished, and hydrated.  NECK: Supple, No JVD   HEART: Regular rate and rhythm; No murmurs, rubs, or gallops.  PULMONARY: Clear to auscultation and perfusion.  No rales, wheezing, or rhonchi bilaterally.  EXTREMITIES:  2+ Peripheral Pulses, no LE edema BL  NEUROLOGICAL: Grossly nonfocal      INTERPRETATION OF TELEMETRY: AF 50 bpm    ECG:  < from: 12 Lead ECG (25 @ 11:59) >    Ventricular Rate 67 BPM    QRS Duration 88 ms    Q-T Interval 402 ms    QTC Calculation(Bazett) 424 ms    R Axis -35 degrees    T Axis 71 degrees    Diagnosis Line Atrial fibrillation  Left axis deviation  Inferior infarct , age undetermined    Abnormal ECG    Confirmed by PAULO MOSQUEDA MD ([id number]) on 2025 12:15:15 PM    < end of copied text >      I&O's Detail    2025 07:01  -  2025 07:00  --------------------------------------------------------  IN:    Insulin: 110 mL    IV PiggyBack: 200 mL    Milrinone: 12.8 mL    Milrinone: 180.6 mL    Milrinone: 8.6 mL    Oral Fluid: 750 mL    sodium chloride 0.9%: 280 mL  Total IN: 1542 mL    OUT:    Chest Tube (mL): 70 mL    Chest Tube (mL): 130 mL    Chest Tube (mL): 170 mL    Indwelling Catheter - Urethral (mL): 805 mL  Total OUT: 1175 mL    Total NET: 367 mL      2025 07:01  -  2025 16:30  --------------------------------------------------------  IN:    Insulin: 4 mL    IV PiggyBack: 100 mL    Milrinone: 4.3 mL    sodium chloride 0.9%: 70 mL  Total IN: 178.3 mL    OUT:    Chest Tube (mL): 35 mL    Chest Tube (mL): 310 mL    Indwelling Catheter - Urethral (mL): 355 mL  Total OUT: 700 mL    Total NET: -521.7 mL          LABS:                        8.4    12.28 )-----------( 116      ( 2025 12:05 )             27.3         134[L]  |  100  |  61[HH]  ----------------------------<  116[H]  4.9   |  18  |  2.7[H]    Ca    8.1[L]      2025 11:36  Mg     2.4         TPro  5.9[L]  /  Alb  3.9  /  TBili  0.7  /  DBili  x   /  AST  100[H]  /  ALT  39  /  AlkPhos  201[H]          PT/INR - ( 2025 02:35 )   PT: 13.40 sec;   INR: 1.13 ratio         PTT - ( 2025 02:35 )  PTT:28.0 sec  Urinalysis Basic - ( 2025 11:36 )    Color: x / Appearance: x / SG: x / pH: x  Gluc: 116 mg/dL / Ketone: x  / Bili: x / Urobili: x   Blood: x / Protein: x / Nitrite: x   Leuk Esterase: x / RBC: x / WBC x   Sq Epi: x / Non Sq Epi: x / Bacteria: x      BNP  I&O's Detail    2025 07:01  -  2025 07:00  --------------------------------------------------------  IN:    Insulin: 110 mL    IV PiggyBack: 200 mL    Milrinone: 12.8 mL    Milrinone: 180.6 mL    Milrinone: 8.6 mL    Oral Fluid: 750 mL    sodium chloride 0.9%: 280 mL  Total IN: 1542 mL    OUT:    Chest Tube (mL): 70 mL    Chest Tube (mL): 130 mL    Chest Tube (mL): 170 mL    Indwelling Catheter - Urethral (mL): 805 mL  Total OUT: 1175 mL    Total NET: 367 mL      2025 07:01  -  2025 16:30  --------------------------------------------------------  IN:    Insulin: 4 mL    IV PiggyBack: 100 mL    Milrinone: 4.3 mL    sodium chloride 0.9%: 70 mL  Total IN: 178.3 mL    OUT:    Chest Tube (mL): 35 mL    Chest Tube (mL): 310 mL    Indwelling Catheter - Urethral (mL): 355 mL  Total OUT: 700 mL    Total NET: -521.7 mL        Daily     Daily Weight in k.1 (2025 06:00)    RADIOLOGY & ADDITIONAL STUDIES: Patient is a 64y old  Male who presents with a chief complaint of mitral regurgitation (2025 11:11)    HPI: Patient is a 64-year-old male, occasional marijuana smoker. with PMHX: of HTN, HLD, obesity, JIMMY (uses CPAP),DM (A1C 7.5), BPH, asthma and osteoarthritis (s/p b/l hip replacement). Symptoms include current SOB and b/l LE edema. Can walk < 1 city block before getting short of breath and needing to sit to relieve it. Patient states SOB has been progressively getting worse over the past year. Denies ever having palpitations despite being recently admitted for Afib RVR. JASMINA revealed severe mitral regurgitation. 10/31/24 s/p Cardiac cath that revealed non obstructive CAD. Patient here for elective MVR with SHEILA ligation and MAZE. EP consulted for junctional bradycardia postop.    PAST MEDICAL & SURGICAL HISTORY:  Diabetes  20 yrs      HTN (hypertension)      Obstructive sleep apnea on CPAP      Asthma      Obesity      Peripheral neuropathy  related to diabetes      Arthritis      Hypercholesteremia      Madelung's disease      FH: mitral regurgitation      CHF NYHA class III      Atrial fibrillation      JIMMY on CPAP      H/O lipoma  times 2-3      History of hip replacement, total, bilateral  two separate times '17, '07      Fracture of orbital floor      Encounter for screening colonoscopy      History of neck surgery                      PREVIOUS DIAGNOSTIC TESTING:      ECHO  FINDINGS:  < from: TTE Echo Complete w/o Contrast w/ Doppler (25 @ 13:16) >  Summary:   1. Technically difficult study.   2. Normal global left ventricular systolic function with a biplane EF of   60%. Abnormal septal motion consistent with post-operative status. The   left ventricular diastolic function could not be assessed in this study.   3. Normal right ventricular size and function.   4. Left atrial enlargement.   5. S/p bioprosthetic mitral valve replacement with a 27mm Mitris valve.   Normal function with trivial regurgitation. The peak transvalvular   velocity is 2.1m/s with a mean transvalvular gradient of 5.7mmHg at 76bpm.   6. Mild to moderate aortic valve stenosis by ALBA and dimensionless index   (AVA1.6cm2, DI 0.6). Visually, the valve appears mild to moderately   stenotic on short axis views.   7. Mild aortic regurgitation.   8. The tricuspid valve was not well visualized.   9. At least moderate pulmonary hypertension (PASP = 48mmHg + right   atrial pressure). The IVC was not adequately visualized.  10. There is no evidence of pericardial effusion.    < end of copied text >      STRESS  FINDINGS:    CATHETERIZATION  FINDINGS:    ELECTROPHYSIOLOGY STUDY  FINDINGS:    CAROTID ULTRASOUND:  FINDINGS    VENOUS DUPLEX SCAN:  FINDINGS:    CHEST CT PULMONARY ANGIO with IV Contrast:  FINDINGS:    MEDICATIONS  (STANDING):  acetaminophen   IVPB .. 1000 milliGRAM(s) IV Intermittent once  acetaminophen   IVPB .. 1000 milliGRAM(s) IV Intermittent once  albuterol/ipratropium for Nebulization 3 milliLiter(s) Nebulizer every 6 hours  aspirin enteric coated 81 milliGRAM(s) Oral daily  bisacodyl Suppository 10 milliGRAM(s) Rectal once  chlorhexidine 2% Cloths 1 Application(s) Topical daily  dextrose 50% Injectable 50 milliLiter(s) IV Push every 15 minutes  dextrose 50% Injectable 25 milliLiter(s) IV Push every 15 minutes  DOBUTamine Infusion 2.5 MICROgram(s)/kG/Min (8.55 mL/Hr) IV Continuous <Continuous>  ferrous    sulfate 325 milliGRAM(s) Oral daily  fluticasone propionate/ salmeterol 250-50 MICROgram(s) Diskus 1 Dose(s) Inhalation two times a day  folic acid 1 milliGRAM(s) Oral daily  furosemide   Injectable 20 milliGRAM(s) IV Push daily  gabapentin 300 milliGRAM(s) Oral every 8 hours  guaiFENesin  milliGRAM(s) Oral every 12 hours  heparin   Injectable 5000 Unit(s) SubCutaneous every 8 hours  influenza  Vaccine (HIGH DOSE) 0.5 milliLiter(s) IntraMuscular once  insulin regular Infusion 5 Unit(s)/Hr (5 mL/Hr) IV Continuous <Continuous>  lidocaine   4% Patch 1 Patch Transdermal daily  meperidine     Injectable 25 milliGRAM(s) IV Push once  milrinone Infusion 0.125 MICROgram(s)/kG/Min (4.28 mL/Hr) IV Continuous <Continuous>  mupirocin 2% Nasal 1 Application(s) Both Nostrils every 12 hours  pantoprazole    Tablet 40 milliGRAM(s) Oral before breakfast  polyethylene glycol 3350 17 Gram(s) Oral daily  rosuvastatin 20 milliGRAM(s) Oral at bedtime  senna 2 Tablet(s) Oral at bedtime  sodium chloride 0.9%. 1000 milliLiter(s) (20 mL/Hr) IV Continuous <Continuous>    MEDICATIONS  (PRN):  ALPRAZolam 0.25 milliGRAM(s) Oral at bedtime PRN anxiety  ondansetron Injectable 4 milliGRAM(s) IV Push every 4 hours PRN Nausea and/or Vomiting  oxyCODONE    IR 5 milliGRAM(s) Oral every 4 hours PRN Moderate Pain (4 - 6)  oxyCODONE    IR 10 milliGRAM(s) Oral every 4 hours PRN Severe Pain (7 - 10)      FAMILY HISTORY:  Family history of diabetes mellitus (Mother, Sibling)    CAD (coronary artery disease) (Mother, Father)        SOCIAL HISTORY: No significant hx    Past Surgical History: see above    Allergies:  No Known Allergies      REVIEW OF SYSTEMS: No CP, palpitations, dizziness or SOB     Vital Signs Last 24 Hrs  T(C): 37 (2025 12:00), Max: 37.1 (2025 20:00)  T(F): 98.6 (2025 12:00), Max: 98.8 (2025 20:00)  HR: 71 (2025 14:00) (55 - 80)  BP: --  BP(mean): --  RR: 8 (2025 14:00) (8 - 36)  SpO2: 97% (2025 14:00) (92% - 100%)    Parameters below as of 2025 12:00  Patient On (Oxygen Delivery Method): BiPAP/CPAP        PHYSICAL EXAM:  GENERAL: In no apparent distress, well nourished, and hydrated.  NECK: Supple   HEART: Irregular rate and rhythm; No murmurs, rubs, or gallops.  PULMONARY: Clear to auscultation and perfusion.  No rales, wheezing, or rhonchi bilaterally.  EXTREMITIES:  2+ Peripheral Pulses, no LE edema BL  NEUROLOGICAL: Grossly nonfocal      INTERPRETATION OF TELEMETRY: AF 50 bpm    ECG:  < from: 12 Lead ECG (25 @ 11:59) >    Ventricular Rate 67 BPM    QRS Duration 88 ms    Q-T Interval 402 ms    QTC Calculation(Bazett) 424 ms    R Axis -35 degrees    T Axis 71 degrees    Diagnosis Line Atrial fibrillation  Left axis deviation  Inferior infarct , age undetermined    Abnormal ECG    Confirmed by PAULO MOSQUEDA MD ([id number]) on 2025 12:15:15 PM    < end of copied text >      I&O's Detail    2025 07:01  -  2025 07:00  --------------------------------------------------------  IN:    Insulin: 110 mL    IV PiggyBack: 200 mL    Milrinone: 12.8 mL    Milrinone: 180.6 mL    Milrinone: 8.6 mL    Oral Fluid: 750 mL    sodium chloride 0.9%: 280 mL  Total IN: 1542 mL    OUT:    Chest Tube (mL): 70 mL    Chest Tube (mL): 130 mL    Chest Tube (mL): 170 mL    Indwelling Catheter - Urethral (mL): 805 mL  Total OUT: 1175 mL    Total NET: 367 mL      2025 07:01  -  2025 16:30  --------------------------------------------------------  IN:    Insulin: 4 mL    IV PiggyBack: 100 mL    Milrinone: 4.3 mL    sodium chloride 0.9%: 70 mL  Total IN: 178.3 mL    OUT:    Chest Tube (mL): 35 mL    Chest Tube (mL): 310 mL    Indwelling Catheter - Urethral (mL): 355 mL  Total OUT: 700 mL    Total NET: -521.7 mL          LABS:                        8.4    12.28 )-----------( 116      ( 2025 12:05 )             27.3         134[L]  |  100  |  61[HH]  ----------------------------<  116[H]  4.9   |  18  |  2.7[H]    Ca    8.1[L]      2025 11:36  Mg     2.4         TPro  5.9[L]  /  Alb  3.9  /  TBili  0.7  /  DBili  x   /  AST  100[H]  /  ALT  39  /  AlkPhos  201[H]          PT/INR - ( 2025 02:35 )   PT: 13.40 sec;   INR: 1.13 ratio         PTT - ( 2025 02:35 )  PTT:28.0 sec  Urinalysis Basic - ( 2025 11:36 )    Color: x / Appearance: x / SG: x / pH: x  Gluc: 116 mg/dL / Ketone: x  / Bili: x / Urobili: x   Blood: x / Protein: x / Nitrite: x   Leuk Esterase: x / RBC: x / WBC x   Sq Epi: x / Non Sq Epi: x / Bacteria: x      BNP  I&O's Detail    2025 07:01  -  2025 07:00  --------------------------------------------------------  IN:    Insulin: 110 mL    IV PiggyBack: 200 mL    Milrinone: 12.8 mL    Milrinone: 180.6 mL    Milrinone: 8.6 mL    Oral Fluid: 750 mL    sodium chloride 0.9%: 280 mL  Total IN: 1542 mL    OUT:    Chest Tube (mL): 70 mL    Chest Tube (mL): 130 mL    Chest Tube (mL): 170 mL    Indwelling Catheter - Urethral (mL): 805 mL  Total OUT: 1175 mL    Total NET: 367 mL      2025 07:01  -  2025 16:30  --------------------------------------------------------  IN:    Insulin: 4 mL    IV PiggyBack: 100 mL    Milrinone: 4.3 mL    sodium chloride 0.9%: 70 mL  Total IN: 178.3 mL    OUT:    Chest Tube (mL): 35 mL    Chest Tube (mL): 310 mL    Indwelling Catheter - Urethral (mL): 355 mL  Total OUT: 700 mL    Total NET: -521.7 mL        Daily     Daily Weight in k.1 (2025 06:00)    RADIOLOGY & ADDITIONAL STUDIES:

## 2025-01-31 NOTE — CONSULT NOTE ADULT - ASSESSMENT
Cardiologist: Dr Argueta    64M w/ afib, nonobstructive CAD, CHF class 3, severe MR, DM, HTN, JIMMY on CPAP, asthma, severe obesity, HL, Madelung's disease, osteoarthritis s/p bilateral hip replacement, peripheral neuropathy presented with increasing dyspnea with exertion found to have severe MR sp MVR    Impression:  Junctional Bradycardia postop  Slow AF  Sev MR sp MVR/MAZE/SHEILA Ligation  JIMMY  CAD  DM  HTN    Plan:  - Reprogrammed temp pacemaker to 45 bpm  - Will cont to monitor over the weekend  - Avoid BB  - Monitor electrolytes, maintain WNL  - Will follow

## 2025-01-31 NOTE — PROGRESS NOTE ADULT - SUBJECTIVE AND OBJECTIVE BOX
CTU Attending Progress Daily Note     30 Jan 2025 07:45    Procedure: MVReplacement (bioprosthetic), SHEILA ligation, MAZE  Procedure Day: 2    Hx: afib, nonobstructive CAD, CHF class 3, severe MR, DM, HTN, JIMMY on CPAP, asthma, severe obesity, HL, Madelung's disease, arthritis, arthritis, peripheral neuropathy    HPI: 64M w/ afib, nonobstructive CAD, CHF class 3, severe MR, DM, HTN, JIMMY on CPAP, asthma, severe obesity, HL, Madelung's disease, osteoarthritis s/p bilateral hip replacement, peripheral neuropathy presented with increasing dyspnea with exertion found to have severe MR    OR Data  Procedure: Procedure: MVReplacement (bioprosthetic), SHEILA ligation, MAZE  Bypass: 202  Cross Clamp: 158  Pre LV Fxn: 50-55%      RV Fxn: normal  Post LV Fxn: 45-50%     RV Fxn: normal    moderate TR, MV gradient 3mmHg  Blood Products: 1uPRBC, ddavp 39mcq  Cell Saver: 698  Fluids: NS 2000, albumin 2000  Pacing Wires: V pacing. sinus rhythm  UO: 1100    OBJECTIVE:  OBJECTIVE:  ICU Vital Signs Last 24 Hrs  T(C): 37.4 (31 Jan 2025 20:00), Max: 37.4 (31 Jan 2025 20:00)  T(F): 99.3 (31 Jan 2025 20:00), Max: 99.3 (31 Jan 2025 20:00)  HR: 63 (31 Jan 2025 20:00) (52 - 87)  BP: --  BP(mean): --  ABP: 111/50 (31 Jan 2025 20:00) (88/43 - 140/55)  ABP(mean): 63 (31 Jan 2025 20:00) (58 - 74)  RR: 16 (31 Jan 2025 20:00) (4 - 36)  SpO2: 93% (31 Jan 2025 20:00) (91% - 100%)    O2 Parameters below as of 31 Jan 2025 20:00  Patient On (Oxygen Delivery Method): nasal cannula  O2 Flow (L/min): 4        I&O's Summary    30 Jan 2025 07:01  -  31 Jan 2025 07:00  --------------------------------------------------------  IN: 1542 mL / OUT: 1175 mL / NET: 367 mL    31 Jan 2025 07:01  -  31 Jan 2025 23:24  --------------------------------------------------------  IN: 843.4 mL / OUT: 965 mL / NET: -121.6 mL      I&O's Detail    30 Jan 2025 07:01  -  31 Jan 2025 07:00  --------------------------------------------------------  IN:    Insulin: 110 mL    IV PiggyBack: 200 mL    Milrinone: 12.8 mL    Milrinone: 180.6 mL    Milrinone: 8.6 mL    Oral Fluid: 750 mL    sodium chloride 0.9%: 280 mL  Total IN: 1542 mL    OUT:    Chest Tube (mL): 70 mL    Chest Tube (mL): 130 mL    Chest Tube (mL): 170 mL    Indwelling Catheter - Urethral (mL): 805 mL  Total OUT: 1175 mL    Total NET: 367 mL      31 Jan 2025 07:01  -  31 Jan 2025 23:24  --------------------------------------------------------  IN:    DOBUTamine: 68.8 mL    Insulin: 70 mL    IV PiggyBack: 200 mL    Milrinone: 51.6 mL    PRBCs (Packed Red Blood Cells): 333 mL    sodium chloride 0.9%: 120 mL  Total IN: 843.4 mL    OUT:    Chest Tube (mL): 370 mL    Chest Tube (mL): 55 mL    Indwelling Catheter - Urethral (mL): 540 mL  Total OUT: 965 mL    Total NET: -121.6 mL      POCT Blood Glucose.: 98 mg/dL (31 Jan 2025 22:37)  POCT Blood Glucose.: 90 mg/dL (31 Jan 2025 21:46)  POCT Blood Glucose.: 123 mg/dL (31 Jan 2025 20:34)  POCT Blood Glucose.: 154 mg/dL (31 Jan 2025 18:53)  POCT Blood Glucose.: 153 mg/dL (31 Jan 2025 17:41)  POCT Blood Glucose.: 156 mg/dL (31 Jan 2025 16:34)  POCT Blood Glucose.: 114 mg/dL (31 Jan 2025 14:58)  POCT Blood Glucose.: 109 mg/dL (31 Jan 2025 13:53)  POCT Blood Glucose.: 117 mg/dL (31 Jan 2025 11:59)  POCT Blood Glucose.: 122 mg/dL (31 Jan 2025 11:05)  POCT Blood Glucose.: 147 mg/dL (31 Jan 2025 09:21)  POCT Blood Glucose.: 150 mg/dL (31 Jan 2025 07:47)  POCT Blood Glucose.: 144 mg/dL (31 Jan 2025 06:41)  POCT Blood Glucose.: 120 mg/dL (31 Jan 2025 05:07)  POCT Blood Glucose.: 118 mg/dL (31 Jan 2025 04:09)  POCT Blood Glucose.: 113 mg/dL (31 Jan 2025 03:34)  POCT Blood Glucose.: 112 mg/dL (31 Jan 2025 02:16)  POCT Blood Glucose.: 105 mg/dL (31 Jan 2025 01:10)  POCT Blood Glucose.: 105 mg/dL (31 Jan 2025 00:27)  POCT Blood Glucose.: 106 mg/dL (31 Jan 2025 00:03)  POCT Blood Glucose.: 128 mg/dL (30 Jan 2025 23:38)    LABS:  ABG - ( 31 Jan 2025 21:01 )  pH, Arterial: 7.28  pH, Blood: x     /  pCO2: 41    /  pO2: 97    / HCO3: 19    / Base Excess: -7.0  /  SaO2: 97.9                                    8.0    10.53 )-----------( 107      ( 31 Jan 2025 17:46 )             25.7     01-31    132[L]  |  100  |  67[HH]  ----------------------------<  133[H]  4.7   |  19  |  2.9[H]    Ca    7.7[L]      31 Jan 2025 17:46  Phos  5.5     01-31  Mg     2.5     01-31    TPro  5.3[L]  /  Alb  3.5  /  TBili  0.6  /  DBili  x   /  AST  70[H]  /  ALT  32  /  AlkPhos  216[H]  01-31    PT/INR - ( 30 Jan 2025 02:35 )   PT: 13.40 sec;   INR: 1.13 ratio         PTT - ( 30 Jan 2025 02:35 )  PTT:28.0 sec    Home Medications:  Breo Ellipta 200 mcg-25 mcg/inh inhalation powder: 1 inhaled once a day (28 Jan 2025 06:52)  HYDROcodone 10 mg oral capsule, extended release: 1 cap(s) orally 2 times a day (28 Jan 2025 06:52)  losartan 50 mg oral tablet: 1 tab(s) orally 2 times a day (28 Jan 2025 06:52)  losartan-hydrochlorothiazide 50 mg-12.5 mg oral tablet: 1 tab(s) orally (28 Jan 2025 06:52)  ProAir HFA 90 mcg/inh inhalation aerosol: 2 puff(s) inhaled 2 times a day (28 Jan 2025 06:52)  tujeo: 80 unit(s) subcutaneous once a day (28 Jan 2025 06:52)    HOSPITAL MEDICATIONS:  MEDICATIONS  (STANDING):  albuterol/ipratropium for Nebulization 3 milliLiter(s) Nebulizer every 6 hours  aspirin enteric coated 81 milliGRAM(s) Oral daily  bisacodyl Suppository 10 milliGRAM(s) Rectal once  chlorhexidine 2% Cloths 1 Application(s) Topical daily  dextrose 50% Injectable 50 milliLiter(s) IV Push every 15 minutes  dextrose 50% Injectable 25 milliLiter(s) IV Push every 15 minutes  DOBUTamine Infusion 2.5 MICROgram(s)/kG/Min (8.55 mL/Hr) IV Continuous <Continuous>  ferrous    sulfate 325 milliGRAM(s) Oral daily  fluticasone propionate/ salmeterol 250-50 MICROgram(s) Diskus 1 Dose(s) Inhalation two times a day  folic acid 1 milliGRAM(s) Oral daily  furosemide   Injectable 20 milliGRAM(s) IV Push daily  gabapentin 300 milliGRAM(s) Oral every 8 hours  guaiFENesin  milliGRAM(s) Oral every 12 hours  heparin   Injectable 5000 Unit(s) SubCutaneous every 8 hours  influenza  Vaccine (HIGH DOSE) 0.5 milliLiter(s) IntraMuscular once  insulin regular Infusion 5 Unit(s)/Hr (5 mL/Hr) IV Continuous <Continuous>  lidocaine   4% Patch 1 Patch Transdermal daily  meperidine     Injectable 25 milliGRAM(s) IV Push once  milrinone Infusion 0.125 MICROgram(s)/kG/Min (4.28 mL/Hr) IV Continuous <Continuous>  mupirocin 2% Nasal 1 Application(s) Both Nostrils every 12 hours  pantoprazole    Tablet 40 milliGRAM(s) Oral before breakfast  polyethylene glycol 3350 17 Gram(s) Oral daily  rosuvastatin 20 milliGRAM(s) Oral at bedtime  senna 2 Tablet(s) Oral at bedtime  sodium chloride 0.9%. 1000 milliLiter(s) (20 mL/Hr) IV Continuous <Continuous>    MEDICATIONS  (PRN):  ALPRAZolam 0.25 milliGRAM(s) Oral at bedtime PRN anxiety  ondansetron Injectable 4 milliGRAM(s) IV Push every 4 hours PRN Nausea and/or Vomiting  oxyCODONE    IR 5 milliGRAM(s) Oral every 4 hours PRN Moderate Pain (4 - 6)  oxyCODONE    IR 10 milliGRAM(s) Oral every 4 hours PRN Severe Pain (7 - 10)    RADIOLOGY:  Chest X-ray Reviewed    REVIEW OF SYSTEMS:  CONSTITUTIONAL: [X] all negative; [ ] weakness, [ ] fevers, [ ] chills  EYES/ENT: [X] all negative; [ ] visual changes, [ ] vertigo, [ ] throat pain   NECK: [X] all negative; [ ] pain, [ ] stiffness  RESPIRATORY: [] all negative, [ ] cough, [ ] wheezing, [ ] hemoptysis, [ ] shortness of breath  CARDIOVASCULAR: [] all negative; [ ] chest pain, [ ] palpitations, [ ] orthopnea  GASTROINTESTINAL: [X] all negative; [ ]abdominal pain, [ ] nausea, [ ] vomiting, [ ] hematemesis, [ ] diarrhea, [ ] constipation, [ ] melena, [ ] hematochezia.  GENITOURINARY: [X] all negative; [ ] dysuria, [ ] frequency, [ ] hematuria  NEUROLOGICAL: [X] all negative; [ ] numbness, [ ] weakness  SKIN: [X] all negative; [ ] itching, [ ] burning, [ ] rashes, [ ] lesions   All other review of systems is negative unless indicated above.  [  ] Unable to assess ROS because   PHYSICAL EXAM:          CONSTITUTIONAL: Well-developed; well-nourished; in no acute distress.   	SKIN: warm, dry  	HEAD: Normocephalic; atraumatic.  	EYES: PERRL, EOM, no conj injection, sclera clear  	ENT: No nasal discharge; airway clear.  	NECK: Supple; non tender.   	CARD: S1, S2 normal; no murmurs, gallops, or rubs. Regular rate and rhythm.  no carotid bruits  	RESP: CTA B/L; good air movement No wheezes, rales or rhonchi.  	ABD: Soft, not tender, not distended,  no rebound or guarding, bowel sounds present  	EXT: Normal ROM.  No clubbing, cyanosis or edema.   warm and well perfused.  pulses palpable  	NEURO: Alert, awake, motor 5/5 R, 5/5 L      Assessment and Plan:   · Assessment	  64M w/ afib, nonobstructive CAD, CHF class 3, severe MR, DM, HTN, JIMMY on CPAP, asthma, severe obesity, HL, Madelung's disease, arthritis, arthritis, peripheral neuropathy s/p MVReplacement, SHEILA ligation, MAZE 1/28/25    Assessment/Plan    Neuro:  #acute postoperative pain  - Analgesics: acetaminophen, lidocaine patch, opioid prn      CV: MVR s/p MV Replacement, SHEILA ligation, MAZE  - Inotropes: milrinone slow wean    Pulm:  #acute postoperative pulmonary insufficiency  #atelectasis  - continue NIPPV. alternating with HFNC   - aggressive incentive spirometry and chest PT to address atelectasis   #acute pulmonary edema on diuretics    Renal/Fluid/Lytes:  #acute fluid overload  - diuresis  - replete/optimize electrolytes      Heme:  #Acute blood loss anemia from surgery  - transfuse for hemoglobin < 7 or hemodynamic instability due to anemia  - Continue aspirin    ID:  Cefazolin    Endocrine:   #acute postoperative stress hyperglycemia  - insulin therapy to achieve tight glucose control    GI:  - bowel regimen    Prophylaxis  - SCDs. VTE chemoprophylaxis start  - GI prophylaxis     PT  - out of bed to chair  - ambulation as much as possible    Patient requires continuous monitoring with:  bedside rhythm monitoring, continuous  pulse oximetry monitoring, O2 supplement titrate for O2 sat above 90%  ;   ventilator management and monitoring; intermittent blood gas analysis.  Care plan discussed with CT ICU care team and attending surgeon Dr Rendon  patient remain critical, at risk for life threatening decompensation; required more than usual post op care;   I have spent 35 minutes providing non routine post op care, revaluated multiple times.    Ivana Zhang MD  intensivist            CTU Attending Progress Daily Note     30 Jan 2025 07:45    Procedure: MV Replacement (bioprosthetic), SHEILA ligation, MAZE  Procedure Day: 3    Hx: afib, nonobstructive CAD, CHF class 3, severe MR, DM, HTN, JIMMY on CPAP, asthma, severe obesity, HL, Madelung's disease, arthritis, arthritis, peripheral neuropathy    HPI: 64M w/ afib, nonobstructive CAD, CHF class 3, severe MR, DM, HTN, JIMMY on CPAP, asthma, severe obesity, HL, Madelung's disease, osteoarthritis s/p bilateral hip replacement, peripheral neuropathy presented with increasing dyspnea with exertion found to have severe MR    OR Data  Procedure: Procedure: MVReplacement (bioprosthetic), SHEILA ligation, MAZE  Bypass: 202  Cross Clamp: 158  Pre LV Fxn: 50-55%      RV Fxn: normal  Post LV Fxn: 45-50%     RV Fxn: normal    moderate TR, MV gradient 3mmHg  Blood Products: 1uPRBC, ddavp 39mcq  Cell Saver: 698  Fluids: NS 2000, albumin 2000  Pacing Wires: V pacing. sinus rhythm  UO: 1100    OBJECTIVE:  ICU Vital Signs Last 24 Hrs  T(C): 37.4 (31 Jan 2025 20:00), Max: 37.4 (31 Jan 2025 20:00)  T(F): 99.3 (31 Jan 2025 20:00), Max: 99.3 (31 Jan 2025 20:00)  HR: 63 (31 Jan 2025 20:00) (52 - 87)  BP: --  BP(mean): --  ABP: 111/50 (31 Jan 2025 20:00) (88/43 - 140/55)  ABP(mean): 63 (31 Jan 2025 20:00) (58 - 74)  RR: 16 (31 Jan 2025 20:00) (4 - 36)  SpO2: 93% (31 Jan 2025 20:00) (91% - 100%)    O2 Parameters below as of 31 Jan 2025 20:00  Patient On (Oxygen Delivery Method): nasal cannula  O2 Flow (L/min): 4        I&O's Summary    30 Jan 2025 07:01  -  31 Jan 2025 07:00  --------------------------------------------------------  IN: 1542 mL / OUT: 1175 mL / NET: 367 mL    31 Jan 2025 07:01  -  31 Jan 2025 23:24  --------------------------------------------------------  IN: 843.4 mL / OUT: 965 mL / NET: -121.6 mL      I&O's Detail    30 Jan 2025 07:01  -  31 Jan 2025 07:00  --------------------------------------------------------  IN:    Insulin: 110 mL    IV PiggyBack: 200 mL    Milrinone: 12.8 mL    Milrinone: 180.6 mL    Milrinone: 8.6 mL    Oral Fluid: 750 mL    sodium chloride 0.9%: 280 mL  Total IN: 1542 mL    OUT:    Chest Tube (mL): 70 mL    Chest Tube (mL): 130 mL    Chest Tube (mL): 170 mL    Indwelling Catheter - Urethral (mL): 805 mL  Total OUT: 1175 mL    Total NET: 367 mL      31 Jan 2025 07:01  -  31 Jan 2025 23:24  --------------------------------------------------------  IN:    DOBUTamine: 68.8 mL    Insulin: 70 mL    IV PiggyBack: 200 mL    Milrinone: 51.6 mL    PRBCs (Packed Red Blood Cells): 333 mL    sodium chloride 0.9%: 120 mL  Total IN: 843.4 mL    OUT:    Chest Tube (mL): 370 mL    Chest Tube (mL): 55 mL    Indwelling Catheter - Urethral (mL): 540 mL  Total OUT: 965 mL    Total NET: -121.6 mL      POCT Blood Glucose.: 98 mg/dL (31 Jan 2025 22:37)  POCT Blood Glucose.: 90 mg/dL (31 Jan 2025 21:46)  POCT Blood Glucose.: 123 mg/dL (31 Jan 2025 20:34)  POCT Blood Glucose.: 154 mg/dL (31 Jan 2025 18:53)  POCT Blood Glucose.: 153 mg/dL (31 Jan 2025 17:41)  POCT Blood Glucose.: 156 mg/dL (31 Jan 2025 16:34)  POCT Blood Glucose.: 114 mg/dL (31 Jan 2025 14:58)  POCT Blood Glucose.: 109 mg/dL (31 Jan 2025 13:53)  POCT Blood Glucose.: 117 mg/dL (31 Jan 2025 11:59)  POCT Blood Glucose.: 122 mg/dL (31 Jan 2025 11:05)  POCT Blood Glucose.: 147 mg/dL (31 Jan 2025 09:21)  POCT Blood Glucose.: 150 mg/dL (31 Jan 2025 07:47)  POCT Blood Glucose.: 144 mg/dL (31 Jan 2025 06:41)  POCT Blood Glucose.: 120 mg/dL (31 Jan 2025 05:07)  POCT Blood Glucose.: 118 mg/dL (31 Jan 2025 04:09)  POCT Blood Glucose.: 113 mg/dL (31 Jan 2025 03:34)  POCT Blood Glucose.: 112 mg/dL (31 Jan 2025 02:16)  POCT Blood Glucose.: 105 mg/dL (31 Jan 2025 01:10)  POCT Blood Glucose.: 105 mg/dL (31 Jan 2025 00:27)  POCT Blood Glucose.: 106 mg/dL (31 Jan 2025 00:03)  POCT Blood Glucose.: 128 mg/dL (30 Jan 2025 23:38)    LABS:  ABG - ( 31 Jan 2025 21:01 )  pH, Arterial: 7.28  pH, Blood: x     /  pCO2: 41    /  pO2: 97    / HCO3: 19    / Base Excess: -7.0  /  SaO2: 97.9                                    8.0    10.53 )-----------( 107      ( 31 Jan 2025 17:46 )             25.7     01-31    132[L]  |  100  |  67[HH]  ----------------------------<  133[H]  4.7   |  19  |  2.9[H]    Ca    7.7[L]      31 Jan 2025 17:46  Phos  5.5     01-31  Mg     2.5     01-31    TPro  5.3[L]  /  Alb  3.5  /  TBili  0.6  /  DBili  x   /  AST  70[H]  /  ALT  32  /  AlkPhos  216[H]  01-31    PT/INR - ( 30 Jan 2025 02:35 )   PT: 13.40 sec;   INR: 1.13 ratio         PTT - ( 30 Jan 2025 02:35 )  PTT:28.0 sec    Home Medications:  Breo Ellipta 200 mcg-25 mcg/inh inhalation powder: 1 inhaled once a day (28 Jan 2025 06:52)  HYDROcodone 10 mg oral capsule, extended release: 1 cap(s) orally 2 times a day (28 Jan 2025 06:52)  losartan 50 mg oral tablet: 1 tab(s) orally 2 times a day (28 Jan 2025 06:52)  losartan-hydrochlorothiazide 50 mg-12.5 mg oral tablet: 1 tab(s) orally (28 Jan 2025 06:52)  ProAir HFA 90 mcg/inh inhalation aerosol: 2 puff(s) inhaled 2 times a day (28 Jan 2025 06:52)  tujeo: 80 unit(s) subcutaneous once a day (28 Jan 2025 06:52)    HOSPITAL MEDICATIONS:  MEDICATIONS  (STANDING):  albuterol/ipratropium for Nebulization 3 milliLiter(s) Nebulizer every 6 hours  aspirin enteric coated 81 milliGRAM(s) Oral daily  bisacodyl Suppository 10 milliGRAM(s) Rectal once  chlorhexidine 2% Cloths 1 Application(s) Topical daily  dextrose 50% Injectable 50 milliLiter(s) IV Push every 15 minutes  dextrose 50% Injectable 25 milliLiter(s) IV Push every 15 minutes  DOBUTamine Infusion 2.5 MICROgram(s)/kG/Min (8.55 mL/Hr) IV Continuous <Continuous>  ferrous    sulfate 325 milliGRAM(s) Oral daily  fluticasone propionate/ salmeterol 250-50 MICROgram(s) Diskus 1 Dose(s) Inhalation two times a day  folic acid 1 milliGRAM(s) Oral daily  furosemide   Injectable 20 milliGRAM(s) IV Push daily  gabapentin 300 milliGRAM(s) Oral every 8 hours  guaiFENesin  milliGRAM(s) Oral every 12 hours  heparin   Injectable 5000 Unit(s) SubCutaneous every 8 hours  influenza  Vaccine (HIGH DOSE) 0.5 milliLiter(s) IntraMuscular once  insulin regular Infusion 5 Unit(s)/Hr (5 mL/Hr) IV Continuous <Continuous>  lidocaine   4% Patch 1 Patch Transdermal daily  meperidine     Injectable 25 milliGRAM(s) IV Push once  milrinone Infusion 0.125 MICROgram(s)/kG/Min (4.28 mL/Hr) IV Continuous <Continuous>  mupirocin 2% Nasal 1 Application(s) Both Nostrils every 12 hours  pantoprazole    Tablet 40 milliGRAM(s) Oral before breakfast  polyethylene glycol 3350 17 Gram(s) Oral daily  rosuvastatin 20 milliGRAM(s) Oral at bedtime  senna 2 Tablet(s) Oral at bedtime  sodium chloride 0.9%. 1000 milliLiter(s) (20 mL/Hr) IV Continuous <Continuous>    MEDICATIONS  (PRN):  ALPRAZolam 0.25 milliGRAM(s) Oral at bedtime PRN anxiety  ondansetron Injectable 4 milliGRAM(s) IV Push every 4 hours PRN Nausea and/or Vomiting  oxyCODONE    IR 5 milliGRAM(s) Oral every 4 hours PRN Moderate Pain (4 - 6)  oxyCODONE    IR 10 milliGRAM(s) Oral every 4 hours PRN Severe Pain (7 - 10)    RADIOLOGY:  Chest X-ray Reviewed    REVIEW OF SYSTEMS:  CONSTITUTIONAL: [X] all negative; [ ] weakness, [ ] fevers, [ ] chills  EYES/ENT: [X] all negative; [ ] visual changes, [ ] vertigo, [ ] throat pain   NECK: [X] all negative; [ ] pain, [ ] stiffness  RESPIRATORY: [] all negative, [ ] cough, [ ] wheezing, [ ] hemoptysis, [ ] shortness of breath  CARDIOVASCULAR: [] all negative; [ ] chest pain, [ ] palpitations, [ ] orthopnea  GASTROINTESTINAL: [X] all negative; [ ]abdominal pain, [ ] nausea, [ ] vomiting, [ ] hematemesis, [ ] diarrhea, [ ] constipation, [ ] melena, [ ] hematochezia.  GENITOURINARY: [X] all negative; [ ] dysuria, [ ] frequency, [ ] hematuria  NEUROLOGICAL: [X] all negative; [ ] numbness, [ ] weakness  SKIN: [X] all negative; [ ] itching, [ ] burning, [ ] rashes, [ ] lesions      PHYSICAL EXAM:          CONSTITUTIONAL: Well-developed; well-nourished; in no acute distress.   	SKIN: warm, dry  	HEAD: Normocephalic; atraumatic.  	EYES: PERRL, EOM, no conj injection, sclera clear  	ENT: No nasal discharge; airway clear.  	NECK: Supple; non tender.   	CARD: S1, S2 normal; no murmurs, gallops, or rubs. Regular rate and rhythm.  no carotid bruits  	RESP: CTA B/L; good air movement No wheezes, rales or rhonchi.  	ABD: Soft, not tender, not distended,  no rebound or guarding, bowel sounds present  	EXT: Normal ROM.  No clubbing, cyanosis or edema.   warm and well perfused.  pulses palpable  	NEURO: Alert, awake, motor 5/5 R, 5/5 L      Assessment and Plan:   · Assessment	  64M w/ afib, nonobstructive CAD, CHF class 3, severe MR, DM, HTN, JIMMY on CPAP, asthma, severe obesity, HL, Madelung's disease, arthritis, arthritis, peripheral neuropathy s/p MVReplacement, SHEILA ligation, MAZE 1/28/25    Assessment/Plan    Neuro:  #acute postoperative pain  - Analgesics: acetaminophen, lidocaine patch, opioid prn      CV: MVR s/p MV Replacement, SHEILA ligation, MAZE  - Inotropes: milrinone slow wean    Pulm:  #acute postoperative pulmonary insufficiency  #atelectasis  - continue NIPPV. alternating with HFNC   - aggressive incentive spirometry and chest PT to address atelectasis   #acute pulmonary edema on diuretics    Renal/Fluid/Lytes:  #acute fluid overload  - diuresis  - replete/optimize electrolytes      Heme:  #Acute blood loss anemia from surgery  - transfuse for hemoglobin < 7 or hemodynamic instability due to anemia  - Continue aspirin    ID:  Cefazolin    Endocrine:   #acute postoperative stress hyperglycemia  - insulin therapy to achieve tight glucose control    GI:  - bowel regimen    Prophylaxis  - SCDs. VTE chemoprophylaxis start  - GI prophylaxis     PT  - out of bed to chair  - ambulation as much as possible    Patient requires continuous monitoring with:  bedside rhythm monitoring, continuous  pulse oximetry monitoring, O2 supplement titrate for O2 sat above 90%  ;   ventilator management and monitoring; intermittent blood gas analysis.  Care plan discussed with CT ICU care team and attending surgeon Dr Rendon  patient remain critical, at risk for life threatening decompensation; required more than usual post op care;   I have spent 35 minutes providing non routine post op care, revaluated multiple times.    Ivana Zhang MD  intensivist            CTU Attending Progress Daily Note     30 Jan 2025 07:45    Procedure: MV Replacement (bioprosthetic), SHEILA ligation, MAZE  Procedure Date: 1/28/2025  Hx: afib, nonobstructive CAD, CHF class 3, severe MR, DM, HTN, JIMMY on CPAP, asthma, severe obesity, HL, Madelung's disease, arthritis, arthritis, peripheral neuropathy    HPI: 64M w/ afib, nonobstructive CAD, CHF class 3, severe MR, DM, HTN, JIMMY on CPAP, asthma, severe obesity, HL, Madelung's disease, osteoarthritis s/p bilateral hip replacement, peripheral neuropathy presented with increasing dyspnea with exertion found to have severe MR    OR Data  Procedure: Procedure: MVReplacement (bioprosthetic), SHEILA ligation, MAZE  Bypass: 202  Cross Clamp: 158  Pre LV Fxn: 50-55%      RV Fxn: normal  Post LV Fxn: 45-50%     RV Fxn: normal    moderate TR, MV gradient 3mmHg  Blood Products: 1uPRBC, ddavp 39mcq  Cell Saver: 698  Fluids: NS 2000, albumin 2000  Pacing Wires: V pacing. sinus rhythm  UO: 1100    OBJECTIVE:  ICU Vital Signs Last 24 Hrs  T(C): 37.4 (31 Jan 2025 20:00), Max: 37.4 (31 Jan 2025 20:00)  T(F): 99.3 (31 Jan 2025 20:00), Max: 99.3 (31 Jan 2025 20:00)  HR: 63 (31 Jan 2025 20:00) (52 - 87)  BP: --  BP(mean): --  ABP: 111/50 (31 Jan 2025 20:00) (88/43 - 140/55)  ABP(mean): 63 (31 Jan 2025 20:00) (58 - 74)  RR: 16 (31 Jan 2025 20:00) (4 - 36)  SpO2: 93% (31 Jan 2025 20:00) (91% - 100%)    O2 Parameters below as of 31 Jan 2025 20:00  Patient On (Oxygen Delivery Method): nasal cannula  O2 Flow (L/min): 4        I&O's Summary    30 Jan 2025 07:01  -  31 Jan 2025 07:00  --------------------------------------------------------  IN: 1542 mL / OUT: 1175 mL / NET: 367 mL    31 Jan 2025 07:01  -  31 Jan 2025 23:24  --------------------------------------------------------  IN: 843.4 mL / OUT: 965 mL / NET: -121.6 mL      I&O's Detail    30 Jan 2025 07:01  -  31 Jan 2025 07:00  --------------------------------------------------------  IN:    Insulin: 110 mL    IV PiggyBack: 200 mL    Milrinone: 12.8 mL    Milrinone: 180.6 mL    Milrinone: 8.6 mL    Oral Fluid: 750 mL    sodium chloride 0.9%: 280 mL  Total IN: 1542 mL    OUT:    Chest Tube (mL): 70 mL    Chest Tube (mL): 130 mL    Chest Tube (mL): 170 mL    Indwelling Catheter - Urethral (mL): 805 mL  Total OUT: 1175 mL    Total NET: 367 mL      31 Jan 2025 07:01  -  31 Jan 2025 23:24  --------------------------------------------------------  IN:    DOBUTamine: 68.8 mL    Insulin: 70 mL    IV PiggyBack: 200 mL    Milrinone: 51.6 mL    PRBCs (Packed Red Blood Cells): 333 mL    sodium chloride 0.9%: 120 mL  Total IN: 843.4 mL    OUT:    Chest Tube (mL): 370 mL    Chest Tube (mL): 55 mL    Indwelling Catheter - Urethral (mL): 540 mL  Total OUT: 965 mL    Total NET: -121.6 mL      POCT Blood Glucose.: 98 mg/dL (31 Jan 2025 22:37)  POCT Blood Glucose.: 90 mg/dL (31 Jan 2025 21:46)  POCT Blood Glucose.: 123 mg/dL (31 Jan 2025 20:34)  POCT Blood Glucose.: 154 mg/dL (31 Jan 2025 18:53)  POCT Blood Glucose.: 153 mg/dL (31 Jan 2025 17:41)  POCT Blood Glucose.: 156 mg/dL (31 Jan 2025 16:34)  POCT Blood Glucose.: 114 mg/dL (31 Jan 2025 14:58)  POCT Blood Glucose.: 109 mg/dL (31 Jan 2025 13:53)  POCT Blood Glucose.: 117 mg/dL (31 Jan 2025 11:59)  POCT Blood Glucose.: 122 mg/dL (31 Jan 2025 11:05)  POCT Blood Glucose.: 147 mg/dL (31 Jan 2025 09:21)  POCT Blood Glucose.: 150 mg/dL (31 Jan 2025 07:47)  POCT Blood Glucose.: 144 mg/dL (31 Jan 2025 06:41)  POCT Blood Glucose.: 120 mg/dL (31 Jan 2025 05:07)  POCT Blood Glucose.: 118 mg/dL (31 Jan 2025 04:09)  POCT Blood Glucose.: 113 mg/dL (31 Jan 2025 03:34)  POCT Blood Glucose.: 112 mg/dL (31 Jan 2025 02:16)  POCT Blood Glucose.: 105 mg/dL (31 Jan 2025 01:10)  POCT Blood Glucose.: 105 mg/dL (31 Jan 2025 00:27)  POCT Blood Glucose.: 106 mg/dL (31 Jan 2025 00:03)  POCT Blood Glucose.: 128 mg/dL (30 Jan 2025 23:38)    LABS:  ABG - ( 31 Jan 2025 21:01 )  pH, Arterial: 7.28  pH, Blood: x     /  pCO2: 41    /  pO2: 97    / HCO3: 19    / Base Excess: -7.0  /  SaO2: 97.9                                    8.0    10.53 )-----------( 107      ( 31 Jan 2025 17:46 )             25.7     01-31    132[L]  |  100  |  67[HH]  ----------------------------<  133[H]  4.7   |  19  |  2.9[H]    Ca    7.7[L]      31 Jan 2025 17:46  Phos  5.5     01-31  Mg     2.5     01-31    TPro  5.3[L]  /  Alb  3.5  /  TBili  0.6  /  DBili  x   /  AST  70[H]  /  ALT  32  /  AlkPhos  216[H]  01-31    PT/INR - ( 30 Jan 2025 02:35 )   PT: 13.40 sec;   INR: 1.13 ratio         PTT - ( 30 Jan 2025 02:35 )  PTT:28.0 sec    Home Medications:  Breo Ellipta 200 mcg-25 mcg/inh inhalation powder: 1 inhaled once a day (28 Jan 2025 06:52)  HYDROcodone 10 mg oral capsule, extended release: 1 cap(s) orally 2 times a day (28 Jan 2025 06:52)  losartan 50 mg oral tablet: 1 tab(s) orally 2 times a day (28 Jan 2025 06:52)  losartan-hydrochlorothiazide 50 mg-12.5 mg oral tablet: 1 tab(s) orally (28 Jan 2025 06:52)  ProAir HFA 90 mcg/inh inhalation aerosol: 2 puff(s) inhaled 2 times a day (28 Jan 2025 06:52)  tujeo: 80 unit(s) subcutaneous once a day (28 Jan 2025 06:52)    HOSPITAL MEDICATIONS:  MEDICATIONS  (STANDING):  albuterol/ipratropium for Nebulization 3 milliLiter(s) Nebulizer every 6 hours  aspirin enteric coated 81 milliGRAM(s) Oral daily  bisacodyl Suppository 10 milliGRAM(s) Rectal once  chlorhexidine 2% Cloths 1 Application(s) Topical daily  dextrose 50% Injectable 50 milliLiter(s) IV Push every 15 minutes  dextrose 50% Injectable 25 milliLiter(s) IV Push every 15 minutes  DOBUTamine Infusion 2.5 MICROgram(s)/kG/Min (8.55 mL/Hr) IV Continuous <Continuous>  ferrous    sulfate 325 milliGRAM(s) Oral daily  fluticasone propionate/ salmeterol 250-50 MICROgram(s) Diskus 1 Dose(s) Inhalation two times a day  folic acid 1 milliGRAM(s) Oral daily  furosemide   Injectable 20 milliGRAM(s) IV Push daily  gabapentin 300 milliGRAM(s) Oral every 8 hours  guaiFENesin  milliGRAM(s) Oral every 12 hours  heparin   Injectable 5000 Unit(s) SubCutaneous every 8 hours  influenza  Vaccine (HIGH DOSE) 0.5 milliLiter(s) IntraMuscular once  insulin regular Infusion 5 Unit(s)/Hr (5 mL/Hr) IV Continuous <Continuous>  lidocaine   4% Patch 1 Patch Transdermal daily  meperidine     Injectable 25 milliGRAM(s) IV Push once  milrinone Infusion 0.125 MICROgram(s)/kG/Min (4.28 mL/Hr) IV Continuous <Continuous>  mupirocin 2% Nasal 1 Application(s) Both Nostrils every 12 hours  pantoprazole    Tablet 40 milliGRAM(s) Oral before breakfast  polyethylene glycol 3350 17 Gram(s) Oral daily  rosuvastatin 20 milliGRAM(s) Oral at bedtime  senna 2 Tablet(s) Oral at bedtime  sodium chloride 0.9%. 1000 milliLiter(s) (20 mL/Hr) IV Continuous <Continuous>    MEDICATIONS  (PRN):  ALPRAZolam 0.25 milliGRAM(s) Oral at bedtime PRN anxiety  ondansetron Injectable 4 milliGRAM(s) IV Push every 4 hours PRN Nausea and/or Vomiting  oxyCODONE    IR 5 milliGRAM(s) Oral every 4 hours PRN Moderate Pain (4 - 6)  oxyCODONE    IR 10 milliGRAM(s) Oral every 4 hours PRN Severe Pain (7 - 10)    RADIOLOGY:  Chest X-ray Reviewed    REVIEW OF SYSTEMS:  CONSTITUTIONAL: [X] all negative; [ ] weakness, [ ] fevers, [ ] chills  EYES/ENT: [X] all negative; [ ] visual changes, [ ] vertigo, [ ] throat pain   NECK: [X] all negative; [ ] pain, [ ] stiffness  RESPIRATORY: [] all negative, [ ] cough, [ ] wheezing, [ ] hemoptysis, [ ] shortness of breath  CARDIOVASCULAR: [] all negative; [ ] chest pain, [ ] palpitations, [ ] orthopnea  GASTROINTESTINAL: [X] all negative; [ ]abdominal pain, [ ] nausea, [ ] vomiting, [ ] hematemesis, [ ] diarrhea, [ ] constipation, [ ] melena, [ ] hematochezia.  GENITOURINARY: [X] all negative; [ ] dysuria, [ ] frequency, [ ] hematuria  NEUROLOGICAL: [X] all negative; [ ] numbness, [ ] weakness  SKIN: [X] all negative; [ ] itching, [ ] burning, [ ] rashes, [ ] lesions      PHYSICAL EXAM:          CONSTITUTIONAL: Well-developed; well-nourished; in no acute distress.   	SKIN: warm, dry  	HEAD: Normocephalic; atraumatic.  	EYES: PERRL, EOM, no conj injection, sclera clear  	ENT: No nasal discharge; airway clear.  	NECK: Supple; non tender.   	CARD: S1, S2 normal; no murmurs, gallops, or rubs. Regular rate and rhythm.  no carotid bruits  	RESP: CTA B/L; good air movement No wheezes, rales or rhonchi.  	ABD: Soft, not tender, not distended,  no rebound or guarding, bowel sounds present  	EXT: Normal ROM.  No clubbing, cyanosis or edema.   warm and well perfused.  pulses palpable  	NEURO: Alert, awake, motor 5/5 R, 5/5 L      Assessment and Plan:   · Assessment	  64M w/ afib, nonobstructive CAD, CHF class 3, severe MR, DM, HTN, JIMMY on CPAP, asthma, severe obesity, HL, Madelung's disease, arthritis, arthritis, peripheral neuropathy s/p MVReplacement, SHEILA ligation, MAZE 1/28/25    Assessment/Plan    Neuro:  #acute postoperative pain  - Analgesics: acetaminophen, lidocaine patch, opioid prn      CV: MVR s/p MV Replacement, SHEILA ligation, MAZE  - Inotropes: milrinone slow wean    Pulm:  #acute postoperative pulmonary insufficiency  #atelectasis  - continue NIPPV. alternating with HFNC   - aggressive incentive spirometry and chest PT to address atelectasis   #acute pulmonary edema on diuretics    Renal/Fluid/Lytes:  #acute fluid overload  - diuresis  - replete/optimize electrolytes      Heme:  #Acute blood loss anemia from surgery  - transfuse for hemoglobin < 7 or hemodynamic instability due to anemia  - Continue aspirin    ID:  Cefazolin    Endocrine:   #acute postoperative stress hyperglycemia  - insulin therapy to achieve tight glucose control    GI:  - bowel regimen    Prophylaxis  - SCDs. VTE chemoprophylaxis start  - GI prophylaxis     PT  - out of bed to chair  - ambulation as much as possible    Patient requires continuous monitoring with:  bedside rhythm monitoring, continuous  pulse oximetry monitoring, O2 supplement titrate for O2 sat above 90%  ;   ventilator management and monitoring; intermittent blood gas analysis.  Care plan discussed with CT ICU care team and attending surgeon Dr Rendon  patient remain critical, at risk for life threatening decompensation; required more than usual post op care;   I have spent 35 minutes providing non routine post op care, revaluated multiple times.    Ivana Zhang MD  intensivist            CTU Attending Progress Daily Note     30 Jan 2025 07:45    Procedure: MV Replacement (bioprosthetic), SHEILA ligation, MAZE  Procedure Date: 1/28/2025  Hx: afib, nonobstructive CAD, CHF class 3, severe MR, DM, HTN, JIMMY on CPAP, asthma, severe obesity, HL, Madelung's disease, arthritis, arthritis, peripheral neuropathy    HPI: 64M w/ afib, nonobstructive CAD, CHF class 3, severe MR, DM, HTN, JIMMY on CPAP, asthma, severe obesity, HL, Madelung's disease, osteoarthritis s/p bilateral hip replacement, peripheral neuropathy presented with increasing dyspnea with exertion found to have severe MR    OR Data  Procedure: Procedure: MVReplacement (bioprosthetic), SHEILA ligation, MAZE  Bypass: 202  Cross Clamp: 158  Pre LV Fxn: 50-55%      RV Fxn: normal  Post LV Fxn: 45-50%     RV Fxn: normal    moderate TR, MV gradient 3mmHg  Blood Products: 1uPRBC, ddavp 39mcq  Cell Saver: 698  Fluids: NS 2000, albumin 2000  Pacing Wires: V pacing. sinus rhythm  UO: 1100    OBJECTIVE:  ICU Vital Signs Last 24 Hrs  T(C): 37.4 (31 Jan 2025 20:00), Max: 37.4 (31 Jan 2025 20:00)  T(F): 99.3 (31 Jan 2025 20:00), Max: 99.3 (31 Jan 2025 20:00)  HR: 63 (31 Jan 2025 20:00) (52 - 87)  BP: --  BP(mean): --  ABP: 111/50 (31 Jan 2025 20:00) (88/43 - 140/55)  ABP(mean): 63 (31 Jan 2025 20:00) (58 - 74)  RR: 16 (31 Jan 2025 20:00) (4 - 36)  SpO2: 93% (31 Jan 2025 20:00) (91% - 100%)    O2 Parameters below as of 31 Jan 2025 20:00  Patient On (Oxygen Delivery Method): nasal cannula  O2 Flow (L/min): 4        I&O's Summary    30 Jan 2025 07:01  -  31 Jan 2025 07:00  --------------------------------------------------------  IN: 1542 mL / OUT: 1175 mL / NET: 367 mL    31 Jan 2025 07:01  -  31 Jan 2025 23:24  --------------------------------------------------------  IN: 843.4 mL / OUT: 965 mL / NET: -121.6 mL      I&O's Detail    30 Jan 2025 07:01  -  31 Jan 2025 07:00  --------------------------------------------------------  IN:    Insulin: 110 mL    IV PiggyBack: 200 mL    Milrinone: 12.8 mL    Milrinone: 180.6 mL    Milrinone: 8.6 mL    Oral Fluid: 750 mL    sodium chloride 0.9%: 280 mL  Total IN: 1542 mL    OUT:    Chest Tube (mL): 70 mL    Chest Tube (mL): 130 mL    Chest Tube (mL): 170 mL    Indwelling Catheter - Urethral (mL): 805 mL  Total OUT: 1175 mL    Total NET: 367 mL      31 Jan 2025 07:01  -  31 Jan 2025 23:24  --------------------------------------------------------  IN:    DOBUTamine: 68.8 mL    Insulin: 70 mL    IV PiggyBack: 200 mL    Milrinone: 51.6 mL    PRBCs (Packed Red Blood Cells): 333 mL    sodium chloride 0.9%: 120 mL  Total IN: 843.4 mL    OUT:    Chest Tube (mL): 370 mL    Chest Tube (mL): 55 mL    Indwelling Catheter - Urethral (mL): 540 mL  Total OUT: 965 mL    Total NET: -121.6 mL      POCT Blood Glucose.: 98 mg/dL (31 Jan 2025 22:37)  POCT Blood Glucose.: 90 mg/dL (31 Jan 2025 21:46)  POCT Blood Glucose.: 123 mg/dL (31 Jan 2025 20:34)  POCT Blood Glucose.: 154 mg/dL (31 Jan 2025 18:53)  POCT Blood Glucose.: 153 mg/dL (31 Jan 2025 17:41)  POCT Blood Glucose.: 156 mg/dL (31 Jan 2025 16:34)  POCT Blood Glucose.: 114 mg/dL (31 Jan 2025 14:58)  POCT Blood Glucose.: 109 mg/dL (31 Jan 2025 13:53)  POCT Blood Glucose.: 117 mg/dL (31 Jan 2025 11:59)  POCT Blood Glucose.: 122 mg/dL (31 Jan 2025 11:05)  POCT Blood Glucose.: 147 mg/dL (31 Jan 2025 09:21)  POCT Blood Glucose.: 150 mg/dL (31 Jan 2025 07:47)  POCT Blood Glucose.: 144 mg/dL (31 Jan 2025 06:41)  POCT Blood Glucose.: 120 mg/dL (31 Jan 2025 05:07)  POCT Blood Glucose.: 118 mg/dL (31 Jan 2025 04:09)  POCT Blood Glucose.: 113 mg/dL (31 Jan 2025 03:34)  POCT Blood Glucose.: 112 mg/dL (31 Jan 2025 02:16)  POCT Blood Glucose.: 105 mg/dL (31 Jan 2025 01:10)  POCT Blood Glucose.: 105 mg/dL (31 Jan 2025 00:27)  POCT Blood Glucose.: 106 mg/dL (31 Jan 2025 00:03)  POCT Blood Glucose.: 128 mg/dL (30 Jan 2025 23:38)    LABS:  ABG - ( 31 Jan 2025 21:01 ) pH, Arterial: 7.28  pH, Blood: x     /  pCO2: 41    /  pO2: 97    / HCO3: 19    / Base Excess: -7.0  /  SaO2: 97.9                  8.0    10.53 )-----------( 107      ( 31 Jan 2025 17:46 )             25.7     01-31    132[L]  |  100  |  67[HH]  ----------------------------<  133[H]  4.7   |  19  |  2.9[H]    Ca    7.7[L]      31 Jan 2025 17:46  Phos  5.5     01-31  Mg     2.5     01-31    TPro  5.3[L]  /  Alb  3.5  /  TBili  0.6  /  DBili  x   /  AST  70[H]  /  ALT  32  /  AlkPhos  216[H]  01-31    PT/INR - ( 30 Jan 2025 02:35 )   PT: 13.40 sec;   INR: 1.13 ratio     PTT - ( 30 Jan 2025 02:35 )  PTT:28.0 sec    Home Medications:  Breo Ellipta 200 mcg-25 mcg/inh inhalation powder: 1 inhaled once a day (28 Jan 2025 06:52)  HYDROcodone 10 mg oral capsule, extended release: 1 cap(s) orally 2 times a day (28 Jan 2025 06:52)  losartan 50 mg oral tablet: 1 tab(s) orally 2 times a day (28 Jan 2025 06:52)  losartan-hydrochlorothiazide 50 mg-12.5 mg oral tablet: 1 tab(s) orally (28 Jan 2025 06:52)  ProAir HFA 90 mcg/inh inhalation aerosol: 2 puff(s) inhaled 2 times a day (28 Jan 2025 06:52)  tujeo: 80 unit(s) subcutaneous once a day (28 Jan 2025 06:52)    HOSPITAL MEDICATIONS:  MEDICATIONS  (STANDING):  albuterol/ipratropium for Nebulization 3 milliLiter(s) Nebulizer every 6 hours  aspirin enteric coated 81 milliGRAM(s) Oral daily  bisacodyl Suppository 10 milliGRAM(s) Rectal once  chlorhexidine 2% Cloths 1 Application(s) Topical daily  dextrose 50% Injectable 50 milliLiter(s) IV Push every 15 minutes  dextrose 50% Injectable 25 milliLiter(s) IV Push every 15 minutes  DOBUTamine Infusion 2.5 MICROgram(s)/kG/Min (8.55 mL/Hr) IV Continuous <Continuous>  ferrous    sulfate 325 milliGRAM(s) Oral daily  fluticasone propionate/ salmeterol 250-50 MICROgram(s) Diskus 1 Dose(s) Inhalation two times a day  folic acid 1 milliGRAM(s) Oral daily  furosemide   Injectable 20 milliGRAM(s) IV Push daily  gabapentin 300 milliGRAM(s) Oral every 8 hours  guaiFENesin  milliGRAM(s) Oral every 12 hours  heparin   Injectable 5000 Unit(s) SubCutaneous every 8 hours  influenza  Vaccine (HIGH DOSE) 0.5 milliLiter(s) IntraMuscular once  insulin regular Infusion 5 Unit(s)/Hr (5 mL/Hr) IV Continuous <Continuous>  lidocaine   4% Patch 1 Patch Transdermal daily  meperidine     Injectable 25 milliGRAM(s) IV Push once  milrinone Infusion 0.125 MICROgram(s)/kG/Min (4.28 mL/Hr) IV Continuous <Continuous>  mupirocin 2% Nasal 1 Application(s) Both Nostrils every 12 hours  pantoprazole    Tablet 40 milliGRAM(s) Oral before breakfast  polyethylene glycol 3350 17 Gram(s) Oral daily  rosuvastatin 20 milliGRAM(s) Oral at bedtime  senna 2 Tablet(s) Oral at bedtime  sodium chloride 0.9%. 1000 milliLiter(s) (20 mL/Hr) IV Continuous <Continuous>    MEDICATIONS  (PRN):  ALPRAZolam 0.25 milliGRAM(s) Oral at bedtime PRN anxiety  ondansetron Injectable 4 milliGRAM(s) IV Push every 4 hours PRN Nausea and/or Vomiting  oxyCODONE    IR 5 milliGRAM(s) Oral every 4 hours PRN Moderate Pain (4 - 6)  oxyCODONE    IR 10 milliGRAM(s) Oral every 4 hours PRN Severe Pain (7 - 10)    RADIOLOGY:  Chest X-ray Reviewed    REVIEW OF SYSTEMS:  CONSTITUTIONAL: [X] all negative; [ ] weakness, [ ] fevers, [ ] chills  EYES/ENT: [X] all negative; [ ] visual changes, [ ] vertigo, [ ] throat pain   NECK: [X] all negative; [ ] pain, [ ] stiffness  RESPIRATORY: [] all negative, [ ] cough, [ ] wheezing, [ ] hemoptysis, [ ] shortness of breath  CARDIOVASCULAR: [] all negative; [ ] chest pain, [ ] palpitations, [ ] orthopnea  GASTROINTESTINAL: [X] all negative; [ ]abdominal pain, [ ] nausea, [ ] vomiting, [ ] hematemesis, [ ] diarrhea, [ ] constipation, [ ] melena, [ ] hematochezia.  GENITOURINARY: [X] all negative; [ ] dysuria, [ ] frequency, [ ] hematuria  NEUROLOGICAL: [X] all negative; [ ] numbness, [ ] weakness  SKIN: [X] all negative; [ ] itching, [ ] burning, [ ] rashes, [ ] lesions      PHYSICAL EXAM:          CONSTITUTIONAL: Well-developed; well-nourished; in no acute distress.   	SKIN: warm, dry  	HEAD: Normocephalic; atraumatic.  	EYES: PERRL, EOM, no conj injection, sclera clear  	ENT: No nasal discharge; airway clear.  	NECK: Supple; non tender.   	CARD: S1, S2 normal; no murmurs, gallops, or rubs. Regular rate and rhythm.  no carotid bruits  	RESP: CTA B/L; good air movement No wheezes, rales or rhonchi.  	ABD: Soft, not tender, not distended,  no rebound or guarding, bowel sounds present  	EXT: Normal ROM.  No clubbing, cyanosis or edema.   warm and well perfused.  pulses palpable  	NEURO: Alert, awake, motor 5/5 R, 5/5 L      Assessment and Plan:   · Assessment	  64M w/ afib, nonobstructive CAD, CHF class 3, severe MR, DM, HTN, JIMMY on CPAP, asthma, severe obesity, HL, Madelung's disease, arthritis, arthritis, peripheral neuropathy;   s/p MV Replacement, SHEILA ligation, MAZE 1/28/25    Assessment/Plan    Neuro:  #acute postoperative pain  - Analgesics: acetaminophen, lidocaine patch, opioid prn      CV: MVR s/p MV Replacement, SHEILA ligation, MAZE  vandana cardia   hemodynamic instability  - Inotropes: milrinone slow wean  -add Dobutamine, low urine output, acidosis                                                                                                                                                                Pulm:  #acute postoperative pulmonary insufficiency  #atelectasis  - continue NIPPV. alternating with HFNC   - aggressive incentive spirometry and chest PT to address atelectasis   #acute pulmonary edema on diuretics    Renal/Fluid/Lytes:  #acute fluid overload  FLAKITO  - diuresis  - replete/optimize electrolytes      Heme:  #Acute blood loss anemia from surgery  - transfuse for hemoglobin < 7 or hemodynamic instability due to anemia  - Continue aspirin    ID:  Cefazolin    Endocrine:   #acute postoperative stress hyperglycemia  - insulin therapy to achieve tight glucose control    GI:  - bowel regimen    Prophylaxis  - SCDs. VTE chemoprophylaxis start  - GI prophylaxis     PT  - out of bed to chair  - ambulation as much as possible    Patient requires continuous monitoring with:  bedside rhythm monitoring, continuous  pulse oximetry monitoring, O2 supplement titrate for O2 sat above 90%  ;   ventilator management and monitoring; intermittent blood gas analysis.  Care plan discussed with CT ICU care team and attending surgeon Dr Rendon  patient remain critical, at risk for life threatening decompensation; required more than usual post op care;   I have spent 45 minutes providing non routine post op care, revaluated multiple times.    Ivana Zhang MD  intensivist

## 2025-01-31 NOTE — CONSULT NOTE ADULT - NS ATTEND AMEND GEN_ALL_CORE FT
Cardiologist: Dr Argueta  Cardio: Dr. Robbi Argueta     65 yo M with history of AFib, nonobstructive CAD, CHF class 3, severe MR, DM, HTN, JIMMY on CPAP, asthma, severe obesity, HL, Madelung's disease, osteoarthritis s/p bilateral hip replacement, peripheral neuropathy presented with increasing dyspnea with exertion found to have severe MR sp MVR/MAZE/SHEILA ligation.     # Junctional Bradycardia postop  # Slow AF  # Sev MR sp MVR/MAZE/SHEILA Ligation    Rec   - Reprogrammed temp pacemaker to 45 bpm. Will need to assess underlying rhythm over the weekend   - Avoid BB  - Monitor electrolytes, maintain K>4 and Mg>2  - Will follow  - No indication for PPM at this time

## 2025-01-31 NOTE — PROGRESS NOTE ADULT - SUBJECTIVE AND OBJECTIVE BOX
OPERATIVE PROCEDURE(s):                POD #    3                   64yMale  SURGEON(s): ISELA Blakely  SUBJECTIVE ASSESSMENT:   Vital Signs Last 24 Hrs  T(F): 98.8 (31 Jan 2025 05:00), Max: 98.8 (30 Jan 2025 20:00)  HR: 55 (31 Jan 2025 07:00) (55 - 80)  ABP: 88/43 (31 Jan 2025 07:00) (88/43 - 156/75)  ABP(mean): 58 (31 Jan 2025 07:00)  RR: 36 (31 Jan 2025 07:00) (9 - 36)  SpO2: 92% (31 Jan 2025 07:00) (92% - 100%)      I&O's Detail    30 Jan 2025 07:01  -  31 Jan 2025 07:00  --------------------------------------------------------  IN:    Insulin: 110 mL    IV PiggyBack: 200 mL    Milrinone: 12.8 mL    Milrinone: 180.6 mL    Milrinone: 8.6 mL    Oral Fluid: 750 mL    sodium chloride 0.9%: 280 mL  Total IN: 1542 mL    OUT:    Chest Tube (mL): 70 mL    Chest Tube (mL): 130 mL    Chest Tube (mL): 170 mL    Indwelling Catheter - Urethral (mL): 805 mL  Total OUT: 1175 mL        Net:   I&O's Detail    29 Jan 2025 07:01  -  30 Jan 2025 07:00  --------------------------------------------------------  Total NET: 260.5 mL      30 Jan 2025 07:01  -  31 Jan 2025 07:00  --------------------------------------------------------  Total NET: 367 mL        CAPILLARY BLOOD GLUCOSE      POCT Blood Glucose.: 150 mg/dL (31 Jan 2025 07:47)  POCT Blood Glucose.: 144 mg/dL (31 Jan 2025 06:41)  POCT Blood Glucose.: 120 mg/dL (31 Jan 2025 05:07)  POCT Blood Glucose.: 118 mg/dL (31 Jan 2025 04:09)  POCT Blood Glucose.: 113 mg/dL (31 Jan 2025 03:34)  POCT Blood Glucose.: 112 mg/dL (31 Jan 2025 02:16)  POCT Blood Glucose.: 105 mg/dL (31 Jan 2025 01:10)  POCT Blood Glucose.: 105 mg/dL (31 Jan 2025 00:27)  POCT Blood Glucose.: 106 mg/dL (31 Jan 2025 00:03)  POCT Blood Glucose.: 128 mg/dL (30 Jan 2025 23:38)  POCT Blood Glucose.: 143 mg/dL (30 Jan 2025 23:08)  POCT Blood Glucose.: 179 mg/dL (30 Jan 2025 21:57)  POCT Blood Glucose.: 174 mg/dL (30 Jan 2025 18:50)  POCT Blood Glucose.: 109 mg/dL (30 Jan 2025 16:25)  POCT Blood Glucose.: 138 mg/dL (30 Jan 2025 14:09)  POCT Blood Glucose.: 146 mg/dL (30 Jan 2025 11:48)  POCT Blood Glucose.: 156 mg/dL (30 Jan 2025 08:17)    Physical Exam:  General: NAD; A&Ox3/Patient is intubated and sedated  Cardiac: S1/S2, RRR, no murmur, no rubs  Lungs: unlabored respirations, CTA b/l, no wheeze, no rales, no crackles  Abdomen: Soft/NT/ND; positive bowel sounds x 4  Sternum: Intact, no click, incision healing well with no drainage  Incisions: Incisions clean/dry/intact  Extremities: No edema b/l lower extremities; good capillary refill; no cyanosis; palpable 1+ pedal pulses b/l    Central Venous Catheter: Yes[]  No[] , If Yes indication:           Day #  Caceres Catheter: Yes  [] , No  [] , If yes indication:                      Day #  NGT: Yes [] No [] ,    If Yes Placement:                                     Day #  EPICARDIAL WIRES:  [] YES [] NO                                              Day #  BOWEL MOVEMENT:  [] YES [] NO, If No, Timing since last BM:      Day #  CHEST TUBE(Left/Right):  [] YES [] NO, If yes -  AIR LEAKS:  [] YES [] NO        LABS:                        7.6[L]  13.12[H] )-----------( 120[L]    ( 31 Jan 2025 02:11 )             24.9[L]                        7.6[L]  12.22[H] )-----------( 108[L]    ( 30 Jan 2025 02:35 )             24.8[L]    01-31    134[L]  |  101  |  54[H]  ----------------------------<  90  4.6   |  19  |  2.5[H]  01-30    138  |  105  |  42[H]  ----------------------------<  143[H]  4.1   |  20  |  2.0[H]    Ca    7.9[L]      31 Jan 2025 02:11  Mg     2.3     01-31    TPro  5.4[L] [6.0 - 8.0]  /  Alb  3.5 [3.5 - 5.2]  /  TBili  0.4 [0.2 - 1.2]  /  DBili  x   /  AST  85[H] [0 - 41]  /  ALT  30 [0 - 41]  /  AlkPhos  151[H] [30 - 115]  01-31    PT/INR - ( 30 Jan 2025 02:35 )   PT: ;   INR: 1.13 ratio         PTT - ( 30 Jan 2025 02:35 )  PTT:28.0 sec  Urinalysis Basic - ( 31 Jan 2025 02:11 )    Color: x / Appearance: x / SG: x / pH: x  Gluc: 90 mg/dL / Ketone: x  / Bili: x / Urobili: x   Blood: x / Protein: x / Nitrite: x   Leuk Esterase: x / RBC: x / WBC x   Sq Epi: x / Non Sq Epi: x / Bacteria: x      ABG - ( 31 Jan 2025 07:30 )  pH: 7.32  /  pCO2: 34    /  pO2: 75    / HCO3: 18    / Base Excess: -7.8  /  SaO2: 96.0  /  LA: 1.3              RADIOLOGY & ADDITIONAL TESTS:  CXR:  EKG:  MEDICATIONS  (STANDING):  acetaminophen   IVPB .. 1000 milliGRAM(s) IV Intermittent once  acetaminophen   IVPB .. 1000 milliGRAM(s) IV Intermittent once  acetaminophen   IVPB .. 1000 milliGRAM(s) IV Intermittent once  albuterol/ipratropium for Nebulization 3 milliLiter(s) Nebulizer every 6 hours  aspirin enteric coated 81 milliGRAM(s) Oral daily  bisacodyl Suppository 10 milliGRAM(s) Rectal once  chlorhexidine 2% Cloths 1 Application(s) Topical daily  dextrose 50% Injectable 50 milliLiter(s) IV Push every 15 minutes  dextrose 50% Injectable 25 milliLiter(s) IV Push every 15 minutes  ferrous    sulfate 325 milliGRAM(s) Oral daily  fluticasone propionate/ salmeterol 250-50 MICROgram(s) Diskus 1 Dose(s) Inhalation two times a day  folic acid 1 milliGRAM(s) Oral daily  furosemide   Injectable 20 milliGRAM(s) IV Push daily  gabapentin 300 milliGRAM(s) Oral every 8 hours  guaiFENesin  milliGRAM(s) Oral every 12 hours  heparin   Injectable 5000 Unit(s) SubCutaneous every 8 hours  influenza  Vaccine (HIGH DOSE) 0.5 milliLiter(s) IntraMuscular once  insulin regular Infusion 5 Unit(s)/Hr (5 mL/Hr) IV Continuous <Continuous>  lidocaine   4% Patch 1 Patch Transdermal daily  meperidine     Injectable 25 milliGRAM(s) IV Push once  milrinone Infusion 0.125 MICROgram(s)/kG/Min (4.28 mL/Hr) IV Continuous <Continuous>  mupirocin 2% Nasal 1 Application(s) Both Nostrils every 12 hours  pantoprazole    Tablet 40 milliGRAM(s) Oral before breakfast  polyethylene glycol 3350 17 Gram(s) Oral daily  rosuvastatin 20 milliGRAM(s) Oral at bedtime  senna 2 Tablet(s) Oral at bedtime  sodium chloride 0.9%. 1000 milliLiter(s) (20 mL/Hr) IV Continuous <Continuous>    MEDICATIONS  (PRN):  ALPRAZolam 0.25 milliGRAM(s) Oral at bedtime PRN anxiety  ondansetron Injectable 4 milliGRAM(s) IV Push every 4 hours PRN Nausea and/or Vomiting  oxyCODONE    IR 5 milliGRAM(s) Oral every 4 hours PRN Moderate Pain (4 - 6)  oxyCODONE    IR 10 milliGRAM(s) Oral every 4 hours PRN Severe Pain (7 - 10)    HEPARIN:  [] YES [] NO  Dose: XX UNITS/HR UNITS Q8H  LOVENOX:[] YES [] NO  Dose: XX mg Q24H  COUMADIN: []  YES [] NO  Dose: XX mg  Q24H  SCD's: YES b/l  GI Prophylaxis: Protonix [], Pepcid [], None [], (Contra-indication:.....)    Post-Op Beta-Blockers: Yes [], No[], If No, then contraindication:  Post-Op Aspirin: Yes [],  No [], If No, then contraindication:  Post-Op Statin: Yes [], No[], If No, then contraindication:  Allergies    No Known Allergies    Intolerances      Ambulation/Activity Status:    Assessment/Plan:  64y Male status-post .....  - Case and plan discussed with CTU Intensivist and CT Surgeon - Dr. Blakely/Mauri/Laron  - Continue CTU supportive care    - Continue DVT/GI prophylaxis  - Incentive Spirometry 10 times an hour  - Continue to advance physical activity as tolerated and continue PT/OT as directed  1. CAD: Continue ASA, statin, BB  2. HTN:   3. A. Fib:   4. COPD/Hypoxia:   5. DM/Glucose Control:     Social Service Disposition:     OPERATIVE PROCEDURE(s):      MV replacement, LA Ligation MAZE     POD #    3                   64yMale  SURGEON(s): ISELA Blakely  SUBJECTIVE ASSESSMENT: pt seen and examined. no acute complaints   Vital Signs Last 24 Hrs  T(F): 98.8 (31 Jan 2025 05:00), Max: 98.8 (30 Jan 2025 20:00)  HR: 55 (31 Jan 2025 07:00) (55 - 80)  ABP: 88/43 (31 Jan 2025 07:00) (88/43 - 156/75)  ABP(mean): 58 (31 Jan 2025 07:00)  RR: 36 (31 Jan 2025 07:00) (9 - 36)  SpO2: 92% (31 Jan 2025 07:00) (92% - 100%)      I&O's Detail    30 Jan 2025 07:01  -  31 Jan 2025 07:00  --------------------------------------------------------  IN:    Insulin: 110 mL    IV PiggyBack: 200 mL    Milrinone: 12.8 mL    Milrinone: 180.6 mL    Milrinone: 8.6 mL    Oral Fluid: 750 mL    sodium chloride 0.9%: 280 mL  Total IN: 1542 mL    OUT:    Chest Tube (mL): 70 mL    Chest Tube (mL): 130 mL    Chest Tube (mL): 170 mL    Indwelling Catheter - Urethral (mL): 805 mL  Total OUT: 1175 mL        Net:   I&O's Detail    29 Jan 2025 07:01  -  30 Jan 2025 07:00  --------------------------------------------------------  Total NET: 260.5 mL      30 Jan 2025 07:01  -  31 Jan 2025 07:00  --------------------------------------------------------  Total NET: 367 mL        CAPILLARY BLOOD GLUCOSE      POCT Blood Glucose.: 150 mg/dL (31 Jan 2025 07:47)  POCT Blood Glucose.: 144 mg/dL (31 Jan 2025 06:41)  POCT Blood Glucose.: 120 mg/dL (31 Jan 2025 05:07)  POCT Blood Glucose.: 118 mg/dL (31 Jan 2025 04:09)  POCT Blood Glucose.: 113 mg/dL (31 Jan 2025 03:34)  POCT Blood Glucose.: 112 mg/dL (31 Jan 2025 02:16)  POCT Blood Glucose.: 105 mg/dL (31 Jan 2025 01:10)  POCT Blood Glucose.: 105 mg/dL (31 Jan 2025 00:27)  POCT Blood Glucose.: 106 mg/dL (31 Jan 2025 00:03)  POCT Blood Glucose.: 128 mg/dL (30 Jan 2025 23:38)  POCT Blood Glucose.: 143 mg/dL (30 Jan 2025 23:08)  POCT Blood Glucose.: 179 mg/dL (30 Jan 2025 21:57)  POCT Blood Glucose.: 174 mg/dL (30 Jan 2025 18:50)  POCT Blood Glucose.: 109 mg/dL (30 Jan 2025 16:25)  POCT Blood Glucose.: 138 mg/dL (30 Jan 2025 14:09)  POCT Blood Glucose.: 146 mg/dL (30 Jan 2025 11:48)  POCT Blood Glucose.: 156 mg/dL (30 Jan 2025 08:17)    Physical Exam:  General: NAD; A&Ox3, no focal deficits, clear speech   Cardiac: S1/S2, RRR, no murmur, no rubs  Lungs: unlabored respirations, CTA b/l, no wheeze, no rales, no crackles  Abdomen: Soft/NT/ND; positive bowel sounds x 4  Sternum: Intact, no click, incision healing well with no drainage  Incisions: Incisions clean/dry/intact  Extremities: No edema b/l lower extremities; good capillary refill; no cyanosis; palpable 1+ pedal pulses b/l    Central Venous Catheter: Yes[x]  No[] , If Yes indication:   critical        Day # 3  Caceres Catheter: Yes  [x] , No  [] , If yes indication:            strict i/o           Day # 3  EPICARDIAL WIRES:  [x] YES [] NO                                              Day # 3  BOWEL MOVEMENT:  [] YES [x] NO, If No, Timing since last BM:      Day #  CHEST TUBE(Left/Right):  [x] YES [] NO, If yes -  AIR LEAKS:  [] YES [] NO        LABS:                        7.6[L]  13.12[H] )-----------( 120[L]    ( 31 Jan 2025 02:11 )             24.9[L]                        7.6[L]  12.22[H] )-----------( 108[L]    ( 30 Jan 2025 02:35 )             24.8[L]    01-31    134[L]  |  101  |  54[H]  ----------------------------<  90  4.6   |  19  |  2.5[H]  01-30    138  |  105  |  42[H]  ----------------------------<  143[H]  4.1   |  20  |  2.0[H]    Ca    7.9[L]      31 Jan 2025 02:11  Mg     2.3     01-31    TPro  5.4[L] [6.0 - 8.0]  /  Alb  3.5 [3.5 - 5.2]  /  TBili  0.4 [0.2 - 1.2]  /  DBili  x   /  AST  85[H] [0 - 41]  /  ALT  30 [0 - 41]  /  AlkPhos  151[H] [30 - 115]  01-31    PT/INR - ( 30 Jan 2025 02:35 )   PT: ;   INR: 1.13 ratio         PTT - ( 30 Jan 2025 02:35 )  PTT:28.0 sec  Urinalysis Basic - ( 31 Jan 2025 02:11 )    Color: x / Appearance: x / SG: x / pH: x  Gluc: 90 mg/dL / Ketone: x  / Bili: x / Urobili: x   Blood: x / Protein: x / Nitrite: x   Leuk Esterase: x / RBC: x / WBC x   Sq Epi: x / Non Sq Epi: x / Bacteria: x      ABG - ( 31 Jan 2025 07:30 )  pH: 7.32  /  pCO2: 34    /  pO2: 75    / HCO3: 18    / Base Excess: -7.8  /  SaO2: 96.0  /  LA: 1.3              RADIOLOGY & ADDITIONAL TESTS:  CXR: < from: Xray Chest 1 View- PORTABLE-Routine (01.31.25 @ 06:24) >    IMPRESSION:    Right opacity/pleural effusion, decreased. Redemonstration left   opacity/pleural effusion and cardiomegaly.    < end of copied text >    EKG: < from: 12 Lead ECG (01.31.25 @ 11:59) >    Ventricular Rate 67 BPM    QRS Duration 88 ms    Q-T Interval 402 ms    QTC Calculation(Bazett) 424 ms    R Axis -35 degrees    T Axis 71 degrees    Diagnosis Line Atrial fibrillation  Left axis deviation  Inferior infarct , age undetermined    Abnormal ECG    < end of copied text >    MEDICATIONS  (STANDING):  acetaminophen   IVPB .. 1000 milliGRAM(s) IV Intermittent once  acetaminophen   IVPB .. 1000 milliGRAM(s) IV Intermittent once  acetaminophen   IVPB .. 1000 milliGRAM(s) IV Intermittent once  albuterol/ipratropium for Nebulization 3 milliLiter(s) Nebulizer every 6 hours  aspirin enteric coated 81 milliGRAM(s) Oral daily  bisacodyl Suppository 10 milliGRAM(s) Rectal once  chlorhexidine 2% Cloths 1 Application(s) Topical daily  dextrose 50% Injectable 50 milliLiter(s) IV Push every 15 minutes  dextrose 50% Injectable 25 milliLiter(s) IV Push every 15 minutes  ferrous    sulfate 325 milliGRAM(s) Oral daily  fluticasone propionate/ salmeterol 250-50 MICROgram(s) Diskus 1 Dose(s) Inhalation two times a day  folic acid 1 milliGRAM(s) Oral daily  furosemide   Injectable 20 milliGRAM(s) IV Push daily  gabapentin 300 milliGRAM(s) Oral every 8 hours  guaiFENesin  milliGRAM(s) Oral every 12 hours  heparin   Injectable 5000 Unit(s) SubCutaneous every 8 hours  influenza  Vaccine (HIGH DOSE) 0.5 milliLiter(s) IntraMuscular once  insulin regular Infusion 5 Unit(s)/Hr (5 mL/Hr) IV Continuous <Continuous>  lidocaine   4% Patch 1 Patch Transdermal daily  meperidine     Injectable 25 milliGRAM(s) IV Push once  milrinone Infusion 0.125 MICROgram(s)/kG/Min (4.28 mL/Hr) IV Continuous <Continuous>  mupirocin 2% Nasal 1 Application(s) Both Nostrils every 12 hours  pantoprazole    Tablet 40 milliGRAM(s) Oral before breakfast  polyethylene glycol 3350 17 Gram(s) Oral daily  rosuvastatin 20 milliGRAM(s) Oral at bedtime  senna 2 Tablet(s) Oral at bedtime  sodium chloride 0.9%. 1000 milliLiter(s) (20 mL/Hr) IV Continuous <Continuous>    MEDICATIONS  (PRN):  ALPRAZolam 0.25 milliGRAM(s) Oral at bedtime PRN anxiety  ondansetron Injectable 4 milliGRAM(s) IV Push every 4 hours PRN Nausea and/or Vomiting  oxyCODONE    IR 5 milliGRAM(s) Oral every 4 hours PRN Moderate Pain (4 - 6)  oxyCODONE    IR 10 milliGRAM(s) Oral every 4 hours PRN Severe Pain (7 - 10)    HEPARIN:  [x] YES [] NO  Dose: 5000 UNITS Q8H  SCD's: YES b/l  GI Prophylaxis: Protonix [x], Pepcid [], None [], (Contra-indication:.....)    Post-Op Beta-Blockers: Yes [], No[x], If No, then contraindication: junctional bradycardia   Post-Op Aspirin: Yes [x],  No [], If No, then contraindication:  Post-Op Statin: Yes [x], No[], If No, then contraindication:  Allergies    No Known Allergies    Intolerances      Ambulation/Activity Status: ambulate     Assessment/Plan:  64y Male status-post MVR / SHEILA ligation and MAZE POD # 3  - Case and plan discussed with CTU Intensivist and CT Surgeon - Dr. Blakely/Mauri/Laron  - Continue CTU supportive care    - Continue DVT/GI prophylaxis  - Incentive Spirometry 10 times an hour  - Continue to advance physical activity as tolerated and continue PT/OT as directed  1. Continue ASA, statin, no bb for junctional bradycardia, pain control,   2.madelung disease: cont bipap, respiratory tx's  3. A. Fib prophylaxis: cont to monitor electrolytes  4. junctional bradycardia- ep consult   5. DM/Glucose Control: cont insulin gtt for now   6. lasix for noncardiogenic fluid overload     Social Service Disposition:  pt to assess

## 2025-02-01 LAB
ALBUMIN SERPL ELPH-MCNC: 3.5 G/DL — SIGNIFICANT CHANGE UP (ref 3.5–5.2)
ALBUMIN SERPL ELPH-MCNC: 3.6 G/DL — SIGNIFICANT CHANGE UP (ref 3.5–5.2)
ALP SERPL-CCNC: 285 U/L — HIGH (ref 30–115)
ALP SERPL-CCNC: 322 U/L — HIGH (ref 30–115)
ALT FLD-CCNC: 23 U/L — SIGNIFICANT CHANGE UP (ref 0–41)
ALT FLD-CCNC: 30 U/L — SIGNIFICANT CHANGE UP (ref 0–41)
ANION GAP SERPL CALC-SCNC: 15 MMOL/L — HIGH (ref 7–14)
ANION GAP SERPL CALC-SCNC: 18 MMOL/L — HIGH (ref 7–14)
AST SERPL-CCNC: 48 U/L — HIGH (ref 0–41)
AST SERPL-CCNC: 67 U/L — HIGH (ref 0–41)
BASE EXCESS BLDA CALC-SCNC: -4.8 MMOL/L — LOW (ref -2–3)
BASE EXCESS BLDA CALC-SCNC: -5.9 MMOL/L — LOW (ref -2–3)
BASE EXCESS BLDA CALC-SCNC: -7.6 MMOL/L — LOW (ref -2–3)
BASE EXCESS BLDV CALC-SCNC: -8.4 MMOL/L — LOW (ref -2–3)
BASOPHILS # BLD AUTO: 0.03 K/UL — SIGNIFICANT CHANGE UP (ref 0–0.2)
BASOPHILS NFR BLD AUTO: 0.3 % — SIGNIFICANT CHANGE UP (ref 0–1)
BILIRUB SERPL-MCNC: 0.5 MG/DL — SIGNIFICANT CHANGE UP (ref 0.2–1.2)
BILIRUB SERPL-MCNC: 0.5 MG/DL — SIGNIFICANT CHANGE UP (ref 0.2–1.2)
BUN SERPL-MCNC: 72 MG/DL — CRITICAL HIGH (ref 10–20)
BUN SERPL-MCNC: 77 MG/DL — CRITICAL HIGH (ref 10–20)
CA-I SERPL-SCNC: 1.11 MMOL/L — LOW (ref 1.15–1.33)
CALCIUM SERPL-MCNC: 7.7 MG/DL — LOW (ref 8.4–10.5)
CALCIUM SERPL-MCNC: 7.7 MG/DL — LOW (ref 8.4–10.5)
CHLORIDE SERPL-SCNC: 97 MMOL/L — LOW (ref 98–110)
CHLORIDE SERPL-SCNC: 99 MMOL/L — SIGNIFICANT CHANGE UP (ref 98–110)
CO2 SERPL-SCNC: 20 MMOL/L — SIGNIFICANT CHANGE UP (ref 17–32)
CO2 SERPL-SCNC: 21 MMOL/L — SIGNIFICANT CHANGE UP (ref 17–32)
CREAT SERPL-MCNC: 2.9 MG/DL — HIGH (ref 0.7–1.5)
CREAT SERPL-MCNC: 3.1 MG/DL — HIGH (ref 0.7–1.5)
EGFR: 21 ML/MIN/1.73M2 — LOW
EGFR: 23 ML/MIN/1.73M2 — LOW
EOSINOPHIL # BLD AUTO: 0.08 K/UL — SIGNIFICANT CHANGE UP (ref 0–0.7)
EOSINOPHIL NFR BLD AUTO: 0.8 % — SIGNIFICANT CHANGE UP (ref 0–8)
GAS PNL BLDV: 129 MMOL/L — LOW (ref 136–145)
GAS PNL BLDV: SIGNIFICANT CHANGE UP
GLUCOSE BLDC GLUCOMTR-MCNC: 112 MG/DL — HIGH (ref 70–99)
GLUCOSE BLDC GLUCOMTR-MCNC: 113 MG/DL — HIGH (ref 70–99)
GLUCOSE BLDC GLUCOMTR-MCNC: 113 MG/DL — HIGH (ref 70–99)
GLUCOSE BLDC GLUCOMTR-MCNC: 119 MG/DL — HIGH (ref 70–99)
GLUCOSE BLDC GLUCOMTR-MCNC: 120 MG/DL — HIGH (ref 70–99)
GLUCOSE BLDC GLUCOMTR-MCNC: 120 MG/DL — HIGH (ref 70–99)
GLUCOSE BLDC GLUCOMTR-MCNC: 124 MG/DL — HIGH (ref 70–99)
GLUCOSE BLDC GLUCOMTR-MCNC: 130 MG/DL — HIGH (ref 70–99)
GLUCOSE BLDC GLUCOMTR-MCNC: 138 MG/DL — HIGH (ref 70–99)
GLUCOSE BLDC GLUCOMTR-MCNC: 149 MG/DL — HIGH (ref 70–99)
GLUCOSE BLDC GLUCOMTR-MCNC: 156 MG/DL — HIGH (ref 70–99)
GLUCOSE BLDC GLUCOMTR-MCNC: 170 MG/DL — HIGH (ref 70–99)
GLUCOSE BLDC GLUCOMTR-MCNC: 178 MG/DL — HIGH (ref 70–99)
GLUCOSE BLDC GLUCOMTR-MCNC: 182 MG/DL — HIGH (ref 70–99)
GLUCOSE BLDC GLUCOMTR-MCNC: 64 MG/DL — LOW (ref 70–99)
GLUCOSE SERPL-MCNC: 133 MG/DL — HIGH (ref 70–99)
GLUCOSE SERPL-MCNC: 86 MG/DL — SIGNIFICANT CHANGE UP (ref 70–99)
HCO3 BLDA-SCNC: 20 MMOL/L — LOW (ref 21–28)
HCO3 BLDA-SCNC: 20 MMOL/L — LOW (ref 21–28)
HCO3 BLDA-SCNC: 22 MMOL/L — SIGNIFICANT CHANGE UP (ref 21–28)
HCO3 BLDV-SCNC: 20 MMOL/L — LOW (ref 22–29)
HCT VFR BLD CALC: 25.8 % — LOW (ref 42–52)
HCT VFR BLD CALC: 25.8 % — LOW (ref 42–52)
HCT VFR BLDA CALC: 26 % — LOW (ref 39–51)
HGB BLD CALC-MCNC: 8.6 G/DL — LOW (ref 12.6–17.4)
HGB BLD-MCNC: 7.8 G/DL — LOW (ref 14–18)
HGB BLD-MCNC: 8.1 G/DL — LOW (ref 14–18)
HOROWITZ INDEX BLDA+IHG-RTO: 32 — SIGNIFICANT CHANGE UP
HOROWITZ INDEX BLDA+IHG-RTO: 40 — SIGNIFICANT CHANGE UP
HOROWITZ INDEX BLDA+IHG-RTO: 50 — SIGNIFICANT CHANGE UP
IMM GRANULOCYTES NFR BLD AUTO: 1.1 % — HIGH (ref 0.1–0.3)
LACTATE BLDV-MCNC: 2.8 MMOL/L — HIGH (ref 0.5–2)
LYMPHOCYTES # BLD AUTO: 0.77 K/UL — LOW (ref 1.2–3.4)
LYMPHOCYTES # BLD AUTO: 8.2 % — LOW (ref 20.5–51.1)
MAGNESIUM SERPL-MCNC: 2.4 MG/DL — SIGNIFICANT CHANGE UP (ref 1.8–2.4)
MAGNESIUM SERPL-MCNC: 2.4 MG/DL — SIGNIFICANT CHANGE UP (ref 1.8–2.4)
MCHC RBC-ENTMCNC: 26.5 PG — LOW (ref 27–31)
MCHC RBC-ENTMCNC: 27.2 PG — SIGNIFICANT CHANGE UP (ref 27–31)
MCHC RBC-ENTMCNC: 30.2 G/DL — LOW (ref 32–37)
MCHC RBC-ENTMCNC: 31.4 G/DL — LOW (ref 32–37)
MCV RBC AUTO: 86.6 FL — SIGNIFICANT CHANGE UP (ref 80–94)
MCV RBC AUTO: 87.8 FL — SIGNIFICANT CHANGE UP (ref 80–94)
MONOCYTES # BLD AUTO: 0.84 K/UL — HIGH (ref 0.1–0.6)
MONOCYTES NFR BLD AUTO: 8.9 % — SIGNIFICANT CHANGE UP (ref 1.7–9.3)
NEUTROPHILS # BLD AUTO: 7.62 K/UL — HIGH (ref 1.4–6.5)
NEUTROPHILS NFR BLD AUTO: 80.7 % — HIGH (ref 42.2–75.2)
NRBC # BLD: 2 /100 WBCS — HIGH (ref 0–0)
NRBC # BLD: 2 /100 WBCS — HIGH (ref 0–0)
NRBC BLD-RTO: 2 /100 WBCS — HIGH (ref 0–0)
NRBC BLD-RTO: 2 /100 WBCS — HIGH (ref 0–0)
PCO2 BLDA: 41 MMHG — SIGNIFICANT CHANGE UP (ref 35–48)
PCO2 BLDA: 46 MMHG — SIGNIFICANT CHANGE UP (ref 35–48)
PCO2 BLDA: 47 MMHG — SIGNIFICANT CHANGE UP (ref 35–48)
PCO2 BLDV: 51 MMHG — SIGNIFICANT CHANGE UP (ref 42–55)
PH BLDA: 7.24 — LOW (ref 7.35–7.45)
PH BLDA: 7.28 — LOW (ref 7.35–7.45)
PH BLDA: 7.3 — LOW (ref 7.35–7.45)
PH BLDV: 7.19 — CRITICAL LOW (ref 7.32–7.43)
PLATELET # BLD AUTO: 102 K/UL — LOW (ref 130–400)
PLATELET # BLD AUTO: 106 K/UL — LOW (ref 130–400)
PMV BLD: 11 FL — HIGH (ref 7.4–10.4)
PMV BLD: 11.2 FL — HIGH (ref 7.4–10.4)
PO2 BLDA: 162 MMHG — HIGH (ref 83–108)
PO2 BLDA: 167 MMHG — HIGH (ref 83–108)
PO2 BLDA: 89 MMHG — SIGNIFICANT CHANGE UP (ref 83–108)
PO2 BLDV: 46 MMHG — HIGH (ref 25–45)
POTASSIUM BLDV-SCNC: 4.9 MMOL/L — SIGNIFICANT CHANGE UP (ref 3.5–5.1)
POTASSIUM SERPL-MCNC: 4.5 MMOL/L — SIGNIFICANT CHANGE UP (ref 3.5–5)
POTASSIUM SERPL-MCNC: 4.8 MMOL/L — SIGNIFICANT CHANGE UP (ref 3.5–5)
POTASSIUM SERPL-SCNC: 4.5 MMOL/L — SIGNIFICANT CHANGE UP (ref 3.5–5)
POTASSIUM SERPL-SCNC: 4.8 MMOL/L — SIGNIFICANT CHANGE UP (ref 3.5–5)
PROT SERPL-MCNC: 5.7 G/DL — LOW (ref 6–8)
PROT SERPL-MCNC: 5.8 G/DL — LOW (ref 6–8)
RBC # BLD: 2.94 M/UL — LOW (ref 4.7–6.1)
RBC # BLD: 2.98 M/UL — LOW (ref 4.7–6.1)
RBC # FLD: 18.5 % — HIGH (ref 11.5–14.5)
RBC # FLD: 18.6 % — HIGH (ref 11.5–14.5)
SAO2 % BLDA: 97.9 % — SIGNIFICANT CHANGE UP (ref 94–98)
SAO2 % BLDA: 98.6 % — HIGH (ref 94–98)
SAO2 % BLDA: 99.1 % — HIGH (ref 94–98)
SAO2 % BLDV: 71.2 % — SIGNIFICANT CHANGE UP (ref 67–88)
SODIUM SERPL-SCNC: 135 MMOL/L — SIGNIFICANT CHANGE UP (ref 135–146)
SODIUM SERPL-SCNC: 135 MMOL/L — SIGNIFICANT CHANGE UP (ref 135–146)
WBC # BLD: 9.38 K/UL — SIGNIFICANT CHANGE UP (ref 4.8–10.8)
WBC # BLD: 9.44 K/UL — SIGNIFICANT CHANGE UP (ref 4.8–10.8)
WBC # FLD AUTO: 9.38 K/UL — SIGNIFICANT CHANGE UP (ref 4.8–10.8)
WBC # FLD AUTO: 9.44 K/UL — SIGNIFICANT CHANGE UP (ref 4.8–10.8)

## 2025-02-01 PROCEDURE — 99232 SBSQ HOSP IP/OBS MODERATE 35: CPT | Mod: FS

## 2025-02-01 PROCEDURE — 99233 SBSQ HOSP IP/OBS HIGH 50: CPT

## 2025-02-01 PROCEDURE — 93010 ELECTROCARDIOGRAM REPORT: CPT

## 2025-02-01 PROCEDURE — 71045 X-RAY EXAM CHEST 1 VIEW: CPT | Mod: 26

## 2025-02-01 RX ORDER — SODIUM BICARBONATE 1 MEQ/ML
50 SYRINGE (ML) INTRAVENOUS ONCE
Refills: 0 | Status: COMPLETED | OUTPATIENT
Start: 2025-02-01 | End: 2025-02-01

## 2025-02-01 RX ORDER — LIDOCAINE HYDROCHLORIDE 20 MG/ML
1 JELLY TOPICAL EVERY 24 HOURS
Refills: 0 | Status: DISCONTINUED | OUTPATIENT
Start: 2025-02-01 | End: 2025-02-04

## 2025-02-01 RX ORDER — FUROSEMIDE 10 MG/ML
40 INJECTION INTRAMUSCULAR; INTRAVENOUS ONCE
Refills: 0 | Status: COMPLETED | OUTPATIENT
Start: 2025-02-01 | End: 2025-02-01

## 2025-02-01 RX ORDER — BUMETANIDE 1 MG/1
2 TABLET ORAL ONCE
Refills: 0 | Status: COMPLETED | OUTPATIENT
Start: 2025-02-01 | End: 2025-02-01

## 2025-02-01 RX ORDER — ACETAMINOPHEN 500 MG/5ML
1000 LIQUID (ML) ORAL ONCE
Refills: 0 | Status: COMPLETED | OUTPATIENT
Start: 2025-02-01 | End: 2025-02-01

## 2025-02-01 RX ORDER — BUMETANIDE 1 MG/1
2 TABLET ORAL
Qty: 20 | Refills: 0 | Status: DISCONTINUED | OUTPATIENT
Start: 2025-02-01 | End: 2025-02-01

## 2025-02-01 RX ADMIN — Medication 50 MILLIEQUIVALENT(S): at 03:14

## 2025-02-01 RX ADMIN — Medication 1000 MILLIGRAM(S): at 23:35

## 2025-02-01 RX ADMIN — LIDOCAINE HYDROCHLORIDE 1 PATCH: 20 JELLY TOPICAL at 19:56

## 2025-02-01 RX ADMIN — BUMETANIDE 2 MILLIGRAM(S): 1 TABLET ORAL at 08:23

## 2025-02-01 RX ADMIN — LIDOCAINE HYDROCHLORIDE 1 PATCH: 20 JELLY TOPICAL at 05:51

## 2025-02-01 RX ADMIN — OXYCODONE HYDROCHLORIDE 10 MILLIGRAM(S): 30 TABLET ORAL at 22:45

## 2025-02-01 RX ADMIN — LIDOCAINE HYDROCHLORIDE 1 PATCH: 20 JELLY TOPICAL at 07:17

## 2025-02-01 RX ADMIN — OXYCODONE HYDROCHLORIDE 10 MILLIGRAM(S): 30 TABLET ORAL at 23:45

## 2025-02-01 RX ADMIN — LIDOCAINE HYDROCHLORIDE 1 PATCH: 20 JELLY TOPICAL at 16:49

## 2025-02-01 RX ADMIN — GABAPENTIN 300 MILLIGRAM(S): 400 CAPSULE ORAL at 05:52

## 2025-02-01 RX ADMIN — MUPIROCIN CALCIUM 1 APPLICATION(S): 20 CREAM TOPICAL at 18:09

## 2025-02-01 RX ADMIN — MUPIROCIN CALCIUM 1 APPLICATION(S): 20 CREAM TOPICAL at 05:54

## 2025-02-01 RX ADMIN — GABAPENTIN 300 MILLIGRAM(S): 400 CAPSULE ORAL at 22:00

## 2025-02-01 RX ADMIN — HEPARIN SODIUM 5000 UNIT(S): 1000 INJECTION INTRAVENOUS; SUBCUTANEOUS at 22:00

## 2025-02-01 RX ADMIN — DEXTROMETHORPHAN HBR, GUAIFENESIN 600 MILLIGRAM(S): 200 LIQUID ORAL at 05:52

## 2025-02-01 RX ADMIN — POLYETHYLENE GLYCOL 3350 17 GRAM(S): 17 POWDER, FOR SOLUTION ORAL at 12:35

## 2025-02-01 RX ADMIN — Medication 2 TABLET(S): at 22:00

## 2025-02-01 RX ADMIN — Medication 50 MILLIEQUIVALENT(S): at 00:34

## 2025-02-01 RX ADMIN — LIDOCAINE HYDROCHLORIDE 1 PATCH: 20 JELLY TOPICAL at 00:10

## 2025-02-01 RX ADMIN — HEPARIN SODIUM 5000 UNIT(S): 1000 INJECTION INTRAVENOUS; SUBCUTANEOUS at 14:47

## 2025-02-01 RX ADMIN — BUMETANIDE 10 MG/HR: 1 TABLET ORAL at 09:00

## 2025-02-01 RX ADMIN — Medication 325 MILLIGRAM(S): at 12:32

## 2025-02-01 RX ADMIN — Medication 81 MILLIGRAM(S): at 12:32

## 2025-02-01 RX ADMIN — Medication 5 UNIT(S)/HR: at 09:30

## 2025-02-01 RX ADMIN — HEPARIN SODIUM 5000 UNIT(S): 1000 INJECTION INTRAVENOUS; SUBCUTANEOUS at 05:52

## 2025-02-01 RX ADMIN — IPRATROPIUM BROMIDE AND ALBUTEROL SULFATE 3 MILLILITER(S): .5; 2.5 SOLUTION RESPIRATORY (INHALATION) at 07:54

## 2025-02-01 RX ADMIN — IPRATROPIUM BROMIDE AND ALBUTEROL SULFATE 3 MILLILITER(S): .5; 2.5 SOLUTION RESPIRATORY (INHALATION) at 13:10

## 2025-02-01 RX ADMIN — IPRATROPIUM BROMIDE AND ALBUTEROL SULFATE 3 MILLILITER(S): .5; 2.5 SOLUTION RESPIRATORY (INHALATION) at 21:42

## 2025-02-01 RX ADMIN — GABAPENTIN 300 MILLIGRAM(S): 400 CAPSULE ORAL at 14:48

## 2025-02-01 RX ADMIN — DEXTROMETHORPHAN HBR, GUAIFENESIN 600 MILLIGRAM(S): 200 LIQUID ORAL at 17:27

## 2025-02-01 RX ADMIN — Medication 40 MILLIGRAM(S): at 05:52

## 2025-02-01 RX ADMIN — FOLIC ACID 1 MILLIGRAM(S): 1 TABLET ORAL at 12:32

## 2025-02-01 RX ADMIN — FUROSEMIDE 40 MILLIGRAM(S): 10 INJECTION INTRAMUSCULAR; INTRAVENOUS at 03:14

## 2025-02-01 RX ADMIN — FUROSEMIDE 20 MILLIGRAM(S): 10 INJECTION INTRAMUSCULAR; INTRAVENOUS at 05:52

## 2025-02-01 RX ADMIN — LIDOCAINE HYDROCHLORIDE 1 PATCH: 20 JELLY TOPICAL at 12:32

## 2025-02-01 RX ADMIN — Medication 5 UNIT(S)/HR: at 21:21

## 2025-02-01 RX ADMIN — ROSUVASTATIN CALCIUM 20 MILLIGRAM(S): 20 TABLET, FILM COATED ORAL at 22:00

## 2025-02-01 RX ADMIN — IPRATROPIUM BROMIDE AND ALBUTEROL SULFATE 3 MILLILITER(S): .5; 2.5 SOLUTION RESPIRATORY (INHALATION) at 04:12

## 2025-02-01 RX ADMIN — MILRINONE LACTATE 4.28 MICROGRAM(S)/KG/MIN: 1 INJECTION, SOLUTION INTRAVENOUS at 06:31

## 2025-02-01 RX ADMIN — Medication 400 MILLIGRAM(S): at 22:45

## 2025-02-01 RX ADMIN — Medication 0.25 MILLIGRAM(S): at 04:01

## 2025-02-01 RX ADMIN — Medication 10 MILLIGRAM(S): at 20:41

## 2025-02-01 RX ADMIN — Medication 5 UNIT(S)/HR: at 04:42

## 2025-02-01 RX ADMIN — Medication 1 APPLICATION(S): at 12:32

## 2025-02-01 RX ADMIN — DOBUTAMINE 12 MICROGRAM(S)/KG/MIN: 250 INJECTION INTRAVENOUS at 12:35

## 2025-02-01 NOTE — PROGRESS NOTE ADULT - SUBJECTIVE AND OBJECTIVE BOX
OPERATIVE PROCEDURE(s):                POD #                       64yMale  SURGEON(s): ISELA Blakely  SUBJECTIVE ASSESSMENT:   Vital Signs Last 24 Hrs  T(F): 98.1 (01 Feb 2025 04:00), Max: 99.3 (31 Jan 2025 20:00)  HR: 80 (01 Feb 2025 08:00) (52 - 87)  ABP: 126/59 (01 Feb 2025 08:00) (-10/-10 - 166/78)  ABP(mean): 76 (01 Feb 2025 08:00)  RR: 18 (01 Feb 2025 08:00) (4 - 30)  SpO2: 96% (01 Feb 2025 08:00) (91% - 100%)      I&O's Detail    31 Jan 2025 07:01  -  01 Feb 2025 07:00  --------------------------------------------------------  IN:    DOBUTamine: 115.2 mL    DOBUTamine: 84 mL    Insulin: 132 mL    IV PiggyBack: 300 mL    Milrinone: 103.2 mL    PRBCs (Packed Red Blood Cells): 333 mL    sodium chloride 0.9%: 240 mL  Total IN: 1307.4 mL    OUT:    Chest Tube (mL): 55 mL    Chest Tube (mL): 500 mL    Indwelling Catheter - Urethral (mL): 1500 mL  Total OUT: 2055 mL        Net:   I&O's Detail    30 Jan 2025 07:01  -  31 Jan 2025 07:00  --------------------------------------------------------  Total NET: 367 mL      31 Jan 2025 07:01  -  01 Feb 2025 07:00  --------------------------------------------------------  Total NET: -747.6 mL        CAPILLARY BLOOD GLUCOSE      POCT Blood Glucose.: 112 mg/dL (01 Feb 2025 08:21)  POCT Blood Glucose.: 120 mg/dL (01 Feb 2025 05:57)  POCT Blood Glucose.: 130 mg/dL (01 Feb 2025 04:03)  POCT Blood Glucose.: 138 mg/dL (01 Feb 2025 03:08)  POCT Blood Glucose.: 149 mg/dL (01 Feb 2025 01:12)  POCT Blood Glucose.: 182 mg/dL (01 Feb 2025 00:07)  POCT Blood Glucose.: 98 mg/dL (31 Jan 2025 22:37)  POCT Blood Glucose.: 90 mg/dL (31 Jan 2025 21:46)  POCT Blood Glucose.: 123 mg/dL (31 Jan 2025 20:34)  POCT Blood Glucose.: 154 mg/dL (31 Jan 2025 18:53)  POCT Blood Glucose.: 153 mg/dL (31 Jan 2025 17:41)  POCT Blood Glucose.: 156 mg/dL (31 Jan 2025 16:34)  POCT Blood Glucose.: 114 mg/dL (31 Jan 2025 14:58)  POCT Blood Glucose.: 109 mg/dL (31 Jan 2025 13:53)  POCT Blood Glucose.: 117 mg/dL (31 Jan 2025 11:59)  POCT Blood Glucose.: 122 mg/dL (31 Jan 2025 11:05)  POCT Blood Glucose.: 147 mg/dL (31 Jan 2025 09:21)    Physical Exam:  General: NAD; A&Ox3/Patient is intubated and sedated  Cardiac: S1/S2, RRR, no murmur, no rubs  Lungs: unlabored respirations, CTA b/l, no wheeze, no rales, no crackles  Abdomen: Soft/NT/ND; positive bowel sounds x 4  Sternum: Intact, no click, incision healing well with no drainage  Incisions: Incisions clean/dry/intact  Extremities: No edema b/l lower extremities; good capillary refill; no cyanosis; palpable 1+ pedal pulses b/l    Central Venous Catheter: Yes[]  No[] , If Yes indication:           Day #  Caceres Catheter: Yes  [] , No  [] , If yes indication:                      Day #  NGT: Yes [] No [] ,    If Yes Placement:                                     Day #  EPICARDIAL WIRES:  [] YES [] NO                                              Day #  BOWEL MOVEMENT:  [] YES [] NO, If No, Timing since last BM:      Day #  CHEST TUBE(Left/Right):  [] YES [] NO, If yes -  AIR LEAKS:  [] YES [] NO        LABS:                        7.8[L]  9.44  )-----------( 106[L]    ( 01 Feb 2025 01:55 )             25.8[L]                        8.0[L]  10.53 )-----------( 107[L]    ( 31 Jan 2025 17:46 )             25.7[L]    02-01    135  |  99  |  72[HH]  ----------------------------<  133[H]  4.8   |  21  |  3.1[H]  01-31    132[L]  |  100  |  67[HH]  ----------------------------<  133[H]  4.7   |  19  |  2.9[H]    Ca    7.7[L]      01 Feb 2025 01:55  Phos  5.5     01-31  Mg     2.4     02-01    TPro  5.7[L] [6.0 - 8.0]  /  Alb  3.5 [3.5 - 5.2]  /  TBili  0.5 [0.2 - 1.2]  /  DBili  x   /  AST  67[H] [0 - 41]  /  ALT  30 [0 - 41]  /  AlkPhos  322[H] [30 - 115]  02-01      Urinalysis Basic - ( 01 Feb 2025 01:55 )    Color: x / Appearance: x / SG: x / pH: x  Gluc: 133 mg/dL / Ketone: x  / Bili: x / Urobili: x   Blood: x / Protein: x / Nitrite: x   Leuk Esterase: x / RBC: x / WBC x   Sq Epi: x / Non Sq Epi: x / Bacteria: x      ABG - ( 01 Feb 2025 04:44 )  pH: 7.28  /  pCO2: 47    /  pO2: 89    / HCO3: 22    / Base Excess: -4.8  /  SaO2: 97.9  /  LA: 0.8              RADIOLOGY & ADDITIONAL TESTS:  CXR:  EKG:  MEDICATIONS  (STANDING):  albuterol/ipratropium for Nebulization 3 milliLiter(s) Nebulizer every 6 hours  aspirin enteric coated 81 milliGRAM(s) Oral daily  bisacodyl Suppository 10 milliGRAM(s) Rectal once  buMETAnide Infusion 2 mG/Hr (10 mL/Hr) IV Continuous <Continuous>  chlorhexidine 2% Cloths 1 Application(s) Topical daily  dextrose 50% Injectable 50 milliLiter(s) IV Push every 15 minutes  dextrose 50% Injectable 25 milliLiter(s) IV Push every 15 minutes  DOBUTamine Infusion 3.5 MICROgram(s)/kG/Min (12 mL/Hr) IV Continuous <Continuous>  ferrous    sulfate 325 milliGRAM(s) Oral daily  fluticasone propionate/ salmeterol 250-50 MICROgram(s) Diskus 1 Dose(s) Inhalation two times a day  folic acid 1 milliGRAM(s) Oral daily  gabapentin 300 milliGRAM(s) Oral every 8 hours  guaiFENesin  milliGRAM(s) Oral every 12 hours  heparin   Injectable 5000 Unit(s) SubCutaneous every 8 hours  influenza  Vaccine (HIGH DOSE) 0.5 milliLiter(s) IntraMuscular once  insulin regular Infusion 5 Unit(s)/Hr (5 mL/Hr) IV Continuous <Continuous>  lidocaine   4% Patch 1 Patch Transdermal daily  lidocaine   4% Patch 1 Patch Transdermal every 24 hours  meperidine     Injectable 25 milliGRAM(s) IV Push once  milrinone Infusion 0.125 MICROgram(s)/kG/Min (4.28 mL/Hr) IV Continuous <Continuous>  mupirocin 2% Nasal 1 Application(s) Both Nostrils every 12 hours  pantoprazole    Tablet 40 milliGRAM(s) Oral before breakfast  polyethylene glycol 3350 17 Gram(s) Oral daily  rosuvastatin 20 milliGRAM(s) Oral at bedtime  senna 2 Tablet(s) Oral at bedtime  sodium chloride 0.9%. 1000 milliLiter(s) (20 mL/Hr) IV Continuous <Continuous>    MEDICATIONS  (PRN):  ALPRAZolam 0.25 milliGRAM(s) Oral at bedtime PRN anxiety  ondansetron Injectable 4 milliGRAM(s) IV Push every 4 hours PRN Nausea and/or Vomiting  oxyCODONE    IR 5 milliGRAM(s) Oral every 4 hours PRN Moderate Pain (4 - 6)  oxyCODONE    IR 10 milliGRAM(s) Oral every 4 hours PRN Severe Pain (7 - 10)    HEPARIN:  [] YES [] NO  Dose: XX UNITS/HR UNITS Q8H  LOVENOX:[] YES [] NO  Dose: XX mg Q24H  COUMADIN: []  YES [] NO  Dose: XX mg  Q24H  SCD's: YES b/l  GI Prophylaxis: Protonix [], Pepcid [], None [], (Contra-indication:.....)    Post-Op Beta-Blockers: Yes [], No[], If No, then contraindication:  Post-Op Aspirin: Yes [],  No [], If No, then contraindication:  Post-Op Statin: Yes [], No[], If No, then contraindication:  Allergies    No Known Allergies    Intolerances      Ambulation/Activity Status:    Assessment/Plan:  64y Male status-post .....  - Case and plan discussed with CTU Intensivist and CT Surgeon - Dr. Blakely/Mauri/Laron  - Continue CTU supportive care    - Continue DVT/GI prophylaxis  - Incentive Spirometry 10 times an hour  - Continue to advance physical activity as tolerated and continue PT/OT as directed  1. CAD: Continue ASA, statin, BB  2. HTN:   3. A. Fib:   4. COPD/Hypoxia:   5. DM/Glucose Control:     Social Service Disposition:     OPERATIVE PROCEDURE(s):       MV replacement, LA Ligation MAZE           POD #   4                    64yMale  SURGEON(s): ISELA Blakely  SUBJECTIVE ASSESSMENT: pt seen and examined. no acute complaints   Vital Signs Last 24 Hrs  T(F): 98.1 (01 Feb 2025 04:00), Max: 99.3 (31 Jan 2025 20:00)  HR: 80 (01 Feb 2025 08:00) (52 - 87)  ABP: 126/59 (01 Feb 2025 08:00) (-10/-10 - 166/78)  ABP(mean): 76 (01 Feb 2025 08:00)  RR: 18 (01 Feb 2025 08:00) (4 - 30)  SpO2: 96% (01 Feb 2025 08:00) (91% - 100%)      I&O's Detail    31 Jan 2025 07:01  -  01 Feb 2025 07:00  --------------------------------------------------------  IN:    DOBUTamine: 115.2 mL    DOBUTamine: 84 mL    Insulin: 132 mL    IV PiggyBack: 300 mL    Milrinone: 103.2 mL    PRBCs (Packed Red Blood Cells): 333 mL    sodium chloride 0.9%: 240 mL  Total IN: 1307.4 mL    OUT:    Chest Tube (mL): 55 mL    Chest Tube (mL): 500 mL    Indwelling Catheter - Urethral (mL): 1500 mL  Total OUT: 2055 mL        Net:   I&O's Detail    30 Jan 2025 07:01  -  31 Jan 2025 07:00  --------------------------------------------------------  Total NET: 367 mL      31 Jan 2025 07:01  -  01 Feb 2025 07:00  --------------------------------------------------------  Total NET: -747.6 mL        CAPILLARY BLOOD GLUCOSE      POCT Blood Glucose.: 112 mg/dL (01 Feb 2025 08:21)  POCT Blood Glucose.: 120 mg/dL (01 Feb 2025 05:57)  POCT Blood Glucose.: 130 mg/dL (01 Feb 2025 04:03)  POCT Blood Glucose.: 138 mg/dL (01 Feb 2025 03:08)  POCT Blood Glucose.: 149 mg/dL (01 Feb 2025 01:12)  POCT Blood Glucose.: 182 mg/dL (01 Feb 2025 00:07)  POCT Blood Glucose.: 98 mg/dL (31 Jan 2025 22:37)  POCT Blood Glucose.: 90 mg/dL (31 Jan 2025 21:46)  POCT Blood Glucose.: 123 mg/dL (31 Jan 2025 20:34)  POCT Blood Glucose.: 154 mg/dL (31 Jan 2025 18:53)  POCT Blood Glucose.: 153 mg/dL (31 Jan 2025 17:41)  POCT Blood Glucose.: 156 mg/dL (31 Jan 2025 16:34)  POCT Blood Glucose.: 114 mg/dL (31 Jan 2025 14:58)  POCT Blood Glucose.: 109 mg/dL (31 Jan 2025 13:53)  POCT Blood Glucose.: 117 mg/dL (31 Jan 2025 11:59)  POCT Blood Glucose.: 122 mg/dL (31 Jan 2025 11:05)  POCT Blood Glucose.: 147 mg/dL (31 Jan 2025 09:21)      Physical Exam:  General: NAD; A&Ox3, no focal deficits, clear speech   Cardiac: S1/S2, RRR, no murmur, no rubs  Lungs: unlabored respirations, CTA b/l, no wheeze, no rales, no crackles  Abdomen: Soft/NT/ND; positive bowel sounds x 4  Sternum: Intact, no click, incision healing well with no drainage  Incisions: Incisions clean/dry/intact  Extremities: No edema b/l lower extremities; good capillary refill; no cyanosis; palpable 1+ pedal pulses b/l    Central Venous Catheter: Yes[x]  No[] , If Yes indication:   critical        Day # 4  Caceres Catheter: Yes  [x] , No  [] , If yes indication:            strict i/o           Day # 4  EPICARDIAL WIRES:  [x] YES [] NO                                              Day # 4  BOWEL MOVEMENT:  [] YES [x] NO, If No, Timing since last BM:      Day #  CHEST TUBE(Left/Right):  [x] YES [] NO, If yes -  AIR LEAKS:  [] YES [] NO        LABS:                        7.8[L]  9.44  )-----------( 106[L]    ( 01 Feb 2025 01:55 )             25.8[L]                        8.0[L]  10.53 )-----------( 107[L]    ( 31 Jan 2025 17:46 )             25.7[L]    02-01    135  |  99  |  72[HH]  ----------------------------<  133[H]  4.8   |  21  |  3.1[H]  01-31    132[L]  |  100  |  67[HH]  ----------------------------<  133[H]  4.7   |  19  |  2.9[H]    Ca    7.7[L]      01 Feb 2025 01:55  Phos  5.5     01-31  Mg     2.4     02-01    TPro  5.7[L] [6.0 - 8.0]  /  Alb  3.5 [3.5 - 5.2]  /  TBili  0.5 [0.2 - 1.2]  /  DBili  x   /  AST  67[H] [0 - 41]  /  ALT  30 [0 - 41]  /  AlkPhos  322[H] [30 - 115]  02-01      Urinalysis Basic - ( 01 Feb 2025 01:55 )    Color: x / Appearance: x / SG: x / pH: x  Gluc: 133 mg/dL / Ketone: x  / Bili: x / Urobili: x   Blood: x / Protein: x / Nitrite: x   Leuk Esterase: x / RBC: x / WBC x   Sq Epi: x / Non Sq Epi: x / Bacteria: x      ABG - ( 01 Feb 2025 04:44 )  pH: 7.28  /  pCO2: 47    /  pO2: 89    / HCO3: 22    / Base Excess: -4.8  /  SaO2: 97.9  /  LA: 0.8          RADIOLOGY & ADDITIONAL TESTS:  CXR: < from: Xray Chest 1 View- PORTABLE-Routine (Xray Chest 1 View- PORTABLE-Routine in AM.) (02.01.25 @ 06:47) >  IMPRESSION:    Bilateral opacities/pleural effusions, decreased. Redemonstration   cardiomegaly.    < end of copied text >    EKG: < from: Xray Chest 1 View- PORTABLE-Routine (Xray Chest 1 View- PORTABLE-Routine in AM.) (02.01.25 @ 06:47) >  IMPRESSION:    Bilateral opacities/pleural effusions, decreased. Redemonstration   cardiomegaly.    < end of copied text >    MEDICATIONS  (STANDING):  albuterol/ipratropium for Nebulization 3 milliLiter(s) Nebulizer every 6 hours  aspirin enteric coated 81 milliGRAM(s) Oral daily  bisacodyl Suppository 10 milliGRAM(s) Rectal once  buMETAnide Infusion 2 mG/Hr (10 mL/Hr) IV Continuous <Continuous>  chlorhexidine 2% Cloths 1 Application(s) Topical daily  dextrose 50% Injectable 50 milliLiter(s) IV Push every 15 minutes  dextrose 50% Injectable 25 milliLiter(s) IV Push every 15 minutes  DOBUTamine Infusion 3.5 MICROgram(s)/kG/Min (12 mL/Hr) IV Continuous <Continuous>  ferrous    sulfate 325 milliGRAM(s) Oral daily  fluticasone propionate/ salmeterol 250-50 MICROgram(s) Diskus 1 Dose(s) Inhalation two times a day  folic acid 1 milliGRAM(s) Oral daily  gabapentin 300 milliGRAM(s) Oral every 8 hours  guaiFENesin  milliGRAM(s) Oral every 12 hours  heparin   Injectable 5000 Unit(s) SubCutaneous every 8 hours  influenza  Vaccine (HIGH DOSE) 0.5 milliLiter(s) IntraMuscular once  insulin regular Infusion 5 Unit(s)/Hr (5 mL/Hr) IV Continuous <Continuous>  lidocaine   4% Patch 1 Patch Transdermal daily  lidocaine   4% Patch 1 Patch Transdermal every 24 hours  meperidine     Injectable 25 milliGRAM(s) IV Push once  milrinone Infusion 0.125 MICROgram(s)/kG/Min (4.28 mL/Hr) IV Continuous <Continuous>  mupirocin 2% Nasal 1 Application(s) Both Nostrils every 12 hours  pantoprazole    Tablet 40 milliGRAM(s) Oral before breakfast  polyethylene glycol 3350 17 Gram(s) Oral daily  rosuvastatin 20 milliGRAM(s) Oral at bedtime  senna 2 Tablet(s) Oral at bedtime  sodium chloride 0.9%. 1000 milliLiter(s) (20 mL/Hr) IV Continuous <Continuous>    MEDICATIONS  (PRN):  ALPRAZolam 0.25 milliGRAM(s) Oral at bedtime PRN anxiety  ondansetron Injectable 4 milliGRAM(s) IV Push every 4 hours PRN Nausea and/or Vomiting  oxyCODONE    IR 5 milliGRAM(s) Oral every 4 hours PRN Moderate Pain (4 - 6)  oxyCODONE    IR 10 milliGRAM(s) Oral every 4 hours PRN Severe Pain (7 - 10)    HEPARIN:  [x] YES [] NO  Dose: 5000 UNITS Q8H    SCD's: YES b/l  GI Prophylaxis: Protonix [x], Pepcid [], None [], (Contra-indication:.....)    Post-Op Beta-Blockers: Yes [], No[x], If No, then contraindication: junctional bradycardia  Post-Op Aspirin: Yes [x],  No [], If No, then contraindication:  Post-Op Statin: Yes [x], No[], If No, then contraindication:  Allergies    No Known Allergies    Intolerances      Ambulation/Activity Status: ambulate     Assessment/Plan:  64y Male status-post MVR / SHEILA ligation and MAZE POD # 4  - Case and plan discussed with CTU Intensivist and CT Surgeon - Dr. Blakely/Mauri/Laron  - Continue CTU supportive care    - Continue DVT/GI prophylaxis  - Incentive Spirometry 10 times an hour  - Continue to advance physical activity as tolerated and continue PT/OT as directed  1. Continue ASA, statin, no bb for junctional bradycardia, pain control,   2.madelung disease: cont bipap, respiratory tx's  3. A. Fib prophylaxis: cont to monitor electrolytes  4. junctional bradycardia- ep consult for poss ppm   5. DM/Glucose Control: cont insulin gtt for now   6. lasix for noncardiogenic fluid overload   7. franklin- renal consult     Social Service Disposition:  pt to assess

## 2025-02-01 NOTE — PROGRESS NOTE ADULT - ASSESSMENT
Cardiologist: Dr Argueta    64M w/ afib, nonobstructive CAD, CHF class 3, severe MR, DM, HTN, JIMMY on CPAP, asthma, severe obesity, HL, Madelung's disease, osteoarthritis s/p bilateral hip replacement, peripheral neuropathy presented with increasing dyspnea with exertion found to have severe MR sp MVR POD#4    Impression:  Junctional Bradycardia postop  Slow AF  Sev MR sp MVR/MAZE/SHEILA Ligation  JIMMY  CAD  DM  HTN    Plan:  - Reprogrammed temp pacemaker to 4o bpm, patient is going on his own. Prolong [place] and runs of PAT noted.   - Will cont to monitor over the weekend  - Avoid BB  - Monitor electrolytes, maintain WNL  - Will follow

## 2025-02-01 NOTE — PROGRESS NOTE ADULT - SUBJECTIVE AND OBJECTIVE BOX
INTERVAL HPI/ OVERNIGHT EVENTS:  Patient was seen.  Telemetry was noted.       MEDICATIONS  (STANDING):  albuterol/ipratropium for Nebulization 3 milliLiter(s) Nebulizer every 6 hours  aspirin enteric coated 81 milliGRAM(s) Oral daily  bisacodyl Suppository 10 milliGRAM(s) Rectal once  chlorhexidine 2% Cloths 1 Application(s) Topical daily  dextrose 50% Injectable 50 milliLiter(s) IV Push every 15 minutes  dextrose 50% Injectable 25 milliLiter(s) IV Push every 15 minutes  DOBUTamine Infusion 3.5 MICROgram(s)/kG/Min (12 mL/Hr) IV Continuous <Continuous>  ferrous    sulfate 325 milliGRAM(s) Oral daily  fluticasone propionate/ salmeterol 250-50 MICROgram(s) Diskus 1 Dose(s) Inhalation two times a day  folic acid 1 milliGRAM(s) Oral daily  gabapentin 300 milliGRAM(s) Oral every 8 hours  guaiFENesin  milliGRAM(s) Oral every 12 hours  heparin   Injectable 5000 Unit(s) SubCutaneous every 8 hours  influenza  Vaccine (HIGH DOSE) 0.5 milliLiter(s) IntraMuscular once  insulin regular Infusion 5 Unit(s)/Hr (5 mL/Hr) IV Continuous <Continuous>  lidocaine   4% Patch 1 Patch Transdermal daily  lidocaine   4% Patch 1 Patch Transdermal every 24 hours  meperidine     Injectable 25 milliGRAM(s) IV Push once  milrinone Infusion 0.125 MICROgram(s)/kG/Min (4.28 mL/Hr) IV Continuous <Continuous>  mupirocin 2% Nasal 1 Application(s) Both Nostrils every 12 hours  pantoprazole    Tablet 40 milliGRAM(s) Oral before breakfast  polyethylene glycol 3350 17 Gram(s) Oral daily  rosuvastatin 20 milliGRAM(s) Oral at bedtime  senna 2 Tablet(s) Oral at bedtime  sodium chloride 0.9%. 1000 milliLiter(s) (20 mL/Hr) IV Continuous <Continuous>    MEDICATIONS  (PRN):  ALPRAZolam 0.25 milliGRAM(s) Oral at bedtime PRN anxiety  ondansetron Injectable 4 milliGRAM(s) IV Push every 4 hours PRN Nausea and/or Vomiting  oxyCODONE    IR 5 milliGRAM(s) Oral every 4 hours PRN Moderate Pain (4 - 6)  oxyCODONE    IR 10 milliGRAM(s) Oral every 4 hours PRN Severe Pain (7 - 10)      Allergies    No Known Allergies    Intolerances        REVIEW OF SYSTEMS    [ ] A ten-point review of systems was otherwise negative except as noted.  [ ] Due to altered mental status/intubation, subjective information were not able to be obtained from the patient. History was obtained, to the extent possible, from review of the chart and collateral sources of information.      Vital Signs Last 24 Hrs  T(C): 36.7 (01 Feb 2025 04:00), Max: 37.4 (31 Jan 2025 20:00)  T(F): 98.1 (01 Feb 2025 04:00), Max: 99.3 (31 Jan 2025 20:00)  HR: 94 (01 Feb 2025 14:00) (52 - 94)  BP: --  BP(mean): --  RR: 20 (01 Feb 2025 14:00) (9 - 34)  SpO2: 99% (01 Feb 2025 14:00) (90% - 99%)    Parameters below as of 01 Feb 2025 14:00  Patient On (Oxygen Delivery Method): nasal cannula    O2 Concentration (%): 4    01-31-25 @ 07:01  -  02-01-25 @ 07:00  --------------------------------------------------------  IN: 1307.4 mL / OUT: 2055 mL / NET: -747.6 mL    02-01-25 @ 07:01  -  02-01-25 @ 14:56  --------------------------------------------------------  IN: 256.1 mL / OUT: 675 mL / NET: -418.9 mL        PHYSICAL EXAM:    GENERAL: In no apparent distress, well nourished, and hydrated.  HEART: Regular rate and rhythm; No murmur; NO rubs, or gallops.  PULMONARY: Clear to auscultation and percussion.  Normal expansion/effort. No rales, wheezing, or rhonchi bilaterally.  ABDOMEN: Soft, Nontender, Nondistended; Bowel sounds present  EXTREMITIES:  Extremities warm, pink, well-perfused, 2+ Peripheral Pulses, No clubbing, cyanosis, or edema  NEUROLOGICAL: alert & oriented x 3, no focal deficits, PERRLA, EOMI    LABS:                        8.1    9.38  )-----------( 102      ( 01 Feb 2025 14:09 )             25.8     02-01    135  |  97[L]  |  77[HH]  ----------------------------<  86  4.5   |  20  |  2.9[H]    Ca    7.7[L]      01 Feb 2025 14:09  Phos  5.5     01-31  Mg     2.4     02-01    TPro  5.8[L]  /  Alb  3.6  /  TBili  0.5  /  DBili  x   /  AST  48[H]  /  ALT  23  /  AlkPhos  285[H]  02-01      Urinalysis Basic - ( 01 Feb 2025 14:09 )    Color: x / Appearance: x / SG: x / pH: x  Gluc: 86 mg/dL / Ketone: x  / Bili: x / Urobili: x   Blood: x / Protein: x / Nitrite: x   Leuk Esterase: x / RBC: x / WBC x   Sq Epi: x / Non Sq Epi: x / Bacteria: x          12 Lead ECG:   Ventricular Rate 90 BPM    QRS Duration 90 ms    Q-T Interval 378 ms    QTC Calculation(Bazett) 462 ms    R Axis -20 degrees    T Axis 97 degrees    Diagnosis Line Atrial fibrillation with premature ventricular or aberrantly conducted  complexes  Poor R wave progression  Nonspecific ST and T wave abnormality  Abnormal ECG    Confirmed by Abhiijt Berg ([id number]) on 2/1/2025 12:29:29 PM (02-01-25 @ 08:42)  12 Lead ECG:   Ventricular Rate 67 BPM    QRS Duration 88 ms    Q-T Interval 402 ms    QTC Calculation(Bazett) 424 ms    R Axis -35 degrees    T Axis 71 degrees    Diagnosis Line Atrial fibrillation  Left axis deviation  Inferior infarct , age undetermined    Abnormal ECG    Confirmed by PAULO MOSQUEDA MD ([id number]) on 1/31/2025 12:15:15 PM (01-31-25 @ 11:59)  12 Lead ECG:   Ventricular Rate 67 BPM    QRS Duration 84 ms    Q-T Interval 422 ms    QTC Calculation(Bazett) 445 ms    R Axis -37 degrees    T Axis 48 degrees    Diagnosis Line Normal sinus rhythm with 1st degree A-V block  Left axis deviation  Low voltage QRS  Possible Anterolateral infarct , age undetermined  Abnormal ECG    Confirmed by James Paris ([id number]) on 1/30/2025 12:57:05 PM (01-30-25 @ 08:55)      RADIOLOGY & ADDITIONAL TESTS:

## 2025-02-01 NOTE — PROGRESS NOTE ADULT - ASSESSMENT
Assessment/Plan:  Mitral Valve Insufficiency/A-Fib-s/p MVR/MAZE-POD #4  1-BP control-nicardipine infusion as needed to maintain SBP <130mmHg  2-serum glucose control-insulin infusion  3-fluid overload-diuresis  4-A-Fib, s/p MAZE, now NSR  5-chronic systolic heart failure-continue inotropic support  6-FLAKITO-avoid nephrotoxins, monitor renal failure daily  7-JIMMY-BIPAP support  5-UOK-fdtssgyj statin  6-ewmsdhfiioqewgd-wdqnxsmrb, monitor daily  10-acute blood loss anemia-stable, monitor Hb/Hct daily  07-dmkmscrttnfzdgmj-lyuuhc,monitor plts daily    45 minutes of critical care services provided

## 2025-02-01 NOTE — PROGRESS NOTE ADULT - SUBJECTIVE AND OBJECTIVE BOX
JAZ GAVIN  MRN#: [id number]  Subjective:  Patient was seen and evalauted on AM rounds offerring no specific compliants at this time.    OBJECTIVE:  ICU Vital Signs Last 24 Hrs  T(C): 36.7 (2025 04:00), Max: 37.4 (2025 20:00)  T(F): 98.1 (2025 04:00), Max: 99.3 (2025 20:00)  HR: 93 (:) (52 - 93)  BP: --  BP(mean): --  ABP: 131/65 (:00) (-10/-10 - 166/78)  ABP(mean): 82 (:) (-10 - 98)  RR: 17 (:) (8 - 34)  SpO2: 96% (:) (90% - 98%)    O2 Parameters below as of 2025 13:00  Patient On (Oxygen Delivery Method): nasal cannula    O2 Concentration (%): 4         @ 07: @ 07:00  --------------------------------------------------------  IN: 1307.4 mL / OUT: 2055 mL / NET: -747.6 mL     @ 07: @ 13:30  --------------------------------------------------------  IN: 223.8 mL / OUT: 450 mL / NET: -226.2 mL      CAPILLARY BLOOD GLUCOSE      POCT Blood Glucose.: 113 mg/dL (2025 12:15)      PHYSICAL EXAM:Daily     Daily Weight in k.1 (2025 05:00)  General: WN/WD NAD    HEENT:     + NCAT  + EOMI  - Conjuctival edema   - Icterus   - Thrush   - ETT  - NGT/OGT    Neck:         + FROM    - JVD     - Nodes     - Masses    + Mid-line trachea   - Tracheostomy    Chest:         - Sternal click  - Sternal drainage  + Pacing wires  + Chest tubes  - SubQ emphysema    Lungs:          + CTA   - Rhonchi    - Rales    - Wheezing     - Decreased BS   - Dullness R L    Cardiac:       + S1 + S2    + RRR   - Irregular   - S3  - S4    - Murmurs   - Rub   - Hamman’s sign     Abdomen:    + BS     + Soft    + Non-tender     - Distended    - Organomegaly  - PEG    Extremities:   - Cyanosis U/L   - Clubbing  U/L  - LE/UE Edema   + Capillary refill    + Pulses     Neuro:        + Awake   +  Alert   - Confused   - Lethargic   - Sedated   - Generalized Weakness    Skin:        - Rashes    - Erythema   + Normal incisions   + IV sites intact  - Sacral decubitus    HOSPITAL MEDICATIONS:  MEDICATIONS  (STANDING):  albuterol/ipratropium for Nebulization 3 milliLiter(s) Nebulizer every 6 hours  aspirin enteric coated 81 milliGRAM(s) Oral daily  bisacodyl Suppository 10 milliGRAM(s) Rectal once  chlorhexidine 2% Cloths 1 Application(s) Topical daily  dextrose 50% Injectable 50 milliLiter(s) IV Push every 15 minutes  dextrose 50% Injectable 25 milliLiter(s) IV Push every 15 minutes  DOBUTamine Infusion 3.5 MICROgram(s)/kG/Min (12 mL/Hr) IV Continuous <Continuous>  ferrous    sulfate 325 milliGRAM(s) Oral daily  fluticasone propionate/ salmeterol 250-50 MICROgram(s) Diskus 1 Dose(s) Inhalation two times a day  folic acid 1 milliGRAM(s) Oral daily  gabapentin 300 milliGRAM(s) Oral every 8 hours  guaiFENesin  milliGRAM(s) Oral every 12 hours  heparin   Injectable 5000 Unit(s) SubCutaneous every 8 hours  influenza  Vaccine (HIGH DOSE) 0.5 milliLiter(s) IntraMuscular once  insulin regular Infusion 5 Unit(s)/Hr (5 mL/Hr) IV Continuous <Continuous>  lidocaine   4% Patch 1 Patch Transdermal daily  lidocaine   4% Patch 1 Patch Transdermal every 24 hours  meperidine     Injectable 25 milliGRAM(s) IV Push once  milrinone Infusion 0.125 MICROgram(s)/kG/Min (4.28 mL/Hr) IV Continuous <Continuous>  mupirocin 2% Nasal 1 Application(s) Both Nostrils every 12 hours  pantoprazole    Tablet 40 milliGRAM(s) Oral before breakfast  polyethylene glycol 3350 17 Gram(s) Oral daily  rosuvastatin 20 milliGRAM(s) Oral at bedtime  senna 2 Tablet(s) Oral at bedtime  sodium chloride 0.9%. 1000 milliLiter(s) (20 mL/Hr) IV Continuous <Continuous>    MEDICATIONS  (PRN):  ALPRAZolam 0.25 milliGRAM(s) Oral at bedtime PRN anxiety  ondansetron Injectable 4 milliGRAM(s) IV Push every 4 hours PRN Nausea and/or Vomiting  oxyCODONE    IR 5 milliGRAM(s) Oral every 4 hours PRN Moderate Pain (4 - 6)  oxyCODONE    IR 10 milliGRAM(s) Oral every 4 hours PRN Severe Pain (7 - 10)      LABS:                        7.8    9.44  )-----------( 106      ( 2025 01:55 )             25.8        135  |  99  |  72[HH]  ----------------------------<  133[H]  4.8   |  21  |  3.1[H]    Ca    7.7[L]      2025 01:55  Phos  5.5     -  Mg     2.4         TPro  5.7[L]  /  Alb  3.5  /  TBili  0.5  /  DBili  x   /  AST  67[H]  /  ALT  30  /  AlkPhos  322[H]       LIVER FUNCTIONS - ( 2025 01:55 )  Alb: 3.5 g/dL / Pro: 5.7 g/dL / ALK PHOS: 322 U/L / ALT: 30 U/L / AST: 67 U/L / GGT: x           Urinalysis Basic - ( 2025 01:55 )    Color: x / Appearance: x / SG: x / pH: x  Gluc: 133 mg/dL / Ketone: x  / Bili: x / Urobili: x   Blood: x / Protein: x / Nitrite: x   Leuk Esterase: x / RBC: x / WBC x   Sq Epi: x / Non Sq Epi: x / Bacteria: x        RADIOLOGY:  X Reviewed and interpreted by me: bilateral opacities/effusions    CARDIOPULMONARY DYSFUNCTION  - Respiratory status required supplemental oxygen & the following of continuous pulse oximetry for support & to prevent decompensation  - Continued early mobilization as tolerated  - Addressed analgesic regimen to optimize function    PREVENTION-PROPHYLAXIS  - ASA continued for graft occlusion-thromboembolism prophylaxis  - Statin was also started   - Heparin continued for VTE prophylaxis in addition to Venodyne boots  - Protonix maintained for GI bleeding prophylaxis  - Metabolic stability & infection prophylaxis required review and adjustment of regular Insulin sliding scale and gylcemic regimen while following serial glucose levels to help achieve & maintain euglycemia  - Reviewed & addressed surgical site infection prophylaxis regimen

## 2025-02-02 LAB
ALBUMIN SERPL ELPH-MCNC: 3.5 G/DL — SIGNIFICANT CHANGE UP (ref 3.5–5.2)
ALBUMIN SERPL ELPH-MCNC: 3.5 G/DL — SIGNIFICANT CHANGE UP (ref 3.5–5.2)
ALP SERPL-CCNC: 235 U/L — HIGH (ref 30–115)
ALP SERPL-CCNC: 248 U/L — HIGH (ref 30–115)
ALT FLD-CCNC: 20 U/L — SIGNIFICANT CHANGE UP (ref 0–41)
ALT FLD-CCNC: 22 U/L — SIGNIFICANT CHANGE UP (ref 0–41)
ANION GAP SERPL CALC-SCNC: 13 MMOL/L — SIGNIFICANT CHANGE UP (ref 7–14)
ANION GAP SERPL CALC-SCNC: 16 MMOL/L — HIGH (ref 7–14)
AST SERPL-CCNC: 42 U/L — HIGH (ref 0–41)
AST SERPL-CCNC: 46 U/L — HIGH (ref 0–41)
BASE EXCESS BLDA CALC-SCNC: -3.6 MMOL/L — LOW (ref -2–3)
BASOPHILS # BLD AUTO: 0.03 K/UL — SIGNIFICANT CHANGE UP (ref 0–0.2)
BASOPHILS NFR BLD AUTO: 0.4 % — SIGNIFICANT CHANGE UP (ref 0–1)
BILIRUB SERPL-MCNC: 0.5 MG/DL — SIGNIFICANT CHANGE UP (ref 0.2–1.2)
BILIRUB SERPL-MCNC: 0.6 MG/DL — SIGNIFICANT CHANGE UP (ref 0.2–1.2)
BUN SERPL-MCNC: 81 MG/DL — CRITICAL HIGH (ref 10–20)
BUN SERPL-MCNC: 84 MG/DL — CRITICAL HIGH (ref 10–20)
CALCIUM SERPL-MCNC: 7.9 MG/DL — LOW (ref 8.4–10.5)
CALCIUM SERPL-MCNC: 8.3 MG/DL — LOW (ref 8.4–10.4)
CHLORIDE SERPL-SCNC: 95 MMOL/L — LOW (ref 98–110)
CHLORIDE SERPL-SCNC: 98 MMOL/L — SIGNIFICANT CHANGE UP (ref 98–110)
CO2 SERPL-SCNC: 21 MMOL/L — SIGNIFICANT CHANGE UP (ref 17–32)
CO2 SERPL-SCNC: 23 MMOL/L — SIGNIFICANT CHANGE UP (ref 17–32)
CREAT SERPL-MCNC: 2.5 MG/DL — HIGH (ref 0.7–1.5)
CREAT SERPL-MCNC: 2.8 MG/DL — HIGH (ref 0.7–1.5)
EGFR: 24 ML/MIN/1.73M2 — LOW
EGFR: 28 ML/MIN/1.73M2 — LOW
EOSINOPHIL # BLD AUTO: 0.18 K/UL — SIGNIFICANT CHANGE UP (ref 0–0.7)
EOSINOPHIL NFR BLD AUTO: 2.1 % — SIGNIFICANT CHANGE UP (ref 0–8)
GAS PNL BLDA: SIGNIFICANT CHANGE UP
GLUCOSE BLDC GLUCOMTR-MCNC: 109 MG/DL — HIGH (ref 70–99)
GLUCOSE BLDC GLUCOMTR-MCNC: 119 MG/DL — HIGH (ref 70–99)
GLUCOSE BLDC GLUCOMTR-MCNC: 132 MG/DL — HIGH (ref 70–99)
GLUCOSE BLDC GLUCOMTR-MCNC: 137 MG/DL — HIGH (ref 70–99)
GLUCOSE BLDC GLUCOMTR-MCNC: 183 MG/DL — HIGH (ref 70–99)
GLUCOSE BLDC GLUCOMTR-MCNC: 238 MG/DL — HIGH (ref 70–99)
GLUCOSE BLDC GLUCOMTR-MCNC: 268 MG/DL — HIGH (ref 70–99)
GLUCOSE BLDC GLUCOMTR-MCNC: 285 MG/DL — HIGH (ref 70–99)
GLUCOSE BLDC GLUCOMTR-MCNC: 70 MG/DL — SIGNIFICANT CHANGE UP (ref 70–99)
GLUCOSE SERPL-MCNC: 257 MG/DL — HIGH (ref 70–99)
GLUCOSE SERPL-MCNC: 80 MG/DL — SIGNIFICANT CHANGE UP (ref 70–99)
HCO3 BLDA-SCNC: 23 MMOL/L — SIGNIFICANT CHANGE UP (ref 21–28)
HCT VFR BLD CALC: 24.6 % — LOW (ref 42–52)
HCT VFR BLD CALC: 25.8 % — LOW (ref 42–52)
HGB BLD-MCNC: 7.7 G/DL — LOW (ref 14–18)
HGB BLD-MCNC: 8 G/DL — LOW (ref 14–18)
HOROWITZ INDEX BLDA+IHG-RTO: 40 — SIGNIFICANT CHANGE UP
IMM GRANULOCYTES NFR BLD AUTO: 1.4 % — HIGH (ref 0.1–0.3)
LYMPHOCYTES # BLD AUTO: 0.97 K/UL — LOW (ref 1.2–3.4)
LYMPHOCYTES # BLD AUTO: 11.5 % — LOW (ref 20.5–51.1)
MAGNESIUM SERPL-MCNC: 2.3 MG/DL — SIGNIFICANT CHANGE UP (ref 1.8–2.4)
MAGNESIUM SERPL-MCNC: 2.4 MG/DL — SIGNIFICANT CHANGE UP (ref 1.8–2.4)
MCHC RBC-ENTMCNC: 26.9 PG — LOW (ref 27–31)
MCHC RBC-ENTMCNC: 27 PG — SIGNIFICANT CHANGE UP (ref 27–31)
MCHC RBC-ENTMCNC: 31 G/DL — LOW (ref 32–37)
MCHC RBC-ENTMCNC: 31.3 G/DL — LOW (ref 32–37)
MCV RBC AUTO: 86 FL — SIGNIFICANT CHANGE UP (ref 80–94)
MCV RBC AUTO: 87.2 FL — SIGNIFICANT CHANGE UP (ref 80–94)
MONOCYTES # BLD AUTO: 0.85 K/UL — HIGH (ref 0.1–0.6)
MONOCYTES NFR BLD AUTO: 10 % — HIGH (ref 1.7–9.3)
NEUTROPHILS # BLD AUTO: 6.31 K/UL — SIGNIFICANT CHANGE UP (ref 1.4–6.5)
NEUTROPHILS NFR BLD AUTO: 74.6 % — SIGNIFICANT CHANGE UP (ref 42.2–75.2)
NRBC # BLD: 2 /100 WBCS — HIGH (ref 0–0)
NRBC # BLD: 2 /100 WBCS — HIGH (ref 0–0)
NRBC BLD-RTO: 2 /100 WBCS — HIGH (ref 0–0)
NRBC BLD-RTO: 2 /100 WBCS — HIGH (ref 0–0)
PCO2 BLDA: 47 MMHG — SIGNIFICANT CHANGE UP (ref 35–48)
PH BLDA: 7.3 — LOW (ref 7.35–7.45)
PLATELET # BLD AUTO: 115 K/UL — LOW (ref 130–400)
PLATELET # BLD AUTO: 127 K/UL — LOW (ref 130–400)
PMV BLD: 10.7 FL — HIGH (ref 7.4–10.4)
PMV BLD: 11.6 FL — HIGH (ref 7.4–10.4)
PO2 BLDA: 103 MMHG — SIGNIFICANT CHANGE UP (ref 83–108)
POTASSIUM SERPL-MCNC: 4.3 MMOL/L — SIGNIFICANT CHANGE UP (ref 3.5–5)
POTASSIUM SERPL-MCNC: 4.9 MMOL/L — SIGNIFICANT CHANGE UP (ref 3.5–5)
POTASSIUM SERPL-SCNC: 4.3 MMOL/L — SIGNIFICANT CHANGE UP (ref 3.5–5)
POTASSIUM SERPL-SCNC: 4.9 MMOL/L — SIGNIFICANT CHANGE UP (ref 3.5–5)
PROT SERPL-MCNC: 5.6 G/DL — LOW (ref 6–8)
PROT SERPL-MCNC: 5.7 G/DL — LOW (ref 6–8)
RBC # BLD: 2.86 M/UL — LOW (ref 4.7–6.1)
RBC # BLD: 2.96 M/UL — LOW (ref 4.7–6.1)
RBC # FLD: 18.3 % — HIGH (ref 11.5–14.5)
RBC # FLD: 18.6 % — HIGH (ref 11.5–14.5)
SAO2 % BLDA: 98.3 % — HIGH (ref 94–98)
SODIUM SERPL-SCNC: 132 MMOL/L — LOW (ref 135–146)
SODIUM SERPL-SCNC: 134 MMOL/L — LOW (ref 135–146)
WBC # BLD: 7.59 K/UL — SIGNIFICANT CHANGE UP (ref 4.8–10.8)
WBC # BLD: 8.46 K/UL — SIGNIFICANT CHANGE UP (ref 4.8–10.8)
WBC # FLD AUTO: 7.59 K/UL — SIGNIFICANT CHANGE UP (ref 4.8–10.8)
WBC # FLD AUTO: 8.46 K/UL — SIGNIFICANT CHANGE UP (ref 4.8–10.8)

## 2025-02-02 PROCEDURE — 99291 CRITICAL CARE FIRST HOUR: CPT | Mod: 24

## 2025-02-02 PROCEDURE — 71045 X-RAY EXAM CHEST 1 VIEW: CPT | Mod: 26

## 2025-02-02 PROCEDURE — 71045 X-RAY EXAM CHEST 1 VIEW: CPT | Mod: 26,77

## 2025-02-02 PROCEDURE — 99233 SBSQ HOSP IP/OBS HIGH 50: CPT | Mod: FS

## 2025-02-02 PROCEDURE — 93010 ELECTROCARDIOGRAM REPORT: CPT

## 2025-02-02 RX ORDER — SODIUM CHLORIDE 9 G/1000ML
1000 INJECTION, SOLUTION INTRAVENOUS
Refills: 0 | Status: DISCONTINUED | OUTPATIENT
Start: 2025-02-02 | End: 2025-02-24

## 2025-02-02 RX ORDER — LACTULOSE 10 G/15ML
20 SOLUTION ORAL DAILY
Refills: 0 | Status: DISCONTINUED | OUTPATIENT
Start: 2025-02-02 | End: 2025-02-05

## 2025-02-02 RX ORDER — CALCIUM GLUCONATE 20 MG/ML
2 INJECTION, SOLUTION INTRAVENOUS ONCE
Refills: 0 | Status: COMPLETED | OUTPATIENT
Start: 2025-02-02 | End: 2025-02-02

## 2025-02-02 RX ORDER — INSULIN LISPRO 100 U/ML
INJECTION, SOLUTION INTRAVENOUS; SUBCUTANEOUS AT BEDTIME
Refills: 0 | Status: DISCONTINUED | OUTPATIENT
Start: 2025-02-02 | End: 2025-02-24

## 2025-02-02 RX ORDER — GLUCAGON 3 MG/1
1 POWDER NASAL ONCE
Refills: 0 | Status: DISCONTINUED | OUTPATIENT
Start: 2025-02-02 | End: 2025-02-24

## 2025-02-02 RX ORDER — IRON SUCROSE 20 MG/ML
200 INJECTION, SOLUTION INTRAVENOUS EVERY 24 HOURS
Refills: 0 | Status: COMPLETED | OUTPATIENT
Start: 2025-02-02 | End: 2025-02-06

## 2025-02-02 RX ORDER — ALBUMIN (HUMAN) 12.5 G/50ML
50 INJECTION, SOLUTION INTRAVENOUS EVERY 6 HOURS
Refills: 0 | Status: COMPLETED | OUTPATIENT
Start: 2025-02-02 | End: 2025-02-03

## 2025-02-02 RX ORDER — INSULIN GLARGINE-YFGN 100 [IU]/ML
30 INJECTION, SOLUTION SUBCUTANEOUS EVERY MORNING
Refills: 0 | Status: DISCONTINUED | OUTPATIENT
Start: 2025-02-02 | End: 2025-02-03

## 2025-02-02 RX ORDER — DEXTROSE 50 % IN WATER 50 %
15 SYRINGE (ML) INTRAVENOUS ONCE
Refills: 0 | Status: DISCONTINUED | OUTPATIENT
Start: 2025-02-02 | End: 2025-02-24

## 2025-02-02 RX ORDER — DOBUTAMINE 250 MG/20ML
1.5 INJECTION INTRAVENOUS
Qty: 500 | Refills: 0 | Status: DISCONTINUED | OUTPATIENT
Start: 2025-02-02 | End: 2025-02-04

## 2025-02-02 RX ORDER — INSULIN LISPRO 100 U/ML
INJECTION, SOLUTION INTRAVENOUS; SUBCUTANEOUS
Refills: 0 | Status: DISCONTINUED | OUTPATIENT
Start: 2025-02-02 | End: 2025-02-24

## 2025-02-02 RX ORDER — INSULIN LISPRO 100 U/ML
10 INJECTION, SOLUTION INTRAVENOUS; SUBCUTANEOUS
Refills: 0 | Status: DISCONTINUED | OUTPATIENT
Start: 2025-02-02 | End: 2025-02-24

## 2025-02-02 RX ORDER — ACETAMINOPHEN 500 MG/5ML
1000 LIQUID (ML) ORAL ONCE
Refills: 0 | Status: COMPLETED | OUTPATIENT
Start: 2025-02-02 | End: 2025-02-02

## 2025-02-02 RX ADMIN — ALBUMIN (HUMAN) 50 MILLILITER(S): 12.5 INJECTION, SOLUTION INTRAVENOUS at 12:56

## 2025-02-02 RX ADMIN — INSULIN GLARGINE-YFGN 30 UNIT(S): 100 INJECTION, SOLUTION SUBCUTANEOUS at 09:04

## 2025-02-02 RX ADMIN — LACTULOSE 20 GRAM(S): 10 SOLUTION ORAL at 12:57

## 2025-02-02 RX ADMIN — MILRINONE LACTATE 4.28 MICROGRAM(S)/KG/MIN: 1 INJECTION, SOLUTION INTRAVENOUS at 17:58

## 2025-02-02 RX ADMIN — Medication 40 MILLIGRAM(S): at 05:42

## 2025-02-02 RX ADMIN — CALCIUM GLUCONATE 200 GRAM(S): 20 INJECTION, SOLUTION INTRAVENOUS at 09:23

## 2025-02-02 RX ADMIN — LIDOCAINE HYDROCHLORIDE 1 PATCH: 20 JELLY TOPICAL at 08:20

## 2025-02-02 RX ADMIN — OXYCODONE HYDROCHLORIDE 10 MILLIGRAM(S): 30 TABLET ORAL at 06:43

## 2025-02-02 RX ADMIN — GABAPENTIN 300 MILLIGRAM(S): 400 CAPSULE ORAL at 21:52

## 2025-02-02 RX ADMIN — INSULIN LISPRO 10 UNIT(S): 100 INJECTION, SOLUTION INTRAVENOUS; SUBCUTANEOUS at 11:28

## 2025-02-02 RX ADMIN — LIDOCAINE HYDROCHLORIDE 1 PATCH: 20 JELLY TOPICAL at 00:32

## 2025-02-02 RX ADMIN — HEPARIN SODIUM 5000 UNIT(S): 1000 INJECTION INTRAVENOUS; SUBCUTANEOUS at 21:52

## 2025-02-02 RX ADMIN — OXYCODONE HYDROCHLORIDE 10 MILLIGRAM(S): 30 TABLET ORAL at 13:08

## 2025-02-02 RX ADMIN — Medication 81 MILLIGRAM(S): at 12:57

## 2025-02-02 RX ADMIN — HEPARIN SODIUM 5000 UNIT(S): 1000 INJECTION INTRAVENOUS; SUBCUTANEOUS at 13:07

## 2025-02-02 RX ADMIN — GABAPENTIN 300 MILLIGRAM(S): 400 CAPSULE ORAL at 05:43

## 2025-02-02 RX ADMIN — OXYCODONE HYDROCHLORIDE 10 MILLIGRAM(S): 30 TABLET ORAL at 05:43

## 2025-02-02 RX ADMIN — LIDOCAINE HYDROCHLORIDE 1 PATCH: 20 JELLY TOPICAL at 19:00

## 2025-02-02 RX ADMIN — OXYCODONE HYDROCHLORIDE 10 MILLIGRAM(S): 30 TABLET ORAL at 13:38

## 2025-02-02 RX ADMIN — MUPIROCIN CALCIUM 1 APPLICATION(S): 20 CREAM TOPICAL at 18:17

## 2025-02-02 RX ADMIN — OXYCODONE HYDROCHLORIDE 10 MILLIGRAM(S): 30 TABLET ORAL at 21:53

## 2025-02-02 RX ADMIN — Medication 1 APPLICATION(S): at 12:57

## 2025-02-02 RX ADMIN — DEXTROMETHORPHAN HBR, GUAIFENESIN 600 MILLIGRAM(S): 200 LIQUID ORAL at 17:43

## 2025-02-02 RX ADMIN — IPRATROPIUM BROMIDE AND ALBUTEROL SULFATE 3 MILLILITER(S): .5; 2.5 SOLUTION RESPIRATORY (INHALATION) at 08:36

## 2025-02-02 RX ADMIN — FOLIC ACID 1 MILLIGRAM(S): 1 TABLET ORAL at 12:57

## 2025-02-02 RX ADMIN — MILRINONE LACTATE 4.28 MICROGRAM(S)/KG/MIN: 1 INJECTION, SOLUTION INTRAVENOUS at 00:12

## 2025-02-02 RX ADMIN — Medication 1000 MILLIGRAM(S): at 09:35

## 2025-02-02 RX ADMIN — Medication 0.25 MILLIGRAM(S): at 21:53

## 2025-02-02 RX ADMIN — IRON SUCROSE 100 MILLIGRAM(S): 20 INJECTION, SOLUTION INTRAVENOUS at 09:49

## 2025-02-02 RX ADMIN — DOBUTAMINE 8.55 MICROGRAM(S)/KG/MIN: 250 INJECTION INTRAVENOUS at 13:09

## 2025-02-02 RX ADMIN — Medication 2 TABLET(S): at 21:53

## 2025-02-02 RX ADMIN — INSULIN LISPRO 10 UNIT(S): 100 INJECTION, SOLUTION INTRAVENOUS; SUBCUTANEOUS at 17:43

## 2025-02-02 RX ADMIN — LIDOCAINE HYDROCHLORIDE 1 PATCH: 20 JELLY TOPICAL at 17:23

## 2025-02-02 RX ADMIN — ALBUMIN (HUMAN) 50 MILLILITER(S): 12.5 INJECTION, SOLUTION INTRAVENOUS at 17:44

## 2025-02-02 RX ADMIN — DEXTROMETHORPHAN HBR, GUAIFENESIN 600 MILLIGRAM(S): 200 LIQUID ORAL at 05:43

## 2025-02-02 RX ADMIN — ROSUVASTATIN CALCIUM 20 MILLIGRAM(S): 20 TABLET, FILM COATED ORAL at 21:53

## 2025-02-02 RX ADMIN — INSULIN LISPRO 6: 100 INJECTION, SOLUTION INTRAVENOUS; SUBCUTANEOUS at 17:43

## 2025-02-02 RX ADMIN — IPRATROPIUM BROMIDE AND ALBUTEROL SULFATE 3 MILLILITER(S): .5; 2.5 SOLUTION RESPIRATORY (INHALATION) at 13:36

## 2025-02-02 RX ADMIN — LIDOCAINE HYDROCHLORIDE 1 PATCH: 20 JELLY TOPICAL at 05:43

## 2025-02-02 RX ADMIN — GABAPENTIN 300 MILLIGRAM(S): 400 CAPSULE ORAL at 13:09

## 2025-02-02 RX ADMIN — POLYETHYLENE GLYCOL 3350 17 GRAM(S): 17 POWDER, FOR SOLUTION ORAL at 12:56

## 2025-02-02 RX ADMIN — Medication 400 MILLIGRAM(S): at 09:05

## 2025-02-02 RX ADMIN — OXYCODONE HYDROCHLORIDE 10 MILLIGRAM(S): 30 TABLET ORAL at 22:00

## 2025-02-02 RX ADMIN — HEPARIN SODIUM 5000 UNIT(S): 1000 INJECTION INTRAVENOUS; SUBCUTANEOUS at 05:42

## 2025-02-02 RX ADMIN — LIDOCAINE HYDROCHLORIDE 1 PATCH: 20 JELLY TOPICAL at 12:57

## 2025-02-02 RX ADMIN — INSULIN LISPRO 6: 100 INJECTION, SOLUTION INTRAVENOUS; SUBCUTANEOUS at 11:28

## 2025-02-02 NOTE — CONSULT NOTE ADULT - ASSESSMENT
64 with PMHx of CKD 3a, severe MR, presented for MVR, SHEILA.  Nephrology consulted for FLAKITO    FLAKITO on CKD 3a d/t hypotension/hemodynamic changes   Volume overload  Acute blood loss anemia    - non oliguric  - creat down-trending    Recommendations:  - cont diuresis as needed to achieve negative fluid balance  - document urine output  - avoid hypotension  - monitor h/h / RBCs to maintain hgb 7-8 64 with PMHx of CKD 3a, severe MR, presented for MVR, SHEILA.  Nephrology consulted for FLAKITO    FLAKITO on CKD 3a d/t hypotension/hemodynamic changes s/p MVR  Volume overload / mild hyponatremia hypervolemic   Acute blood loss anemia    - non oliguric  - creat down-trending    Recommendations:  - cont diuresis as needed to achieve negative fluid balance / can maintain on bumex 2mg iv q12 for now  - document urine output  - avoid hypotension  - monitor h/h / RBCs to maintain hgb 7-8  - phos level noted / maintain renal diet   - on milrinone/dobutamine / CTS f/u

## 2025-02-02 NOTE — PROGRESS NOTE ADULT - ASSESSMENT
64M w/ afib, nonobstructive CAD, CHF class 3, severe MR, DM, HTN, JIMMY on CPAP, asthma, severe obesity, HL, Madelung's disease, osteoarthritis s/p bilateral hip replacement, peripheral neuropathy presented with increasing dyspnea with exertion found to have severe MR sp MVR POD#5    Impression:  Junctional Bradycardia postop  Slow AF  Sev MR sp MVR/MAZE/SHEILA Ligation  JIMMY  CAD  DM  HTN    Plan:  -TVP disconnected, patient is going on his own 70-90 bpm. Prolong WI and Wenckebach noted susannah 10-15 beats (dropped beat).   - Will cont to monitor, possible AV leadless PPM if conduction system does not recover  - currently on Dobutamine  - off BB  - Monitor electrolytes, maintain WNL  - Will follow

## 2025-02-02 NOTE — PROGRESS NOTE ADULT - SUBJECTIVE AND OBJECTIVE BOX
OPERATIVE PROCEDURE(s):   MVR, SHEILA ligation, MAZE             POD #       5                64yMale  SURGEON(s): ISELA Blakely  SUBJECTIVE ASSESSMENT: pt seen and examined.   Vital Signs Last 24 Hrs  T(F): 98.1 (02 Feb 2025 04:00), Max: 98.8 (01 Feb 2025 20:00)  HR: 97 (02 Feb 2025 08:00) (50 - 100)  BP: 111/59 (01 Feb 2025 20:00) (111/59 - 111/59)  BP(mean): 80 (01 Feb 2025 20:00) (80 - 80)  ABP: 136/52 (02 Feb 2025 08:00) (91/44 - 183/64)  ABP(mean): 73 (02 Feb 2025 08:00)  RR: 23 (02 Feb 2025 08:00) (10 - 34)  SpO2: 95% (02 Feb 2025 08:00) (90% - 100%)      I&O's Detail    01 Feb 2025 07:01  -  02 Feb 2025 07:00  --------------------------------------------------------  IN:    Bumetanide: 30 mL    DOBUTamine: 288 mL    Insulin: 88 mL    IV PiggyBack: 100 mL    Milrinone: 103.2 mL    sodium chloride 0.9%: 240 mL  Total IN: 849.2 mL    OUT:    Chest Tube (mL): 140 mL    Chest Tube (mL): 80 mL    Indwelling Catheter - Urethral (mL): 2520 mL  Total OUT: 2740 mL        Net:   I&O's Detail    31 Jan 2025 07:01  -  01 Feb 2025 07:00  --------------------------------------------------------  Total NET: -747.6 mL      01 Feb 2025 07:01  -  02 Feb 2025 07:00  --------------------------------------------------------  Total NET: -1890.8 mL        CAPILLARY BLOOD GLUCOSE      POCT Blood Glucose.: 119 mg/dL (02 Feb 2025 08:08)  POCT Blood Glucose.: 137 mg/dL (02 Feb 2025 06:54)  POCT Blood Glucose.: 132 mg/dL (02 Feb 2025 04:25)  POCT Blood Glucose.: 109 mg/dL (02 Feb 2025 02:58)  POCT Blood Glucose.: 70 mg/dL (02 Feb 2025 01:43)  POCT Blood Glucose.: 113 mg/dL (01 Feb 2025 23:56)  POCT Blood Glucose.: 124 mg/dL (01 Feb 2025 23:21)  POCT Blood Glucose.: 156 mg/dL (01 Feb 2025 21:00)  POCT Blood Glucose.: 178 mg/dL (01 Feb 2025 19:01)  POCT Blood Glucose.: 170 mg/dL (01 Feb 2025 18:28)  POCT Blood Glucose.: 119 mg/dL (01 Feb 2025 17:30)  POCT Blood Glucose.: 64 mg/dL (01 Feb 2025 16:29)  POCT Blood Glucose.: 113 mg/dL (01 Feb 2025 12:15)  POCT Blood Glucose.: 120 mg/dL (01 Feb 2025 10:10)    Physical Exam:  General: NAD; A&Ox3/Patient is intubated and sedated  Cardiac: S1/S2, RRR, no murmur, no rubs  Lungs: unlabored respirations, CTA b/l, no wheeze, no rales, no crackles  Abdomen: Soft/NT/ND; positive bowel sounds x 4  Sternum: Intact, no click, incision healing well with no drainage  Incisions: Incisions clean/dry/intact  Extremities: No edema b/l lower extremities; good capillary refill; no cyanosis; palpable 1+ pedal pulses b/l    Central Venous Catheter: Yes[]  No[] , If Yes indication:           Day #  Caceres Catheter: Yes  [] , No  [] , If yes indication:                      Day #  NGT: Yes [] No [] ,    If Yes Placement:                                     Day #  EPICARDIAL WIRES:  [] YES [] NO                                              Day #  BOWEL MOVEMENT:  [] YES [] NO, If No, Timing since last BM:      Day #  CHEST TUBE(Left/Right):  [] YES [] NO, If yes -  AIR LEAKS:  [] YES [] NO        LABS:                        7.7[L]  8.46  )-----------( 115[L]    ( 02 Feb 2025 01:20 )             24.6[L]                        8.1[L]  9.38  )-----------( 102[L]    ( 01 Feb 2025 14:09 )             25.8[L]    02-02    134[L]  |  98  |  81[HH]  ----------------------------<  80  4.3   |  23  |  2.8[H]  02-01    135  |  97[L]  |  77[HH]  ----------------------------<  86  4.5   |  20  |  2.9[H]    Ca    7.9[L]      02 Feb 2025 01:20  Phos  5.5     01-31  Mg     2.4     02-02    TPro  5.6[L] [6.0 - 8.0]  /  Alb  3.5 [3.5 - 5.2]  /  TBili  0.5 [0.2 - 1.2]  /  DBili  x   /  AST  42[H] [0 - 41]  /  ALT  20 [0 - 41]  /  AlkPhos  248[H] [30 - 115]  02-02      Urinalysis Basic - ( 02 Feb 2025 01:20 )    Color: x / Appearance: x / SG: x / pH: x  Gluc: 80 mg/dL / Ketone: x  / Bili: x / Urobili: x   Blood: x / Protein: x / Nitrite: x   Leuk Esterase: x / RBC: x / WBC x   Sq Epi: x / Non Sq Epi: x / Bacteria: x      ABG - ( 02 Feb 2025 05:17 )  pH: 7.30  /  pCO2: 47    /  pO2: 103   / HCO3: 23    / Base Excess: -3.6  /  SaO2: 98.3  /  LA: 0.7              RADIOLOGY & ADDITIONAL TESTS:  CXR:  EKG:  MEDICATIONS  (STANDING):  albuterol/ipratropium for Nebulization 3 milliLiter(s) Nebulizer every 6 hours  aspirin enteric coated 81 milliGRAM(s) Oral daily  bisacodyl Suppository 10 milliGRAM(s) Rectal once  chlorhexidine 2% Cloths 1 Application(s) Topical daily  dextrose 50% Injectable 50 milliLiter(s) IV Push every 15 minutes  dextrose 50% Injectable 25 milliLiter(s) IV Push every 15 minutes  DOBUTamine Infusion 3.5 MICROgram(s)/kG/Min (12 mL/Hr) IV Continuous <Continuous>  ferrous    sulfate 325 milliGRAM(s) Oral daily  fluticasone propionate/ salmeterol 250-50 MICROgram(s) Diskus 1 Dose(s) Inhalation two times a day  folic acid 1 milliGRAM(s) Oral daily  gabapentin 300 milliGRAM(s) Oral every 8 hours  guaiFENesin  milliGRAM(s) Oral every 12 hours  heparin   Injectable 5000 Unit(s) SubCutaneous every 8 hours  influenza  Vaccine (HIGH DOSE) 0.5 milliLiter(s) IntraMuscular once  insulin regular Infusion 5 Unit(s)/Hr (5 mL/Hr) IV Continuous <Continuous>  lidocaine   4% Patch 1 Patch Transdermal daily  lidocaine   4% Patch 1 Patch Transdermal every 24 hours  meperidine     Injectable 25 milliGRAM(s) IV Push once  milrinone Infusion 0.125 MICROgram(s)/kG/Min (4.28 mL/Hr) IV Continuous <Continuous>  mupirocin 2% Nasal 1 Application(s) Both Nostrils every 12 hours  pantoprazole    Tablet 40 milliGRAM(s) Oral before breakfast  polyethylene glycol 3350 17 Gram(s) Oral daily  rosuvastatin 20 milliGRAM(s) Oral at bedtime  senna 2 Tablet(s) Oral at bedtime  sodium chloride 0.9%. 1000 milliLiter(s) (20 mL/Hr) IV Continuous <Continuous>    MEDICATIONS  (PRN):  ALPRAZolam 0.25 milliGRAM(s) Oral at bedtime PRN anxiety  ondansetron Injectable 4 milliGRAM(s) IV Push every 4 hours PRN Nausea and/or Vomiting  oxyCODONE    IR 5 milliGRAM(s) Oral every 4 hours PRN Moderate Pain (4 - 6)  oxyCODONE    IR 10 milliGRAM(s) Oral every 4 hours PRN Severe Pain (7 - 10)    HEPARIN:  [] YES [] NO  Dose: XX UNITS/HR UNITS Q8H  LOVENOX:[] YES [] NO  Dose: XX mg Q24H  COUMADIN: []  YES [] NO  Dose: XX mg  Q24H  SCD's: YES b/l  GI Prophylaxis: Protonix [], Pepcid [], None [], (Contra-indication:.....)    Post-Op Beta-Blockers: Yes [], No[], If No, then contraindication:  Post-Op Aspirin: Yes [],  No [], If No, then contraindication:  Post-Op Statin: Yes [], No[], If No, then contraindication:  Allergies    No Known Allergies    Intolerances      Ambulation/Activity Status:    Assessment/Plan:  64y Male status-post .....  - Case and plan discussed with CTU Intensivist and CT Surgeon - Dr. Blakely/Mauri/Laron  - Continue CTU supportive care    - Continue DVT/GI prophylaxis  - Incentive Spirometry 10 times an hour  - Continue to advance physical activity as tolerated and continue PT/OT as directed  1. CAD: Continue ASA, statin, BB  2. HTN:   3. A. Fib:   4. COPD/Hypoxia:   5. DM/Glucose Control:     Social Service Disposition:     OPERATIVE PROCEDURE(s):   MVR, SHEILA ligation, MAZE             POD #       5                64yMale  SURGEON(s): ISELA Blakely  SUBJECTIVE ASSESSMENT: pt seen and examined. no acute complaints at this time   Vital Signs Last 24 Hrs  T(F): 98.1 (02 Feb 2025 04:00), Max: 98.8 (01 Feb 2025 20:00)  HR: 97 (02 Feb 2025 08:00) (50 - 100)  BP: 111/59 (01 Feb 2025 20:00) (111/59 - 111/59)  BP(mean): 80 (01 Feb 2025 20:00) (80 - 80)  ABP: 136/52 (02 Feb 2025 08:00) (91/44 - 183/64)  ABP(mean): 73 (02 Feb 2025 08:00)  RR: 23 (02 Feb 2025 08:00) (10 - 34)  SpO2: 95% (02 Feb 2025 08:00) (90% - 100%)      I&O's Detail    01 Feb 2025 07:01  -  02 Feb 2025 07:00  --------------------------------------------------------  IN:    Bumetanide: 30 mL    DOBUTamine: 288 mL    Insulin: 88 mL    IV PiggyBack: 100 mL    Milrinone: 103.2 mL    sodium chloride 0.9%: 240 mL  Total IN: 849.2 mL    OUT:    Chest Tube (mL): 140 mL    Chest Tube (mL): 80 mL    Indwelling Catheter - Urethral (mL): 2520 mL  Total OUT: 2740 mL        Net:   I&O's Detail    31 Jan 2025 07:01  -  01 Feb 2025 07:00  --------------------------------------------------------  Total NET: -747.6 mL      01 Feb 2025 07:01  -  02 Feb 2025 07:00  --------------------------------------------------------  Total NET: -1890.8 mL        CAPILLARY BLOOD GLUCOSE      POCT Blood Glucose.: 119 mg/dL (02 Feb 2025 08:08)  POCT Blood Glucose.: 137 mg/dL (02 Feb 2025 06:54)  POCT Blood Glucose.: 132 mg/dL (02 Feb 2025 04:25)  POCT Blood Glucose.: 109 mg/dL (02 Feb 2025 02:58)  POCT Blood Glucose.: 70 mg/dL (02 Feb 2025 01:43)  POCT Blood Glucose.: 113 mg/dL (01 Feb 2025 23:56)  POCT Blood Glucose.: 124 mg/dL (01 Feb 2025 23:21)  POCT Blood Glucose.: 156 mg/dL (01 Feb 2025 21:00)  POCT Blood Glucose.: 178 mg/dL (01 Feb 2025 19:01)  POCT Blood Glucose.: 170 mg/dL (01 Feb 2025 18:28)  POCT Blood Glucose.: 119 mg/dL (01 Feb 2025 17:30)  POCT Blood Glucose.: 64 mg/dL (01 Feb 2025 16:29)  POCT Blood Glucose.: 113 mg/dL (01 Feb 2025 12:15)  POCT Blood Glucose.: 120 mg/dL (01 Feb 2025 10:10)    Physical Exam:  General: NAD; A&Ox3, no focal deficits, clear speech   Cardiac: S1/S2, RRR, no murmur, no rubs  Lungs: unlabored respirations, CTA b/l, no wheeze, no rales, no crackles  Abdomen: Soft/NT/ND; positive bowel sounds x 4  Sternum: Intact, no click, incision healing well with no drainage  Incisions: Incisions clean/dry/intact  Extremities: No edema b/l lower extremities; good capillary refill; no cyanosis; palpable 1+ pedal pulses b/l    Central Venous Catheter: Yes[x]  No[] , If Yes indication:   critical        Day # 5  Caceres Catheter: Yes  [x] , No  [] , If yes indication:            strict i/o           Day # 5  EPICARDIAL WIRES:  [x] YES [] NO                                              Day # 5  BOWEL MOVEMENT:  [] YES [x] NO, If No, Timing since last BM:      Day #  CHEST TUBE(Left/Right):  [x] YES [] NO, If yes -  AIR LEAKS:  [] YES [] NO     LABS:                        7.7[L]  8.46  )-----------( 115[L]    ( 02 Feb 2025 01:20 )             24.6[L]                        8.1[L]  9.38  )-----------( 102[L]    ( 01 Feb 2025 14:09 )             25.8[L]    02-02    134[L]  |  98  |  81[HH]  ----------------------------<  80  4.3   |  23  |  2.8[H]  02-01    135  |  97[L]  |  77[HH]  ----------------------------<  86  4.5   |  20  |  2.9[H]    Ca    7.9[L]      02 Feb 2025 01:20  Phos  5.5     01-31  Mg     2.4     02-02    TPro  5.6[L] [6.0 - 8.0]  /  Alb  3.5 [3.5 - 5.2]  /  TBili  0.5 [0.2 - 1.2]  /  DBili  x   /  AST  42[H] [0 - 41]  /  ALT  20 [0 - 41]  /  AlkPhos  248[H] [30 - 115]  02-02      Urinalysis Basic - ( 02 Feb 2025 01:20 )    Color: x / Appearance: x / SG: x / pH: x  Gluc: 80 mg/dL / Ketone: x  / Bili: x / Urobili: x   Blood: x / Protein: x / Nitrite: x   Leuk Esterase: x / RBC: x / WBC x   Sq Epi: x / Non Sq Epi: x / Bacteria: x      ABG - ( 02 Feb 2025 05:17 )  pH: 7.30  /  pCO2: 47    /  pO2: 103   / HCO3: 23    / Base Excess: -3.6  /  SaO2: 98.3  /  LA: 0.7          RADIOLOGY & ADDITIONAL TESTS:  CXR: < from: Xray Chest 1 View- PORTABLE-Urgent (Xray Chest 1 View- PORTABLE-Urgent .) (02.02.25 @ 13:53) >  impression:    Support devices: Interval removal of bilateral chest tubes. Unchanged   right IJ vascular sheath    Cardiac/mediastinum/hilum: No significant change    Skeleton/soft tissues: No significant change.    Lung parenchyma/Pleura: Stable bilateral opacities. No definite   pneumothorax.    < end of copied text >    EKG: e< from: 12 Lead ECG (02.02.25 @ 08:10) >    Ventricular Rate 86 BPM    QRS Duration 92 ms    Q-T Interval 366 ms    QTC Calculation(Bazett) 437 ms    R Axis -45 degrees    T Axis 102 degrees    Diagnosis Line Atrial fibrillation  Inferior infarct , age undetermined  Poor R wave progression  ST & T wave abnormality, consider lateral ischemia  Abnormal ECG    < end of copied text >    MEDICATIONS  (STANDING):  albuterol/ipratropium for Nebulization 3 milliLiter(s) Nebulizer every 6 hours  aspirin enteric coated 81 milliGRAM(s) Oral daily  bisacodyl Suppository 10 milliGRAM(s) Rectal once  chlorhexidine 2% Cloths 1 Application(s) Topical daily  dextrose 50% Injectable 50 milliLiter(s) IV Push every 15 minutes  dextrose 50% Injectable 25 milliLiter(s) IV Push every 15 minutes  DOBUTamine Infusion 3.5 MICROgram(s)/kG/Min (12 mL/Hr) IV Continuous <Continuous>  ferrous    sulfate 325 milliGRAM(s) Oral daily  fluticasone propionate/ salmeterol 250-50 MICROgram(s) Diskus 1 Dose(s) Inhalation two times a day  folic acid 1 milliGRAM(s) Oral daily  gabapentin 300 milliGRAM(s) Oral every 8 hours  guaiFENesin  milliGRAM(s) Oral every 12 hours  heparin   Injectable 5000 Unit(s) SubCutaneous every 8 hours  influenza  Vaccine (HIGH DOSE) 0.5 milliLiter(s) IntraMuscular once  insulin regular Infusion 5 Unit(s)/Hr (5 mL/Hr) IV Continuous <Continuous>  lidocaine   4% Patch 1 Patch Transdermal daily  lidocaine   4% Patch 1 Patch Transdermal every 24 hours  meperidine     Injectable 25 milliGRAM(s) IV Push once  milrinone Infusion 0.125 MICROgram(s)/kG/Min (4.28 mL/Hr) IV Continuous <Continuous>  mupirocin 2% Nasal 1 Application(s) Both Nostrils every 12 hours  pantoprazole    Tablet 40 milliGRAM(s) Oral before breakfast  polyethylene glycol 3350 17 Gram(s) Oral daily  rosuvastatin 20 milliGRAM(s) Oral at bedtime  senna 2 Tablet(s) Oral at bedtime  sodium chloride 0.9%. 1000 milliLiter(s) (20 mL/Hr) IV Continuous <Continuous>    MEDICATIONS  (PRN):  ALPRAZolam 0.25 milliGRAM(s) Oral at bedtime PRN anxiety  ondansetron Injectable 4 milliGRAM(s) IV Push every 4 hours PRN Nausea and/or Vomiting  oxyCODONE    IR 5 milliGRAM(s) Oral every 4 hours PRN Moderate Pain (4 - 6)  oxyCODONE    IR 10 milliGRAM(s) Oral every 4 hours PRN Severe Pain (7 - 10)    HEPARIN:  [x] YES [] NO  Dose: 5000 UNITS Q8H    SCD's: YES b/l  GI Prophylaxis: Protonix [x], Pepcid [], None [], (Contra-indication:.....)    Post-Op Beta-Blockers: Yes [], No[x], If No, then contraindication: junctional bradycardia  Post-Op Aspirin: Yes [x],  No [], If No, then contraindication:  Post-Op Statin: Yes [x], No[]      Allergies    No Known Allergies    Intolerances      Ambulation/Activity Status: ambulate     Assessment/Plan:  64y Male status-post MVR / SHEILA ligation and MAZE POD # 5  - Case and plan discussed with CTU Intensivist and CT Surgeon - Dr. Blakely/Mauri/Laron  - Continue CTU supportive care    - Continue DVT/GI prophylaxis  - Incentive Spirometry 10 times an hour  - Continue to advance physical activity as tolerated and continue PT/OT as directed  1. Continue ASA, statin, no bb for junctional bradycardia, pain control, d/c ct's  2.madelung disease: cont bipap, respiratory tx's  3. A. Fib prophylaxis: cont to monitor electrolytes  4. junctional bradycardia/wenckebach - ep consult for poss ppm, npo after midnight    5. DM/Glucose Control: d/c insulin gtt, start lantus 30/humalog 10 and sliding scale   6. bumex PRN for noncardiogenic fluid overload   7. franklin- renal consult- diuresis PRN, avoid hypotension     Social Service Disposition:  pt to assess

## 2025-02-02 NOTE — CONSULT NOTE ADULT - SUBJECTIVE AND OBJECTIVE BOX
NEPHROLOGY CONSULTATION NOTE    JAZ GAVIN  64y  Male  MRN-[id number]    CC:   Patient is a 64y old  Male who presents with a chief complaint of mitral regurgitation (29 Jan 2025 11:11)      HPI:  64 year old  male presenting to PAST in preparation for MVP,LEFT ATRIAL APPENDAGE CLOSURE, MAZE   on 1/28/25   under  general anesthesia by Dr. Blakely .  . Pt had c/o SOB in Fall 2024;  He was followed by cardiology; echo showed severe MR  and referred for procedure.    PMHX: of HTN, HLD, obesity, JIMMY (uses CPAP), ,DM (A1C 7.5), BPH, asthma and osteoarthritis (s/p b/l hip replacement). Symptoms include current SOB and b/l LE edema. Can walk < 1 city block before getting short of breath and needing to sit to relieve symptoms.  Per pt,  SOB has been progressively getting worse over the past year. Denies ever having palpitations despite being recently admitted for Afib RVR.  He was evaluated for above surgery.  Underwent echo/ JASMINA, cardiac cath (non obstructive CAD).    PAST MEDICAL & SURGICAL HISTORY:  Diabetes  20 yrs      HTN (hypertension)      Obstructive sleep apnea on CPAP      Asthma      Obesity      Peripheral neuropathy  related to diabetes      Arthritis      Hypercholesteremia      Madelung's disease      FH: mitral regurgitation      CHF NYHA class III      Atrial fibrillation      JIMMY on CPAP      H/O lipoma  times 2-3      History of hip replacement, total, bilateral  two separate times '17, '07      Fracture of orbital floor      Encounter for screening colonoscopy      History of neck surgery        Allergies:  No Known Allergies    Home Medications Reviewed  Hospital Medications:   MEDICATIONS  (STANDING):  albuterol/ipratropium for Nebulization 3 milliLiter(s) Nebulizer every 6 hours  aspirin enteric coated 81 milliGRAM(s) Oral daily  bisacodyl Suppository 10 milliGRAM(s) Rectal once  chlorhexidine 2% Cloths 1 Application(s) Topical daily  dextrose 50% Injectable 50 milliLiter(s) IV Push every 15 minutes  dextrose 50% Injectable 25 milliLiter(s) IV Push every 15 minutes  DOBUTamine Infusion 3.5 MICROgram(s)/kG/Min (12 mL/Hr) IV Continuous <Continuous>  ferrous    sulfate 325 milliGRAM(s) Oral daily  fluticasone propionate/ salmeterol 250-50 MICROgram(s) Diskus 1 Dose(s) Inhalation two times a day  folic acid 1 milliGRAM(s) Oral daily  gabapentin 300 milliGRAM(s) Oral every 8 hours  guaiFENesin  milliGRAM(s) Oral every 12 hours  heparin   Injectable 5000 Unit(s) SubCutaneous every 8 hours  influenza  Vaccine (HIGH DOSE) 0.5 milliLiter(s) IntraMuscular once  insulin regular Infusion 5 Unit(s)/Hr (5 mL/Hr) IV Continuous <Continuous>  lidocaine   4% Patch 1 Patch Transdermal daily  lidocaine   4% Patch 1 Patch Transdermal every 24 hours  meperidine     Injectable 25 milliGRAM(s) IV Push once  milrinone Infusion 0.125 MICROgram(s)/kG/Min (4.28 mL/Hr) IV Continuous <Continuous>  mupirocin 2% Nasal 1 Application(s) Both Nostrils every 12 hours  pantoprazole    Tablet 40 milliGRAM(s) Oral before breakfast  polyethylene glycol 3350 17 Gram(s) Oral daily  rosuvastatin 20 milliGRAM(s) Oral at bedtime  senna 2 Tablet(s) Oral at bedtime  sodium chloride 0.9%. 1000 milliLiter(s) (20 mL/Hr) IV Continuous <Continuous>    MEDICATIONS  (PRN):  ALPRAZolam 0.25 milliGRAM(s) Oral at bedtime PRN anxiety  ondansetron Injectable 4 milliGRAM(s) IV Push every 4 hours PRN Nausea and/or Vomiting  oxyCODONE    IR 5 milliGRAM(s) Oral every 4 hours PRN Moderate Pain (4 - 6)  oxyCODONE    IR 10 milliGRAM(s) Oral every 4 hours PRN Severe Pain (7 - 10)    Home Medications:  Breo Ellipta 200 mcg-25 mcg/inh inhalation powder: 1 inhaled once a day (28 Jan 2025 06:52)  HYDROcodone 10 mg oral capsule, extended release: 1 cap(s) orally 2 times a day (28 Jan 2025 06:52)  losartan 50 mg oral tablet: 1 tab(s) orally 2 times a day (28 Jan 2025 06:52)  losartan-hydrochlorothiazide 50 mg-12.5 mg oral tablet: 1 tab(s) orally (28 Jan 2025 06:52)  ProAir HFA 90 mcg/inh inhalation aerosol: 2 puff(s) inhaled 2 times a day (28 Jan 2025 06:52)  tujeo: 80 unit(s) subcutaneous once a day (28 Jan 2025 06:52)      SOCIAL HISTORY:  Social History:      FAMILY HISTORY:  Family history of diabetes mellitus (Mother, Sibling)    CAD (coronary artery disease) (Mother, Father)        REVIEW OF SYSTEMS:  All other review of systems is negative unless indicated above.    VITALS:  T(F): 98.1 (02-02-25 @ 04:00), Max: 98.8 (02-01-25 @ 20:00)  HR: 97 (02-02-25 @ 04:00)  BP: 111/59 (02-01-25 @ 20:00)  RR: 14 (02-02-25 @ 04:00)  SpO2: 97% (02-02-25 @ 04:00)      I&O's Detail    31 Jan 2025 07:01  -  01 Feb 2025 07:00  --------------------------------------------------------  IN:    DOBUTamine: 115.2 mL    DOBUTamine: 84 mL    Insulin: 132 mL    IV PiggyBack: 300 mL    Milrinone: 103.2 mL    PRBCs (Packed Red Blood Cells): 333 mL    sodium chloride 0.9%: 240 mL  Total IN: 1307.4 mL    OUT:    Chest Tube (mL): 55 mL    Chest Tube (mL): 500 mL    Indwelling Catheter - Urethral (mL): 1500 mL  Total OUT: 2055 mL    Total NET: -747.6 mL      01 Feb 2025 07:01  -  02 Feb 2025 05:39  --------------------------------------------------------  IN:    Bumetanide: 30 mL    DOBUTamine: 252 mL    Insulin: 81 mL    IV PiggyBack: 100 mL    Milrinone: 90.3 mL    sodium chloride 0.9%: 210 mL  Total IN: 763.3 mL    OUT:    Chest Tube (mL): 110 mL    Chest Tube (mL): 70 mL    Indwelling Catheter - Urethral (mL): 2370 mL  Total OUT: 2550 mL    Total NET: -1786.7 mL            I&O's Summary    31 Jan 2025 07:01  -  01 Feb 2025 07:00  --------------------------------------------------------  IN: 1307.4 mL / OUT: 2055 mL / NET: -747.6 mL    01 Feb 2025 07:01  -  02 Feb 2025 05:39  --------------------------------------------------------  IN: 763.3 mL / OUT: 2550 mL / NET: -1786.7 mL        PHYSICAL EXAM:  Gen: NAD  resp: b/l breath sounds  abd: soft  ext: + edema    LABS:  ABG - ( 02 Feb 2025 05:17 )  pH, Arterial: 7.30  pH, Blood: x     /  pCO2: 47    /  pO2: 103   / HCO3: 23    / Base Excess: -3.6  /  SaO2: 98.3      02-02    134[L]  |  98  |  81[HH]  ----------------------------<  80  4.3   |  23  |  2.8[H]    Ca    7.9[L]      02 Feb 2025 01:20  Phos  5.5     01-31  Mg     2.4     02-02    TPro  5.6[L]  /  Alb  3.5  /  TBili  0.5  /  DBili      /  AST  42[H]  /  ALT  20  /  AlkPhos  248[H]  02-02    Creatinine Trend:   Creatinine: 2.8 mg/dL (02-02-25 @ 01:20)  Creatinine: 2.9 mg/dL (02-01-25 @ 14:09)  Creatinine: 3.1 mg/dL (02-01-25 @ 01:55)  Creatinine: 2.9 mg/dL (01-31-25 @ 17:46)  Creatinine: 2.7 mg/dL (01-31-25 @ 11:36)  Creatinine: 2.5 mg/dL (01-31-25 @ 02:11)  Creatinine: 2.0 mg/dL (01-30-25 @ 02:35)  Creatinine: 1.6 mg/dL (01-29-25 @ 13:18)                            7.7    8.46  )-----------( 115      ( 02 Feb 2025 01:20 )             24.6     Mean Cell Volume: 86.0 fL (02-02-25 @ 01:20)    Urine Studies:  Protein, Urine: 100 mg/dL (01-23-25 @ 14:20)  White Blood Cell - Urine: 1 /HPF (01-23-25 @ 14:20)  Red Blood Cell - Urine: 0 /HPF (01-23-25 @ 14:20)        RADIOLOGY & ADDITIONAL STUDIES:    US Kidney and Bladder:   ACC: [id number] EXAM:  US KIDNEYS AND BLADDER   ORDERED BY: DAE VICENTE     PROCEDURE DATE:  12/06/2024          INTERPRETATION:  CLINICAL INFORMATION: Acute kidney injury    COMPARISON: CT abdomen pelvis from 12/4/2024.    TECHNIQUE: Sonography ofthe kidneys and bladder.    FINDINGS:  Right kidney: 12.2 cm. No hydronephrosis    Left kidney: 12.6 cm. No hydronephrosis    Urinary bladder: Bladder wall thickening. Bladder volume of 331 mL.   Patient did not have urge to void at time of exam.    IMPRESSION:  No hydronephrosis.    Bladder wall thickening. Correlate with urinalysis.    --- End of Report ---          ROME BRENNAN MD; Resident Radiologist  This document has been electronically signed.  JESUS CAMACHO MD; Attending Radiologist  This document has been electronically signed. Dec  6 2024 10:21AM (12-06-24 @ 07:01)      Xray Chest 1 View- PORTABLE-Routine:   ACC: [id number] EXAM:  XR CHEST PORTABLE ROUTINE 1V   ORDERED BY: ANMOL MENDEZ     PROCEDURE DATE:  02/01/2025          INTERPRETATION:  CLINICAL HISTORY: Postop    COMPARISON: January 31, 2025    TECHNIQUE: Portable frontal chest radiograph.    FINDINGS:    Support devices: Satisfactory positioning.    Cardiac/mediastinum/hilum: Cardiomegaly, status post median sternotomy,   left atrial appendage clip, mitral valve replacement..    Lung parenchyma/Pleura: Bilateral opacities/pleural effusions    Skeleton/soft tissues: Stable.      IMPRESSION:    Bilateral opacities/pleural effusions, decreased. Redemonstration   cardiomegaly.    --- End of Report ---            CHET MADRIGAL MD; Attending Radiologist  This document has been electronically signed. Feb 1 2025 10:12AM (02-01-25 @ 06:47)                     NEPHROLOGY CONSULTATION NOTE    JAZ GAVIN  64y  Male  MRN-[id number]    CC:   Patient is a 64y old  Male who presents with a chief complaint of mitral regurgitation (29 Jan 2025 11:11)      HPI:  64 year old  male presenting to PAST in preparation for MVP,LEFT ATRIAL APPENDAGE CLOSURE, MAZE   on 1/28/25   under  general anesthesia by Dr. Blakely .  . Pt had c/o SOB in Fall 2024;  He was followed by cardiology; echo showed severe MR  and referred for procedure.    PMHX: of HTN, HLD, obesity, JIMMY (uses CPAP), ,DM (A1C 7.5), BPH, asthma and osteoarthritis (s/p b/l hip replacement). Symptoms include current SOB and b/l LE edema. Can walk < 1 city block before getting short of breath and needing to sit to relieve symptoms.  Per pt,  SOB has been progressively getting worse over the past year. Denies ever having palpitations despite being recently admitted for Afib RVR.  He was evaluated for above surgery.  Underwent echo/ JASMINA, cardiac cath (non obstructive CAD).    PAST MEDICAL & SURGICAL HISTORY:  Diabetes  20 yrs      HTN (hypertension)      Obstructive sleep apnea on CPAP      Asthma      Obesity      Peripheral neuropathy  related to diabetes      Arthritis      Hypercholesteremia      Madelung's disease      FH: mitral regurgitation      CHF NYHA class III      Atrial fibrillation      JIMMY on CPAP      H/O lipoma  times 2-3      History of hip replacement, total, bilateral  two separate times '17, '07      Fracture of orbital floor      Encounter for screening colonoscopy      History of neck surgery        Allergies:  No Known Allergies    Home Medications Reviewed  Hospital Medications:   MEDICATIONS  (STANDING):  albuterol/ipratropium for Nebulization 3 milliLiter(s) Nebulizer every 6 hours  aspirin enteric coated 81 milliGRAM(s) Oral daily  bisacodyl Suppository 10 milliGRAM(s) Rectal once  chlorhexidine 2% Cloths 1 Application(s) Topical daily  dextrose 50% Injectable 50 milliLiter(s) IV Push every 15 minutes  dextrose 50% Injectable 25 milliLiter(s) IV Push every 15 minutes  DOBUTamine Infusion 3.5 MICROgram(s)/kG/Min (12 mL/Hr) IV Continuous <Continuous>  ferrous    sulfate 325 milliGRAM(s) Oral daily  fluticasone propionate/ salmeterol 250-50 MICROgram(s) Diskus 1 Dose(s) Inhalation two times a day  folic acid 1 milliGRAM(s) Oral daily  gabapentin 300 milliGRAM(s) Oral every 8 hours  guaiFENesin  milliGRAM(s) Oral every 12 hours  heparin   Injectable 5000 Unit(s) SubCutaneous every 8 hours  influenza  Vaccine (HIGH DOSE) 0.5 milliLiter(s) IntraMuscular once  insulin regular Infusion 5 Unit(s)/Hr (5 mL/Hr) IV Continuous <Continuous>  lidocaine   4% Patch 1 Patch Transdermal daily  lidocaine   4% Patch 1 Patch Transdermal every 24 hours  meperidine     Injectable 25 milliGRAM(s) IV Push once  milrinone Infusion 0.125 MICROgram(s)/kG/Min (4.28 mL/Hr) IV Continuous <Continuous>  mupirocin 2% Nasal 1 Application(s) Both Nostrils every 12 hours  pantoprazole    Tablet 40 milliGRAM(s) Oral before breakfast  polyethylene glycol 3350 17 Gram(s) Oral daily  rosuvastatin 20 milliGRAM(s) Oral at bedtime  senna 2 Tablet(s) Oral at bedtime  sodium chloride 0.9%. 1000 milliLiter(s) (20 mL/Hr) IV Continuous <Continuous>    MEDICATIONS  (PRN):  ALPRAZolam 0.25 milliGRAM(s) Oral at bedtime PRN anxiety  ondansetron Injectable 4 milliGRAM(s) IV Push every 4 hours PRN Nausea and/or Vomiting  oxyCODONE    IR 5 milliGRAM(s) Oral every 4 hours PRN Moderate Pain (4 - 6)  oxyCODONE    IR 10 milliGRAM(s) Oral every 4 hours PRN Severe Pain (7 - 10)    Home Medications:  Breo Ellipta 200 mcg-25 mcg/inh inhalation powder: 1 inhaled once a day (28 Jan 2025 06:52)  HYDROcodone 10 mg oral capsule, extended release: 1 cap(s) orally 2 times a day (28 Jan 2025 06:52)  losartan 50 mg oral tablet: 1 tab(s) orally 2 times a day (28 Jan 2025 06:52)  losartan-hydrochlorothiazide 50 mg-12.5 mg oral tablet: 1 tab(s) orally (28 Jan 2025 06:52)  ProAir HFA 90 mcg/inh inhalation aerosol: 2 puff(s) inhaled 2 times a day (28 Jan 2025 06:52)  tujeo: 80 unit(s) subcutaneous once a day (28 Jan 2025 06:52)      SOCIAL HISTORY:  Social History:      FAMILY HISTORY:  Family history of diabetes mellitus (Mother, Sibling)    CAD (coronary artery disease) (Mother, Father)        REVIEW OF SYSTEMS:  All other review of systems is negative unless indicated above.    VITALS:  T(F): 98.1 (02-02-25 @ 04:00), Max: 98.8 (02-01-25 @ 20:00)  HR: 97 (02-02-25 @ 04:00)  BP: 111/59 (02-01-25 @ 20:00)  RR: 14 (02-02-25 @ 04:00)  SpO2: 97% (02-02-25 @ 04:00)      I&O's Detail    31 Jan 2025 07:01  -  01 Feb 2025 07:00  --------------------------------------------------------  IN:    DOBUTamine: 115.2 mL    DOBUTamine: 84 mL    Insulin: 132 mL    IV PiggyBack: 300 mL    Milrinone: 103.2 mL    PRBCs (Packed Red Blood Cells): 333 mL    sodium chloride 0.9%: 240 mL  Total IN: 1307.4 mL    OUT:    Chest Tube (mL): 55 mL    Chest Tube (mL): 500 mL    Indwelling Catheter - Urethral (mL): 1500 mL  Total OUT: 2055 mL    Total NET: -747.6 mL      01 Feb 2025 07:01  -  02 Feb 2025 05:39  --------------------------------------------------------  IN:    Bumetanide: 30 mL    DOBUTamine: 252 mL    Insulin: 81 mL    IV PiggyBack: 100 mL    Milrinone: 90.3 mL    sodium chloride 0.9%: 210 mL  Total IN: 763.3 mL    OUT:    Chest Tube (mL): 110 mL    Chest Tube (mL): 70 mL    Indwelling Catheter - Urethral (mL): 2370 mL  Total OUT: 2550 mL    Total NET: -1786.7 mL            I&O's Summary    31 Jan 2025 07:01  -  01 Feb 2025 07:00  --------------------------------------------------------  IN: 1307.4 mL / OUT: 2055 mL / NET: -747.6 mL    01 Feb 2025 07:01  -  02 Feb 2025 05:39  --------------------------------------------------------  IN: 763.3 mL / OUT: 2550 mL / NET: -1786.7 mL        PHYSICAL EXAM:  Gen: NAD  resp: b/l breath sounds / chest tubes  abd: soft  ext: mild edema/ compression socks    LABS:  ABG - ( 02 Feb 2025 05:17 )  pH, Arterial: 7.30  pH, Blood: x     /  pCO2: 47    /  pO2: 103   / HCO3: 23    / Base Excess: -3.6  /  SaO2: 98.3      02-02    134[L]  |  98  |  81[HH]  ----------------------------<  80  4.3   |  23  |  2.8[H]    Ca    7.9[L]      02 Feb 2025 01:20  Phos  5.5     01-31  Mg     2.4     02-02    TPro  5.6[L]  /  Alb  3.5  /  TBili  0.5  /  DBili      /  AST  42[H]  /  ALT  20  /  AlkPhos  248[H]  02-02    Creatinine Trend:   Creatinine: 2.8 mg/dL (02-02-25 @ 01:20)  Creatinine: 2.9 mg/dL (02-01-25 @ 14:09)  Creatinine: 3.1 mg/dL (02-01-25 @ 01:55)  Creatinine: 2.9 mg/dL (01-31-25 @ 17:46)  Creatinine: 2.7 mg/dL (01-31-25 @ 11:36)  Creatinine: 2.5 mg/dL (01-31-25 @ 02:11)  Creatinine: 2.0 mg/dL (01-30-25 @ 02:35)  Creatinine: 1.6 mg/dL (01-29-25 @ 13:18)                            7.7    8.46  )-----------( 115      ( 02 Feb 2025 01:20 )             24.6     Mean Cell Volume: 86.0 fL (02-02-25 @ 01:20)    Urine Studies:  Protein, Urine: 100 mg/dL (01-23-25 @ 14:20)  White Blood Cell - Urine: 1 /HPF (01-23-25 @ 14:20)  Red Blood Cell - Urine: 0 /HPF (01-23-25 @ 14:20)        RADIOLOGY & ADDITIONAL STUDIES:    US Kidney and Bladder:   ACC: [id number] EXAM:  US KIDNEYS AND BLADDER   ORDERED BY: DAE VICENTE     PROCEDURE DATE:  12/06/2024          INTERPRETATION:  CLINICAL INFORMATION: Acute kidney injury    COMPARISON: CT abdomen pelvis from 12/4/2024.    TECHNIQUE: Sonography ofthe kidneys and bladder.    FINDINGS:  Right kidney: 12.2 cm. No hydronephrosis    Left kidney: 12.6 cm. No hydronephrosis    Urinary bladder: Bladder wall thickening. Bladder volume of 331 mL.   Patient did not have urge to void at time of exam.    IMPRESSION:  No hydronephrosis.    Bladder wall thickening. Correlate with urinalysis.    --- End of Report ---          ROME BRENNAN MD; Resident Radiologist  This document has been electronically signed.  JESUS CAMACHO MD; Attending Radiologist  This document has been electronically signed. Dec  6 2024 10:21AM (12-06-24 @ 07:01)      Xray Chest 1 View- PORTABLE-Routine:   ACC: [id number] EXAM:  XR CHEST PORTABLE ROUTINE 1V   ORDERED BY: ANMOL MENDEZ     PROCEDURE DATE:  02/01/2025          INTERPRETATION:  CLINICAL HISTORY: Postop    COMPARISON: January 31, 2025    TECHNIQUE: Portable frontal chest radiograph.    FINDINGS:    Support devices: Satisfactory positioning.    Cardiac/mediastinum/hilum: Cardiomegaly, status post median sternotomy,   left atrial appendage clip, mitral valve replacement..    Lung parenchyma/Pleura: Bilateral opacities/pleural effusions    Skeleton/soft tissues: Stable.      IMPRESSION:    Bilateral opacities/pleural effusions, decreased. Redemonstration   cardiomegaly.    --- End of Report ---            CHET MADRIGAL MD; Attending Radiologist  This document has been electronically signed. Feb 1 2025 10:12AM (02-01-25 @ 06:47)

## 2025-02-02 NOTE — PROGRESS NOTE ADULT - ASSESSMENT
Assessment  64M s/p MVR, LAAC, MAZE on 1/28  Post op complicated by acute pulmonary insufficiency, FLAKITO, cardiogenic shock req inotropes    Plan:    Neuro:  Multimodal pain management - no toradol  Tylenol, Lidoderm patch, gabapentin, opiates as needed  Monitor neuro status in the post op period    CV:  S/P MVR, LAAC, MAZE  Monitor perfusion, , lactate, UOP, index and MVO2 if available  Maintain MAP > 65  On milrinone 0.125  Dobutamine added for low HR and FLAKITO - decreased to 2.5 today  EP consulted for bradycardia - abdi - NPO for possible PPM  Chest tube removed    Resp:  Supplemental oxygen to maintain sats > 92%  Continuous pulse oximetry monitoring  BiPAP at night  NC during the day  IS / Chest PT  OOBTC and Ambulate  Nebulizers as needed - albuterol/atrovent  Avdair    GI:  Heart healthy diet  Bowel Regimen - escalate as needed  PUD ppx per protocol    Renal  FLAKITO post surgery  Creat up to 3.1 now downtrending  Baseline CKD  Monitor UOP, lytes, creatinine  Holding diuretics today  25% albumin Q6hrs  Keep K > 4, Mg > 2    Endo:  Tight glucose control per CTICU protocol  Lantus 30 / premeal 10  SSI    Heme:  Acute blood loss anemia post surgery  High risk for thrombocytopenia  DVT ppx with HSQ  Iron / FA / MVI    ID:  Perioperative prophylactic abx per protocol  Monitor fever curve and WBC count  Remove TLC when no longer indicated    Upon my evaluation, the above patient has a high probability of imminent or life threatening deterioration due to a high risk for Shock, Cardiogenic shock, arrythmia, acute blood loss, acute kidney injury and acute pulmonary insufficiency which required my direct attention, intervention, and personal management.  Total critical care time indicated in the note includes review of radiology results, ordering, interpreting and reviewing diagnostic studies / lab tests with immediate assessment and treatment, consultant collaboration on findings and treatment options, monitoring for potential decompensation, obtaining history from family, EMT, nursing home staff and/or treating physicians; directing the titration of pressors, oxygen support devices, fluid resuscitation, interpretation of cardiac output measurements, interpretation of radiological assessments, interpretation of EKG / rhythm strips, telemetry.  This time did not overlap with the management of other patients or performing separately reportable procedures.      Management of this patient was discussed with the CT Surgery/Thoracic surgery/Structural Cardiology attending and mid-level providers.    I acknowledge the use of copied documentation.  All copy forward documentation is my own and has been reviewed and revised as appropriate.  If no changes were made, it is because that information remains the same.

## 2025-02-02 NOTE — PROGRESS NOTE ADULT - SUBJECTIVE AND OBJECTIVE BOX
CTU Attending Progress Daily Note       Procedure: MV Replacement (bioprosthetic), SHEILA ligation, MAZE  Procedure Date: 1/28/2025    HPI: 64M w/ afib, nonobstructive CAD, CHF class 3, severe MR, DM, HTN, JIMMY on CPAP, asthma, severe obesity, HL, Madelung's disease, osteoarthritis s/p bilateral hip replacement, peripheral neuropathy presented with increasing dyspnea with exertion found to have severe MR.  He was evaluated for mitral clip and not a candidate.  On prior admission he had FLAKITO with creat up to 2.  Was optimized with diuresis.  He was on AC and developed hemorrhoidal bleeding and was taken off AC.  He was discharged and returned for his procedure on 1/28    OR Data  Procedure: Procedure: MVReplacement (bioprosthetic), SHEILA ligation, MAZE  Bypass: 202  Cross Clamp: 158  Pre LV Fxn: 50-55%      RV Fxn: normal  Post LV Fxn: 45-50%     RV Fxn: normal    moderate TR, MV gradient 3mmHg  Blood Products: 1uPRBC, ddavp 39mcq  Cell Saver: 698  Fluids: NS 2000, albumin 2000  Pacing Wires: V pacing. sinus rhythm  UO: 1100    1/28 - Fany and poor oxygenation in OR. Milrinone, norepi, vaso. Extubation in evening.  1/29 - start aspirin, diuresis, heparin dvt prophylaxis  1/30- d/c pleural ct if drainage dec; wean to dc primacor; wean high flow o2  1/31 - FLAKITO worsening, bradyarrythmias - wenchebach - ep consulted  2/2 - Chest tube removed, ambulating, creat improving, dobutamine weaned    OBJECTIVE:  ICU Vital Signs Last 24 Hrs  T(C): 36.7 (02 Feb 2025 04:00), Max: 37.1 (01 Feb 2025 20:00)  T(F): 98.1 (02 Feb 2025 04:00), Max: 98.8 (01 Feb 2025 20:00)  HR: 97 (02 Feb 2025 17:00) (50 - 100)  BP: 111/59 (01 Feb 2025 20:00) (111/59 - 111/59)  BP(mean): 80 (01 Feb 2025 20:00) (80 - 80)  ABP: 118/53 (02 Feb 2025 17:00) (91/44 - 170/60)  ABP(mean): 77 (02 Feb 2025 17:00) (55 - 118)  RR: 23 (02 Feb 2025 17:00) (10 - 42)  SpO2: 96% (02 Feb 2025 17:00) (95% - 99%)    O2 Parameters below as of 02 Feb 2025 17:00  Patient On (Oxygen Delivery Method): nasal cannula    O2 Concentration (%): 4      I&O's Summary    01 Feb 2025 07:01  -  02 Feb 2025 07:00  --------------------------------------------------------  IN: 849.2 mL / OUT: 2740 mL / NET: -1890.8 mL    02 Feb 2025 07:01  -  02 Feb 2025 18:11  --------------------------------------------------------  IN: 1311.4 mL / OUT: 965 mL / NET: 346.4 mL      I&O's Detail    01 Feb 2025 07:01  -  02 Feb 2025 07:00  --------------------------------------------------------  IN:    Bumetanide: 30 mL    DOBUTamine: 288 mL    Insulin: 88 mL    IV PiggyBack: 100 mL    Milrinone: 103.2 mL    sodium chloride 0.9%: 240 mL  Total IN: 849.2 mL    OUT:    Chest Tube (mL): 140 mL    Chest Tube (mL): 80 mL    Indwelling Catheter - Urethral (mL): 2520 mL  Total OUT: 2740 mL    Total NET: -1890.8 mL      02 Feb 2025 07:01  -  02 Feb 2025 18:11  --------------------------------------------------------  IN:    Albumin 25%  -  50 mL: 50 mL    DOBUTamine: 12 mL    DOBUTamine: 77.4 mL    Insulin: 9 mL    IV PiggyBack: 100 mL    IV PiggyBack: 200 mL    Milrinone: 43 mL    Oral Fluid: 720 mL    sodium chloride 0.9%: 100 mL  Total IN: 1311.4 mL    OUT:    Chest Tube (mL): 80 mL    Chest Tube (mL): 0 mL    Indwelling Catheter - Urethral (mL): 885 mL  Total OUT: 965 mL    Total NET: 346.4 mL        Adult Advanced Hemodynamics Last 24 Hrs  CVP(mm Hg): --  CVP(cm H2O): --  CO: --  CI: --  PA: --  PA(mean): --  PCWP: --  SVR: --  SVRI: --  PVR: --  PVRI: --    CAPILLARY BLOOD GLUCOSE      POCT Blood Glucose.: 268 mg/dL (02 Feb 2025 16:52)  POCT Blood Glucose.: 285 mg/dL (02 Feb 2025 11:18)  POCT Blood Glucose.: 183 mg/dL (02 Feb 2025 09:04)  POCT Blood Glucose.: 119 mg/dL (02 Feb 2025 08:08)  POCT Blood Glucose.: 137 mg/dL (02 Feb 2025 06:54)  POCT Blood Glucose.: 132 mg/dL (02 Feb 2025 04:25)  POCT Blood Glucose.: 109 mg/dL (02 Feb 2025 02:58)  POCT Blood Glucose.: 70 mg/dL (02 Feb 2025 01:43)  POCT Blood Glucose.: 113 mg/dL (01 Feb 2025 23:56)  POCT Blood Glucose.: 124 mg/dL (01 Feb 2025 23:21)  POCT Blood Glucose.: 156 mg/dL (01 Feb 2025 21:00)  POCT Blood Glucose.: 178 mg/dL (01 Feb 2025 19:01)  POCT Blood Glucose.: 170 mg/dL (01 Feb 2025 18:28)    LABS:  ABG - ( 02 Feb 2025 11:34 )  pH, Arterial: 7.33  pH, Blood: x     /  pCO2: 41    /  pO2: 101   / HCO3: 22    / Base Excess: -4.0  /  SaO2: 98.4                                    8.0    7.59  )-----------( 127      ( 02 Feb 2025 14:23 )             25.8     02-02    132[L]  |  95[L]  |  84[HH]  ----------------------------<  257[H]  4.9   |  21  |  2.5[H]    Ca    8.3[L]      02 Feb 2025 14:23  Mg     2.3     02-02    TPro  5.7[L]  /  Alb  3.5  /  TBili  0.6  /  DBili  x   /  AST  46[H]  /  ALT  22  /  AlkPhos  235[H]  02-02      Urinalysis Basic - ( 02 Feb 2025 14:23 )    Color: x / Appearance: x / SG: x / pH: x  Gluc: 257 mg/dL / Ketone: x  / Bili: x / Urobili: x   Blood: x / Protein: x / Nitrite: x   Leuk Esterase: x / RBC: x / WBC x   Sq Epi: x / Non Sq Epi: x / Bacteria: x        Home Medications:  Breo Ellipta 200 mcg-25 mcg/inh inhalation powder: 1 inhaled once a day (28 Jan 2025 06:52)  HYDROcodone 10 mg oral capsule, extended release: 1 cap(s) orally 2 times a day (28 Jan 2025 06:52)  losartan 50 mg oral tablet: 1 tab(s) orally 2 times a day (28 Jan 2025 06:52)  losartan-hydrochlorothiazide 50 mg-12.5 mg oral tablet: 1 tab(s) orally (28 Jan 2025 06:52)  ProAir HFA 90 mcg/inh inhalation aerosol: 2 puff(s) inhaled 2 times a day (28 Jan 2025 06:52)  tujeo: 80 unit(s) subcutaneous once a day (28 Jan 2025 06:52)    HOSPITAL MEDICATIONS:  MEDICATIONS  (STANDING):  albumin human 25% IVPB 50 milliLiter(s) IV Intermittent every 6 hours  albuterol/ipratropium for Nebulization 3 milliLiter(s) Nebulizer every 6 hours  aspirin enteric coated 81 milliGRAM(s) Oral daily  bisacodyl Suppository 10 milliGRAM(s) Rectal once  chlorhexidine 2% Cloths 1 Application(s) Topical daily  dextrose 5%. 1000 milliLiter(s) (50 mL/Hr) IV Continuous <Continuous>  dextrose 5%. 1000 milliLiter(s) (100 mL/Hr) IV Continuous <Continuous>  dextrose 50% Injectable 50 milliLiter(s) IV Push every 15 minutes  dextrose 50% Injectable 25 milliLiter(s) IV Push every 15 minutes  DOBUTamine Infusion 2.5 MICROgram(s)/kG/Min (8.55 mL/Hr) IV Continuous <Continuous>  fluticasone propionate/ salmeterol 250-50 MICROgram(s) Diskus 1 Dose(s) Inhalation two times a day  folic acid 1 milliGRAM(s) Oral daily  gabapentin 300 milliGRAM(s) Oral every 8 hours  glucagon  Injectable 1 milliGRAM(s) IntraMuscular once  guaiFENesin  milliGRAM(s) Oral every 12 hours  heparin   Injectable 5000 Unit(s) SubCutaneous every 8 hours  influenza  Vaccine (HIGH DOSE) 0.5 milliLiter(s) IntraMuscular once  insulin glargine Injectable (LANTUS) 30 Unit(s) SubCutaneous every morning  insulin lispro (ADMELOG) corrective regimen sliding scale   SubCutaneous three times a day before meals  insulin lispro (ADMELOG) corrective regimen sliding scale   SubCutaneous at bedtime  insulin lispro Injectable (ADMELOG) 10 Unit(s) SubCutaneous three times a day before meals  insulin regular Infusion 5 Unit(s)/Hr (5 mL/Hr) IV Continuous <Continuous>  iron sucrose IVPB 200 milliGRAM(s) IV Intermittent every 24 hours  lactulose Syrup 20 Gram(s) Oral daily  lidocaine   4% Patch 1 Patch Transdermal every 24 hours  lidocaine   4% Patch 1 Patch Transdermal daily  meperidine     Injectable 25 milliGRAM(s) IV Push once  milrinone Infusion 0.125 MICROgram(s)/kG/Min (4.28 mL/Hr) IV Continuous <Continuous>  mupirocin 2% Nasal 1 Application(s) Both Nostrils every 12 hours  pantoprazole    Tablet 40 milliGRAM(s) Oral before breakfast  polyethylene glycol 3350 17 Gram(s) Oral daily  rosuvastatin 20 milliGRAM(s) Oral at bedtime  senna 2 Tablet(s) Oral at bedtime  sodium chloride 0.9%. 1000 milliLiter(s) (20 mL/Hr) IV Continuous <Continuous>    MEDICATIONS  (PRN):  ALPRAZolam 0.25 milliGRAM(s) Oral at bedtime PRN anxiety  dextrose Oral Gel 15 Gram(s) Oral once PRN Blood Glucose LESS THAN 70 milliGRAM(s)/deciliter  ondansetron Injectable 4 milliGRAM(s) IV Push every 4 hours PRN Nausea and/or Vomiting  oxyCODONE    IR 5 milliGRAM(s) Oral every 4 hours PRN Moderate Pain (4 - 6)  oxyCODONE    IR 10 milliGRAM(s) Oral every 4 hours PRN Severe Pain (7 - 10)    RADIOLOGY:  Chest X-ray Reviewed    REVIEW OF SYSTEMS:  CONSTITUTIONAL: [X] all negative; [ ] weakness, [ ] fevers, [ ] chills  EYES/ENT: [X] all negative; [ ] visual changes, [ ] vertigo, [ ] throat pain   NECK: [X] all negative; [ ] pain, [ ] stiffness  RESPIRATORY: [] all negative, [ ] cough, [ ] wheezing, [ ] hemoptysis, [ ] shortness of breath  CARDIOVASCULAR: [] all negative; [ ] chest pain, [ ] palpitations, [ ] orthopnea  GASTROINTESTINAL: [X] all negative; [ ]abdominal pain, [ ] nausea, [ ] vomiting, [ ] hematemesis, [ ] diarrhea, [ ] constipation, [ ] melena, [ ] hematochezia.  GENITOURINARY: [X] all negative; [ ] dysuria, [ ] frequency, [ ] hematuria  NEUROLOGICAL: [X] all negative; [ ] numbness, [ ] weakness  SKIN: [X] all negative; [ ] itching, [ ] burning, [ ] rashes, [ ] lesions   All other review of systems is negative unless indicated above.  [  ] Unable to assess ROS because     PHYSICAL EXAM:          CONSTITUTIONAL: Well-developed; obese man; in no acute distress.   	SKIN: warm, dry  	HEAD: Normocephalic; atraumatic.  	EYES: PERRL, EOM, no conj injection, sclera clear  	ENT: No nasal discharge; airway clear.  	NECK: Supple; non tender.   	CARD: S1, S2 normal; no murmurs, gallops, or rubs. Regular rate and rhythm.  no carotid bruits  	RESP: CTA B/L; good air movement No wheezes, rales or rhonchi.  	ABD: Soft, obese, not tender, not distended,  no rebound or guarding, bowel sounds present  	EXT: Normal ROM.  No clubbing, cyanosis or edema.   warm and well perfused.  pulses palpable  	NEURO: Alert, awake, motor 5/5 R, 5/5 L

## 2025-02-03 LAB
ALBUMIN SERPL ELPH-MCNC: 3.7 G/DL — SIGNIFICANT CHANGE UP (ref 3.5–5.2)
ALBUMIN SERPL ELPH-MCNC: 3.8 G/DL — SIGNIFICANT CHANGE UP (ref 3.5–5.2)
ALP SERPL-CCNC: 193 U/L — HIGH (ref 30–115)
ALP SERPL-CCNC: 226 U/L — HIGH (ref 30–115)
ALT FLD-CCNC: 25 U/L — SIGNIFICANT CHANGE UP (ref 0–41)
ALT FLD-CCNC: 25 U/L — SIGNIFICANT CHANGE UP (ref 0–41)
ANION GAP SERPL CALC-SCNC: 15 MMOL/L — HIGH (ref 7–14)
ANION GAP SERPL CALC-SCNC: 16 MMOL/L — HIGH (ref 7–14)
AST SERPL-CCNC: 44 U/L — HIGH (ref 0–41)
AST SERPL-CCNC: 47 U/L — HIGH (ref 0–41)
BASOPHILS # BLD AUTO: 0.02 K/UL — SIGNIFICANT CHANGE UP (ref 0–0.2)
BASOPHILS # BLD AUTO: 0.04 K/UL — SIGNIFICANT CHANGE UP (ref 0–0.2)
BASOPHILS NFR BLD AUTO: 0.2 % — SIGNIFICANT CHANGE UP (ref 0–1)
BASOPHILS NFR BLD AUTO: 0.4 % — SIGNIFICANT CHANGE UP (ref 0–1)
BILIRUB SERPL-MCNC: 0.6 MG/DL — SIGNIFICANT CHANGE UP (ref 0.2–1.2)
BILIRUB SERPL-MCNC: 0.6 MG/DL — SIGNIFICANT CHANGE UP (ref 0.2–1.2)
BUN SERPL-MCNC: 85 MG/DL — CRITICAL HIGH (ref 10–20)
BUN SERPL-MCNC: 94 MG/DL — CRITICAL HIGH (ref 10–20)
CALCIUM SERPL-MCNC: 8.4 MG/DL — SIGNIFICANT CHANGE UP (ref 8.4–10.4)
CALCIUM SERPL-MCNC: 8.4 MG/DL — SIGNIFICANT CHANGE UP (ref 8.4–10.5)
CHLORIDE SERPL-SCNC: 95 MMOL/L — LOW (ref 98–110)
CHLORIDE SERPL-SCNC: 98 MMOL/L — SIGNIFICANT CHANGE UP (ref 98–110)
CO2 SERPL-SCNC: 21 MMOL/L — SIGNIFICANT CHANGE UP (ref 17–32)
CO2 SERPL-SCNC: 22 MMOL/L — SIGNIFICANT CHANGE UP (ref 17–32)
CREAT SERPL-MCNC: 2.5 MG/DL — HIGH (ref 0.7–1.5)
CREAT SERPL-MCNC: 2.7 MG/DL — HIGH (ref 0.7–1.5)
EGFR: 25 ML/MIN/1.73M2 — LOW
EGFR: 28 ML/MIN/1.73M2 — LOW
EOSINOPHIL # BLD AUTO: 0.23 K/UL — SIGNIFICANT CHANGE UP (ref 0–0.7)
EOSINOPHIL # BLD AUTO: 0.25 K/UL — SIGNIFICANT CHANGE UP (ref 0–0.7)
EOSINOPHIL NFR BLD AUTO: 2.3 % — SIGNIFICANT CHANGE UP (ref 0–8)
EOSINOPHIL NFR BLD AUTO: 2.4 % — SIGNIFICANT CHANGE UP (ref 0–8)
GLUCOSE BLDC GLUCOMTR-MCNC: 137 MG/DL — HIGH (ref 70–99)
GLUCOSE BLDC GLUCOMTR-MCNC: 155 MG/DL — HIGH (ref 70–99)
GLUCOSE BLDC GLUCOMTR-MCNC: 157 MG/DL — HIGH (ref 70–99)
GLUCOSE BLDC GLUCOMTR-MCNC: 190 MG/DL — HIGH (ref 70–99)
GLUCOSE SERPL-MCNC: 132 MG/DL — HIGH (ref 70–99)
GLUCOSE SERPL-MCNC: 236 MG/DL — HIGH (ref 70–99)
HCT VFR BLD CALC: 25.2 % — LOW (ref 42–52)
HCT VFR BLD CALC: 26.3 % — LOW (ref 42–52)
HGB BLD-MCNC: 7.7 G/DL — LOW (ref 14–18)
HGB BLD-MCNC: 8 G/DL — LOW (ref 14–18)
IMM GRANULOCYTES NFR BLD AUTO: 2.7 % — HIGH (ref 0.1–0.3)
IMM GRANULOCYTES NFR BLD AUTO: 3.4 % — HIGH (ref 0.1–0.3)
LYMPHOCYTES # BLD AUTO: 0.91 K/UL — LOW (ref 1.2–3.4)
LYMPHOCYTES # BLD AUTO: 1.01 K/UL — LOW (ref 1.2–3.4)
LYMPHOCYTES # BLD AUTO: 8.9 % — LOW (ref 20.5–51.1)
LYMPHOCYTES # BLD AUTO: 9.5 % — LOW (ref 20.5–51.1)
MAGNESIUM SERPL-MCNC: 2.4 MG/DL — SIGNIFICANT CHANGE UP (ref 1.8–2.4)
MCHC RBC-ENTMCNC: 26.7 PG — LOW (ref 27–31)
MCHC RBC-ENTMCNC: 26.8 PG — LOW (ref 27–31)
MCHC RBC-ENTMCNC: 30.4 G/DL — LOW (ref 32–37)
MCHC RBC-ENTMCNC: 30.6 G/DL — LOW (ref 32–37)
MCV RBC AUTO: 87.5 FL — SIGNIFICANT CHANGE UP (ref 80–94)
MCV RBC AUTO: 88.3 FL — SIGNIFICANT CHANGE UP (ref 80–94)
MONOCYTES # BLD AUTO: 1.34 K/UL — HIGH (ref 0.1–0.6)
MONOCYTES # BLD AUTO: 1.35 K/UL — HIGH (ref 0.1–0.6)
MONOCYTES NFR BLD AUTO: 12.7 % — HIGH (ref 1.7–9.3)
MONOCYTES NFR BLD AUTO: 13.2 % — HIGH (ref 1.7–9.3)
NEUTROPHILS # BLD AUTO: 7.34 K/UL — HIGH (ref 1.4–6.5)
NEUTROPHILS # BLD AUTO: 7.67 K/UL — HIGH (ref 1.4–6.5)
NEUTROPHILS NFR BLD AUTO: 72 % — SIGNIFICANT CHANGE UP (ref 42.2–75.2)
NEUTROPHILS NFR BLD AUTO: 72.3 % — SIGNIFICANT CHANGE UP (ref 42.2–75.2)
NRBC # BLD: 2 /100 WBCS — HIGH (ref 0–0)
NRBC # BLD: 3 /100 WBCS — HIGH (ref 0–0)
NRBC BLD-RTO: 2 /100 WBCS — HIGH (ref 0–0)
NRBC BLD-RTO: 3 /100 WBCS — HIGH (ref 0–0)
PLATELET # BLD AUTO: 140 K/UL — SIGNIFICANT CHANGE UP (ref 130–400)
PLATELET # BLD AUTO: 145 K/UL — SIGNIFICANT CHANGE UP (ref 130–400)
PMV BLD: 10.8 FL — HIGH (ref 7.4–10.4)
PMV BLD: 11.4 FL — HIGH (ref 7.4–10.4)
POTASSIUM SERPL-MCNC: 4.8 MMOL/L — SIGNIFICANT CHANGE UP (ref 3.5–5)
POTASSIUM SERPL-MCNC: 4.9 MMOL/L — SIGNIFICANT CHANGE UP (ref 3.5–5)
POTASSIUM SERPL-SCNC: 4.8 MMOL/L — SIGNIFICANT CHANGE UP (ref 3.5–5)
POTASSIUM SERPL-SCNC: 4.9 MMOL/L — SIGNIFICANT CHANGE UP (ref 3.5–5)
PROT SERPL-MCNC: 6 G/DL — SIGNIFICANT CHANGE UP (ref 6–8)
PROT SERPL-MCNC: 6.1 G/DL — SIGNIFICANT CHANGE UP (ref 6–8)
RBC # BLD: 2.88 M/UL — LOW (ref 4.7–6.1)
RBC # BLD: 2.98 M/UL — LOW (ref 4.7–6.1)
RBC # FLD: 18.4 % — HIGH (ref 11.5–14.5)
RBC # FLD: 18.6 % — HIGH (ref 11.5–14.5)
SODIUM SERPL-SCNC: 132 MMOL/L — LOW (ref 135–146)
SODIUM SERPL-SCNC: 135 MMOL/L — SIGNIFICANT CHANGE UP (ref 135–146)
WBC # BLD: 10.19 K/UL — SIGNIFICANT CHANGE UP (ref 4.8–10.8)
WBC # BLD: 10.61 K/UL — SIGNIFICANT CHANGE UP (ref 4.8–10.8)
WBC # FLD AUTO: 10.19 K/UL — SIGNIFICANT CHANGE UP (ref 4.8–10.8)
WBC # FLD AUTO: 10.61 K/UL — SIGNIFICANT CHANGE UP (ref 4.8–10.8)

## 2025-02-03 PROCEDURE — 93010 ELECTROCARDIOGRAM REPORT: CPT

## 2025-02-03 PROCEDURE — 99291 CRITICAL CARE FIRST HOUR: CPT

## 2025-02-03 PROCEDURE — 99233 SBSQ HOSP IP/OBS HIGH 50: CPT | Mod: FS

## 2025-02-03 PROCEDURE — 71045 X-RAY EXAM CHEST 1 VIEW: CPT | Mod: 26

## 2025-02-03 RX ORDER — CEFAZOLIN SODIUM IN 0.9 % NACL 3 G/100 ML
1000 INTRAVENOUS SOLUTION, PIGGYBACK (ML) INTRAVENOUS EVERY 8 HOURS
Refills: 0 | Status: DISCONTINUED | OUTPATIENT
Start: 2025-02-03 | End: 2025-02-03

## 2025-02-03 RX ORDER — FUROSEMIDE 10 MG/ML
40 INJECTION INTRAMUSCULAR; INTRAVENOUS ONCE
Refills: 0 | Status: COMPLETED | OUTPATIENT
Start: 2025-02-03 | End: 2025-02-03

## 2025-02-03 RX ORDER — LEVALBUTEROL HYDROCHLORIDE 1.25 MG/3ML
1.25 SOLUTION RESPIRATORY (INHALATION)
Refills: 0 | Status: DISCONTINUED | OUTPATIENT
Start: 2025-02-03 | End: 2025-02-09

## 2025-02-03 RX ORDER — CEFAZOLIN SODIUM IN 0.9 % NACL 3 G/100 ML
2000 INTRAVENOUS SOLUTION, PIGGYBACK (ML) INTRAVENOUS EVERY 8 HOURS
Refills: 0 | Status: DISCONTINUED | OUTPATIENT
Start: 2025-02-03 | End: 2025-02-03

## 2025-02-03 RX ORDER — CEFAZOLIN SODIUM IN 0.9 % NACL 3 G/100 ML
2000 INTRAVENOUS SOLUTION, PIGGYBACK (ML) INTRAVENOUS EVERY 12 HOURS
Refills: 0 | Status: DISCONTINUED | OUTPATIENT
Start: 2025-02-03 | End: 2025-02-03

## 2025-02-03 RX ORDER — INSULIN GLARGINE-YFGN 100 [IU]/ML
35 INJECTION, SOLUTION SUBCUTANEOUS EVERY MORNING
Refills: 0 | Status: DISCONTINUED | OUTPATIENT
Start: 2025-02-03 | End: 2025-02-04

## 2025-02-03 RX ORDER — CEFAZOLIN SODIUM IN 0.9 % NACL 3 G/100 ML
2000 INTRAVENOUS SOLUTION, PIGGYBACK (ML) INTRAVENOUS EVERY 12 HOURS
Refills: 0 | Status: COMPLETED | OUTPATIENT
Start: 2025-02-03 | End: 2025-02-10

## 2025-02-03 RX ADMIN — INSULIN LISPRO 10 UNIT(S): 100 INJECTION, SOLUTION INTRAVENOUS; SUBCUTANEOUS at 17:32

## 2025-02-03 RX ADMIN — INSULIN GLARGINE-YFGN 35 UNIT(S): 100 INJECTION, SOLUTION SUBCUTANEOUS at 22:00

## 2025-02-03 RX ADMIN — LIDOCAINE HYDROCHLORIDE 1 PATCH: 20 JELLY TOPICAL at 12:36

## 2025-02-03 RX ADMIN — LIDOCAINE HYDROCHLORIDE 1 PATCH: 20 JELLY TOPICAL at 12:29

## 2025-02-03 RX ADMIN — HEPARIN SODIUM 5000 UNIT(S): 1000 INJECTION INTRAVENOUS; SUBCUTANEOUS at 05:23

## 2025-02-03 RX ADMIN — GABAPENTIN 300 MILLIGRAM(S): 400 CAPSULE ORAL at 05:24

## 2025-02-03 RX ADMIN — FOLIC ACID 1 MILLIGRAM(S): 1 TABLET ORAL at 12:30

## 2025-02-03 RX ADMIN — Medication 40 MILLIGRAM(S): at 05:23

## 2025-02-03 RX ADMIN — DEXTROMETHORPHAN HBR, GUAIFENESIN 600 MILLIGRAM(S): 200 LIQUID ORAL at 05:24

## 2025-02-03 RX ADMIN — IRON SUCROSE 100 MILLIGRAM(S): 20 INJECTION, SOLUTION INTRAVENOUS at 12:29

## 2025-02-03 RX ADMIN — INSULIN LISPRO 2: 100 INJECTION, SOLUTION INTRAVENOUS; SUBCUTANEOUS at 07:46

## 2025-02-03 RX ADMIN — MILRINONE LACTATE 4.28 MICROGRAM(S)/KG/MIN: 1 INJECTION, SOLUTION INTRAVENOUS at 17:13

## 2025-02-03 RX ADMIN — LIDOCAINE HYDROCHLORIDE 1 PATCH: 20 JELLY TOPICAL at 23:15

## 2025-02-03 RX ADMIN — ALBUMIN (HUMAN) 50 MILLILITER(S): 12.5 INJECTION, SOLUTION INTRAVENOUS at 00:25

## 2025-02-03 RX ADMIN — OXYCODONE HYDROCHLORIDE 10 MILLIGRAM(S): 30 TABLET ORAL at 21:45

## 2025-02-03 RX ADMIN — HEPARIN SODIUM 5000 UNIT(S): 1000 INJECTION INTRAVENOUS; SUBCUTANEOUS at 13:31

## 2025-02-03 RX ADMIN — Medication 2 TABLET(S): at 21:44

## 2025-02-03 RX ADMIN — Medication 1 DOSE(S): at 07:47

## 2025-02-03 RX ADMIN — LEVALBUTEROL HYDROCHLORIDE 1.25 MILLIGRAM(S): 1.25 SOLUTION RESPIRATORY (INHALATION) at 20:49

## 2025-02-03 RX ADMIN — Medication 100 MILLIGRAM(S): at 17:46

## 2025-02-03 RX ADMIN — GABAPENTIN 300 MILLIGRAM(S): 400 CAPSULE ORAL at 13:30

## 2025-02-03 RX ADMIN — INSULIN LISPRO 10 UNIT(S): 100 INJECTION, SOLUTION INTRAVENOUS; SUBCUTANEOUS at 12:34

## 2025-02-03 RX ADMIN — Medication 81 MILLIGRAM(S): at 12:30

## 2025-02-03 RX ADMIN — DEXTROMETHORPHAN HBR, GUAIFENESIN 600 MILLIGRAM(S): 200 LIQUID ORAL at 17:33

## 2025-02-03 RX ADMIN — Medication 0.25 MILLIGRAM(S): at 21:45

## 2025-02-03 RX ADMIN — LIDOCAINE HYDROCHLORIDE 1 PATCH: 20 JELLY TOPICAL at 00:29

## 2025-02-03 RX ADMIN — GABAPENTIN 300 MILLIGRAM(S): 400 CAPSULE ORAL at 21:46

## 2025-02-03 RX ADMIN — HEPARIN SODIUM 5000 UNIT(S): 1000 INJECTION INTRAVENOUS; SUBCUTANEOUS at 21:44

## 2025-02-03 RX ADMIN — OXYCODONE HYDROCHLORIDE 10 MILLIGRAM(S): 30 TABLET ORAL at 05:37

## 2025-02-03 RX ADMIN — INSULIN LISPRO 2: 100 INJECTION, SOLUTION INTRAVENOUS; SUBCUTANEOUS at 17:32

## 2025-02-03 RX ADMIN — LIDOCAINE HYDROCHLORIDE 1 PATCH: 20 JELLY TOPICAL at 07:41

## 2025-02-03 RX ADMIN — Medication 1 APPLICATION(S): at 12:32

## 2025-02-03 RX ADMIN — LIDOCAINE HYDROCHLORIDE 1 PATCH: 20 JELLY TOPICAL at 05:22

## 2025-02-03 RX ADMIN — ROSUVASTATIN CALCIUM 20 MILLIGRAM(S): 20 TABLET, FILM COATED ORAL at 21:45

## 2025-02-03 RX ADMIN — MUPIROCIN CALCIUM 1 APPLICATION(S): 20 CREAM TOPICAL at 05:24

## 2025-02-03 RX ADMIN — DOBUTAMINE 5.13 MICROGRAM(S)/KG/MIN: 250 INJECTION INTRAVENOUS at 21:44

## 2025-02-03 RX ADMIN — OXYCODONE HYDROCHLORIDE 10 MILLIGRAM(S): 30 TABLET ORAL at 06:30

## 2025-02-03 RX ADMIN — DOBUTAMINE 5.13 MICROGRAM(S)/KG/MIN: 250 INJECTION INTRAVENOUS at 08:37

## 2025-02-03 RX ADMIN — FUROSEMIDE 40 MILLIGRAM(S): 10 INJECTION INTRAMUSCULAR; INTRAVENOUS at 13:20

## 2025-02-03 RX ADMIN — IPRATROPIUM BROMIDE AND ALBUTEROL SULFATE 3 MILLILITER(S): .5; 2.5 SOLUTION RESPIRATORY (INHALATION) at 08:25

## 2025-02-03 RX ADMIN — INSULIN GLARGINE-YFGN 30 UNIT(S): 100 INJECTION, SOLUTION SUBCUTANEOUS at 07:45

## 2025-02-03 RX ADMIN — POLYETHYLENE GLYCOL 3350 17 GRAM(S): 17 POWDER, FOR SOLUTION ORAL at 12:29

## 2025-02-03 RX ADMIN — INSULIN LISPRO 10 UNIT(S): 100 INJECTION, SOLUTION INTRAVENOUS; SUBCUTANEOUS at 07:45

## 2025-02-03 RX ADMIN — LACTULOSE 20 GRAM(S): 10 SOLUTION ORAL at 12:29

## 2025-02-03 RX ADMIN — ALBUMIN (HUMAN) 50 MILLILITER(S): 12.5 INJECTION, SOLUTION INTRAVENOUS at 05:31

## 2025-02-03 NOTE — PROGRESS NOTE ADULT - SUBJECTIVE AND OBJECTIVE BOX
Nephrology progress note    Patient is seen and examined, events over the last 24 h noted .  Lying in bed  said he has problems breathing/ has prince   Allergies:  No Known Allergies    Hospital Medications:   MEDICATIONS  (STANDING):    albuterol/ipratropium for Nebulization 3 milliLiter(s) Nebulizer every 6 hours  aspirin enteric coated 81 milliGRAM(s) Oral daily  bisacodyl Suppository 10 milliGRAM(s) Rectal once  ceFAZolin   IVPB 1000 milliGRAM(s) IV Intermittent every 8 hours  DOBUTamine Infusion 1.5 MICROgram(s)/kG/Min (5.13 mL/Hr) IV Continuous <Continuous>  fluticasone propionate/ salmeterol 250-50 MICROgram(s) Diskus 1 Dose(s) Inhalation two times a day  folic acid 1 milliGRAM(s) Oral daily  gabapentin 300 milliGRAM(s) Oral every 8 hours  glucagon  Injectable 1 milliGRAM(s) IntraMuscular once  guaiFENesin  milliGRAM(s) Oral every 12 hours  heparin   Injectable 5000 Unit(s) SubCutaneous every 8 hours  influenza  Vaccine (HIGH DOSE) 0.5 milliLiter(s) IntraMuscular once  insulin glargine Injectable (LANTUS) 35 Unit(s) SubCutaneous every morning  insulin lispro (ADMELOG) corrective regimen sliding scale   SubCutaneous three times a day before meals  insulin lispro (ADMELOG) corrective regimen sliding scale   SubCutaneous at bedtime  insulin lispro Injectable (ADMELOG) 10 Unit(s) SubCutaneous three times a day before meals  iron sucrose IVPB 200 milliGRAM(s) IV Intermittent every 24 hours  lactulose Syrup 20 Gram(s) Oral daily  lidocaine   4% Patch 1 Patch Transdermal daily  lidocaine   4% Patch 1 Patch Transdermal every 24 hours  milrinone Infusion 0.125 MICROgram(s)/kG/Min (4.28 mL/Hr) IV Continuous <Continuous>  mupirocin 2% Nasal 1 Application(s) Both Nostrils every 12 hours  pantoprazole    Tablet 40 milliGRAM(s) Oral before breakfast  polyethylene glycol 3350 17 Gram(s) Oral daily  rosuvastatin 20 milliGRAM(s) Oral at bedtime  senna 2 Tablet(s) Oral at bedtime          VITALS:  T(F): 97.4 (02-03-25 @ 08:00), Max: 98.2 (02-02-25 @ 16:00)  HR: 87 (02-03-25 @ 10:00)  RR: 16 (02-03-25 @ 10:00)  SpO2: 97% (02-03-25 @ 10:00)      02-01 @ 07:01  -  02-02 @ 07:00  --------------------------------------------------------  IN: 849.2 mL / OUT: 2740 mL / NET: -1890.8 mL    02-02 @ 07:01  -  02-03 @ 07:00  --------------------------------------------------------  IN: 1736.2 mL / OUT: 2490 mL / NET: -753.8 mL    02-03 @ 07:01  -  02-03 @ 10:28  --------------------------------------------------------  IN: 527.5 mL / OUT: 210 mL / NET: 317.5 mL          PHYSICAL EXAM:  Constitutional: NAD/ obese  Respiratory: CTAB, no wheezes, rales or rhonchi  Cardiovascular: S1, S2, RRR  Gastrointestinal: BS+, soft, NT/ND  Extremities: No cyanosis or clubbing. plus one  peripheral edema  :  prince.   Skin: No rashes    LABS:  02-03    132[L]  |  95[L]  |  85[HH]  ----------------------------<  236[H]  4.9   |  21  |  2.5[H]  Creatinine Trend: 2.5<--, 2.5<--, 2.8<--, 2.9<--, 3.1<--, 2.9<--  Ca    8.4      03 Feb 2025 02:52  Mg     2.4     02-03    TPro  6.1  /  Alb  3.7  /  TBili  0.6  /  DBili      /  AST  47[H]  /  ALT  25  /  AlkPhos  226[H]  02-03                          8.0    10.19 )-----------( 140      ( 03 Feb 2025 02:52 )             26.3       Urine Studies:  Urinalysis Basic - ( 03 Feb 2025 02:52 )    Color:  / Appearance:  / SG:  / pH:   Gluc: 236 mg/dL / Ketone:   / Bili:  / Urobili:    Blood:  / Protein:  / Nitrite:    Leuk Esterase:  / RBC:  / WBC    Sq Epi:  / Non Sq Epi:  / Bacteria:           TSH 2.72      [12-04-24 @ 04:45]          RADIOLOGY & ADDITIONAL STUDIES:

## 2025-02-03 NOTE — PROGRESS NOTE ADULT - SUBJECTIVE AND OBJECTIVE BOX
INTERVAL HPI/OVERNIGHT EVENTS:  Patient was seen today. Telemetry reviewed.       MEDICATIONS  (STANDING):  aspirin enteric coated 81 milliGRAM(s) Oral daily  bisacodyl Suppository 10 milliGRAM(s) Rectal once  ceFAZolin   IVPB 2000 milliGRAM(s) IV Intermittent every 12 hours  chlorhexidine 2% Cloths 1 Application(s) Topical daily  dextrose 5%. 1000 milliLiter(s) (50 mL/Hr) IV Continuous <Continuous>  dextrose 5%. 1000 milliLiter(s) (100 mL/Hr) IV Continuous <Continuous>  dextrose 50% Injectable 50 milliLiter(s) IV Push every 15 minutes  dextrose 50% Injectable 25 milliLiter(s) IV Push every 15 minutes  DOBUTamine Infusion 1.5 MICROgram(s)/kG/Min (5.13 mL/Hr) IV Continuous <Continuous>  fluticasone propionate/ salmeterol 250-50 MICROgram(s) Diskus 1 Dose(s) Inhalation two times a day  folic acid 1 milliGRAM(s) Oral daily  furosemide   Injectable 40 milliGRAM(s) IV Push once  gabapentin 300 milliGRAM(s) Oral every 8 hours  glucagon  Injectable 1 milliGRAM(s) IntraMuscular once  guaiFENesin  milliGRAM(s) Oral every 12 hours  heparin   Injectable 5000 Unit(s) SubCutaneous every 8 hours  influenza  Vaccine (HIGH DOSE) 0.5 milliLiter(s) IntraMuscular once  insulin glargine Injectable (LANTUS) 35 Unit(s) SubCutaneous every morning  insulin lispro (ADMELOG) corrective regimen sliding scale   SubCutaneous three times a day before meals  insulin lispro (ADMELOG) corrective regimen sliding scale   SubCutaneous at bedtime  insulin lispro Injectable (ADMELOG) 10 Unit(s) SubCutaneous three times a day before meals  iron sucrose IVPB 200 milliGRAM(s) IV Intermittent every 24 hours  lactulose Syrup 20 Gram(s) Oral daily  levalbuterol Inhalation 1.25 milliGRAM(s) Inhalation four times a day  lidocaine   4% Patch 1 Patch Transdermal every 24 hours  lidocaine   4% Patch 1 Patch Transdermal daily  milrinone Infusion 0.125 MICROgram(s)/kG/Min (4.28 mL/Hr) IV Continuous <Continuous>  mupirocin 2% Nasal 1 Application(s) Both Nostrils every 12 hours  pantoprazole    Tablet 40 milliGRAM(s) Oral before breakfast  polyethylene glycol 3350 17 Gram(s) Oral daily  rosuvastatin 20 milliGRAM(s) Oral at bedtime  senna 2 Tablet(s) Oral at bedtime  sodium chloride 0.9%. 1000 milliLiter(s) (20 mL/Hr) IV Continuous <Continuous>    MEDICATIONS  (PRN):  ALPRAZolam 0.25 milliGRAM(s) Oral at bedtime PRN anxiety  dextrose Oral Gel 15 Gram(s) Oral once PRN Blood Glucose LESS THAN 70 milliGRAM(s)/deciliter  hydrALAZINE Injectable 5 milliGRAM(s) IV Push every 4 hours PRN Systolic > 160  ondansetron Injectable 4 milliGRAM(s) IV Push every 4 hours PRN Nausea and/or Vomiting  oxyCODONE    IR 10 milliGRAM(s) Oral every 4 hours PRN Severe Pain (7 - 10)  oxyCODONE    IR 5 milliGRAM(s) Oral every 4 hours PRN Moderate Pain (4 - 6)      Allergies    No Known Allergies    Intolerances        REVIEW OF SYSTEMS  [x] A ten-point review of systems was otherwise negative except as noted.  [ ] Due to altered mental status/intubation, subjective information were not able to be obtained from the patient. History was obtained, to the extent possible, from review of the chart and collateral sources of information.      Vital Signs Last 24 Hrs  T(C): 36.3 (03 Feb 2025 08:00), Max: 36.8 (02 Feb 2025 16:00)  T(F): 97.4 (03 Feb 2025 08:00), Max: 98.2 (02 Feb 2025 16:00)  HR: 95 (03 Feb 2025 11:00) (74 - 108)  BP: --  BP(mean): --  RR: 24 (03 Feb 2025 11:00) (15 - 42)  SpO2: 98% (03 Feb 2025 11:00) (89% - 99%)    Parameters below as of 03 Feb 2025 11:00  Patient On (Oxygen Delivery Method): nasal cannula  O2 Flow (L/min): 4      02-02-25 @ 07:01  -  02-03-25 @ 07:00  --------------------------------------------------------  IN: 1736.2 mL / OUT: 2490 mL / NET: -753.8 mL    02-03-25 @ 07:01  -  02-03-25 @ 12:48  --------------------------------------------------------  IN: 580.6 mL / OUT: 360 mL / NET: 220.6 mL        PHYSICAL EXAM:    GENERAL: In no apparent distress, well nourished, and hydrated.  HEART: Regular rate and rhythm; No murmur; NO rubs, or gallops.  PULMONARY: Clear to auscultation and percussion.  Normal expansion/effort. No rales, wheezing, or rhonchi bilaterally.  ABDOMEN: Soft, Nontender, Nondistended; Bowel sounds present  EXTREMITIES:  Extremities warm, pink, well-perfused, 2+ Peripheral Pulses, No clubbing, cyanosis, or edema  NEUROLOGICAL: alert & oriented x 3, no focal deficits, PERRLA, EOMI    LABS:                        8.0    10.19 )-----------( 140      ( 03 Feb 2025 02:52 )             26.3     02-03    132[L]  |  95[L]  |  85[HH]  ----------------------------<  236[H]  4.9   |  21  |  2.5[H]    Ca    8.4      03 Feb 2025 02:52  Mg     2.4     02-03    TPro  6.1  /  Alb  3.7  /  TBili  0.6  /  DBili  x   /  AST  47[H]  /  ALT  25  /  AlkPhos  226[H]  02-03      Urinalysis Basic - ( 03 Feb 2025 02:52 )    Color: x / Appearance: x / SG: x / pH: x  Gluc: 236 mg/dL / Ketone: x  / Bili: x / Urobili: x   Blood: x / Protein: x / Nitrite: x   Leuk Esterase: x / RBC: x / WBC x   Sq Epi: x / Non Sq Epi: x / Bacteria: x          12 Lead ECG:   Ventricular Rate 86 BPM    QRS Duration 92 ms    Q-T Interval 366 ms    QTC Calculation(Bazett) 437 ms    R Axis -45 degrees    T Axis 102 degrees    Diagnosis Line Atrial fibrillation  Inferior infarct , age undetermined  Poor R wave progression  ST & T wave abnormality, consider lateral ischemia  Abnormal ECG    Confirmed by Abhijit Berg ([id number]) on 2/2/2025 2:30:50 PM (02-02-25 @ 08:10)  12 Lead ECG:   Ventricular Rate 90 BPM    QRS Duration 90 ms    Q-T Interval 378 ms    QTC Calculation(Bazett) 462 ms    R Axis -20 degrees    T Axis 97 degrees    Diagnosis Line Atrial fibrillation with premature ventricular or aberrantly conducted  complexes  Poor R wave progression  Nonspecific ST and T wave abnormality  Abnormal ECG    Confirmed by Abhijit Berg ([id number]) on 2/1/2025 12:29:29 PM (02-01-25 @ 08:42)      RADIOLOGY & ADDITIONAL TESTS:

## 2025-02-03 NOTE — PROGRESS NOTE ADULT - ASSESSMENT
64M w/ afib, nonobstructive CAD, CHF class 3, severe MR, DM, HTN, JIMMY on CPAP, asthma, severe obesity, HL, Madelung's disease, osteoarthritis s/p bilateral hip replacement, peripheral neuropathy presented with increasing dyspnea with exertion found to have severe MR sp MVR POD#6    Impression:  Junctional Bradycardia postop  Slow AF  Sev MR sp MVR/MAZE/SHEILA Ligation  JIMMY  CAD  DM  HTN    Plan:  -TVP disconnected, patient is going on his own 70-90 bpm. Telemetry and EKG reviewed - appear to be sinus arrest, slow sinus rhythm with junctional escape and runs of PAT.   - currently on Dobutamine and Milrinone, wean off as tolerated  - off BB  - Will cont to monitor, possible AV leadless PPM if conduction system does not recover  - Monitor electrolytes, maintain WNL  - Will follow

## 2025-02-03 NOTE — PROGRESS NOTE ADULT - ASSESSMENT
64y Male status-post MVR / SHEILA ligation and MAZE POD # 5  - Case and plan discussed with CTU Intensivist and CT Surgeon - Dr. Blakely/Mauri/Laron  - Continue CTU supportive care    - Continue DVT/GI prophylaxis  - Incentive Spirometry 10 times an hour  - Continue to advance physical activity as tolerated and continue PT/OT as directed  1. Continue ASA, statin, no bb for junctional bradycardia, pain control, d/c ct's  2.madelung disease: cont bipap, respiratory tx's  3. A. Fib prophylaxis: cont to monitor electrolytes - hold AC at this time prior lower GI bleed  4. junctional bradycardia/wenckebach - ep consult for poss ppm, EP following - ekg sent to ep team 2/3   restarted given junctional/wenckebach rythm   5. DM/Glucose Control: d/c insulin gtt, start lantus 35/humalog 10 and sliding scale   6. bumex PRN for noncardiogenic fluid overload   7. franklin- renal consult- diuresis PRN, avoid hypotension   8. Ancef added for possible URI , pt with productive cough and b/l opacities seen on cxr    Social Service Disposition:  pt to assess

## 2025-02-03 NOTE — PROGRESS NOTE ADULT - SUBJECTIVE AND OBJECTIVE BOX
Subjective: Pt resting in bed comfortably. No acute events overnight. Pt denies any headache, syncope, chest pain, palpitations, SOB, difficulty breathing, nausea, vomiting, fevers, chills, abdominal discomfort, urinary retention.     Vital Signs:  Vital Signs Last 24 Hrs  T(C): 36.3 (02-03-25 @ 08:00), Max: 36.8 (02-02-25 @ 16:00)  T(F): 97.4 (02-03-25 @ 08:00), Max: 98.2 (02-02-25 @ 16:00)  HR: 95 (02-03-25 @ 11:00) (74 - 108)  BP: --  RR: 24 (02-03-25 @ 11:00) (15 - 42)  SpO2: 98% (02-03-25 @ 11:00) (89% - 99%) on (O2)    Telemetry/Alarms:    Relevant labs, radiology and Medications reviewed    Pertinent Physical Exam      02-02 @ 07:01  -  02-03 @ 07:00  --------------------------------------------------------  IN:    Albumin 25%  -  50 mL: 100 mL    DOBUTamine: 12 mL    DOBUTamine: 180.6 mL    Insulin: 9 mL    IV PiggyBack: 100 mL    IV PiggyBack: 300 mL    Milrinone: 94.6 mL    Oral Fluid: 720 mL    sodium chloride 0.9%: 220 mL  Total IN: 1736.2 mL    OUT:    Chest Tube (mL): 80 mL    Chest Tube (mL): 0 mL    Indwelling Catheter - Urethral (mL): 2410 mL  Total OUT: 2490 mL    Total NET: -753.8 mL      02-03 @ 07:01  -  02-03 @ 12:40  --------------------------------------------------------  IN:    DOBUTamine: 8.6 mL    DOBUTamine: 20.5 mL    Milrinone: 21.5 mL    Oral Fluid: 480 mL    sodium chloride 0.9%: 50 mL  Total IN: 580.6 mL    OUT:    Indwelling Catheter - Urethral (mL): 360 mL  Total OUT: 360 mL    Total NET: 220.6 mL      General: NAD  Neuro: A+O x 3, non-focal, speech clear and intact  HEENT: PERRL, EOMI, oral mucosa pink and moist  Neck: supple, no JVD  CV: regular rate, regular rhythm, +S1S2, no murmurs or rub  Pulm/chest: lung sounds CTA and equal bilaterally, no accessory muscle use noted  Abd: soft, NT, ND, +BS  : normal and skin intact   Ext: MCKEON x 4, no C/C/E  Skin: warm, well perfused       Assessment  64y Male admitted with complaints of Patient is a 64y old  Male who presents with a chief complaint of mitral regurgitation (29 Jan 2025 11:11)  .    On (Date), patient underwent Replacement, mitral valve, with JASMINA, adult    Modified maze procedure    Ligation, left atrial appendage    .

## 2025-02-03 NOTE — PROGRESS NOTE ADULT - ASSESSMENT
64 with PMHx of CKD 3a, severe MR, presented for MVR, SHEILA.  Nephrology consulted for FLAKITO    FLAKITO on CKD 3a d/t hypotension/hemodynamic changes s/p MVR  Volume overload / mild hyponatremia hypervolemic   Acute blood loss anemia    - non oliguric  - creat down-trending noted today     Recommendations:  - cont diuresis as needed to achieve negative fluid balance / can maintain on bumex 2mg iv q12 for now  - document urine output  - avoid hypotension  - monitor h/h / RBCs to maintain hgb > 7-8  - phos level noted / maintain renal diet   - on milrinone/dobutamine / CTS f/u noted     no need for RRT  will follow

## 2025-02-03 NOTE — PROGRESS NOTE ADULT - ASSESSMENT
Assessment/Plan:  Mitral Valve Insufficiency/A-Fib-s/p MVR/MAZE-POD #6  1-BP control-nicardipine infusion as needed to maintain SBP <130mmHg  2-serum glucose control-insulin infusion  3-fluid overload-diuresis  4-A-Fib, s/p MAZE, now NSR  5-chronic systolic heart failure-continue inotropic support  6-FLAKITO-avoid nephrotoxins, monitor renal failure daily  7-JIMMY-BIPAP support  5-ZXH-hhzghwok statin  2-nebphecaoeeduqr-iwxlefchg, monitor daily  10-acute blood loss anemia-stable, monitor Hb/Hct daily  26-uctuwgomzxnameym-hbnhrlcis,monitor plts daily    40 minutes of critical care services provided

## 2025-02-03 NOTE — PROGRESS NOTE ADULT - SUBJECTIVE AND OBJECTIVE BOX
JAZ GAVIN  MRN#: [id number]  Subjective:  Patient was seen and evalauted on AM rounds offerring no specific compliants at this time.    OBJECTIVE:  ICU Vital Signs Last 24 Hrs  T(C): 36.3 (2025 08:00), Max: 36.8 (2025 16:00)  T(F): 97.4 (2025 08:00), Max: 98.2 (2025 16:00)  HR: 95 (:00) (74 - 108)  BP: --  BP(mean): --  ABP: 134/53 (2025 11:00) (106/47 - 183/63)  ABP(mean): 79 (2025 11:00) (65 - 94)  RR: 24 (2025 11:) (15 - 42)  SpO2: 98% (:00) (89% - 99%)    O2 Parameters below as of 2025 11:00  Patient On (Oxygen Delivery Method): nasal cannula  O2 Flow (L/min): 4           @ 07:  -  -03 @ 07:00  --------------------------------------------------------  IN: 1736.2 mL / OUT: 2490 mL / NET: -753.8 mL     @ 07:01  -  -03 @ 12:08  --------------------------------------------------------  IN: 580.6 mL / OUT: 360 mL / NET: 220.6 mL      CAPILLARY BLOOD GLUCOSE      POCT Blood Glucose.: 137 mg/dL (2025 11:46)      PHYSICAL EXAM:Daily     Daily Weight in k.7 (2025 06:00)  General: WN/WD NAD    HEENT:     + NCAT  + EOMI  - Conjuctival edema   - Icterus   - Thrush   - ETT  - NGT/OGT    Neck:         + FROM    - JVD     - Nodes     - Masses    + Mid-line trachea   - Tracheostomy    Chest:         - Sternal click  - Sternal drainage  + Pacing wires  - Chest tubes  - SubQ emphysema    Lungs:          + CTA   - Rhonchi    - Rales    - Wheezing     - Decreased BS   - Dullness R L    Cardiac:       + S1 + S2    + RRR   - Irregular   - S3  - S4    - Murmurs   - Rub   - Hamman’s sign     Abdomen:    + BS     + Soft    + Non-tender     - Distended    - Organomegaly  - PEG    Extremities:   - Cyanosis U/L   - Clubbing  U/L  - LE/UE Edema   + Capillary refill    + Pulses     Neuro:        + Awake   +  Alert   - Confused   - Lethargic   - Sedated   - Generalized Weakness    Skin:        - Rashes    - Erythema   + Normal incisions   + IV sites intact  - Sacral decubitus    HOSPITAL MEDICATIONS:  MEDICATIONS  (STANDING):  albuterol/ipratropium for Nebulization 3 milliLiter(s) Nebulizer every 6 hours  aspirin enteric coated 81 milliGRAM(s) Oral daily  bisacodyl Suppository 10 milliGRAM(s) Rectal once  ceFAZolin   IVPB 2000 milliGRAM(s) IV Intermittent every 12 hours  chlorhexidine 2% Cloths 1 Application(s) Topical daily  dextrose 5%. 1000 milliLiter(s) (50 mL/Hr) IV Continuous <Continuous>  dextrose 5%. 1000 milliLiter(s) (100 mL/Hr) IV Continuous <Continuous>  dextrose 50% Injectable 50 milliLiter(s) IV Push every 15 minutes  dextrose 50% Injectable 25 milliLiter(s) IV Push every 15 minutes  DOBUTamine Infusion 1.5 MICROgram(s)/kG/Min (5.13 mL/Hr) IV Continuous <Continuous>  fluticasone propionate/ salmeterol 250-50 MICROgram(s) Diskus 1 Dose(s) Inhalation two times a day  folic acid 1 milliGRAM(s) Oral daily  gabapentin 300 milliGRAM(s) Oral every 8 hours  glucagon  Injectable 1 milliGRAM(s) IntraMuscular once  guaiFENesin  milliGRAM(s) Oral every 12 hours  heparin   Injectable 5000 Unit(s) SubCutaneous every 8 hours  influenza  Vaccine (HIGH DOSE) 0.5 milliLiter(s) IntraMuscular once  insulin glargine Injectable (LANTUS) 35 Unit(s) SubCutaneous every morning  insulin lispro (ADMELOG) corrective regimen sliding scale   SubCutaneous three times a day before meals  insulin lispro (ADMELOG) corrective regimen sliding scale   SubCutaneous at bedtime  insulin lispro Injectable (ADMELOG) 10 Unit(s) SubCutaneous three times a day before meals  iron sucrose IVPB 200 milliGRAM(s) IV Intermittent every 24 hours  lactulose Syrup 20 Gram(s) Oral daily  levalbuterol Inhalation 1.25 milliGRAM(s) Inhalation four times a day  lidocaine   4% Patch 1 Patch Transdermal every 24 hours  lidocaine   4% Patch 1 Patch Transdermal daily  milrinone Infusion 0.125 MICROgram(s)/kG/Min (4.28 mL/Hr) IV Continuous <Continuous>  mupirocin 2% Nasal 1 Application(s) Both Nostrils every 12 hours  pantoprazole    Tablet 40 milliGRAM(s) Oral before breakfast  polyethylene glycol 3350 17 Gram(s) Oral daily  rosuvastatin 20 milliGRAM(s) Oral at bedtime  senna 2 Tablet(s) Oral at bedtime  sodium chloride 0.9%. 1000 milliLiter(s) (20 mL/Hr) IV Continuous <Continuous>    MEDICATIONS  (PRN):  ALPRAZolam 0.25 milliGRAM(s) Oral at bedtime PRN anxiety  dextrose Oral Gel 15 Gram(s) Oral once PRN Blood Glucose LESS THAN 70 milliGRAM(s)/deciliter  hydrALAZINE Injectable 5 milliGRAM(s) IV Push every 4 hours PRN Systolic > 160  ondansetron Injectable 4 milliGRAM(s) IV Push every 4 hours PRN Nausea and/or Vomiting  oxyCODONE    IR 5 milliGRAM(s) Oral every 4 hours PRN Moderate Pain (4 - 6)  oxyCODONE    IR 10 milliGRAM(s) Oral every 4 hours PRN Severe Pain (7 - 10)      LABS:                        8.0    10.19 )-----------( 140      ( 2025 02:52 )             26.3    02-03    132[L]  |  95[L]  |  85[HH]  ----------------------------<  236[H]  4.9   |  21  |  2.5[H]    Ca    8.4      2025 02:52  Mg     2.4     02-03    TPro  6.1  /  Alb  3.7  /  TBili  0.6  /  DBili  x   /  AST  47[H]  /  ALT  25  /  AlkPhos  226[H]       LIVER FUNCTIONS - ( 2025 02:52 )  Alb: 3.7 g/dL / Pro: 6.1 g/dL / ALK PHOS: 226 U/L / ALT: 25 U/L / AST: 47 U/L / GGT: x           Urinalysis Basic - ( 2025 02:52 )    Color: x / Appearance: x / SG: x / pH: x  Gluc: 236 mg/dL / Ketone: x  / Bili: x / Urobili: x   Blood: x / Protein: x / Nitrite: x   Leuk Esterase: x / RBC: x / WBC x   Sq Epi: x / Non Sq Epi: x / Bacteria: x        RADIOLOGY:  X Reviewed and interpreted by me: bilateral opacities/effusions    CARDIOPULMONARY DYSFUNCTION  - Respiratory status required supplemental oxygen & the following of continuous pulse oximetry for support & to prevent decompensation  - Continued early mobilization as tolerated  - Addressed analgesic regimen to optimize function    PREVENTION-PROPHYLAXIS  - ASA continued for thromboembolism prophylaxis  - Heparin continued for VTE prophylaxis in addition to Venodyne boots  - Protonix maintained for GI bleeding prophylaxis  - Metabolic stability & infection prophylaxis required review and adjustment of regular Insulin sliding scale and gylcemic regimen while following serial glucose levels to help achieve & maintain euglycemia  - Reviewed & addressed surgical site infection prophylaxis regimen

## 2025-02-04 LAB
ALBUMIN SERPL ELPH-MCNC: 3.6 G/DL — SIGNIFICANT CHANGE UP (ref 3.5–5.2)
ALBUMIN SERPL ELPH-MCNC: 3.7 G/DL — SIGNIFICANT CHANGE UP (ref 3.5–5.2)
ALP SERPL-CCNC: 174 U/L — HIGH (ref 30–115)
ALP SERPL-CCNC: 180 U/L — HIGH (ref 30–115)
ALT FLD-CCNC: 20 U/L — SIGNIFICANT CHANGE UP (ref 0–41)
ALT FLD-CCNC: 22 U/L — SIGNIFICANT CHANGE UP (ref 0–41)
ANION GAP SERPL CALC-SCNC: 13 MMOL/L — SIGNIFICANT CHANGE UP (ref 7–14)
ANION GAP SERPL CALC-SCNC: 16 MMOL/L — HIGH (ref 7–14)
AST SERPL-CCNC: 36 U/L — SIGNIFICANT CHANGE UP (ref 0–41)
AST SERPL-CCNC: 36 U/L — SIGNIFICANT CHANGE UP (ref 0–41)
BASOPHILS # BLD AUTO: 0.03 K/UL — SIGNIFICANT CHANGE UP (ref 0–0.2)
BASOPHILS NFR BLD AUTO: 0.3 % — SIGNIFICANT CHANGE UP (ref 0–1)
BILIRUB SERPL-MCNC: 0.5 MG/DL — SIGNIFICANT CHANGE UP (ref 0.2–1.2)
BILIRUB SERPL-MCNC: 0.5 MG/DL — SIGNIFICANT CHANGE UP (ref 0.2–1.2)
BLD GP AB SCN SERPL QL: SIGNIFICANT CHANGE UP
BUN SERPL-MCNC: 94 MG/DL — CRITICAL HIGH (ref 10–20)
BUN SERPL-MCNC: 95 MG/DL — CRITICAL HIGH (ref 10–20)
CALCIUM SERPL-MCNC: 8.3 MG/DL — LOW (ref 8.4–10.5)
CALCIUM SERPL-MCNC: 8.6 MG/DL — SIGNIFICANT CHANGE UP (ref 8.4–10.5)
CHLORIDE SERPL-SCNC: 95 MMOL/L — LOW (ref 98–110)
CHLORIDE SERPL-SCNC: 96 MMOL/L — LOW (ref 98–110)
CO2 SERPL-SCNC: 21 MMOL/L — SIGNIFICANT CHANGE UP (ref 17–32)
CO2 SERPL-SCNC: 23 MMOL/L — SIGNIFICANT CHANGE UP (ref 17–32)
CREAT SERPL-MCNC: 2.4 MG/DL — HIGH (ref 0.7–1.5)
CREAT SERPL-MCNC: 2.5 MG/DL — HIGH (ref 0.7–1.5)
EGFR: 28 ML/MIN/1.73M2 — LOW
EGFR: 29 ML/MIN/1.73M2 — LOW
EOSINOPHIL # BLD AUTO: 0.3 K/UL — SIGNIFICANT CHANGE UP (ref 0–0.7)
EOSINOPHIL NFR BLD AUTO: 2.8 % — SIGNIFICANT CHANGE UP (ref 0–8)
GAS PNL BLDA: SIGNIFICANT CHANGE UP
GLUCOSE BLDC GLUCOMTR-MCNC: 122 MG/DL — HIGH (ref 70–99)
GLUCOSE BLDC GLUCOMTR-MCNC: 173 MG/DL — HIGH (ref 70–99)
GLUCOSE BLDC GLUCOMTR-MCNC: 174 MG/DL — HIGH (ref 70–99)
GLUCOSE BLDC GLUCOMTR-MCNC: 176 MG/DL — HIGH (ref 70–99)
GLUCOSE BLDC GLUCOMTR-MCNC: 192 MG/DL — HIGH (ref 70–99)
GLUCOSE SERPL-MCNC: 135 MG/DL — HIGH (ref 70–99)
GLUCOSE SERPL-MCNC: 161 MG/DL — HIGH (ref 70–99)
HCT VFR BLD CALC: 24.2 % — LOW (ref 42–52)
HCT VFR BLD CALC: 25.1 % — LOW (ref 42–52)
HGB BLD-MCNC: 7.4 G/DL — LOW (ref 14–18)
HGB BLD-MCNC: 7.8 G/DL — LOW (ref 14–18)
IMM GRANULOCYTES NFR BLD AUTO: 3.9 % — HIGH (ref 0.1–0.3)
LYMPHOCYTES # BLD AUTO: 1.2 K/UL — SIGNIFICANT CHANGE UP (ref 1.2–3.4)
LYMPHOCYTES # BLD AUTO: 11.1 % — LOW (ref 20.5–51.1)
MAGNESIUM SERPL-MCNC: 2.5 MG/DL — HIGH (ref 1.8–2.4)
MAGNESIUM SERPL-MCNC: 2.5 MG/DL — HIGH (ref 1.8–2.4)
MCHC RBC-ENTMCNC: 26.8 PG — LOW (ref 27–31)
MCHC RBC-ENTMCNC: 27.3 PG — SIGNIFICANT CHANGE UP (ref 27–31)
MCHC RBC-ENTMCNC: 30.6 G/DL — LOW (ref 32–37)
MCHC RBC-ENTMCNC: 31.1 G/DL — LOW (ref 32–37)
MCV RBC AUTO: 87.7 FL — SIGNIFICANT CHANGE UP (ref 80–94)
MCV RBC AUTO: 87.8 FL — SIGNIFICANT CHANGE UP (ref 80–94)
MONOCYTES # BLD AUTO: 1.27 K/UL — HIGH (ref 0.1–0.6)
MONOCYTES NFR BLD AUTO: 11.8 % — HIGH (ref 1.7–9.3)
NEUTROPHILS # BLD AUTO: 7.56 K/UL — HIGH (ref 1.4–6.5)
NEUTROPHILS NFR BLD AUTO: 70.1 % — SIGNIFICANT CHANGE UP (ref 42.2–75.2)
NRBC # BLD: 0 /100 WBCS — SIGNIFICANT CHANGE UP (ref 0–0)
NRBC # BLD: 1 /100 WBCS — HIGH (ref 0–0)
NRBC BLD-RTO: 0 /100 WBCS — SIGNIFICANT CHANGE UP (ref 0–0)
NRBC BLD-RTO: 1 /100 WBCS — HIGH (ref 0–0)
PLATELET # BLD AUTO: 145 K/UL — SIGNIFICANT CHANGE UP (ref 130–400)
PLATELET # BLD AUTO: 165 K/UL — SIGNIFICANT CHANGE UP (ref 130–400)
PMV BLD: 11.3 FL — HIGH (ref 7.4–10.4)
PMV BLD: 11.3 FL — HIGH (ref 7.4–10.4)
POTASSIUM SERPL-MCNC: 4.8 MMOL/L — SIGNIFICANT CHANGE UP (ref 3.5–5)
POTASSIUM SERPL-MCNC: 4.8 MMOL/L — SIGNIFICANT CHANGE UP (ref 3.5–5)
POTASSIUM SERPL-SCNC: 4.8 MMOL/L — SIGNIFICANT CHANGE UP (ref 3.5–5)
POTASSIUM SERPL-SCNC: 4.8 MMOL/L — SIGNIFICANT CHANGE UP (ref 3.5–5)
PROT SERPL-MCNC: 5.7 G/DL — LOW (ref 6–8)
PROT SERPL-MCNC: 6 G/DL — SIGNIFICANT CHANGE UP (ref 6–8)
RBC # BLD: 2.76 M/UL — LOW (ref 4.7–6.1)
RBC # BLD: 2.86 M/UL — LOW (ref 4.7–6.1)
RBC # FLD: 18.5 % — HIGH (ref 11.5–14.5)
RBC # FLD: 18.6 % — HIGH (ref 11.5–14.5)
SODIUM SERPL-SCNC: 132 MMOL/L — LOW (ref 135–146)
SODIUM SERPL-SCNC: 132 MMOL/L — LOW (ref 135–146)
WBC # BLD: 10.78 K/UL — SIGNIFICANT CHANGE UP (ref 4.8–10.8)
WBC # BLD: 11.31 K/UL — HIGH (ref 4.8–10.8)
WBC # FLD AUTO: 10.78 K/UL — SIGNIFICANT CHANGE UP (ref 4.8–10.8)
WBC # FLD AUTO: 11.31 K/UL — HIGH (ref 4.8–10.8)

## 2025-02-04 PROCEDURE — 71045 X-RAY EXAM CHEST 1 VIEW: CPT | Mod: 26

## 2025-02-04 PROCEDURE — 99233 SBSQ HOSP IP/OBS HIGH 50: CPT | Mod: FS

## 2025-02-04 PROCEDURE — 99291 CRITICAL CARE FIRST HOUR: CPT

## 2025-02-04 PROCEDURE — 93010 ELECTROCARDIOGRAM REPORT: CPT

## 2025-02-04 RX ORDER — BISACODYL 5 MG
5 TABLET, DELAYED RELEASE (ENTERIC COATED) ORAL EVERY 12 HOURS
Refills: 0 | Status: DISCONTINUED | OUTPATIENT
Start: 2025-02-04 | End: 2025-02-05

## 2025-02-04 RX ORDER — MINERAL OIL
133 OIL (ML) MISCELLANEOUS ONCE
Refills: 0 | Status: DISCONTINUED | OUTPATIENT
Start: 2025-02-04 | End: 2025-02-05

## 2025-02-04 RX ORDER — LIDOCAINE HYDROCHLORIDE 20 MG/ML
1 JELLY TOPICAL EVERY 24 HOURS
Refills: 0 | Status: COMPLETED | OUTPATIENT
Start: 2025-02-04 | End: 2025-02-07

## 2025-02-04 RX ORDER — ACETAMINOPHEN 500 MG/5ML
1000 LIQUID (ML) ORAL ONCE
Refills: 0 | Status: COMPLETED | OUTPATIENT
Start: 2025-02-05 | End: 2025-02-05

## 2025-02-04 RX ORDER — ACETAMINOPHEN 500 MG/5ML
1000 LIQUID (ML) ORAL ONCE
Refills: 0 | Status: COMPLETED | OUTPATIENT
Start: 2025-02-04 | End: 2025-02-04

## 2025-02-04 RX ORDER — DOBUTAMINE 250 MG/20ML
0.5 INJECTION INTRAVENOUS
Qty: 500 | Refills: 0 | Status: DISCONTINUED | OUTPATIENT
Start: 2025-02-04 | End: 2025-02-05

## 2025-02-04 RX ORDER — ALBUMIN (HUMAN) 12.5 G/50ML
50 INJECTION, SOLUTION INTRAVENOUS EVERY 6 HOURS
Refills: 0 | Status: COMPLETED | OUTPATIENT
Start: 2025-02-04 | End: 2025-02-05

## 2025-02-04 RX ORDER — DOBUTAMINE 250 MG/20ML
1 INJECTION INTRAVENOUS
Qty: 500 | Refills: 0 | Status: DISCONTINUED | OUTPATIENT
Start: 2025-02-04 | End: 2025-02-04

## 2025-02-04 RX ORDER — LACTULOSE 10 G/15ML
20 SOLUTION ORAL
Refills: 0 | Status: DISCONTINUED | OUTPATIENT
Start: 2025-02-04 | End: 2025-02-05

## 2025-02-04 RX ORDER — INSULIN GLARGINE-YFGN 100 [IU]/ML
40 INJECTION, SOLUTION SUBCUTANEOUS
Refills: 0 | Status: DISCONTINUED | OUTPATIENT
Start: 2025-02-04 | End: 2025-02-24

## 2025-02-04 RX ORDER — ACETAMINOPHEN 500 MG/5ML
1000 LIQUID (ML) ORAL ONCE
Refills: 0 | Status: COMPLETED | OUTPATIENT
Start: 2025-02-05 | End: 2025-02-04

## 2025-02-04 RX ORDER — TAMSULOSIN HYDROCHLORIDE 0.4 MG/1
0.4 CAPSULE ORAL AT BEDTIME
Refills: 0 | Status: DISCONTINUED | OUTPATIENT
Start: 2025-02-04 | End: 2025-02-24

## 2025-02-04 RX ADMIN — LIDOCAINE HYDROCHLORIDE 1 PATCH: 20 JELLY TOPICAL at 07:31

## 2025-02-04 RX ADMIN — GABAPENTIN 300 MILLIGRAM(S): 400 CAPSULE ORAL at 05:59

## 2025-02-04 RX ADMIN — DEXTROMETHORPHAN HBR, GUAIFENESIN 600 MILLIGRAM(S): 200 LIQUID ORAL at 05:59

## 2025-02-04 RX ADMIN — ALBUMIN (HUMAN) 50 MILLILITER(S): 12.5 INJECTION, SOLUTION INTRAVENOUS at 18:02

## 2025-02-04 RX ADMIN — OXYCODONE HYDROCHLORIDE 5 MILLIGRAM(S): 30 TABLET ORAL at 00:15

## 2025-02-04 RX ADMIN — POLYETHYLENE GLYCOL 3350 17 GRAM(S): 17 POWDER, FOR SOLUTION ORAL at 13:38

## 2025-02-04 RX ADMIN — LEVALBUTEROL HYDROCHLORIDE 1.25 MILLIGRAM(S): 1.25 SOLUTION RESPIRATORY (INHALATION) at 20:17

## 2025-02-04 RX ADMIN — DOBUTAMINE 1.71 MICROGRAM(S)/KG/MIN: 250 INJECTION INTRAVENOUS at 16:28

## 2025-02-04 RX ADMIN — INSULIN LISPRO 2: 100 INJECTION, SOLUTION INTRAVENOUS; SUBCUTANEOUS at 16:42

## 2025-02-04 RX ADMIN — INSULIN LISPRO 10 UNIT(S): 100 INJECTION, SOLUTION INTRAVENOUS; SUBCUTANEOUS at 16:42

## 2025-02-04 RX ADMIN — INSULIN LISPRO 10 UNIT(S): 100 INJECTION, SOLUTION INTRAVENOUS; SUBCUTANEOUS at 08:59

## 2025-02-04 RX ADMIN — Medication 5 MILLIGRAM(S): at 18:03

## 2025-02-04 RX ADMIN — HEPARIN SODIUM 5000 UNIT(S): 1000 INJECTION INTRAVENOUS; SUBCUTANEOUS at 05:59

## 2025-02-04 RX ADMIN — HEPARIN SODIUM 5000 UNIT(S): 1000 INJECTION INTRAVENOUS; SUBCUTANEOUS at 16:28

## 2025-02-04 RX ADMIN — DEXTROMETHORPHAN HBR, GUAIFENESIN 600 MILLIGRAM(S): 200 LIQUID ORAL at 18:03

## 2025-02-04 RX ADMIN — INSULIN LISPRO 2: 100 INJECTION, SOLUTION INTRAVENOUS; SUBCUTANEOUS at 14:17

## 2025-02-04 RX ADMIN — MILRINONE LACTATE 4.28 MICROGRAM(S)/KG/MIN: 1 INJECTION, SOLUTION INTRAVENOUS at 16:25

## 2025-02-04 RX ADMIN — INSULIN GLARGINE-YFGN 40 UNIT(S): 100 INJECTION, SOLUTION SUBCUTANEOUS at 14:07

## 2025-02-04 RX ADMIN — Medication 1000 MILLIGRAM(S): at 17:28

## 2025-02-04 RX ADMIN — INSULIN LISPRO 10 UNIT(S): 100 INJECTION, SOLUTION INTRAVENOUS; SUBCUTANEOUS at 14:16

## 2025-02-04 RX ADMIN — FOLIC ACID 1 MILLIGRAM(S): 1 TABLET ORAL at 13:15

## 2025-02-04 RX ADMIN — TAMSULOSIN HYDROCHLORIDE 0.4 MILLIGRAM(S): 0.4 CAPSULE ORAL at 22:06

## 2025-02-04 RX ADMIN — ALBUMIN (HUMAN) 50 MILLILITER(S): 12.5 INJECTION, SOLUTION INTRAVENOUS at 13:17

## 2025-02-04 RX ADMIN — ROSUVASTATIN CALCIUM 20 MILLIGRAM(S): 20 TABLET, FILM COATED ORAL at 22:05

## 2025-02-04 RX ADMIN — LACTULOSE 20 GRAM(S): 10 SOLUTION ORAL at 18:03

## 2025-02-04 RX ADMIN — Medication 100 MILLIGRAM(S): at 05:58

## 2025-02-04 RX ADMIN — INSULIN LISPRO 2: 100 INJECTION, SOLUTION INTRAVENOUS; SUBCUTANEOUS at 08:59

## 2025-02-04 RX ADMIN — LIDOCAINE HYDROCHLORIDE 1 PATCH: 20 JELLY TOPICAL at 00:00

## 2025-02-04 RX ADMIN — DOBUTAMINE 3.42 MICROGRAM(S)/KG/MIN: 250 INJECTION INTRAVENOUS at 09:25

## 2025-02-04 RX ADMIN — Medication 40 MILLIGRAM(S): at 09:06

## 2025-02-04 RX ADMIN — Medication 100 MILLIGRAM(S): at 18:02

## 2025-02-04 RX ADMIN — LACTULOSE 20 GRAM(S): 10 SOLUTION ORAL at 13:20

## 2025-02-04 RX ADMIN — LIDOCAINE HYDROCHLORIDE 1 PATCH: 20 JELLY TOPICAL at 16:26

## 2025-02-04 RX ADMIN — Medication 1 APPLICATION(S): at 13:16

## 2025-02-04 RX ADMIN — Medication 81 MILLIGRAM(S): at 13:35

## 2025-02-04 RX ADMIN — Medication 2 TABLET(S): at 22:05

## 2025-02-04 RX ADMIN — LEVALBUTEROL HYDROCHLORIDE 1.25 MILLIGRAM(S): 1.25 SOLUTION RESPIRATORY (INHALATION) at 09:37

## 2025-02-04 RX ADMIN — LIDOCAINE HYDROCHLORIDE 1 PATCH: 20 JELLY TOPICAL at 20:22

## 2025-02-04 RX ADMIN — IRON SUCROSE 100 MILLIGRAM(S): 20 INJECTION, SOLUTION INTRAVENOUS at 09:11

## 2025-02-04 RX ADMIN — LACTULOSE 20 GRAM(S): 10 SOLUTION ORAL at 09:05

## 2025-02-04 RX ADMIN — Medication 400 MILLIGRAM(S): at 16:28

## 2025-02-04 RX ADMIN — Medication 400 MILLIGRAM(S): at 23:59

## 2025-02-04 RX ADMIN — LIDOCAINE HYDROCHLORIDE 1 PATCH: 20 JELLY TOPICAL at 20:21

## 2025-02-04 RX ADMIN — HEPARIN SODIUM 5000 UNIT(S): 1000 INJECTION INTRAVENOUS; SUBCUTANEOUS at 22:06

## 2025-02-04 RX ADMIN — LIDOCAINE HYDROCHLORIDE 1 PATCH: 20 JELLY TOPICAL at 16:27

## 2025-02-04 NOTE — PROGRESS NOTE ADULT - SUBJECTIVE AND OBJECTIVE BOX
INTERVAL HPI/OVERNIGHT EVENTS:    Patient denies fever, chills, dizziness, syncope, chest pain, palpitations, SOB, cough, abd pain, n/v/d/c, dysuria, hematuria or unusual rash.     MEDICATIONS  (STANDING):  albumin human 25% IVPB 50 milliLiter(s) IV Intermittent every 6 hours  aspirin enteric coated 81 milliGRAM(s) Oral daily  bisacodyl 5 milliGRAM(s) Oral every 12 hours  bisacodyl Suppository 10 milliGRAM(s) Rectal once  ceFAZolin   IVPB 2000 milliGRAM(s) IV Intermittent every 12 hours  chlorhexidine 2% Cloths 1 Application(s) Topical daily  dextrose 5%. 1000 milliLiter(s) (50 mL/Hr) IV Continuous <Continuous>  dextrose 5%. 1000 milliLiter(s) (100 mL/Hr) IV Continuous <Continuous>  dextrose 50% Injectable 50 milliLiter(s) IV Push every 15 minutes  dextrose 50% Injectable 25 milliLiter(s) IV Push every 15 minutes  DOBUTamine Infusion 0.5 MICROgram(s)/kG/Min (1.71 mL/Hr) IV Continuous <Continuous>  fluticasone propionate/ salmeterol 250-50 MICROgram(s) Diskus 1 Dose(s) Inhalation two times a day  folic acid 1 milliGRAM(s) Oral daily  glucagon  Injectable 1 milliGRAM(s) IntraMuscular once  guaiFENesin  milliGRAM(s) Oral every 12 hours  heparin   Injectable 5000 Unit(s) SubCutaneous every 8 hours  influenza  Vaccine (HIGH DOSE) 0.5 milliLiter(s) IntraMuscular once  insulin glargine Injectable (LANTUS) 40 Unit(s) SubCutaneous before breakfast  insulin lispro (ADMELOG) corrective regimen sliding scale   SubCutaneous three times a day before meals  insulin lispro (ADMELOG) corrective regimen sliding scale   SubCutaneous at bedtime  insulin lispro Injectable (ADMELOG) 10 Unit(s) SubCutaneous three times a day before meals  iron sucrose IVPB 200 milliGRAM(s) IV Intermittent every 24 hours  lactulose Syrup 20 Gram(s) Oral two times a day  lactulose Syrup 20 Gram(s) Oral daily  levalbuterol Inhalation 1.25 milliGRAM(s) Inhalation four times a day  lidocaine   4% Patch 1 Patch Transdermal every 24 hours  lidocaine   4% Patch 1 Patch Transdermal every 24 hours  milrinone Infusion 0.125 MICROgram(s)/kG/Min (4.28 mL/Hr) IV Continuous <Continuous>  mineral oil enema 133 milliLiter(s) Rectal once  mupirocin 2% Nasal 1 Application(s) Both Nostrils every 12 hours  pantoprazole    Tablet 40 milliGRAM(s) Oral before breakfast  polyethylene glycol 3350 17 Gram(s) Oral daily  rosuvastatin 20 milliGRAM(s) Oral at bedtime  senna 2 Tablet(s) Oral at bedtime  sodium chloride 0.9%. 1000 milliLiter(s) (20 mL/Hr) IV Continuous <Continuous>  tamsulosin 0.4 milliGRAM(s) Oral at bedtime    MEDICATIONS  (PRN):  dextrose Oral Gel 15 Gram(s) Oral once PRN Blood Glucose LESS THAN 70 milliGRAM(s)/deciliter  hydrALAZINE Injectable 5 milliGRAM(s) IV Push every 4 hours PRN Systolic > 160  ondansetron Injectable 4 milliGRAM(s) IV Push every 4 hours PRN Nausea and/or Vomiting  oxyCODONE    IR 5 milliGRAM(s) Oral every 4 hours PRN Moderate Pain (4 - 6)  oxyCODONE    IR 10 milliGRAM(s) Oral every 4 hours PRN Severe Pain (7 - 10)      Allergies    No Known Allergies    Intolerances        REVIEW OF SYSTEMS    [ ] A ten-point review of systems was otherwise negative except as noted.  [ ] Due to altered mental status/intubation, subjective information were not able to be obtained from the patient. History was obtained, to the extent possible, from review of the chart and collateral sources of information.      Vital Signs Last 24 Hrs  T(C): 36.9 (04 Feb 2025 12:00), Max: 37.1 (04 Feb 2025 00:00)  T(F): 98.4 (04 Feb 2025 12:00), Max: 98.7 (04 Feb 2025 00:00)  HR: 106 (04 Feb 2025 15:00) (83 - 107)  BP: --  BP(mean): --  RR: 26 (04 Feb 2025 15:00) (12 - 35)  SpO2: 96% (04 Feb 2025 15:00) (93% - 100%)    Parameters below as of 04 Feb 2025 15:00  Patient On (Oxygen Delivery Method): nasal cannula  O2 Flow (L/min): 5      02-03-25 @ 07:01  -  02-04-25 @ 07:00  --------------------------------------------------------  IN: 1756.8 mL / OUT: 1620 mL / NET: 136.8 mL    02-04-25 @ 07:01  -  02-04-25 @ 17:14  --------------------------------------------------------  IN: 172.5 mL / OUT: 415 mL / NET: -242.5 mL        Physical Exam  GENERAL: In no apparent distress, well nourished, and hydrated.  HEART: Regular rate and rhythm; No murmurs, rubs, or gallops.  PULMONARY: Clear to auscultation and perfusion.  No rales, wheezing, or rhonchi bilaterally.  ABDOMEN: Soft, Nontender, Nondistended; Bowel sounds present  EXTREMITIES:  2+ Peripheral Pulses, No clubbing, cyanosis, or edema  NEUROLOGICAL: AO x4 ,MCKEON, speech clear    LABS:                        7.8    11.31 )-----------( 165      ( 04 Feb 2025 14:15 )             25.1     02-04    132[L]  |  95[L]  |  95[HH]  ----------------------------<  161[H]  4.8   |  21  |  2.4[H]    Ca    8.6      04 Feb 2025 14:15  Mg     2.5     02-04    TPro  6.0  /  Alb  3.7  /  TBili  0.5  /  DBili  x   /  AST  36  /  ALT  20  /  AlkPhos  174[H]  02-04      Urinalysis Basic - ( 04 Feb 2025 14:15 )    Color: x / Appearance: x / SG: x / pH: x  Gluc: 161 mg/dL / Ketone: x  / Bili: x / Urobili: x   Blood: x / Protein: x / Nitrite: x   Leuk Esterase: x / RBC: x / WBC x   Sq Epi: x / Non Sq Epi: x / Bacteria: x        02-03-25 @ 07:01  -  02-04-25 @ 07:00  --------------------------------------------------------  IN: 1756.8 mL / OUT: 1620 mL / NET: 136.8 mL    02-04-25 @ 07:01  -  02-04-25 @ 17:14  --------------------------------------------------------  IN: 172.5 mL / OUT: 415 mL / NET: -242.5 mL        02-03-25 @ 07:01  -  02-04-25 @ 07:00  --------------------------------------------------------  IN: 1756.8 mL / OUT: 1620 mL / NET: 136.8 mL    02-04-25 @ 07:01  -  02-04-25 @ 17:14  --------------------------------------------------------  IN: 172.5 mL / OUT: 415 mL / NET: -242.5 mL        RADIOLOGY:   INTERVAL HPI/OVERNIGHT EVENTS:  Pt remains on inotropic support. Currentyl in AF 100s, vandana down to 40s 2/3. Short runs NSVT noted on tele review.   Patient denies fever, chills, dizziness, syncope, chest pain, palpitations, SOB, cough, abd pain, n/v/d/c, dysuria, hematuria or unusual rash.     MEDICATIONS  (STANDING):  albumin human 25% IVPB 50 milliLiter(s) IV Intermittent every 6 hours  aspirin enteric coated 81 milliGRAM(s) Oral daily  bisacodyl 5 milliGRAM(s) Oral every 12 hours  bisacodyl Suppository 10 milliGRAM(s) Rectal once  ceFAZolin   IVPB 2000 milliGRAM(s) IV Intermittent every 12 hours  chlorhexidine 2% Cloths 1 Application(s) Topical daily  dextrose 5%. 1000 milliLiter(s) (50 mL/Hr) IV Continuous <Continuous>  dextrose 5%. 1000 milliLiter(s) (100 mL/Hr) IV Continuous <Continuous>  dextrose 50% Injectable 50 milliLiter(s) IV Push every 15 minutes  dextrose 50% Injectable 25 milliLiter(s) IV Push every 15 minutes  DOBUTamine Infusion 0.5 MICROgram(s)/kG/Min (1.71 mL/Hr) IV Continuous <Continuous>  fluticasone propionate/ salmeterol 250-50 MICROgram(s) Diskus 1 Dose(s) Inhalation two times a day  folic acid 1 milliGRAM(s) Oral daily  glucagon  Injectable 1 milliGRAM(s) IntraMuscular once  guaiFENesin  milliGRAM(s) Oral every 12 hours  heparin   Injectable 5000 Unit(s) SubCutaneous every 8 hours  influenza  Vaccine (HIGH DOSE) 0.5 milliLiter(s) IntraMuscular once  insulin glargine Injectable (LANTUS) 40 Unit(s) SubCutaneous before breakfast  insulin lispro (ADMELOG) corrective regimen sliding scale   SubCutaneous three times a day before meals  insulin lispro (ADMELOG) corrective regimen sliding scale   SubCutaneous at bedtime  insulin lispro Injectable (ADMELOG) 10 Unit(s) SubCutaneous three times a day before meals  iron sucrose IVPB 200 milliGRAM(s) IV Intermittent every 24 hours  lactulose Syrup 20 Gram(s) Oral two times a day  lactulose Syrup 20 Gram(s) Oral daily  levalbuterol Inhalation 1.25 milliGRAM(s) Inhalation four times a day  lidocaine   4% Patch 1 Patch Transdermal every 24 hours  lidocaine   4% Patch 1 Patch Transdermal every 24 hours  milrinone Infusion 0.125 MICROgram(s)/kG/Min (4.28 mL/Hr) IV Continuous <Continuous>  mineral oil enema 133 milliLiter(s) Rectal once  mupirocin 2% Nasal 1 Application(s) Both Nostrils every 12 hours  pantoprazole    Tablet 40 milliGRAM(s) Oral before breakfast  polyethylene glycol 3350 17 Gram(s) Oral daily  rosuvastatin 20 milliGRAM(s) Oral at bedtime  senna 2 Tablet(s) Oral at bedtime  sodium chloride 0.9%. 1000 milliLiter(s) (20 mL/Hr) IV Continuous <Continuous>  tamsulosin 0.4 milliGRAM(s) Oral at bedtime    MEDICATIONS  (PRN):  dextrose Oral Gel 15 Gram(s) Oral once PRN Blood Glucose LESS THAN 70 milliGRAM(s)/deciliter  hydrALAZINE Injectable 5 milliGRAM(s) IV Push every 4 hours PRN Systolic > 160  ondansetron Injectable 4 milliGRAM(s) IV Push every 4 hours PRN Nausea and/or Vomiting  oxyCODONE    IR 5 milliGRAM(s) Oral every 4 hours PRN Moderate Pain (4 - 6)  oxyCODONE    IR 10 milliGRAM(s) Oral every 4 hours PRN Severe Pain (7 - 10)      Allergies    No Known Allergies    Intolerances        REVIEW OF SYSTEMS    [x] A ten-point review of systems was otherwise negative except as noted.  [ ] Due to altered mental status/intubation, subjective information were not able to be obtained from the patient. History was obtained, to the extent possible, from review of the chart and collateral sources of information.      Vital Signs Last 24 Hrs  T(C): 36.9 (04 Feb 2025 12:00), Max: 37.1 (04 Feb 2025 00:00)  T(F): 98.4 (04 Feb 2025 12:00), Max: 98.7 (04 Feb 2025 00:00)  HR: 106 (04 Feb 2025 15:00) (83 - 107)  BP: --  BP(mean): --  RR: 26 (04 Feb 2025 15:00) (12 - 35)  SpO2: 96% (04 Feb 2025 15:00) (93% - 100%)    Parameters below as of 04 Feb 2025 15:00  Patient On (Oxygen Delivery Method): nasal cannula  O2 Flow (L/min): 5      02-03-25 @ 07:01  -  02-04-25 @ 07:00  --------------------------------------------------------  IN: 1756.8 mL / OUT: 1620 mL / NET: 136.8 mL    02-04-25 @ 07:01  -  02-04-25 @ 17:14  --------------------------------------------------------  IN: 172.5 mL / OUT: 415 mL / NET: -242.5 mL        Physical Exam  GENERAL: Morbidly obese male, In no apparent distress, well nourished, and hydrated.  HEART: Regular rate and rhythm; No murmurs, rubs, or gallops.  PULMONARY: Clear to auscultation and perfusion.  No rales, wheezing, or rhonchi bilaterally.  ABDOMEN: Soft, Nontender, Nondistended; Bowel sounds present  EXTREMITIES:  2+ Peripheral Pulses, No clubbing, cyanosis, or edema  NEUROLOGICAL: AO x4 ,MCKEON, speech clear    LABS:                        7.8    11.31 )-----------( 165      ( 04 Feb 2025 14:15 )             25.1     02-04    132[L]  |  95[L]  |  95[HH]  ----------------------------<  161[H]  4.8   |  21  |  2.4[H]    Ca    8.6      04 Feb 2025 14:15  Mg     2.5     02-04    TPro  6.0  /  Alb  3.7  /  TBili  0.5  /  DBili  x   /  AST  36  /  ALT  20  /  AlkPhos  174[H]  02-04      Urinalysis Basic - ( 04 Feb 2025 14:15 )    Color: x / Appearance: x / SG: x / pH: x  Gluc: 161 mg/dL / Ketone: x  / Bili: x / Urobili: x   Blood: x / Protein: x / Nitrite: x   Leuk Esterase: x / RBC: x / WBC x   Sq Epi: x / Non Sq Epi: x / Bacteria: x        02-03-25 @ 07:01  -  02-04-25 @ 07:00  --------------------------------------------------------  IN: 1756.8 mL / OUT: 1620 mL / NET: 136.8 mL    02-04-25 @ 07:01  -  02-04-25 @ 17:14  --------------------------------------------------------  IN: 172.5 mL / OUT: 415 mL / NET: -242.5 mL        02-03-25 @ 07:01  -  02-04-25 @ 07:00  --------------------------------------------------------  IN: 1756.8 mL / OUT: 1620 mL / NET: 136.8 mL    02-04-25 @ 07:01  -  02-04-25 @ 17:14  --------------------------------------------------------  IN: 172.5 mL / OUT: 415 mL / NET: -242.5 mL        RADIOLOGY:

## 2025-02-04 NOTE — PROGRESS NOTE ADULT - SUBJECTIVE AND OBJECTIVE BOX
Nephrology progress note    Patient is seen and examined, events over the last 24 h noted .  sitting in chair  said he was dizzy lethargic and sleepy    Allergies:  No Known Allergies    Hospital Medications:   MEDICATIONS  (STANDING):  albumin human 25% IVPB 50 milliLiter(s) IV Intermittent every 6 hours  aspirin enteric coated 81 milliGRAM(s) Oral daily  bisacodyl 5 milliGRAM(s) Oral every 12 hours  bisacodyl Suppository 10 milliGRAM(s) Rectal once  ceFAZolin   IVPB 2000 milliGRAM(s) IV Intermittent every 12 hours  DOBUTamine Infusion 1 MICROgram(s)/kG/Min (3.42 mL/Hr) IV Continuous <Continuous>  fluticasone propionate/ salmeterol 250-50 MICROgram(s) Diskus 1 Dose(s) Inhalation two times a day  folic acid 1 milliGRAM(s) Oral daily  gabapentin 300 milliGRAM(s) Oral every 8 hours  glucagon  Injectable 1 milliGRAM(s) IntraMuscular once  guaiFENesin  milliGRAM(s) Oral every 12 hours  heparin   Injectable 5000 Unit(s) SubCutaneous every 8 hours  influenza  Vaccine (HIGH DOSE) 0.5 milliLiter(s) IntraMuscular once  insulin glargine Injectable (LANTUS) 35 Unit(s) SubCutaneous every morning  insulin lispro (ADMELOG) corrective regimen sliding scale   SubCutaneous three times a day before meals  insulin lispro (ADMELOG) corrective regimen sliding scale   SubCutaneous at bedtime  insulin lispro Injectable (ADMELOG) 10 Unit(s) SubCutaneous three times a day before meals  iron sucrose IVPB 200 milliGRAM(s) IV Intermittent every 24 hours  lactulose Syrup 20 Gram(s) Oral daily  lactulose Syrup 20 Gram(s) Oral two times a day  levalbuterol Inhalation 1.25 milliGRAM(s) Inhalation four times a day  lidocaine   4% Patch 1 Patch Transdermal daily  lidocaine   4% Patch 1 Patch Transdermal every 24 hours  milrinone Infusion 0.125 MICROgram(s)/kG/Min (4.28 mL/Hr) IV Continuous <Continuous>  mupirocin 2% Nasal 1 Application(s) Both Nostrils every 12 hours  pantoprazole    Tablet 40 milliGRAM(s) Oral before breakfast  polyethylene glycol 3350 17 Gram(s) Oral daily  rosuvastatin 20 milliGRAM(s) Oral at bedtime  senna 2 Tablet(s) Oral at bedtime  sodium chloride 0.9%. 1000 milliLiter(s) (20 mL/Hr) IV Continuous <Continuous>  tamsulosin 0.4 milliGRAM(s) Oral at bedtime        VITALS:  T(F): 98.4 (02-04-25 @ 04:00), Max: 98.7 (02-04-25 @ 00:00)  HR: 107 (02-04-25 @ 07:00)  RR: 17 (02-04-25 @ 07:00)  SpO2: 98% (02-04-25 @ 07:00)    02-02 @ 07:01  -  02-03 @ 07:00  --------------------------------------------------------  IN: 1736.2 mL / OUT: 2490 mL / NET: -753.8 mL    02-03 @ 07:01  -  02-04 @ 07:00  --------------------------------------------------------  IN: 1756.8 mL / OUT: 1620 mL / NET: 136.8 mL          PHYSICAL EXAM:  Constitutional: NAD,  Respiratory: CTAB, no wheezes, rales or rhonchi  Cardiovascular: S1, S2, RRR  Gastrointestinal: BS+, soft, NT/ND  Extremities: No cyanosis or clubbing. plus one edema  :  No prince.   Skin: No rashes    LABS:  02-04    132[L]  |  96[L]  |  94[HH]  ----------------------------<  135[H]  4.8   |  23  |  2.5[H]    Ca    8.3[L]      04 Feb 2025 01:37  Mg     2.5     02-04    TPro  5.7[L]  /  Alb  3.6  /  TBili  0.5  /  DBili      /  AST  36  /  ALT  22  /  AlkPhos  180[H]  02-04                          7.4    10.78 )-----------( 145      ( 04 Feb 2025 01:37 )             24.2       Urine Studies:  Urinalysis Basic - ( 04 Feb 2025 01:37 )    Color:  / Appearance:  / SG:  / pH:   Gluc: 135 mg/dL / Ketone:   / Bili:  / Urobili:    Blood:  / Protein:  / Nitrite:    Leuk Esterase:  / RBC:  / WBC    Sq Epi:  / Non Sq Epi:  / Bacteria:           TSH 2.72      [12-04-24 @ 04:45]          RADIOLOGY & ADDITIONAL STUDIES:

## 2025-02-04 NOTE — DIETITIAN INITIAL EVALUATION ADULT - NSPROEDAREADYLEARN_GEN_A_NUR
Reviewed diet order & fluid restriction. Encouraged adequate po intake. Discussed DM & Heart healthy nutrition therapy concepts. Discussed FLAKITO nutrition therapy.

## 2025-02-04 NOTE — PROGRESS NOTE ADULT - SUBJECTIVE AND OBJECTIVE BOX
OPERATIVE PROCEDURE(s):                POD # 7 MV replacement, LLL ligation and MAZE                      64yMale  SURGEON(s): ISELA Blakely  SUBJECTIVE ASSESSMENT: doing a little better    Vital Signs Last 24 Hrs  T(F): 97.9 (2025 16:00), Max: 97.9 (2025 16:00)  HR: 107 (2025 07:00) (74 - 107)  ABP: 128/63 (2025 06:00) (115/49 - 159/67)  ABP(mean): 81 (2025 06:00)  RR: 17 (2025 07:00) (12 - 35)  SpO2: 98% (2025 07:00) (93% - 100%)  I&O's Detail    2025 07:01  -  2025 07:00  --------------------------------------------------------  IN:    DOBUTamine: 8.6 mL    DOBUTamine: 82.1 mL    IV PiggyBack: 100 mL    IV PiggyBack: 50 mL    Milrinone: 77.4 mL    Oral Fluid: 1197 mL    sodium chloride 0.9%: 180 mL  Total IN: 1695.1 mL    OUT:    Indwelling Catheter - Urethral (mL): 1120 mL  Total OUT: 1120 mL    Net:   I&O's Detail    2025 07:01  -  2025 07:00  --------------------------------------------------------  Total NET: -753.8 mL    2025 07:01  -  2025 07:00  --------------------------------------------------------  Total NET: 575.1 mL    CAPILLARY BLOOD GLUCOSE    POCT Blood Glucose.: 192 mg/dL (2025 07:04)  POCT Blood Glucose.: 157 mg/dL (2025 21:58)  POCT Blood Glucose.: 155 mg/dL (2025 16:38)  POCT Blood Glucose.: 137 mg/dL (2025 11:46)    Physical Exam:  General: NAD; A&Ox3  Cardiac: S1/S2, RRR, no murmur, no rubs  Lungs: unlabored shallow respirations, bilateral bs decreased at bases  Abdomen: Soft/NT/protuberant  Sternum: Intact, no click, incision healing well with no drainage  Extremities: No edema b/l lower extremities    Central Venous Catheter: Yes[]  critical patient   EPICARDIAL WIRES:  [] YES  BOWEL MOVEMENT:  [] YES        LABS:                        7.4[L]  10.78 )-----------( 145      ( 2025 01:37 )             24.2[L]                        7.7[L]  10.61 )-----------( 145      ( 2025 14:50 )             25.2[L]    0204    132[L]  |  96[L]  |  94[HH]  ----------------------------<  135[H]  4.8   |  23  |  2.5[H]      135  |  98  |  94[HH]  ----------------------------<  132[H]  4.8   |  22  |  2.7[H]    Ca    8.3[L]      2025 01:37  Mg     2.5     -    TPro  5.7[L] [6.0 - 8.0]  /  Alb  3.6 [3.5 - 5.2]  /  TBili  0.5 [0.2 - 1.2]  /  DBili  x   /  AST  36 [0 - 41]  /  ALT  22 [0 - 41]  /  AlkPhos  180[H] [30 - 115]  02-04        ABG - ( 2025 11:34 )  pH: 7.33  /  pCO2: 41    /  pO2: 101   / HCO3: 22    / Base Excess: -4.0  /  SaO2: 98.4  /  LA: 1.6        RADIOLOGY & ADDITIONAL TESTS:  CXR:  EKG:  MEDICATIONS  (STANDING):  aspirin enteric coated 81 milliGRAM(s) Oral daily  bisacodyl Suppository 10 milliGRAM(s) Rectal once  ceFAZolin   IVPB 2000 milliGRAM(s) IV Intermittent every 12 hours  chlorhexidine 2% Cloths 1 Application(s) Topical daily  dextrose 5%. 1000 milliLiter(s) (50 mL/Hr) IV Continuous <Continuous>  dextrose 5%. 1000 milliLiter(s) (100 mL/Hr) IV Continuous <Continuous>  dextrose 50% Injectable 50 milliLiter(s) IV Push every 15 minutes  dextrose 50% Injectable 25 milliLiter(s) IV Push every 15 minutes  DOBUTamine Infusion 1.5 MICROgram(s)/kG/Min (5.13 mL/Hr) IV Continuous <Continuous>  fluticasone propionate/ salmeterol 250-50 MICROgram(s) Diskus 1 Dose(s) Inhalation two times a day  folic acid 1 milliGRAM(s) Oral daily  gabapentin 300 milliGRAM(s) Oral every 8 hours  glucagon  Injectable 1 milliGRAM(s) IntraMuscular once  guaiFENesin  milliGRAM(s) Oral every 12 hours  heparin   Injectable 5000 Unit(s) SubCutaneous every 8 hours  influenza  Vaccine (HIGH DOSE) 0.5 milliLiter(s) IntraMuscular once  insulin glargine Injectable (LANTUS) 35 Unit(s) SubCutaneous every morning  insulin lispro (ADMELOG) corrective regimen sliding scale   SubCutaneous three times a day before meals  insulin lispro (ADMELOG) corrective regimen sliding scale   SubCutaneous at bedtime  insulin lispro Injectable (ADMELOG) 10 Unit(s) SubCutaneous three times a day before meals  iron sucrose IVPB 200 milliGRAM(s) IV Intermittent every 24 hours  lactulose Syrup 20 Gram(s) Oral daily  levalbuterol Inhalation 1.25 milliGRAM(s) Inhalation four times a day  lidocaine   4% Patch 1 Patch Transdermal daily  lidocaine   4% Patch 1 Patch Transdermal every 24 hours  milrinone Infusion 0.125 MICROgram(s)/kG/Min (4.28 mL/Hr) IV Continuous <Continuous>  mupirocin 2% Nasal 1 Application(s) Both Nostrils every 12 hours  pantoprazole    Tablet 40 milliGRAM(s) Oral before breakfast  polyethylene glycol 3350 17 Gram(s) Oral daily  rosuvastatin 20 milliGRAM(s) Oral at bedtime  senna 2 Tablet(s) Oral at bedtime  sodium chloride 0.9%. 1000 milliLiter(s) (20 mL/Hr) IV Continuous <Continuous>    MEDICATIONS  (PRN):  ALPRAZolam 0.25 milliGRAM(s) Oral at bedtime PRN anxiety  dextrose Oral Gel 15 Gram(s) Oral once PRN Blood Glucose LESS THAN 70 milliGRAM(s)/deciliter  hydrALAZINE Injectable 5 milliGRAM(s) IV Push every 4 hours PRN Systolic > 160  ondansetron Injectable 4 milliGRAM(s) IV Push every 4 hours PRN Nausea and/or Vomiting  oxyCODONE    IR 5 milliGRAM(s) Oral every 4 hours PRN Moderate Pain (4 - 6)  oxyCODONE    IR 10 milliGRAM(s) Oral every 4 hours PRN Severe Pain (7 - 10)    Allergies    No Known Allergies    Intolerances      Ambulation/Activity Status: ambulate with assistance    · Assessment	  64y Male status-post MVR / SHEILA ligation and MAZE POD # 7  - Case and plan discussed with CTU Intensivist and CT Surgeon - Dr. Blakely/Mauri/Laron  - Continue CTU supportive care    - Continue DVT/GI prophylaxis  - Incentive Spirometry 10 times an hour  - Continue to advance physical activity as tolerated and continue PT/OT as directed  1. Continue ASA, statin, no bb for junctional bradycardia, pain control,   2. madelung disease: cont bipap, respiratory tx's  3. A. Fib prophylaxis: cont to monitor electrolytes - hold AC at this time prior lower GI bleed - s/p MAZE and SHEILA closure  4. junctional bradycardia/wenckebach - ep consult for poss ppm, EP following -      restarted given junctional/wenckebach rythm   5. DM/Glucose A1c 6.2 Control: d/c insulin gtt, start lantus 35/humalog 10 and sliding scale   6. bumex PRN for noncardiogenic fluid overload   7. franklin on CKD3a- renal consult- diuresis PRN, avoid hypotension   8. Ancef added for possible URI , pt with productive cough and b/l opacities seen on cxr    Social Service Disposition:  pt to assess    OPERATIVE PROCEDURE(s):                POD # 7 MV replacement, LLL ligation and MAZE                      64yMale  SURGEON(s): ISELA Blakely  SUBJECTIVE ASSESSMENT: doing a little better, state's feels tired having a stroke    Vital Signs Last 24 Hrs  T(F): 97.9 (2025 16:00), Max: 97.9 (2025 16:00)  HR: 107 (2025 07:00) (74 - 107)  ABP: 128/63 (2025 06:00) (115/49 - 159/67)  ABP(mean): 81 (2025 06:00)  RR: 17 (2025 07:00) (12 - 35)  SpO2: 98% (2025 07:00) (93% - 100%)  I&O's Detail    2025 07:01  -  2025 07:00  --------------------------------------------------------  IN:    DOBUTamine: 8.6 mL    DOBUTamine: 82.1 mL    IV PiggyBack: 100 mL    IV PiggyBack: 50 mL    Milrinone: 77.4 mL    Oral Fluid: 1197 mL    sodium chloride 0.9%: 180 mL  Total IN: 1695.1 mL    OUT:    Indwelling Catheter - Urethral (mL): 1120 mL  Total OUT: 1120 mL    Net:   I&O's Detail    2025 07:01  -  2025 07:00  --------------------------------------------------------  Total NET: -753.8 mL    2025 07:01  -  2025 07:00  --------------------------------------------------------  Total NET: 575.1 mL    CAPILLARY BLOOD GLUCOSE    POCT Blood Glucose.: 192 mg/dL (2025 07:04)  POCT Blood Glucose.: 157 mg/dL (2025 21:58)  POCT Blood Glucose.: 155 mg/dL (2025 16:38)  POCT Blood Glucose.: 137 mg/dL (2025 11:46)    Physical Exam:  General: NAD; A&Ox3  Cardiac: S1/S2, RRR, ? murmur, no rubs  Neck; very large neck, ? mass  Lungs: unlabored shallow respirations, bilateral bs decreased at bases  Abdomen: Soft/NT/protuberant  Sternum: Intact, no click, Prevena in place  Extremities: mild edema b/l lower extremities  Neuro: smile =, no arm drift, good strength neck rotation, good strength upper and lower ext, ROM intact    Central Venous Catheter: Yes[]  critical patient   EPICARDIAL WIRES:  [] YES  BOWEL MOVEMENT:  [] YES        LABS:                        7.4[L]  10.78 )-----------( 145      ( 2025 01:37 )             24.2[L]                        7.7[L]  10.61 )-----------( 145      ( 2025 14:50 )             25.2[L]    02-04    132[L]  |  96[L]  |  94[HH]  ----------------------------<  135[H]  4.8   |  23  |  2.5[H]  02-03    135  |  98  |  94[HH]  ----------------------------<  132[H]  4.8   |  22  |  2.7[H]    Ca    8.3[L]      2025 01:37  Mg     2.5     02-04    TPro  5.7[L] [6.0 - 8.0]  /  Alb  3.6 [3.5 - 5.2]  /  TBili  0.5 [0.2 - 1.2]  /  DBili  x   /  AST  36 [0 - 41]  /  ALT  22 [0 - 41]  /  AlkPhos  180[H] [30 - 115]  -    ABG - ( 2025 11:34 )  pH: 7.33  /  pCO2: 41    /  pO2: 101   / HCO3: 22    / Base Excess: -4.0  /  SaO2: 98.4  /  LA: 1.6      RADIOLOGY & ADDITIONAL TESTS:  CXR: < from: Xray Chest 1 View AP/PA (25 @ 05:52) >  Impression:  Unchanged bilateral pleural effusions, opacities.      < end of copied text >  < from: 12 Lead ECG (25 @ 08:10) >  Ventricular Rate 86 BPM    QRS Duration 92 ms    Q-T Interval 366 ms    QTC Calculation(Bazett) 437 ms    R Axis -45 degrees    T Axis 102 degrees    Diagnosis Line Atrial fibrillation  Inferior infarct , age undetermined  Poor R wave progression  ST & T wave abnormality, consider lateral ischemia    < end of copied text >    EKG:  MEDICATIONS  (STANDING):  aspirin enteric coated 81 milliGRAM(s) Oral daily  bisacodyl Suppository 10 milliGRAM(s) Rectal once  ceFAZolin   IVPB 2000 milliGRAM(s) IV Intermittent every 12 hours  chlorhexidine 2% Cloths 1 Application(s) Topical daily  dextrose 5%. 1000 milliLiter(s) (50 mL/Hr) IV Continuous <Continuous>  dextrose 5%. 1000 milliLiter(s) (100 mL/Hr) IV Continuous <Continuous>  dextrose 50% Injectable 50 milliLiter(s) IV Push every 15 minutes  dextrose 50% Injectable 25 milliLiter(s) IV Push every 15 minutes  DOBUTamine Infusion 1.5 MICROgram(s)/kG/Min (5.13 mL/Hr) IV Continuous <Continuous>  fluticasone propionate/ salmeterol 250-50 MICROgram(s) Diskus 1 Dose(s) Inhalation two times a day  folic acid 1 milliGRAM(s) Oral daily  gabapentin 300 milliGRAM(s) Oral every 8 hours  glucagon  Injectable 1 milliGRAM(s) IntraMuscular once  guaiFENesin  milliGRAM(s) Oral every 12 hours  heparin   Injectable 5000 Unit(s) SubCutaneous every 8 hours  influenza  Vaccine (HIGH DOSE) 0.5 milliLiter(s) IntraMuscular once  insulin glargine Injectable (LANTUS) 35 Unit(s) SubCutaneous every morning  insulin lispro (ADMELOG) corrective regimen sliding scale   SubCutaneous three times a day before meals  insulin lispro (ADMELOG) corrective regimen sliding scale   SubCutaneous at bedtime  insulin lispro Injectable (ADMELOG) 10 Unit(s) SubCutaneous three times a day before meals  iron sucrose IVPB 200 milliGRAM(s) IV Intermittent every 24 hours  lactulose Syrup 20 Gram(s) Oral daily  levalbuterol Inhalation 1.25 milliGRAM(s) Inhalation four times a day  lidocaine   4% Patch 1 Patch Transdermal daily  lidocaine   4% Patch 1 Patch Transdermal every 24 hours  milrinone Infusion 0.125 MICROgram(s)/kG/Min (4.28 mL/Hr) IV Continuous <Continuous>  mupirocin 2% Nasal 1 Application(s) Both Nostrils every 12 hours  pantoprazole    Tablet 40 milliGRAM(s) Oral before breakfast  polyethylene glycol 3350 17 Gram(s) Oral daily  rosuvastatin 20 milliGRAM(s) Oral at bedtime  senna 2 Tablet(s) Oral at bedtime  sodium chloride 0.9%. 1000 milliLiter(s) (20 mL/Hr) IV Continuous <Continuous>    MEDICATIONS  (PRN):  ALPRAZolam 0.25 milliGRAM(s) Oral at bedtime PRN anxiety  dextrose Oral Gel 15 Gram(s) Oral once PRN Blood Glucose LESS THAN 70 milliGRAM(s)/deciliter  hydrALAZINE Injectable 5 milliGRAM(s) IV Push every 4 hours PRN Systolic > 160  ondansetron Injectable 4 milliGRAM(s) IV Push every 4 hours PRN Nausea and/or Vomiting  oxyCODONE    IR 5 milliGRAM(s) Oral every 4 hours PRN Moderate Pain (4 - 6)  oxyCODONE    IR 10 milliGRAM(s) Oral every 4 hours PRN Severe Pain (7 - 10)    Allergies    No Known Allergies    Intolerances      Ambulation/Activity Status: ambulate with assistance    · Assessment	  64y Male status-post MVR / SHEILA ligation and MAZE POD # 7  - Case and plan discussed with CTU Intensivist and CT Surgeon - Dr. Blakely/Mauri/Laron  - Continue CTU supportive care    - Continue DVT/GI prophylaxis  - Incentive Spirometry 10 times an hour  - Continue to advance physical activity as tolerated and continue PT/OT as directed  1. Continue ASA, statin, no bb for junctional bradycardia and on dobutamine & milrinone, will decrease dobutamine to 1,   2. madelung disease: cont bipap, respiratory tx's  3. A. Fib prophylaxis: cont to monitor electrolytes - hold AC at this time prior lower GI bleed - s/p MAZE and SHEILA closure  4. junctional bradycardia/wenckebach - ep consult for poss ppm, EP following -      restarted given junctional/wenckebach rythm - for ppm in AM  5. DM/Glucose A1c 6.2 Control: increase lantus 40/humalog 10 and sliding scale   6. bumex PRN for noncardiogenic fluid overload   7. franklin on CKD3a- renal consult- diuresis PRN, avoid hypotension   8. Ancef added for possible URI , pt with productive cough and b/l opacities seen on cxr  9. Lactulose and enema for constipation  10. start flomax for BPH  11. hyponatremia - 1 liter fluid restriction  12. NPO after midnight for PPM    Social Service Disposition:  to be determined

## 2025-02-04 NOTE — DIETITIAN INITIAL EVALUATION ADULT - ADD RECOMMEND
Recommendation: Continue DASH/TLC + Consistent Carbohydrate diet (evening snacks) as tolerated. Order Glucerna once daily (220 kcal, 10 g protein). Order Prosource Gelatein 20 sugar free once daily (80 kcal, 20 g protein). Obtain updated PO4.

## 2025-02-04 NOTE — DIETITIAN INITIAL EVALUATION ADULT - ORAL INTAKE PTA/DIET HISTORY
Fair appetite & po intake reported PTP. Reportedly follows a heart healthy, low sugar diet PTP. Consumes 3 meals/day. No supplements reported. NKFA. No food preferences reported. UBW reported to be ~100 kg with no known h/o unintentional wt loss. No signs of significant muscle wasting/subcutaneous fat loss observed upon nutrition focused physical exam.

## 2025-02-04 NOTE — DIETITIAN INITIAL EVALUATION ADULT - NUTRITIONGOAL OUTCOME1
Pt to demonstrate tolerance & adherence to diet order, with at least 75% po intake maintained over next 5-7 days.    Pt at moderate nutrition risk.    Monitor: Skin, labs, BM, wt, nutrition focused physical findings, body composition, GI, po intake, diet order, adherence.

## 2025-02-04 NOTE — PROGRESS NOTE ADULT - ASSESSMENT
EP: Carolyn    64M w/ afib, nonobstructive CAD, CHF class 3, severe MR, DM, HTN, JIMMY on CPAP, asthma, severe obesity, HL, Madelung's disease, osteoarthritis s/p bilateral hip replacement, peripheral neuropathy presented with increasing dyspnea with exertion found to have severe MR sp MVR POD#6    Impression:  Junctional Bradycardia postop  Slow AF, now 100s  Sev MR sp MVR/MAZE/SHEILA Ligation on 1/28  Morbid obesity  JIMMY  CAD  DM  HTN    Plan:  - currently on Dobutamine and Milrinone, wean off as tolerated  - Please notify EP once off inotropes so we can reassess rhythm  - Will cont to monitor, possible AV leadless PPM if conduction system does not recover  - Monitor electrolytes, maintain WNL

## 2025-02-04 NOTE — PROCEDURAL SAFETY CHECKLIST WITH OR WITHOUT SEDATION - NSPREPROCLOCFT_GEN_ALL_CORE
51y M presenting for rabies vaccine #2 (day 3). Pt was bitten by a small dog while making a delivery on Saturday 2/17. Was in ED and got first vaccine series that day.   Denies fevers, chills cough. Reports feeling well.
CTU

## 2025-02-04 NOTE — DIETITIAN INITIAL EVALUATION ADULT - OTHER INFO
Pertinent Medical Information: Presented w/ increasing dyspnea with exertion, found to have severe MR. s/p MV Replacement (bioprosthetic), SHEILA ligation, MAZE. Post op complicated by acute pulmonary insufficiency, FLAKITO, cardiogenic shock per progress notes. Noted no need for RRT per Nephrology. Acute blood loss anemia noted. Volume overload / mild hyponatremia hypervolemic - on diuretics this admit.    PMH includes afib, nonobstructive CAD, CHF class 3, severe MR, DM, HTN, JIMMY on CPAP, asthma, severe obesity, HL, Madelung's disease, osteoarthritis s/p bilateral hip replacement, peripheral neuropathy.

## 2025-02-04 NOTE — DIETITIAN INITIAL EVALUATION ADULT - PERTINENT LABORATORY DATA
02-04    132[L]  |  95[L]  |  95[HH]  ----------------------------<  161[H]  4.8   |  21  |  2.4[H]    Ca    8.6      04 Feb 2025 14:15  Mg     2.5     02-04    TPro  6.0  /  Alb  3.7  /  TBili  0.5  /  DBili  x   /  AST  36  /  ALT  20  /  AlkPhos  174[H]  02-04  POCT Blood Glucose.: 173 mg/dL (02-04-25 @ 16:36)  A1C with Estimated Average Glucose Result: 6.2 % (01-23-25 @ 09:45)  A1C with Estimated Average Glucose Result: 7.1 % (12-04-24 @ 04:45)    Latest PO4-5.5 (1/31)

## 2025-02-04 NOTE — PROGRESS NOTE ADULT - ASSESSMENT
64 with PMHx of CKD 3a, severe MR, presented for MVR, SHEILA.  Nephrology consulted for FLAKITO    FLAKITO on CKD 3a d/t hypotension/hemodynamic changes s/p MVR  Volume overload / mild hyponatremia hypervolemic   Acute blood loss anemia    - non oliguric  - creat down-trending noted today   -Creatinine Trend: 2.5<--, 2.7<--, 2.5<--, 2.5<--, 2.8<--, 2.9<--    Recommendations:  - resume diuretics to keep in neg balance   - document urine output  - avoid hypotension  - monitor h/h / RBCs to maintain hgb > 7-8/ on venofer complete loading dose of 1 g   - decrease gabapentin to 100 mg q8h   - phos level noted / maintain renal diet   - on milrinone/dobutamine / CTS f/u noted     no need for RRT  will follow

## 2025-02-04 NOTE — PROGRESS NOTE ADULT - ASSESSMENT
Assessment  64M s/p MVR, LAAC, MAZE on 1/28  Post op complicated by acute pulmonary insufficiency, FLAKITO, cardiogenic shock req inotropes    Plan:    Neuro:  Multimodal pain management - no toradol, minimize opiates, stop gabapentin  Tylenol, Lidoderm patch, opiates as needed  Monitor neuro status in the post op period    CV:  S/P MVR, LAAC, MAZE  Monitor perfusion, , lactate, UOP, index and MVO2 if available  Maintain MAP > 65  On milrinone 0.125  Dobutamine added for low HR and FLAKITO - decreased to 1 today  EP consulted for bradycardia - abdi - May need PPM    Resp:  Supplemental oxygen to maintain sats > 92%  Continuous pulse oximetry monitoring  BiPAP at night  NC during the day  IS / Chest PT  OOBTC and Ambulate  Nebulizers as needed - albuterol/atrovent  Advair    GI:  Heart healthy diet  Bowel Regimen - escalate as needed  PUD ppx per protocol    Renal  FLAKITO post surgery  Creat up to 3.1 now downtrending  BUN still rising - may be contributing to confusion  Baseline CKD  Monitor UOP, lytes, creatinine  Holding diuretics today  25% albumin Q6hrs  Keep K > 4, Mg > 2  Remove prince    Endo:  Tight glucose control per CTICU protocol  Lantus 35 / premeal 10  SSI    Heme:  Acute blood loss anemia post surgery  High risk for thrombocytopenia  DVT ppx with HSQ  Iron / FA / MVI    ID:  Perioperative prophylactic abx per protocol  Monitor fever curve and WBC count  Remove TLC when no longer indicated    Upon my evaluation, the above patient has a high probability of imminent or life threatening deterioration due to a high risk for Shock, Cardiogenic shock, arrythmia, acute blood loss, acute kidney injury and acute pulmonary insufficiency which required my direct attention, intervention, and personal management.  Total critical care time indicated in the note includes review of radiology results, ordering, interpreting and reviewing diagnostic studies / lab tests with immediate assessment and treatment, consultant collaboration on findings and treatment options, monitoring for potential decompensation, obtaining history from family, EMT, nursing home staff and/or treating physicians; directing the titration of pressors, oxygen support devices, fluid resuscitation, interpretation of cardiac output measurements, interpretation of radiological assessments, interpretation of EKG / rhythm strips, telemetry.  This time did not overlap with the management of other patients or performing separately reportable procedures.      Management of this patient was discussed with the CT Surgery/Thoracic surgery/Structural Cardiology attending and mid-level providers.    I acknowledge the use of copied documentation.  All copy forward documentation is my own and has been reviewed and revised as appropriate.  If no changes were made, it is because that information remains the same.

## 2025-02-04 NOTE — PROGRESS NOTE ADULT - SUBJECTIVE AND OBJECTIVE BOX
CTU Attending Progress Daily Note       Procedure: MV Replacement (bioprosthetic), SHEILA ligation, MAZE  Procedure Date: 1/28/2025    HPI: 64M w/ afib, nonobstructive CAD, CHF class 3, severe MR, DM, HTN, JIMMY on CPAP, asthma, severe obesity, HL, Madelung's disease, osteoarthritis s/p bilateral hip replacement, peripheral neuropathy presented with increasing dyspnea with exertion found to have severe MR.  He was evaluated for mitral clip and not a candidate.  On prior admission he had LFAKITO with creat up to 2.  Was optimized with diuresis.  He was on AC and developed hemorrhoidal bleeding and was taken off AC.  He was discharged and returned for his procedure on 1/28    OR Data  Procedure: Procedure: MVReplacement (bioprosthetic), SHEILA ligation, MAZE  Bypass: 202  Cross Clamp: 158  Pre LV Fxn: 50-55%      RV Fxn: normal  Post LV Fxn: 45-50%     RV Fxn: normal    moderate TR, MV gradient 3mmHg  Blood Products: 1uPRBC, ddavp 39mcq  Cell Saver: 698  Fluids: NS 2000, albumin 2000  Pacing Wires: V pacing. sinus rhythm  UO: 1100    1/28 - Fany and poor oxygenation in OR. Milrinone, norepi, vaso. Extubation in evening.  1/29 - start aspirin, diuresis, heparin dvt prophylaxis  1/30- d/c pleural ct if drainage dec; wean to dc primacor; wean high flow o2  1/31 - FLAKITO worsening, bradyarrythmias - wenchebach - ep consulted  2/2 - Chest tube removed, ambulating, creat improving, dobutamine weaned  2/3 - Dobut to 1.5, BUN elevated  2/4 - Confused this AM, normal neuro exam, prince removed    OBJECTIVE:  ICU Vital Signs Last 24 Hrs  T(C): 36.9 (04 Feb 2025 12:00), Max: 37.1 (04 Feb 2025 00:00)  T(F): 98.4 (04 Feb 2025 12:00), Max: 98.7 (04 Feb 2025 00:00)  HR: 104 (04 Feb 2025 13:00) (81 - 107)  BP: --  BP(mean): --  ABP: 139/52 (04 Feb 2025 13:00) (116/52 - 159/67)  ABP(mean): 78 (04 Feb 2025 13:00) (65 - 91)  RR: 21 (04 Feb 2025 13:00) (12 - 35)  SpO2: 98% (04 Feb 2025 13:00) (93% - 100%)    O2 Parameters below as of 04 Feb 2025 13:00  Patient On (Oxygen Delivery Method): nasal cannula  O2 Flow (L/min): 5        I&O's Summary    03 Feb 2025 07:01  -  04 Feb 2025 07:00  --------------------------------------------------------  IN: 1756.8 mL / OUT: 1620 mL / NET: 136.8 mL    04 Feb 2025 07:01  -  04 Feb 2025 13:31  --------------------------------------------------------  IN: 172.5 mL / OUT: 415 mL / NET: -242.5 mL      I&O's Detail    03 Feb 2025 07:01  -  04 Feb 2025 07:00  --------------------------------------------------------  IN:    DOBUTamine: 8.6 mL    DOBUTamine: 118 mL    IV PiggyBack: 100 mL    IV PiggyBack: 50 mL    Milrinone: 103.2 mL    Oral Fluid: 1197 mL    sodium chloride 0.9%: 180 mL  Total IN: 1756.8 mL    OUT:    Indwelling Catheter - Urethral (mL): 1620 mL  Total OUT: 1620 mL    Total NET: 136.8 mL      04 Feb 2025 07:01  -  04 Feb 2025 13:31  --------------------------------------------------------  IN:    DOBUTamine: 15.3 mL    IV PiggyBack: 100 mL    Milrinone: 17.2 mL    sodium chloride 0.9%: 40 mL  Total IN: 172.5 mL    OUT:    Indwelling Catheter - Urethral (mL): 415 mL  Total OUT: 415 mL    Total NET: -242.5 mL        Adult Advanced Hemodynamics Last 24 Hrs  CVP(mm Hg): --  CVP(cm H2O): --  CO: --  CI: --  PA: --  PA(mean): --  PCWP: --  SVR: --  SVRI: --  PVR: --  PVRI: --    CAPILLARY BLOOD GLUCOSE      POCT Blood Glucose.: 176 mg/dL (04 Feb 2025 08:39)  POCT Blood Glucose.: 192 mg/dL (04 Feb 2025 07:04)  POCT Blood Glucose.: 157 mg/dL (03 Feb 2025 21:58)  POCT Blood Glucose.: 155 mg/dL (03 Feb 2025 16:38)    LABS:                          7.4    10.78 )-----------( 145      ( 04 Feb 2025 01:37 )             24.2     02-04    132[L]  |  96[L]  |  94[HH]  ----------------------------<  135[H]  4.8   |  23  |  2.5[H]    Ca    8.3[L]      04 Feb 2025 01:37  Mg     2.5     02-04    TPro  5.7[L]  /  Alb  3.6  /  TBili  0.5  /  DBili  x   /  AST  36  /  ALT  22  /  AlkPhos  180[H]  02-04      Urinalysis Basic - ( 04 Feb 2025 01:37 )    Color: x / Appearance: x / SG: x / pH: x  Gluc: 135 mg/dL / Ketone: x  / Bili: x / Urobili: x   Blood: x / Protein: x / Nitrite: x   Leuk Esterase: x / RBC: x / WBC x   Sq Epi: x / Non Sq Epi: x / Bacteria: x        Home Medications:  Breo Ellipta 200 mcg-25 mcg/inh inhalation powder: 1 inhaled once a day (28 Jan 2025 06:52)  HYDROcodone 10 mg oral capsule, extended release: 1 cap(s) orally 2 times a day (28 Jan 2025 06:52)  losartan 50 mg oral tablet: 1 tab(s) orally 2 times a day (28 Jan 2025 06:52)  losartan-hydrochlorothiazide 50 mg-12.5 mg oral tablet: 1 tab(s) orally (28 Jan 2025 06:52)  ProAir HFA 90 mcg/inh inhalation aerosol: 2 puff(s) inhaled 2 times a day (28 Jan 2025 06:52)  tujeo: 80 unit(s) subcutaneous once a day (28 Jan 2025 06:52)    HOSPITAL MEDICATIONS:  MEDICATIONS  (STANDING):  albumin human 25% IVPB 50 milliLiter(s) IV Intermittent every 6 hours  aspirin enteric coated 81 milliGRAM(s) Oral daily  bisacodyl 5 milliGRAM(s) Oral every 12 hours  bisacodyl Suppository 10 milliGRAM(s) Rectal once  ceFAZolin   IVPB 2000 milliGRAM(s) IV Intermittent every 12 hours  chlorhexidine 2% Cloths 1 Application(s) Topical daily  dextrose 5%. 1000 milliLiter(s) (50 mL/Hr) IV Continuous <Continuous>  dextrose 5%. 1000 milliLiter(s) (100 mL/Hr) IV Continuous <Continuous>  dextrose 50% Injectable 50 milliLiter(s) IV Push every 15 minutes  dextrose 50% Injectable 25 milliLiter(s) IV Push every 15 minutes  DOBUTamine Infusion 1 MICROgram(s)/kG/Min (3.42 mL/Hr) IV Continuous <Continuous>  fluticasone propionate/ salmeterol 250-50 MICROgram(s) Diskus 1 Dose(s) Inhalation two times a day  folic acid 1 milliGRAM(s) Oral daily  gabapentin 300 milliGRAM(s) Oral every 8 hours  glucagon  Injectable 1 milliGRAM(s) IntraMuscular once  guaiFENesin  milliGRAM(s) Oral every 12 hours  heparin   Injectable 5000 Unit(s) SubCutaneous every 8 hours  influenza  Vaccine (HIGH DOSE) 0.5 milliLiter(s) IntraMuscular once  insulin glargine Injectable (LANTUS) 40 Unit(s) SubCutaneous before breakfast  insulin lispro (ADMELOG) corrective regimen sliding scale   SubCutaneous three times a day before meals  insulin lispro (ADMELOG) corrective regimen sliding scale   SubCutaneous at bedtime  insulin lispro Injectable (ADMELOG) 10 Unit(s) SubCutaneous three times a day before meals  iron sucrose IVPB 200 milliGRAM(s) IV Intermittent every 24 hours  lactulose Syrup 20 Gram(s) Oral two times a day  lactulose Syrup 20 Gram(s) Oral daily  levalbuterol Inhalation 1.25 milliGRAM(s) Inhalation four times a day  lidocaine   4% Patch 1 Patch Transdermal daily  lidocaine   4% Patch 1 Patch Transdermal every 24 hours  milrinone Infusion 0.125 MICROgram(s)/kG/Min (4.28 mL/Hr) IV Continuous <Continuous>  mineral oil enema 133 milliLiter(s) Rectal once  mupirocin 2% Nasal 1 Application(s) Both Nostrils every 12 hours  pantoprazole    Tablet 40 milliGRAM(s) Oral before breakfast  polyethylene glycol 3350 17 Gram(s) Oral daily  rosuvastatin 20 milliGRAM(s) Oral at bedtime  senna 2 Tablet(s) Oral at bedtime  sodium chloride 0.9%. 1000 milliLiter(s) (20 mL/Hr) IV Continuous <Continuous>  tamsulosin 0.4 milliGRAM(s) Oral at bedtime    MEDICATIONS  (PRN):  dextrose Oral Gel 15 Gram(s) Oral once PRN Blood Glucose LESS THAN 70 milliGRAM(s)/deciliter  hydrALAZINE Injectable 5 milliGRAM(s) IV Push every 4 hours PRN Systolic > 160  ondansetron Injectable 4 milliGRAM(s) IV Push every 4 hours PRN Nausea and/or Vomiting  oxyCODONE    IR 10 milliGRAM(s) Oral every 4 hours PRN Severe Pain (7 - 10)  oxyCODONE    IR 5 milliGRAM(s) Oral every 4 hours PRN Moderate Pain (4 - 6)    RADIOLOGY:  Chest X-ray Reviewed    REVIEW OF SYSTEMS:  CONSTITUTIONAL: [X] all negative; [ ] weakness, [ ] fevers, [ ] chills  EYES/ENT: [X] all negative; [ ] visual changes, [ ] vertigo, [ ] throat pain   NECK: [X] all negative; [ ] pain, [ ] stiffness  RESPIRATORY: [] all negative, [ ] cough, [ ] wheezing, [ ] hemoptysis, [ ] shortness of breath  CARDIOVASCULAR: [] all negative; [ ] chest pain, [ ] palpitations, [ ] orthopnea  GASTROINTESTINAL: [X] all negative; [ ]abdominal pain, [ ] nausea, [ ] vomiting, [ ] hematemesis, [ ] diarrhea, [ ] constipation, [ ] melena, [ ] hematochezia.  GENITOURINARY: [X] all negative; [ ] dysuria, [ ] frequency, [ ] hematuria  NEUROLOGICAL: [X] all negative; [ ] numbness, [ ] weakness  SKIN: [X] all negative; [ ] itching, [ ] burning, [ ] rashes, [ ] lesions   All other review of systems is negative unless indicated above.  [  ] Unable to assess ROS because     PHYSICAL EXAM:          CONSTITUTIONAL: Well-developed; obese man; in no acute distress.   	SKIN: warm, dry  	HEAD: Normocephalic; atraumatic.  	EYES: PERRL, EOM, no conj injection, sclera clear  	ENT: No nasal discharge; airway clear.  	NECK: Supple; non tender.   	CARD: S1, S2 normal; no murmurs, gallops, or rubs. Regular rate and rhythm.  no carotid bruits  	RESP: CTA B/L; good air movement No wheezes, rales or rhonchi.  	ABD: Soft, obese, not tender, not distended,  no rebound or guarding, bowel sounds present  	EXT: Normal ROM.  No clubbing, cyanosis or edema.   warm and well perfused.  pulses palpable  	NEURO: Alert, awake, motor 5/5 R, 5/5 L

## 2025-02-04 NOTE — DIETITIAN INITIAL EVALUATION ADULT - NSFNSGIIOFT_GEN_A_CORE
Daily wts this admit: 116.9 kg (1/29), 116 kg (1/30), 131.1 kg (1/31), 115.1 kg (2/1), 135.8 kg (2/2), 115.7 kg (2/3), 131.2 kg (2/4)    Wt fluctuations observed suspected to be related to fluid shifts. BMI calculated using lowest wt this admit 115.1 kg.    IBW: 53.6 kg.

## 2025-02-04 NOTE — DIETITIAN INITIAL EVALUATION ADULT - PERTINENT MEDS FT
MEDICATIONS  (STANDING):  albumin human 25% IVPB 50 milliLiter(s) IV Intermittent every 6 hours  aspirin enteric coated 81 milliGRAM(s) Oral daily  bisacodyl 5 milliGRAM(s) Oral every 12 hours  bisacodyl Suppository 10 milliGRAM(s) Rectal once  ceFAZolin   IVPB 2000 milliGRAM(s) IV Intermittent every 12 hours  chlorhexidine 2% Cloths 1 Application(s) Topical daily  dextrose 5%. 1000 milliLiter(s) (50 mL/Hr) IV Continuous <Continuous>  dextrose 5%. 1000 milliLiter(s) (100 mL/Hr) IV Continuous <Continuous>  dextrose 50% Injectable 50 milliLiter(s) IV Push every 15 minutes  dextrose 50% Injectable 25 milliLiter(s) IV Push every 15 minutes  DOBUTamine Infusion 0.5 MICROgram(s)/kG/Min (1.71 mL/Hr) IV Continuous <Continuous>  fluticasone propionate/ salmeterol 250-50 MICROgram(s) Diskus 1 Dose(s) Inhalation two times a day  folic acid 1 milliGRAM(s) Oral daily  glucagon  Injectable 1 milliGRAM(s) IntraMuscular once  guaiFENesin  milliGRAM(s) Oral every 12 hours  heparin   Injectable 5000 Unit(s) SubCutaneous every 8 hours  influenza  Vaccine (HIGH DOSE) 0.5 milliLiter(s) IntraMuscular once  insulin glargine Injectable (LANTUS) 40 Unit(s) SubCutaneous before breakfast  insulin lispro (ADMELOG) corrective regimen sliding scale   SubCutaneous three times a day before meals  insulin lispro (ADMELOG) corrective regimen sliding scale   SubCutaneous at bedtime  insulin lispro Injectable (ADMELOG) 10 Unit(s) SubCutaneous three times a day before meals  iron sucrose IVPB 200 milliGRAM(s) IV Intermittent every 24 hours  lactulose Syrup 20 Gram(s) Oral daily  lactulose Syrup 20 Gram(s) Oral two times a day  levalbuterol Inhalation 1.25 milliGRAM(s) Inhalation four times a day  lidocaine   4% Patch 1 Patch Transdermal every 24 hours  lidocaine   4% Patch 1 Patch Transdermal every 24 hours  milrinone Infusion 0.125 MICROgram(s)/kG/Min (4.28 mL/Hr) IV Continuous <Continuous>  mineral oil enema 133 milliLiter(s) Rectal once  mupirocin 2% Nasal 1 Application(s) Both Nostrils every 12 hours  pantoprazole    Tablet 40 milliGRAM(s) Oral before breakfast  polyethylene glycol 3350 17 Gram(s) Oral daily  rosuvastatin 20 milliGRAM(s) Oral at bedtime  senna 2 Tablet(s) Oral at bedtime  sodium chloride 0.9%. 1000 milliLiter(s) (20 mL/Hr) IV Continuous <Continuous>  tamsulosin 0.4 milliGRAM(s) Oral at bedtime    MEDICATIONS  (PRN):  dextrose Oral Gel 15 Gram(s) Oral once PRN Blood Glucose LESS THAN 70 milliGRAM(s)/deciliter  hydrALAZINE Injectable 5 milliGRAM(s) IV Push every 4 hours PRN Systolic > 160  ondansetron Injectable 4 milliGRAM(s) IV Push every 4 hours PRN Nausea and/or Vomiting  oxyCODONE    IR 5 milliGRAM(s) Oral every 4 hours PRN Moderate Pain (4 - 6)  oxyCODONE    IR 10 milliGRAM(s) Oral every 4 hours PRN Severe Pain (7 - 10)

## 2025-02-04 NOTE — DIETITIAN INITIAL EVALUATION ADULT - NS FNS DIET ORDER
DASH/TLC with Consistent Carbohydrate (evening snacks) + 1000 mL fluid restriction. Reportedly w/ reduced appetite this admit in setting of post-op pain; reportedly improving at this time. Consuming 50-75% of meals at this time. No chewing/swallowing difficulty reported at this time.

## 2025-02-05 DIAGNOSIS — R41.0 DISORIENTATION, UNSPECIFIED: ICD-10-CM

## 2025-02-05 LAB
ALBUMIN SERPL ELPH-MCNC: 3.6 G/DL — SIGNIFICANT CHANGE UP (ref 3.5–5.2)
ALP SERPL-CCNC: 166 U/L — HIGH (ref 30–115)
ALT FLD-CCNC: 17 U/L — SIGNIFICANT CHANGE UP (ref 0–41)
ANION GAP SERPL CALC-SCNC: 11 MMOL/L — SIGNIFICANT CHANGE UP (ref 7–14)
APTT BLD: 30.3 SEC — SIGNIFICANT CHANGE UP (ref 27–39.2)
AST SERPL-CCNC: 39 U/L — SIGNIFICANT CHANGE UP (ref 0–41)
BASOPHILS # BLD AUTO: 0.03 K/UL — SIGNIFICANT CHANGE UP (ref 0–0.2)
BASOPHILS NFR BLD AUTO: 0.3 % — SIGNIFICANT CHANGE UP (ref 0–1)
BILIRUB SERPL-MCNC: 0.4 MG/DL — SIGNIFICANT CHANGE UP (ref 0.2–1.2)
BUN SERPL-MCNC: 93 MG/DL — CRITICAL HIGH (ref 10–20)
CALCIUM SERPL-MCNC: 8.3 MG/DL — LOW (ref 8.4–10.5)
CHLORIDE SERPL-SCNC: 101 MMOL/L — SIGNIFICANT CHANGE UP (ref 98–110)
CO2 SERPL-SCNC: 22 MMOL/L — SIGNIFICANT CHANGE UP (ref 17–32)
CREAT SERPL-MCNC: 2.2 MG/DL — HIGH (ref 0.7–1.5)
EGFR: 32 ML/MIN/1.73M2 — LOW
EOSINOPHIL # BLD AUTO: 0.39 K/UL — SIGNIFICANT CHANGE UP (ref 0–0.7)
EOSINOPHIL NFR BLD AUTO: 3.4 % — SIGNIFICANT CHANGE UP (ref 0–8)
GAS PNL BLDA: SIGNIFICANT CHANGE UP
GLUCOSE BLDC GLUCOMTR-MCNC: 133 MG/DL — HIGH (ref 70–99)
GLUCOSE BLDC GLUCOMTR-MCNC: 153 MG/DL — HIGH (ref 70–99)
GLUCOSE BLDC GLUCOMTR-MCNC: 158 MG/DL — HIGH (ref 70–99)
GLUCOSE BLDC GLUCOMTR-MCNC: 160 MG/DL — HIGH (ref 70–99)
GLUCOSE BLDC GLUCOMTR-MCNC: 241 MG/DL — HIGH (ref 70–99)
GLUCOSE SERPL-MCNC: 118 MG/DL — HIGH (ref 70–99)
HCT VFR BLD CALC: 24.6 % — LOW (ref 42–52)
HGB BLD-MCNC: 7.4 G/DL — LOW (ref 14–18)
IMM GRANULOCYTES NFR BLD AUTO: 5.1 % — HIGH (ref 0.1–0.3)
INR BLD: 1.06 RATIO — SIGNIFICANT CHANGE UP (ref 0.65–1.3)
LYMPHOCYTES # BLD AUTO: 1.09 K/UL — LOW (ref 1.2–3.4)
LYMPHOCYTES # BLD AUTO: 9.6 % — LOW (ref 20.5–51.1)
MAGNESIUM SERPL-MCNC: 2.5 MG/DL — HIGH (ref 1.8–2.4)
MCHC RBC-ENTMCNC: 26.6 PG — LOW (ref 27–31)
MCHC RBC-ENTMCNC: 30.1 G/DL — LOW (ref 32–37)
MCV RBC AUTO: 88.5 FL — SIGNIFICANT CHANGE UP (ref 80–94)
MONOCYTES # BLD AUTO: 1.15 K/UL — HIGH (ref 0.1–0.6)
MONOCYTES NFR BLD AUTO: 10.1 % — HIGH (ref 1.7–9.3)
NEUTROPHILS # BLD AUTO: 8.13 K/UL — HIGH (ref 1.4–6.5)
NEUTROPHILS NFR BLD AUTO: 71.5 % — SIGNIFICANT CHANGE UP (ref 42.2–75.2)
NRBC # BLD: 0 /100 WBCS — SIGNIFICANT CHANGE UP (ref 0–0)
NRBC BLD-RTO: 0 /100 WBCS — SIGNIFICANT CHANGE UP (ref 0–0)
PLATELET # BLD AUTO: 164 K/UL — SIGNIFICANT CHANGE UP (ref 130–400)
PMV BLD: 11.3 FL — HIGH (ref 7.4–10.4)
POTASSIUM SERPL-MCNC: 4.9 MMOL/L — SIGNIFICANT CHANGE UP (ref 3.5–5)
POTASSIUM SERPL-SCNC: 4.9 MMOL/L — SIGNIFICANT CHANGE UP (ref 3.5–5)
PROT SERPL-MCNC: 5.7 G/DL — LOW (ref 6–8)
PROTHROM AB SERPL-ACNC: 12.5 SEC — SIGNIFICANT CHANGE UP (ref 9.95–12.87)
RBC # BLD: 2.78 M/UL — LOW (ref 4.7–6.1)
RBC # FLD: 18.6 % — HIGH (ref 11.5–14.5)
SODIUM SERPL-SCNC: 134 MMOL/L — LOW (ref 135–146)
WBC # BLD: 11.37 K/UL — HIGH (ref 4.8–10.8)
WBC # FLD AUTO: 11.37 K/UL — HIGH (ref 4.8–10.8)

## 2025-02-05 PROCEDURE — 99291 CRITICAL CARE FIRST HOUR: CPT

## 2025-02-05 PROCEDURE — 71045 X-RAY EXAM CHEST 1 VIEW: CPT | Mod: 26

## 2025-02-05 PROCEDURE — 90792 PSYCH DIAG EVAL W/MED SRVCS: CPT

## 2025-02-05 PROCEDURE — 93010 ELECTROCARDIOGRAM REPORT: CPT

## 2025-02-05 PROCEDURE — 99232 SBSQ HOSP IP/OBS MODERATE 35: CPT | Mod: 24

## 2025-02-05 RX ORDER — ALBUMIN (HUMAN) 12.5 G/50ML
50 INJECTION, SOLUTION INTRAVENOUS EVERY 6 HOURS
Refills: 0 | Status: COMPLETED | OUTPATIENT
Start: 2025-02-05 | End: 2025-02-06

## 2025-02-05 RX ORDER — ACETAMINOPHEN 500 MG/5ML
1000 LIQUID (ML) ORAL ONCE
Refills: 0 | Status: COMPLETED | OUTPATIENT
Start: 2025-02-06 | End: 2025-02-06

## 2025-02-05 RX ORDER — ALPRAZOLAM 0.5 MG
0.25 TABLET, EXTENDED RELEASE 24 HR ORAL AT BEDTIME
Refills: 0 | Status: DISCONTINUED | OUTPATIENT
Start: 2025-02-05 | End: 2025-02-12

## 2025-02-05 RX ORDER — BUMETANIDE 1 MG/1
1 TABLET ORAL ONCE
Refills: 0 | Status: COMPLETED | OUTPATIENT
Start: 2025-02-05 | End: 2025-02-05

## 2025-02-05 RX ORDER — GABAPENTIN 400 MG/1
100 CAPSULE ORAL EVERY 8 HOURS
Refills: 0 | Status: DISCONTINUED | OUTPATIENT
Start: 2025-02-05 | End: 2025-02-13

## 2025-02-05 RX ORDER — MILRINONE LACTATE 1 MG/ML
0.06 INJECTION, SOLUTION INTRAVENOUS
Qty: 20 | Refills: 0 | Status: DISCONTINUED | OUTPATIENT
Start: 2025-02-05 | End: 2025-02-06

## 2025-02-05 RX ORDER — ACETAMINOPHEN 500 MG/5ML
1000 LIQUID (ML) ORAL ONCE
Refills: 0 | Status: COMPLETED | OUTPATIENT
Start: 2025-02-05 | End: 2025-02-05

## 2025-02-05 RX ADMIN — Medication 1000 MILLIGRAM(S): at 00:30

## 2025-02-05 RX ADMIN — LIDOCAINE HYDROCHLORIDE 1 PATCH: 20 JELLY TOPICAL at 16:34

## 2025-02-05 RX ADMIN — HEPARIN SODIUM 5000 UNIT(S): 1000 INJECTION INTRAVENOUS; SUBCUTANEOUS at 21:18

## 2025-02-05 RX ADMIN — LIDOCAINE HYDROCHLORIDE 1 PATCH: 20 JELLY TOPICAL at 19:03

## 2025-02-05 RX ADMIN — MILRINONE LACTATE 2.14 MICROGRAM(S)/KG/MIN: 1 INJECTION, SOLUTION INTRAVENOUS at 09:11

## 2025-02-05 RX ADMIN — ALBUMIN (HUMAN) 50 MILLILITER(S): 12.5 INJECTION, SOLUTION INTRAVENOUS at 00:00

## 2025-02-05 RX ADMIN — Medication 5 MILLIGRAM(S): at 11:49

## 2025-02-05 RX ADMIN — DEXTROMETHORPHAN HBR, GUAIFENESIN 600 MILLIGRAM(S): 200 LIQUID ORAL at 05:19

## 2025-02-05 RX ADMIN — LEVALBUTEROL HYDROCHLORIDE 1.25 MILLIGRAM(S): 1.25 SOLUTION RESPIRATORY (INHALATION) at 02:50

## 2025-02-05 RX ADMIN — ROSUVASTATIN CALCIUM 20 MILLIGRAM(S): 20 TABLET, FILM COATED ORAL at 21:18

## 2025-02-05 RX ADMIN — LIDOCAINE HYDROCHLORIDE 1 PATCH: 20 JELLY TOPICAL at 03:48

## 2025-02-05 RX ADMIN — Medication 400 MILLIGRAM(S): at 05:20

## 2025-02-05 RX ADMIN — LEVALBUTEROL HYDROCHLORIDE 1.25 MILLIGRAM(S): 1.25 SOLUTION RESPIRATORY (INHALATION) at 14:05

## 2025-02-05 RX ADMIN — Medication 100 MILLIGRAM(S): at 05:21

## 2025-02-05 RX ADMIN — POLYETHYLENE GLYCOL 3350 17 GRAM(S): 17 POWDER, FOR SOLUTION ORAL at 11:47

## 2025-02-05 RX ADMIN — Medication 81 MILLIGRAM(S): at 11:50

## 2025-02-05 RX ADMIN — Medication 400 MILLIGRAM(S): at 21:17

## 2025-02-05 RX ADMIN — LIDOCAINE HYDROCHLORIDE 1 PATCH: 20 JELLY TOPICAL at 23:04

## 2025-02-05 RX ADMIN — ALBUMIN (HUMAN) 50 MILLILITER(S): 12.5 INJECTION, SOLUTION INTRAVENOUS at 17:40

## 2025-02-05 RX ADMIN — Medication 5 MILLIGRAM(S): at 05:20

## 2025-02-05 RX ADMIN — Medication 1000 MILLIGRAM(S): at 21:47

## 2025-02-05 RX ADMIN — INSULIN LISPRO 2: 100 INJECTION, SOLUTION INTRAVENOUS; SUBCUTANEOUS at 09:07

## 2025-02-05 RX ADMIN — IRON SUCROSE 100 MILLIGRAM(S): 20 INJECTION, SOLUTION INTRAVENOUS at 09:07

## 2025-02-05 RX ADMIN — BUMETANIDE 1 MILLIGRAM(S): 1 TABLET ORAL at 09:10

## 2025-02-05 RX ADMIN — Medication 400 MILLIGRAM(S): at 14:09

## 2025-02-05 RX ADMIN — Medication 20 MILLILITER(S): at 09:12

## 2025-02-05 RX ADMIN — Medication 100 MILLIGRAM(S): at 17:34

## 2025-02-05 RX ADMIN — FOLIC ACID 1 MILLIGRAM(S): 1 TABLET ORAL at 11:47

## 2025-02-05 RX ADMIN — TAMSULOSIN HYDROCHLORIDE 0.4 MILLIGRAM(S): 0.4 CAPSULE ORAL at 21:18

## 2025-02-05 RX ADMIN — LACTULOSE 20 GRAM(S): 10 SOLUTION ORAL at 05:20

## 2025-02-05 RX ADMIN — Medication 1000 MILLIGRAM(S): at 15:53

## 2025-02-05 RX ADMIN — ALBUMIN (HUMAN) 50 MILLILITER(S): 12.5 INJECTION, SOLUTION INTRAVENOUS at 21:20

## 2025-02-05 RX ADMIN — HEPARIN SODIUM 5000 UNIT(S): 1000 INJECTION INTRAVENOUS; SUBCUTANEOUS at 14:09

## 2025-02-05 RX ADMIN — Medication 2 TABLET(S): at 21:18

## 2025-02-05 RX ADMIN — INSULIN LISPRO 10 UNIT(S): 100 INJECTION, SOLUTION INTRAVENOUS; SUBCUTANEOUS at 16:33

## 2025-02-05 RX ADMIN — GABAPENTIN 100 MILLIGRAM(S): 400 CAPSULE ORAL at 14:08

## 2025-02-05 RX ADMIN — LEVALBUTEROL HYDROCHLORIDE 1.25 MILLIGRAM(S): 1.25 SOLUTION RESPIRATORY (INHALATION) at 08:56

## 2025-02-05 RX ADMIN — DEXTROMETHORPHAN HBR, GUAIFENESIN 600 MILLIGRAM(S): 200 LIQUID ORAL at 17:34

## 2025-02-05 RX ADMIN — INSULIN LISPRO 4: 100 INJECTION, SOLUTION INTRAVENOUS; SUBCUTANEOUS at 16:32

## 2025-02-05 RX ADMIN — Medication 1 DOSE(S): at 09:05

## 2025-02-05 RX ADMIN — Medication 0.25 MILLIGRAM(S): at 21:18

## 2025-02-05 RX ADMIN — GABAPENTIN 100 MILLIGRAM(S): 400 CAPSULE ORAL at 21:18

## 2025-02-05 RX ADMIN — Medication 40 MILLIGRAM(S): at 05:22

## 2025-02-05 RX ADMIN — Medication 1000 MILLIGRAM(S): at 05:50

## 2025-02-05 RX ADMIN — INSULIN GLARGINE-YFGN 40 UNIT(S): 100 INJECTION, SOLUTION SUBCUTANEOUS at 11:48

## 2025-02-05 RX ADMIN — ALBUMIN (HUMAN) 50 MILLILITER(S): 12.5 INJECTION, SOLUTION INTRAVENOUS at 11:46

## 2025-02-05 RX ADMIN — HEPARIN SODIUM 5000 UNIT(S): 1000 INJECTION INTRAVENOUS; SUBCUTANEOUS at 05:20

## 2025-02-05 RX ADMIN — INSULIN LISPRO 10 UNIT(S): 100 INJECTION, SOLUTION INTRAVENOUS; SUBCUTANEOUS at 09:08

## 2025-02-05 NOTE — PROGRESS NOTE ADULT - SUBJECTIVE AND OBJECTIVE BOX
CTU Attending Progress Daily Note       Procedure: MV Replacement (bioprosthetic), SHEILA ligation, MAZE  Procedure Date: 1/28/2025    HPI: 64M w/ afib, nonobstructive CAD, CHF class 3, severe MR, DM, HTN, JIMMY on CPAP, asthma, severe obesity, HL, Madelung's disease, osteoarthritis s/p bilateral hip replacement, peripheral neuropathy presented with increasing dyspnea with exertion found to have severe MR.  He was evaluated for mitral clip and not a candidate.  On prior admission he had FLAKITO with creat up to 2.  Was optimized with diuresis.  He was on AC and developed hemorrhoidal bleeding and was taken off AC.  He was discharged and returned for his procedure on 1/28    OR Data  Procedure: Procedure: MVReplacement (bioprosthetic), SHEILA ligation, MAZE  Bypass: 202  Cross Clamp: 158  Pre LV Fxn: 50-55%      RV Fxn: normal  Post LV Fxn: 45-50%     RV Fxn: normal    moderate TR, MV gradient 3mmHg  Blood Products: 1uPRBC, ddavp 39mcq  Cell Saver: 698  Fluids: NS 2000, albumin 2000  Pacing Wires: V pacing. sinus rhythm  UO: 1100    1/28 - Fany and poor oxygenation in OR. Milrinone, norepi, vaso. Extubation in evening.  1/29 - start aspirin, diuresis, heparin dvt prophylaxis  1/30- d/c pleural ct if drainage dec; wean to dc primacor; wean high flow o2  1/31 - FLAKITO worsening, bradyarrythmias - wencbrennan - ep consulted  2/2 - Chest tube removed, ambulating, creat improving, dobutamine weaned  2/3 - Dobut to 1.5, BUN elevated  2/4 - Confused this AM, normal neuro exam, prince removed  2/5 - FLAKITO improving, off dobutamine, mil decreased     OBJECTIVE:  ICU Vital Signs Last 24 Hrs  T(C): 36.9 (05 Feb 2025 12:00), Max: 37 (04 Feb 2025 16:00)  T(F): 98.4 (05 Feb 2025 12:00), Max: 98.6 (04 Feb 2025 16:00)  HR: 93 (05 Feb 2025 12:15) (91 - 116)  BP: 148/80 (05 Feb 2025 08:00) (148/80 - 148/80)  BP(mean): 107 (05 Feb 2025 08:00) (107 - 107)  ABP: 154/68 (05 Feb 2025 12:15) (112/47 - 180/73)  ABP(mean): 95 (05 Feb 2025 12:15) (66 - 134)  RR: 27 (05 Feb 2025 12:15) (12 - 38)  SpO2: 99% (05 Feb 2025 10:00) (88% - 100%)    O2 Parameters below as of 05 Feb 2025 13:00  Patient On (Oxygen Delivery Method): nasal cannula  O2 Flow (L/min): 2        I&O's Summary    04 Feb 2025 07:01  -  05 Feb 2025 07:00  --------------------------------------------------------  IN: 1565.3 mL / OUT: 1516 mL / NET: 49.3 mL    05 Feb 2025 07:01  -  05 Feb 2025 12:43  --------------------------------------------------------  IN: 114.8 mL / OUT: 200 mL / NET: -85.2 mL      I&O's Detail    04 Feb 2025 07:01  -  05 Feb 2025 07:00  --------------------------------------------------------  IN:    Albumin 25%  -  50 mL: 100 mL    DOBUTamine: 32.3 mL    DOBUTamine: 9.8 mL    IV PiggyBack: 100 mL    IV PiggyBack: 50 mL    IV PiggyBack: 100 mL    IV PiggyBack: 300 mL    Milrinone: 103.2 mL    Oral Fluid: 650 mL    sodium chloride 0.9%: 120 mL  Total IN: 1565.3 mL    OUT:    Indwelling Catheter - Urethral (mL): 815 mL    Voided (mL): 701 mL  Total OUT: 1516 mL    Total NET: 49.3 mL      05 Feb 2025 07:01  -  05 Feb 2025 12:43  --------------------------------------------------------  IN:    IV PiggyBack: 100 mL    Milrinone: 4.3 mL    Milrinone: 10.5 mL  Total IN: 114.8 mL    OUT:    Voided (mL): 200 mL  Total OUT: 200 mL    Total NET: -85.2 mL        Adult Advanced Hemodynamics Last 24 Hrs  CVP(mm Hg): --  CVP(cm H2O): --  CO: --  CI: --  PA: --  PA(mean): --  PCWP: --  SVR: --  SVRI: --  PVR: --  PVRI: --    CAPILLARY BLOOD GLUCOSE      POCT Blood Glucose.: 133 mg/dL (05 Feb 2025 11:39)  POCT Blood Glucose.: 160 mg/dL (05 Feb 2025 08:53)  POCT Blood Glucose.: 153 mg/dL (05 Feb 2025 07:00)  POCT Blood Glucose.: 122 mg/dL (04 Feb 2025 22:11)  POCT Blood Glucose.: 173 mg/dL (04 Feb 2025 16:36)  POCT Blood Glucose.: 174 mg/dL (04 Feb 2025 13:59)    LABS:  ABG - ( 05 Feb 2025 03:52 )  pH, Arterial: 7.29  pH, Blood: x     /  pCO2: 47    /  pO2: 111   / HCO3: 23    / Base Excess: -3.9  /  SaO2: 98.6                                    7.4    11.37 )-----------( 164      ( 05 Feb 2025 02:13 )             24.6     02-05    134[L]  |  101  |  93[HH]  ----------------------------<  118[H]  4.9   |  22  |  2.2[H]    Ca    8.3[L]      05 Feb 2025 02:13  Mg     2.5     02-05    TPro  5.7[L]  /  Alb  3.6  /  TBili  0.4  /  DBili  x   /  AST  39  /  ALT  17  /  AlkPhos  166[H]  02-05    PT/INR - ( 05 Feb 2025 02:13 )   PT: 12.50 sec;   INR: 1.06 ratio         PTT - ( 05 Feb 2025 02:13 )  PTT:30.3 sec  Urinalysis Basic - ( 05 Feb 2025 02:13 )    Color: x / Appearance: x / SG: x / pH: x  Gluc: 118 mg/dL / Ketone: x  / Bili: x / Urobili: x   Blood: x / Protein: x / Nitrite: x   Leuk Esterase: x / RBC: x / WBC x   Sq Epi: x / Non Sq Epi: x / Bacteria: x        Home Medications:  Breo Ellipta 200 mcg-25 mcg/inh inhalation powder: 1 inhaled once a day (28 Jan 2025 06:52)  HYDROcodone 10 mg oral capsule, extended release: 1 cap(s) orally 2 times a day (28 Jan 2025 06:52)  losartan 50 mg oral tablet: 1 tab(s) orally 2 times a day (28 Jan 2025 06:52)  losartan-hydrochlorothiazide 50 mg-12.5 mg oral tablet: 1 tab(s) orally (28 Jan 2025 06:52)  ProAir HFA 90 mcg/inh inhalation aerosol: 2 puff(s) inhaled 2 times a day (28 Jan 2025 06:52)  tujeo: 80 unit(s) subcutaneous once a day (28 Jan 2025 06:52)    HOSPITAL MEDICATIONS:  MEDICATIONS  (STANDING):  acetaminophen   IVPB .. 1000 milliGRAM(s) IV Intermittent once  albumin human 25% IVPB 50 milliLiter(s) IV Intermittent every 6 hours  aspirin enteric coated 81 milliGRAM(s) Oral daily  bisacodyl Suppository 10 milliGRAM(s) Rectal once  ceFAZolin   IVPB 2000 milliGRAM(s) IV Intermittent every 12 hours  chlorhexidine 2% Cloths 1 Application(s) Topical daily  dextrose 5%. 1000 milliLiter(s) (50 mL/Hr) IV Continuous <Continuous>  dextrose 5%. 1000 milliLiter(s) (100 mL/Hr) IV Continuous <Continuous>  dextrose 50% Injectable 50 milliLiter(s) IV Push every 15 minutes  dextrose 50% Injectable 25 milliLiter(s) IV Push every 15 minutes  fluticasone propionate/ salmeterol 250-50 MICROgram(s) Diskus 1 Dose(s) Inhalation two times a day  folic acid 1 milliGRAM(s) Oral daily  gabapentin 100 milliGRAM(s) Oral every 8 hours  glucagon  Injectable 1 milliGRAM(s) IntraMuscular once  guaiFENesin  milliGRAM(s) Oral every 12 hours  heparin   Injectable 5000 Unit(s) SubCutaneous every 8 hours  influenza  Vaccine (HIGH DOSE) 0.5 milliLiter(s) IntraMuscular once  insulin glargine Injectable (LANTUS) 40 Unit(s) SubCutaneous before breakfast  insulin lispro (ADMELOG) corrective regimen sliding scale   SubCutaneous three times a day before meals  insulin lispro (ADMELOG) corrective regimen sliding scale   SubCutaneous at bedtime  insulin lispro Injectable (ADMELOG) 10 Unit(s) SubCutaneous three times a day before meals  iron sucrose IVPB 200 milliGRAM(s) IV Intermittent every 24 hours  levalbuterol Inhalation 1.25 milliGRAM(s) Inhalation four times a day  lidocaine   4% Patch 1 Patch Transdermal every 24 hours  lidocaine   4% Patch 1 Patch Transdermal every 24 hours  milrinone Infusion 0.0625 MICROgram(s)/kG/Min (2.14 mL/Hr) IV Continuous <Continuous>  mupirocin 2% Nasal 1 Application(s) Both Nostrils every 12 hours  pantoprazole    Tablet 40 milliGRAM(s) Oral before breakfast  polyethylene glycol 3350 17 Gram(s) Oral daily  rosuvastatin 20 milliGRAM(s) Oral at bedtime  senna 2 Tablet(s) Oral at bedtime  sodium chloride 0.9%. 1000 milliLiter(s) (20 mL/Hr) IV Continuous <Continuous>  tamsulosin 0.4 milliGRAM(s) Oral at bedtime    MEDICATIONS  (PRN):  ALPRAZolam 0.25 milliGRAM(s) Oral at bedtime PRN anxiety  dextrose Oral Gel 15 Gram(s) Oral once PRN Blood Glucose LESS THAN 70 milliGRAM(s)/deciliter  hydrALAZINE Injectable 5 milliGRAM(s) IV Push every 4 hours PRN Systolic > 160  ondansetron Injectable 4 milliGRAM(s) IV Push every 4 hours PRN Nausea and/or Vomiting    RADIOLOGY:  Chest X-ray Reviewed    REVIEW OF SYSTEMS:  CONSTITUTIONAL: [X] all negative; [ ] weakness, [ ] fevers, [ ] chills  EYES/ENT: [X] all negative; [ ] visual changes, [ ] vertigo, [ ] throat pain   NECK: [X] all negative; [ ] pain, [ ] stiffness  RESPIRATORY: [] all negative, [ ] cough, [ ] wheezing, [ ] hemoptysis, [ ] shortness of breath  CARDIOVASCULAR: [] all negative; [ ] chest pain, [ ] palpitations, [ ] orthopnea  GASTROINTESTINAL: [X] all negative; [ ]abdominal pain, [ ] nausea, [ ] vomiting, [ ] hematemesis, [ ] diarrhea, [ ] constipation, [ ] melena, [ ] hematochezia.  GENITOURINARY: [X] all negative; [ ] dysuria, [ ] frequency, [ ] hematuria  NEUROLOGICAL: [X] all negative; [ ] numbness, [ ] weakness  SKIN: [X] all negative; [ ] itching, [ ] burning, [ ] rashes, [ ] lesions   All other review of systems is negative unless indicated above.  [  ] Unable to assess ROS because     PHYSICAL EXAM:          CONSTITUTIONAL: Well-developed; obese man; in no acute distress.   	SKIN: warm, dry  	HEAD: Normocephalic; atraumatic.  	EYES: PERRL, EOM, no conj injection, sclera clear  	ENT: No nasal discharge; airway clear.  	NECK: Supple; non tender.   	CARD: S1, S2 normal; no murmurs, gallops, or rubs. Regular rate and rhythm.  no carotid bruits  	RESP: CTA B/L; good air movement No wheezes, rales or rhonchi.  	ABD: Soft, obese, not tender, not distended,  no rebound or guarding, bowel sounds present  	EXT: Normal ROM.  No clubbing, cyanosis or edema.   warm and well perfused.  pulses palpable  	NEURO: Alert, awake, motor 5/5 R, 5/5 L

## 2025-02-05 NOTE — PROGRESS NOTE ADULT - ASSESSMENT
64 with PMHx of CKD 3a, severe MR, presented for MVR, SHEILA.  Nephrology consulted for FLAKITO    FLAKITO on CKD 3a d/t hypotension/hemodynamic changes s/p MVR  Volume overload / mild hyponatremia hypervolemic   Acute blood loss anemia    - non oliguric  - creat down-trending noted today   - Creatinine Trend: 2.2<--, 2.4<--, 2.5<--, 2.7<--, 2.5<--, 2.5<--    Recommendations:  - resume diuretics to keep in neg balance for bumex today 2mg IV one time   - document urine output  - avoid hypotension  - monitor h/h / RBCs to maintain hgb > 7-8/ on venofer complete loading dose of 1 g   - off gabapentin   - phos level noted / maintain renal diet - repeat   - on milrinone    no need for RRT  will follow

## 2025-02-05 NOTE — BH CONSULTATION LIAISON ASSESSMENT NOTE - SUMMARY
Patient is a 64M w/ afib, nonobstructive CAD, CHF class 3, severe MR, DM, HTN, JIMMY on CPAP, asthma, severe obesity, HL, Madelung's disease, osteoarthritis s/p bilateral hip replacement, peripheral neuropathy presenting with increasing dyspnea with exertion found to have severe MR on admission. Patient is currently POD #8 for mv replacement/ la ligation. Psychiatry is consulted for anxiety.    Patient endorses symptoms of anxiety in context of stress from his medical illness and conflicts with his son at home. Per history there is some baseline anxiety symptoms on/off for several years which has now been exacerbated by his hospitalization. Vast majority of interview time was spent venting about his treatment from the nursing here during his hospitalization and conflict with his son. Patient expresses helplessness, but rejected any concrete recommendations to address life stressors during interview. Patient is currently delirious which is also impacting his frustration tolerance and emotional stability.    After attempting to explain specific benefits and risk of medication options--patient seemed willing to try zoloft and prn hydroxyzine for breakthrough anxiety.

## 2025-02-05 NOTE — BH CONSULTATION LIAISON ASSESSMENT NOTE - NSBHCHARTREVIEWLAB_PSY_A_CORE FT
02-05    134[L]  |  101  |  93[HH]  ----------------------------<  118[H]  4.9   |  22  |  2.2[H]    Ca    8.3[L]      05 Feb 2025 02:13  Mg     2.5     02-05    TPro  5.7[L]  /  Alb  3.6  /  TBili  0.4  /  DBili  x   /  AST  39  /  ALT  17  /  AlkPhos  166[H]  02-05

## 2025-02-05 NOTE — PROGRESS NOTE ADULT - SUBJECTIVE AND OBJECTIVE BOX
OPERATIVE PROCEDURE(s):  mv replacement/ la ligation              POD # 8    SURGEON(s): rich    SUBJECTIVE ASSESSMENT: pt is complaining about having multiple BM's and he and his ex wife were very argumentative to the staff-- the patient had to have a meeting with the surgeon re proper communication with the staff    Vital Signs Last 24 Hrs  T(C): 36.9 (05 Feb 2025 12:00), Max: 37 (04 Feb 2025 16:00)  T(F): 98.4 (05 Feb 2025 12:00), Max: 98.6 (04 Feb 2025 16:00)  HR: 93 (05 Feb 2025 12:15) (91 - 116)  BP: 148/80 (05 Feb 2025 08:00) (148/80 - 148/80)  BP(mean): 107 (05 Feb 2025 08:00) (107 - 107)  RR: 27 (05 Feb 2025 12:15) (12 - 38)  SpO2: 99% (05 Feb 2025 10:00) (88% - 100%)    Parameters below as of 05 Feb 2025 13:00  Patient On (Oxygen Delivery Method): nasal cannula  O2 Flow (L/min): 2    02-04-25 @ 07:01  -  02-05-25 @ 07:00  --------------------------------------------------------  IN: 1565.3 mL / OUT: 1516 mL / NET: 49.3 mL    02-05-25 @ 07:01  -  02-05-25 @ 14:08  --------------------------------------------------------  IN: 476.9 mL / OUT: 200 mL / NET: 276.9 mL    Physical Exam:  General: NAD; A&Ox3  Cardiac: S1/S2, RRR, ? murmur, no rubs  Neck; very large neck, ? mass  Lungs: unlabored shallow respirations, bilateral bs decreased at bases  Abdomen: Soft/NT/protuberant  Sternum: Intact, no click, Prevena in place  Extremities: mild edema b/l lower extremities  Neuro: smile =, no arm drift, good strength neck rotation, good strength upper and lower ext, ROM intact      LABS:                        7.4    11.37 )-----------( 164      ( 05 Feb 2025 02:13 )             24.6     COUMADIN:   [ ] YES [x ] NO    PT/INR - ( 05 Feb 2025 02:13 )   PT: 12.50 sec;   INR: 1.06 ratio         PTT - ( 05 Feb 2025 02:13 )  PTT:30.3 sec  02-05    134[L]  |  101  |  93[HH]  ----------------------------<  118[H]  4.9   |  22  |  2.2[H]    Ca    8.3[L]      05 Feb 2025 02:13  Mg     2.5     02-05    TPro  5.7[L]  /  Alb  3.6  /  TBili  0.4  /  DBili  x   /  AST  39  /  ALT  17  /  AlkPhos  166[H]  02-05    Urinalysis Basic - ( 05 Feb 2025 02:13 )    Color: x / Appearance: x / SG: x / pH: x  Gluc: 118 mg/dL / Ketone: x  / Bili: x / Urobili: x   Blood: x / Protein: x / Nitrite: x   Leuk Esterase: x / RBC: x / WBC x   Sq Epi: x / Non Sq Epi: x / Bacteria: x      MEDICATIONS  (STANDING):  acetaminophen   IVPB .. 1000 milliGRAM(s) IV Intermittent once  albumin human 25% IVPB 50 milliLiter(s) IV Intermittent every 6 hours  aspirin enteric coated 81 milliGRAM(s) Oral daily  bisacodyl Suppository 10 milliGRAM(s) Rectal once  ceFAZolin   IVPB 2000 milliGRAM(s) IV Intermittent every 12 hours  chlorhexidine 2% Cloths 1 Application(s) Topical daily  dextrose 5%. 1000 milliLiter(s) (50 mL/Hr) IV Continuous <Continuous>  dextrose 5%. 1000 milliLiter(s) (100 mL/Hr) IV Continuous <Continuous>  dextrose 50% Injectable 50 milliLiter(s) IV Push every 15 minutes  dextrose 50% Injectable 25 milliLiter(s) IV Push every 15 minutes  fluticasone propionate/ salmeterol 250-50 MICROgram(s) Diskus 1 Dose(s) Inhalation two times a day  folic acid 1 milliGRAM(s) Oral daily  gabapentin 100 milliGRAM(s) Oral every 8 hours  glucagon  Injectable 1 milliGRAM(s) IntraMuscular once  guaiFENesin  milliGRAM(s) Oral every 12 hours  heparin   Injectable 5000 Unit(s) SubCutaneous every 8 hours  influenza  Vaccine (HIGH DOSE) 0.5 milliLiter(s) IntraMuscular once  insulin glargine Injectable (LANTUS) 40 Unit(s) SubCutaneous before breakfast  insulin lispro (ADMELOG) corrective regimen sliding scale   SubCutaneous three times a day before meals  insulin lispro (ADMELOG) corrective regimen sliding scale   SubCutaneous at bedtime  insulin lispro Injectable (ADMELOG) 10 Unit(s) SubCutaneous three times a day before meals  iron sucrose IVPB 200 milliGRAM(s) IV Intermittent every 24 hours  levalbuterol Inhalation 1.25 milliGRAM(s) Inhalation four times a day  lidocaine   4% Patch 1 Patch Transdermal every 24 hours  lidocaine   4% Patch 1 Patch Transdermal every 24 hours  milrinone Infusion 0.0625 MICROgram(s)/kG/Min (2.14 mL/Hr) IV Continuous <Continuous>  mupirocin 2% Nasal 1 Application(s) Both Nostrils every 12 hours  pantoprazole    Tablet 40 milliGRAM(s) Oral before breakfast  polyethylene glycol 3350 17 Gram(s) Oral daily  rosuvastatin 20 milliGRAM(s) Oral at bedtime  senna 2 Tablet(s) Oral at bedtime  sodium chloride 0.9%. 1000 milliLiter(s) (20 mL/Hr) IV Continuous <Continuous>  tamsulosin 0.4 milliGRAM(s) Oral at bedtime    MEDICATIONS  (PRN):  ALPRAZolam 0.25 milliGRAM(s) Oral at bedtime PRN anxiety  dextrose Oral Gel 15 Gram(s) Oral once PRN Blood Glucose LESS THAN 70 milliGRAM(s)/deciliter  hydrALAZINE Injectable 5 milliGRAM(s) IV Push every 4 hours PRN Systolic > 160  ondansetron Injectable 4 milliGRAM(s) IV Push every 4 hours PRN Nausea and/or Vomiting      Allergies    No Known Allergies    Intolerances    Ambulation/Activity Status:  amb min with pt    RADIOLOGY & ADDITIONAL TESTS:  Xray Chest 1 View- PORTABLE-Routine: AM   Indication: Shortness of Breath  Transport: Portable,  w/ Monitor  Provider's Contact #: [phone] (02-05-25 @ 08:22)  ACC: [id number] EXAM:  XR CHEST PORTABLE ROUTINE 1V   ORDERED BY: PIPPA ALEJANDRE     PROCEDURE DATE:  02/05/2025      INTERPRETATION:  CLINICAL HISTORY: Mitral valve replacement    COMPARISON: 2/4/2025.    TECHNIQUE: Portable frontal chest radiograph.    FINDINGS:    Support devices: Stable right IJ vascular sheath.    Cardiac/mediastinum/hilum: Stable.    Lung parenchyma/Pleura: Stable to mild decrease in bibasilar   opacities/effusions. No pneumothorax.    Skeleton/soft tissues: Stable.      IMPRESSION:    Stable to mild decrease in bibasilar opacities/effusions.    --- End of Report ---    · Assessment	  64y Male status-post MVR / SHEILA ligation and MAZE POD # 8  - Case and plan discussed with CTU Intensivist and CT Surgeon - Dr. Blakely/Mauri/Laron  - Continue CTU supportive care    - Continue DVT/GI prophylaxis  - Incentive Spirometry 10 times an hour  - Continue to advance physical activity as tolerated and continue PT/OT as directed  1. Continue ASA, statin, no bb for junctional bradycardia and milrinone, will dc dobutamine  2. madelung disease: cont bipap, respiratory tx's  3. A. Fib prophylaxis: cont to monitor electrolytes - hold AC at this time prior lower GI bleed - s/p MAZE and SHEILA closure  4. junctional bradycardia/wenckebach - ep recommends re-eval pat once off of all inotropes; no ppm needed at the present time   5. DM/Glucose A1c 6.2 Control: increase lantus 40/humalog 10 and sliding scale   6. bumex PRN for noncardiogenic fluid overload   7. franklin on CKD3a- renal consult- diuresis PRN, avoid hypotension   8. Ancef added for possible URI , pt with productive cough and b/l opacities seen on cxr  psych consult for anxiety  restart low dose neurontin for pain control  restart xanax for anxiety  Social Service Disposition:  to be determined

## 2025-02-05 NOTE — PROGRESS NOTE ADULT - SUBJECTIVE AND OBJECTIVE BOX
Nephrology progress note    Patient is seen and examined, events over the last 24 h noted .  sitting in chair     Allergies:  No Known Allergies    Hospital Medications:   MEDICATIONS  (STANDING):  acetaminophen   IVPB .. 1000 milliGRAM(s) IV Intermittent once  albumin human 25% IVPB 50 milliLiter(s) IV Intermittent every 6 hours  aspirin enteric coated 81 milliGRAM(s) Oral daily  bisacodyl Suppository 10 milliGRAM(s) Rectal once  ceFAZolin   IVPB 2000 milliGRAM(s) IV Intermittent every 12 hours  fluticasone propionate/ salmeterol 250-50 MICROgram(s) Diskus 1 Dose(s) Inhalation two times a day  folic acid 1 milliGRAM(s) Oral daily  glucagon  Injectable 1 milliGRAM(s) IntraMuscular once  guaiFENesin  milliGRAM(s) Oral every 12 hours  heparin   Injectable 5000 Unit(s) SubCutaneous every 8 hours  influenza  Vaccine (HIGH DOSE) 0.5 milliLiter(s) IntraMuscular once  insulin glargine Injectable (LANTUS) 40 Unit(s) SubCutaneous before breakfast  insulin lispro (ADMELOG) corrective regimen sliding scale   SubCutaneous three times a day before meals  insulin lispro (ADMELOG) corrective regimen sliding scale   SubCutaneous at bedtime  insulin lispro Injectable (ADMELOG) 10 Unit(s) SubCutaneous three times a day before meals  iron sucrose IVPB 200 milliGRAM(s) IV Intermittent every 24 hours  levalbuterol Inhalation 1.25 milliGRAM(s) Inhalation four times a day  lidocaine   4% Patch 1 Patch Transdermal every 24 hours  lidocaine   4% Patch 1 Patch Transdermal every 24 hours  milrinone Infusion 0.0625 MICROgram(s)/kG/Min (2.14 mL/Hr) IV Continuous <Continuous>  mupirocin 2% Nasal 1 Application(s) Both Nostrils every 12 hours  pantoprazole    Tablet 40 milliGRAM(s) Oral before breakfast  polyethylene glycol 3350 17 Gram(s) Oral daily  rosuvastatin 20 milliGRAM(s) Oral at bedtime  senna 2 Tablet(s) Oral at bedtime  sodium chloride 0.9%. 1000 milliLiter(s) (20 mL/Hr) IV Continuous <Continuous>  tamsulosin 0.4 milliGRAM(s) Oral at bedtime        VITALS:  T(F): 98.6 (02-05-25 @ 08:00), Max: 98.6 (02-04-25 @ 16:00)  HR: 91 (02-05-25 @ 09:00)  BP: 148/80 (02-05-25 @ 08:00)  RR: 14 (02-05-25 @ 09:00)  SpO2: 100% (02-05-25 @ 09:00)      02-03 @ 07:01  -  02-04 @ 07:00  --------------------------------------------------------  IN: 1756.8 mL / OUT: 1620 mL / NET: 136.8 mL    02-04 @ 07:01  -  02-05 @ 07:00  --------------------------------------------------------  IN: 1565.3 mL / OUT: 1516 mL / NET: 49.3 mL    02-05 @ 07:01  -  02-05 @ 10:06  --------------------------------------------------------  IN: 108.5 mL / OUT: 200 mL / NET: -91.5 mL          PHYSICAL EXAM:  Constitutional: NAD/ sleepy   Respiratory: CTAB, no wheezes, rales or rhonchi  Cardiovascular: S1, S2, RRR  Gastrointestinal: BS+, soft, NT/ND  Extremities: No cyanosis or clubbing. No peripheral edema  :  No prince.   Skin: No rashes    LABS:  02-05    134[L]  |  101  |  93[HH]  ----------------------------<  118[H]  4.9   |  22  |  2.2[H]    Creatinine Trend: 2.2<--, 2.4<--, 2.5<--, 2.7<--, 2.5<--, 2.5<--    Ca    8.3[L]      05 Feb 2025 02:13  Mg     2.5     02-05    TPro  5.7[L]  /  Alb  3.6  /  TBili  0.4  /  DBili      /  AST  39  /  ALT  17  /  AlkPhos  166[H]  02-05                          7.4    11.37 )-----------( 164      ( 05 Feb 2025 02:13 )             24.6       Urine Studies:  Urinalysis Basic - ( 05 Feb 2025 02:13 )    Color:  / Appearance:  / SG:  / pH:   Gluc: 118 mg/dL / Ketone:   / Bili:  / Urobili:    Blood:  / Protein:  / Nitrite:    Leuk Esterase:  / RBC:  / WBC    Sq Epi:  / Non Sq Epi:  / Bacteria:           TSH 2.72      [12-04-24 @ 04:45]          RADIOLOGY & ADDITIONAL STUDIES:

## 2025-02-05 NOTE — BH CONSULTATION LIAISON ASSESSMENT NOTE - RISK ASSESSMENT
Risk: acute anxiety, medical illness, stress at home  Protective: not suicidal, no previous psych hx of suicide, obligation to family, limited access to lethal means    Patient is adamantly not suicide, risk is overall at this time.

## 2025-02-05 NOTE — BH CONSULTATION LIAISON ASSESSMENT NOTE - CURRENT MEDICATION
MEDICATIONS  (STANDING):  acetaminophen   IVPB .. 1000 milliGRAM(s) IV Intermittent once  albumin human 25% IVPB 50 milliLiter(s) IV Intermittent every 6 hours  aspirin enteric coated 81 milliGRAM(s) Oral daily  bisacodyl Suppository 10 milliGRAM(s) Rectal once  ceFAZolin   IVPB 2000 milliGRAM(s) IV Intermittent every 12 hours  chlorhexidine 2% Cloths 1 Application(s) Topical daily  dextrose 5%. 1000 milliLiter(s) (50 mL/Hr) IV Continuous <Continuous>  dextrose 5%. 1000 milliLiter(s) (100 mL/Hr) IV Continuous <Continuous>  dextrose 50% Injectable 50 milliLiter(s) IV Push every 15 minutes  dextrose 50% Injectable 25 milliLiter(s) IV Push every 15 minutes  fluticasone propionate/ salmeterol 250-50 MICROgram(s) Diskus 1 Dose(s) Inhalation two times a day  folic acid 1 milliGRAM(s) Oral daily  gabapentin 100 milliGRAM(s) Oral every 8 hours  glucagon  Injectable 1 milliGRAM(s) IntraMuscular once  guaiFENesin  milliGRAM(s) Oral every 12 hours  heparin   Injectable 5000 Unit(s) SubCutaneous every 8 hours  influenza  Vaccine (HIGH DOSE) 0.5 milliLiter(s) IntraMuscular once  insulin glargine Injectable (LANTUS) 40 Unit(s) SubCutaneous before breakfast  insulin lispro (ADMELOG) corrective regimen sliding scale   SubCutaneous three times a day before meals  insulin lispro (ADMELOG) corrective regimen sliding scale   SubCutaneous at bedtime  insulin lispro Injectable (ADMELOG) 10 Unit(s) SubCutaneous three times a day before meals  iron sucrose IVPB 200 milliGRAM(s) IV Intermittent every 24 hours  levalbuterol Inhalation 1.25 milliGRAM(s) Inhalation four times a day  lidocaine   4% Patch 1 Patch Transdermal every 24 hours  lidocaine   4% Patch 1 Patch Transdermal every 24 hours  milrinone Infusion 0.0625 MICROgram(s)/kG/Min (2.14 mL/Hr) IV Continuous <Continuous>  mupirocin 2% Nasal 1 Application(s) Both Nostrils every 12 hours  pantoprazole    Tablet 40 milliGRAM(s) Oral before breakfast  polyethylene glycol 3350 17 Gram(s) Oral daily  rosuvastatin 20 milliGRAM(s) Oral at bedtime  senna 2 Tablet(s) Oral at bedtime  sodium chloride 0.9%. 1000 milliLiter(s) (20 mL/Hr) IV Continuous <Continuous>  tamsulosin 0.4 milliGRAM(s) Oral at bedtime    MEDICATIONS  (PRN):  ALPRAZolam 0.25 milliGRAM(s) Oral at bedtime PRN anxiety  dextrose Oral Gel 15 Gram(s) Oral once PRN Blood Glucose LESS THAN 70 milliGRAM(s)/deciliter  hydrALAZINE Injectable 5 milliGRAM(s) IV Push every 4 hours PRN Systolic > 160  ondansetron Injectable 4 milliGRAM(s) IV Push every 4 hours PRN Nausea and/or Vomiting

## 2025-02-05 NOTE — BH CONSULTATION LIAISON ASSESSMENT NOTE - NSBHCONSULTRECOMMENDOTHER_PSY_A_CORE FT
-Start Zoloft 25 mg daily for anxiety  -Start hydroxyzine 25 mg q 6 hrs prn for breakthrough anxiety.  -Will have psychiatry SW follow up for linkage/referral for outpatient psychiatry and therapy.  -Psychiatry will sign off. Please recall prn--would appreciate direct message for specific concerns to address.

## 2025-02-05 NOTE — BH CONSULTATION LIAISON ASSESSMENT NOTE - NSBHCHARTREVIEWINVESTIGATE_PSY_A_CORE FT
< from: 12 Lead ECG (02.04.25 @ 07:38) >    Ventricular Rate 99 BPM    QRS Duration 90 ms    Q-T Interval 330 ms    QTC Calculation(Bazett) 423 ms    < end of copied text >

## 2025-02-05 NOTE — BH CONSULTATION LIAISON ASSESSMENT NOTE - NSBHCHARTREVIEWVS_PSY_A_CORE FT
Vital Signs Last 24 Hrs  T(C): 36.7 (05 Feb 2025 16:00), Max: 37 (05 Feb 2025 08:00)  T(F): 98.1 (05 Feb 2025 16:00), Max: 98.6 (05 Feb 2025 08:00)  HR: 94 (05 Feb 2025 17:00) (86 - 116)  BP: 148/80 (05 Feb 2025 08:00) (148/80 - 148/80)  BP(mean): 107 (05 Feb 2025 08:00) (107 - 107)  RR: 22 (05 Feb 2025 17:00) (12 - 38)  SpO2: 99% (05 Feb 2025 10:00) (88% - 100%)    Parameters below as of 05 Feb 2025 17:00  Patient On (Oxygen Delivery Method): nasal cannula  O2 Flow (L/min): 2
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
## 2025-02-05 NOTE — BH CONSULTATION LIAISON ASSESSMENT NOTE - NSBHCONSULTFOLLOWAFTERCARE_PSY_A_CORE FT
Will be given sources for outpatient psychiatric and psychotherapy services--[facility] ([facility]), [address] [place] [address] , [place] [address], phone number : [phone]

## 2025-02-05 NOTE — PROGRESS NOTE ADULT - ASSESSMENT
Assessment  64M s/p MVR, LAAC, MAZE on 1/28  Post op complicated by acute pulmonary insufficiency, FLAKITO, cardiogenic shock req inotropes    Plan:    Neuro:  Multimodal pain management - no toradol, minimize opiates  Resume 100 TID gabapentin  Resume 0.25 xanax PRN at night  Tylenol, Lidoderm patch, opiates as needed  Monitor neuro status in the post op period  Psych consult    CV:  S/P MVR, LAAC, MAZE  Monitor perfusion, , lactate, UOP, index and MVO2 if available  Maintain MAP > 65  On milrinone 0.125->0.0625  Dobutamine off  EP consulted for bradycardia - now sinus with 1st degree  No BB or amio  ASA,statin    Resp:  Supplemental oxygen to maintain sats > 92%  Continuous pulse oximetry monitoring  BiPAP at night  NC during the day  IS / Chest PT  OOBTC and Ambulate  Nebulizers as needed - albuterol/atrovent  Advair    GI:  Heart healthy diet  Bowel Regimen - escalate as needed  PUD ppx per protocol    Renal  FLAKITO post surgery  Creat up to 3.1 now downtrending  BUN elevated - may be contributing to confusion  Baseline CKD  Monitor UOP, lytes, creatinine  1mg bumex today with 25% albumin Q6hrs  Keep K > 4, Mg > 2  Flomax    Endo:  Tight glucose control per CTICU protocol  Lantus 40 / premeal 10  SSI    Heme:  Acute blood loss anemia post surgery  High risk for thrombocytopenia  DVT ppx with HSQ  Iron / FA / MVI    ID:  Started on ancef per CTS  Monitor fever curve and WBC count  Remove TLC today  Remove [place] tomorrow    Upon my evaluation, the above patient has a high probability of imminent or life threatening deterioration due to a high risk for Shock, Cardiogenic shock, arrythmia, acute blood loss, acute kidney injury and acute pulmonary insufficiency which required my direct attention, intervention, and personal management.  Total critical care time indicated in the note includes review of radiology results, ordering, interpreting and reviewing diagnostic studies / lab tests with immediate assessment and treatment, consultant collaboration on findings and treatment options, monitoring for potential decompensation, obtaining history from family, EMT, nursing home staff and/or treating physicians; directing the titration of pressors, oxygen support devices, fluid resuscitation, interpretation of cardiac output measurements, interpretation of radiological assessments, interpretation of EKG / rhythm strips, telemetry.  This time did not overlap with the management of other patients or performing separately reportable procedures.      Management of this patient was discussed with the CT Surgery/Thoracic surgery/Structural Cardiology attending and mid-level providers.    I acknowledge the use of copied documentation.  All copy forward documentation is my own and has been reviewed and revised as appropriate.  If no changes were made, it is because that information remains the same.

## 2025-02-06 LAB
ALBUMIN SERPL ELPH-MCNC: 3.8 G/DL — SIGNIFICANT CHANGE UP (ref 3.5–5.2)
ALP SERPL-CCNC: 160 U/L — HIGH (ref 30–115)
ALT FLD-CCNC: 12 U/L — SIGNIFICANT CHANGE UP (ref 0–41)
ANION GAP SERPL CALC-SCNC: 13 MMOL/L — SIGNIFICANT CHANGE UP (ref 7–14)
AST SERPL-CCNC: 33 U/L — SIGNIFICANT CHANGE UP (ref 0–41)
BASOPHILS # BLD AUTO: 0.03 K/UL — SIGNIFICANT CHANGE UP (ref 0–0.2)
BASOPHILS NFR BLD AUTO: 0.3 % — SIGNIFICANT CHANGE UP (ref 0–1)
BILIRUB SERPL-MCNC: 0.4 MG/DL — SIGNIFICANT CHANGE UP (ref 0.2–1.2)
BUN SERPL-MCNC: 83 MG/DL — CRITICAL HIGH (ref 10–20)
CALCIUM SERPL-MCNC: 8.5 MG/DL — SIGNIFICANT CHANGE UP (ref 8.4–10.5)
CHLORIDE SERPL-SCNC: 100 MMOL/L — SIGNIFICANT CHANGE UP (ref 98–110)
CO2 SERPL-SCNC: 23 MMOL/L — SIGNIFICANT CHANGE UP (ref 17–32)
CREAT SERPL-MCNC: 1.8 MG/DL — HIGH (ref 0.7–1.5)
EGFR: 41 ML/MIN/1.73M2 — LOW
EOSINOPHIL # BLD AUTO: 0.53 K/UL — SIGNIFICANT CHANGE UP (ref 0–0.7)
EOSINOPHIL NFR BLD AUTO: 4.9 % — SIGNIFICANT CHANGE UP (ref 0–8)
GAS PNL BLDA: SIGNIFICANT CHANGE UP
GLUCOSE BLDC GLUCOMTR-MCNC: 158 MG/DL — HIGH (ref 70–99)
GLUCOSE BLDC GLUCOMTR-MCNC: 166 MG/DL — HIGH (ref 70–99)
GLUCOSE BLDC GLUCOMTR-MCNC: 225 MG/DL — HIGH (ref 70–99)
GLUCOSE BLDC GLUCOMTR-MCNC: 252 MG/DL — HIGH (ref 70–99)
GLUCOSE SERPL-MCNC: 146 MG/DL — HIGH (ref 70–99)
HCT VFR BLD CALC: 27.6 % — LOW (ref 42–52)
HGB BLD-MCNC: 8.2 G/DL — LOW (ref 14–18)
IMM GRANULOCYTES NFR BLD AUTO: 5 % — HIGH (ref 0.1–0.3)
LYMPHOCYTES # BLD AUTO: 1.15 K/UL — LOW (ref 1.2–3.4)
LYMPHOCYTES # BLD AUTO: 10.5 % — LOW (ref 20.5–51.1)
MAGNESIUM SERPL-MCNC: 2.5 MG/DL — HIGH (ref 1.8–2.4)
MCHC RBC-ENTMCNC: 26.5 PG — LOW (ref 27–31)
MCHC RBC-ENTMCNC: 29.7 G/DL — LOW (ref 32–37)
MCV RBC AUTO: 89.3 FL — SIGNIFICANT CHANGE UP (ref 80–94)
MONOCYTES # BLD AUTO: 0.97 K/UL — HIGH (ref 0.1–0.6)
MONOCYTES NFR BLD AUTO: 8.9 % — SIGNIFICANT CHANGE UP (ref 1.7–9.3)
NEUTROPHILS # BLD AUTO: 7.69 K/UL — HIGH (ref 1.4–6.5)
NEUTROPHILS NFR BLD AUTO: 70.4 % — SIGNIFICANT CHANGE UP (ref 42.2–75.2)
NRBC # BLD: 0 /100 WBCS — SIGNIFICANT CHANGE UP (ref 0–0)
NRBC BLD-RTO: 0 /100 WBCS — SIGNIFICANT CHANGE UP (ref 0–0)
PLATELET # BLD AUTO: 204 K/UL — SIGNIFICANT CHANGE UP (ref 130–400)
PMV BLD: 11 FL — HIGH (ref 7.4–10.4)
POTASSIUM SERPL-MCNC: 4.2 MMOL/L — SIGNIFICANT CHANGE UP (ref 3.5–5)
POTASSIUM SERPL-SCNC: 4.2 MMOL/L — SIGNIFICANT CHANGE UP (ref 3.5–5)
PROT SERPL-MCNC: 5.9 G/DL — LOW (ref 6–8)
RBC # BLD: 3.09 M/UL — LOW (ref 4.7–6.1)
RBC # FLD: 19.5 % — HIGH (ref 11.5–14.5)
SODIUM SERPL-SCNC: 136 MMOL/L — SIGNIFICANT CHANGE UP (ref 135–146)
WBC # BLD: 10.92 K/UL — HIGH (ref 4.8–10.8)
WBC # FLD AUTO: 10.92 K/UL — HIGH (ref 4.8–10.8)

## 2025-02-06 PROCEDURE — 93010 ELECTROCARDIOGRAM REPORT: CPT

## 2025-02-06 PROCEDURE — 71045 X-RAY EXAM CHEST 1 VIEW: CPT | Mod: 26

## 2025-02-06 PROCEDURE — 99291 CRITICAL CARE FIRST HOUR: CPT | Mod: 24

## 2025-02-06 RX ORDER — SODIUM BICARBONATE 1 MEQ/ML
650 SYRINGE (ML) INTRAVENOUS EVERY 8 HOURS
Refills: 0 | Status: COMPLETED | OUTPATIENT
Start: 2025-02-06 | End: 2025-02-08

## 2025-02-06 RX ORDER — MELATONIN 5 MG
5 TABLET ORAL AT BEDTIME
Refills: 0 | Status: DISCONTINUED | OUTPATIENT
Start: 2025-02-06 | End: 2025-02-24

## 2025-02-06 RX ORDER — OXYCODONE HYDROCHLORIDE 30 MG/1
5 TABLET ORAL EVERY 6 HOURS
Refills: 0 | Status: DISCONTINUED | OUTPATIENT
Start: 2025-02-06 | End: 2025-02-13

## 2025-02-06 RX ORDER — BUMETANIDE 1 MG/1
1 TABLET ORAL ONCE
Refills: 0 | Status: COMPLETED | OUTPATIENT
Start: 2025-02-06 | End: 2025-02-06

## 2025-02-06 RX ORDER — FINASTERIDE 1 MG/1
5 TABLET, FILM COATED ORAL DAILY
Refills: 0 | Status: DISCONTINUED | OUTPATIENT
Start: 2025-02-06 | End: 2025-02-24

## 2025-02-06 RX ORDER — SERTRALINE 100 MG/1
25 TABLET, FILM COATED ORAL DAILY
Refills: 0 | Status: DISCONTINUED | OUTPATIENT
Start: 2025-02-06 | End: 2025-02-24

## 2025-02-06 RX ADMIN — TAMSULOSIN HYDROCHLORIDE 0.4 MILLIGRAM(S): 0.4 CAPSULE ORAL at 22:25

## 2025-02-06 RX ADMIN — GABAPENTIN 100 MILLIGRAM(S): 400 CAPSULE ORAL at 06:08

## 2025-02-06 RX ADMIN — FOLIC ACID 1 MILLIGRAM(S): 1 TABLET ORAL at 12:28

## 2025-02-06 RX ADMIN — Medication 1000 MILLIGRAM(S): at 07:45

## 2025-02-06 RX ADMIN — ROSUVASTATIN CALCIUM 20 MILLIGRAM(S): 20 TABLET, FILM COATED ORAL at 22:25

## 2025-02-06 RX ADMIN — INSULIN LISPRO 10 UNIT(S): 100 INJECTION, SOLUTION INTRAVENOUS; SUBCUTANEOUS at 16:42

## 2025-02-06 RX ADMIN — INSULIN LISPRO 2: 100 INJECTION, SOLUTION INTRAVENOUS; SUBCUTANEOUS at 16:41

## 2025-02-06 RX ADMIN — OXYCODONE HYDROCHLORIDE 5 MILLIGRAM(S): 30 TABLET ORAL at 22:25

## 2025-02-06 RX ADMIN — SERTRALINE 25 MILLIGRAM(S): 100 TABLET, FILM COATED ORAL at 12:28

## 2025-02-06 RX ADMIN — Medication 81 MILLIGRAM(S): at 12:28

## 2025-02-06 RX ADMIN — OXYCODONE HYDROCHLORIDE 5 MILLIGRAM(S): 30 TABLET ORAL at 14:50

## 2025-02-06 RX ADMIN — LEVALBUTEROL HYDROCHLORIDE 1.25 MILLIGRAM(S): 1.25 SOLUTION RESPIRATORY (INHALATION) at 13:26

## 2025-02-06 RX ADMIN — Medication 400 MILLIGRAM(S): at 07:34

## 2025-02-06 RX ADMIN — Medication 1 APPLICATION(S): at 00:10

## 2025-02-06 RX ADMIN — BUMETANIDE 1 MILLIGRAM(S): 1 TABLET ORAL at 14:46

## 2025-02-06 RX ADMIN — HEPARIN SODIUM 5000 UNIT(S): 1000 INJECTION INTRAVENOUS; SUBCUTANEOUS at 13:31

## 2025-02-06 RX ADMIN — LEVALBUTEROL HYDROCHLORIDE 1.25 MILLIGRAM(S): 1.25 SOLUTION RESPIRATORY (INHALATION) at 21:05

## 2025-02-06 RX ADMIN — Medication 1000 MILLIGRAM(S): at 02:13

## 2025-02-06 RX ADMIN — DEXTROMETHORPHAN HBR, GUAIFENESIN 600 MILLIGRAM(S): 200 LIQUID ORAL at 06:08

## 2025-02-06 RX ADMIN — Medication 40 MILLIGRAM(S): at 06:08

## 2025-02-06 RX ADMIN — DEXTROMETHORPHAN HBR, GUAIFENESIN 600 MILLIGRAM(S): 200 LIQUID ORAL at 18:12

## 2025-02-06 RX ADMIN — Medication 5 MILLIGRAM(S): at 22:24

## 2025-02-06 RX ADMIN — GABAPENTIN 100 MILLIGRAM(S): 400 CAPSULE ORAL at 13:31

## 2025-02-06 RX ADMIN — HEPARIN SODIUM 5000 UNIT(S): 1000 INJECTION INTRAVENOUS; SUBCUTANEOUS at 09:51

## 2025-02-06 RX ADMIN — INSULIN GLARGINE-YFGN 40 UNIT(S): 100 INJECTION, SOLUTION SUBCUTANEOUS at 07:33

## 2025-02-06 RX ADMIN — ALBUMIN (HUMAN) 50 MILLILITER(S): 12.5 INJECTION, SOLUTION INTRAVENOUS at 02:05

## 2025-02-06 RX ADMIN — Medication 1 DOSE(S): at 07:35

## 2025-02-06 RX ADMIN — MILRINONE LACTATE 2.14 MICROGRAM(S)/KG/MIN: 1 INJECTION, SOLUTION INTRAVENOUS at 01:44

## 2025-02-06 RX ADMIN — FINASTERIDE 5 MILLIGRAM(S): 1 TABLET, FILM COATED ORAL at 12:28

## 2025-02-06 RX ADMIN — INSULIN LISPRO 10 UNIT(S): 100 INJECTION, SOLUTION INTRAVENOUS; SUBCUTANEOUS at 06:57

## 2025-02-06 RX ADMIN — LIDOCAINE HYDROCHLORIDE 1 PATCH: 20 JELLY TOPICAL at 18:11

## 2025-02-06 RX ADMIN — Medication 650 MILLIGRAM(S): at 09:52

## 2025-02-06 RX ADMIN — HEPARIN SODIUM 5000 UNIT(S): 1000 INJECTION INTRAVENOUS; SUBCUTANEOUS at 22:25

## 2025-02-06 RX ADMIN — Medication 650 MILLIGRAM(S): at 13:31

## 2025-02-06 RX ADMIN — OXYCODONE HYDROCHLORIDE 5 MILLIGRAM(S): 30 TABLET ORAL at 10:20

## 2025-02-06 RX ADMIN — BUMETANIDE 1 MILLIGRAM(S): 1 TABLET ORAL at 09:51

## 2025-02-06 RX ADMIN — Medication 100 MILLIGRAM(S): at 18:12

## 2025-02-06 RX ADMIN — IRON SUCROSE 100 MILLIGRAM(S): 20 INJECTION, SOLUTION INTRAVENOUS at 07:51

## 2025-02-06 RX ADMIN — Medication 400 MILLIGRAM(S): at 01:43

## 2025-02-06 RX ADMIN — INSULIN LISPRO 10 UNIT(S): 100 INJECTION, SOLUTION INTRAVENOUS; SUBCUTANEOUS at 12:29

## 2025-02-06 RX ADMIN — Medication 1 APPLICATION(S): at 12:28

## 2025-02-06 RX ADMIN — Medication 0.25 MILLIGRAM(S): at 22:24

## 2025-02-06 RX ADMIN — Medication 1000 MILLIGRAM(S): at 13:55

## 2025-02-06 RX ADMIN — OXYCODONE HYDROCHLORIDE 5 MILLIGRAM(S): 30 TABLET ORAL at 23:00

## 2025-02-06 RX ADMIN — POLYETHYLENE GLYCOL 3350 17 GRAM(S): 17 POWDER, FOR SOLUTION ORAL at 12:29

## 2025-02-06 RX ADMIN — Medication 400 MILLIGRAM(S): at 13:31

## 2025-02-06 RX ADMIN — INSULIN LISPRO 6: 100 INJECTION, SOLUTION INTRAVENOUS; SUBCUTANEOUS at 12:29

## 2025-02-06 RX ADMIN — LEVALBUTEROL HYDROCHLORIDE 1.25 MILLIGRAM(S): 1.25 SOLUTION RESPIRATORY (INHALATION) at 07:34

## 2025-02-06 RX ADMIN — INSULIN LISPRO 2: 100 INJECTION, SOLUTION INTRAVENOUS; SUBCUTANEOUS at 06:56

## 2025-02-06 RX ADMIN — LIDOCAINE HYDROCHLORIDE 1 PATCH: 20 JELLY TOPICAL at 19:04

## 2025-02-06 RX ADMIN — OXYCODONE HYDROCHLORIDE 5 MILLIGRAM(S): 30 TABLET ORAL at 16:25

## 2025-02-06 RX ADMIN — Medication 100 MILLIGRAM(S): at 06:06

## 2025-02-06 RX ADMIN — Medication 650 MILLIGRAM(S): at 22:25

## 2025-02-06 RX ADMIN — GABAPENTIN 100 MILLIGRAM(S): 400 CAPSULE ORAL at 22:25

## 2025-02-06 RX ADMIN — OXYCODONE HYDROCHLORIDE 5 MILLIGRAM(S): 30 TABLET ORAL at 09:51

## 2025-02-06 NOTE — PROGRESS NOTE ADULT - SUBJECTIVE AND OBJECTIVE BOX
CTU Attending Progress Daily Note       Procedure: MV Replacement (bioprosthetic), SHEILA ligation, MAZE  Procedure Date: 1/28/2025    HPI: 64M w/ afib, nonobstructive CAD, CHF class 3, severe MR, DM, HTN, JIMMY on CPAP, asthma, severe obesity, HL, Madelung's disease, osteoarthritis s/p bilateral hip replacement, peripheral neuropathy presented with increasing dyspnea with exertion found to have severe MR.  He was evaluated for mitral clip and not a candidate.  On prior admission he had FLAKITO with creat up to 2.  Was optimized with diuresis.  He was on AC and developed hemorrhoidal bleeding and was taken off AC.  He was discharged and returned for his procedure on 1/28    OR Data  Procedure: Procedure: MVReplacement (bioprosthetic), SHEILA ligation, MAZE  Bypass: 202  Cross Clamp: 158  Pre LV Fxn: 50-55%      RV Fxn: normal  Post LV Fxn: 45-50%     RV Fxn: normal    moderate TR, MV gradient 3mmHg  Blood Products: 1uPRBC, ddavp 39mcq  Cell Saver: 698  Fluids: NS 2000, albumin 2000  Pacing Wires: V pacing. sinus rhythm  UO: 1100    1/28 - Fany and poor oxygenation in OR. Milrinone, norepi, vaso. Extubation in evening.  1/29 - start aspirin, diuresis, heparin dvt prophylaxis  1/30- d/c pleural ct if drainage dec; wean to dc primacor; wean high flow o2  1/31 - FLAKITO worsening, bradyarrythmias - nigel - ep consulted  2/2 - Chest tube removed, ambulating, creat improving, dobutamine weaned  2/3 - Dobut to 1.5, BUN elevated  2/4 - Confused this AM, normal neuro exam, prince removed  2/5 - FLAKITO improving, off dobutamine, mil decreased   2/6 - milrinone stopped, bumex 1mg BID, walked the whole unit    OBJECTIVE:  ICU Vital Signs Last 24 Hrs  T(C): 37.3 (06 Feb 2025 16:00), Max: 37.3 (06 Feb 2025 16:00)  T(F): 99.1 (06 Feb 2025 16:00), Max: 99.1 (06 Feb 2025 16:00)  HR: 87 (06 Feb 2025 15:00) (82 - 96)  BP: --  BP(mean): --  ABP: 139/47 (06 Feb 2025 15:00) (130/45 - 190/75)  ABP(mean): 74 (06 Feb 2025 15:00) (70 - 112)  RR: 20 (06 Feb 2025 15:00) (13 - 25)  SpO2: 96% (06 Feb 2025 15:00) (89% - 100%)    O2 Parameters below as of 06 Feb 2025 15:00  Patient On (Oxygen Delivery Method): nasal cannula  O2 Flow (L/min): 2        I&O's Summary    05 Feb 2025 07:01  -  06 Feb 2025 07:00  --------------------------------------------------------  IN: 908.4 mL / OUT: 1300 mL / NET: -391.6 mL    06 Feb 2025 07:01  -  06 Feb 2025 15:12  --------------------------------------------------------  IN: 200 mL / OUT: 700 mL / NET: -500 mL      I&O's Detail    05 Feb 2025 07:01  -  06 Feb 2025 07:00  --------------------------------------------------------  IN:    Albumin 25%  -  50 mL: 100 mL    Albumin 5%  - 500 mL: 50 mL    IV PiggyBack: 100 mL    IV PiggyBack: 50 mL    IV PiggyBack: 200 mL    Milrinone: 4.3 mL    Milrinone: 44.1 mL    Oral Fluid: 360 mL  Total IN: 908.4 mL    OUT:    Voided (mL): 1300 mL  Total OUT: 1300 mL    Total NET: -391.6 mL      06 Feb 2025 07:01  -  06 Feb 2025 15:12  --------------------------------------------------------  IN:    IV PiggyBack: 200 mL  Total IN: 200 mL    OUT:    Voided (mL): 700 mL  Total OUT: 700 mL    Total NET: -500 mL        Adult Advanced Hemodynamics Last 24 Hrs  CVP(mm Hg): --  CVP(cm H2O): --  CO: --  CI: --  PA: --  PA(mean): --  PCWP: --  SVR: --  SVRI: --  PVR: --  PVRI: --    CAPILLARY BLOOD GLUCOSE      POCT Blood Glucose.: 252 mg/dL (06 Feb 2025 11:54)  POCT Blood Glucose.: 158 mg/dL (06 Feb 2025 06:14)  POCT Blood Glucose.: 158 mg/dL (05 Feb 2025 21:29)  POCT Blood Glucose.: 241 mg/dL (05 Feb 2025 16:28)    LABS:  ABG - ( 06 Feb 2025 04:33 )  pH, Arterial: 7.30  pH, Blood: x     /  pCO2: 50    /  pO2: 133   / HCO3: 25    / Base Excess: -1.9  /  SaO2: 98.0                                    8.2    10.92 )-----------( 204      ( 06 Feb 2025 02:25 )             27.6     02-06    136  |  100  |  83[HH]  ----------------------------<  146[H]  4.2   |  23  |  1.8[H]    Ca    8.5      06 Feb 2025 02:25  Mg     2.5     02-06    TPro  5.9[L]  /  Alb  3.8  /  TBili  0.4  /  DBili  x   /  AST  33  /  ALT  12  /  AlkPhos  160[H]  02-06    PT/INR - ( 05 Feb 2025 02:13 )   PT: 12.50 sec;   INR: 1.06 ratio         PTT - ( 05 Feb 2025 02:13 )  PTT:30.3 sec  Urinalysis Basic - ( 06 Feb 2025 02:25 )    Color: x / Appearance: x / SG: x / pH: x  Gluc: 146 mg/dL / Ketone: x  / Bili: x / Urobili: x   Blood: x / Protein: x / Nitrite: x   Leuk Esterase: x / RBC: x / WBC x   Sq Epi: x / Non Sq Epi: x / Bacteria: x        Home Medications:  Breo Ellipta 200 mcg-25 mcg/inh inhalation powder: 1 inhaled once a day (28 Jan 2025 06:52)  HYDROcodone 10 mg oral capsule, extended release: 1 cap(s) orally 2 times a day (28 Jan 2025 06:52)  losartan 50 mg oral tablet: 1 tab(s) orally 2 times a day (28 Jan 2025 06:52)  losartan-hydrochlorothiazide 50 mg-12.5 mg oral tablet: 1 tab(s) orally (28 Jan 2025 06:52)  ProAir HFA 90 mcg/inh inhalation aerosol: 2 puff(s) inhaled 2 times a day (28 Jan 2025 06:52)  tujeo: 80 unit(s) subcutaneous once a day (28 Jan 2025 06:52)    HOSPITAL MEDICATIONS:  MEDICATIONS  (STANDING):  acetaminophen   IVPB .. 1000 milliGRAM(s) IV Intermittent once  aspirin enteric coated 81 milliGRAM(s) Oral daily  bisacodyl Suppository 10 milliGRAM(s) Rectal once  ceFAZolin   IVPB 2000 milliGRAM(s) IV Intermittent every 12 hours  chlorhexidine 2% Cloths 1 Application(s) Topical daily  dextrose 5%. 1000 milliLiter(s) (50 mL/Hr) IV Continuous <Continuous>  dextrose 5%. 1000 milliLiter(s) (100 mL/Hr) IV Continuous <Continuous>  dextrose 50% Injectable 50 milliLiter(s) IV Push every 15 minutes  dextrose 50% Injectable 25 milliLiter(s) IV Push every 15 minutes  finasteride 5 milliGRAM(s) Oral daily  fluticasone propionate/ salmeterol 250-50 MICROgram(s) Diskus 1 Dose(s) Inhalation two times a day  folic acid 1 milliGRAM(s) Oral daily  gabapentin 100 milliGRAM(s) Oral every 8 hours  glucagon  Injectable 1 milliGRAM(s) IntraMuscular once  guaiFENesin  milliGRAM(s) Oral every 12 hours  heparin   Injectable 5000 Unit(s) SubCutaneous every 8 hours  influenza  Vaccine (HIGH DOSE) 0.5 milliLiter(s) IntraMuscular once  insulin glargine Injectable (LANTUS) 40 Unit(s) SubCutaneous before breakfast  insulin lispro (ADMELOG) corrective regimen sliding scale   SubCutaneous three times a day before meals  insulin lispro (ADMELOG) corrective regimen sliding scale   SubCutaneous at bedtime  insulin lispro Injectable (ADMELOG) 10 Unit(s) SubCutaneous three times a day before meals  levalbuterol Inhalation 1.25 milliGRAM(s) Inhalation four times a day  lidocaine   4% Patch 1 Patch Transdermal every 24 hours  lidocaine   4% Patch 1 Patch Transdermal every 24 hours  mupirocin 2% Nasal 1 Application(s) Both Nostrils every 12 hours  pantoprazole    Tablet 40 milliGRAM(s) Oral before breakfast  polyethylene glycol 3350 17 Gram(s) Oral daily  rosuvastatin 20 milliGRAM(s) Oral at bedtime  senna 2 Tablet(s) Oral at bedtime  sertraline 25 milliGRAM(s) Oral daily  sodium bicarbonate 650 milliGRAM(s) Oral every 8 hours  sodium chloride 0.9%. 1000 milliLiter(s) (20 mL/Hr) IV Continuous <Continuous>  tamsulosin 0.4 milliGRAM(s) Oral at bedtime    MEDICATIONS  (PRN):  ALPRAZolam 0.25 milliGRAM(s) Oral at bedtime PRN anxiety  dextrose Oral Gel 15 Gram(s) Oral once PRN Blood Glucose LESS THAN 70 milliGRAM(s)/deciliter  hydrALAZINE Injectable 5 milliGRAM(s) IV Push every 4 hours PRN Systolic > 160  ondansetron Injectable 4 milliGRAM(s) IV Push every 4 hours PRN Nausea and/or Vomiting  oxyCODONE    IR 5 milliGRAM(s) Oral every 6 hours PRN Moderate Pain (4 - 6)    RADIOLOGY:  Chest X-ray Reviewed    REVIEW OF SYSTEMS:  CONSTITUTIONAL: [X] all negative; [ ] weakness, [ ] fevers, [ ] chills  EYES/ENT: [X] all negative; [ ] visual changes, [ ] vertigo, [ ] throat pain   NECK: [X] all negative; [ ] pain, [ ] stiffness  RESPIRATORY: [] all negative, [ ] cough, [ ] wheezing, [ ] hemoptysis, [ ] shortness of breath  CARDIOVASCULAR: [] all negative; [ ] chest pain, [ ] palpitations, [ ] orthopnea  GASTROINTESTINAL: [X] all negative; [ ]abdominal pain, [ ] nausea, [ ] vomiting, [ ] hematemesis, [ ] diarrhea, [ ] constipation, [ ] melena, [ ] hematochezia.  GENITOURINARY: [X] all negative; [ ] dysuria, [ ] frequency, [ ] hematuria  NEUROLOGICAL: [X] all negative; [ ] numbness, [ ] weakness  SKIN: [X] all negative; [ ] itching, [ ] burning, [ ] rashes, [ ] lesions   All other review of systems is negative unless indicated above.  [  ] Unable to assess ROS because     PHYSICAL EXAM:          CONSTITUTIONAL: Well-developed; obese man; in no acute distress.   	SKIN: warm, dry  	HEAD: Normocephalic; atraumatic.  	EYES: PERRL, EOM, no conj injection, sclera clear  	ENT: No nasal discharge; airway clear.  	NECK: Supple; non tender.   	CARD: S1, S2 normal; no murmurs, gallops, or rubs. Regular rate and rhythm.  no carotid bruits  	RESP: CTA B/L; good air movement No wheezes, rales or rhonchi.  	ABD: Soft, obese, not tender, not distended,  no rebound or guarding, bowel sounds present  	EXT: Normal ROM.  No clubbing, cyanosis or edema.   warm and well perfused.  pulses palpable  	NEURO: Alert, awake, motor 5/5 R, 5/5 L

## 2025-02-06 NOTE — PROGRESS NOTE ADULT - ASSESSMENT
64 with PMHx of CKD 3a, severe MR, presented for MVR, SHEILA.  Nephrology consulted for FLAKITO    FLAKITO on CKD 3a d/t hypotension/hemodynamic changes s/p MVR  Volume overload / mild hyponatremia hypervolemic   Acute blood loss anemia    - non oliguric  - creat down-trending noted today       Recommendations:  - resume diuretics as needed for diuresis   - document urine output  - avoid hypotension  - monitor h/h / RBCs to maintain hgb > 7-8/ on venofer complete loading dose of 1 g   - off gabapentin   - phos level noted / maintain renal diet - repeat   - on milrinone    no need for RRT    will sign off recall PRN / call using TEAMS or on [phone]

## 2025-02-06 NOTE — PROGRESS NOTE ADULT - SUBJECTIVE AND OBJECTIVE BOX
Nephrology progress note    Patient is seen and examined, events over the last 24 h noted .  Lying in bed comfortable     Allergies:  No Known Allergies    Hospital Medications:   MEDICATIONS  (STANDING):  acetaminophen   IVPB .. 1000 milliGRAM(s) IV Intermittent once  acetaminophen   IVPB .. 1000 milliGRAM(s) IV Intermittent once  aspirin enteric coated 81 milliGRAM(s) Oral daily  bisacodyl Suppository 10 milliGRAM(s) Rectal once  buMETAnide Injectable 1 milliGRAM(s) IV Push once  ceFAZolin   IVPB 2000 milliGRAM(s) IV Intermittent every 12 hours  finasteride 5 milliGRAM(s) Oral daily  fluticasone propionate/ salmeterol 250-50 MICROgram(s) Diskus 1 Dose(s) Inhalation two times a day  folic acid 1 milliGRAM(s) Oral daily  gabapentin 100 milliGRAM(s) Oral every 8 hours  glucagon  Injectable 1 milliGRAM(s) IntraMuscular once  guaiFENesin  milliGRAM(s) Oral every 12 hours  heparin   Injectable 5000 Unit(s) SubCutaneous every 8 hours  influenza  Vaccine (HIGH DOSE) 0.5 milliLiter(s) IntraMuscular once  insulin glargine Injectable (LANTUS) 40 Unit(s) SubCutaneous before breakfast  insulin lispro (ADMELOG) corrective regimen sliding scale   SubCutaneous three times a day before meals  insulin lispro (ADMELOG) corrective regimen sliding scale   SubCutaneous at bedtime  insulin lispro Injectable (ADMELOG) 10 Unit(s) SubCutaneous three times a day before meals  levalbuterol Inhalation 1.25 milliGRAM(s) Inhalation four times a day  lidocaine   4% Patch 1 Patch Transdermal every 24 hours  lidocaine   4% Patch 1 Patch Transdermal every 24 hours  mupirocin 2% Nasal 1 Application(s) Both Nostrils every 12 hours  pantoprazole    Tablet 40 milliGRAM(s) Oral before breakfast  polyethylene glycol 3350 17 Gram(s) Oral daily  rosuvastatin 20 milliGRAM(s) Oral at bedtime  senna 2 Tablet(s) Oral at bedtime  sertraline 25 milliGRAM(s) Oral daily  sodium bicarbonate 650 milliGRAM(s) Oral every 8 hours  sodium chloride 0.9%. 1000 milliLiter(s) (20 mL/Hr) IV Continuous <Continuous>  tamsulosin 0.4 milliGRAM(s) Oral at bedtime        VITALS:  T(F): 98.8 (02-06-25 @ 04:00), Max: 98.8 (02-06-25 @ 04:00)  HR: 86 (02-06-25 @ 07:00)  BP: --  RR: 16 (02-06-25 @ 07:00)  SpO2: 98% (02-06-25 @ 07:00)      02-04 @ 07:01  -  02-05 @ 07:00  --------------------------------------------------------  IN: 1565.3 mL / OUT: 1516 mL / NET: 49.3 mL    02-05 @ 07:01  -  02-06 @ 07:00  --------------------------------------------------------  IN: 908.4 mL / OUT: 1300 mL / NET: -391.6 mL          PHYSICAL EXAM:  Constitutional: leying in bed   Respiratory: CTAB,   Cardiovascular: S1, S2, RRR  Gastrointestinal: BS+, soft, NT/ND  Extremities: No cyanosis or clubbing. No peripheral edema  :  No prince.   Skin: No rashes    LABS:  02-06    136  |  100  |  83[HH]  ----------------------------<  146[H]  4.2   |  23  |  1.8[H]    Ca    8.5      06 Feb 2025 02:25  Mg     2.5     02-06    TPro  5.9[L]  /  Alb  3.8  /  TBili  0.4  /  DBili      /  AST  33  /  ALT  12  /  AlkPhos  160[H]  02-06                          8.2    10.92 )-----------( 204      ( 06 Feb 2025 02:25 )             27.6       Urine Studies:  Urinalysis Basic - ( 06 Feb 2025 02:25 )    Color:  / Appearance:  / SG:  / pH:   Gluc: 146 mg/dL / Ketone:   / Bili:  / Urobili:    Blood:  / Protein:  / Nitrite:    Leuk Esterase:  / RBC:  / WBC    Sq Epi:  / Non Sq Epi:  / Bacteria:           TSH 2.72      [12-04-24 @ 04:45]          RADIOLOGY & ADDITIONAL STUDIES:

## 2025-02-06 NOTE — PROGRESS NOTE ADULT - SUBJECTIVE AND OBJECTIVE BOX
OPERATIVE PROCEDURE(s):   MVR, SHEILA ligation, MAZE             POD #   9                    64yMale  SURGEON(s): ISELA Blakely  SUBJECTIVE ASSESSMENT: pt seen and examined.   Vital Signs Last 24 Hrs  T(F): 98.8 (06 Feb 2025 04:00), Max: 98.8 (06 Feb 2025 04:00)  HR: 86 (06 Feb 2025 07:00) (82 - 98)  BP: 148/80 (05 Feb 2025 08:00) (148/80 - 148/80)  BP(mean): 107 (05 Feb 2025 08:00) (107 - 107)  ABP: 153/59 (06 Feb 2025 07:00) (130/45 - 190/75)  ABP(mean): 87 (06 Feb 2025 07:00)  RR: 16 (06 Feb 2025 07:00) (13 - 33)  SpO2: 98% (06 Feb 2025 07:00) (89% - 100%)    I&O's Detail    05 Feb 2025 07:01  -  06 Feb 2025 07:00  --------------------------------------------------------  IN:    Albumin 25%  -  50 mL: 100 mL    Albumin 5%  - 500 mL: 50 mL    IV PiggyBack: 100 mL    IV PiggyBack: 50 mL    IV PiggyBack: 200 mL    Milrinone: 4.3 mL    Milrinone: 44.1 mL    Oral Fluid: 360 mL  Total IN: 908.4 mL    OUT:    Voided (mL): 1300 mL  Total OUT: 1300 mL        Net:   I&O's Detail    04 Feb 2025 07:01  -  05 Feb 2025 07:00  --------------------------------------------------------  Total NET: 49.3 mL      05 Feb 2025 07:01  -  06 Feb 2025 07:00  --------------------------------------------------------  Total NET: -391.6 mL        CAPILLARY BLOOD GLUCOSE      POCT Blood Glucose.: 158 mg/dL (06 Feb 2025 06:14)  POCT Blood Glucose.: 158 mg/dL (05 Feb 2025 21:29)  POCT Blood Glucose.: 241 mg/dL (05 Feb 2025 16:28)  POCT Blood Glucose.: 133 mg/dL (05 Feb 2025 11:39)  POCT Blood Glucose.: 160 mg/dL (05 Feb 2025 08:53)    Physical Exam:  General: NAD; A&Ox3,   Cardiac: S1/S2, RRR, no murmur, no rubs  Lungs: unlabored respirations, CTA b/l, no wheeze, no rales, no crackles  Abdomen: Soft/NT/ND; positive bowel sounds x 4  Sternum: Intact, no click, incision healing well with no drainage  Incisions: Incisions clean/dry/intact  Extremities: No edema b/l lower extremities; good capillary refill; no cyanosis; palpable 1+ pedal pulses b/l    Central Venous Catheter: Yes[]  No[] , If Yes indication:           Day #  Caceres Catheter: Yes  [] , No  [] , If yes indication:                      Day #  NGT: Yes [] No [] ,    If Yes Placement:                                     Day #  EPICARDIAL WIRES:  [] YES [] NO                                              Day #  BOWEL MOVEMENT:  [] YES [] NO, If No, Timing since last BM:      Day #  CHEST TUBE(Left/Right):  [] YES [] NO, If yes -  AIR LEAKS:  [] YES [] NO        LABS:                        8.2[L]  10.92[H] )-----------( 204      ( 06 Feb 2025 02:25 )             27.6[L]                        7.4[L]  11.37[H] )-----------( 164      ( 05 Feb 2025 02:13 )             24.6[L]    02-06    136  |  100  |  83[HH]  ----------------------------<  146[H]  4.2   |  23  |  1.8[H]  02-05    134[L]  |  101  |  93[HH]  ----------------------------<  118[H]   4.9   |  22  |  2.2[H]    Ca    8.5      06 Feb 2025 02:25  Mg     2.5     02-06    TPro  5.9[L] [6.0 - 8.0]  /  Alb  3.8 [3.5 - 5.2]  /  TBili  0.4 [0.2 - 1.2]  /  DBili  x   /  AST  33 [0 - 41]  /  ALT  12 [0 - 41]  /  AlkPhos  160[H] [30 - 115]  02-06    PT/INR - ( 05 Feb 2025 02:13 )   PT: ;   INR: 1.06 ratio         PTT - ( 05 Feb 2025 02:13 )  PTT:30.3 sec  Urinalysis Basic - ( 06 Feb 2025 02:25 )    Color: x / Appearance: x / SG: x / pH: x  Gluc: 146 mg/dL / Ketone: x  / Bili: x / Urobili: x   Blood: x / Protein: x / Nitrite: x   Leuk Esterase: x / RBC: x / WBC x   Sq Epi: x / Non Sq Epi: x / Bacteria: x      ABG - ( 06 Feb 2025 04:33 )  pH: 7.30  /  pCO2: 50    /  pO2: 133   / HCO3: 25    / Base Excess: -1.9  /  SaO2: 98.0  /  LA: 0.5              RADIOLOGY & ADDITIONAL TESTS:  CXR:  EKG:  MEDICATIONS  (STANDING):  acetaminophen   IVPB .. 1000 milliGRAM(s) IV Intermittent once  acetaminophen   IVPB .. 1000 milliGRAM(s) IV Intermittent once  aspirin enteric coated 81 milliGRAM(s) Oral daily  bisacodyl Suppository 10 milliGRAM(s) Rectal once  ceFAZolin   IVPB 2000 milliGRAM(s) IV Intermittent every 12 hours  chlorhexidine 2% Cloths 1 Application(s) Topical daily  dextrose 5%. 1000 milliLiter(s) (50 mL/Hr) IV Continuous <Continuous>  dextrose 5%. 1000 milliLiter(s) (100 mL/Hr) IV Continuous <Continuous>  dextrose 50% Injectable 50 milliLiter(s) IV Push every 15 minutes  dextrose 50% Injectable 25 milliLiter(s) IV Push every 15 minutes  fluticasone propionate/ salmeterol 250-50 MICROgram(s) Diskus 1 Dose(s) Inhalation two times a day  folic acid 1 milliGRAM(s) Oral daily  gabapentin 100 milliGRAM(s) Oral every 8 hours  glucagon  Injectable 1 milliGRAM(s) IntraMuscular once  guaiFENesin  milliGRAM(s) Oral every 12 hours  heparin   Injectable 5000 Unit(s) SubCutaneous every 8 hours  influenza  Vaccine (HIGH DOSE) 0.5 milliLiter(s) IntraMuscular once  insulin glargine Injectable (LANTUS) 40 Unit(s) SubCutaneous before breakfast  insulin lispro (ADMELOG) corrective regimen sliding scale   SubCutaneous three times a day before meals  insulin lispro (ADMELOG) corrective regimen sliding scale   SubCutaneous at bedtime  insulin lispro Injectable (ADMELOG) 10 Unit(s) SubCutaneous three times a day before meals  levalbuterol Inhalation 1.25 milliGRAM(s) Inhalation four times a day  lidocaine   4% Patch 1 Patch Transdermal every 24 hours  lidocaine   4% Patch 1 Patch Transdermal every 24 hours  mupirocin 2% Nasal 1 Application(s) Both Nostrils every 12 hours  pantoprazole    Tablet 40 milliGRAM(s) Oral before breakfast  polyethylene glycol 3350 17 Gram(s) Oral daily  rosuvastatin 20 milliGRAM(s) Oral at bedtime  senna 2 Tablet(s) Oral at bedtime  sodium chloride 0.9%. 1000 milliLiter(s) (20 mL/Hr) IV Continuous <Continuous>  tamsulosin 0.4 milliGRAM(s) Oral at bedtime    MEDICATIONS  (PRN):  ALPRAZolam 0.25 milliGRAM(s) Oral at bedtime PRN anxiety  dextrose Oral Gel 15 Gram(s) Oral once PRN Blood Glucose LESS THAN 70 milliGRAM(s)/deciliter  hydrALAZINE Injectable 5 milliGRAM(s) IV Push every 4 hours PRN Systolic > 160  ondansetron Injectable 4 milliGRAM(s) IV Push every 4 hours PRN Nausea and/or Vomiting    HEPARIN:  [] YES [] NO  Dose: XX UNITS/HR UNITS Q8H  LOVENOX:[] YES [] NO  Dose: XX mg Q24H  COUMADIN: []  YES [] NO  Dose: XX mg  Q24H  SCD's: YES b/l  GI Prophylaxis: Protonix [], Pepcid [], None [], (Contra-indication:.....)    Post-Op Beta-Blockers: Yes [], No[], If No, then contraindication:  Post-Op Aspirin: Yes [],  No [], If No, then contraindication:  Post-Op Statin: Yes [], No[], If No, then contraindication:  Allergies    No Known Allergies    Intolerances      Ambulation/Activity Status:    Assessment/Plan:  64y Male status-post .....  - Case and plan discussed with CTU Intensivist and CT Surgeon - Dr. Blakely/Mauri/Laron  - Continue CTU supportive care    - Continue DVT/GI prophylaxis  - Incentive Spirometry 10 times an hour  - Continue to advance physical activity as tolerated and continue PT/OT as directed  1. CAD: Continue ASA, statin, BB  2. HTN:   3. A. Fib:   4. COPD/Hypoxia:   5. DM/Glucose Control:     Social Service Disposition:     OPERATIVE PROCEDURE(s):   MVR, SHEILA ligation, MAZE             POD #   9                    64yMale  SURGEON(s): ISELA Blakely  SUBJECTIVE ASSESSMENT: pt seen and examined. no acute events   Vital Signs Last 24 Hrs  T(F): 98.8 (06 Feb 2025 04:00), Max: 98.8 (06 Feb 2025 04:00)  HR: 86 (06 Feb 2025 07:00) (82 - 98)  BP: 148/80 (05 Feb 2025 08:00) (148/80 - 148/80)  BP(mean): 107 (05 Feb 2025 08:00) (107 - 107)  ABP: 153/59 (06 Feb 2025 07:00) (130/45 - 190/75)  ABP(mean): 87 (06 Feb 2025 07:00)  RR: 16 (06 Feb 2025 07:00) (13 - 33)  SpO2: 98% (06 Feb 2025 07:00) (89% - 100%)    I&O's Detail    05 Feb 2025 07:01  -  06 Feb 2025 07:00  --------------------------------------------------------  IN:    Albumin 25%  -  50 mL: 100 mL    Albumin 5%  - 500 mL: 50 mL    IV PiggyBack: 100 mL    IV PiggyBack: 50 mL    IV PiggyBack: 200 mL    Milrinone: 4.3 mL    Milrinone: 44.1 mL    Oral Fluid: 360 mL  Total IN: 908.4 mL    OUT:    Voided (mL): 1300 mL  Total OUT: 1300 mL        Net:   I&O's Detail    04 Feb 2025 07:01  -  05 Feb 2025 07:00  --------------------------------------------------------  Total NET: 49.3 mL      05 Feb 2025 07:01  -  06 Feb 2025 07:00  --------------------------------------------------------  Total NET: -391.6 mL        CAPILLARY BLOOD GLUCOSE      POCT Blood Glucose.: 158 mg/dL (06 Feb 2025 06:14)  POCT Blood Glucose.: 158 mg/dL (05 Feb 2025 21:29)  POCT Blood Glucose.: 241 mg/dL (05 Feb 2025 16:28)  POCT Blood Glucose.: 133 mg/dL (05 Feb 2025 11:39)  POCT Blood Glucose.: 160 mg/dL (05 Feb 2025 08:53)    Physical Exam:  General: NAD; A&Ox3  Cardiac: S1/S2, RRR, ? murmur, no rubs  Neck; very large neck, ? mass  Lungs: unlabored shallow respirations, bilateral bs decreased at bases  Abdomen: Soft/NT/protuberant  Sternum: Intact, no click, Prevena in place  Extremities: mild edema b/l lower extremities  Neuro: smile =, no arm drift, good strength neck rotation, good strength upper and lower ext, ROM intact      Central Venous Catheter: Yes[x]  No[] , If Yes indication: critical           Day # 9  EPICARDIAL WIRES:  [x] YES [] NO                                              Day # 9  BOWEL MOVEMENT:  [x] YES [] NO, If No, Timing since last BM:      Day #        LABS:                        8.2[L]  10.92[H] )-----------( 204      ( 06 Feb 2025 02:25 )             27.6[L]                        7.4[L]  11.37[H] )-----------( 164      ( 05 Feb 2025 02:13 )             24.6[L]    02-06    136  |  100  |  83[HH]  ----------------------------<  146[H]  4.2   |  23  |  1.8[H]  02-05    134[L]  |  101  |  93[HH]  ----------------------------<  118[H]   4.9   |  22  |  2.2[H]    Ca    8.5      06 Feb 2025 02:25  Mg     2.5     02-06    TPro  5.9[L] [6.0 - 8.0]  /  Alb  3.8 [3.5 - 5.2]  /  TBili  0.4 [0.2 - 1.2]  /  DBili  x   /  AST  33 [0 - 41]  /  ALT  12 [0 - 41]  /  AlkPhos  160[H] [30 - 115]  02-06    PT/INR - ( 05 Feb 2025 02:13 )   PT: ;   INR: 1.06 ratio         PTT - ( 05 Feb 2025 02:13 )  PTT:30.3 sec  Urinalysis Basic - ( 06 Feb 2025 02:25 )    Color: x / Appearance: x / SG: x / pH: x  Gluc: 146 mg/dL / Ketone: x  / Bili: x / Urobili: x   Blood: x / Protein: x / Nitrite: x   Leuk Esterase: x / RBC: x / WBC x   Sq Epi: x / Non Sq Epi: x / Bacteria: x      ABG - ( 06 Feb 2025 04:33 )  pH: 7.30  /  pCO2: 50    /  pO2: 133   / HCO3: 25    / Base Excess: -1.9  /  SaO2: 98.0  /  LA: 0.5          RADIOLOGY & ADDITIONAL TESTS:  CXR: < from: Xray Chest 1 View- PORTABLE-Routine (Xray Chest 1 View- PORTABLE-Routine in AM.) (02.06.25 @ 05:36) >  Impression:    Unchanged bilateral opacities, pleural effusions. No evidence of   pneumothorax.    < end of copied text >    EKG: < from: 12 Lead ECG (02.06.25 @ 07:37) >    Ventricular Rate 88 BPM    QRS Duration 96 ms    Q-T Interval 386 ms    QTC Calculation(Bazett) 467 ms    R Axis -26 degrees    T Axis 74 degrees    Diagnosis Line Accelerated Junctional rhythm  Low voltage QRS  Nonspecific T wave abnormality  Abnormal ECG    < end of copied text >    MEDICATIONS  (STANDING):  acetaminophen   IVPB .. 1000 milliGRAM(s) IV Intermittent once  acetaminophen   IVPB .. 1000 milliGRAM(s) IV Intermittent once  aspirin enteric coated 81 milliGRAM(s) Oral daily  bisacodyl Suppository 10 milliGRAM(s) Rectal once  ceFAZolin   IVPB 2000 milliGRAM(s) IV Intermittent every 12 hours  chlorhexidine 2% Cloths 1 Application(s) Topical daily  dextrose 5%. 1000 milliLiter(s) (50 mL/Hr) IV Continuous <Continuous>  dextrose 5%. 1000 milliLiter(s) (100 mL/Hr) IV Continuous <Continuous>  dextrose 50% Injectable 50 milliLiter(s) IV Push every 15 minutes  dextrose 50% Injectable 25 milliLiter(s) IV Push every 15 minutes  fluticasone propionate/ salmeterol 250-50 MICROgram(s) Diskus 1 Dose(s) Inhalation two times a day  folic acid 1 milliGRAM(s) Oral daily  gabapentin 100 milliGRAM(s) Oral every 8 hours  glucagon  Injectable 1 milliGRAM(s) IntraMuscular once  guaiFENesin  milliGRAM(s) Oral every 12 hours  heparin   Injectable 5000 Unit(s) SubCutaneous every 8 hours  influenza  Vaccine (HIGH DOSE) 0.5 milliLiter(s) IntraMuscular once  insulin glargine Injectable (LANTUS) 40 Unit(s) SubCutaneous before breakfast  insulin lispro (ADMELOG) corrective regimen sliding scale   SubCutaneous three times a day before meals  insulin lispro (ADMELOG) corrective regimen sliding scale   SubCutaneous at bedtime  insulin lispro Injectable (ADMELOG) 10 Unit(s) SubCutaneous three times a day before meals  levalbuterol Inhalation 1.25 milliGRAM(s) Inhalation four times a day  lidocaine   4% Patch 1 Patch Transdermal every 24 hours  lidocaine   4% Patch 1 Patch Transdermal every 24 hours  mupirocin 2% Nasal 1 Application(s) Both Nostrils every 12 hours  pantoprazole    Tablet 40 milliGRAM(s) Oral before breakfast  polyethylene glycol 3350 17 Gram(s) Oral daily  rosuvastatin 20 milliGRAM(s) Oral at bedtime  senna 2 Tablet(s) Oral at bedtime  sodium chloride 0.9%. 1000 milliLiter(s) (20 mL/Hr) IV Continuous <Continuous>  tamsulosin 0.4 milliGRAM(s) Oral at bedtime    MEDICATIONS  (PRN):  ALPRAZolam 0.25 milliGRAM(s) Oral at bedtime PRN anxiety  dextrose Oral Gel 15 Gram(s) Oral once PRN Blood Glucose LESS THAN 70 milliGRAM(s)/deciliter  hydrALAZINE Injectable 5 milliGRAM(s) IV Push every 4 hours PRN Systolic > 160  ondansetron Injectable 4 milliGRAM(s) IV Push every 4 hours PRN Nausea and/or Vomiting    HEPARIN:  [x] YES [] NO  Dose: 5000 UNITS Q8H  SCD's: YES b/l  GI Prophylaxis: Protonix [x], Pepcid [], None [], (Contra-indication:.....)    Post-Op Beta-Blockers: Yes [], No[x], If No, then contraindication:  Post-Op Aspirin: Yes [],  No [], If No, then contraindication:  Post-Op Statin: Yes [], No[], If No, then contraindication:  Allergies    No Known Allergies    Intolerances      Ambulation/Activity Status:    Assessment/Plan:  64y Male status-post .....  - Case and plan discussed with CTU Intensivist and CT Surgeon - Dr. Blakely/Mauri/Laron  - Continue CTU supportive care    - Continue DVT/GI prophylaxis  - Incentive Spirometry 10 times an hour  - Continue to advance physical activity as tolerated and continue PT/OT as directed  1. CAD: Continue ASA, statin, BB  2. HTN:   3. A. Fib:   4. COPD/Hypoxia:   5. DM/Glucose Control:     Social Service Disposition:     OPERATIVE PROCEDURE(s):   MVR, SHEILA ligation, MAZE             POD #   9                    64yMale  SURGEON(s): ISELA Blakely  SUBJECTIVE ASSESSMENT: pt seen and examined. no acute events   Vital Signs Last 24 Hrs  T(F): 98.8 (06 Feb 2025 04:00), Max: 98.8 (06 Feb 2025 04:00)  HR: 86 (06 Feb 2025 07:00) (82 - 98)  BP: 148/80 (05 Feb 2025 08:00) (148/80 - 148/80)  BP(mean): 107 (05 Feb 2025 08:00) (107 - 107)  ABP: 153/59 (06 Feb 2025 07:00) (130/45 - 190/75)  ABP(mean): 87 (06 Feb 2025 07:00)  RR: 16 (06 Feb 2025 07:00) (13 - 33)  SpO2: 98% (06 Feb 2025 07:00) (89% - 100%)    I&O's Detail    05 Feb 2025 07:01  -  06 Feb 2025 07:00  --------------------------------------------------------  IN:    Albumin 25%  -  50 mL: 100 mL    Albumin 5%  - 500 mL: 50 mL    IV PiggyBack: 100 mL    IV PiggyBack: 50 mL    IV PiggyBack: 200 mL    Milrinone: 4.3 mL    Milrinone: 44.1 mL    Oral Fluid: 360 mL  Total IN: 908.4 mL    OUT:    Voided (mL): 1300 mL  Total OUT: 1300 mL        Net:   I&O's Detail    04 Feb 2025 07:01  -  05 Feb 2025 07:00  --------------------------------------------------------  Total NET: 49.3 mL      05 Feb 2025 07:01  -  06 Feb 2025 07:00  --------------------------------------------------------  Total NET: -391.6 mL        CAPILLARY BLOOD GLUCOSE      POCT Blood Glucose.: 158 mg/dL (06 Feb 2025 06:14)  POCT Blood Glucose.: 158 mg/dL (05 Feb 2025 21:29)  POCT Blood Glucose.: 241 mg/dL (05 Feb 2025 16:28)  POCT Blood Glucose.: 133 mg/dL (05 Feb 2025 11:39)  POCT Blood Glucose.: 160 mg/dL (05 Feb 2025 08:53)    Physical Exam:  General: NAD; A&Ox3  Cardiac: S1/S2, RRR, ? murmur, no rubs  Neck; very large neck, ? mass  Lungs: unlabored shallow respirations, bilateral bs decreased at bases  Abdomen: Soft/NT/protuberant  Sternum: Intact, no click, Prevena in place  Extremities: mild edema b/l lower extremities  Neuro: smile =, no arm drift, good strength neck rotation, good strength upper and lower ext, ROM intact      Central Venous Catheter: Yes[x]  No[] , If Yes indication: critical           Day # 9  EPICARDIAL WIRES:  [x] YES [] NO                                              Day # 9  BOWEL MOVEMENT:  [x] YES [] NO, If No, Timing since last BM:      Day #        LABS:                        8.2[L]  10.92[H] )-----------( 204      ( 06 Feb 2025 02:25 )             27.6[L]                        7.4[L]  11.37[H] )-----------( 164      ( 05 Feb 2025 02:13 )             24.6[L]    02-06    136  |  100  |  83[HH]  ----------------------------<  146[H]  4.2   |  23  |  1.8[H]  02-05    134[L]  |  101  |  93[HH]  ----------------------------<  118[H]   4.9   |  22  |  2.2[H]    Ca    8.5      06 Feb 2025 02:25  Mg     2.5     02-06    TPro  5.9[L] [6.0 - 8.0]  /  Alb  3.8 [3.5 - 5.2]  /  TBili  0.4 [0.2 - 1.2]  /  DBili  x   /  AST  33 [0 - 41]  /  ALT  12 [0 - 41]  /  AlkPhos  160[H] [30 - 115]  02-06    PT/INR - ( 05 Feb 2025 02:13 )   PT: ;   INR: 1.06 ratio         PTT - ( 05 Feb 2025 02:13 )  PTT:30.3 sec  Urinalysis Basic - ( 06 Feb 2025 02:25 )    Color: x / Appearance: x / SG: x / pH: x  Gluc: 146 mg/dL / Ketone: x  / Bili: x / Urobili: x   Blood: x / Protein: x / Nitrite: x   Leuk Esterase: x / RBC: x / WBC x   Sq Epi: x / Non Sq Epi: x / Bacteria: x      ABG - ( 06 Feb 2025 04:33 )  pH: 7.30  /  pCO2: 50    /  pO2: 133   / HCO3: 25    / Base Excess: -1.9  /  SaO2: 98.0  /  LA: 0.5          RADIOLOGY & ADDITIONAL TESTS:  CXR: < from: Xray Chest 1 View- PORTABLE-Routine (Xray Chest 1 View- PORTABLE-Routine in AM.) (02.06.25 @ 05:36) >  Impression:    Unchanged bilateral opacities, pleural effusions. No evidence of   pneumothorax.    < end of copied text >    EKG: < from: 12 Lead ECG (02.06.25 @ 07:37) >    Ventricular Rate 88 BPM    QRS Duration 96 ms    Q-T Interval 386 ms    QTC Calculation(Bazett) 467 ms    R Axis -26 degrees    T Axis 74 degrees    Diagnosis Line Accelerated Junctional rhythm  Low voltage QRS  Nonspecific T wave abnormality  Abnormal ECG    < end of copied text >    MEDICATIONS  (STANDING):  acetaminophen   IVPB .. 1000 milliGRAM(s) IV Intermittent once  acetaminophen   IVPB .. 1000 milliGRAM(s) IV Intermittent once  aspirin enteric coated 81 milliGRAM(s) Oral daily  bisacodyl Suppository 10 milliGRAM(s) Rectal once  ceFAZolin   IVPB 2000 milliGRAM(s) IV Intermittent every 12 hours  chlorhexidine 2% Cloths 1 Application(s) Topical daily  dextrose 5%. 1000 milliLiter(s) (50 mL/Hr) IV Continuous <Continuous>  dextrose 5%. 1000 milliLiter(s) (100 mL/Hr) IV Continuous <Continuous>  dextrose 50% Injectable 50 milliLiter(s) IV Push every 15 minutes  dextrose 50% Injectable 25 milliLiter(s) IV Push every 15 minutes  fluticasone propionate/ salmeterol 250-50 MICROgram(s) Diskus 1 Dose(s) Inhalation two times a day  folic acid 1 milliGRAM(s) Oral daily  gabapentin 100 milliGRAM(s) Oral every 8 hours  glucagon  Injectable 1 milliGRAM(s) IntraMuscular once  guaiFENesin  milliGRAM(s) Oral every 12 hours  heparin   Injectable 5000 Unit(s) SubCutaneous every 8 hours  influenza  Vaccine (HIGH DOSE) 0.5 milliLiter(s) IntraMuscular once  insulin glargine Injectable (LANTUS) 40 Unit(s) SubCutaneous before breakfast  insulin lispro (ADMELOG) corrective regimen sliding scale   SubCutaneous three times a day before meals  insulin lispro (ADMELOG) corrective regimen sliding scale   SubCutaneous at bedtime  insulin lispro Injectable (ADMELOG) 10 Unit(s) SubCutaneous three times a day before meals  levalbuterol Inhalation 1.25 milliGRAM(s) Inhalation four times a day  lidocaine   4% Patch 1 Patch Transdermal every 24 hours  lidocaine   4% Patch 1 Patch Transdermal every 24 hours  mupirocin 2% Nasal 1 Application(s) Both Nostrils every 12 hours  pantoprazole    Tablet 40 milliGRAM(s) Oral before breakfast  polyethylene glycol 3350 17 Gram(s) Oral daily  rosuvastatin 20 milliGRAM(s) Oral at bedtime  senna 2 Tablet(s) Oral at bedtime  sodium chloride 0.9%. 1000 milliLiter(s) (20 mL/Hr) IV Continuous <Continuous>  tamsulosin 0.4 milliGRAM(s) Oral at bedtime    MEDICATIONS  (PRN):  ALPRAZolam 0.25 milliGRAM(s) Oral at bedtime PRN anxiety  dextrose Oral Gel 15 Gram(s) Oral once PRN Blood Glucose LESS THAN 70 milliGRAM(s)/deciliter  hydrALAZINE Injectable 5 milliGRAM(s) IV Push every 4 hours PRN Systolic > 160  ondansetron Injectable 4 milliGRAM(s) IV Push every 4 hours PRN Nausea and/or Vomiting    HEPARIN:  [x] YES [] NO  Dose: 5000 UNITS Q8H  SCD's: YES b/l  GI Prophylaxis: Protonix [x], Pepcid [], None [], (Contra-indication:.....)    Post-Op Beta-Blockers: Yes [], No[x], If No, then contraindication: junctional bradycardia   Post-Op Aspirin: Yes [x],  No [], If No, then contraindication:  Post-Op Statin: Yes [x], No[], If No, then contraindication:  Allergies    No Known Allergies    Intolerances      Ambulation/Activity Status: ambulate     Assessment/Plan:  64y Male status-post MVR / SHEILA ligation and MAZE POD # 9  - Case and plan discussed with CTU Intensivist and CT Surgeon - Dr. Blakely/Mauri/Laron  - Continue CTU supportive care    - Continue DVT/GI prophylaxis  - Incentive Spirometry 10 times an hour  - Continue to advance physical activity as tolerated and continue PT/OT as directed  1. Continue ASA, statin, no bb for junctional bradycardia and milrinone, will dc dobutamine  2. madelung disease: cont bipap, respiratory tx's  3. A. Fib prophylaxis: cont to monitor electrolytes - hold AC at this time prior lower GI bleed - s/p MAZE and SHEILA closure  4. junctional bradycardia/wenckebach - ep recommends re-eval pat once off of all inotropes; no ppm needed at the present time- milrinone off will f/u ep    5. DM/Glucose A1c 6.2 Control: increase lantus 40/humalog 10 and sliding scale   6. bumex PRN for noncardiogenic fluid overload   7. franklin on CKD3a- renal consult- diuresis PRN, avoid hypotension   8. Ancef added for possible URI , pt with productive cough and b/l opacities seen on cxr  psych consult for anxiety- start zoloft cont xanax at night   restart low dose neurontin for pain control  restart xanax for anxiety  Social Service Disposition:  to be determined

## 2025-02-06 NOTE — PROGRESS NOTE ADULT - ASSESSMENT
Assessment  64M s/p MVR, LAAC, MAZE on 1/28  Post op complicated by acute pulmonary insufficiency, FLAKITO, cardiogenic shock req inotropes    Plan:    Neuro:  Multimodal pain management - no toradol, minimize opiates  100 TID gabapentin  0.25 xanax PRN at night  Tylenol, Lidoderm patch, opiates as needed  Monitor neuro status in the post op period  Psych consult - start zoloft - will not start hydroxizine at this time    CV:  S/P MVR, LAAC, MAZE  Monitor perfusion, , lactate, UOP, index and MVO2 if available  Maintain MAP > 65  Milrinone off 2/6  Dobutamine off 2/5  EP consulted for bradycardia - now junctional  No BB or amio  ASA,statin  Remove pacing wires tomorrow    Resp:  Supplemental oxygen to maintain sats > 92%  Continuous pulse oximetry monitoring  BiPAP at night  NC during the day  IS / Chest PT  OOBTC and Ambulate  Nebulizers as needed - albuterol/atrovent  Advair    GI:  Heart healthy diet  Bowel Regimen - escalate as needed  PUD ppx per protocol    Renal  FLAKITO post surgery  Creat up to 3.1 now downtrending  BUN elevated - may be contributing to confusion  Baseline CKD  Monitor UOP, lytes, creatinine  1mg bumes BID  Bicarb supplements for 3 days  Keep K > 4, Mg > 2  Flomax  Add finasteride    Endo:  Tight glucose control per CTICU protocol  Lantus 40 / premeal 10  SSI    Heme:  Acute blood loss anemia post surgery  High risk for thrombocytopenia  DVT ppx with HSQ  Iron / FA / MVI    ID:  Started on ancef per CTS  Monitor fever curve and WBC count  Remove TLC today  Remove Mira tomorrow    Upon my evaluation, the above patient has a high probability of imminent or life threatening deterioration due to a high risk for Shock, Cardiogenic shock, arrythmia, acute blood loss, acute kidney injury and acute pulmonary insufficiency which required my direct attention, intervention, and personal management.  Total critical care time indicated in the note includes review of radiology results, ordering, interpreting and reviewing diagnostic studies / lab tests with immediate assessment and treatment, consultant collaboration on findings and treatment options, monitoring for potential decompensation, obtaining history from family, EMT, nursing home staff and/or treating physicians; directing the titration of pressors, oxygen support devices, fluid resuscitation, interpretation of cardiac output measurements, interpretation of radiological assessments, interpretation of EKG / rhythm strips, telemetry.  This time did not overlap with the management of other patients or performing separately reportable procedures.      Management of this patient was discussed with the CT Surgery/Thoracic surgery/Structural Cardiology attending and mid-level providers.    I acknowledge the use of copied documentation.  All copy forward documentation is my own and has been reviewed and revised as appropriate.  If no changes were made, it is because that information remains the same.

## 2025-02-07 LAB
ALBUMIN SERPL ELPH-MCNC: 3.5 G/DL — SIGNIFICANT CHANGE UP (ref 3.5–5.2)
ALP SERPL-CCNC: 169 U/L — HIGH (ref 30–115)
ALT FLD-CCNC: 14 U/L — SIGNIFICANT CHANGE UP (ref 0–41)
ANION GAP SERPL CALC-SCNC: 10 MMOL/L — SIGNIFICANT CHANGE UP (ref 7–14)
ANISOCYTOSIS BLD QL: SLIGHT — SIGNIFICANT CHANGE UP
AST SERPL-CCNC: 41 U/L — SIGNIFICANT CHANGE UP (ref 0–41)
BASE EXCESS BLDA CALC-SCNC: 0.8 MMOL/L — SIGNIFICANT CHANGE UP (ref -2–3)
BASOPHILS # BLD AUTO: 0.09 K/UL — SIGNIFICANT CHANGE UP (ref 0–0.2)
BASOPHILS NFR BLD AUTO: 0.9 % — SIGNIFICANT CHANGE UP (ref 0–1)
BILIRUB SERPL-MCNC: 0.4 MG/DL — SIGNIFICANT CHANGE UP (ref 0.2–1.2)
BUN SERPL-MCNC: 75 MG/DL — CRITICAL HIGH (ref 10–20)
CALCIUM SERPL-MCNC: 8.1 MG/DL — LOW (ref 8.4–10.5)
CHLORIDE SERPL-SCNC: 101 MMOL/L — SIGNIFICANT CHANGE UP (ref 98–110)
CO2 SERPL-SCNC: 24 MMOL/L — SIGNIFICANT CHANGE UP (ref 17–32)
CREAT SERPL-MCNC: 1.6 MG/DL — HIGH (ref 0.7–1.5)
EGFR: 47 ML/MIN/1.73M2 — LOW
EOSINOPHIL # BLD AUTO: 0.42 K/UL — SIGNIFICANT CHANGE UP (ref 0–0.7)
EOSINOPHIL NFR BLD AUTO: 4.4 % — SIGNIFICANT CHANGE UP (ref 0–8)
GIANT PLATELETS BLD QL SMEAR: PRESENT — SIGNIFICANT CHANGE UP
GLUCOSE BLDC GLUCOMTR-MCNC: 119 MG/DL — HIGH (ref 70–99)
GLUCOSE BLDC GLUCOMTR-MCNC: 129 MG/DL — HIGH (ref 70–99)
GLUCOSE BLDC GLUCOMTR-MCNC: 200 MG/DL — HIGH (ref 70–99)
GLUCOSE BLDC GLUCOMTR-MCNC: 83 MG/DL — SIGNIFICANT CHANGE UP (ref 70–99)
GLUCOSE BLDC GLUCOMTR-MCNC: 98 MG/DL — SIGNIFICANT CHANGE UP (ref 70–99)
GLUCOSE SERPL-MCNC: 197 MG/DL — HIGH (ref 70–99)
HCO3 BLDA-SCNC: 27 MMOL/L — SIGNIFICANT CHANGE UP (ref 21–28)
HCT VFR BLD CALC: 27.5 % — LOW (ref 42–52)
HGB BLD-MCNC: 8 G/DL — LOW (ref 14–18)
LYMPHOCYTES # BLD AUTO: 1.08 K/UL — LOW (ref 1.2–3.4)
LYMPHOCYTES # BLD AUTO: 11.3 % — LOW (ref 20.5–51.1)
MAGNESIUM SERPL-MCNC: 2.4 MG/DL — SIGNIFICANT CHANGE UP (ref 1.8–2.4)
MANUAL SMEAR VERIFICATION: SIGNIFICANT CHANGE UP
MCHC RBC-ENTMCNC: 26.1 PG — LOW (ref 27–31)
MCHC RBC-ENTMCNC: 29.1 G/DL — LOW (ref 32–37)
MCV RBC AUTO: 89.9 FL — SIGNIFICANT CHANGE UP (ref 80–94)
MICROCYTES BLD QL: SLIGHT — SIGNIFICANT CHANGE UP
MONOCYTES # BLD AUTO: 0.5 K/UL — SIGNIFICANT CHANGE UP (ref 0.1–0.6)
MONOCYTES NFR BLD AUTO: 5.2 % — SIGNIFICANT CHANGE UP (ref 1.7–9.3)
MYELOCYTES NFR BLD: 1.7 % — HIGH (ref 0–0)
NEUTROPHILS # BLD AUTO: 7.34 K/UL — HIGH (ref 1.4–6.5)
NEUTROPHILS NFR BLD AUTO: 76.5 % — HIGH (ref 42.2–75.2)
OVALOCYTES BLD QL SMEAR: SLIGHT — SIGNIFICANT CHANGE UP
PCO2 BLDA: 46 MMHG — SIGNIFICANT CHANGE UP (ref 35–48)
PH BLDA: 7.37 — SIGNIFICANT CHANGE UP (ref 7.35–7.45)
PLAT MORPH BLD: NORMAL — SIGNIFICANT CHANGE UP
PLATELET # BLD AUTO: 198 K/UL — SIGNIFICANT CHANGE UP (ref 130–400)
PMV BLD: 10.9 FL — HIGH (ref 7.4–10.4)
PO2 BLDA: 72 MMHG — LOW (ref 83–108)
POIKILOCYTOSIS BLD QL AUTO: SLIGHT — SIGNIFICANT CHANGE UP
POLYCHROMASIA BLD QL SMEAR: SLIGHT — SIGNIFICANT CHANGE UP
POTASSIUM SERPL-MCNC: 4.3 MMOL/L — SIGNIFICANT CHANGE UP (ref 3.5–5)
POTASSIUM SERPL-SCNC: 4.3 MMOL/L — SIGNIFICANT CHANGE UP (ref 3.5–5)
PROT SERPL-MCNC: 5.5 G/DL — LOW (ref 6–8)
RBC # BLD: 3.06 M/UL — LOW (ref 4.7–6.1)
RBC # FLD: 19.8 % — HIGH (ref 11.5–14.5)
RBC BLD AUTO: ABNORMAL
SAO2 % BLDA: 97.1 % — SIGNIFICANT CHANGE UP (ref 94–98)
SODIUM SERPL-SCNC: 135 MMOL/L — SIGNIFICANT CHANGE UP (ref 135–146)
WBC # BLD: 9.59 K/UL — SIGNIFICANT CHANGE UP (ref 4.8–10.8)
WBC # FLD AUTO: 9.59 K/UL — SIGNIFICANT CHANGE UP (ref 4.8–10.8)

## 2025-02-07 PROCEDURE — 99232 SBSQ HOSP IP/OBS MODERATE 35: CPT

## 2025-02-07 PROCEDURE — 99232 SBSQ HOSP IP/OBS MODERATE 35: CPT | Mod: 24

## 2025-02-07 PROCEDURE — 93010 ELECTROCARDIOGRAM REPORT: CPT

## 2025-02-07 PROCEDURE — 71045 X-RAY EXAM CHEST 1 VIEW: CPT | Mod: 26

## 2025-02-07 PROCEDURE — 99291 CRITICAL CARE FIRST HOUR: CPT

## 2025-02-07 RX ORDER — BUMETANIDE 1 MG/1
1 TABLET ORAL EVERY 12 HOURS
Refills: 0 | Status: DISCONTINUED | OUTPATIENT
Start: 2025-02-07 | End: 2025-02-12

## 2025-02-07 RX ORDER — ACETAMINOPHEN 500 MG/5ML
1000 LIQUID (ML) ORAL ONCE
Refills: 0 | Status: COMPLETED | OUTPATIENT
Start: 2025-02-08 | End: 2025-02-08

## 2025-02-07 RX ORDER — ACETAMINOPHEN 500 MG/5ML
1000 LIQUID (ML) ORAL ONCE
Refills: 0 | Status: COMPLETED | OUTPATIENT
Start: 2025-02-07 | End: 2025-02-07

## 2025-02-07 RX ORDER — METOPROLOL SUCCINATE 50 MG/1
12.5 TABLET, EXTENDED RELEASE ORAL EVERY 12 HOURS
Refills: 0 | Status: DISCONTINUED | OUTPATIENT
Start: 2025-02-07 | End: 2025-02-13

## 2025-02-07 RX ORDER — BUMETANIDE 1 MG/1
1 TABLET ORAL DAILY
Refills: 0 | Status: DISCONTINUED | OUTPATIENT
Start: 2025-02-07 | End: 2025-02-07

## 2025-02-07 RX ORDER — PSYLLIUM SEED (WITH DEXTROSE)
1 POWDER (GRAM) ORAL EVERY 12 HOURS
Refills: 0 | Status: DISCONTINUED | OUTPATIENT
Start: 2025-02-07 | End: 2025-02-24

## 2025-02-07 RX ADMIN — Medication 1000 MILLIGRAM(S): at 09:45

## 2025-02-07 RX ADMIN — LIDOCAINE HYDROCHLORIDE 1 PATCH: 20 JELLY TOPICAL at 06:07

## 2025-02-07 RX ADMIN — Medication 40 MILLIGRAM(S): at 06:37

## 2025-02-07 RX ADMIN — GABAPENTIN 100 MILLIGRAM(S): 400 CAPSULE ORAL at 14:00

## 2025-02-07 RX ADMIN — BUMETANIDE 1 MILLIGRAM(S): 1 TABLET ORAL at 06:41

## 2025-02-07 RX ADMIN — INSULIN LISPRO 10 UNIT(S): 100 INJECTION, SOLUTION INTRAVENOUS; SUBCUTANEOUS at 07:31

## 2025-02-07 RX ADMIN — FOLIC ACID 1 MILLIGRAM(S): 1 TABLET ORAL at 11:53

## 2025-02-07 RX ADMIN — INSULIN LISPRO 10 UNIT(S): 100 INJECTION, SOLUTION INTRAVENOUS; SUBCUTANEOUS at 11:55

## 2025-02-07 RX ADMIN — GABAPENTIN 100 MILLIGRAM(S): 400 CAPSULE ORAL at 06:37

## 2025-02-07 RX ADMIN — GABAPENTIN 100 MILLIGRAM(S): 400 CAPSULE ORAL at 23:06

## 2025-02-07 RX ADMIN — BUMETANIDE 1 MILLIGRAM(S): 1 TABLET ORAL at 17:37

## 2025-02-07 RX ADMIN — Medication 650 MILLIGRAM(S): at 06:37

## 2025-02-07 RX ADMIN — LEVALBUTEROL HYDROCHLORIDE 1.25 MILLIGRAM(S): 1.25 SOLUTION RESPIRATORY (INHALATION) at 14:13

## 2025-02-07 RX ADMIN — Medication 100 MILLIGRAM(S): at 17:37

## 2025-02-07 RX ADMIN — Medication 100 MILLIGRAM(S): at 06:39

## 2025-02-07 RX ADMIN — TAMSULOSIN HYDROCHLORIDE 0.4 MILLIGRAM(S): 0.4 CAPSULE ORAL at 23:06

## 2025-02-07 RX ADMIN — METOPROLOL SUCCINATE 12.5 MILLIGRAM(S): 50 TABLET, EXTENDED RELEASE ORAL at 17:37

## 2025-02-07 RX ADMIN — DEXTROMETHORPHAN HBR, GUAIFENESIN 600 MILLIGRAM(S): 200 LIQUID ORAL at 17:38

## 2025-02-07 RX ADMIN — INSULIN GLARGINE-YFGN 40 UNIT(S): 100 INJECTION, SOLUTION SUBCUTANEOUS at 07:30

## 2025-02-07 RX ADMIN — Medication 400 MILLIGRAM(S): at 16:46

## 2025-02-07 RX ADMIN — LIDOCAINE HYDROCHLORIDE 1 PATCH: 20 JELLY TOPICAL at 13:16

## 2025-02-07 RX ADMIN — Medication 400 MILLIGRAM(S): at 23:07

## 2025-02-07 RX ADMIN — Medication 5 MILLIGRAM(S): at 14:00

## 2025-02-07 RX ADMIN — POLYETHYLENE GLYCOL 3350 17 GRAM(S): 17 POWDER, FOR SOLUTION ORAL at 11:53

## 2025-02-07 RX ADMIN — HEPARIN SODIUM 5000 UNIT(S): 1000 INJECTION INTRAVENOUS; SUBCUTANEOUS at 23:06

## 2025-02-07 RX ADMIN — OXYCODONE HYDROCHLORIDE 5 MILLIGRAM(S): 30 TABLET ORAL at 06:41

## 2025-02-07 RX ADMIN — LIDOCAINE HYDROCHLORIDE 1 PATCH: 20 JELLY TOPICAL at 23:03

## 2025-02-07 RX ADMIN — SERTRALINE 25 MILLIGRAM(S): 100 TABLET, FILM COATED ORAL at 11:53

## 2025-02-07 RX ADMIN — LIDOCAINE HYDROCHLORIDE 1 PATCH: 20 JELLY TOPICAL at 19:00

## 2025-02-07 RX ADMIN — Medication 2 TABLET(S): at 23:05

## 2025-02-07 RX ADMIN — LEVALBUTEROL HYDROCHLORIDE 1.25 MILLIGRAM(S): 1.25 SOLUTION RESPIRATORY (INHALATION) at 08:09

## 2025-02-07 RX ADMIN — Medication 650 MILLIGRAM(S): at 14:00

## 2025-02-07 RX ADMIN — Medication 400 MILLIGRAM(S): at 08:49

## 2025-02-07 RX ADMIN — INSULIN LISPRO 2: 100 INJECTION, SOLUTION INTRAVENOUS; SUBCUTANEOUS at 16:43

## 2025-02-07 RX ADMIN — Medication 20 MILLIEQUIVALENT(S): at 06:40

## 2025-02-07 RX ADMIN — Medication 1 PACKET(S): at 17:39

## 2025-02-07 RX ADMIN — Medication 1000 MILLIGRAM(S): at 23:27

## 2025-02-07 RX ADMIN — Medication 650 MILLIGRAM(S): at 23:06

## 2025-02-07 RX ADMIN — Medication 81 MILLIGRAM(S): at 11:53

## 2025-02-07 RX ADMIN — Medication 1 APPLICATION(S): at 23:08

## 2025-02-07 RX ADMIN — HEPARIN SODIUM 5000 UNIT(S): 1000 INJECTION INTRAVENOUS; SUBCUTANEOUS at 14:00

## 2025-02-07 RX ADMIN — ROSUVASTATIN CALCIUM 20 MILLIGRAM(S): 20 TABLET, FILM COATED ORAL at 23:05

## 2025-02-07 RX ADMIN — INSULIN LISPRO 10 UNIT(S): 100 INJECTION, SOLUTION INTRAVENOUS; SUBCUTANEOUS at 16:44

## 2025-02-07 RX ADMIN — FINASTERIDE 5 MILLIGRAM(S): 1 TABLET, FILM COATED ORAL at 11:53

## 2025-02-07 RX ADMIN — Medication 1000 MILLIGRAM(S): at 17:04

## 2025-02-07 NOTE — PROGRESS NOTE ADULT - ASSESSMENT
Assessment  64M s/p MVR, LAAC, MAZE on 1/28  Post op complicated by acute pulmonary insufficiency, FLAKITO, cardiogenic shock req inotropes    Plan:    Neuro:  Multimodal pain management - no toradol, minimize opiates  100 TID gabapentin  0.25 xanax PRN at night  Tylenol, Lidoderm patch, opiates as needed  Monitor neuro status in the post op period      CV:  S/P MVR, LAAC, MAZE  Monitor perfusion, , lactate, UOP, index and MVO2 if available  Maintain MAP > 65  Milrinone off 2/6  Dobutamine off 2/5  EP consulted for bradycardia  No BB or amio  ASA,statin  Remove pacing wires tomorrow    Resp:  Supplemental oxygen to maintain sats > 92%  Continuous pulse oximetry monitoring  BiPAP at night  NC during the day  IS / Chest PT  OOBTC and Ambulate  Nebulizers as needed - albuterol/atrovent  Advair    GI:  Heart healthy diet  Bowel Regimen - escalate as needed  PUD ppx per protocol    Renal  FLAKITO post surgery  Creat up to 3.1 now downtrending  BUN elevated - may be contributing to confusion  Baseline CKD  Monitor UOP, lytes, creatinine  1mg bumes BID  Bicarb supplements for 3 days  Keep K > 4, Mg > 2  Flomax, finasteride    Endo:  Tight glucose control per CTICU protocol  Lantus 40 / premeal 10  SSI    Heme:  Acute blood loss anemia post surgery  High risk for thrombocytopenia  DVT ppx with HSQ  Iron / FA / MVI    ID:  on Ancef per CTS  Monitor fever curve and WBC count  Remove Mira     Upon my evaluation, the above patient has a high probability of imminent or life threatening deterioration due to a high risk for Shock, Cardiogenic shock, arrythmia, acute blood loss, acute kidney injury and acute pulmonary insufficiency which required my direct attention, intervention, and personal management.  Total critical care time indicated in the note includes review of radiology results, ordering, interpreting and reviewing diagnostic studies / lab tests with immediate assessment and treatment, consultant collaboration on findings and treatment options, monitoring for potential decompensation, obtaining history from family, EMT, nursing home staff and/or treating physicians; directing the titration of pressors, oxygen support devices, fluid resuscitation, interpretation of cardiac output measurements, interpretation of radiological assessments, interpretation of EKG / rhythm strips, telemetry.  This time did not overlap with the management of other patients or performing separately reportable procedures.      Management of this patient was discussed with the CT Surgery/Thoracic surgery/Structural Cardiology attending and mid-level providers.    I acknowledge the use of copied documentation.  All copy forward documentation is my own and has been reviewed and revised as appropriate.  If no changes were made, it is because that information remains the same.

## 2025-02-07 NOTE — PROGRESS NOTE ADULT - SUBJECTIVE AND OBJECTIVE BOX
OPERATIVE PROCEDURE(s):     mv replacement/la ligation/maze           POD # 10    SURGEON(s): rich    SUBJECTIVE ASSESSMENT: pt is complaining of feeling tired    Vital Signs Last 24 Hrs  T(C): 35.9 (07 Feb 2025 12:00), Max: 37.3 (06 Feb 2025 16:00)  T(F): 96.7 (07 Feb 2025 12:00), Max: 99.1 (06 Feb 2025 16:00)  HR: 85 (07 Feb 2025 13:00) (81 - 95)  BP: --  BP(mean): --  RR: 23 (07 Feb 2025 13:00) (15 - 32)  SpO2: 98% (07 Feb 2025 13:00) (91% - 100%)    Parameters below as of 07 Feb 2025 13:00  Patient On (Oxygen Delivery Method): nasal cannula  O2 Flow (L/min): 2    02-06-25 @ 07:01  -  02-07-25 @ 07:00  --------------------------------------------------------  IN: 990 mL / OUT: 3050 mL / NET: -2060 mL    02-07-25 @ 07:01  -  02-07-25 @ 14:48  --------------------------------------------------------  IN: 150 mL / OUT: 1125 mL / NET: -975 mL    Physical Exam:  General: NAD; A&Ox3  Cardiac: S1/S2, RRR, ? murmur, no rubs  Neck; very large neck, ? mass  Lungs: unlabored shallow respirations, bilateral bs decreased at bases  Abdomen: Soft/NT/protuberant  Sternum: Intact, no click, Prevena in place  Extremities: mild edema b/l lower extremities  Neuro: smile =, no arm drift, good strength neck rotation, good strength upper and lower ext, ROM intact    LABS:                        8.0    9.59  )-----------( 198      ( 07 Feb 2025 01:44 )             27.5     COUMADIN:   [ ] YES [x ] NO      02-07    135  |  101  |  75[HH]  ----------------------------<  197[H]  4.3   |  24  |  1.6[H]    Ca    8.1[L]      07 Feb 2025 01:44  Mg     2.4     02-07    TPro  5.5[L]  /  Alb  3.5  /  TBili  0.4  /  DBili  x   /  AST  41  /  ALT  14  /  AlkPhos  169[H]  02-07    Urinalysis Basic - ( 07 Feb 2025 01:44 )    Color: x / Appearance: x / SG: x / pH: x  Gluc: 197 mg/dL / Ketone: x  / Bili: x / Urobili: x   Blood: x / Protein: x / Nitrite: x   Leuk Esterase: x / RBC: x / WBC x   Sq Epi: x / Non Sq Epi: x / Bacteria: x        MEDICATIONS  (STANDING):  acetaminophen   IVPB .. 1000 milliGRAM(s) IV Intermittent once  acetaminophen   IVPB .. 1000 milliGRAM(s) IV Intermittent once  aspirin enteric coated 81 milliGRAM(s) Oral daily  bisacodyl Suppository 10 milliGRAM(s) Rectal once  buMETAnide Injectable 1 milliGRAM(s) IV Push every 12 hours  ceFAZolin   IVPB 2000 milliGRAM(s) IV Intermittent every 12 hours  chlorhexidine 2% Cloths 1 Application(s) Topical daily  dextrose 5%. 1000 milliLiter(s) (50 mL/Hr) IV Continuous <Continuous>  dextrose 5%. 1000 milliLiter(s) (100 mL/Hr) IV Continuous <Continuous>  dextrose 50% Injectable 50 milliLiter(s) IV Push every 15 minutes  dextrose 50% Injectable 25 milliLiter(s) IV Push every 15 minutes  finasteride 5 milliGRAM(s) Oral daily  fluticasone propionate/ salmeterol 250-50 MICROgram(s) Diskus 1 Dose(s) Inhalation two times a day  folic acid 1 milliGRAM(s) Oral daily  gabapentin 100 milliGRAM(s) Oral every 8 hours  glucagon  Injectable 1 milliGRAM(s) IntraMuscular once  guaiFENesin  milliGRAM(s) Oral every 12 hours  heparin   Injectable 5000 Unit(s) SubCutaneous every 8 hours  influenza  Vaccine (HIGH DOSE) 0.5 milliLiter(s) IntraMuscular once  insulin glargine Injectable (LANTUS) 40 Unit(s) SubCutaneous before breakfast  insulin lispro (ADMELOG) corrective regimen sliding scale   SubCutaneous three times a day before meals  insulin lispro (ADMELOG) corrective regimen sliding scale   SubCutaneous at bedtime  insulin lispro Injectable (ADMELOG) 10 Unit(s) SubCutaneous three times a day before meals  levalbuterol Inhalation 1.25 milliGRAM(s) Inhalation four times a day  lidocaine   4% Patch 1 Patch Transdermal every 24 hours  metoprolol tartrate 12.5 milliGRAM(s) Oral every 12 hours  mupirocin 2% Nasal 1 Application(s) Both Nostrils every 12 hours  pantoprazole    Tablet 40 milliGRAM(s) Oral before breakfast  polyethylene glycol 3350 17 Gram(s) Oral daily  potassium chloride    Tablet ER 20 milliEquivalent(s) Oral daily  psyllium Powder 1 Packet(s) Oral every 12 hours  rosuvastatin 20 milliGRAM(s) Oral at bedtime  senna 2 Tablet(s) Oral at bedtime  sertraline 25 milliGRAM(s) Oral daily  sodium bicarbonate 650 milliGRAM(s) Oral every 8 hours  tamsulosin 0.4 milliGRAM(s) Oral at bedtime    MEDICATIONS  (PRN):  ALPRAZolam 0.25 milliGRAM(s) Oral at bedtime PRN anxiety  dextrose Oral Gel 15 Gram(s) Oral once PRN Blood Glucose LESS THAN 70 milliGRAM(s)/deciliter  hydrALAZINE Injectable 5 milliGRAM(s) IV Push every 4 hours PRN Systolic > 160  melatonin 5 milliGRAM(s) Oral at bedtime PRN Insomnia  ondansetron Injectable 4 milliGRAM(s) IV Push every 4 hours PRN Nausea and/or Vomiting  oxyCODONE    IR 5 milliGRAM(s) Oral every 6 hours PRN Moderate Pain (4 - 6)      Allergies    No Known Allergies    Intolerances        Ambulation/Activity Status:  amb with pt      RADIOLOGY & ADDITIONAL TESTS:  Xray Chest 1 View- PORTABLE-Routine: AM   Indication: Shortness of Breath  Transport: Portable,  w/ Monitor  Provider's Contact #: [phone] (02-07-25 @ 08:21)    ACC: [id number] EXAM:  XR CHEST PORTABLE ROUTINE 1V   ORDERED BY: ANMOL MENDEZ     PROCEDURE DATE:  02/07/2025          INTERPRETATION:  CLINICAL HISTORY: Postop    COMPARISON: February 6, 2025    TECHNIQUE: Portable frontal chest radiograph.    FINDINGS:    Support devices: None.    Cardiac/mediastinum/hilum: Cardiomegaly, status post median sternotomy,   left atrial appendage clip, mitral valve replacement.    Lung parenchyma/Pleura: Bilateral opacities/pleural effusions    Skeleton/soft tissues: Stable.    IMPRESSION:    No radiographic evidence of acute cardiopulmonary disease.   Redemonstration bilateral opacities/pleural effusions and cardiomegaly    --- End of Report ---      Assessment/Plan:  64y Male status-post MVR / SHEILA ligation and MAZE POD # 10  - Case and plan discussed with CTU Intensivist and CT Surgeon - Dr. Blakely/Mauri/Laron  - Continue CTU supportive care    - Continue DVT/GI prophylaxis  - Incentive Spirometry 10 times an hour  - Continue to advance physical activity as tolerated and continue PT/OT as directed  1. Continue ASA, statin,start low dose B Blocker-- pt was seen by EP and has no conduction problems; hence, no need for PPM as per Dr. barker  2. madelung disease: cont bipap, respiratory tx's  3. A. Fib prophylaxis: cont to monitor electrolytes - hold AC at this time prior lower GI bleed - s/p MAZE and SHEILA closure  4. junctional bradycardia/wenckebach - ep recommends re-eval pat once off of all inotropes; no ppm needed at the present time- milrinone off will f/u ep    5. DM/Glucose A1c 6.2 Control: increase lantus 40/humalog 10 and sliding scale   6. bumex PRN for noncardiogenic fluid overload   7. franklin on CKD3a- renal consult- diuresis PRN, avoid hypotension ; creat has improved  8. Ancef added for possible URI , pt with productive cough and b/l opacities seen on cxr  psych consult for anxiety- start zoloft cont xanax at night   restart low dose neurontin for pain control  restart xanax for anxiety  encourage incentive spirometer and ambulation with pt

## 2025-02-07 NOTE — PROGRESS NOTE ADULT - SUBJECTIVE AND OBJECTIVE BOX
INTERVAL HPI/OVERNIGHT EVENTS:  several NSVT, last 7bts 2am today  no vandana events  rates 80-100s      MEDICATIONS  (STANDING):  acetaminophen   IVPB .. 1000 milliGRAM(s) IV Intermittent once  acetaminophen   IVPB .. 1000 milliGRAM(s) IV Intermittent once  aspirin enteric coated 81 milliGRAM(s) Oral daily  bisacodyl Suppository 10 milliGRAM(s) Rectal once  buMETAnide Injectable 1 milliGRAM(s) IV Push every 12 hours  ceFAZolin   IVPB 2000 milliGRAM(s) IV Intermittent every 12 hours  chlorhexidine 2% Cloths 1 Application(s) Topical daily  dextrose 5%. 1000 milliLiter(s) (50 mL/Hr) IV Continuous <Continuous>  dextrose 5%. 1000 milliLiter(s) (100 mL/Hr) IV Continuous <Continuous>  dextrose 50% Injectable 50 milliLiter(s) IV Push every 15 minutes  dextrose 50% Injectable 25 milliLiter(s) IV Push every 15 minutes  finasteride 5 milliGRAM(s) Oral daily  fluticasone propionate/ salmeterol 250-50 MICROgram(s) Diskus 1 Dose(s) Inhalation two times a day  folic acid 1 milliGRAM(s) Oral daily  gabapentin 100 milliGRAM(s) Oral every 8 hours  glucagon  Injectable 1 milliGRAM(s) IntraMuscular once  guaiFENesin  milliGRAM(s) Oral every 12 hours  heparin   Injectable 5000 Unit(s) SubCutaneous every 8 hours  influenza  Vaccine (HIGH DOSE) 0.5 milliLiter(s) IntraMuscular once  insulin glargine Injectable (LANTUS) 40 Unit(s) SubCutaneous before breakfast  insulin lispro (ADMELOG) corrective regimen sliding scale   SubCutaneous three times a day before meals  insulin lispro (ADMELOG) corrective regimen sliding scale   SubCutaneous at bedtime  insulin lispro Injectable (ADMELOG) 10 Unit(s) SubCutaneous three times a day before meals  levalbuterol Inhalation 1.25 milliGRAM(s) Inhalation four times a day  lidocaine   4% Patch 1 Patch Transdermal every 24 hours  metoprolol tartrate 12.5 milliGRAM(s) Oral every 12 hours  mupirocin 2% Nasal 1 Application(s) Both Nostrils every 12 hours  pantoprazole    Tablet 40 milliGRAM(s) Oral before breakfast  polyethylene glycol 3350 17 Gram(s) Oral daily  potassium chloride    Tablet ER 20 milliEquivalent(s) Oral daily  psyllium Powder 1 Packet(s) Oral every 12 hours  rosuvastatin 20 milliGRAM(s) Oral at bedtime  senna 2 Tablet(s) Oral at bedtime  sertraline 25 milliGRAM(s) Oral daily  sodium bicarbonate 650 milliGRAM(s) Oral every 8 hours  tamsulosin 0.4 milliGRAM(s) Oral at bedtime    MEDICATIONS  (PRN):  ALPRAZolam 0.25 milliGRAM(s) Oral at bedtime PRN anxiety  dextrose Oral Gel 15 Gram(s) Oral once PRN Blood Glucose LESS THAN 70 milliGRAM(s)/deciliter  hydrALAZINE Injectable 5 milliGRAM(s) IV Push every 4 hours PRN Systolic > 160  melatonin 5 milliGRAM(s) Oral at bedtime PRN Insomnia  ondansetron Injectable 4 milliGRAM(s) IV Push every 4 hours PRN Nausea and/or Vomiting  oxyCODONE    IR 5 milliGRAM(s) Oral every 6 hours PRN Moderate Pain (4 - 6)      Allergies    No Known Allergies    Intolerances        REVIEW OF SYSTEMS    [x ] A ten-point review of systems was otherwise negative except as noted.  [ ] Due to altered mental status/intubation, subjective information were not able to be obtained from the patient. History was obtained, to the extent possible, from review of the chart and collateral sources of information.      Vital Signs Last 24 Hrs  T(C): 35.9 (07 Feb 2025 12:00), Max: 36.7 (06 Feb 2025 20:00)  T(F): 96.7 (07 Feb 2025 12:00), Max: 98 (06 Feb 2025 20:00)  HR: 86 (07 Feb 2025 15:00) (81 - 95)  BP: 149/65 (07 Feb 2025 15:00) (149/65 - 164/76)  BP(mean): 94 (07 Feb 2025 15:00) (94 - 109)  RR: 18 (07 Feb 2025 15:00) (15 - 32)  SpO2: 100% (07 Feb 2025 15:00) (91% - 100%)    Parameters below as of 07 Feb 2025 15:00  Patient On (Oxygen Delivery Method): BiPAP/CPAP    O2 Concentration (%): 40    02-06-25 @ 07:01  -  02-07-25 @ 07:00  --------------------------------------------------------  IN: 990 mL / OUT: 3050 mL / NET: -2060 mL    02-07-25 @ 07:01  -  02-07-25 @ 16:02  --------------------------------------------------------  IN: 150 mL / OUT: 1125 mL / NET: -975 mL        PHYSICAL EXAM:    GENERAL: In no apparent distress, well nourished, and hydrated.  HEART: Regular rate and rhythm; No murmur; NO rubs, or gallops.  PULMONARY: Clear to auscultation and percussion.  Normal expansion/effort. No rales, wheezing, or rhonchi bilaterally.  ABDOMEN: Soft, Nontender, Nondistended; Bowel sounds present  EXTREMITIES:  Extremities warm, pink, well-perfused, 2+ Peripheral Pulses, No clubbing, cyanosis, or edema  NEUROLOGICAL: alert & oriented x 3, no focal deficits, PERRLA, EOMI    LABS:                        8.0    9.59  )-----------( 198      ( 07 Feb 2025 01:44 )             27.5     02-07    135  |  101  |  75[HH]  ----------------------------<  197[H]  4.3   |  24  |  1.6[H]    Ca    8.1[L]      07 Feb 2025 01:44  Mg     2.4     02-07    TPro  5.5[L]  /  Alb  3.5  /  TBili  0.4  /  DBili  x   /  AST  41  /  ALT  14  /  AlkPhos  169[H]  02-07            12 Lead ECG:   Ventricular Rate 89 BPM    QRS Duration 94 ms    Q-T Interval 368 ms    QTC Calculation(Bazett) 447 ms    R Axis -23 degrees    T Axis 129 degrees    Diagnosis Line Accelerated Junctional rhythm  Left ventricular hypertrophy with repolarization abnormality ( R in aVL )  Possible Lateral infarct , age undetermined  Abnormal ECG    Confirmed by PAULO MOSQUEDA MD ([id number]) on 2/7/2025 12:28:27 PM (02-07-25 @ 07:54)  12 Lead ECG:   Ventricular Rate 88 BPM    QRS Duration 96 ms    Q-T Interval 386 ms    QTC Calculation(Bazett) 467 ms    R Axis -26 degrees    T Axis 74 degrees    Diagnosis Line Accelerated Junctional rhythm  Low voltage QRS  Nonspecific T wave abnormality  Abnormal ECG    Confirmed by James Paris ([id number]) on 2/6/2025 8:52:14 AM (02-06-25 @ 07:37)  12 Lead ECG:   Ventricular Rate 91 BPM    Atrial Rate 91 BPM    P-R Interval 264 ms    QRS Duration 96 ms    Q-T Interval 352 ms    QTC Calculation(Bazett) 432 ms    P Axis 25 degrees    R Axis -15 degrees    T Axis 139 degrees    Diagnosis Line Sinus rhythm [id number] degree A-V block  Left ventricular hypertrophy with repolarization abnormality ( R in aVL )  Abnormal ECG    Confirmed by James Paris ([id number]) on 2/5/2025 6:29:21 PM (02-05-25 @ 07:30)      RADIOLOGY & ADDITIONAL TESTS:

## 2025-02-07 NOTE — PROGRESS NOTE ADULT - ASSESSMENT
EP: Carolyn    64M w/ afib, nonobstructive CAD, CHF class 3, severe MR, DM, HTN, JIMMY on CPAP, asthma, severe obesity, HL, Madelung's disease, osteoarthritis s/p bilateral hip replacement, peripheral neuropathy presented with increasing dyspnea with exertion found to have severe MR sp MVR POD#10  no bradycardia, +NSVT off inotropes      Impression:  Junctional Bradycardia postop now resolved  NSVT off milrinone and dobutamine   Slow AF, now 100s  Sev MR sp MVR/MAZE/SHEILA Ligation on 1/28  Morbid obesity  JIMMY  CAD  DM  HTN    Plan:  - con't tele  - start beta blockers  - conduction system seems to recover from postop  - Will cont to monitor, if will not tolerate beta blockers might require possible AV leadless PPM   - Monitor electrolytes, maintain WNL

## 2025-02-07 NOTE — PROGRESS NOTE ADULT - SUBJECTIVE AND OBJECTIVE BOX
Subjective and Objective:   CTU Attending Progress Daily Note       Procedure: MV Replacement (bioprosthetic), SHEILA ligation, MAZE  Procedure Date: 1/28/2025    HPI: 64M w/ afib, nonobstructive CAD, CHF class 3, severe MR, DM, HTN, JIMYM on CPAP, asthma, severe obesity, HL, Madelung's disease, osteoarthritis s/p bilateral hip replacement, peripheral neuropathy presented with increasing dyspnea with exertion found to have severe MR.  He was evaluated for mitral clip and not a candidate.  On prior admission he had FLAKITO with creat up to 2.  Was optimized with diuresis.  He was on AC and developed hemorrhoidal bleeding and was taken off AC.  He was discharged and returned for his procedure on 1/28    OR Data  Procedure: Procedure: MVReplacement (bioprosthetic), SHEILA ligation, MAZE  Bypass: 202  Cross Clamp: 158  Pre LV Fxn: 50-55%      RV Fxn: normal  Post LV Fxn: 45-50%     RV Fxn: normal    moderate TR, MV gradient 3mmHg  Blood Products: 1uPRBC, ddavp 39mcq  Cell Saver: 698  Fluids: NS 2000, albumin 2000  Pacing Wires: V pacing. sinus rhythm  UO: 1100    1/28 - Fany and poor oxygenation in OR. Milrinone, norepi, vaso. Extubation in evening.  1/29 - start aspirin, diuresis, heparin dvt prophylaxis  1/30- d/c pleural ct if drainage dec; wean to dc primacor; wean high flow o2  1/31 - FLAKITO worsening, bradyarrythmias - wenchebach - ep consulted  2/2 - Chest tube removed, ambulating, creat improving, dobutamine weaned  2/3 - Dobut to 1.5, BUN elevated  2/4 - Confused this AM, normal neuro exam, prince removed  2/5 - FLAKITO improving, off dobutamine, mil decreased   2/6 - milrinone stopped, bumex 1mg BID, walked the whole unit    OBJECTIVE:  ICU Vital Signs Last 24 Hrs  T(C): 35.6 (07 Feb 2025 04:00), Max: 37.3 (06 Feb 2025 16:00)  T(F): 96.1 (07 Feb 2025 04:00), Max: 99.1 (06 Feb 2025 16:00)  HR: 86 (07 Feb 2025 06:00) (81 - 95)  BP: --  BP(mean): --  ABP: 130/57 (07 Feb 2025 06:00) (120/50 - 154/57)  ABP(mean): 80 (07 Feb 2025 06:00) (73 - 88)  RR: 16 (07 Feb 2025 06:00) (16 - 26)  SpO2: 96% (07 Feb 2025 06:00) (91% - 100%)    O2 Parameters below as of 07 Feb 2025 06:00  Patient On (Oxygen Delivery Method): nasal cannula  O2 Flow (L/min): 2        I&O's Summary    06 Feb 2025 07:01  -  07 Feb 2025 07:00  --------------------------------------------------------  IN: 990 mL / OUT: 3050 mL / NET: -2060 mL      I&O's Detail    06 Feb 2025 07:01  -  07 Feb 2025 07:00  --------------------------------------------------------  IN:    IV PiggyBack: 200 mL    IV PiggyBack: 50 mL    Oral Fluid: 740 mL  Total IN: 990 mL    OUT:    Voided (mL): 3050 mL  Total OUT: 3050 mL    Total NET: -2060 mL    CAPILLARY BLOOD GLUCOSE      POCT Blood Glucose.: 119 mg/dL (07 Feb 2025 06:57)  POCT Blood Glucose.: 225 mg/dL (06 Feb 2025 23:38)  POCT Blood Glucose.: 166 mg/dL (06 Feb 2025 15:59)  POCT Blood Glucose.: 252 mg/dL (06 Feb 2025 11:54)    LABS:  ABG - ( 07 Feb 2025 04:20 )  pH, Arterial: 7.37  pH, Blood: x     /  pCO2: 46    /  pO2: 72    / HCO3: 27    / Base Excess: 0.8   /  SaO2: 97.1                                    8.0    9.59  )-----------( 198      ( 07 Feb 2025 01:44 )             27.5     02-07    135  |  101  |  75[HH]  ----------------------------<  197[H]  4.3   |  24  |  1.6[H]    Ca    8.1[L]      07 Feb 2025 01:44  Mg     2.4     02-07    TPro  5.5[L]  /  Alb  3.5  /  TBili  0.4  /  DBili  x   /  AST  41  /  ALT  14  /  AlkPhos  169[H]  02-07      Urinalysis Basic - ( 07 Feb 2025 01:44 )    Color: x / Appearance: x / SG: x / pH: x  Gluc: 197 mg/dL / Ketone: x  / Bili: x / Urobili: x   Blood: x / Protein: x / Nitrite: x   Leuk Esterase: x / RBC: x / WBC x   Sq Epi: x / Non Sq Epi: x / Bacteria: x        Home Medications:  Breo Ellipta 200 mcg-25 mcg/inh inhalation powder: 1 inhaled once a day (28 Jan 2025 06:52)  HYDROcodone 10 mg oral capsule, extended release: 1 cap(s) orally 2 times a day (28 Jan 2025 06:52)  losartan 50 mg oral tablet: 1 tab(s) orally 2 times a day (28 Jan 2025 06:52)  losartan-hydrochlorothiazide 50 mg-12.5 mg oral tablet: 1 tab(s) orally (28 Jan 2025 06:52)  ProAir HFA 90 mcg/inh inhalation aerosol: 2 puff(s) inhaled 2 times a day (28 Jan 2025 06:52)  tujeo: 80 unit(s) subcutaneous once a day (28 Jan 2025 06:52)    HOSPITAL MEDICATIONS:  MEDICATIONS  (STANDING):  aspirin enteric coated 81 milliGRAM(s) Oral daily  bisacodyl Suppository 10 milliGRAM(s) Rectal once  buMETAnide Injectable 1 milliGRAM(s) IV Push every 12 hours  ceFAZolin   IVPB 2000 milliGRAM(s) IV Intermittent every 12 hours  chlorhexidine 2% Cloths 1 Application(s) Topical daily  dextrose 5%. 1000 milliLiter(s) (50 mL/Hr) IV Continuous <Continuous>  dextrose 5%. 1000 milliLiter(s) (100 mL/Hr) IV Continuous <Continuous>  dextrose 50% Injectable 50 milliLiter(s) IV Push every 15 minutes  dextrose 50% Injectable 25 milliLiter(s) IV Push every 15 minutes  finasteride 5 milliGRAM(s) Oral daily  fluticasone propionate/ salmeterol 250-50 MICROgram(s) Diskus 1 Dose(s) Inhalation two times a day  folic acid 1 milliGRAM(s) Oral daily  gabapentin 100 milliGRAM(s) Oral every 8 hours  glucagon  Injectable 1 milliGRAM(s) IntraMuscular once  guaiFENesin  milliGRAM(s) Oral every 12 hours  heparin   Injectable 5000 Unit(s) SubCutaneous every 8 hours  influenza  Vaccine (HIGH DOSE) 0.5 milliLiter(s) IntraMuscular once  insulin glargine Injectable (LANTUS) 40 Unit(s) SubCutaneous before breakfast  insulin lispro (ADMELOG) corrective regimen sliding scale   SubCutaneous three times a day before meals  insulin lispro (ADMELOG) corrective regimen sliding scale   SubCutaneous at bedtime  insulin lispro Injectable (ADMELOG) 10 Unit(s) SubCutaneous three times a day before meals  levalbuterol Inhalation 1.25 milliGRAM(s) Inhalation four times a day  lidocaine   4% Patch 1 Patch Transdermal every 24 hours  lidocaine   4% Patch 1 Patch Transdermal every 24 hours  mupirocin 2% Nasal 1 Application(s) Both Nostrils every 12 hours  pantoprazole    Tablet 40 milliGRAM(s) Oral before breakfast  polyethylene glycol 3350 17 Gram(s) Oral daily  potassium chloride    Tablet ER 20 milliEquivalent(s) Oral daily  psyllium Powder 1 Packet(s) Oral every 12 hours  rosuvastatin 20 milliGRAM(s) Oral at bedtime  senna 2 Tablet(s) Oral at bedtime  sertraline 25 milliGRAM(s) Oral daily  sodium bicarbonate 650 milliGRAM(s) Oral every 8 hours  tamsulosin 0.4 milliGRAM(s) Oral at bedtime    MEDICATIONS  (PRN):  ALPRAZolam 0.25 milliGRAM(s) Oral at bedtime PRN anxiety  dextrose Oral Gel 15 Gram(s) Oral once PRN Blood Glucose LESS THAN 70 milliGRAM(s)/deciliter  hydrALAZINE Injectable 5 milliGRAM(s) IV Push every 4 hours PRN Systolic > 160  melatonin 5 milliGRAM(s) Oral at bedtime PRN Insomnia  ondansetron Injectable 4 milliGRAM(s) IV Push every 4 hours PRN Nausea and/or Vomiting  oxyCODONE    IR 5 milliGRAM(s) Oral every 6 hours PRN Moderate Pain (4 - 6)      RADIOLOGY:  Chest X-ray Reviewed    REVIEW OF SYSTEMS:  CONSTITUTIONAL: [X] all negative; [ ] weakness, [ ] fevers, [ ] chills  EYES/ENT: [X] all negative; [ ] visual changes, [ ] vertigo, [ ] throat pain   NECK: [X] all negative; [ ] pain, [ ] stiffness  RESPIRATORY: [] all negative, [ ] cough, [ ] wheezing, [ ] hemoptysis, [ ] shortness of breath  CARDIOVASCULAR: [] all negative; [ ] chest pain, [ ] palpitations, [ ] orthopnea  GASTROINTESTINAL: [X] all negative; [ ]abdominal pain, [ ] nausea, [ ] vomiting, [ ] hematemesis, [ ] diarrhea, [ ] constipation, [ ] melena, [ ] hematochezia.  GENITOURINARY: [X] all negative; [ ] dysuria, [ ] frequency, [ ] hematuria  NEUROLOGICAL: [X] all negative; [ ] numbness, [ ] weakness  SKIN: [X] all negative; [ ] itching, [ ] burning, [ ] rashes, [ ] lesions   All other review of systems is negative unless indicated above.  [  ] Unable to assess ROS because     PHYSICAL EXAM:          CONSTITUTIONAL: Well-developed; obese man; in no acute distress.   	SKIN: warm, dry  	HEAD: Normocephalic; atraumatic.  	EYES: PERRL, EOM, no conj injection, sclera clear  	ENT: No nasal discharge; airway clear.  	NECK: Supple; non tender.   	CARD: S1, S2 normal; no murmurs, gallops, or rubs. Regular rate and rhythm.  no carotid bruits  	RESP: CTA B/L; good air movement No wheezes, rales or rhonchi.  	ABD: Soft, obese, not tender, not distended,  no rebound or guarding, bowel sounds present  	EXT: Normal ROM.  No clubbing, cyanosis or edema.   warm and well perfused.  pulses palpable  	NEURO: Alert, awake, motor 5/5 R, 5/5 L

## 2025-02-08 LAB
ALBUMIN SERPL ELPH-MCNC: 3.3 G/DL — LOW (ref 3.5–5.2)
ALP SERPL-CCNC: 189 U/L — HIGH (ref 30–115)
ALT FLD-CCNC: 18 U/L — SIGNIFICANT CHANGE UP (ref 0–41)
ANION GAP SERPL CALC-SCNC: 10 MMOL/L — SIGNIFICANT CHANGE UP (ref 7–14)
ANION GAP SERPL CALC-SCNC: 9 MMOL/L — SIGNIFICANT CHANGE UP (ref 7–14)
AST SERPL-CCNC: 51 U/L — HIGH (ref 0–41)
BILIRUB SERPL-MCNC: 0.3 MG/DL — SIGNIFICANT CHANGE UP (ref 0.2–1.2)
BUN SERPL-MCNC: 62 MG/DL — CRITICAL HIGH (ref 10–20)
BUN SERPL-MCNC: 66 MG/DL — CRITICAL HIGH (ref 10–20)
CALCIUM SERPL-MCNC: 8.1 MG/DL — LOW (ref 8.4–10.5)
CALCIUM SERPL-MCNC: 8.6 MG/DL — SIGNIFICANT CHANGE UP (ref 8.4–10.4)
CHLORIDE SERPL-SCNC: 104 MMOL/L — SIGNIFICANT CHANGE UP (ref 98–110)
CHLORIDE SERPL-SCNC: 99 MMOL/L — SIGNIFICANT CHANGE UP (ref 98–110)
CO2 SERPL-SCNC: 25 MMOL/L — SIGNIFICANT CHANGE UP (ref 17–32)
CO2 SERPL-SCNC: 27 MMOL/L — SIGNIFICANT CHANGE UP (ref 17–32)
CREAT SERPL-MCNC: 1.1 MG/DL — SIGNIFICANT CHANGE UP (ref 0.7–1.5)
CREAT SERPL-MCNC: 1.2 MG/DL — SIGNIFICANT CHANGE UP (ref 0.7–1.5)
EGFR: 67 ML/MIN/1.73M2 — SIGNIFICANT CHANGE UP
EGFR: 74 ML/MIN/1.73M2 — SIGNIFICANT CHANGE UP
GLUCOSE BLDC GLUCOMTR-MCNC: 123 MG/DL — HIGH (ref 70–99)
GLUCOSE BLDC GLUCOMTR-MCNC: 147 MG/DL — HIGH (ref 70–99)
GLUCOSE BLDC GLUCOMTR-MCNC: 225 MG/DL — HIGH (ref 70–99)
GLUCOSE BLDC GLUCOMTR-MCNC: 236 MG/DL — HIGH (ref 70–99)
GLUCOSE SERPL-MCNC: 154 MG/DL — HIGH (ref 70–99)
GLUCOSE SERPL-MCNC: 216 MG/DL — HIGH (ref 70–99)
HCT VFR BLD CALC: 27.9 % — LOW (ref 42–52)
HGB BLD-MCNC: 8.2 G/DL — LOW (ref 14–18)
MAGNESIUM SERPL-MCNC: 2 MG/DL — SIGNIFICANT CHANGE UP (ref 1.8–2.4)
MAGNESIUM SERPL-MCNC: 2.2 MG/DL — SIGNIFICANT CHANGE UP (ref 1.8–2.4)
MCHC RBC-ENTMCNC: 27.2 PG — SIGNIFICANT CHANGE UP (ref 27–31)
MCHC RBC-ENTMCNC: 29.4 G/DL — LOW (ref 32–37)
MCV RBC AUTO: 92.4 FL — SIGNIFICANT CHANGE UP (ref 80–94)
NRBC # BLD: 0 /100 WBCS — SIGNIFICANT CHANGE UP (ref 0–0)
NRBC BLD-RTO: 0 /100 WBCS — SIGNIFICANT CHANGE UP (ref 0–0)
PLATELET # BLD AUTO: 195 K/UL — SIGNIFICANT CHANGE UP (ref 130–400)
PMV BLD: 10.2 FL — SIGNIFICANT CHANGE UP (ref 7.4–10.4)
POTASSIUM SERPL-MCNC: 4.3 MMOL/L — SIGNIFICANT CHANGE UP (ref 3.5–5)
POTASSIUM SERPL-MCNC: 4.4 MMOL/L — SIGNIFICANT CHANGE UP (ref 3.5–5)
POTASSIUM SERPL-SCNC: 4.3 MMOL/L — SIGNIFICANT CHANGE UP (ref 3.5–5)
POTASSIUM SERPL-SCNC: 4.4 MMOL/L — SIGNIFICANT CHANGE UP (ref 3.5–5)
PROT SERPL-MCNC: 5.4 G/DL — LOW (ref 6–8)
RBC # BLD: 3.02 M/UL — LOW (ref 4.7–6.1)
RBC # FLD: 20.6 % — HIGH (ref 11.5–14.5)
SODIUM SERPL-SCNC: 134 MMOL/L — LOW (ref 135–146)
SODIUM SERPL-SCNC: 140 MMOL/L — SIGNIFICANT CHANGE UP (ref 135–146)
WBC # BLD: 11.22 K/UL — HIGH (ref 4.8–10.8)
WBC # FLD AUTO: 11.22 K/UL — HIGH (ref 4.8–10.8)

## 2025-02-08 PROCEDURE — 99291 CRITICAL CARE FIRST HOUR: CPT

## 2025-02-08 PROCEDURE — 93010 ELECTROCARDIOGRAM REPORT: CPT

## 2025-02-08 PROCEDURE — 71045 X-RAY EXAM CHEST 1 VIEW: CPT | Mod: 26

## 2025-02-08 RX ADMIN — LIDOCAINE HYDROCHLORIDE 1 PATCH: 20 JELLY TOPICAL at 02:12

## 2025-02-08 RX ADMIN — Medication 1000 MILLIGRAM(S): at 14:34

## 2025-02-08 RX ADMIN — Medication 1000 MILLIGRAM(S): at 09:05

## 2025-02-08 RX ADMIN — LEVALBUTEROL HYDROCHLORIDE 1.25 MILLIGRAM(S): 1.25 SOLUTION RESPIRATORY (INHALATION) at 08:40

## 2025-02-08 RX ADMIN — Medication 100 MILLIGRAM(S): at 05:52

## 2025-02-08 RX ADMIN — FOLIC ACID 1 MILLIGRAM(S): 1 TABLET ORAL at 12:02

## 2025-02-08 RX ADMIN — HEPARIN SODIUM 5000 UNIT(S): 1000 INJECTION INTRAVENOUS; SUBCUTANEOUS at 22:20

## 2025-02-08 RX ADMIN — INSULIN LISPRO 4: 100 INJECTION, SOLUTION INTRAVENOUS; SUBCUTANEOUS at 12:00

## 2025-02-08 RX ADMIN — ROSUVASTATIN CALCIUM 20 MILLIGRAM(S): 20 TABLET, FILM COATED ORAL at 22:21

## 2025-02-08 RX ADMIN — INSULIN LISPRO 10 UNIT(S): 100 INJECTION, SOLUTION INTRAVENOUS; SUBCUTANEOUS at 17:22

## 2025-02-08 RX ADMIN — INSULIN LISPRO 4: 100 INJECTION, SOLUTION INTRAVENOUS; SUBCUTANEOUS at 08:03

## 2025-02-08 RX ADMIN — DEXTROMETHORPHAN HBR, GUAIFENESIN 600 MILLIGRAM(S): 200 LIQUID ORAL at 17:23

## 2025-02-08 RX ADMIN — Medication 1000 MILLIGRAM(S): at 04:00

## 2025-02-08 RX ADMIN — Medication 1 DOSE(S): at 08:05

## 2025-02-08 RX ADMIN — METOPROLOL SUCCINATE 12.5 MILLIGRAM(S): 50 TABLET, EXTENDED RELEASE ORAL at 17:23

## 2025-02-08 RX ADMIN — Medication 20 MILLIEQUIVALENT(S): at 12:01

## 2025-02-08 RX ADMIN — Medication 5 MILLIGRAM(S): at 01:26

## 2025-02-08 RX ADMIN — Medication 1 PACKET(S): at 17:23

## 2025-02-08 RX ADMIN — LIDOCAINE HYDROCHLORIDE 1 PATCH: 20 JELLY TOPICAL at 11:44

## 2025-02-08 RX ADMIN — Medication 400 MILLIGRAM(S): at 02:20

## 2025-02-08 RX ADMIN — LIDOCAINE HYDROCHLORIDE 1 PATCH: 20 JELLY TOPICAL at 07:00

## 2025-02-08 RX ADMIN — Medication 1 DOSE(S): at 22:21

## 2025-02-08 RX ADMIN — Medication 81 MILLIGRAM(S): at 12:01

## 2025-02-08 RX ADMIN — INSULIN LISPRO 10 UNIT(S): 100 INJECTION, SOLUTION INTRAVENOUS; SUBCUTANEOUS at 08:03

## 2025-02-08 RX ADMIN — BUMETANIDE 1 MILLIGRAM(S): 1 TABLET ORAL at 05:52

## 2025-02-08 RX ADMIN — Medication 400 MILLIGRAM(S): at 08:05

## 2025-02-08 RX ADMIN — Medication 0.25 MILLIGRAM(S): at 23:27

## 2025-02-08 RX ADMIN — GABAPENTIN 100 MILLIGRAM(S): 400 CAPSULE ORAL at 13:33

## 2025-02-08 RX ADMIN — Medication 100 MILLIGRAM(S): at 17:23

## 2025-02-08 RX ADMIN — GABAPENTIN 100 MILLIGRAM(S): 400 CAPSULE ORAL at 05:49

## 2025-02-08 RX ADMIN — BUMETANIDE 1 MILLIGRAM(S): 1 TABLET ORAL at 17:22

## 2025-02-08 RX ADMIN — Medication 650 MILLIGRAM(S): at 05:49

## 2025-02-08 RX ADMIN — TAMSULOSIN HYDROCHLORIDE 0.4 MILLIGRAM(S): 0.4 CAPSULE ORAL at 22:21

## 2025-02-08 RX ADMIN — GABAPENTIN 100 MILLIGRAM(S): 400 CAPSULE ORAL at 22:22

## 2025-02-08 RX ADMIN — OXYCODONE HYDROCHLORIDE 5 MILLIGRAM(S): 30 TABLET ORAL at 22:24

## 2025-02-08 RX ADMIN — HEPARIN SODIUM 5000 UNIT(S): 1000 INJECTION INTRAVENOUS; SUBCUTANEOUS at 05:49

## 2025-02-08 RX ADMIN — OXYCODONE HYDROCHLORIDE 5 MILLIGRAM(S): 30 TABLET ORAL at 23:00

## 2025-02-08 RX ADMIN — METOPROLOL SUCCINATE 12.5 MILLIGRAM(S): 50 TABLET, EXTENDED RELEASE ORAL at 05:51

## 2025-02-08 RX ADMIN — HEPARIN SODIUM 5000 UNIT(S): 1000 INJECTION INTRAVENOUS; SUBCUTANEOUS at 13:34

## 2025-02-08 RX ADMIN — Medication 400 MILLIGRAM(S): at 13:34

## 2025-02-08 RX ADMIN — Medication 0.25 MILLIGRAM(S): at 01:26

## 2025-02-08 RX ADMIN — INSULIN GLARGINE-YFGN 40 UNIT(S): 100 INJECTION, SOLUTION SUBCUTANEOUS at 08:04

## 2025-02-08 RX ADMIN — FINASTERIDE 5 MILLIGRAM(S): 1 TABLET, FILM COATED ORAL at 12:02

## 2025-02-08 RX ADMIN — Medication 1 PACKET(S): at 06:08

## 2025-02-08 RX ADMIN — INSULIN LISPRO 10 UNIT(S): 100 INJECTION, SOLUTION INTRAVENOUS; SUBCUTANEOUS at 12:00

## 2025-02-08 RX ADMIN — Medication 40 MILLIGRAM(S): at 08:13

## 2025-02-08 RX ADMIN — SERTRALINE 25 MILLIGRAM(S): 100 TABLET, FILM COATED ORAL at 13:34

## 2025-02-08 RX ADMIN — Medication 5 MILLIGRAM(S): at 23:33

## 2025-02-08 RX ADMIN — DEXTROMETHORPHAN HBR, GUAIFENESIN 600 MILLIGRAM(S): 200 LIQUID ORAL at 05:49

## 2025-02-08 NOTE — PROGRESS NOTE ADULT - SUBJECTIVE AND OBJECTIVE BOX
JAZ GAVIN  MRN#: [id number]  Subjective:  Patient was seen and evalauted on AM rounds offerring no specific compliants at this time.    OBJECTIVE:  ICU Vital Signs Last 24 Hrs  T(C): 36.6 (08 Feb 2025 08:00), Max: 36.7 (07 Feb 2025 20:00)  T(F): 97.8 (08 Feb 2025 08:00), Max: 98 (07 Feb 2025 20:00)  HR: 79 (08 Feb 2025 10:00) (74 - 91)  BP: 124/71 (08 Feb 2025 10:00) (113/57 - 164/76)  BP(mean): 89 (08 Feb 2025 10:00) (81 - 109)  ABP: 159/59 (07 Feb 2025 13:00) (159/59 - 165/61)  ABP(mean): 88 (07 Feb 2025 13:00) (88 - 92)  RR: 26 (08 Feb 2025 10:00) (14 - 31)  SpO2: 98% (08 Feb 2025 10:00) (96% - 100%)    O2 Parameters below as of 08 Feb 2025 11:00  Patient On (Oxygen Delivery Method): nasal cannula  O2 Flow (L/min): 3          02-07 @ 07:01 - 02-08 @ 07:00  --------------------------------------------------------  IN: 600 mL / OUT: 3750 mL / NET: -3150 mL    02-08 @ 07:01  -  02-08 @ 11:00  --------------------------------------------------------  IN: 280 mL / OUT: 750 mL / NET: -470 mL      CAPILLARY BLOOD GLUCOSE      POCT Blood Glucose.: 225 mg/dL (08 Feb 2025 07:37)      PHYSICAL EXAM:Daily     Daily   General: WN/WD NAD    HEENT:     + NCAT  + EOMI  - Conjuctival edema   - Icterus   - Thrush   - ETT  - NGT/OGT    Neck:         + FROM    - JVD     - Nodes     - Masses    + Mid-line trachea   - Tracheostomy    Chest:         - Sternal click  - Sternal drainage  + Pacing wires  - Chest tubes  - SubQ emphysema    Lungs:          + CTA   - Rhonchi    - Rales    - Wheezing     - Decreased BS   - Dullness R L    Cardiac:       + S1 + S2    + RRR   - Irregular   - S3  - S4    - Murmurs   - Rub   - Hamman’s sign     Abdomen:    + BS     + Soft    + Non-tender     - Distended    - Organomegaly  - PEG    Extremities:   - Cyanosis U/L   - Clubbing  U/L  - LE/UE Edema   + Capillary refill    + Pulses     Neuro:        + Awake   +  Alert   - Confused   - Lethargic   - Sedated   - Generalized Weakness    Skin:        - Rashes    - Erythema   + Normal incisions   + IV sites intact  - Sacral decubitus    HOSPITAL MEDICATIONS:  MEDICATIONS  (STANDING):  acetaminophen   IVPB .. 1000 milliGRAM(s) IV Intermittent once  aspirin enteric coated 81 milliGRAM(s) Oral daily  bisacodyl Suppository 10 milliGRAM(s) Rectal once  buMETAnide Injectable 1 milliGRAM(s) IV Push every 12 hours  ceFAZolin   IVPB 2000 milliGRAM(s) IV Intermittent every 12 hours  chlorhexidine 2% Cloths 1 Application(s) Topical daily  dextrose 5%. 1000 milliLiter(s) (50 mL/Hr) IV Continuous <Continuous>  dextrose 5%. 1000 milliLiter(s) (100 mL/Hr) IV Continuous <Continuous>  dextrose 50% Injectable 50 milliLiter(s) IV Push every 15 minutes  dextrose 50% Injectable 25 milliLiter(s) IV Push every 15 minutes  finasteride 5 milliGRAM(s) Oral daily  fluticasone propionate/ salmeterol 250-50 MICROgram(s) Diskus 1 Dose(s) Inhalation two times a day  folic acid 1 milliGRAM(s) Oral daily  gabapentin 100 milliGRAM(s) Oral every 8 hours  glucagon  Injectable 1 milliGRAM(s) IntraMuscular once  guaiFENesin  milliGRAM(s) Oral every 12 hours  heparin   Injectable 5000 Unit(s) SubCutaneous every 8 hours  influenza  Vaccine (HIGH DOSE) 0.5 milliLiter(s) IntraMuscular once  insulin glargine Injectable (LANTUS) 40 Unit(s) SubCutaneous before breakfast  insulin lispro (ADMELOG) corrective regimen sliding scale   SubCutaneous three times a day before meals  insulin lispro (ADMELOG) corrective regimen sliding scale   SubCutaneous at bedtime  insulin lispro Injectable (ADMELOG) 10 Unit(s) SubCutaneous three times a day before meals  levalbuterol Inhalation 1.25 milliGRAM(s) Inhalation four times a day  metoprolol tartrate 12.5 milliGRAM(s) Oral every 12 hours  mupirocin 2% Nasal 1 Application(s) Both Nostrils every 12 hours  pantoprazole    Tablet 40 milliGRAM(s) Oral before breakfast  potassium chloride    Tablet ER 20 milliEquivalent(s) Oral daily  psyllium Powder 1 Packet(s) Oral every 12 hours  rosuvastatin 20 milliGRAM(s) Oral at bedtime  senna 2 Tablet(s) Oral at bedtime  sertraline 25 milliGRAM(s) Oral daily  tamsulosin 0.4 milliGRAM(s) Oral at bedtime    MEDICATIONS  (PRN):  ALPRAZolam 0.25 milliGRAM(s) Oral at bedtime PRN anxiety  dextrose Oral Gel 15 Gram(s) Oral once PRN Blood Glucose LESS THAN 70 milliGRAM(s)/deciliter  hydrALAZINE Injectable 5 milliGRAM(s) IV Push every 4 hours PRN Systolic > 160  melatonin 5 milliGRAM(s) Oral at bedtime PRN Insomnia  ondansetron Injectable 4 milliGRAM(s) IV Push every 4 hours PRN Nausea and/or Vomiting  oxyCODONE    IR 5 milliGRAM(s) Oral every 6 hours PRN Moderate Pain (4 - 6)      LABS:                        8.2    11.22 )-----------( 195      ( 08 Feb 2025 04:13 )             27.9    02-08    134[L]  |  99  |  66[HH]  ----------------------------<  216[H]  4.3   |  25  |  1.1    Ca    8.1[L]      08 Feb 2025 04:13  Mg     2.2     02-08    TPro  5.4[L]  /  Alb  3.3[L]  /  TBili  0.3  /  DBili  x   /  AST  51[H]  /  ALT  18  /  AlkPhos  189[H]  02-08     LIVER FUNCTIONS - ( 08 Feb 2025 04:13 )  Alb: 3.3 g/dL / Pro: 5.4 g/dL / ALK PHOS: 189 U/L / ALT: 18 U/L / AST: 51 U/L / GGT: x           Urinalysis Basic - ( 08 Feb 2025 04:13 )    Color: x / Appearance: x / SG: x / pH: x  Gluc: 216 mg/dL / Ketone: x  / Bili: x / Urobili: x   Blood: x / Protein: x / Nitrite: x   Leuk Esterase: x / RBC: x / WBC x   Sq Epi: x / Non Sq Epi: x / Bacteria: x        RADIOLOGY:  X Reviewed and interpreted by me: bilateral opacities/effusions    CARDIOPULMONARY DYSFUNCTION  - Respiratory status required supplemental oxygen & the following of continuous pulse oximetry for support & to prevent decompensation  - Continued early mobilization as tolerated  - Addressed analgesic regimen to optimize function    PREVENTION-PROPHYLAXIS  - ASA continued for thromboembolism prophylaxis  - Statin was also started   - Heparin continued for VTE prophylaxis in addition to Venodyne boots  - Protonix maintained for GI bleeding prophylaxis  - Lopressor continued for atrial fibrillation prophylaxis  - Metabolic stability & infection prophylaxis required review and adjustment of regular Insulin sliding scale and gylcemic regimen while following serial glucose levels to help achieve & maintain euglycemia  - Reviewed & addressed surgical site infection prophylaxis regimen

## 2025-02-08 NOTE — PROGRESS NOTE ADULT - ASSESSMENT
Assessment/Plan:  Mitral Valve Insufficiency/A-Fib-s/p MVR/MAZE-POD #11  1-BP control-metoprolol  2-serum glucose control-Lantus/Lispro with insulin sliding scale correction regimen  3-fluid overload-diuresis  4-A-Fib, s/p MAZE, now NSR  5-chronic systolic heart failure-off inotropes, f/u repeat 2D ECHO  4-UPY-kvvwbygil, avoid nephrotoxins, monitor renal failure daily  7-JIMMY-BIPAP support  5-CDH-yheykryi statin  4-xzoximodyuhcxqd-nftwrhoon, monitor daily  10-acute blood loss anemia-stable, monitor Hb/Hct daily  78-fepnecmcfhjlfycb-buorzhwgr,monitor plts daily  12-BPH-finasteride, tamsulosin    40 minutes of critical care services provided

## 2025-02-08 NOTE — PROGRESS NOTE ADULT - SUBJECTIVE AND OBJECTIVE BOX
OPERATIVE PROCEDURE(s):                POD #                       SURGEON(s): ISELA Blakely    HPI:      SUBJECTIVE ASSESSMENT:64yMale patient seen and examined at bedside.    Vital Signs Last 24 Hrs  T(F): 97.7 (08 Feb 2025 16:00), Max: 98 (07 Feb 2025 20:00)  HR: 80 (08 Feb 2025 16:00) (74 - 86)  BP: 136/76 (08 Feb 2025 16:00) (113/57 - 158/75)  BP(mean): 99 (08 Feb 2025 16:00) (78 - 107)  RR: 29 (08 Feb 2025 16:00) (14 - 32)  SpO2: 99% (08 Feb 2025 16:00) (92% - 100%)      I&O's Detail    07 Feb 2025 07:01  -  08 Feb 2025 07:00  --------------------------------------------------------  IN:    IV PiggyBack: 400 mL    Oral Fluid: 200 mL  Total IN: 600 mL    OUT:    Voided (mL): 3750 mL  Total OUT: 3750 mL        Net: I&O's Detail    06 Feb 2025 07:01  -  07 Feb 2025 07:00  --------------------------------------------------------  Total NET: -2060 mL      07 Feb 2025 07:01  -  08 Feb 2025 07:00  --------------------------------------------------------  Total NET: -3150 mL        CAPILLARY BLOOD GLUCOSE      POCT Blood Glucose.: 123 mg/dL (08 Feb 2025 16:29)  POCT Blood Glucose.: 236 mg/dL (08 Feb 2025 11:21)  POCT Blood Glucose.: 225 mg/dL (08 Feb 2025 07:37)  POCT Blood Glucose.: 98 mg/dL (07 Feb 2025 23:31)    A1C with Estimated Average Glucose Result: 6.2 % (01-23-25 @ 09:45)  A1C with Estimated Average Glucose Result: 7.1 % (12-04-24 @ 04:45)      Physical Exam:  General: NAD; A&Ox3  Cardiac: S1/S2, RRR, ? murmur, no rubs  Neck; very large neck, ? mass  Lungs: unlabored shallow respirations, bilateral bs decreased at bases  Abdomen: Soft/NT/protuberant  Sternum: Intact, no click, Prevena in place  Extremities: mild edema b/l lower extremities  Neuro: smile =, no arm drift, good strength neck rotation, good strength upper and lower ext, ROM intact      LABS:                        8.2[L]  11.22[H] )-----------( 195      ( 08 Feb 2025 04:13 )             27.9[L]                        8.0[L]  9.59  )-----------( 198      ( 07 Feb 2025 01:44 )             27.5[L]    02-08    140  |  104  |  62[HH]  ----------------------------<  154[H]  4.4   |  27  |  1.2  02-08    134[L]  |  99  |  66[HH]  ----------------------------<  216[H]  4.3   |  25  |  1.1    Ca    8.6      08 Feb 2025 14:00  Mg     2.0     02-08    TPro  5.4[L] [6.0 - 8.0]  /  Alb  3.3[L] [3.5 - 5.2]  /  TBili  0.3 [0.2 - 1.2]  /  DBili  x   /  AST  51[H] [0 - 41]  /  ALT  18 [0 - 41]  /  AlkPhos  189[H] [30 - 115]  02-08      Urinalysis Basic - ( 08 Feb 2025 14:00 )    Color: x / Appearance: x / SG: x / pH: x  Gluc: 154 mg/dL / Ketone: x  / Bili: x / Urobili: x   Blood: x / Protein: x / Nitrite: x   Leuk Esterase: x / RBC: x / WBC x   Sq Epi: x / Non Sq Epi: x / Bacteria: x      ABG - ( 07 Feb 2025 04:20 )  pH: 7.37  /  pCO2: 46    /  pO2: 72    / HCO3: 27    / Base Excess: 0.8   /  SaO2: 97.1  /  LA: 0.7      Allergies    No Known Allergies    Intolerances      MEDICATIONS  (STANDING):  aspirin enteric coated 81 milliGRAM(s) Oral daily  bisacodyl Suppository 10 milliGRAM(s) Rectal once  buMETAnide Injectable 1 milliGRAM(s) IV Push every 12 hours  ceFAZolin   IVPB 2000 milliGRAM(s) IV Intermittent every 12 hours  chlorhexidine 2% Cloths 1 Application(s) Topical daily  dextrose 5%. 1000 milliLiter(s) (50 mL/Hr) IV Continuous <Continuous>  dextrose 5%. 1000 milliLiter(s) (100 mL/Hr) IV Continuous <Continuous>  dextrose 50% Injectable 50 milliLiter(s) IV Push every 15 minutes  dextrose 50% Injectable 25 milliLiter(s) IV Push every 15 minutes  finasteride 5 milliGRAM(s) Oral daily  fluticasone propionate/ salmeterol 250-50 MICROgram(s) Diskus 1 Dose(s) Inhalation two times a day  folic acid 1 milliGRAM(s) Oral daily  gabapentin 100 milliGRAM(s) Oral every 8 hours  glucagon  Injectable 1 milliGRAM(s) IntraMuscular once  guaiFENesin  milliGRAM(s) Oral every 12 hours  heparin   Injectable 5000 Unit(s) SubCutaneous every 8 hours  influenza  Vaccine (HIGH DOSE) 0.5 milliLiter(s) IntraMuscular once  insulin glargine Injectable (LANTUS) 40 Unit(s) SubCutaneous before breakfast  insulin lispro (ADMELOG) corrective regimen sliding scale   SubCutaneous three times a day before meals  insulin lispro (ADMELOG) corrective regimen sliding scale   SubCutaneous at bedtime  insulin lispro Injectable (ADMELOG) 10 Unit(s) SubCutaneous three times a day before meals  levalbuterol Inhalation 1.25 milliGRAM(s) Inhalation four times a day  metoprolol tartrate 12.5 milliGRAM(s) Oral every 12 hours  mupirocin 2% Nasal 1 Application(s) Both Nostrils every 12 hours  pantoprazole    Tablet 40 milliGRAM(s) Oral before breakfast  potassium chloride    Tablet ER 20 milliEquivalent(s) Oral daily  psyllium Powder 1 Packet(s) Oral every 12 hours  rosuvastatin 20 milliGRAM(s) Oral at bedtime  senna 2 Tablet(s) Oral at bedtime  sertraline 25 milliGRAM(s) Oral daily  tamsulosin 0.4 milliGRAM(s) Oral at bedtime    MEDICATIONS  (PRN):  ALPRAZolam 0.25 milliGRAM(s) Oral at bedtime PRN anxiety  dextrose Oral Gel 15 Gram(s) Oral once PRN Blood Glucose LESS THAN 70 milliGRAM(s)/deciliter  hydrALAZINE Injectable 5 milliGRAM(s) IV Push every 4 hours PRN Systolic > 160  melatonin 5 milliGRAM(s) Oral at bedtime PRN Insomnia  ondansetron Injectable 4 milliGRAM(s) IV Push every 4 hours PRN Nausea and/or Vomiting  oxyCODONE    IR 5 milliGRAM(s) Oral every 6 hours PRN Moderate Pain (4 - 6)    Home Medications:  Breo Ellipta 200 mcg-25 mcg/inh inhalation powder: 1 inhaled once a day (28 Jan 2025 06:52)  HYDROcodone 10 mg oral capsule, extended release: 1 cap(s) orally 2 times a day (28 Jan 2025 06:52)  losartan 50 mg oral tablet: 1 tab(s) orally 2 times a day (28 Jan 2025 06:52)  losartan-hydrochlorothiazide 50 mg-12.5 mg oral tablet: 1 tab(s) orally (28 Jan 2025 06:52)  ProAir HFA 90 mcg/inh inhalation aerosol: 2 puff(s) inhaled 2 times a day (28 Jan 2025 06:52)  tujeo: 80 unit(s) subcutaneous once a day (28 Jan 2025 06:52)        Assessment/Plan:  64y Male status-post MVR / SHEILA ligation and MAZE POD # 11  - Case and plan discussed with CTU Intensivist and CT Surgeon - Dr. Blakely/Mauri/Laron  - Continue CTU supportive care    - Continue DVT/GI prophylaxis  - Incentive Spirometry 10 times an hour  - Continue to advance physical activity as tolerated and continue PT/OT as directed  1. Continue ASA, statin,start low dose B Blocker-- pt was seen by EP and has no conduction problems; hence, no need for PPM as per Dr. barker  2. madelung disease: cont bipap, respiratory tx's  3. A. Fib prophylaxis: cont to monitor electrolytes - hold AC at this time prior lower GI bleed - s/p MAZE and SHEILA closure  4. junctional bradycardia/wenckebach - ep recommends re-eval pat once off of all inotropes; no ppm needed at the present time- milrinone off will f/u ep    5. DM/Glucose A1c 6.2 Control: increase lantus 40/humalog 10 and sliding scale   6. bumex PRN for noncardiogenic fluid overload   7. franklin on CKD3a- renal consult- diuresis PRN, avoid hypotension ; creat has improved  8. Ancef added for possible URI , pt with productive cough and b/l opacities seen on cxr  psych consult for anxiety- start zoloft cont xanax at night     Social Service Disposition: Pt physically deconditioned. Family deciding on SNF VS. Home with services.     OPERATIVE PROCEDURE(s): MVR / SHEILA ligation and MAZE POD # 11                 SURGEON(s): ISELA Blakely    SUBJECTIVE ASSESSMENT:64yMale patient seen and examined at bedside.    Vital Signs Last 24 Hrs  T(F): 97.7 (08 Feb 2025 16:00), Max: 98 (07 Feb 2025 20:00)  HR: 80 (08 Feb 2025 16:00) (74 - 86)  BP: 136/76 (08 Feb 2025 16:00) (113/57 - 158/75)  BP(mean): 99 (08 Feb 2025 16:00) (78 - 107)  RR: 29 (08 Feb 2025 16:00) (14 - 32)  SpO2: 99% (08 Feb 2025 16:00) (92% - 100%)      I&O's Detail    07 Feb 2025 07:01  -  08 Feb 2025 07:00  --------------------------------------------------------  IN:    IV PiggyBack: 400 mL    Oral Fluid: 200 mL  Total IN: 600 mL    OUT:    Voided (mL): 3750 mL  Total OUT: 3750 mL        Net: I&O's Detail    06 Feb 2025 07:01  -  07 Feb 2025 07:00  --------------------------------------------------------  Total NET: -2060 mL      07 Feb 2025 07:01  -  08 Feb 2025 07:00  --------------------------------------------------------  Total NET: -3150 mL        CAPILLARY BLOOD GLUCOSE      POCT Blood Glucose.: 123 mg/dL (08 Feb 2025 16:29)  POCT Blood Glucose.: 236 mg/dL (08 Feb 2025 11:21)  POCT Blood Glucose.: 225 mg/dL (08 Feb 2025 07:37)  POCT Blood Glucose.: 98 mg/dL (07 Feb 2025 23:31)    A1C with Estimated Average Glucose Result: 6.2 % (01-23-25 @ 09:45)  A1C with Estimated Average Glucose Result: 7.1 % (12-04-24 @ 04:45)      Physical Exam:  General: NAD; A&Ox3  Cardiac: S1/S2, RRR, ? murmur, no rubs  Neck; very large neck, ? mass  Lungs: unlabored shallow respirations, bilateral bs decreased at bases  Abdomen: Soft/NT/protuberant  Sternum: Intact, no click, Prevena in place  Extremities: mild edema b/l lower extremities  Neuro: smile =, no arm drift, good strength neck rotation, good strength upper and lower ext, ROM intact      LABS:                        8.2[L]  11.22[H] )-----------( 195      ( 08 Feb 2025 04:13 )             27.9[L]                        8.0[L]  9.59  )-----------( 198      ( 07 Feb 2025 01:44 )             27.5[L]    02-08    140  |  104  |  62[HH]  ----------------------------<  154[H]  4.4   |  27  |  1.2  02-08    134[L]  |  99  |  66[HH]  ----------------------------<  216[H]  4.3   |  25  |  1.1    Ca    8.6      08 Feb 2025 14:00  Mg     2.0     02-08    TPro  5.4[L] [6.0 - 8.0]  /  Alb  3.3[L] [3.5 - 5.2]  /  TBili  0.3 [0.2 - 1.2]  /  DBili  x   /  AST  51[H] [0 - 41]  /  ALT  18 [0 - 41]  /  AlkPhos  189[H] [30 - 115]  02-08      Urinalysis Basic - ( 08 Feb 2025 14:00 )    Color: x / Appearance: x / SG: x / pH: x  Gluc: 154 mg/dL / Ketone: x  / Bili: x / Urobili: x   Blood: x / Protein: x / Nitrite: x   Leuk Esterase: x / RBC: x / WBC x   Sq Epi: x / Non Sq Epi: x / Bacteria: x      ABG - ( 07 Feb 2025 04:20 )  pH: 7.37  /  pCO2: 46    /  pO2: 72    / HCO3: 27    / Base Excess: 0.8   /  SaO2: 97.1  /  LA: 0.7      Allergies    No Known Allergies    Intolerances      MEDICATIONS  (STANDING):  aspirin enteric coated 81 milliGRAM(s) Oral daily  bisacodyl Suppository 10 milliGRAM(s) Rectal once  buMETAnide Injectable 1 milliGRAM(s) IV Push every 12 hours  ceFAZolin   IVPB 2000 milliGRAM(s) IV Intermittent every 12 hours  chlorhexidine 2% Cloths 1 Application(s) Topical daily  dextrose 5%. 1000 milliLiter(s) (50 mL/Hr) IV Continuous <Continuous>  dextrose 5%. 1000 milliLiter(s) (100 mL/Hr) IV Continuous <Continuous>  dextrose 50% Injectable 50 milliLiter(s) IV Push every 15 minutes  dextrose 50% Injectable 25 milliLiter(s) IV Push every 15 minutes  finasteride 5 milliGRAM(s) Oral daily  fluticasone propionate/ salmeterol 250-50 MICROgram(s) Diskus 1 Dose(s) Inhalation two times a day  folic acid 1 milliGRAM(s) Oral daily  gabapentin 100 milliGRAM(s) Oral every 8 hours  glucagon  Injectable 1 milliGRAM(s) IntraMuscular once  guaiFENesin  milliGRAM(s) Oral every 12 hours  heparin   Injectable 5000 Unit(s) SubCutaneous every 8 hours  influenza  Vaccine (HIGH DOSE) 0.5 milliLiter(s) IntraMuscular once  insulin glargine Injectable (LANTUS) 40 Unit(s) SubCutaneous before breakfast  insulin lispro (ADMELOG) corrective regimen sliding scale   SubCutaneous three times a day before meals  insulin lispro (ADMELOG) corrective regimen sliding scale   SubCutaneous at bedtime  insulin lispro Injectable (ADMELOG) 10 Unit(s) SubCutaneous three times a day before meals  levalbuterol Inhalation 1.25 milliGRAM(s) Inhalation four times a day  metoprolol tartrate 12.5 milliGRAM(s) Oral every 12 hours  mupirocin 2% Nasal 1 Application(s) Both Nostrils every 12 hours  pantoprazole    Tablet 40 milliGRAM(s) Oral before breakfast  potassium chloride    Tablet ER 20 milliEquivalent(s) Oral daily  psyllium Powder 1 Packet(s) Oral every 12 hours  rosuvastatin 20 milliGRAM(s) Oral at bedtime  senna 2 Tablet(s) Oral at bedtime  sertraline 25 milliGRAM(s) Oral daily  tamsulosin 0.4 milliGRAM(s) Oral at bedtime    MEDICATIONS  (PRN):  ALPRAZolam 0.25 milliGRAM(s) Oral at bedtime PRN anxiety  dextrose Oral Gel 15 Gram(s) Oral once PRN Blood Glucose LESS THAN 70 milliGRAM(s)/deciliter  hydrALAZINE Injectable 5 milliGRAM(s) IV Push every 4 hours PRN Systolic > 160  melatonin 5 milliGRAM(s) Oral at bedtime PRN Insomnia  ondansetron Injectable 4 milliGRAM(s) IV Push every 4 hours PRN Nausea and/or Vomiting  oxyCODONE    IR 5 milliGRAM(s) Oral every 6 hours PRN Moderate Pain (4 - 6)    Home Medications:  Breo Ellipta 200 mcg-25 mcg/inh inhalation powder: 1 inhaled once a day (28 Jan 2025 06:52)  HYDROcodone 10 mg oral capsule, extended release: 1 cap(s) orally 2 times a day (28 Jan 2025 06:52)  losartan 50 mg oral tablet: 1 tab(s) orally 2 times a day (28 Jan 2025 06:52)  losartan-hydrochlorothiazide 50 mg-12.5 mg oral tablet: 1 tab(s) orally (28 Jan 2025 06:52)  ProAir HFA 90 mcg/inh inhalation aerosol: 2 puff(s) inhaled 2 times a day (28 Jan 2025 06:52)  tujeo: 80 unit(s) subcutaneous once a day (28 Jan 2025 06:52)        Assessment/Plan:  64y Male status-post MVR / SHEILA ligation and MAZE POD # 11  - Case and plan discussed with CTU Intensivist and CT Surgeon - Dr. Blakely/Mauri/Laron  - Continue CTU supportive care    - Continue DVT/GI prophylaxis  - Incentive Spirometry 10 times an hour  - Continue to advance physical activity as tolerated and continue PT/OT as directed  1. Continue ASA, statin,start low dose B Blocker-- pt was seen by EP and has no conduction problems; hence, no need for PPM as per Dr. barker  2. madelung disease: cont bipap, respiratory tx's  3. A. Fib prophylaxis: cont to monitor electrolytes - hold AC at this time prior lower GI bleed - s/p MAZE and SHEILA closure  4. junctional bradycardia/wenckebach - ep recommends re-eval pat once off of all inotropes; no ppm needed at the present time- milrinone off will f/u ep    5. DM/Glucose A1c 6.2 Control: increase lantus 40/humalog 10 and sliding scale   6. bumex PRN for noncardiogenic fluid overload   7. franklin on CKD3a- renal consult- diuresis PRN, avoid hypotension ; creat has improved  8. Ancef added for possible URI , pt with productive cough and b/l opacities seen on cxr  psych consult for anxiety- start zoloft cont xanax at night     Social Service Disposition: Pt physically deconditioned. Family deciding on SNF VS. Home with services.

## 2025-02-09 LAB
ALBUMIN SERPL ELPH-MCNC: 3.5 G/DL — SIGNIFICANT CHANGE UP (ref 3.5–5.2)
ALP SERPL-CCNC: 163 U/L — HIGH (ref 30–115)
ALT FLD-CCNC: 11 U/L — SIGNIFICANT CHANGE UP (ref 0–41)
ANION GAP SERPL CALC-SCNC: 10 MMOL/L — SIGNIFICANT CHANGE UP (ref 7–14)
AST SERPL-CCNC: 26 U/L — SIGNIFICANT CHANGE UP (ref 0–41)
BILIRUB SERPL-MCNC: 0.3 MG/DL — SIGNIFICANT CHANGE UP (ref 0.2–1.2)
BUN SERPL-MCNC: 56 MG/DL — HIGH (ref 10–20)
CALCIUM SERPL-MCNC: 8.5 MG/DL — SIGNIFICANT CHANGE UP (ref 8.4–10.5)
CHLORIDE SERPL-SCNC: 105 MMOL/L — SIGNIFICANT CHANGE UP (ref 98–110)
CO2 SERPL-SCNC: 27 MMOL/L — SIGNIFICANT CHANGE UP (ref 17–32)
CREAT SERPL-MCNC: 1.2 MG/DL — SIGNIFICANT CHANGE UP (ref 0.7–1.5)
EGFR: 67 ML/MIN/1.73M2 — SIGNIFICANT CHANGE UP
GLUCOSE BLDC GLUCOMTR-MCNC: 102 MG/DL — HIGH (ref 70–99)
GLUCOSE BLDC GLUCOMTR-MCNC: 127 MG/DL — HIGH (ref 70–99)
GLUCOSE BLDC GLUCOMTR-MCNC: 168 MG/DL — HIGH (ref 70–99)
GLUCOSE BLDC GLUCOMTR-MCNC: 72 MG/DL — SIGNIFICANT CHANGE UP (ref 70–99)
GLUCOSE SERPL-MCNC: 124 MG/DL — HIGH (ref 70–99)
HCT VFR BLD CALC: 29 % — LOW (ref 42–52)
HGB BLD-MCNC: 8.4 G/DL — LOW (ref 14–18)
MAGNESIUM SERPL-MCNC: 2.2 MG/DL — SIGNIFICANT CHANGE UP (ref 1.8–2.4)
MCHC RBC-ENTMCNC: 27 PG — SIGNIFICANT CHANGE UP (ref 27–31)
MCHC RBC-ENTMCNC: 29 G/DL — LOW (ref 32–37)
MCV RBC AUTO: 93.2 FL — SIGNIFICANT CHANGE UP (ref 80–94)
NRBC # BLD: 0 /100 WBCS — SIGNIFICANT CHANGE UP (ref 0–0)
NRBC BLD-RTO: 0 /100 WBCS — SIGNIFICANT CHANGE UP (ref 0–0)
PLATELET # BLD AUTO: 195 K/UL — SIGNIFICANT CHANGE UP (ref 130–400)
PMV BLD: 10.7 FL — HIGH (ref 7.4–10.4)
POTASSIUM SERPL-MCNC: 4.7 MMOL/L — SIGNIFICANT CHANGE UP (ref 3.5–5)
POTASSIUM SERPL-SCNC: 4.7 MMOL/L — SIGNIFICANT CHANGE UP (ref 3.5–5)
PROT SERPL-MCNC: 5.6 G/DL — LOW (ref 6–8)
RBC # BLD: 3.11 M/UL — LOW (ref 4.7–6.1)
RBC # FLD: 20.5 % — HIGH (ref 11.5–14.5)
SODIUM SERPL-SCNC: 142 MMOL/L — SIGNIFICANT CHANGE UP (ref 135–146)
WBC # BLD: 11.24 K/UL — HIGH (ref 4.8–10.8)
WBC # FLD AUTO: 11.24 K/UL — HIGH (ref 4.8–10.8)

## 2025-02-09 PROCEDURE — 71045 X-RAY EXAM CHEST 1 VIEW: CPT | Mod: 26

## 2025-02-09 PROCEDURE — 93010 ELECTROCARDIOGRAM REPORT: CPT

## 2025-02-09 PROCEDURE — 99291 CRITICAL CARE FIRST HOUR: CPT | Mod: 24

## 2025-02-09 RX ORDER — IPRATROPIUM BROMIDE AND ALBUTEROL SULFATE .5; 2.5 MG/3ML; MG/3ML
3 SOLUTION RESPIRATORY (INHALATION) EVERY 6 HOURS
Refills: 0 | Status: DISCONTINUED | OUTPATIENT
Start: 2025-02-09 | End: 2025-02-19

## 2025-02-09 RX ADMIN — Medication 2 TABLET(S): at 23:01

## 2025-02-09 RX ADMIN — INSULIN GLARGINE-YFGN 40 UNIT(S): 100 INJECTION, SOLUTION SUBCUTANEOUS at 06:57

## 2025-02-09 RX ADMIN — INSULIN LISPRO 10 UNIT(S): 100 INJECTION, SOLUTION INTRAVENOUS; SUBCUTANEOUS at 16:05

## 2025-02-09 RX ADMIN — SERTRALINE 25 MILLIGRAM(S): 100 TABLET, FILM COATED ORAL at 11:47

## 2025-02-09 RX ADMIN — Medication 100 MILLIGRAM(S): at 17:08

## 2025-02-09 RX ADMIN — INSULIN LISPRO 2: 100 INJECTION, SOLUTION INTRAVENOUS; SUBCUTANEOUS at 11:46

## 2025-02-09 RX ADMIN — GABAPENTIN 100 MILLIGRAM(S): 400 CAPSULE ORAL at 22:59

## 2025-02-09 RX ADMIN — GABAPENTIN 100 MILLIGRAM(S): 400 CAPSULE ORAL at 05:56

## 2025-02-09 RX ADMIN — OXYCODONE HYDROCHLORIDE 5 MILLIGRAM(S): 30 TABLET ORAL at 11:35

## 2025-02-09 RX ADMIN — HEPARIN SODIUM 5000 UNIT(S): 1000 INJECTION INTRAVENOUS; SUBCUTANEOUS at 13:21

## 2025-02-09 RX ADMIN — Medication 100 MILLIGRAM(S): at 05:54

## 2025-02-09 RX ADMIN — OXYCODONE HYDROCHLORIDE 5 MILLIGRAM(S): 30 TABLET ORAL at 10:35

## 2025-02-09 RX ADMIN — BUMETANIDE 1 MILLIGRAM(S): 1 TABLET ORAL at 17:07

## 2025-02-09 RX ADMIN — Medication 1 DOSE(S): at 09:17

## 2025-02-09 RX ADMIN — Medication 1 DOSE(S): at 20:39

## 2025-02-09 RX ADMIN — TAMSULOSIN HYDROCHLORIDE 0.4 MILLIGRAM(S): 0.4 CAPSULE ORAL at 23:02

## 2025-02-09 RX ADMIN — Medication 81 MILLIGRAM(S): at 11:47

## 2025-02-09 RX ADMIN — INSULIN LISPRO 10 UNIT(S): 100 INJECTION, SOLUTION INTRAVENOUS; SUBCUTANEOUS at 11:46

## 2025-02-09 RX ADMIN — BUMETANIDE 1 MILLIGRAM(S): 1 TABLET ORAL at 05:53

## 2025-02-09 RX ADMIN — DEXTROMETHORPHAN HBR, GUAIFENESIN 600 MILLIGRAM(S): 200 LIQUID ORAL at 05:53

## 2025-02-09 RX ADMIN — HEPARIN SODIUM 5000 UNIT(S): 1000 INJECTION INTRAVENOUS; SUBCUTANEOUS at 05:52

## 2025-02-09 RX ADMIN — HEPARIN SODIUM 5000 UNIT(S): 1000 INJECTION INTRAVENOUS; SUBCUTANEOUS at 23:01

## 2025-02-09 RX ADMIN — INSULIN LISPRO 10 UNIT(S): 100 INJECTION, SOLUTION INTRAVENOUS; SUBCUTANEOUS at 06:56

## 2025-02-09 RX ADMIN — FOLIC ACID 1 MILLIGRAM(S): 1 TABLET ORAL at 11:47

## 2025-02-09 RX ADMIN — ROSUVASTATIN CALCIUM 20 MILLIGRAM(S): 20 TABLET, FILM COATED ORAL at 23:01

## 2025-02-09 RX ADMIN — METOPROLOL SUCCINATE 12.5 MILLIGRAM(S): 50 TABLET, EXTENDED RELEASE ORAL at 17:08

## 2025-02-09 RX ADMIN — Medication 40 MILLIGRAM(S): at 06:04

## 2025-02-09 RX ADMIN — DEXTROMETHORPHAN HBR, GUAIFENESIN 600 MILLIGRAM(S): 200 LIQUID ORAL at 17:08

## 2025-02-09 RX ADMIN — Medication 20 MILLIEQUIVALENT(S): at 11:47

## 2025-02-09 RX ADMIN — FINASTERIDE 5 MILLIGRAM(S): 1 TABLET, FILM COATED ORAL at 11:47

## 2025-02-09 RX ADMIN — Medication 1 PACKET(S): at 05:53

## 2025-02-09 RX ADMIN — METOPROLOL SUCCINATE 12.5 MILLIGRAM(S): 50 TABLET, EXTENDED RELEASE ORAL at 05:52

## 2025-02-09 RX ADMIN — GABAPENTIN 100 MILLIGRAM(S): 400 CAPSULE ORAL at 13:22

## 2025-02-09 NOTE — PROGRESS NOTE ADULT - SUBJECTIVE AND OBJECTIVE BOX
HPI: 64M w/ afib, nonobstructive CAD, CHF class 3, severe MR, DM, HTN, JIMMY on CPAP, asthma, severe obesity, HL, Madelung's disease, osteoarthritis s/p bilateral hip replacement, peripheral neuropathy presented with increasing dyspnea with exertion found to have severe MR.  He was evaluated for mitral clip and not a candidate.  On prior admission he had FLAKITO with creat up to 2.  Was optimized with diuresis.  He was on AC and developed hemorrhoidal bleeding and was taken off AC.  He was discharged and returned for his procedure on 1/28    OR Data  Procedure: Procedure: MVReplacement (bioprosthetic), SHEILA ligation, MAZE  Bypass: 202  Cross Clamp: 158  Pre LV Fxn: 50-55%      RV Fxn: normal  Post LV Fxn: 45-50%     RV Fxn: normal    moderate TR, MV gradient 3mmHg  Blood Products: 1uPRBC, ddavp 39mcq  Cell Saver: 698  Fluids: NS 2000, albumin 2000  Pacing Wires: V pacing. sinus rhythm  UO: 1100    1/28 - Fany and poor oxygenation in OR. Milrinone, norepi, vaso. Extubation in evening.  1/29 - start aspirin, diuresis, heparin dvt prophylaxis  1/30- d/c pleural ct if drainage dec; wean to dc primacor; wean high flow o2  1/31 - FLAKITO worsening, bradyarrythmias - wenchebach - ep consulted  2/2 - Chest tube removed, ambulating, creat improving, dobutamine weaned  2/3 - Dobut to 1.5, BUN elevated  2/4 - Confused this AM, normal neuro exam, prince removed  2/5 - FLAKITO improving, off dobutamine, mil decreased   2/6 - milrinone stopped, bumex 1mg BID, walked the whole unCTU Attending Progress Daily Note     09 Feb 2025 13:41  Procedure: MV Replacemant Maze  La Ligation  POD#: 12      He has history of Diabetes    HTN (hypertension)    Obstructive sleep apnea on CPAP    Asthma    Obesity    Peripheral neuropathy    Arthritis    Hypercholesteremia    Madelung's disease    FH: mitral regurgitation    CHF NYHA class III    Atrial fibrillation    JIMMY on CPAP      HPI:      Hospital Course:    OBJECTIVE:  ICU Vital Signs Last 24 Hrs  T(C): 35.9 (09 Feb 2025 12:00), Max: 36.7 (09 Feb 2025 00:00)  T(F): 96.7 (09 Feb 2025 12:00), Max: 98.1 (09 Feb 2025 00:00)  HR: 84 (09 Feb 2025 13:00) (75 - 87)  BP: 149/74 (09 Feb 2025 13:00) (120/58 - 159/72)  BP(mean): 89 (09 Feb 2025 13:00) (78 - 104)  ABP: --  ABP(mean): --  RR: 20 (09 Feb 2025 13:00) (15 - 30)  SpO2: 95% (09 Feb 2025 13:00) (92% - 100%)    O2 Parameters below as of 09 Feb 2025 14:00  Patient On (Oxygen Delivery Method): nasal cannula  O2 Flow (L/min): 2        I&O's Summary    08 Feb 2025 07:01  -  09 Feb 2025 07:00  --------------------------------------------------------  IN: 1240 mL / OUT: 4070 mL / NET: -2830 mL    09 Feb 2025 07:01  -  09 Feb 2025 13:41  --------------------------------------------------------  IN: 460 mL / OUT: 405 mL / NET: 55 mL      I&O's Detail    08 Feb 2025 07:01  -  09 Feb 2025 07:00  --------------------------------------------------------  IN:    IV PiggyBack: 200 mL    IV PiggyBack: 100 mL    Oral Fluid: 940 mL  Total IN: 1240 mL    OUT:    Voided (mL): 4070 mL  Total OUT: 4070 mL    Total NET: -2830 mL      09 Feb 2025 07:01  -  09 Feb 2025 13:41  --------------------------------------------------------  IN:    Oral Fluid: 460 mL  Total IN: 460 mL    OUT:    Voided (mL): 405 mL  Total OUT: 405 mL    Total NET: 55 mL        Adult Advanced Hemodynamics Last 24 Hrs  CVP(mm Hg): --  CVP(cm H2O): --  CO: --  CI: --  PA: --  PA(mean): --  PCWP: --  SVR: --  SVRI: --  PVR: --  PVRI: --    CAPILLARY BLOOD GLUCOSE      POCT Blood Glucose.: 168 mg/dL (09 Feb 2025 11:22)  POCT Blood Glucose.: 127 mg/dL (09 Feb 2025 06:25)  POCT Blood Glucose.: 147 mg/dL (08 Feb 2025 22:19)  POCT Blood Glucose.: 123 mg/dL (08 Feb 2025 16:29)    LABS:                          8.4    11.24 )-----------( 195      ( 09 Feb 2025 01:45 )             29.0     02-09    142  |  105  |  56[H]  ----------------------------<  124[H]  4.7   |  27  |  1.2    Ca    8.5      09 Feb 2025 01:45  Mg     2.2     02-09    TPro  5.6[L]  /  Alb  3.5  /  TBili  0.3  /  DBili  x   /  AST  26  /  ALT  11  /  AlkPhos  163[H]  02-09      Urinalysis Basic - ( 09 Feb 2025 01:45 )    Color: x / Appearance: x / SG: x / pH: x  Gluc: 124 mg/dL / Ketone: x  / Bili: x / Urobili: x   Blood: x / Protein: x / Nitrite: x   Leuk Esterase: x / RBC: x / WBC x   Sq Epi: x / Non Sq Epi: x / Bacteria: x        Home Medications:  Breo Ellipta 200 mcg-25 mcg/inh inhalation powder: 1 inhaled once a day (28 Jan 2025 06:52)  HYDROcodone 10 mg oral capsule, extended release: 1 cap(s) orally 2 times a day (28 Jan 2025 06:52)  losartan 50 mg oral tablet: 1 tab(s) orally 2 times a day (28 Jan 2025 06:52)  losartan-hydrochlorothiazide 50 mg-12.5 mg oral tablet: 1 tab(s) orally (28 Jan 2025 06:52)  ProAir HFA 90 mcg/inh inhalation aerosol: 2 puff(s) inhaled 2 times a day (28 Jan 2025 06:52)  tujeo: 80 unit(s) subcutaneous once a day (28 Jan 2025 06:52)    HOSPITAL MEDICATIONS:  MEDICATIONS  (STANDING):  albuterol/ipratropium for Nebulization 3 milliLiter(s) Nebulizer every 6 hours  aspirin enteric coated 81 milliGRAM(s) Oral daily  buMETAnide Injectable 1 milliGRAM(s) IV Push every 12 hours  ceFAZolin   IVPB 2000 milliGRAM(s) IV Intermittent every 12 hours  chlorhexidine 2% Cloths 1 Application(s) Topical daily  dextrose 5%. 1000 milliLiter(s) (50 mL/Hr) IV Continuous <Continuous>  dextrose 5%. 1000 milliLiter(s) (100 mL/Hr) IV Continuous <Continuous>  dextrose 50% Injectable 50 milliLiter(s) IV Push every 15 minutes  dextrose 50% Injectable 25 milliLiter(s) IV Push every 15 minutes  finasteride 5 milliGRAM(s) Oral daily  fluticasone propionate/ salmeterol 250-50 MICROgram(s) Diskus 1 Dose(s) Inhalation two times a day  folic acid 1 milliGRAM(s) Oral daily  gabapentin 100 milliGRAM(s) Oral every 8 hours  glucagon  Injectable 1 milliGRAM(s) IntraMuscular once  guaiFENesin  milliGRAM(s) Oral every 12 hours  heparin   Injectable 5000 Unit(s) SubCutaneous every 8 hours  influenza  Vaccine (HIGH DOSE) 0.5 milliLiter(s) IntraMuscular once  insulin glargine Injectable (LANTUS) 40 Unit(s) SubCutaneous before breakfast  insulin lispro (ADMELOG) corrective regimen sliding scale   SubCutaneous three times a day before meals  insulin lispro (ADMELOG) corrective regimen sliding scale   SubCutaneous at bedtime  insulin lispro Injectable (ADMELOG) 10 Unit(s) SubCutaneous three times a day before meals  metoprolol tartrate 12.5 milliGRAM(s) Oral every 12 hours  mupirocin 2% Nasal 1 Application(s) Both Nostrils every 12 hours  pantoprazole    Tablet 40 milliGRAM(s) Oral before breakfast  potassium chloride    Tablet ER 20 milliEquivalent(s) Oral daily  psyllium Powder 1 Packet(s) Oral every 12 hours  rosuvastatin 20 milliGRAM(s) Oral at bedtime  senna 2 Tablet(s) Oral at bedtime  sertraline 25 milliGRAM(s) Oral daily  tamsulosin 0.4 milliGRAM(s) Oral at bedtime    MEDICATIONS  (PRN):  ALPRAZolam 0.25 milliGRAM(s) Oral at bedtime PRN anxiety  dextrose Oral Gel 15 Gram(s) Oral once PRN Blood Glucose LESS THAN 70 milliGRAM(s)/deciliter  hydrALAZINE Injectable 5 milliGRAM(s) IV Push every 4 hours PRN Systolic > 160  melatonin 5 milliGRAM(s) Oral at bedtime PRN Insomnia  ondansetron Injectable 4 milliGRAM(s) IV Push every 4 hours PRN Nausea and/or Vomiting  oxyCODONE    IR 5 milliGRAM(s) Oral every 6 hours PRN Moderate Pain (4 - 6)    RADIOLOGY:  Chest X-ray Reviewed    REVIEW OF SYSTEMS:  CONSTITUTIONAL: [X] all negative; [ ] weakness, [ ] fevers, [ ] chills  EYES/ENT: [X] all negative; [ ] visual changes, [ ] vertigo, [ ] throat pain   NECK: [X] all negative; [ ] pain, [ ] stiffness  RESPIRATORY: [] all negative, [ ] cough, [ ] wheezing, [ ] hemoptysis, [ ] shortness of breath  CARDIOVASCULAR: [] all negative; [ ] chest pain, [ ] palpitations, [ ] orthopnea  GASTROINTESTINAL: [X] all negative; [ ]abdominal pain, [ ] nausea, [ ] vomiting, [ ] hematemesis, [ ] diarrhea, [ ] constipation, [ ] melena, [ ] hematochezia.  GENITOURINARY: [X] all negative; [ ] dysuria, [ ] frequency, [ ] hematuria  NEUROLOGICAL: [X] all negative; [ ] numbness, [ ] weakness  SKIN: [X] all negative; [ ] itching, [ ] burning, [ ] rashes, [ ] lesions   All other review of systems is negative unless indicated above.  [  ] Unable to assess ROS because     PHYSICAL EXAM:          CONSTITUTIONAL: Well-developed; well-nourished; in no acute distress.   	SKIN: warm, dry  	HEAD: Normocephalic; atraumatic.  	EYES: PERRL, EOM, no conj injection, sclera clear  	ENT: No nasal discharge; airway clear.  	NECK: Supple; non tender.   	CARD: S1, S2 normal; no murmurs, gallops, or rubs. Regular rate and rhythm.  no carotid bruits  	RESP: CTA B/L; good air movement No wheezes, rales or rhonchi.  	ABD: Soft, not tender, not distended,  no rebound or guarding, bowel sounds present  	EXT: Normal ROM.  No clubbing, cyanosis or edema.   warm and well perfused.  pulses palpable  	NEURO: Alert, awake, motor 5/5 R, 5/5 L      Upon my evaluation, the above patient has a high probabillity of imminent or life threatening deterioration due to a high risk for Shock, Cardiogenic shock, arrythmias, acute blood loss, acute kidney injury and acute pulmonary insufficiency which required my direct attention, intervention, and personal management.  Total time was 55 minutes which includes review of radiology results, ordering, interpreting and reviewing diagnostic studies / lab tests with immediate assessment and treatment, consultant collaboration on findings and treatment options, monitoring for potential decompensation, obtaining history from family, EMT, nursing home staff and/or treating physicians; directing the titration of pressors, oxygen support devices, fluid resucitation, interpretation of cardiac output measurements, interpretation of radiological assessments, interpretation of EKG / rhythm strips, telemetry.  This time did not overlap with the management of other patients or performing separately reportable procedures.      Management of this patient was discussed with the CT surgery attending and mid-level providers.    I ackknowledge the use of copied documentation.  All copy forward documentation is my own and has been reviewed and revised as appropriate.  If no changes were made, it is because that information remains the same.

## 2025-02-09 NOTE — PROGRESS NOTE ADULT - ASSESSMENT
Assessment  64M s/p MVR, LAAC, MAZE POD #12  Post op complicated by acute pulmonary insufficiency, FLAKITO, cardiogenic shock req inotropes  still has petal edema  fluid balance neg 2.5 liters  still deconditioned need to walk more    Plan:    Neuro:  Multimodal pain management - no toradol, minimize opiates  100 TID gabapentin  0.25 xanax PRN at night  Tylenol, Lidoderm patch, opiates as needed  Monitor neuro status in the post op period      CV:  S/P MVR, LAAC, MAZE  Monitor perfusion, , lactate, UOP, index and MVO2 if available  Maintain MAP > 65  Milrinone off 2/6  Dobutamine off 2/5  EP consulted for bradycardia resolved no need for Pacemaker  ASA,statin lopressor 12.5 q12      Resp:  Supplemental oxygen to maintain sats > 92%  RA 93%  Continuous pulse oximetry monitoring  BiPAP at night  NC during the day  IS / Chest PT  OOBTC and Ambulate  Nebulizers as needed - albuterol/atrovent  Advair    GI:  Heart healthy diet  Bowel Regimen - escalate as needed  PUD ppx per protocol    Renal  FLAKITO post surgery resolved   Baseline CKD  Monitor UOP, lytes, creatinine  1mg bumes BID [place] 5 daily    Keep K > 4, Mg > 2  Flomax, finasteride    Endo:  Tight glucose control per CTICU protocol  Lantus 40 / premeal 10  SSI    Heme:  Acute blood loss anemia post surgery  High risk for thrombocytopenia  DVT ppx with HSQ  Iron / FA / MVI    ID:  on Ancef per CTS x 7 days  Monitor fever curve and WBC count

## 2025-02-09 NOTE — PROGRESS NOTE ADULT - SUBJECTIVE AND OBJECTIVE BOX
OPERATIVE PROCEDURE(s): MVR/SHEILA ligation and MAZE                POD # 12                      SURGEON(s): ISELA Blakely        SUBJECTIVE ASSESSMENT:64yMale patient seen and examined at bedside.     Vital Signs Last 24 Hrs  T(F): 97.9 (09 Feb 2025 04:00), Max: 98.1 (09 Feb 2025 00:00)  HR: 87 (09 Feb 2025 06:00) (75 - 87)  BP: 144/70 (09 Feb 2025 06:00) (120/59 - 152/69)  BP(mean): 100 (09 Feb 2025 06:00) (78 - 100)  RR: 21 (09 Feb 2025 06:00) (15 - 32)  SpO2: 96% (09 Feb 2025 06:00) (92% - 100%)      I&O's Detail    07 Feb 2025 07:01  -  08 Feb 2025 07:00  --------------------------------------------------------  IN:    IV PiggyBack: 400 mL    Oral Fluid: 200 mL  Total IN: 600 mL    OUT:    Voided (mL): 3750 mL  Total OUT: 3750 mL        Net: I&O's Detail    06 Feb 2025 07:01  -  07 Feb 2025 07:00  --------------------------------------------------------  Total NET: -2060 mL      07 Feb 2025 07:01  -  08 Feb 2025 07:00  --------------------------------------------------------  Total NET: -3150 mL        CAPILLARY BLOOD GLUCOSE      POCT Blood Glucose.: 127 mg/dL (09 Feb 2025 06:25)  POCT Blood Glucose.: 147 mg/dL (08 Feb 2025 22:19)  POCT Blood Glucose.: 123 mg/dL (08 Feb 2025 16:29)  POCT Blood Glucose.: 236 mg/dL (08 Feb 2025 11:21)  POCT Blood Glucose.: 225 mg/dL (08 Feb 2025 07:37)    A1C with Estimated Average Glucose Result: 6.2 % (01-23-25 @ 09:45)  A1C with Estimated Average Glucose Result: 7.1 % (12-04-24 @ 04:45)      Physical Exam:  General: NAD; lethargic A&Ox3  Neuro: pupils equal and reactive, speech clear, no overt sensory or motor deficits  Cardiac: S1/S2, RRR, no murmur, no rubs  Lungs: unlabored shallow respirations, bilateral bases decreased  Abdomen: Soft/NT/ND; positive bowel sounds x 4  Sternum: Intact, no click, incision healing well with no drainage  Incisions: Incisions clean/dry/intact  Extremities: Moderate edema b/l lower extremities; good capillary refill; no cyanosis; palpable 1+ pedal pulses b/l      BOWEL MOVEMENT:  [X] YES [] NO, If No, Timing since last BM Day #         LABS:                        8.4[L]  11.24[H] )-----------( 195      ( 09 Feb 2025 01:45 )             29.0[L]                        8.2[L]  11.22[H] )-----------( 195      ( 08 Feb 2025 04:13 )             27.9[L]    02-09    142  |  105  |  56[H]  ----------------------------<  124[H]  4.7   |  27  |  1.2  02-08    140  |  104  |  62[HH]  ----------------------------<  154[H]  4.4   |  27  |  1.2    Ca    8.5      09 Feb 2025 01:45  Mg     2.2     02-09    TPro  5.6[L] [6.0 - 8.0]  /  Alb  3.5 [3.5 - 5.2]  /  TBili  0.3 [0.2 - 1.2]  /  DBili  x   /  AST  26 [0 - 41]  /  ALT  11 [0 - 41]  /  AlkPhos  163[H] [30 - 115]  02-09      RADIOLOGY & ADDITIONAL TESTS:  CXR:   < from: Xray Chest 1 View- PORTABLE-Routine (Xray Chest 1 View- PORTABLE-Routine in AM.) (02.08.25 @ 06:46) >      INTERPRETATION:  CLINICAL HISTORY: Dyspnea    COMPARISON: 2/7/2025.    TECHNIQUE: Frontal    FINDINGS:    Support devices:Heart valve in place    Cardiac/mediastinum/hilum: Stable.    Lung parenchyma/Pleura: Decreased previous bilateral opacity and pleural   effusion. No air leak.    Skeleton/soft tissues: Stable.      IMPRESSION:    Decreased previous bilateral opacityand pleural effusion. No air leak.      EKG:  < from: 12 Lead ECG (02.07.25 @ 07:54) >    Ventricular Rate 89 BPM    QRS Duration 94 ms    Q-T Interval 368 ms    QTC Calculation(Bazett) 447 ms    R Axis -23 degrees    T Axis 129 degrees    Diagnosis Line Accelerated Junctional rhythm  Left ventricular hypertrophy with repolarization abnormality ( R in aVL )  Possible Lateral infarct , age undetermined  Abnormal ECG        Allergies    No Known Allergies    Intolerances      MEDICATIONS  (STANDING):  aspirin enteric coated 81 milliGRAM(s) Oral daily  bisacodyl Suppository 10 milliGRAM(s) Rectal once  buMETAnide Injectable 1 milliGRAM(s) IV Push every 12 hours  ceFAZolin   IVPB 2000 milliGRAM(s) IV Intermittent every 12 hours  chlorhexidine 2% Cloths 1 Application(s) Topical daily  dextrose 5%. 1000 milliLiter(s) (50 mL/Hr) IV Continuous <Continuous>  dextrose 5%. 1000 milliLiter(s) (100 mL/Hr) IV Continuous <Continuous>  dextrose 50% Injectable 50 milliLiter(s) IV Push every 15 minutes  dextrose 50% Injectable 25 milliLiter(s) IV Push every 15 minutes  finasteride 5 milliGRAM(s) Oral daily  fluticasone propionate/ salmeterol 250-50 MICROgram(s) Diskus 1 Dose(s) Inhalation two times a day  folic acid 1 milliGRAM(s) Oral daily  gabapentin 100 milliGRAM(s) Oral every 8 hours  glucagon  Injectable 1 milliGRAM(s) IntraMuscular once  guaiFENesin  milliGRAM(s) Oral every 12 hours  heparin   Injectable 5000 Unit(s) SubCutaneous every 8 hours  influenza  Vaccine (HIGH DOSE) 0.5 milliLiter(s) IntraMuscular once  insulin glargine Injectable (LANTUS) 40 Unit(s) SubCutaneous before breakfast  insulin lispro (ADMELOG) corrective regimen sliding scale   SubCutaneous three times a day before meals  insulin lispro (ADMELOG) corrective regimen sliding scale   SubCutaneous at bedtime  insulin lispro Injectable (ADMELOG) 10 Unit(s) SubCutaneous three times a day before meals  levalbuterol Inhalation 1.25 milliGRAM(s) Inhalation four times a day  metoprolol tartrate 12.5 milliGRAM(s) Oral every 12 hours  mupirocin 2% Nasal 1 Application(s) Both Nostrils every 12 hours  pantoprazole    Tablet 40 milliGRAM(s) Oral before breakfast  potassium chloride    Tablet ER 20 milliEquivalent(s) Oral daily  psyllium Powder 1 Packet(s) Oral every 12 hours  rosuvastatin 20 milliGRAM(s) Oral at bedtime  senna 2 Tablet(s) Oral at bedtime  sertraline 25 milliGRAM(s) Oral daily  tamsulosin 0.4 milliGRAM(s) Oral at bedtime    MEDICATIONS  (PRN):  ALPRAZolam 0.25 milliGRAM(s) Oral at bedtime PRN anxiety  dextrose Oral Gel 15 Gram(s) Oral once PRN Blood Glucose LESS THAN 70 milliGRAM(s)/deciliter  hydrALAZINE Injectable 5 milliGRAM(s) IV Push every 4 hours PRN Systolic > 160  melatonin 5 milliGRAM(s) Oral at bedtime PRN Insomnia  ondansetron Injectable 4 milliGRAM(s) IV Push every 4 hours PRN Nausea and/or Vomiting  oxyCODONE    IR 5 milliGRAM(s) Oral every 6 hours PRN Moderate Pain (4 - 6)    Home Medications:  Breo Ellipta 200 mcg-25 mcg/inh inhalation powder: 1 inhaled once a day (28 Jan 2025 06:52)  HYDROcodone 10 mg oral capsule, extended release: 1 cap(s) orally 2 times a day (28 Jan 2025 06:52)  losartan 50 mg oral tablet: 1 tab(s) orally 2 times a day (28 Jan 2025 06:52)  losartan-hydrochlorothiazide 50 mg-12.5 mg oral tablet: 1 tab(s) orally (28 Jan 2025 06:52)  ProAir HFA 90 mcg/inh inhalation aerosol: 2 puff(s) inhaled 2 times a day (28 Jan 2025 06:52)  tujeo: 80 unit(s) subcutaneous once a day (28 Jan 2025 06:52)      Pharmacologic DVT Prophylaxis [X] YES, [] NO: Contraindication:  SCD's: YES b/l    GI Prophylaxis: protonix     Post-Op Beta-Blockers: [X] Yes, [] No: contraindication:  Post-Op CCB: [] Yes, [] No: contraindication:  Post-Op Aspirin:  [X] Yes, [] No: contraindication:  Post-Op Statin:  [] Yes, [X] No: contraindication: increased LFTs    Ambulation/Activity Status: ambulate with assistance    Assessment/Plan:  64y Male status-post MVR/SHEILA ligation and MAZE                POD # 12   - Case and plan discussed with CTU Intensivist and CT Surgeon - Dr. Blakely  - Continue CTU supportive care and ongoing plan of care as per continuing CTU rounds.   - Continue DVT/GI prophylaxis  - Incentive Spirometry 10 times an hour  - Continue to advance physical activity as tolerated and continue PT/OT as directed  1. Continue ASA, statin,start low dose B Blocker-- pt was seen by EP and has no conduction problems; hence, no need for PPM as per Dr. barker  2. madelung disease: cont bipap, respiratory tx's  3. A. Fib prophylaxis: cont to monitor electrolytes - hold AC at this time prior lower GI bleed - s/p MAZE and SHEILA closure  4. junctional bradycardia/wenckebach - ep recommends re-eval pat once off of all inotropes; no ppm needed at the present time- milrinone off will f/u ep    5. DM/Glucose A1c 6.2 Control: continue lantus 40/humalog 10 and sliding scale   6. bumex PRN for noncardiogenic fluid overload   7. flakito on CKD3a- FLAKITO resolving BUN/Cr 56/1.2. Renal following- diuresis PRN, avoid hypotension   8. Ancef added for possible URI , pt with productive cough and b/l opacities seen on cxr  psych consult for anxiety- Zoloft 25mg daily, cont xanax at night    Social Service Disposition: Pt physically deconditioned. Family deciding on SNF VS. Home with services.     OPERATIVE PROCEDURE(s): MVR/SHEILA ligation and MAZE                POD # 12                      SURGEON(s): ISELA Blakely        SUBJECTIVE ASSESSMENT:64yMale patient seen and examined at bedside.     Vital Signs Last 24 Hrs  T(F): 97.9 (09 Feb 2025 04:00), Max: 98.1 (09 Feb 2025 00:00)  HR: 87 (09 Feb 2025 06:00) (75 - 87)  BP: 144/70 (09 Feb 2025 06:00) (120/59 - 152/69)  BP(mean): 100 (09 Feb 2025 06:00) (78 - 100)  RR: 21 (09 Feb 2025 06:00) (15 - 32)  SpO2: 96% (09 Feb 2025 06:00) (92% - 100%)      I&O's Detail    07 Feb 2025 07:01  -  08 Feb 2025 07:00  --------------------------------------------------------  IN:    IV PiggyBack: 400 mL    Oral Fluid: 200 mL  Total IN: 600 mL    OUT:    Voided (mL): 3750 mL  Total OUT: 3750 mL        Net: I&O's Detail    06 Feb 2025 07:01  -  07 Feb 2025 07:00  --------------------------------------------------------  Total NET: -2060 mL      07 Feb 2025 07:01  -  08 Feb 2025 07:00  --------------------------------------------------------  Total NET: -3150 mL        CAPILLARY BLOOD GLUCOSE      POCT Blood Glucose.: 127 mg/dL (09 Feb 2025 06:25)  POCT Blood Glucose.: 147 mg/dL (08 Feb 2025 22:19)  POCT Blood Glucose.: 123 mg/dL (08 Feb 2025 16:29)  POCT Blood Glucose.: 236 mg/dL (08 Feb 2025 11:21)  POCT Blood Glucose.: 225 mg/dL (08 Feb 2025 07:37)    A1C with Estimated Average Glucose Result: 6.2 % (01-23-25 @ 09:45)  A1C with Estimated Average Glucose Result: 7.1 % (12-04-24 @ 04:45)      Physical Exam:  General: NAD; lethargic A&Ox3  Neuro: pupils equal and reactive, speech clear, no overt sensory or motor deficits  Cardiac: S1/S2, RRR, no murmur, no rubs  Lungs: unlabored shallow respirations, bilateral bases decreased  Abdomen: Soft/NT/ND; positive bowel sounds x 4  Sternum: Intact, no click, incision healing well with no drainage  Incisions: Incisions clean/dry/intact  Extremities: Moderate edema b/l lower extremities; good capillary refill; no cyanosis; palpable 1+ pedal pulses b/l      BOWEL MOVEMENT:  [X] YES [] NO, If No, Timing since last BM Day #         LABS:                        8.4[L]  11.24[H] )-----------( 195      ( 09 Feb 2025 01:45 )             29.0[L]                        8.2[L]  11.22[H] )-----------( 195      ( 08 Feb 2025 04:13 )             27.9[L]    02-09    142  |  105  |  56[H]  ----------------------------<  124[H]  4.7   |  27  |  1.2  02-08    140  |  104  |  62[HH]  ----------------------------<  154[H]  4.4   |  27  |  1.2    Ca    8.5      09 Feb 2025 01:45  Mg     2.2     02-09    TPro  5.6[L] [6.0 - 8.0]  /  Alb  3.5 [3.5 - 5.2]  /  TBili  0.3 [0.2 - 1.2]  /  DBili  x   /  AST  26 [0 - 41]  /  ALT  11 [0 - 41]  /  AlkPhos  163[H] [30 - 115]  02-09      RADIOLOGY & ADDITIONAL TESTS:  CXR:   < from: Xray Chest 1 View- PORTABLE-Routine (Xray Chest 1 View- PORTABLE-Routine in AM.) (02.08.25 @ 06:46) >      INTERPRETATION:  CLINICAL HISTORY: Dyspnea    COMPARISON: 2/7/2025.    TECHNIQUE: Frontal    FINDINGS:    Support devices:Heart valve in place    Cardiac/mediastinum/hilum: Stable.    Lung parenchyma/Pleura: Decreased previous bilateral opacity and pleural   effusion. No air leak.    Skeleton/soft tissues: Stable.      IMPRESSION:    Decreased previous bilateral opacityand pleural effusion. No air leak.      EKG:  < from: 12 Lead ECG (02.07.25 @ 07:54) >    Ventricular Rate 89 BPM    QRS Duration 94 ms    Q-T Interval 368 ms    QTC Calculation(Bazett) 447 ms    R Axis -23 degrees    T Axis 129 degrees    Diagnosis Line Accelerated Junctional rhythm  Left ventricular hypertrophy with repolarization abnormality ( R in aVL )  Possible Lateral infarct , age undetermined  Abnormal ECG        Allergies    No Known Allergies    Intolerances      MEDICATIONS  (STANDING):  aspirin enteric coated 81 milliGRAM(s) Oral daily  bisacodyl Suppository 10 milliGRAM(s) Rectal once  buMETAnide Injectable 1 milliGRAM(s) IV Push every 12 hours  ceFAZolin   IVPB 2000 milliGRAM(s) IV Intermittent every 12 hours  chlorhexidine 2% Cloths 1 Application(s) Topical daily  dextrose 5%. 1000 milliLiter(s) (50 mL/Hr) IV Continuous <Continuous>  dextrose 5%. 1000 milliLiter(s) (100 mL/Hr) IV Continuous <Continuous>  dextrose 50% Injectable 50 milliLiter(s) IV Push every 15 minutes  dextrose 50% Injectable 25 milliLiter(s) IV Push every 15 minutes  finasteride 5 milliGRAM(s) Oral daily  fluticasone propionate/ salmeterol 250-50 MICROgram(s) Diskus 1 Dose(s) Inhalation two times a day  folic acid 1 milliGRAM(s) Oral daily  gabapentin 100 milliGRAM(s) Oral every 8 hours  glucagon  Injectable 1 milliGRAM(s) IntraMuscular once  guaiFENesin  milliGRAM(s) Oral every 12 hours  heparin   Injectable 5000 Unit(s) SubCutaneous every 8 hours  influenza  Vaccine (HIGH DOSE) 0.5 milliLiter(s) IntraMuscular once  insulin glargine Injectable (LANTUS) 40 Unit(s) SubCutaneous before breakfast  insulin lispro (ADMELOG) corrective regimen sliding scale   SubCutaneous three times a day before meals  insulin lispro (ADMELOG) corrective regimen sliding scale   SubCutaneous at bedtime  insulin lispro Injectable (ADMELOG) 10 Unit(s) SubCutaneous three times a day before meals  levalbuterol Inhalation 1.25 milliGRAM(s) Inhalation four times a day  metoprolol tartrate 12.5 milliGRAM(s) Oral every 12 hours  mupirocin 2% Nasal 1 Application(s) Both Nostrils every 12 hours  pantoprazole    Tablet 40 milliGRAM(s) Oral before breakfast  potassium chloride    Tablet ER 20 milliEquivalent(s) Oral daily  psyllium Powder 1 Packet(s) Oral every 12 hours  rosuvastatin 20 milliGRAM(s) Oral at bedtime  senna 2 Tablet(s) Oral at bedtime  sertraline 25 milliGRAM(s) Oral daily  tamsulosin 0.4 milliGRAM(s) Oral at bedtime    MEDICATIONS  (PRN):  ALPRAZolam 0.25 milliGRAM(s) Oral at bedtime PRN anxiety  dextrose Oral Gel 15 Gram(s) Oral once PRN Blood Glucose LESS THAN 70 milliGRAM(s)/deciliter  hydrALAZINE Injectable 5 milliGRAM(s) IV Push every 4 hours PRN Systolic > 160  melatonin 5 milliGRAM(s) Oral at bedtime PRN Insomnia  ondansetron Injectable 4 milliGRAM(s) IV Push every 4 hours PRN Nausea and/or Vomiting  oxyCODONE    IR 5 milliGRAM(s) Oral every 6 hours PRN Moderate Pain (4 - 6)    Home Medications:  Breo Ellipta 200 mcg-25 mcg/inh inhalation powder: 1 inhaled once a day (28 Jan 2025 06:52)  HYDROcodone 10 mg oral capsule, extended release: 1 cap(s) orally 2 times a day (28 Jan 2025 06:52)  losartan 50 mg oral tablet: 1 tab(s) orally 2 times a day (28 Jan 2025 06:52)  losartan-hydrochlorothiazide 50 mg-12.5 mg oral tablet: 1 tab(s) orally (28 Jan 2025 06:52)  ProAir HFA 90 mcg/inh inhalation aerosol: 2 puff(s) inhaled 2 times a day (28 Jan 2025 06:52)  tujeo: 80 unit(s) subcutaneous once a day (28 Jan 2025 06:52)      Pharmacologic DVT Prophylaxis [X] YES, [] NO: Contraindication:  SCD's: YES b/l    GI Prophylaxis: protonix     Post-Op Beta-Blockers: [X] Yes, [] No: contraindication:  Post-Op CCB: [] Yes, [] No: contraindication:  Post-Op Aspirin:  [X] Yes, [] No: contraindication:  Post-Op Statin:  [] Yes, [X] No: contraindication: MVR    Ambulation/Activity Status: ambulate with assistance    Assessment/Plan:  64y Male status-post MVR/SHEILA ligation and MAZE                POD # 12   - Case and plan discussed with CTU Intensivist and CT Surgeon - Dr. Blakely  - Continue CTU supportive care and ongoing plan of care as per continuing CTU rounds.   - Continue DVT/GI prophylaxis  - Incentive Spirometry 10 times an hour  - Continue to advance physical activity as tolerated and continue PT/OT as directed  1. Continue ASA, statin,start low dose B Blocker-- pt was seen by EP and has no conduction problems; hence, no need for PPM as per Dr. barker  2. madelung disease: cont bipap, respiratory tx's  3. A. Fib prophylaxis: cont to monitor electrolytes - hold AC at this time prior lower GI bleed - s/p MAZE and SHEILA closure  4. junctional bradycardia/wenckebach - ep recommends re-eval pat once off of all inotropes; no ppm needed at the present time- milrinone off will f/u ep    5. DM/Glucose A1c 6.2 Control: continue lantus 40/humalog 10 and sliding scale   6. bumex PRN for noncardiogenic fluid overload   7. flakito on CKD3a- FLAKITO resolving BUN/Cr 56/1.2. Renal following- diuresis PRN, avoid hypotension   8. Ancef added for possible URI , pt with productive cough and b/l opacities seen on cxr  psych consult for anxiety- Zoloft 25mg daily, cont xanax at night    Social Service Disposition: Pt physically deconditioned. Family deciding on SNF VS. Home with services.

## 2025-02-10 LAB
ALBUMIN SERPL ELPH-MCNC: 3.6 G/DL — SIGNIFICANT CHANGE UP (ref 3.5–5.2)
ALP SERPL-CCNC: 168 U/L — HIGH (ref 30–115)
ALT FLD-CCNC: 19 U/L — SIGNIFICANT CHANGE UP (ref 0–41)
ANION GAP SERPL CALC-SCNC: 9 MMOL/L — SIGNIFICANT CHANGE UP (ref 7–14)
AST SERPL-CCNC: 37 U/L — SIGNIFICANT CHANGE UP (ref 0–41)
BILIRUB SERPL-MCNC: 0.4 MG/DL — SIGNIFICANT CHANGE UP (ref 0.2–1.2)
BUN SERPL-MCNC: 44 MG/DL — HIGH (ref 10–20)
CALCIUM SERPL-MCNC: 8.8 MG/DL — SIGNIFICANT CHANGE UP (ref 8.4–10.5)
CHLORIDE SERPL-SCNC: 103 MMOL/L — SIGNIFICANT CHANGE UP (ref 98–110)
CO2 SERPL-SCNC: 30 MMOL/L — SIGNIFICANT CHANGE UP (ref 17–32)
CREAT SERPL-MCNC: 1 MG/DL — SIGNIFICANT CHANGE UP (ref 0.7–1.5)
EGFR: 83 ML/MIN/1.73M2 — SIGNIFICANT CHANGE UP
GLUCOSE BLDC GLUCOMTR-MCNC: 165 MG/DL — HIGH (ref 70–99)
GLUCOSE BLDC GLUCOMTR-MCNC: 196 MG/DL — HIGH (ref 70–99)
GLUCOSE BLDC GLUCOMTR-MCNC: 213 MG/DL — HIGH (ref 70–99)
GLUCOSE BLDC GLUCOMTR-MCNC: 221 MG/DL — HIGH (ref 70–99)
GLUCOSE BLDC GLUCOMTR-MCNC: 494 MG/DL — CRITICAL HIGH (ref 70–99)
GLUCOSE SERPL-MCNC: 228 MG/DL — HIGH (ref 70–99)
HCT VFR BLD CALC: 31.4 % — LOW (ref 42–52)
HGB BLD-MCNC: 9.1 G/DL — LOW (ref 14–18)
MAGNESIUM SERPL-MCNC: 2 MG/DL — SIGNIFICANT CHANGE UP (ref 1.8–2.4)
MCHC RBC-ENTMCNC: 26.7 PG — LOW (ref 27–31)
MCHC RBC-ENTMCNC: 29 G/DL — LOW (ref 32–37)
MCV RBC AUTO: 92.1 FL — SIGNIFICANT CHANGE UP (ref 80–94)
NRBC # BLD: 0 /100 WBCS — SIGNIFICANT CHANGE UP (ref 0–0)
NRBC BLD-RTO: 0 /100 WBCS — SIGNIFICANT CHANGE UP (ref 0–0)
PLATELET # BLD AUTO: 224 K/UL — SIGNIFICANT CHANGE UP (ref 130–400)
PMV BLD: 10.5 FL — HIGH (ref 7.4–10.4)
POTASSIUM SERPL-MCNC: 4.3 MMOL/L — SIGNIFICANT CHANGE UP (ref 3.5–5)
POTASSIUM SERPL-SCNC: 4.3 MMOL/L — SIGNIFICANT CHANGE UP (ref 3.5–5)
PROT SERPL-MCNC: 5.8 G/DL — LOW (ref 6–8)
RBC # BLD: 3.41 M/UL — LOW (ref 4.7–6.1)
RBC # FLD: 20.9 % — HIGH (ref 11.5–14.5)
SODIUM SERPL-SCNC: 142 MMOL/L — SIGNIFICANT CHANGE UP (ref 135–146)
WBC # BLD: 13.16 K/UL — HIGH (ref 4.8–10.8)
WBC # FLD AUTO: 13.16 K/UL — HIGH (ref 4.8–10.8)

## 2025-02-10 PROCEDURE — 93010 ELECTROCARDIOGRAM REPORT: CPT

## 2025-02-10 PROCEDURE — 71045 X-RAY EXAM CHEST 1 VIEW: CPT | Mod: 26

## 2025-02-10 PROCEDURE — 99232 SBSQ HOSP IP/OBS MODERATE 35: CPT

## 2025-02-10 PROCEDURE — 99291 CRITICAL CARE FIRST HOUR: CPT

## 2025-02-10 RX ORDER — FLUTICASONE PROPIONATE 50 UG/1
1 SPRAY, METERED NASAL EVERY 12 HOURS
Refills: 0 | Status: DISCONTINUED | OUTPATIENT
Start: 2025-02-10 | End: 2025-02-24

## 2025-02-10 RX ORDER — ALBUTEROL SULFATE 2.5 MG/3ML
2 VIAL, NEBULIZER (ML) INHALATION EVERY 6 HOURS
Refills: 0 | Status: DISCONTINUED | OUTPATIENT
Start: 2025-02-10 | End: 2025-02-12

## 2025-02-10 RX ORDER — LABETALOL HYDROCHLORIDE 200 MG/1
10 TABLET, FILM COATED ORAL EVERY 6 HOURS
Refills: 0 | Status: DISCONTINUED | OUTPATIENT
Start: 2025-02-10 | End: 2025-02-24

## 2025-02-10 RX ADMIN — Medication 1 PACKET(S): at 05:21

## 2025-02-10 RX ADMIN — ROSUVASTATIN CALCIUM 20 MILLIGRAM(S): 20 TABLET, FILM COATED ORAL at 21:06

## 2025-02-10 RX ADMIN — SERTRALINE 25 MILLIGRAM(S): 100 TABLET, FILM COATED ORAL at 14:06

## 2025-02-10 RX ADMIN — METOPROLOL SUCCINATE 12.5 MILLIGRAM(S): 50 TABLET, EXTENDED RELEASE ORAL at 05:21

## 2025-02-10 RX ADMIN — Medication 2 PUFF(S): at 21:08

## 2025-02-10 RX ADMIN — GABAPENTIN 100 MILLIGRAM(S): 400 CAPSULE ORAL at 21:06

## 2025-02-10 RX ADMIN — INSULIN LISPRO 12: 100 INJECTION, SOLUTION INTRAVENOUS; SUBCUTANEOUS at 16:45

## 2025-02-10 RX ADMIN — FLUTICASONE PROPIONATE 1 SPRAY(S): 50 SPRAY, METERED NASAL at 09:00

## 2025-02-10 RX ADMIN — GABAPENTIN 100 MILLIGRAM(S): 400 CAPSULE ORAL at 14:06

## 2025-02-10 RX ADMIN — INSULIN LISPRO 10 UNIT(S): 100 INJECTION, SOLUTION INTRAVENOUS; SUBCUTANEOUS at 10:27

## 2025-02-10 RX ADMIN — Medication 5 MILLIGRAM(S): at 04:17

## 2025-02-10 RX ADMIN — Medication 20 MILLIEQUIVALENT(S): at 14:06

## 2025-02-10 RX ADMIN — INSULIN LISPRO 10 UNIT(S): 100 INJECTION, SOLUTION INTRAVENOUS; SUBCUTANEOUS at 16:46

## 2025-02-10 RX ADMIN — Medication 1 APPLICATION(S): at 12:00

## 2025-02-10 RX ADMIN — INSULIN LISPRO 4: 100 INJECTION, SOLUTION INTRAVENOUS; SUBCUTANEOUS at 10:27

## 2025-02-10 RX ADMIN — OXYCODONE HYDROCHLORIDE 5 MILLIGRAM(S): 30 TABLET ORAL at 00:49

## 2025-02-10 RX ADMIN — Medication 2 PUFF(S): at 08:30

## 2025-02-10 RX ADMIN — IPRATROPIUM BROMIDE AND ALBUTEROL SULFATE 3 MILLILITER(S): .5; 2.5 SOLUTION RESPIRATORY (INHALATION) at 22:08

## 2025-02-10 RX ADMIN — OXYCODONE HYDROCHLORIDE 5 MILLIGRAM(S): 30 TABLET ORAL at 15:46

## 2025-02-10 RX ADMIN — LABETALOL HYDROCHLORIDE 10 MILLIGRAM(S): 200 TABLET, FILM COATED ORAL at 02:12

## 2025-02-10 RX ADMIN — IPRATROPIUM BROMIDE AND ALBUTEROL SULFATE 3 MILLILITER(S): .5; 2.5 SOLUTION RESPIRATORY (INHALATION) at 08:30

## 2025-02-10 RX ADMIN — BUMETANIDE 1 MILLIGRAM(S): 1 TABLET ORAL at 18:20

## 2025-02-10 RX ADMIN — Medication 0.25 MILLIGRAM(S): at 02:37

## 2025-02-10 RX ADMIN — Medication 5 MILLIGRAM(S): at 21:06

## 2025-02-10 RX ADMIN — FLUTICASONE PROPIONATE 1 SPRAY(S): 50 SPRAY, METERED NASAL at 18:20

## 2025-02-10 RX ADMIN — INSULIN GLARGINE-YFGN 40 UNIT(S): 100 INJECTION, SOLUTION SUBCUTANEOUS at 12:00

## 2025-02-10 RX ADMIN — BUMETANIDE 1 MILLIGRAM(S): 1 TABLET ORAL at 05:21

## 2025-02-10 RX ADMIN — OXYCODONE HYDROCHLORIDE 5 MILLIGRAM(S): 30 TABLET ORAL at 21:06

## 2025-02-10 RX ADMIN — HEPARIN SODIUM 5000 UNIT(S): 1000 INJECTION INTRAVENOUS; SUBCUTANEOUS at 13:36

## 2025-02-10 RX ADMIN — HEPARIN SODIUM 5000 UNIT(S): 1000 INJECTION INTRAVENOUS; SUBCUTANEOUS at 05:22

## 2025-02-10 RX ADMIN — OXYCODONE HYDROCHLORIDE 5 MILLIGRAM(S): 30 TABLET ORAL at 00:59

## 2025-02-10 RX ADMIN — OXYCODONE HYDROCHLORIDE 5 MILLIGRAM(S): 30 TABLET ORAL at 22:05

## 2025-02-10 RX ADMIN — DEXTROMETHORPHAN HBR, GUAIFENESIN 600 MILLIGRAM(S): 200 LIQUID ORAL at 05:21

## 2025-02-10 RX ADMIN — GABAPENTIN 100 MILLIGRAM(S): 400 CAPSULE ORAL at 05:19

## 2025-02-10 RX ADMIN — OXYCODONE HYDROCHLORIDE 5 MILLIGRAM(S): 30 TABLET ORAL at 15:15

## 2025-02-10 RX ADMIN — METOPROLOL SUCCINATE 12.5 MILLIGRAM(S): 50 TABLET, EXTENDED RELEASE ORAL at 19:30

## 2025-02-10 RX ADMIN — IPRATROPIUM BROMIDE AND ALBUTEROL SULFATE 3 MILLILITER(S): .5; 2.5 SOLUTION RESPIRATORY (INHALATION) at 13:37

## 2025-02-10 RX ADMIN — Medication 81 MILLIGRAM(S): at 14:05

## 2025-02-10 RX ADMIN — Medication 1 PACKET(S): at 19:30

## 2025-02-10 RX ADMIN — Medication 2 TABLET(S): at 21:07

## 2025-02-10 RX ADMIN — Medication 1 DOSE(S): at 09:59

## 2025-02-10 RX ADMIN — FINASTERIDE 5 MILLIGRAM(S): 1 TABLET, FILM COATED ORAL at 14:05

## 2025-02-10 RX ADMIN — TAMSULOSIN HYDROCHLORIDE 0.4 MILLIGRAM(S): 0.4 CAPSULE ORAL at 21:07

## 2025-02-10 RX ADMIN — FOLIC ACID 1 MILLIGRAM(S): 1 TABLET ORAL at 14:05

## 2025-02-10 RX ADMIN — Medication 0.25 MILLIGRAM(S): at 23:25

## 2025-02-10 RX ADMIN — Medication 100 MILLIGRAM(S): at 05:21

## 2025-02-10 RX ADMIN — DEXTROMETHORPHAN HBR, GUAIFENESIN 600 MILLIGRAM(S): 200 LIQUID ORAL at 19:30

## 2025-02-10 RX ADMIN — HEPARIN SODIUM 5000 UNIT(S): 1000 INJECTION INTRAVENOUS; SUBCUTANEOUS at 21:05

## 2025-02-10 NOTE — PROGRESS NOTE ADULT - NS ATTEND AMEND GEN_ALL_CORE FT
NSVT  CHB    Increase Metoprolol to 25 q12. Increase as tolerated  MCOT on DC  No need for PPM
Resume BB  Tele  No PPM for now  Decision about MCOT based upon HR response to BB

## 2025-02-10 NOTE — PROGRESS NOTE ADULT - SUBJECTIVE AND OBJECTIVE BOX
OPERATIVE PROCEDURE(s): MVR/SHEILA ligation and MAZE                POD # 13                      SURGEON(s): ISELA Blakely      SUBJECTIVE ASSESSMENT:64yMale patient seen and examined at bedside.     Vital Signs Last 24 Hrs  T(F): 98.7 (10 Feb 2025 04:00), Max: 98.7 (10 Feb 2025 04:00)  HR: 87 (10 Feb 2025 06:00) (78 - 91)  BP: 157/71 (10 Feb 2025 06:00) (120/58 - 177/75)  BP(mean): 102 (10 Feb 2025 06:00) (83 - 112)  RR: 21 (10 Feb 2025 06:00) (16 - 30)  SpO2: 97% (10 Feb 2025 06:00) (91% - 98%)      I&O's Detail    09 Feb 2025 07:01  -  10 Feb 2025 07:00  --------------------------------------------------------  IN:    IV PiggyBack: 50 mL    Oral Fluid: 960 mL  Total IN: 1010 mL    OUT:    Voided (mL): 2105 mL  Total OUT: 2105 mL        Net: I&O's Detail    08 Feb 2025 07:01  -  09 Feb 2025 07:00  --------------------------------------------------------  Total NET: -2830 mL      09 Feb 2025 07:01  -  10 Feb 2025 07:00  --------------------------------------------------------  Total NET: -1095 mL        CAPILLARY BLOOD GLUCOSE      POCT Blood Glucose.: 72 mg/dL (09 Feb 2025 21:40)  POCT Blood Glucose.: 102 mg/dL (09 Feb 2025 16:00)  POCT Blood Glucose.: 168 mg/dL (09 Feb 2025 11:22)    A1C with Estimated Average Glucose Result: 6.2 % (01-23-25 @ 09:45)  A1C with Estimated Average Glucose Result: 7.1 % (12-04-24 @ 04:45)      Physical Exam:  General: NAD; A&Ox3  Neuro: pupils equal and reactive, speech clear, no overt sensory or motor deficts  Cardiac: S1/S2, RRR, no murmur, no rubs  Lungs: unlabored shallow respirations, bilateral bases decreased  Abdomen: Soft/NT/ND; positive bowel sounds x 4  Sternum: Intact, no click, incision healing well with no drainage  Incisions: Incisions clean/dry/intact  Extremities: Moderate edema b/l lower extremities; good capillary refill; no cyanosis; palpable 1+ pedal pulses b/l      BOWEL MOVEMENT:  [] YES [] NO, If No, Timing since last BM Day #        LABS:                        8.4[L]  11.24[H] )-----------( 195      ( 09 Feb 2025 01:45 )             29.0[L]                        8.2[L]  11.22[H] )-----------( 195      ( 08 Feb 2025 04:13 )             27.9[L]    02-09    142  |  105  |  56[H]  ----------------------------<  124[H]  4.7   |  27  |  1.2  02-08    140  |  104  |  62[HH]  ----------------------------<  154[H]  4.4   |  27  |  1.2    Ca    8.5      09 Feb 2025 01:45  Mg     2.2     02-09    TPro  5.6[L] [6.0 - 8.0]  /  Alb  3.5 [3.5 - 5.2]  /  TBili  0.3 [0.2 - 1.2]  /  DBili  x   /  AST  26 [0 - 41]  /  ALT  11 [0 - 41]  /  AlkPhos  163[H] [30 - 115]  02-09      Urinalysis Basic - ( 09 Feb 2025 01:45 )    Color: x / Appearance: x / SG: x / pH: x  Gluc: 124 mg/dL / Ketone: x  / Bili: x / Urobili: x   Blood: x / Protein: x / Nitrite: x   Leuk Esterase: x / RBC: x / WBC x   Sq Epi: x / Non Sq Epi: x / Bacteria: x        RADIOLOGY & ADDITIONAL TESTS:  CXR:   EKG:    Allergies    No Known Allergies    Intolerances      MEDICATIONS  (STANDING):  albuterol/ipratropium for Nebulization 3 milliLiter(s) Nebulizer every 6 hours  aspirin enteric coated 81 milliGRAM(s) Oral daily  buMETAnide Injectable 1 milliGRAM(s) IV Push every 12 hours  chlorhexidine 2% Cloths 1 Application(s) Topical daily  dextrose 5%. 1000 milliLiter(s) (50 mL/Hr) IV Continuous <Continuous>  dextrose 5%. 1000 milliLiter(s) (100 mL/Hr) IV Continuous <Continuous>  dextrose 50% Injectable 50 milliLiter(s) IV Push every 15 minutes  dextrose 50% Injectable 25 milliLiter(s) IV Push every 15 minutes  finasteride 5 milliGRAM(s) Oral daily  fluticasone propionate/ salmeterol 250-50 MICROgram(s) Diskus 1 Dose(s) Inhalation two times a day  folic acid 1 milliGRAM(s) Oral daily  gabapentin 100 milliGRAM(s) Oral every 8 hours  glucagon  Injectable 1 milliGRAM(s) IntraMuscular once  guaiFENesin  milliGRAM(s) Oral every 12 hours  heparin   Injectable 5000 Unit(s) SubCutaneous every 8 hours  influenza  Vaccine (HIGH DOSE) 0.5 milliLiter(s) IntraMuscular once  insulin glargine Injectable (LANTUS) 40 Unit(s) SubCutaneous before breakfast  insulin lispro (ADMELOG) corrective regimen sliding scale   SubCutaneous three times a day before meals  insulin lispro (ADMELOG) corrective regimen sliding scale   SubCutaneous at bedtime  insulin lispro Injectable (ADMELOG) 10 Unit(s) SubCutaneous three times a day before meals  metolazone 5 milliGRAM(s) Oral daily  metoprolol tartrate 12.5 milliGRAM(s) Oral every 12 hours  mupirocin 2% Nasal 1 Application(s) Both Nostrils every 12 hours  pantoprazole    Tablet 40 milliGRAM(s) Oral before breakfast  potassium chloride    Tablet ER 20 milliEquivalent(s) Oral daily  psyllium Powder 1 Packet(s) Oral every 12 hours  rosuvastatin 20 milliGRAM(s) Oral at bedtime  senna 2 Tablet(s) Oral at bedtime  sertraline 25 milliGRAM(s) Oral daily  tamsulosin 0.4 milliGRAM(s) Oral at bedtime    MEDICATIONS  (PRN):  ALPRAZolam 0.25 milliGRAM(s) Oral at bedtime PRN anxiety  dextrose Oral Gel 15 Gram(s) Oral once PRN Blood Glucose LESS THAN 70 milliGRAM(s)/deciliter  hydrALAZINE Injectable 5 milliGRAM(s) IV Push every 4 hours PRN Systolic > 160  labetalol Injectable 10 milliGRAM(s) IV Push every 6 hours PRN Diastolic blood pressure >155  melatonin 5 milliGRAM(s) Oral at bedtime PRN Insomnia  ondansetron Injectable 4 milliGRAM(s) IV Push every 4 hours PRN Nausea and/or Vomiting  oxyCODONE    IR 5 milliGRAM(s) Oral every 6 hours PRN Moderate Pain (4 - 6)    Home Medications:  Breo Ellipta 200 mcg-25 mcg/inh inhalation powder: 1 inhaled once a day (28 Jan 2025 06:52)  HYDROcodone 10 mg oral capsule, extended release: 1 cap(s) orally 2 times a day (28 Jan 2025 06:52)  losartan 50 mg oral tablet: 1 tab(s) orally 2 times a day (28 Jan 2025 06:52)  losartan-hydrochlorothiazide 50 mg-12.5 mg oral tablet: 1 tab(s) orally (28 Jan 2025 06:52)  ProAir HFA 90 mcg/inh inhalation aerosol: 2 puff(s) inhaled 2 times a day (28 Jan 2025 06:52)  tujeo: 80 unit(s) subcutaneous once a day (28 Jan 2025 06:52)      Pharmacologic DVT Prophylaxis [] YES, [] NO: Contraindication:  SCD's: YES b/l    GI Prophylaxis:     Post-Op Beta-Blockers: [] Yes, [] No: contraindication:  Post-Op CCB: [] Yes, [] No: contraindication:  Post-Op Aspirin:  [] Yes, [] No: contraindication:  Post-Op Statin:  [] Yes, [] No: contraindication:    Ambulation/Activity Status:    Assessment/Plan:  64y Male status-post  - Case and plan discussed with CTU Intensivist and CT Surgeon - Dr. Blakely/Mauri/Laron/Rian  - Continue CTU supportive care and ongoing plan of care as per continuing CTU rounds.   - Continue DVT/GI prophylaxis  - Incentive Spirometry 10 times an hour  - Continue to advance physical activity as tolerated and continue PT/OT as directed  1. CAD s/p CABG: Continue ASA, statin, BB  2. HTN:   3. Post-op A. Fib ppx:   4. COPD/Hypoxia:   5. DM/Glucose Control:     Social Service Disposition:     OPERATIVE PROCEDURE(s): MVR/SHEILA ligation and MAZE                POD # 13                      SURGEON(s): ISELA Blakely      SUBJECTIVE ASSESSMENT:64yMale patient seen and examined at bedside.     Vital Signs Last 24 Hrs  T(F): 98.7 (10 Feb 2025 04:00), Max: 98.7 (10 Feb 2025 04:00)  HR: 87 (10 Feb 2025 06:00) (78 - 91)  BP: 157/71 (10 Feb 2025 06:00) (120/58 - 177/75)  BP(mean): 102 (10 Feb 2025 06:00) (83 - 112)  RR: 21 (10 Feb 2025 06:00) (16 - 30)  SpO2: 97% (10 Feb 2025 06:00) (91% - 98%)      I&O's Detail    09 Feb 2025 07:01  -  10 Feb 2025 07:00  --------------------------------------------------------  IN:    IV PiggyBack: 50 mL    Oral Fluid: 960 mL  Total IN: 1010 mL    OUT:    Voided (mL): 2105 mL  Total OUT: 2105 mL        Net: I&O's Detail    08 Feb 2025 07:01  -  09 Feb 2025 07:00  --------------------------------------------------------  Total NET: -2830 mL      09 Feb 2025 07:01  -  10 Feb 2025 07:00  --------------------------------------------------------  Total NET: -1095 mL        CAPILLARY BLOOD GLUCOSE      POCT Blood Glucose.: 72 mg/dL (09 Feb 2025 21:40)  POCT Blood Glucose.: 102 mg/dL (09 Feb 2025 16:00)  POCT Blood Glucose.: 168 mg/dL (09 Feb 2025 11:22)    A1C with Estimated Average Glucose Result: 6.2 % (01-23-25 @ 09:45)  A1C with Estimated Average Glucose Result: 7.1 % (12-04-24 @ 04:45)      Physical Exam:  General: NAD; A&Ox3  Neuro: pupils equal and reactive, speech clear, no overt sensory or motor deficts  Cardiac: S1/S2, RRR, no murmur, no rubs  Lungs: unlabored shallow respirations, bilateral bases decreased  Abdomen: Soft/NT/ND; positive bowel sounds x 4  Sternum: Intact, no click, incision healing well with no drainage  Incisions: Incisions clean/dry/intact  Extremities: Moderate edema b/l lower extremities; good capillary refill; no cyanosis; palpable 1+ pedal pulses b/l      BOWEL MOVEMENT:  [] YES [] NO, If No, Timing since last BM Day #        LABS:                        8.4[L]  11.24[H] )-----------( 195      ( 09 Feb 2025 01:45 )             29.0[L]                        8.2[L]  11.22[H] )-----------( 195      ( 08 Feb 2025 04:13 )             27.9[L]    02-09    142  |  105  |  56[H]  ----------------------------<  124[H]  4.7   |  27  |  1.2  02-08    140  |  104  |  62[HH]  ----------------------------<  154[H]  4.4   |  27  |  1.2    Ca    8.5      09 Feb 2025 01:45  Mg     2.2     02-09    TPro  5.6[L] [6.0 - 8.0]  /  Alb  3.5 [3.5 - 5.2]  /  TBili  0.3 [0.2 - 1.2]  /  DBili  x   /  AST  26 [0 - 41]  /  ALT  11 [0 - 41]  /  AlkPhos  163[H] [30 - 115]  02-09      RADIOLOGY & ADDITIONAL TESTS:  CXR:   < from: Xray Chest 1 View- PORTABLE-Routine (Xray Chest 1 View- PORTABLE-Routine in AM.) (02.10.25 @ 06:41) >  INTERPRETATION:  CLINICAL HISTORY: Shortness of breath    COMPARISON: February 9, 2025    TECHNIQUE: Portable frontal chest radiograph.    FINDINGS:    Support devices: None.    Cardiac/mediastinum/hilum: Cardiomegaly, status post median sternotomy,   thoracic aortic calcification, left atrial appendage clip, mitral valve   replacement..    Lung parenchyma/Pleura: Bilateral opacities/pleural effusions.    Skeleton/soft tissues: Stable.      IMPRESSION:    Right opacity/pleural effusion, increased. Redemonstration left   opacity/pleural effusion and cardiomegaly.        EKG:  < from: 12 Lead ECG (02.09.25 @ 08:33) >    Ventricular Rate 82 BPM    Atrial Rate 82 BPM    P-R Interval 208 ms    QRS Duration 96 ms    Q-T Interval 368 ms    QTC Calculation(Bazett) 429 ms    P Axis 47 degrees    R Axis -16 degrees    T Axis 123 degrees    Diagnosis Line Normal sinus rhythm  Low voltage QRS  T wave abnormality, consider lateral ischemia  Abnormal ECG        Allergies    No Known Allergies    Intolerances      MEDICATIONS  (STANDING):  albuterol/ipratropium for Nebulization 3 milliLiter(s) Nebulizer every 6 hours  aspirin enteric coated 81 milliGRAM(s) Oral daily  buMETAnide Injectable 1 milliGRAM(s) IV Push every 12 hours  chlorhexidine 2% Cloths 1 Application(s) Topical daily  dextrose 5%. 1000 milliLiter(s) (50 mL/Hr) IV Continuous <Continuous>  dextrose 5%. 1000 milliLiter(s) (100 mL/Hr) IV Continuous <Continuous>  dextrose 50% Injectable 50 milliLiter(s) IV Push every 15 minutes  dextrose 50% Injectable 25 milliLiter(s) IV Push every 15 minutes  finasteride 5 milliGRAM(s) Oral daily  fluticasone propionate/ salmeterol 250-50 MICROgram(s) Diskus 1 Dose(s) Inhalation two times a day  folic acid 1 milliGRAM(s) Oral daily  gabapentin 100 milliGRAM(s) Oral every 8 hours  glucagon  Injectable 1 milliGRAM(s) IntraMuscular once  guaiFENesin  milliGRAM(s) Oral every 12 hours  heparin   Injectable 5000 Unit(s) SubCutaneous every 8 hours  influenza  Vaccine (HIGH DOSE) 0.5 milliLiter(s) IntraMuscular once  insulin glargine Injectable (LANTUS) 40 Unit(s) SubCutaneous before breakfast  insulin lispro (ADMELOG) corrective regimen sliding scale   SubCutaneous three times a day before meals  insulin lispro (ADMELOG) corrective regimen sliding scale   SubCutaneous at bedtime  insulin lispro Injectable (ADMELOG) 10 Unit(s) SubCutaneous three times a day before meals  metolazone 5 milliGRAM(s) Oral daily  metoprolol tartrate 12.5 milliGRAM(s) Oral every 12 hours  mupirocin 2% Nasal 1 Application(s) Both Nostrils every 12 hours  pantoprazole    Tablet 40 milliGRAM(s) Oral before breakfast  potassium chloride    Tablet ER 20 milliEquivalent(s) Oral daily  psyllium Powder 1 Packet(s) Oral every 12 hours  rosuvastatin 20 milliGRAM(s) Oral at bedtime  senna 2 Tablet(s) Oral at bedtime  sertraline 25 milliGRAM(s) Oral daily  tamsulosin 0.4 milliGRAM(s) Oral at bedtime    MEDICATIONS  (PRN):  ALPRAZolam 0.25 milliGRAM(s) Oral at bedtime PRN anxiety  dextrose Oral Gel 15 Gram(s) Oral once PRN Blood Glucose LESS THAN 70 milliGRAM(s)/deciliter  hydrALAZINE Injectable 5 milliGRAM(s) IV Push every 4 hours PRN Systolic > 160  labetalol Injectable 10 milliGRAM(s) IV Push every 6 hours PRN Diastolic blood pressure >155  melatonin 5 milliGRAM(s) Oral at bedtime PRN Insomnia  ondansetron Injectable 4 milliGRAM(s) IV Push every 4 hours PRN Nausea and/or Vomiting  oxyCODONE    IR 5 milliGRAM(s) Oral every 6 hours PRN Moderate Pain (4 - 6)    Home Medications:  Breo Ellipta 200 mcg-25 mcg/inh inhalation powder: 1 inhaled once a day (28 Jan 2025 06:52)  HYDROcodone 10 mg oral capsule, extended release: 1 cap(s) orally 2 times a day (28 Jan 2025 06:52)  losartan 50 mg oral tablet: 1 tab(s) orally 2 times a day (28 Jan 2025 06:52)  losartan-hydrochlorothiazide 50 mg-12.5 mg oral tablet: 1 tab(s) orally (28 Jan 2025 06:52)  ProAir HFA 90 mcg/inh inhalation aerosol: 2 puff(s) inhaled 2 times a day (28 Jan 2025 06:52)  tujeo: 80 unit(s) subcutaneous once a day (28 Jan 2025 06:52)      Pharmacologic DVT Prophylaxis [X] YES, [] NO: Contraindication:  SCD's: YES b/l    GI Prophylaxis: protonix    Post-Op Beta-Blockers: [X] Yes, [] No: contraindication:  Post-Op CCB: [] Yes, [] No: contraindication:  Post-Op Aspirin:  [X] Yes, [] No: contraindication:  Post-Op Statin:  [X] Yes, [] No: contraindication:    Ambulation/Activity Status: ambulate as tolerated     Assessment/Plan:  64y Male status-post   - Case and plan discussed with CTU Intensivist and CT Surgeon - Dr. Blakely/Mauri/Laron/Rian  - Continue CTU supportive care and ongoing plan of care as per continuing CTU rounds.   - Continue DVT/GI prophylaxis  - Incentive Spirometry 10 times an hour  - Continue to advance physical activity as tolerated and continue PT/OT as directed  1. Continue ASA, statin,start low dose B Blocker-- pt was seen by EP and has no conduction problems; hence, no need for PPM as per Dr. barker  2. madelung disease: cont bipap, respiratory tx's  3. A. Fib prophylaxis: cont to monitor electrolytes - hold AC at this time prior lower GI bleed - s/p MAZE and SHEILA closure  4. junctional bradycardia/wenckebach - ep recommends re-eval pat once off of all inotropes; no ppm needed at the present time- milrinone off will f/u ep    5. DM/Glucose A1c 6.2 Control: continue lantus 40/humalog 10 and sliding scale   6. bumex PRN for noncardiogenic fluid overload   7. flakito on CKD3a- FLAKITO resolving BUN/Cr 56/1.2. Renal following- diuresis PRN, avoid hypotension   8. Ancef added for possible URI , pt with productive cough and b/l opacities seen on cxr  psych consult for anxiety- Zoloft 25mg daily, cont xanax at night    Social Service Disposition: Pt physically deconditioned. Family deciding on SNF VS. Home with services.     OPERATIVE PROCEDURE(s): MVR/SHEILA ligation and MAZE                POD # 13                      SURGEON(s): ISELA Blakely      SUBJECTIVE ASSESSMENT:64yMale patient seen and examined at bedside.     Vital Signs Last 24 Hrs  T(F): 98.7 (10 Feb 2025 04:00), Max: 98.7 (10 Feb 2025 04:00)  HR: 87 (10 Feb 2025 06:00) (78 - 91)  BP: 157/71 (10 Feb 2025 06:00) (120/58 - 177/75)  BP(mean): 102 (10 Feb 2025 06:00) (83 - 112)  RR: 21 (10 Feb 2025 06:00) (16 - 30)  SpO2: 97% (10 Feb 2025 06:00) (91% - 98%)      I&O's Detail    09 Feb 2025 07:01  -  10 Feb 2025 07:00  --------------------------------------------------------  IN:    IV PiggyBack: 50 mL    Oral Fluid: 960 mL  Total IN: 1010 mL    OUT:    Voided (mL): 2105 mL  Total OUT: 2105 mL        Net: I&O's Detail    08 Feb 2025 07:01  -  09 Feb 2025 07:00  --------------------------------------------------------  Total NET: -2830 mL      09 Feb 2025 07:01  -  10 Feb 2025 07:00  --------------------------------------------------------  Total NET: -1095 mL        CAPILLARY BLOOD GLUCOSE      POCT Blood Glucose.: 72 mg/dL (09 Feb 2025 21:40)  POCT Blood Glucose.: 102 mg/dL (09 Feb 2025 16:00)  POCT Blood Glucose.: 168 mg/dL (09 Feb 2025 11:22)    A1C with Estimated Average Glucose Result: 6.2 % (01-23-25 @ 09:45)  A1C with Estimated Average Glucose Result: 7.1 % (12-04-24 @ 04:45)      Physical Exam:  General: NAD; A&Ox3  Neuro: pupils equal and reactive, speech clear, no overt sensory or motor deficts  Cardiac: S1/S2, RRR, no murmur, no rubs  Lungs: unlabored shallow respirations, bilateral bases decreased  Abdomen: Soft/NT/ND; positive bowel sounds x 4  Sternum: Intact, no click, incision healing well with no drainage  Incisions: Incisions clean/dry/intact  Extremities: Pitting edema b/l lower extremities; b/l feet red, closed blisters noted, good capillary refill; no cyanosis; palpable 1+ pedal pulses b/l  - Testicular edema noted    BOWEL MOVEMENT:  [X] YES [] NO, If No, Timing since last BM Day #        LABS:                        8.4[L]  11.24[H] )-----------( 195      ( 09 Feb 2025 01:45 )             29.0[L]                        8.2[L]  11.22[H] )-----------( 195      ( 08 Feb 2025 04:13 )             27.9[L]    02-09    142  |  105  |  56[H]  ----------------------------<  124[H]  4.7   |  27  |  1.2  02-08    140  |  104  |  62[HH]  ----------------------------<  154[H]  4.4   |  27  |  1.2    Ca    8.5      09 Feb 2025 01:45  Mg     2.2     02-09    TPro  5.6[L] [6.0 - 8.0]  /  Alb  3.5 [3.5 - 5.2]  /  TBili  0.3 [0.2 - 1.2]  /  DBili  x   /  AST  26 [0 - 41]  /  ALT  11 [0 - 41]  /  AlkPhos  163[H] [30 - 115]  02-09      RADIOLOGY & ADDITIONAL TESTS:  CXR:   < from: Xray Chest 1 View- PORTABLE-Routine (Xray Chest 1 View- PORTABLE-Routine in AM.) (02.10.25 @ 06:41) >  INTERPRETATION:  CLINICAL HISTORY: Shortness of breath    COMPARISON: February 9, 2025    TECHNIQUE: Portable frontal chest radiograph.    FINDINGS:    Support devices: None.    Cardiac/mediastinum/hilum: Cardiomegaly, status post median sternotomy,   thoracic aortic calcification, left atrial appendage clip, mitral valve   replacement..    Lung parenchyma/Pleura: Bilateral opacities/pleural effusions.    Skeleton/soft tissues: Stable.      IMPRESSION:    Right opacity/pleural effusion, increased. Redemonstration left   opacity/pleural effusion and cardiomegaly.        EKG:  < from: 12 Lead ECG (02.09.25 @ 08:33) >    Ventricular Rate 82 BPM    Atrial Rate 82 BPM    P-R Interval 208 ms    QRS Duration 96 ms    Q-T Interval 368 ms    QTC Calculation(Bazett) 429 ms    P Axis 47 degrees    R Axis -16 degrees    T Axis 123 degrees    Diagnosis Line Normal sinus rhythm  Low voltage QRS  T wave abnormality, consider lateral ischemia  Abnormal ECG        Allergies    No Known Allergies    Intolerances      MEDICATIONS  (STANDING):  albuterol/ipratropium for Nebulization 3 milliLiter(s) Nebulizer every 6 hours  aspirin enteric coated 81 milliGRAM(s) Oral daily  buMETAnide Injectable 1 milliGRAM(s) IV Push every 12 hours  chlorhexidine 2% Cloths 1 Application(s) Topical daily  dextrose 5%. 1000 milliLiter(s) (50 mL/Hr) IV Continuous <Continuous>  dextrose 5%. 1000 milliLiter(s) (100 mL/Hr) IV Continuous <Continuous>  dextrose 50% Injectable 50 milliLiter(s) IV Push every 15 minutes  dextrose 50% Injectable 25 milliLiter(s) IV Push every 15 minutes  finasteride 5 milliGRAM(s) Oral daily  fluticasone propionate/ salmeterol 250-50 MICROgram(s) Diskus 1 Dose(s) Inhalation two times a day  folic acid 1 milliGRAM(s) Oral daily  gabapentin 100 milliGRAM(s) Oral every 8 hours  glucagon  Injectable 1 milliGRAM(s) IntraMuscular once  guaiFENesin  milliGRAM(s) Oral every 12 hours  heparin   Injectable 5000 Unit(s) SubCutaneous every 8 hours  influenza  Vaccine (HIGH DOSE) 0.5 milliLiter(s) IntraMuscular once  insulin glargine Injectable (LANTUS) 40 Unit(s) SubCutaneous before breakfast  insulin lispro (ADMELOG) corrective regimen sliding scale   SubCutaneous three times a day before meals  insulin lispro (ADMELOG) corrective regimen sliding scale   SubCutaneous at bedtime  insulin lispro Injectable (ADMELOG) 10 Unit(s) SubCutaneous three times a day before meals  metolazone 5 milliGRAM(s) Oral daily  metoprolol tartrate 12.5 milliGRAM(s) Oral every 12 hours  mupirocin 2% Nasal 1 Application(s) Both Nostrils every 12 hours  pantoprazole    Tablet 40 milliGRAM(s) Oral before breakfast  potassium chloride    Tablet ER 20 milliEquivalent(s) Oral daily  psyllium Powder 1 Packet(s) Oral every 12 hours  rosuvastatin 20 milliGRAM(s) Oral at bedtime  senna 2 Tablet(s) Oral at bedtime  sertraline 25 milliGRAM(s) Oral daily  tamsulosin 0.4 milliGRAM(s) Oral at bedtime    MEDICATIONS  (PRN):  ALPRAZolam 0.25 milliGRAM(s) Oral at bedtime PRN anxiety  dextrose Oral Gel 15 Gram(s) Oral once PRN Blood Glucose LESS THAN 70 milliGRAM(s)/deciliter  hydrALAZINE Injectable 5 milliGRAM(s) IV Push every 4 hours PRN Systolic > 160  labetalol Injectable 10 milliGRAM(s) IV Push every 6 hours PRN Diastolic blood pressure >155  melatonin 5 milliGRAM(s) Oral at bedtime PRN Insomnia  ondansetron Injectable 4 milliGRAM(s) IV Push every 4 hours PRN Nausea and/or Vomiting  oxyCODONE    IR 5 milliGRAM(s) Oral every 6 hours PRN Moderate Pain (4 - 6)    Home Medications:  Breo Ellipta 200 mcg-25 mcg/inh inhalation powder: 1 inhaled once a day (28 Jan 2025 06:52)  HYDROcodone 10 mg oral capsule, extended release: 1 cap(s) orally 2 times a day (28 Jan 2025 06:52)  losartan 50 mg oral tablet: 1 tab(s) orally 2 times a day (28 Jan 2025 06:52)  losartan-hydrochlorothiazide 50 mg-12.5 mg oral tablet: 1 tab(s) orally (28 Jan 2025 06:52)  ProAir HFA 90 mcg/inh inhalation aerosol: 2 puff(s) inhaled 2 times a day (28 Jan 2025 06:52)  tujeo: 80 unit(s) subcutaneous once a day (28 Jan 2025 06:52)      Pharmacologic DVT Prophylaxis [X] YES, [] NO: Contraindication:  SCD's: YES b/l    GI Prophylaxis: protonix    Post-Op Beta-Blockers: [X] Yes, [] No: contraindication:  Post-Op Aspirin:  [X] Yes, [] No: contraindication:  Post-Op Statin:  [X] Yes, [] No: contraindication:    Ambulation/Activity Status: ambulate as tolerated     Assessment/Plan:  64y Male status-post MVR/SHEILA ligation and MAZE                POD # 13   - Case and plan discussed with CTU Intensivist and CT Surgeon - Dr. Blakely  - Continue CTU supportive care and ongoing plan of care as per continuing CTU rounds.   - Continue DVT/GI prophylaxis  - Incentive Spirometry 10 times an hour  - Continue to advance physical activity as tolerated and continue PT/OT as directed  1. Continue ASA, statin,start low dose B Blocker-- pt was seen by EP and has no conduction problems; hence, no need for PPM as per Dr. barker  2. madelung disease: cont bipap, respiratory tx's  3. A. Fib prophylaxis: cont to monitor electrolytes - hold AC at this time prior lower GI bleed - s/p MAZE and SHEILA closure  4. junctional bradycardia/wenckebach - ep recommends re-eval pat once off of all inotropes; no ppm needed at the present time- milrinone off will f/u ep    5. DM/Glucose A1c 6.2 Control: continue lantus 40/humalog 10 and sliding scale. Refusing carb consistent diet   6. Noncardiac fluid overload: IV bumex 1mg BID, 1000mL fluid restriction.   7. flakito on CKD3a- FLAKITO resolving BUN/Cr 44/1.0. Renal following- diuresis PRN, avoid hypotension   8. Ancef added for possible URI , pt with productive cough and b/l opacities seen on cxr  psych consult for anxiety- Zoloft 25mg daily, cont xanax at night    Social Service Disposition: Pt physically deconditioned, agreed to review SNF facilities

## 2025-02-10 NOTE — PROGRESS NOTE ADULT - ASSESSMENT
EP: Carolyn    64M w/ afib, nonobstructive CAD, CHF class 3, severe MR, DM, HTN, JIMMY on CPAP, asthma, severe obesity, HL, Madelung's disease, osteoarthritis s/p bilateral hip replacement, peripheral neuropathy presented with increasing dyspnea with exertion found to have severe MR sp MVR POD#13  no bradycardia, +NSVT off inotropes      Impression:  Junctional Bradycardia postop now resolved  NSVT off milrinone and dobutamine   Slow AF, now 100s  Sev MR sp MVR/MAZE/SHEILA Ligation on 1/28  Morbid obesity  JIMMY  CAD  DM  HTN    Plan:  - con't tele  - increase BB to 25 mg BID  - conduction system seems to recover from postop  - Will cont to monitor  - Monitor electrolytes, maintain WNL  - recommend MCOT on discharge

## 2025-02-10 NOTE — PROGRESS NOTE ADULT - SUBJECTIVE AND OBJECTIVE BOX
INTERVAL HPI/OVERNIGHT EVENTS:  Patient was seen today.  Telemetry reviewed - no bradycardia, pauses, or AV block noted. 1 brief NSVT was appreciated.     MEDICATIONS  (STANDING):  albuterol/ipratropium for Nebulization 3 milliLiter(s) Nebulizer every 6 hours  aspirin enteric coated 81 milliGRAM(s) Oral daily  buMETAnide Injectable 1 milliGRAM(s) IV Push every 12 hours  chlorhexidine 2% Cloths 1 Application(s) Topical daily  dextrose 5%. 1000 milliLiter(s) (50 mL/Hr) IV Continuous <Continuous>  dextrose 5%. 1000 milliLiter(s) (100 mL/Hr) IV Continuous <Continuous>  dextrose 50% Injectable 50 milliLiter(s) IV Push every 15 minutes  dextrose 50% Injectable 25 milliLiter(s) IV Push every 15 minutes  finasteride 5 milliGRAM(s) Oral daily  fluticasone propionate 50 MICROgram(s)/spray Nasal Spray 1 Spray(s) Both Nostrils every 12 hours  fluticasone propionate/ salmeterol 250-50 MICROgram(s) Diskus 1 Dose(s) Inhalation two times a day  folic acid 1 milliGRAM(s) Oral daily  gabapentin 100 milliGRAM(s) Oral every 8 hours  glucagon  Injectable 1 milliGRAM(s) IntraMuscular once  guaiFENesin  milliGRAM(s) Oral every 12 hours  heparin   Injectable 5000 Unit(s) SubCutaneous every 8 hours  influenza  Vaccine (HIGH DOSE) 0.5 milliLiter(s) IntraMuscular once  insulin glargine Injectable (LANTUS) 40 Unit(s) SubCutaneous before breakfast  insulin lispro (ADMELOG) corrective regimen sliding scale   SubCutaneous three times a day before meals  insulin lispro (ADMELOG) corrective regimen sliding scale   SubCutaneous at bedtime  insulin lispro Injectable (ADMELOG) 10 Unit(s) SubCutaneous three times a day before meals  metolazone 5 milliGRAM(s) Oral daily  metoprolol tartrate 12.5 milliGRAM(s) Oral every 12 hours  mupirocin 2% Nasal 1 Application(s) Both Nostrils every 12 hours  pantoprazole    Tablet 40 milliGRAM(s) Oral before breakfast  potassium chloride    Tablet ER 20 milliEquivalent(s) Oral daily  psyllium Powder 1 Packet(s) Oral every 12 hours  rosuvastatin 20 milliGRAM(s) Oral at bedtime  senna 2 Tablet(s) Oral at bedtime  sertraline 25 milliGRAM(s) Oral daily  tamsulosin 0.4 milliGRAM(s) Oral at bedtime    MEDICATIONS  (PRN):  albuterol    90 MICROgram(s) HFA Inhaler 2 Puff(s) Inhalation every 6 hours PRN Shortness of Breath  ALPRAZolam 0.25 milliGRAM(s) Oral at bedtime PRN anxiety  dextrose Oral Gel 15 Gram(s) Oral once PRN Blood Glucose LESS THAN 70 milliGRAM(s)/deciliter  hydrALAZINE Injectable 5 milliGRAM(s) IV Push every 4 hours PRN Systolic > 160  labetalol Injectable 10 milliGRAM(s) IV Push every 6 hours PRN Diastolic blood pressure >155  melatonin 5 milliGRAM(s) Oral at bedtime PRN Insomnia  ondansetron Injectable 4 milliGRAM(s) IV Push every 4 hours PRN Nausea and/or Vomiting  oxyCODONE    IR 5 milliGRAM(s) Oral every 6 hours PRN Moderate Pain (4 - 6)      Allergies  No Known Allergies  Intolerances    REVIEW OF SYSTEMS  [x] A ten-point review of systems was otherwise negative except as noted.  [ ] Due to altered mental status/intubation, subjective information were not able to be obtained from the patient. History was obtained, to the extent possible, from review of the chart and collateral sources of information.      Vital Signs Last 24 Hrs  T(C): 37.1 (10 Feb 2025 04:00), Max: 37.1 (10 Feb 2025 04:00)  T(F): 98.7 (10 Feb 2025 04:00), Max: 98.7 (10 Feb 2025 04:00)  HR: 91 (10 Feb 2025 10:00) (78 - 91)  BP: 141/87 (10 Feb 2025 10:00) (128/75 - 177/75)  BP(mean): 102 (10 Feb 2025 06:00) (84 - 112)  RR: 22 (10 Feb 2025 10:00) (16 - 25)  SpO2: 95% (10 Feb 2025 10:00) (91% - 100%)    Parameters below as of 10 Feb 2025 10:00  Patient On (Oxygen Delivery Method): nasal cannula  O2 Flow (L/min): 4      02-09-25 @ 07:01  -  02-10-25 @ 07:00  --------------------------------------------------------  IN: 1010 mL / OUT: 2105 mL / NET: -1095 mL    02-10-25 @ 07:01  -  02-10-25 @ 14:02  --------------------------------------------------------  IN: 0 mL / OUT: 1650 mL / NET: -1650 mL        PHYSICAL EXAM:    GENERAL: In no apparent distress, well nourished, and hydrated.  HEART: Regular rate and rhythm; No murmur; NO rubs, or gallops.  PULMONARY: Clear to auscultation and percussion.  Normal expansion/effort. No rales, wheezing, or rhonchi bilaterally.  ABDOMEN: Soft, Nontender, Nondistended; Bowel sounds present  EXTREMITIES:  Extremities warm, pink, well-perfused, 2+ Peripheral Pulses, No clubbing, cyanosis, or edema  NEUROLOGICAL: alert & oriented x 3, no focal deficits, PERRLA, EOMI    LABS:                        9.1    13.16 )-----------( 224      ( 10 Feb 2025 12:00 )             31.4     02-10    142  |  103  |  44[H]  ----------------------------<  228[H]  4.3   |  30  |  1.0    Ca    8.8      10 Feb 2025 12:00  Mg     2.0     02-10    TPro  5.8[L]  /  Alb  3.6  /  TBili  0.4  /  DBili  x   /  AST  37  /  ALT  19  /  AlkPhos  168[H]  02-10      Urinalysis Basic - ( 10 Feb 2025 12:00 )    Color: x / Appearance: x / SG: x / pH: x  Gluc: 228 mg/dL / Ketone: x  / Bili: x / Urobili: x   Blood: x / Protein: x / Nitrite: x   Leuk Esterase: x / RBC: x / WBC x   Sq Epi: x / Non Sq Epi: x / Bacteria: x          12 Lead ECG:   Ventricular Rate 82 BPM    Atrial Rate 82 BPM    P-R Interval 208 ms    QRS Duration 96 ms    Q-T Interval 368 ms    QTC Calculation(Bazett) 429 ms    P Axis 47 degrees    R Axis -16 degrees    T Axis 123 degrees    Diagnosis Line Normal sinus rhythm  Low voltage QRS  T wave abnormality, consider lateral ischemia  Abnormal ECG    Confirmed by Kiki Argueta MD ([id number]) on 2/9/2025 6:32:58 PM (02-09-25 @ 08:33)  12 Lead ECG:   Ventricular Rate 80 BPM    Atrial Rate 80 BPM    P-R Interval 256 ms    QRS Duration 94 ms    Q-T Interval 370 ms    QTC Calculation(Bazett) 426 ms    P Axis 33 degrees    R Axis -39 degrees    T Axis 111 degrees    Diagnosis Line Sinus rhythm [id number] degree A-V block  Left axis deviation  Poor R wave progression  Cannot rule out Anterolateral infarct , age undetermined  Abnormal ECG    Confirmed by Kiki Argueta MD [id number]) on 2/9/2025 8:02:27 AM (02-08-25 @ 07:12)      RADIOLOGY & ADDITIONAL TESTS:

## 2025-02-10 NOTE — PROGRESS NOTE ADULT - ASSESSMENT
Assessment  64M s/p MVR, LAAC, MAZE POD #12  Post op complicated by acute pulmonary insufficiency, FLAKITO, cardiogenic shock req inotropes  still has petal edema  fluid balance neg 2.5 liters  still deconditioned need to walk more    Plan:    Neuro:  Multimodal pain management - no toradol, minimize opiates  100 TID gabapentin  0.25 xanax PRN at night  Tylenol, Lidoderm patch, opiates as needed  Monitor neuro status in the post op period      CV:  S/P MVR, LAAC, MAZE  Monitor perfusion, , lactate, UOP, index and MVO2 if available  Maintain MAP > 65  Milrinone off 2/6  Dobutamine off 2/5  EP consulted for bradycardia resolved no need for Pacemaker  ASA,statin lopressor 12.5 q12      Resp:  Supplemental oxygen to maintain sats > 92%  RA 93%  Continuous pulse oximetry monitoring  BiPAP at night  NC during the day  IS / Chest PT  OOBTC and Ambulate  Nebulizers as needed - albuterol/atrovent  Advair    GI:  Heart healthy diet  Bowel Regimen - escalate as needed  PUD ppx per protocol    Renal  FLAKITO post surgery resolved   Baseline CKD  Monitor UOP, lytes, creatinine  1mg bumes BID [place] 5 daily    Keep K > 4, Mg > 2  Flomax, finasteride    Endo:  Tight glucose control per CTICU protocol  Lantus 40 / premeal 10  SSI    Heme:  Acute blood loss anemia post surgery  High risk for thrombocytopenia  DVT ppx with HSQ  Iron / FA / MVI    ID:  on Ancef per CTS x 7 days  Monitor fever curve and WBC count     Assessment  64M s/p MVR, LAAC, MAZE POD #12  Post op complicated by acute pulmonary insufficiency, FLAKITO, cardiogenic shock required  inotropes  still has petal edema  fluid balance neg 2.5 liters  still deconditioned need to walk more    Plan:    Neuro:  Multimodal pain management - no toradol, minimize opiates  100 TID gabapentin  0.25 xanax PRN at night  Tylenol, Lidoderm patch, opiates as needed  Monitor neuro status in the post op period      CV:  S/P MVR, LAAC, MAZE  Monitor perfusion, , lactate, UOP, index and MVO2 if available  Maintain MAP > 65  Milrinone off 2/6  Dobutamine off 2/5  EP consulted for bradycardia resolved no need for Pacemaker  ASA,statin lopressor 12.5 q12      Resp:  Supplemental oxygen to maintain sats > 92%  RA 93%  Continuous pulse oximetry monitoring  BiPAP at night  NC during the day  IS / Chest PT  OOBTC and Ambulate  Nebulizers as needed - albuterol/atrovent  Advair    GI:  Heart healthy diet  Bowel Regimen - escalate as needed  PUD ppx per protocol    Renal  FLAKITO post surgery resolved   Baseline CKD  Monitor UOP, lytes, creatinine  1mg bumes BID [place] 5 daily    Keep K > 4, Mg > 2  Flomax, finasteride    Endo:  Tight glucose control per [facility]ICU protocol  Lantus 40 / premeal 10  SSI    Heme:  Acute blood loss anemia post surgery  High risk for thrombocytopenia  DVT ppx with HSQ  Iron / FA / MVI    ID:  on Ancef per CTS x 7 days  Monitor fever curve and WBC count    Patient requires continuous monitoring with:  bedside rhythm monitoring, continuous  pulse oximetry monitoring, O2 supplement titrate for O2 sat above 90%  ;   Care plan discussed with CT ICU care team and attending surgeon Dr Zhao                         .  patient remain critical, at risk for life threatening decompensation; required more than usual post op care;   I have spent 35 minutes providing non routine post op care, revaluated multiple times.    Ivana Zhang MD  intensivist

## 2025-02-11 LAB
ALBUMIN SERPL ELPH-MCNC: 3.4 G/DL — LOW (ref 3.5–5.2)
ALP SERPL-CCNC: 151 U/L — HIGH (ref 30–115)
ALT FLD-CCNC: 16 U/L — SIGNIFICANT CHANGE UP (ref 0–41)
ANION GAP SERPL CALC-SCNC: 11 MMOL/L — SIGNIFICANT CHANGE UP (ref 7–14)
ANION GAP SERPL CALC-SCNC: 9 MMOL/L — SIGNIFICANT CHANGE UP (ref 7–14)
AST SERPL-CCNC: 30 U/L — SIGNIFICANT CHANGE UP (ref 0–41)
BILIRUB SERPL-MCNC: 0.3 MG/DL — SIGNIFICANT CHANGE UP (ref 0.2–1.2)
BUN SERPL-MCNC: 37 MG/DL — HIGH (ref 10–20)
BUN SERPL-MCNC: 39 MG/DL — HIGH (ref 10–20)
CALCIUM SERPL-MCNC: 9 MG/DL — SIGNIFICANT CHANGE UP (ref 8.4–10.5)
CALCIUM SERPL-MCNC: 9.1 MG/DL — SIGNIFICANT CHANGE UP (ref 8.4–10.5)
CHLORIDE SERPL-SCNC: 101 MMOL/L — SIGNIFICANT CHANGE UP (ref 98–110)
CHLORIDE SERPL-SCNC: 104 MMOL/L — SIGNIFICANT CHANGE UP (ref 98–110)
CO2 SERPL-SCNC: 31 MMOL/L — SIGNIFICANT CHANGE UP (ref 17–32)
CO2 SERPL-SCNC: 33 MMOL/L — HIGH (ref 17–32)
CREAT SERPL-MCNC: 0.9 MG/DL — SIGNIFICANT CHANGE UP (ref 0.7–1.5)
CREAT SERPL-MCNC: 0.9 MG/DL — SIGNIFICANT CHANGE UP (ref 0.7–1.5)
EGFR: 94 ML/MIN/1.73M2 — SIGNIFICANT CHANGE UP
EGFR: 94 ML/MIN/1.73M2 — SIGNIFICANT CHANGE UP
GLUCOSE BLDC GLUCOMTR-MCNC: 120 MG/DL — HIGH (ref 70–99)
GLUCOSE BLDC GLUCOMTR-MCNC: 134 MG/DL — HIGH (ref 70–99)
GLUCOSE BLDC GLUCOMTR-MCNC: 188 MG/DL — HIGH (ref 70–99)
GLUCOSE BLDC GLUCOMTR-MCNC: 82 MG/DL — SIGNIFICANT CHANGE UP (ref 70–99)
GLUCOSE BLDC GLUCOMTR-MCNC: 97 MG/DL — SIGNIFICANT CHANGE UP (ref 70–99)
GLUCOSE SERPL-MCNC: 110 MG/DL — HIGH (ref 70–99)
GLUCOSE SERPL-MCNC: 82 MG/DL — SIGNIFICANT CHANGE UP (ref 70–99)
HCT VFR BLD CALC: 30.3 % — LOW (ref 42–52)
HGB BLD-MCNC: 8.7 G/DL — LOW (ref 14–18)
MAGNESIUM SERPL-MCNC: 1.9 MG/DL — SIGNIFICANT CHANGE UP (ref 1.8–2.4)
MCHC RBC-ENTMCNC: 27.2 PG — SIGNIFICANT CHANGE UP (ref 27–31)
MCHC RBC-ENTMCNC: 28.7 G/DL — LOW (ref 32–37)
MCV RBC AUTO: 94.7 FL — HIGH (ref 80–94)
NRBC # BLD: 0 /100 WBCS — SIGNIFICANT CHANGE UP (ref 0–0)
NRBC BLD-RTO: 0 /100 WBCS — SIGNIFICANT CHANGE UP (ref 0–0)
PLATELET # BLD AUTO: 186 K/UL — SIGNIFICANT CHANGE UP (ref 130–400)
PMV BLD: 10 FL — SIGNIFICANT CHANGE UP (ref 7.4–10.4)
POTASSIUM SERPL-MCNC: 4.1 MMOL/L — SIGNIFICANT CHANGE UP (ref 3.5–5)
POTASSIUM SERPL-MCNC: 4.8 MMOL/L — SIGNIFICANT CHANGE UP (ref 3.5–5)
POTASSIUM SERPL-SCNC: 4.1 MMOL/L — SIGNIFICANT CHANGE UP (ref 3.5–5)
POTASSIUM SERPL-SCNC: 4.8 MMOL/L — SIGNIFICANT CHANGE UP (ref 3.5–5)
PROT SERPL-MCNC: 5.6 G/DL — LOW (ref 6–8)
RBC # BLD: 3.2 M/UL — LOW (ref 4.7–6.1)
RBC # FLD: 20.3 % — HIGH (ref 11.5–14.5)
SODIUM SERPL-SCNC: 143 MMOL/L — SIGNIFICANT CHANGE UP (ref 135–146)
SODIUM SERPL-SCNC: 146 MMOL/L — SIGNIFICANT CHANGE UP (ref 135–146)
WBC # BLD: 9.71 K/UL — SIGNIFICANT CHANGE UP (ref 4.8–10.8)
WBC # FLD AUTO: 9.71 K/UL — SIGNIFICANT CHANGE UP (ref 4.8–10.8)

## 2025-02-11 PROCEDURE — 71045 X-RAY EXAM CHEST 1 VIEW: CPT | Mod: 26

## 2025-02-11 PROCEDURE — 93010 ELECTROCARDIOGRAM REPORT: CPT

## 2025-02-11 PROCEDURE — 99291 CRITICAL CARE FIRST HOUR: CPT

## 2025-02-11 PROCEDURE — 99232 SBSQ HOSP IP/OBS MODERATE 35: CPT | Mod: 24

## 2025-02-11 RX ORDER — ACETAMINOPHEN 500 MG/5ML
1000 LIQUID (ML) ORAL ONCE
Refills: 0 | Status: COMPLETED | OUTPATIENT
Start: 2025-02-11 | End: 2025-02-11

## 2025-02-11 RX ORDER — BUMETANIDE 1 MG/1
1 TABLET ORAL ONCE
Refills: 0 | Status: COMPLETED | OUTPATIENT
Start: 2025-02-11 | End: 2025-02-11

## 2025-02-11 RX ORDER — LIDOCAINE HYDROCHLORIDE 20 MG/ML
1 JELLY TOPICAL ONCE
Refills: 0 | Status: COMPLETED | OUTPATIENT
Start: 2025-02-11 | End: 2025-02-11

## 2025-02-11 RX ADMIN — HEPARIN SODIUM 5000 UNIT(S): 1000 INJECTION INTRAVENOUS; SUBCUTANEOUS at 13:36

## 2025-02-11 RX ADMIN — TAMSULOSIN HYDROCHLORIDE 0.4 MILLIGRAM(S): 0.4 CAPSULE ORAL at 21:07

## 2025-02-11 RX ADMIN — Medication 81 MILLIGRAM(S): at 13:34

## 2025-02-11 RX ADMIN — GABAPENTIN 100 MILLIGRAM(S): 400 CAPSULE ORAL at 05:16

## 2025-02-11 RX ADMIN — OXYCODONE HYDROCHLORIDE 5 MILLIGRAM(S): 30 TABLET ORAL at 05:40

## 2025-02-11 RX ADMIN — GABAPENTIN 100 MILLIGRAM(S): 400 CAPSULE ORAL at 13:34

## 2025-02-11 RX ADMIN — Medication 2 TABLET(S): at 21:07

## 2025-02-11 RX ADMIN — FLUTICASONE PROPIONATE 1 SPRAY(S): 50 SPRAY, METERED NASAL at 05:39

## 2025-02-11 RX ADMIN — HEPARIN SODIUM 5000 UNIT(S): 1000 INJECTION INTRAVENOUS; SUBCUTANEOUS at 05:17

## 2025-02-11 RX ADMIN — LIDOCAINE HYDROCHLORIDE 1 PATCH: 20 JELLY TOPICAL at 21:37

## 2025-02-11 RX ADMIN — IPRATROPIUM BROMIDE AND ALBUTEROL SULFATE 3 MILLILITER(S): .5; 2.5 SOLUTION RESPIRATORY (INHALATION) at 19:29

## 2025-02-11 RX ADMIN — SERTRALINE 25 MILLIGRAM(S): 100 TABLET, FILM COATED ORAL at 13:34

## 2025-02-11 RX ADMIN — Medication 1 APPLICATION(S): at 13:35

## 2025-02-11 RX ADMIN — DEXTROMETHORPHAN HBR, GUAIFENESIN 600 MILLIGRAM(S): 200 LIQUID ORAL at 17:27

## 2025-02-11 RX ADMIN — OXYCODONE HYDROCHLORIDE 5 MILLIGRAM(S): 30 TABLET ORAL at 21:36

## 2025-02-11 RX ADMIN — FINASTERIDE 5 MILLIGRAM(S): 1 TABLET, FILM COATED ORAL at 13:34

## 2025-02-11 RX ADMIN — DEXTROMETHORPHAN HBR, GUAIFENESIN 600 MILLIGRAM(S): 200 LIQUID ORAL at 05:17

## 2025-02-11 RX ADMIN — ROSUVASTATIN CALCIUM 20 MILLIGRAM(S): 20 TABLET, FILM COATED ORAL at 21:06

## 2025-02-11 RX ADMIN — OXYCODONE HYDROCHLORIDE 5 MILLIGRAM(S): 30 TABLET ORAL at 14:04

## 2025-02-11 RX ADMIN — METOPROLOL SUCCINATE 12.5 MILLIGRAM(S): 50 TABLET, EXTENDED RELEASE ORAL at 17:27

## 2025-02-11 RX ADMIN — BUMETANIDE 1 MILLIGRAM(S): 1 TABLET ORAL at 05:16

## 2025-02-11 RX ADMIN — FLUTICASONE PROPIONATE 1 SPRAY(S): 50 SPRAY, METERED NASAL at 17:51

## 2025-02-11 RX ADMIN — IPRATROPIUM BROMIDE AND ALBUTEROL SULFATE 3 MILLILITER(S): .5; 2.5 SOLUTION RESPIRATORY (INHALATION) at 08:59

## 2025-02-11 RX ADMIN — Medication 1 PACKET(S): at 17:28

## 2025-02-11 RX ADMIN — OXYCODONE HYDROCHLORIDE 5 MILLIGRAM(S): 30 TABLET ORAL at 13:34

## 2025-02-11 RX ADMIN — Medication 1000 MILLIGRAM(S): at 10:44

## 2025-02-11 RX ADMIN — OXYCODONE HYDROCHLORIDE 5 MILLIGRAM(S): 30 TABLET ORAL at 21:06

## 2025-02-11 RX ADMIN — Medication 0.25 MILLIGRAM(S): at 21:06

## 2025-02-11 RX ADMIN — Medication 5 MILLIGRAM(S): at 21:06

## 2025-02-11 RX ADMIN — LIDOCAINE HYDROCHLORIDE 1 PATCH: 20 JELLY TOPICAL at 10:14

## 2025-02-11 RX ADMIN — INSULIN LISPRO 10 UNIT(S): 100 INJECTION, SOLUTION INTRAVENOUS; SUBCUTANEOUS at 06:39

## 2025-02-11 RX ADMIN — METOPROLOL SUCCINATE 12.5 MILLIGRAM(S): 50 TABLET, EXTENDED RELEASE ORAL at 05:16

## 2025-02-11 RX ADMIN — INSULIN GLARGINE-YFGN 40 UNIT(S): 100 INJECTION, SOLUTION SUBCUTANEOUS at 05:22

## 2025-02-11 RX ADMIN — Medication 1 PACKET(S): at 05:22

## 2025-02-11 RX ADMIN — IPRATROPIUM BROMIDE AND ALBUTEROL SULFATE 3 MILLILITER(S): .5; 2.5 SOLUTION RESPIRATORY (INHALATION) at 14:43

## 2025-02-11 RX ADMIN — FOLIC ACID 1 MILLIGRAM(S): 1 TABLET ORAL at 13:34

## 2025-02-11 RX ADMIN — LIDOCAINE HYDROCHLORIDE 1 PATCH: 20 JELLY TOPICAL at 19:40

## 2025-02-11 RX ADMIN — OXYCODONE HYDROCHLORIDE 5 MILLIGRAM(S): 30 TABLET ORAL at 05:17

## 2025-02-11 RX ADMIN — Medication 40 MILLIGRAM(S): at 05:17

## 2025-02-11 RX ADMIN — GABAPENTIN 100 MILLIGRAM(S): 400 CAPSULE ORAL at 21:06

## 2025-02-11 RX ADMIN — INSULIN LISPRO 10 UNIT(S): 100 INJECTION, SOLUTION INTRAVENOUS; SUBCUTANEOUS at 16:31

## 2025-02-11 RX ADMIN — Medication 1 DOSE(S): at 17:30

## 2025-02-11 RX ADMIN — Medication 400 MILLIGRAM(S): at 10:14

## 2025-02-11 RX ADMIN — BUMETANIDE 1 MILLIGRAM(S): 1 TABLET ORAL at 16:31

## 2025-02-11 RX ADMIN — BUMETANIDE 1 MILLIGRAM(S): 1 TABLET ORAL at 05:15

## 2025-02-11 RX ADMIN — INSULIN LISPRO 2: 100 INJECTION, SOLUTION INTRAVENOUS; SUBCUTANEOUS at 13:36

## 2025-02-11 RX ADMIN — HEPARIN SODIUM 5000 UNIT(S): 1000 INJECTION INTRAVENOUS; SUBCUTANEOUS at 21:06

## 2025-02-11 RX ADMIN — INSULIN LISPRO 10 UNIT(S): 100 INJECTION, SOLUTION INTRAVENOUS; SUBCUTANEOUS at 13:36

## 2025-02-11 NOTE — PROGRESS NOTE ADULT - SUBJECTIVE AND OBJECTIVE BOX
· Subjective and Objective:   HPI: 64M w/ afib, nonobstructive CAD, CHF class 3, severe MR, DM, HTN, JIMMY on CPAP, asthma, severe obesity, HL, Madelung's disease, osteoarthritis s/p bilateral hip replacement, peripheral neuropathy presented with increasing dyspnea with exertion found to have severe MR.  He was evaluated for mitral clip and not a candidate.  On prior admission he had FLAKITO with creat up to 2.  Was optimized with diuresis.  He was on AC and developed hemorrhoidal bleeding and was taken off AC.  He was discharged and returned for his procedure on 1/28    OR Data  Procedure: Procedure: MVReplacement (bioprosthetic), SHEILA ligation, MAZE  Bypass: 202  Cross Clamp: 158  Pre LV Fxn: 50-55%      RV Fxn: normal  Post LV Fxn: 45-50%     RV Fxn: normal    moderate TR, MV gradient 3mmHg  Blood Products: 1uPRBC, ddavp 39mcq  Cell Saver: 698  Fluids: NS 2000, albumin 2000  Pacing Wires: V pacing. sinus rhythm  UO: 1100    1/28 - OP day :  Fany and poor oxygenation in OR. Milrinone, norepi, vaso. Extubation in evening.  1/29 - start aspirin, diuresis, heparin dvt prophylaxis  1/30- d/c pleural ct if drainage dec; wean to dc primacor; wean high flow o2  1/31 - FLAKITO worsening, bradyarrythmias - wenchebach - ep consulted  2/2 - Chest tube removed, ambulating, creat improving, dobutamine weaned  2/3 - Dobut to 1.5, BUN elevated  2/4 - Confused this AM, normal neuro exam, prince removed  2/5 - FLAKITO improving, off dobutamine, mil decreased   2/6 - milrinone stopped, bumex 1mg BID, [facility]     09 Feb 2025 13:41    Procedure: MV Replacemant Maze  La Ligation  POD#: 15      He has history of Diabetes    HTN (hypertension)    Obstructive sleep apnea on CPAP    Asthma    Obesity    Peripheral neuropathy    Arthritis    Hypercholesteremia    Madelung's disease    FH: mitral regurgitation    CHF NYHA class III    Atrial fibrillation    JIMMY on CPAP    OBJECTIVE:  ICU Vital Signs Last 24 Hrs  T(C): 35.6 (10 Feb 2025 20:00), Max: 35.6 (10 Feb 2025 20:00)  T(F): 96 (10 Feb 2025 20:00), Max: 96 (10 Feb 2025 20:00)  HR: 80 (11 Feb 2025 06:00) (76 - 91)  BP: 133/72 (11 Feb 2025 06:00) (126/59 - 172/85)  BP(mean): 96 (11 Feb 2025 06:00) (85 - 119)  ABP: --  ABP(mean): --  RR: 15 (11 Feb 2025 06:00) (14 - 25)  SpO2: 97% (11 Feb 2025 06:00) (94% - 100%)    O2 Parameters below as of 11 Feb 2025 06:00  Patient On (Oxygen Delivery Method): nasal cannula  O2 Flow (L/min): 4        I&O's Summary    10 Feb 2025 07:01  -  11 Feb 2025 07:00  --------------------------------------------------------  IN: 0 mL / OUT: 2350 mL / NET: -2350 mL      I&O's Detail    10 Feb 2025 07:01  -  11 Feb 2025 07:00  --------------------------------------------------------  IN:  Total IN: 0 mL    OUT:    Voided (mL): 2350 mL  Total OUT: 2350 mL    Total NET: -2350 mL    CAPILLARY BLOOD GLUCOSE      POCT Blood Glucose.: 97 mg/dL (11 Feb 2025 06:36)  POCT Blood Glucose.: 82 mg/dL (11 Feb 2025 05:20)  POCT Blood Glucose.: 165 mg/dL (10 Feb 2025 21:41)  POCT Blood Glucose.: 196 mg/dL (10 Feb 2025 17:52)  POCT Blood Glucose.: 494 mg/dL (10 Feb 2025 16:52)  POCT Blood Glucose.: 213 mg/dL (10 Feb 2025 10:06)    LABS:                          8.7    9.71  )-----------( 186      ( 11 Feb 2025 04:25 )             30.3     02-11    146  |  104  |  37[H]  ----------------------------<  82  4.8   |  31  |  0.9    Ca    9.1      11 Feb 2025 04:25  Mg     1.9     02-11    TPro  5.6[L]  /  Alb  3.4[L]  /  TBili  0.3  /  DBili  x   /  AST  30  /  ALT  16  /  AlkPhos  151[H]  02-11      Urinalysis Basic - ( 11 Feb 2025 04:25 )    Color: x / Appearance: x / SG: x / pH: x  Gluc: 82 mg/dL / Ketone: x  / Bili: x / Urobili: x   Blood: x / Protein: x / Nitrite: x   Leuk Esterase: x / RBC: x / WBC x   Sq Epi: x / Non Sq Epi: x / Bacteria: x        Home Medications:  Breo Ellipta 200 mcg-25 mcg/inh inhalation powder: 1 inhaled once a day (28 Jan 2025 06:52)  HYDROcodone 10 mg oral capsule, extended release: 1 cap(s) orally 2 times a day (28 Jan 2025 06:52)  losartan 50 mg oral tablet: 1 tab(s) orally 2 times a day (28 Jan 2025 06:52)  losartan-hydrochlorothiazide 50 mg-12.5 mg oral tablet: 1 tab(s) orally (28 Jan 2025 06:52)  ProAir HFA 90 mcg/inh inhalation aerosol: 2 puff(s) inhaled 2 times a day (28 Jan 2025 06:52)  tujeo: 80 unit(s) subcutaneous once a day (28 Jan 2025 06:52)    HOSPITAL MEDICATIONS:  MEDICATIONS  (STANDING):  albuterol/ipratropium for Nebulization 3 milliLiter(s) Nebulizer every 6 hours  aspirin enteric coated 81 milliGRAM(s) Oral daily  buMETAnide Injectable 1 milliGRAM(s) IV Push every 12 hours  chlorhexidine 2% Cloths 1 Application(s) Topical daily  dextrose 5%. 1000 milliLiter(s) (50 mL/Hr) IV Continuous <Continuous>  dextrose 5%. 1000 milliLiter(s) (100 mL/Hr) IV Continuous <Continuous>  dextrose 50% Injectable 50 milliLiter(s) IV Push every 15 minutes  dextrose 50% Injectable 25 milliLiter(s) IV Push every 15 minutes  finasteride 5 milliGRAM(s) Oral daily  fluticasone propionate 50 MICROgram(s)/spray Nasal Spray 1 Spray(s) Both Nostrils every 12 hours  fluticasone propionate/ salmeterol 250-50 MICROgram(s) Diskus 1 Dose(s) Inhalation two times a day  folic acid 1 milliGRAM(s) Oral daily  gabapentin 100 milliGRAM(s) Oral every 8 hours  glucagon  Injectable 1 milliGRAM(s) IntraMuscular once  guaiFENesin  milliGRAM(s) Oral every 12 hours  heparin   Injectable 5000 Unit(s) SubCutaneous every 8 hours  influenza  Vaccine (HIGH DOSE) 0.5 milliLiter(s) IntraMuscular once  insulin glargine Injectable (LANTUS) 40 Unit(s) SubCutaneous before breakfast  insulin lispro (ADMELOG) corrective regimen sliding scale   SubCutaneous three times a day before meals  insulin lispro (ADMELOG) corrective regimen sliding scale   SubCutaneous at bedtime  insulin lispro Injectable (ADMELOG) 10 Unit(s) SubCutaneous three times a day before meals  metolazone 5 milliGRAM(s) Oral daily  metoprolol tartrate 12.5 milliGRAM(s) Oral every 12 hours  mupirocin 2% Nasal 1 Application(s) Both Nostrils every 12 hours  pantoprazole    Tablet 40 milliGRAM(s) Oral before breakfast  psyllium Powder 1 Packet(s) Oral every 12 hours  rosuvastatin 20 milliGRAM(s) Oral at bedtime  senna 2 Tablet(s) Oral at bedtime  sertraline 25 milliGRAM(s) Oral daily  tamsulosin 0.4 milliGRAM(s) Oral at bedtime    MEDICATIONS  (PRN):  albuterol    90 MICROgram(s) HFA Inhaler 2 Puff(s) Inhalation every 6 hours PRN Shortness of Breath  ALPRAZolam 0.25 milliGRAM(s) Oral at bedtime PRN anxiety  dextrose Oral Gel 15 Gram(s) Oral once PRN Blood Glucose LESS THAN 70 milliGRAM(s)/deciliter  hydrALAZINE Injectable 5 milliGRAM(s) IV Push every 4 hours PRN Systolic > 160  labetalol Injectable 10 milliGRAM(s) IV Push every 6 hours PRN Diastolic blood pressure >155  melatonin 5 milliGRAM(s) Oral at bedtime PRN Insomnia  ondansetron Injectable 4 milliGRAM(s) IV Push every 4 hours PRN Nausea and/or Vomiting  oxyCODONE    IR 5 milliGRAM(s) Oral every 6 hours PRN Moderate Pain (4 - 6)    RADIOLOGY:  Chest X-ray Reviewed    REVIEW OF SYSTEMS:  CONSTITUTIONAL: [X] all negative; [ ] weakness, [ ] fevers, [ ] chills  EYES/ENT: [X] all negative; [ ] visual changes, [ ] vertigo, [ ] throat pain   NECK: [X] all negative; [ ] pain, [ ] stiffness  RESPIRATORY: [x] all negative, [ ] cough, [ ] wheezing, [ ] hemoptysis, [ ] shortness of breath  CARDIOVASCULAR: [x] all negative; [ ] chest pain, [ ] palpitations, [ ] orthopnea  GASTROINTESTINAL: [X] all negative; [ ]abdominal pain, [ ] nausea, [ ] vomiting, [ ] hematemesis, [ ] diarrhea, [ ] constipation, [ ] melena, [ ] hematochezia.  GENITOURINARY: [X] all negative; [ ] dysuria, [ ] frequency, [ ] hematuria  NEUROLOGICAL: [X] all negative; [ ] numbness, [ ] weakness  SKIN: [X] all negative; [ ] itching, [ ] burning, [ ] rashes, [ ] lesions   All other review of systems is negative unless indicated above.    PHYSICAL EXAM:          CONSTITUTIONAL: Well-developed; well-nourished; in no acute distress.   	SKIN: warm, dry  	HEAD: Normocephalic; atraumatic.  	EYES: PERRL, EOM, no conj injection, sclera clear  	ENT: No nasal discharge; airway clear.  	NECK: Supple; non tender.   	CARD: S1, S2 normal; no murmurs, gallops, or rubs. Regular rate and rhythm.  no carotid bruits  	RESP: CTA B/L; good air movement No wheezes, rales or rhonchi.  	ABD: Soft, not tender, not distended,  no rebound or guarding, bowel sounds present  	EXT:+ Edema  	NEURO: Alert, awake, motor 5/5 R, 5/5 L

## 2025-02-11 NOTE — CONSULT NOTE ADULT - SUBJECTIVE AND OBJECTIVE BOX
HPI: 64M w/ afib, nonobstructive CAD, CHF class 3, severe MR, DM, HTN, JIMMY on CPAP, asthma, severe obesity, HL, Madelung's disease, osteoarthritis s/p bilateral hip replacement, peripheral neuropathy presented with increasing dyspnea with exertion found to have severe MR.  He was evaluated for mitral clip and not a candidate. S/P  MVR/SHEILA ligation and MAZE for severe MR / afib  on 1/28/25.     Post op complicated by acute pulmonary insufficiency, FLAKITO, cardiogenic shock req inotropes      PAST MEDICAL & SURGICAL HISTORY:  Diabetes  20 yrs      HTN (hypertension)      Obstructive sleep apnea on CPAP      Asthma      Obesity      Peripheral neuropathy  related to diabetes      Arthritis      Hypercholesteremia      Madelung's disease      FH: mitral regurgitation      CHF NYHA class III      Atrial fibrillation      JIMMY on CPAP      H/O lipoma  times 2-3      History of hip replacement, total, bilateral  two separate times '17, '07      Fracture of orbital floor      Encounter for screening colonoscopy      History of neck surgery          Hospital Course:    TODAY'S SUBJECTIVE & REVIEW OF SYMPTOMS:     Constitutional WNL   Cardio WNL   Resp sob    GI WNL  Heme WNL  Endo WNL  Skin WNL  MSK WNL  Neuro WNL  Cognitive WNL  Psych WNL      MEDICATIONS  (STANDING):  albuterol/ipratropium for Nebulization 3 milliLiter(s) Nebulizer every 6 hours  aspirin enteric coated 81 milliGRAM(s) Oral daily  buMETAnide Injectable 1 milliGRAM(s) IV Push every 12 hours  chlorhexidine 2% Cloths 1 Application(s) Topical daily  dextrose 5%. 1000 milliLiter(s) (50 mL/Hr) IV Continuous <Continuous>  dextrose 5%. 1000 milliLiter(s) (100 mL/Hr) IV Continuous <Continuous>  dextrose 50% Injectable 50 milliLiter(s) IV Push every 15 minutes  dextrose 50% Injectable 25 milliLiter(s) IV Push every 15 minutes  finasteride 5 milliGRAM(s) Oral daily  fluticasone propionate 50 MICROgram(s)/spray Nasal Spray 1 Spray(s) Both Nostrils every 12 hours  fluticasone propionate/ salmeterol 250-50 MICROgram(s) Diskus 1 Dose(s) Inhalation two times a day  folic acid 1 milliGRAM(s) Oral daily  gabapentin 100 milliGRAM(s) Oral every 8 hours  glucagon  Injectable 1 milliGRAM(s) IntraMuscular once  guaiFENesin  milliGRAM(s) Oral every 12 hours  heparin   Injectable 5000 Unit(s) SubCutaneous every 8 hours  influenza  Vaccine (HIGH DOSE) 0.5 milliLiter(s) IntraMuscular once  insulin glargine Injectable (LANTUS) 40 Unit(s) SubCutaneous before breakfast  insulin lispro (ADMELOG) corrective regimen sliding scale   SubCutaneous three times a day before meals  insulin lispro (ADMELOG) corrective regimen sliding scale   SubCutaneous at bedtime  insulin lispro Injectable (ADMELOG) 10 Unit(s) SubCutaneous three times a day before meals  metolazone 5 milliGRAM(s) Oral daily  metoprolol tartrate 12.5 milliGRAM(s) Oral every 12 hours  mupirocin 2% Nasal 1 Application(s) Both Nostrils every 12 hours  pantoprazole    Tablet 40 milliGRAM(s) Oral before breakfast  psyllium Powder 1 Packet(s) Oral every 12 hours  rosuvastatin 20 milliGRAM(s) Oral at bedtime  senna 2 Tablet(s) Oral at bedtime  sertraline 25 milliGRAM(s) Oral daily  tamsulosin 0.4 milliGRAM(s) Oral at bedtime    MEDICATIONS  (PRN):  albuterol    90 MICROgram(s) HFA Inhaler 2 Puff(s) Inhalation every 6 hours PRN Shortness of Breath  ALPRAZolam 0.25 milliGRAM(s) Oral at bedtime PRN anxiety  dextrose Oral Gel 15 Gram(s) Oral once PRN Blood Glucose LESS THAN 70 milliGRAM(s)/deciliter  hydrALAZINE Injectable 5 milliGRAM(s) IV Push every 4 hours PRN Systolic > 160  labetalol Injectable 10 milliGRAM(s) IV Push every 6 hours PRN Diastolic blood pressure >155  melatonin 5 milliGRAM(s) Oral at bedtime PRN Insomnia  ondansetron Injectable 4 milliGRAM(s) IV Push every 4 hours PRN Nausea and/or Vomiting  oxyCODONE    IR 5 milliGRAM(s) Oral every 6 hours PRN Moderate Pain (4 - 6)      FAMILY HISTORY:  Family history of diabetes mellitus (Mother, Sibling)    CAD (coronary artery disease) (Mother, Father)        Allergies    No Known Allergies    Intolerances        SOCIAL HISTORY:    [  ] Etoh  [  ] Smoking  [  ] Substance abuse     Home Environment:  [   ] Home Alone  [ x  ] Lives with Family  [   ] Home Health Aid    Dwelling:  [   ] Apartment  [  x ] Private House  [   ] Adult Home  [   ] Skilled Nursing Facility      [   ] Short Term  [   ] Long Term  [ x  ] Stairs       Elevator [   ]    FUNCTIONAL STATUS PTA: (Check all that apply)  Ambulation: [  x  ]Independent    [   ] Dependent     [   ] Non-Ambulatory  Assistive Device: [ x  ] SA Cane  [   ]  Q Cane  [   ] Walker  [   ]  Wheelchair  ADL : [  x ] Independent  [    ]  Dependent       Vital Signs Last 24 Hrs  T(C): 37.2 (11 Feb 2025 16:00), Max: 37.2 (11 Feb 2025 16:00)  T(F): 98.9 (11 Feb 2025 16:00), Max: 98.9 (11 Feb 2025 16:00)  HR: 84 (11 Feb 2025 18:00) (76 - 87)  BP: 113/54 (11 Feb 2025 17:00) (113/54 - 146/78)  BP(mean): 78 (11 Feb 2025 17:00) (78 - 101)  RR: 14 (11 Feb 2025 18:00) (14 - 32)  SpO2: 97% (11 Feb 2025 18:00) (93% - 100%)    Parameters below as of 11 Feb 2025 18:00  Patient On (Oxygen Delivery Method): nasal cannula  O2 Flow (L/min): 3        PHYSICAL EXAM: Awake & Alert  GENERAL: NAD  HEAD:  Normocephalic  CHEST/LUNG: Clear   HEART: S1S2+  ABDOMEN: Soft, Nontender  EXTREMITIES:  + edema wm LE     NERVOUS SYSTEM:  Cranial Nerves 2-12 intact [   ] Abnormal  [   ]  ROM: WFL all extremities [  x ]  Abnormal [   ]  Motor Strength: WFL all extremities  [x   ]  Abnormal [   ]  Sensation: intact to light touch [ x  ] Abnormal [   ]    FUNCTIONAL STATUS:  Bed Mobility: Independent [   ]  Supervision [   ]  Needs Assistance [  x ]  N/A [   ]  Transfers: Independent [   ]  Supervision [   ]  Needs Assistance [  x ]  N/A [   ]   Ambulation: Independent [   ]  Supervision [   ]  Needs Assistance [x   ]  N/A [   ]  ADL: Independent [   ] Requires Assistance [   ] N/A [   ]      LABS:                        8.7    9.71  )-----------( 186      ( 11 Feb 2025 04:25 )             30.3     02-11    143  |  101  |  39[H]  ----------------------------<  110[H]  4.1   |  33[H]  |  0.9    Ca    9.0      11 Feb 2025 17:10  Mg     1.9     02-11    TPro  5.6[L]  /  Alb  3.4[L]  /  TBili  0.3  /  DBili  x   /  AST  30  /  ALT  16  /  AlkPhos  151[H]  02-11      Urinalysis Basic - ( 11 Feb 2025 17:10 )    Color: x / Appearance: x / SG: x / pH: x  Gluc: 110 mg/dL / Ketone: x  / Bili: x / Urobili: x   Blood: x / Protein: x / Nitrite: x   Leuk Esterase: x / RBC: x / WBC x   Sq Epi: x / Non Sq Epi: x / Bacteria: x        RADIOLOGY & ADDITIONAL STUDIES:

## 2025-02-11 NOTE — PROGRESS NOTE ADULT - ASSESSMENT
Assessment  64M s/p MVR, LAAC, MAZE POD #15  Post op complicated by acute pulmonary insufficiency, FLAKITO, cardiogenic shock req inotropes    Plan:    Neuro:  Multimodal pain management - no toradol, minimize opiates  100 TID gabapentin  0.25 xanax PRN at night  Tylenol, Lidoderm patch, opiates as needed  Monitor neuro status in the post op period      CV:  S/P MVR, LAAC, MAZE  Monitor perfusion, , lactate, UOP, index and MVO2 if available  Maintain MAP > 65  Milrinone off 2/6  Dobutamine off 2/5  EP consulted for bradycardia resolved no need for Pacemaker  ASA,statin lopressor 12.5 q12      Resp:  Supplemental oxygen to maintain sats > 92%  RA 93%  Continuous pulse oximetry monitoring  BiPAP at night  NC during the day  IS / Chest PT  OOBTC and Ambulate  Nebulizers as needed - albuterol/atrovent  Advair    GI:  Heart healthy diet  Bowel Regimen - escalate as needed  PUD ppx per protocol    Renal  FLAKITO post surgery resolved   Baseline CKD  Monitor UOP, lytes, creatinine  Diuresis    Keep K > 4, Mg > 2  Flomax, finasteride    Endo:  Tight glucose control per CTICU protocol  Lantus 40 / premeal 10  SSI    Heme:  Acute blood loss anemia post surgery  High risk for thrombocytopenia  DVT ppx with HSQ  Iron / FA / MVI    ID:  Monitor fever curve and WBC count    Patient requires continuous monitoring with:  bedside rhythm monitoring, continuous  pulse oximetry monitoring, O2 supplement titrate for O2 sat above 90%  ;   Care plan discussed with CT ICU care team and attending surgeon Dr Zhao                         .  patient remain critical, at risk for life threatening decompensation; required more than usual post op care;   I have spent 35 minutes providing non routine post op care, revaluated multiple times.

## 2025-02-11 NOTE — PROGRESS NOTE ADULT - SUBJECTIVE AND OBJECTIVE BOX
OPERATIVE PROCEDURE(s):                POD #                       64yMale  SURGEON(s): ISELA Blakely  SUBJECTIVE ASSESSMENT:     Vital Signs Last 24 Hrs  T(F): 96 (10 Feb 2025 20:00), Max: 96 (10 Feb 2025 20:00)  HR: 80 (11 Feb 2025 06:00) (76 - 91)  BP: 133/72 (11 Feb 2025 06:00) (126/59 - 172/85)  BP(mean): 96 (11 Feb 2025 06:00) (85 - 119)  ABP: --  ABP(mean): --  RR: 15 (11 Feb 2025 06:00) (14 - 25)  SpO2: 97% (11 Feb 2025 06:00) (94% - 100%)  CVP(mm Hg): --  CVP(cm H2O): --  CO: --  CI: --  PA: --  SVR: --    I&O's Detail    10 Feb 2025 07:01  -  11 Feb 2025 07:00  --------------------------------------------------------  IN:  Total IN: 0 mL    OUT:    Voided (mL): 2350 mL  Total OUT: 2350 mL        Net:   I&O's Detail    09 Feb 2025 07:01  -  10 Feb 2025 07:00  --------------------------------------------------------  Total NET: -1095 mL      10 Feb 2025 07:01  -  11 Feb 2025 07:00  --------------------------------------------------------  Total NET: -2350 mL        CAPILLARY BLOOD GLUCOSE      POCT Blood Glucose.: 97 mg/dL (11 Feb 2025 06:36)  POCT Blood Glucose.: 82 mg/dL (11 Feb 2025 05:20)  POCT Blood Glucose.: 165 mg/dL (10 Feb 2025 21:41)  POCT Blood Glucose.: 196 mg/dL (10 Feb 2025 17:52)  POCT Blood Glucose.: 494 mg/dL (10 Feb 2025 16:52)  POCT Blood Glucose.: 213 mg/dL (10 Feb 2025 10:06)  POCT Blood Glucose.: 221 mg/dL (10 Feb 2025 07:41)    Physical Exam:  General: NAD; A&Ox3  Cardiac: S1/S2, RRR, no murmur, no rubs  Lungs: unlabored shallow respirations, bilateral bs decreased at bases  Abdomen: Soft/NT/protuberant  Sternum: Intact, no click, incision healing well with no drainage  Incisions: Incisions clean/dry/intact  Extremities: No edema b/l lower extremities    Central Venous Catheter: Yes[]  critical patient   Caceres Catheter: Yes  [] , critical patient strict I&O  NGT: Yes []   EPICARDIAL WIRES:  [] YES  BOWEL MOVEMENT:  [] YES [] NO,   CHEST TUBE(Left/Right):  [] YES [] NO      LABS:                        8.7[L]  9.71  )-----------( 186      ( 11 Feb 2025 04:25 )             30.3[L]                        9.1[L]  13.16[H] )-----------( 224      ( 10 Feb 2025 12:00 )             31.4[L]    02-11    146  |  104  |  37[H]  ----------------------------<  82  4.8   |  31  |  0.9  02-10    142  |  103  |  44[H]  ----------------------------<  228[H]  4.3   |  30  |  1.0    Ca    9.1      11 Feb 2025 04:25  Mg     1.9     02-11    TPro  5.6[L] [6.0 - 8.0]  /  Alb  3.4[L] [3.5 - 5.2]  /  TBili  0.3 [0.2 - 1.2]  /  DBili  x   /  AST  30 [0 - 41]  /  ALT  16 [0 - 41]  /  AlkPhos  151[H] [30 - 115]  02-11          RADIOLOGY & ADDITIONAL TESTS:  CXR:  EKG:  MEDICATIONS  (STANDING):  albuterol/ipratropium for Nebulization 3 milliLiter(s) Nebulizer every 6 hours  aspirin enteric coated 81 milliGRAM(s) Oral daily  buMETAnide Injectable 1 milliGRAM(s) IV Push every 12 hours  chlorhexidine 2% Cloths 1 Application(s) Topical daily  dextrose 5%. 1000 milliLiter(s) (50 mL/Hr) IV Continuous <Continuous>  dextrose 5%. 1000 milliLiter(s) (100 mL/Hr) IV Continuous <Continuous>  dextrose 50% Injectable 50 milliLiter(s) IV Push every 15 minutes  dextrose 50% Injectable 25 milliLiter(s) IV Push every 15 minutes  finasteride 5 milliGRAM(s) Oral daily  fluticasone propionate 50 MICROgram(s)/spray Nasal Spray 1 Spray(s) Both Nostrils every 12 hours  fluticasone propionate/ salmeterol 250-50 MICROgram(s) Diskus 1 Dose(s) Inhalation two times a day  folic acid 1 milliGRAM(s) Oral daily  gabapentin 100 milliGRAM(s) Oral every 8 hours  glucagon  Injectable 1 milliGRAM(s) IntraMuscular once  guaiFENesin  milliGRAM(s) Oral every 12 hours  heparin   Injectable 5000 Unit(s) SubCutaneous every 8 hours  influenza  Vaccine (HIGH DOSE) 0.5 milliLiter(s) IntraMuscular once  insulin glargine Injectable (LANTUS) 40 Unit(s) SubCutaneous before breakfast  insulin lispro (ADMELOG) corrective regimen sliding scale   SubCutaneous three times a day before meals  insulin lispro (ADMELOG) corrective regimen sliding scale   SubCutaneous at bedtime  insulin lispro Injectable (ADMELOG) 10 Unit(s) SubCutaneous three times a day before meals  metolazone 5 milliGRAM(s) Oral daily  metoprolol tartrate 12.5 milliGRAM(s) Oral every 12 hours  mupirocin 2% Nasal 1 Application(s) Both Nostrils every 12 hours  pantoprazole    Tablet 40 milliGRAM(s) Oral before breakfast  psyllium Powder 1 Packet(s) Oral every 12 hours  rosuvastatin 20 milliGRAM(s) Oral at bedtime  senna 2 Tablet(s) Oral at bedtime  sertraline 25 milliGRAM(s) Oral daily  tamsulosin 0.4 milliGRAM(s) Oral at bedtime    MEDICATIONS  (PRN):  albuterol    90 MICROgram(s) HFA Inhaler 2 Puff(s) Inhalation every 6 hours PRN Shortness of Breath  ALPRAZolam 0.25 milliGRAM(s) Oral at bedtime PRN anxiety  dextrose Oral Gel 15 Gram(s) Oral once PRN Blood Glucose LESS THAN 70 milliGRAM(s)/deciliter  hydrALAZINE Injectable 5 milliGRAM(s) IV Push every 4 hours PRN Systolic > 160  labetalol Injectable 10 milliGRAM(s) IV Push every 6 hours PRN Diastolic blood pressure >155  melatonin 5 milliGRAM(s) Oral at bedtime PRN Insomnia  ondansetron Injectable 4 milliGRAM(s) IV Push every 4 hours PRN Nausea and/or Vomiting  oxyCODONE    IR 5 milliGRAM(s) Oral every 6 hours PRN Moderate Pain (4 - 6)    Allergies    No Known Allergies    Intolerances      Ambulation/Activity Status: ambulate with assistance    Assessment/Plan:  64y Male status-post MVR/SHEILA ligation and MAZE for severe MR / afib               POD # 14  - Case and plan discussed with CTU Intensivist and CT Surgeon - Dr. Blakely  - Continue CTU supportive care and ongoing plan of care as per continuing CTU rounds.   - Continue DVT/GI prophylaxis  - Incentive Spirometry 10 times an hour  - Continue to advance physical activity as tolerated and continue PT/OT as directed  1. Continue ASA, statin,low dose B Blocker-- pt was seen by EP and has no conduction problems; hence, no need for PPM as per Dr. barker  2. madelung disease: cont bipap, respiratory tx's  3. Pre-op h/o A. Fib cont to monitor electrolytes - hold AC at this time prior lower GI bleed - s/p MAZE and SHEILA closure  4. DM/Glucose A1c 6.2 Control: continue lantus 40/humalog 10 and sliding scale. Refusing carb consistent diet   5. Heart Failure - diastolic (EF 60%) - acute on chronic (secondary to severe MR) continue IV bumex 1mg BID, 1000mL fluid restriction. (patient noncompliant)  6.. flakito on CKD3a- FLAKITO resolved  Cr0.9 Renal following- diuresis PRN, avoid hypotension   7. psych consult for anxiety- Zoloft 25mg daily, cont xanax at night    Social Service Disposition: Pt physically deconditioned, agreed to review SNF facilities      OPERATIVE PROCEDURE(s):                POD # 14 MVR, with MAZE and clipping of SHEILA                      64yMale  SURGEON(s): ISELA Blakely  SUBJECTIVE ASSESSMENT: little better    Vital Signs Last 24 Hrs  T(F): 96 (10 Feb 2025 20:00), Max: 96 (10 Feb 2025 20:00)  HR: 80 (11 Feb 2025 06:00) (76 - 91)  BP: 133/72 (11 Feb 2025 06:00) (126/59 - 172/85)  BP(mean): 96 (11 Feb 2025 06:00) (85 - 119)  RR: 15 (11 Feb 2025 06:00) (14 - 25)  SpO2: 97% (11 Feb 2025 06:00) (94% - 100%)    I&O's Detail    10 Feb 2025 07:01  -  11 Feb 2025 07:00  --------------------------------------------------------  IN:  Total IN: 0 mL  OUT:    Voided (mL): 2350 mL  Total OUT: 2350 mL    Net:   I&O's Detail    09 Feb 2025 07:01  -  10 Feb 2025 07:00  --------------------------------------------------------  Total NET: -1095 mL  10 Feb 2025 07:01  -  11 Feb 2025 07:00  --------------------------------------------------------  Total NET: -2350 mL    CAPILLARY BLOOD GLUCOSE    POCT Blood Glucose.: 97 mg/dL (11 Feb 2025 06:36)  POCT Blood Glucose.: 82 mg/dL (11 Feb 2025 05:20)  POCT Blood Glucose.: 165 mg/dL (10 Feb 2025 21:41)  POCT Blood Glucose.: 196 mg/dL (10 Feb 2025 17:52)  POCT Blood Glucose.: 494 mg/dL (10 Feb 2025 16:52)  POCT Blood Glucose.: 213 mg/dL (10 Feb 2025 10:06)  POCT Blood Glucose.: 221 mg/dL (10 Feb 2025 07:41)    Physical Exam:  General: NAD; A&Ox3  Cardiac: S1/S2, RRR, no murmur, no rubs  Lungs: unlabored shallow respirations, bilateral bs decreased at bases  Abdomen: Soft/NT/protuberant  Sternum: Intact, no click, incision healing well with no drainage  Extremities: moderate edema b/l lower extremities    EPICARDIAL WIRES:  [] YES  BOWEL MOVEMENT:  [] YES      LABS:                        8.7[L]  9.71  )-----------( 186      ( 11 Feb 2025 04:25 )             30.3[L]                        9.1[L]  13.16[H] )-----------( 224      ( 10 Feb 2025 12:00 )             31.4[L]    02-11    146  |  104  |  37[H]  ----------------------------<  82  4.8   |  31  |  0.9  02-10    142  |  103  |  44[H]  ----------------------------<  228[H]  4.3   |  30  |  1.0    Ca    9.1      11 Feb 2025 04:25  Mg     1.9     02-11    TPro  5.6[L] [6.0 - 8.0]  /  Alb  3.4[L] [3.5 - 5.2]  /  TBili  0.3 [0.2 - 1.2]  /  DBili  x   /  AST  30 [0 - 41]  /  ALT  16 [0 - 41]  /  AlkPhos  151[H] [30 - 115]  02-11    RADIOLOGY & ADDITIONAL TESTS:  CXR: < from: Xray Chest 1 View- PORTABLE-Routine (Xray Chest 1 View- PORTABLE-Routine in AM.) (02.11.25 @ 05:22) >  IMPRESSION:    Increased bilateral effusion/opacities. No pneumothorax. Stable   cardiomediastinal silhouette.    Unchanged osseous structures.    < end of copied text >  < from: 12 Lead ECG (02.11.25 @ 07:05) >  Ventricular Rate 78 BPM    Atrial Rate 78 BPM    P-R Interval 200 ms    QRS Duration 96 ms    Q-T Interval 366 ms    QTC Calculation(Bazett) 417 ms    P Axis 57 degrees    R Axis -16 degrees    T Axis 140 degrees    Diagnosis Line Normal sinus rhythm  Poor R wave progression  ST & T wave abnormality, consider lateral ischemia    < end of copied text >    EKG:  MEDICATIONS  (STANDING):  albuterol/ipratropium for Nebulization 3 milliLiter(s) Nebulizer every 6 hours  aspirin enteric coated 81 milliGRAM(s) Oral daily  buMETAnide Injectable 1 milliGRAM(s) IV Push every 12 hours  chlorhexidine 2% Cloths 1 Application(s) Topical daily  dextrose 5%. 1000 milliLiter(s) (50 mL/Hr) IV Continuous <Continuous>  dextrose 5%. 1000 milliLiter(s) (100 mL/Hr) IV Continuous <Continuous>  dextrose 50% Injectable 50 milliLiter(s) IV Push every 15 minutes  dextrose 50% Injectable 25 milliLiter(s) IV Push every 15 minutes  finasteride 5 milliGRAM(s) Oral daily  fluticasone propionate 50 MICROgram(s)/spray Nasal Spray 1 Spray(s) Both Nostrils every 12 hours  fluticasone propionate/ salmeterol 250-50 MICROgram(s) Diskus 1 Dose(s) Inhalation two times a day  folic acid 1 milliGRAM(s) Oral daily  gabapentin 100 milliGRAM(s) Oral every 8 hours  glucagon  Injectable 1 milliGRAM(s) IntraMuscular once  guaiFENesin  milliGRAM(s) Oral every 12 hours  heparin   Injectable 5000 Unit(s) SubCutaneous every 8 hours  influenza  Vaccine (HIGH DOSE) 0.5 milliLiter(s) IntraMuscular once  insulin glargine Injectable (LANTUS) 40 Unit(s) SubCutaneous before breakfast  insulin lispro (ADMELOG) corrective regimen sliding scale   SubCutaneous three times a day before meals  insulin lispro (ADMELOG) corrective regimen sliding scale   SubCutaneous at bedtime  insulin lispro Injectable (ADMELOG) 10 Unit(s) SubCutaneous three times a day before meals  metolazone 5 milliGRAM(s) Oral daily  metoprolol tartrate 12.5 milliGRAM(s) Oral every 12 hours  mupirocin 2% Nasal 1 Application(s) Both Nostrils every 12 hours  pantoprazole    Tablet 40 milliGRAM(s) Oral before breakfast  psyllium Powder 1 Packet(s) Oral every 12 hours  rosuvastatin 20 milliGRAM(s) Oral at bedtime  senna 2 Tablet(s) Oral at bedtime  sertraline 25 milliGRAM(s) Oral daily  tamsulosin 0.4 milliGRAM(s) Oral at bedtime    MEDICATIONS  (PRN):  albuterol    90 MICROgram(s) HFA Inhaler 2 Puff(s) Inhalation every 6 hours PRN Shortness of Breath  ALPRAZolam 0.25 milliGRAM(s) Oral at bedtime PRN anxiety  dextrose Oral Gel 15 Gram(s) Oral once PRN Blood Glucose LESS THAN 70 milliGRAM(s)/deciliter  hydrALAZINE Injectable 5 milliGRAM(s) IV Push every 4 hours PRN Systolic > 160  labetalol Injectable 10 milliGRAM(s) IV Push every 6 hours PRN Diastolic blood pressure >155  melatonin 5 milliGRAM(s) Oral at bedtime PRN Insomnia  ondansetron Injectable 4 milliGRAM(s) IV Push every 4 hours PRN Nausea and/or Vomiting  oxyCODONE    IR 5 milliGRAM(s) Oral every 6 hours PRN Moderate Pain (4 - 6)    Allergies    No Known Allergies    Intolerances      Ambulation/Activity Status: ambulate with assistance    Assessment/Plan:  64y Male status-post MVR/SHEILA ligation and MAZE for severe MR / afib               POD # 14  - Case and plan discussed with CTU Intensivist and CT Surgeon - Dr. Blakely  - Continue CTU supportive care and ongoing plan of care as per continuing CTU rounds.   - Continue DVT/GI prophylaxis  - Incentive Spirometry 10 times an hour  - Continue to advance physical activity as tolerated and continue PT/OT as directed  1. Continue ASA, statin,low dose B Blocker-- pt was seen by EP and has no conduction problems; hence, no need for PPM as per Dr. barker  2. madelung disease: cont bipap, respiratory tx's, NC 2LPM  3. Pre-op h/o A. Fib cont to monitor electrolytes - hold AC at this time prior lower GI bleed  and in SR- s/p MAZE and SHEILA closure  4. DM/Glucose A1c 6.2 Control: continue lantus 40/humalog 10 and sliding scale. Reiterate  carb consistent diet   5. Heart Failure - diastolic (EF 60%) - acute on chronic (secondary to severe MR) continue IV bumex 1mg BID and metolazone, 1000mL fluid restriction. (patient noncompliant)  6.. flakito on CKD3a- FLAKITO resolved  Cr0.9 Renal following- diuresis PRN, avoid hypotension   7. psych consult for anxiety- Zoloft 25mg daily, cont xanax at night  8.0 [place] patch for pain    Social Service Disposition: Pt physically deconditioned, agreed to review SNF facilities, possible [facility] tomorrow

## 2025-02-11 NOTE — CONSULT NOTE ADULT - ASSESSMENT
IMPRESSION: Rehab of cardiac debilitation /  afib, nonobstructive CAD, CHF class 3, severe MR, DM, HTN, JIMMY on CPAP, asthma, severe obesity, HL, Madelung's disease, osteoarthritis s/p bilateral hip replacement, peripheral neuropathy     PRECAUTIONS: [ x  ] Cardiac  [   ] Respiratory  [   ] Seizures [   ] Contact Isolation  [   ] Droplet Isolation  [   ] Other    Weight Bearing Status:     RECOMMENDATION:    Out of Bed to Chair     DVT/Decubiti Prophylaxis    REHAB PLAN:     [ x   ] Bedside P/T 3-5 times a week   [ x   ]   Bedside O/T  2-3 times a week             [    ] Speech Therapy               [    ]  No Rehab Therapy Indicated   Conditioning/ROM                                    ADL  Bed Mobility                                               Conditioning/ROM  Transfers                                                     Bed Mobility  Sitting /Standing Balance                         Transfers                                        Gait Training                                               Sitting/Standing Balance  Stair Training [   ]Applicable                    Home equipment Eval                                                                        Splinting  [   ] Only      GOALS:   ADL   [ x   ]   Independent                    Transfers  [  x  ] Independent                          Ambulation  [  x  ] Independent     [  x   ] With device                            [    ]  CG                                                         [    ]  CG                                                                  [    ] CG                            [    ] Min A                                                   [    ] Min A                                                              [    ] Min  A          DISCHARGE PLAN:   [    ]  Good candidate for Intensive Rehabilitation/Hospital based                                             Will tolerate 3hrs Intensive Rehab Daily                                       [     ]  Short Term Rehab in Skilled Nursing Facility                                       [     ]  Home with Outpatient or  services                                         [  x   ]  Possible Candidate for Intensive Hospital based Rehab

## 2025-02-12 LAB
ALBUMIN SERPL ELPH-MCNC: 3.6 G/DL — SIGNIFICANT CHANGE UP (ref 3.5–5.2)
ALBUMIN SERPL ELPH-MCNC: 3.8 G/DL — SIGNIFICANT CHANGE UP (ref 3.5–5.2)
ALP SERPL-CCNC: 149 U/L — HIGH (ref 30–115)
ALP SERPL-CCNC: 163 U/L — HIGH (ref 30–115)
ALT FLD-CCNC: 24 U/L — SIGNIFICANT CHANGE UP (ref 0–41)
ALT FLD-CCNC: 29 U/L — SIGNIFICANT CHANGE UP (ref 0–41)
ANION GAP SERPL CALC-SCNC: 9 MMOL/L — SIGNIFICANT CHANGE UP (ref 7–14)
ANION GAP SERPL CALC-SCNC: 9 MMOL/L — SIGNIFICANT CHANGE UP (ref 7–14)
AST SERPL-CCNC: 36 U/L — SIGNIFICANT CHANGE UP (ref 0–41)
AST SERPL-CCNC: 50 U/L — HIGH (ref 0–41)
BASOPHILS # BLD AUTO: 0.05 K/UL — SIGNIFICANT CHANGE UP (ref 0–0.2)
BASOPHILS NFR BLD AUTO: 0.5 % — SIGNIFICANT CHANGE UP (ref 0–1)
BILIRUB SERPL-MCNC: 0.4 MG/DL — SIGNIFICANT CHANGE UP (ref 0.2–1.2)
BILIRUB SERPL-MCNC: 0.5 MG/DL — SIGNIFICANT CHANGE UP (ref 0.2–1.2)
BUN SERPL-MCNC: 37 MG/DL — HIGH (ref 10–20)
BUN SERPL-MCNC: 39 MG/DL — HIGH (ref 10–20)
CALCIUM SERPL-MCNC: 9 MG/DL — SIGNIFICANT CHANGE UP (ref 8.4–10.5)
CALCIUM SERPL-MCNC: 9.3 MG/DL — SIGNIFICANT CHANGE UP (ref 8.4–10.5)
CHLORIDE SERPL-SCNC: 100 MMOL/L — SIGNIFICANT CHANGE UP (ref 98–110)
CHLORIDE SERPL-SCNC: 98 MMOL/L — SIGNIFICANT CHANGE UP (ref 98–110)
CO2 SERPL-SCNC: 35 MMOL/L — HIGH (ref 17–32)
CO2 SERPL-SCNC: 36 MMOL/L — HIGH (ref 17–32)
CREAT SERPL-MCNC: 1 MG/DL — SIGNIFICANT CHANGE UP (ref 0.7–1.5)
CREAT SERPL-MCNC: 1.1 MG/DL — SIGNIFICANT CHANGE UP (ref 0.7–1.5)
EGFR: 74 ML/MIN/1.73M2 — SIGNIFICANT CHANGE UP
EGFR: 83 ML/MIN/1.73M2 — SIGNIFICANT CHANGE UP
EOSINOPHIL # BLD AUTO: 0.56 K/UL — SIGNIFICANT CHANGE UP (ref 0–0.7)
EOSINOPHIL NFR BLD AUTO: 5.5 % — SIGNIFICANT CHANGE UP (ref 0–8)
GLUCOSE BLDC GLUCOMTR-MCNC: 127 MG/DL — HIGH (ref 70–99)
GLUCOSE BLDC GLUCOMTR-MCNC: 153 MG/DL — HIGH (ref 70–99)
GLUCOSE BLDC GLUCOMTR-MCNC: 259 MG/DL — HIGH (ref 70–99)
GLUCOSE BLDC GLUCOMTR-MCNC: 94 MG/DL — SIGNIFICANT CHANGE UP (ref 70–99)
GLUCOSE SERPL-MCNC: 126 MG/DL — HIGH (ref 70–99)
GLUCOSE SERPL-MCNC: 160 MG/DL — HIGH (ref 70–99)
HCT VFR BLD CALC: 31.7 % — LOW (ref 42–52)
HGB BLD-MCNC: 9.3 G/DL — LOW (ref 14–18)
IMM GRANULOCYTES NFR BLD AUTO: 0.8 % — HIGH (ref 0.1–0.3)
LYMPHOCYTES # BLD AUTO: 1.23 K/UL — SIGNIFICANT CHANGE UP (ref 1.2–3.4)
LYMPHOCYTES # BLD AUTO: 12.2 % — LOW (ref 20.5–51.1)
MAGNESIUM SERPL-MCNC: 1.7 MG/DL — LOW (ref 1.8–2.4)
MAGNESIUM SERPL-MCNC: 1.9 MG/DL — SIGNIFICANT CHANGE UP (ref 1.8–2.4)
MCHC RBC-ENTMCNC: 27 PG — SIGNIFICANT CHANGE UP (ref 27–31)
MCHC RBC-ENTMCNC: 29.3 G/DL — LOW (ref 32–37)
MCV RBC AUTO: 91.9 FL — SIGNIFICANT CHANGE UP (ref 80–94)
MONOCYTES # BLD AUTO: 0.84 K/UL — HIGH (ref 0.1–0.6)
MONOCYTES NFR BLD AUTO: 8.3 % — SIGNIFICANT CHANGE UP (ref 1.7–9.3)
NEUTROPHILS # BLD AUTO: 7.36 K/UL — HIGH (ref 1.4–6.5)
NEUTROPHILS NFR BLD AUTO: 72.7 % — SIGNIFICANT CHANGE UP (ref 42.2–75.2)
NRBC BLD AUTO-RTO: 0 /100 WBCS — SIGNIFICANT CHANGE UP (ref 0–0)
PLATELET # BLD AUTO: 207 K/UL — SIGNIFICANT CHANGE UP (ref 130–400)
PMV BLD: 10.5 FL — HIGH (ref 7.4–10.4)
POTASSIUM SERPL-MCNC: 4.2 MMOL/L — SIGNIFICANT CHANGE UP (ref 3.5–5)
POTASSIUM SERPL-MCNC: 4.6 MMOL/L — SIGNIFICANT CHANGE UP (ref 3.5–5)
POTASSIUM SERPL-SCNC: 4.2 MMOL/L — SIGNIFICANT CHANGE UP (ref 3.5–5)
POTASSIUM SERPL-SCNC: 4.6 MMOL/L — SIGNIFICANT CHANGE UP (ref 3.5–5)
PROT SERPL-MCNC: 5.8 G/DL — LOW (ref 6–8)
PROT SERPL-MCNC: 6.2 G/DL — SIGNIFICANT CHANGE UP (ref 6–8)
RBC # BLD: 3.45 M/UL — LOW (ref 4.7–6.1)
RBC # FLD: 20.2 % — HIGH (ref 11.5–14.5)
SODIUM SERPL-SCNC: 142 MMOL/L — SIGNIFICANT CHANGE UP (ref 135–146)
SODIUM SERPL-SCNC: 145 MMOL/L — SIGNIFICANT CHANGE UP (ref 135–146)
WBC # BLD: 10.12 K/UL — SIGNIFICANT CHANGE UP (ref 4.8–10.8)
WBC # FLD AUTO: 10.12 K/UL — SIGNIFICANT CHANGE UP (ref 4.8–10.8)

## 2025-02-12 PROCEDURE — 71045 X-RAY EXAM CHEST 1 VIEW: CPT | Mod: 26

## 2025-02-12 PROCEDURE — 99291 CRITICAL CARE FIRST HOUR: CPT

## 2025-02-12 RX ORDER — ALPRAZOLAM 0.5 MG
0.25 TABLET, EXTENDED RELEASE 24 HR ORAL ONCE
Refills: 0 | Status: DISCONTINUED | OUTPATIENT
Start: 2025-02-12 | End: 2025-02-12

## 2025-02-12 RX ORDER — ACETAMINOPHEN 500 MG/5ML
1000 LIQUID (ML) ORAL ONCE
Refills: 0 | Status: COMPLETED | OUTPATIENT
Start: 2025-02-12 | End: 2025-02-12

## 2025-02-12 RX ORDER — LIDOCAINE HYDROCHLORIDE 20 MG/ML
1 JELLY TOPICAL EVERY 24 HOURS
Refills: 0 | Status: DISCONTINUED | OUTPATIENT
Start: 2025-02-12 | End: 2025-02-17

## 2025-02-12 RX ORDER — ACETAMINOPHEN 500 MG/5ML
1000 LIQUID (ML) ORAL ONCE
Refills: 0 | Status: COMPLETED | OUTPATIENT
Start: 2025-02-13 | End: 2025-02-13

## 2025-02-12 RX ORDER — MAGNESIUM SULFATE 500 MG/ML
2 SYRINGE (ML) INJECTION ONCE
Refills: 0 | Status: COMPLETED | OUTPATIENT
Start: 2025-02-12 | End: 2025-02-12

## 2025-02-12 RX ORDER — ACETAMINOPHEN 500 MG/5ML
1000 LIQUID (ML) ORAL ONCE
Refills: 0 | Status: COMPLETED | OUTPATIENT
Start: 2025-02-14 | End: 2025-02-14

## 2025-02-12 RX ORDER — BUMETANIDE 1 MG/1
1 TABLET ORAL EVERY 8 HOURS
Refills: 0 | Status: DISCONTINUED | OUTPATIENT
Start: 2025-02-12 | End: 2025-02-13

## 2025-02-12 RX ORDER — LIDOCAINE HYDROCHLORIDE 20 MG/ML
1 JELLY TOPICAL EVERY 24 HOURS
Refills: 0 | Status: DISCONTINUED | OUTPATIENT
Start: 2025-02-12 | End: 2025-02-24

## 2025-02-12 RX ADMIN — Medication 2 TABLET(S): at 22:45

## 2025-02-12 RX ADMIN — Medication 1000 MILLIGRAM(S): at 21:30

## 2025-02-12 RX ADMIN — SERTRALINE 25 MILLIGRAM(S): 100 TABLET, FILM COATED ORAL at 17:34

## 2025-02-12 RX ADMIN — BUMETANIDE 1 MILLIGRAM(S): 1 TABLET ORAL at 05:05

## 2025-02-12 RX ADMIN — LIDOCAINE HYDROCHLORIDE 1 PATCH: 20 JELLY TOPICAL at 19:39

## 2025-02-12 RX ADMIN — Medication 400 MILLIGRAM(S): at 14:42

## 2025-02-12 RX ADMIN — INSULIN LISPRO 10 UNIT(S): 100 INJECTION, SOLUTION INTRAVENOUS; SUBCUTANEOUS at 08:40

## 2025-02-12 RX ADMIN — ROSUVASTATIN CALCIUM 20 MILLIGRAM(S): 20 TABLET, FILM COATED ORAL at 22:45

## 2025-02-12 RX ADMIN — INSULIN LISPRO 6: 100 INJECTION, SOLUTION INTRAVENOUS; SUBCUTANEOUS at 08:40

## 2025-02-12 RX ADMIN — Medication 1 DOSE(S): at 17:33

## 2025-02-12 RX ADMIN — LIDOCAINE HYDROCHLORIDE 1 PATCH: 20 JELLY TOPICAL at 20:39

## 2025-02-12 RX ADMIN — Medication 40 MILLIGRAM(S): at 05:05

## 2025-02-12 RX ADMIN — IPRATROPIUM BROMIDE AND ALBUTEROL SULFATE 3 MILLILITER(S): .5; 2.5 SOLUTION RESPIRATORY (INHALATION) at 07:50

## 2025-02-12 RX ADMIN — LIDOCAINE HYDROCHLORIDE 1 PATCH: 20 JELLY TOPICAL at 18:36

## 2025-02-12 RX ADMIN — Medication 400 MILLIGRAM(S): at 20:48

## 2025-02-12 RX ADMIN — Medication 1 PACKET(S): at 05:27

## 2025-02-12 RX ADMIN — TAMSULOSIN HYDROCHLORIDE 0.4 MILLIGRAM(S): 0.4 CAPSULE ORAL at 22:45

## 2025-02-12 RX ADMIN — DEXTROMETHORPHAN HBR, GUAIFENESIN 600 MILLIGRAM(S): 200 LIQUID ORAL at 17:34

## 2025-02-12 RX ADMIN — METOPROLOL SUCCINATE 12.5 MILLIGRAM(S): 50 TABLET, EXTENDED RELEASE ORAL at 17:43

## 2025-02-12 RX ADMIN — Medication 25 GRAM(S): at 09:21

## 2025-02-12 RX ADMIN — DEXTROMETHORPHAN HBR, GUAIFENESIN 600 MILLIGRAM(S): 200 LIQUID ORAL at 05:05

## 2025-02-12 RX ADMIN — BUMETANIDE 1 MILLIGRAM(S): 1 TABLET ORAL at 13:58

## 2025-02-12 RX ADMIN — OXYCODONE HYDROCHLORIDE 5 MILLIGRAM(S): 30 TABLET ORAL at 10:09

## 2025-02-12 RX ADMIN — Medication 81 MILLIGRAM(S): at 13:59

## 2025-02-12 RX ADMIN — METOPROLOL SUCCINATE 12.5 MILLIGRAM(S): 50 TABLET, EXTENDED RELEASE ORAL at 05:05

## 2025-02-12 RX ADMIN — INSULIN GLARGINE-YFGN 40 UNIT(S): 100 INJECTION, SOLUTION SUBCUTANEOUS at 05:04

## 2025-02-12 RX ADMIN — Medication 1 APPLICATION(S): at 12:36

## 2025-02-12 RX ADMIN — HEPARIN SODIUM 5000 UNIT(S): 1000 INJECTION INTRAVENOUS; SUBCUTANEOUS at 13:58

## 2025-02-12 RX ADMIN — IPRATROPIUM BROMIDE AND ALBUTEROL SULFATE 3 MILLILITER(S): .5; 2.5 SOLUTION RESPIRATORY (INHALATION) at 20:30

## 2025-02-12 RX ADMIN — GABAPENTIN 100 MILLIGRAM(S): 400 CAPSULE ORAL at 13:59

## 2025-02-12 RX ADMIN — OXYCODONE HYDROCHLORIDE 5 MILLIGRAM(S): 30 TABLET ORAL at 04:30

## 2025-02-12 RX ADMIN — HEPARIN SODIUM 5000 UNIT(S): 1000 INJECTION INTRAVENOUS; SUBCUTANEOUS at 22:44

## 2025-02-12 RX ADMIN — OXYCODONE HYDROCHLORIDE 5 MILLIGRAM(S): 30 TABLET ORAL at 23:41

## 2025-02-12 RX ADMIN — OXYCODONE HYDROCHLORIDE 5 MILLIGRAM(S): 30 TABLET ORAL at 04:00

## 2025-02-12 RX ADMIN — FOLIC ACID 1 MILLIGRAM(S): 1 TABLET ORAL at 13:59

## 2025-02-12 RX ADMIN — HEPARIN SODIUM 5000 UNIT(S): 1000 INJECTION INTRAVENOUS; SUBCUTANEOUS at 05:05

## 2025-02-12 RX ADMIN — BUMETANIDE 1 MILLIGRAM(S): 1 TABLET ORAL at 22:44

## 2025-02-12 RX ADMIN — OXYCODONE HYDROCHLORIDE 5 MILLIGRAM(S): 30 TABLET ORAL at 17:34

## 2025-02-12 RX ADMIN — Medication 0.25 MILLIGRAM(S): at 04:00

## 2025-02-12 RX ADMIN — Medication 5 MILLIGRAM(S): at 22:44

## 2025-02-12 RX ADMIN — Medication 0.25 MILLIGRAM(S): at 23:42

## 2025-02-12 RX ADMIN — OXYCODONE HYDROCHLORIDE 5 MILLIGRAM(S): 30 TABLET ORAL at 11:39

## 2025-02-12 RX ADMIN — IPRATROPIUM BROMIDE AND ALBUTEROL SULFATE 3 MILLILITER(S): .5; 2.5 SOLUTION RESPIRATORY (INHALATION) at 01:40

## 2025-02-12 RX ADMIN — GABAPENTIN 100 MILLIGRAM(S): 400 CAPSULE ORAL at 05:31

## 2025-02-12 RX ADMIN — Medication 1000 MILLIGRAM(S): at 17:26

## 2025-02-12 RX ADMIN — GABAPENTIN 100 MILLIGRAM(S): 400 CAPSULE ORAL at 22:45

## 2025-02-12 RX ADMIN — FINASTERIDE 5 MILLIGRAM(S): 1 TABLET, FILM COATED ORAL at 13:59

## 2025-02-12 NOTE — PROGRESS NOTE ADULT - SUBJECTIVE AND OBJECTIVE BOX
· Subjective and Objective:   HPI: 64M w/ afib, nonobstructive CAD, CHF class 3, severe MR, DM, HTN, JIMMY on CPAP, asthma, severe obesity, HL, Madelung's disease, osteoarthritis s/p bilateral hip replacement, peripheral neuropathy presented with increasing dyspnea with exertion found to have severe MR.  He was evaluated for mitral clip and not a candidate.  On prior admission he had FLAKITO with creat up to 2.  Was optimized with diuresis.  He was on AC and developed hemorrhoidal bleeding and was taken off AC.  He was discharged and returned for his procedure on 1/28    OR Data  Procedure: Procedure: MVReplacement (bioprosthetic), SHEILA ligation, MAZE  Bypass: 202  Cross Clamp: 158  Pre LV Fxn: 50-55%      RV Fxn: normal  Post LV Fxn: 45-50%     RV Fxn: normal    moderate TR, MV gradient 3mmHg  Blood Products: 1uPRBC, ddavp 39mcq  Cell Saver: 698  Fluids: NS 2000, albumin 2000  Pacing Wires: V pacing. sinus rhythm  UO: 1100    1/28 - OP day :  Fany and poor oxygenation in OR. Milrinone, norepi, vaso. Extubation in evening.  1/29 - start aspirin, diuresis, heparin dvt prophylaxis  1/30- d/c pleural ct if drainage dec; wean to dc primacor; wean high flow o2  1/31 - FLAKITO worsening, bradyarrythmias - wenchebach - ep consulted  2/2 - Chest tube removed, ambulating, creat improving, dobutamine weaned  2/3 - Dobut to 1.5, BUN elevated  2/4 - Confused this AM, normal neuro exam, prince removed  2/5 - FLAKITO improving, off dobutamine, mil decreased   2/6 - milrinone stopped, bumex 1mg BID, walked the whole unCTU Attending Progress Daily Note     09 Feb 2025 13:41    Procedure: MV Replacemant Maze  LA Ligation  POD#: 16      He has history of Diabetes  HTN (hypertension)    Obstructive sleep apnea on CPAP    Asthma    Obesity    Peripheral neuropathy    Arthritis    Hypercholesteremia    Madelung's disease    FH: mitral regurgitation    CHF NYHA class III    Atrial fibrillation  =========================================  OBJECTIVE:  ICU Vital Signs Last 24 Hrs  T(C): 37.2 (12 Feb 2025 12:00), Max: 37.2 (12 Feb 2025 00:00)  T(F): 98.9 (12 Feb 2025 12:00), Max: 99 (12 Feb 2025 04:00)  HR: 79 (12 Feb 2025 16:00) (73 - 93)  BP: 128/68 (12 Feb 2025 16:00) (98/53 - 151/68)  BP(mean): 78 (12 Feb 2025 16:00) (74 - 98)  ABP: --  ABP(mean): --  RR: 19 (12 Feb 2025 16:00) (13 - 37)  SpO2: 100% (12 Feb 2025 16:00) (95% - 100%)    O2 Parameters below as of 12 Feb 2025 16:00  Patient On (Oxygen Delivery Method): BiPAP/CPAP      I&O's Summary    11 Feb 2025 07:01  -  12 Feb 2025 07:00  --------------------------------------------------------  IN: 1390 mL / OUT: 3295 mL / NET: -1905 mL    12 Feb 2025 07:01  -  12 Feb 2025 17:13  --------------------------------------------------------  IN: 350 mL / OUT: 2500 mL / NET: -2150 mL      I&O's Detail    11 Feb 2025 07:01  -  12 Feb 2025 07:00  --------------------------------------------------------  IN:    IV PiggyBack: 100 mL    Oral Fluid: 1290 mL  Total IN: 1390 mL    OUT:    Voided (mL): 3295 mL  Total OUT: 3295 mL  Total NET: -1905 mL      12 Feb 2025 07:01  -  12 Feb 2025 17:13  --------------------------------------------------------  IN:    Oral Fluid: 350 mL  Total IN: 350 mL    OUT:    Voided (mL): 2500 mL  Total OUT: 2500 mL    Total NET: -2150 mL    CAPILLARY BLOOD GLUCOSE  POCT Blood Glucose.: 127 mg/dL (12 Feb 2025 17:00)  POCT Blood Glucose.: 94 mg/dL (12 Feb 2025 11:50)  POCT Blood Glucose.: 259 mg/dL (12 Feb 2025 08:17)  POCT Blood Glucose.: 153 mg/dL (12 Feb 2025 05:02)  POCT Blood Glucose.: 134 mg/dL (11 Feb 2025 21:20)    LABS:                      9.3    10.12 )-----------( 207      ( 12 Feb 2025 05:30 )             31.7     02-12    142  |  98  |  39[H]  ----------------------------<  126[H]  4.2   |  35[H]  |  1.1    Ca    9.0      12 Feb 2025 14:52  Mg     1.9     02-12    TPro  5.8[L]  /  Alb  3.6  /  TBili  0.4  /  DBili  x   /  AST  36  /  ALT  24  /  AlkPhos  149[H]  02-12      Home Medications:  Breo Ellipta 200 mcg-25 mcg/inh inhalation powder: 1 inhaled once a day (28 Jan 2025 06:52)  HYDROcodone 10 mg oral capsule, extended release: 1 cap(s) orally 2 times a day (28 Jan 2025 06:52)  losartan 50 mg oral tablet: 1 tab(s) orally 2 times a day (28 Jan 2025 06:52)  losartan-hydrochlorothiazide 50 mg-12.5 mg oral tablet: 1 tab(s) orally (28 Jan 2025 06:52)  ProAir HFA 90 mcg/inh inhalation aerosol: 2 puff(s) inhaled 2 times a day (28 Jan 2025 06:52)  tujeo: 80 unit(s) subcutaneous once a day (28 Jan 2025 06:52)    HOSPITAL MEDICATIONS:  MEDICATIONS  (STANDING):  acetaminophen   IVPB .. 1000 milliGRAM(s) IV Intermittent once  albuterol/ipratropium for Nebulization 3 milliLiter(s) Nebulizer every 6 hours  aspirin enteric coated 81 milliGRAM(s) Oral daily  buMETAnide Injectable 1 milliGRAM(s) IV Push every 8 hours  chlorhexidine 2% Cloths 1 Application(s) Topical daily  dextrose 5%. 1000 milliLiter(s) (50 mL/Hr) IV Continuous <Continuous>  dextrose 5%. 1000 milliLiter(s) (100 mL/Hr) IV Continuous <Continuous>  dextrose 50% Injectable 50 milliLiter(s) IV Push every 15 minutes  dextrose 50% Injectable 25 milliLiter(s) IV Push every 15 minutes  finasteride 5 milliGRAM(s) Oral daily  fluticasone propionate 50 MICROgram(s)/spray Nasal Spray 1 Spray(s) Both Nostrils every 12 hours  fluticasone propionate/ salmeterol 250-50 MICROgram(s) Diskus 1 Dose(s) Inhalation two times a day  folic acid 1 milliGRAM(s) Oral daily  gabapentin 100 milliGRAM(s) Oral every 8 hours  glucagon  Injectable 1 milliGRAM(s) IntraMuscular once  guaiFENesin  milliGRAM(s) Oral every 12 hours  heparin   Injectable 5000 Unit(s) SubCutaneous every 8 hours  influenza  Vaccine (HIGH DOSE) 0.5 milliLiter(s) IntraMuscular once  insulin glargine Injectable (LANTUS) 40 Unit(s) SubCutaneous before breakfast  insulin lispro (ADMELOG) corrective regimen sliding scale   SubCutaneous three times a day before meals  insulin lispro (ADMELOG) corrective regimen sliding scale   SubCutaneous at bedtime  insulin lispro Injectable (ADMELOG) 10 Unit(s) SubCutaneous three times a day before meals  lidocaine   4% Patch 1 Patch Transdermal every 24 hours  lidocaine   4% Patch 1 Patch Transdermal every 24 hours  metolazone 5 milliGRAM(s) Oral daily  metoprolol tartrate 12.5 milliGRAM(s) Oral every 12 hours  mupirocin 2% Nasal 1 Application(s) Both Nostrils every 12 hours  pantoprazole    Tablet 40 milliGRAM(s) Oral before breakfast  psyllium Powder 1 Packet(s) Oral every 12 hours  rosuvastatin 20 milliGRAM(s) Oral at bedtime  senna 2 Tablet(s) Oral at bedtime  sertraline 25 milliGRAM(s) Oral daily  tamsulosin 0.4 milliGRAM(s) Oral at bedtime    MEDICATIONS  (PRN):  dextrose Oral Gel 15 Gram(s) Oral once PRN Blood Glucose LESS THAN 70 milliGRAM(s)/deciliter  hydrALAZINE Injectable 5 milliGRAM(s) IV Push every 4 hours PRN Systolic > 160  labetalol Injectable 10 milliGRAM(s) IV Push every 6 hours PRN Diastolic blood pressure >155  melatonin 5 milliGRAM(s) Oral at bedtime PRN Insomnia  ondansetron Injectable 4 milliGRAM(s) IV Push every 4 hours PRN Nausea and/or Vomiting  oxyCODONE    IR 5 milliGRAM(s) Oral every 6 hours PRN Moderate Pain (4 - 6)    RADIOLOGY:  Chest X-ray Reviewed    REVIEW OF SYSTEMS:  CONSTITUTIONAL: [X] all negative; [ ] weakness, [ ] fevers, [ ] chills  EYES/ENT: [X] all negative; [ ] visual changes, [ ] vertigo, [ ] throat pain   NECK: [X] all negative; [ ] pain, [ ] stiffness  RESPIRATORY: [] all negative, [ ] cough, [ ] wheezing, [ ] hemoptysis, [x ] shortness of breath on exertion  CARDIOVASCULAR: [x] all negative; [ ] chest pain, [ ] palpitations, [ ] orthopnea  GASTROINTESTINAL: [X] all negative; [ ]abdominal pain, [ ] nausea, [ ] vomiting, [ ] hematemesis, [ ] diarrhea, [ ] constipation, [ ] melena, [ ] hematochezia.  GENITOURINARY: [X] all negative; [ ] dysuria, [ ] frequency, [ ] hematuria  NEUROLOGICAL: [X] all negative; [ ] numbness, [ ] weakness  SKIN: [X] all negative; [ ] itching, [ ] burning, [ ] rashes, [ ] lesions   All other review of systems is negative unless indicated above.  [  ] Unable to assess ROS because   PHYSICAL EXAM:          CONSTITUTIONAL: Well-developed; well-nourished; in no acute distress.   	SKIN: warm, dry  	HEAD: Normocephalic; atraumatic.  	EYES: PERRL, EOM, no conj injection, sclera clear  	ENT: No nasal discharge; airway clear.  	NECK: Supple; non tender.   	CARD: S1, S2 normal; no murmurs, gallops, or rubs. Regular rate and rhythm.  no carotid bruits  	RESP: CTA B/L; good air movement No wheezes, rales or rhonchi.  	ABD: Soft, not tender, not distended,  no rebound or guarding, bowel sounds present  	EXT:+ Edema  	NEURO: Alert, awake, motor 5/5 R, 5/5 L

## 2025-02-12 NOTE — PROGRESS NOTE ADULT - SUBJECTIVE AND OBJECTIVE BOX
OPERATIVE PROCEDURE(s):                POD #15 s/p MVR, LA ligation, MAZE  SURGEON(s): ISELA Blakely  SUBJECTIVE ASSESSMENT:64yMale patient seen and examined at bedside. Pt states that he has been restricting in his fluid intake and does not feel improvement. Pt still with dyspnea at rest and discomfort from the LE swelling. Pt has been ambulating with PT. Pt denies fever, chills, HA, chest pain, palpitations.     Vital Signs Last 24 Hrs  T(F): 98.6 (12 Feb 2025 08:00), Max: 99 (12 Feb 2025 04:00)  HR: 82 (12 Feb 2025 10:00) (73 - 93)  BP: 132/72 (12 Feb 2025 10:00) (98/53 - 151/68)  BP(mean): 88 (12 Feb 2025 10:00) (74 - 101)  ABP: --  ABP(mean): --  RR: 23 (12 Feb 2025 10:00) (13 - 32)  SpO2: 96% (12 Feb 2025 06:00) (93% - 97%)  CVP(mm Hg): --  CVP(cm H2O): --  CO: --  CI: --  PA: --  SVR: --    I&O's Detail    11 Feb 2025 07:01  -  12 Feb 2025 07:00  --------------------------------------------------------  IN:    IV PiggyBack: 100 mL    Oral Fluid: 1290 mL  Total IN: 1390 mL    OUT:    Voided (mL): 3295 mL  Total OUT: 3295 mL        Net: I&O's Detail    10 Feb 2025 07:01  -  11 Feb 2025 07:00  --------------------------------------------------------  Total NET: -2350 mL      11 Feb 2025 07:01  -  12 Feb 2025 07:00  --------------------------------------------------------  Total NET: -1905 mL        CAPILLARY BLOOD GLUCOSE      POCT Blood Glucose.: 153 mg/dL (12 Feb 2025 05:02)  POCT Blood Glucose.: 134 mg/dL (11 Feb 2025 21:20)  POCT Blood Glucose.: 120 mg/dL (11 Feb 2025 16:31)  POCT Blood Glucose.: 188 mg/dL (11 Feb 2025 13:33)      Physical Exam:  General: NAD; A&Ox3  Cardiac: S1/S2, RRR, no murmur, no rubs  Lungs: unlabored shallow respirations, decreased at B/L bases  Abdomen: Soft/NT/ND  Sternum: Intact, no click, incision healing well with no drainage  Incisions: Incisions clean/dry/intact, healing well  Extremities: Pitting edema in B/L LE; good capillary refill; no cyanosis; palpable 1+ pedal pulses B/L  Scrotal edema noted          LABS:                        9.3[L]  10.12 )-----------( 207      ( 12 Feb 2025 05:30 )             31.7[L]                        8.7[L]  9.71  )-----------( 186      ( 11 Feb 2025 04:25 )             30.3[L]    02-12    145  |  100  |  37[H]  ----------------------------<  160[H]  4.6   |  36[H]  |  1.0  02-11    143  |  101  |  39[H]  ----------------------------<  110[H]  4.1   |  33[H]  |  0.9    Ca    9.3      12 Feb 2025 05:30  Mg     1.7     02-12    TPro  6.2 [6.0 - 8.0]  /  Alb  3.8 [3.5 - 5.2]  /  TBili  0.5 [0.2 - 1.2]  /  DBili  x   /  AST  50[H] [0 - 41]  /  ALT  29 [0 - 41]  /  AlkPhos  163[H] [30 - 115]  02-12      Urinalysis Basic - ( 12 Feb 2025 05:30 )    Color: x / Appearance: x / SG: x / pH: x  Gluc: 160 mg/dL / Ketone: x  / Bili: x / Urobili: x   Blood: x / Protein: x / Nitrite: x   Leuk Esterase: x / RBC: x / WBC x   Sq Epi: x / Non Sq Epi: x / Bacteria: x        RADIOLOGY & ADDITIONAL TESTS:  CXR:   < from: Xray Chest 1 View- PORTABLE-Routine (Xray Chest 1 View- PORTABLE-Routine in AM.) (02.12.25 @ 06:48) >  IMPRESSION:    Support devices: None.    Cardiac/mediastinum/hilum: Stable.    Lung parenchyma/Pleura: Unchanged bilateral pleural effusions and   bibasilar opacities. No pneumothorax.    Skeleton/softtissues: Stable.    < end of copied text >      Allergies    No Known Allergies    Intolerances      MEDICATIONS  (STANDING):  albuterol/ipratropium for Nebulization 3 milliLiter(s) Nebulizer every 6 hours  aspirin enteric coated 81 milliGRAM(s) Oral daily  buMETAnide Injectable 1 milliGRAM(s) IV Push every 8 hours  chlorhexidine 2% Cloths 1 Application(s) Topical daily  dextrose 5%. 1000 milliLiter(s) (50 mL/Hr) IV Continuous <Continuous>  dextrose 5%. 1000 milliLiter(s) (100 mL/Hr) IV Continuous <Continuous>  dextrose 50% Injectable 50 milliLiter(s) IV Push every 15 minutes  dextrose 50% Injectable 25 milliLiter(s) IV Push every 15 minutes  finasteride 5 milliGRAM(s) Oral daily  fluticasone propionate 50 MICROgram(s)/spray Nasal Spray 1 Spray(s) Both Nostrils every 12 hours  fluticasone propionate/ salmeterol 250-50 MICROgram(s) Diskus 1 Dose(s) Inhalation two times a day  folic acid 1 milliGRAM(s) Oral daily  gabapentin 100 milliGRAM(s) Oral every 8 hours  glucagon  Injectable 1 milliGRAM(s) IntraMuscular once  guaiFENesin  milliGRAM(s) Oral every 12 hours  heparin   Injectable 5000 Unit(s) SubCutaneous every 8 hours  influenza  Vaccine (HIGH DOSE) 0.5 milliLiter(s) IntraMuscular once  insulin glargine Injectable (LANTUS) 40 Unit(s) SubCutaneous before breakfast  insulin lispro (ADMELOG) corrective regimen sliding scale   SubCutaneous three times a day before meals  insulin lispro (ADMELOG) corrective regimen sliding scale   SubCutaneous at bedtime  insulin lispro Injectable (ADMELOG) 10 Unit(s) SubCutaneous three times a day before meals  metolazone 5 milliGRAM(s) Oral daily  metoprolol tartrate 12.5 milliGRAM(s) Oral every 12 hours  mupirocin 2% Nasal 1 Application(s) Both Nostrils every 12 hours  pantoprazole    Tablet 40 milliGRAM(s) Oral before breakfast  psyllium Powder 1 Packet(s) Oral every 12 hours  rosuvastatin 20 milliGRAM(s) Oral at bedtime  senna 2 Tablet(s) Oral at bedtime  sertraline 25 milliGRAM(s) Oral daily  tamsulosin 0.4 milliGRAM(s) Oral at bedtime    MEDICATIONS  (PRN):  dextrose Oral Gel 15 Gram(s) Oral once PRN Blood Glucose LESS THAN 70 milliGRAM(s)/deciliter  hydrALAZINE Injectable 5 milliGRAM(s) IV Push every 4 hours PRN Systolic > 160  labetalol Injectable 10 milliGRAM(s) IV Push every 6 hours PRN Diastolic blood pressure >155  melatonin 5 milliGRAM(s) Oral at bedtime PRN Insomnia  ondansetron Injectable 4 milliGRAM(s) IV Push every 4 hours PRN Nausea and/or Vomiting  oxyCODONE    IR 5 milliGRAM(s) Oral every 6 hours PRN Moderate Pain (4 - 6)      Ambulation/Activity Status: Ambulate with assistance    Assessment/Plan:  64y Male POD#15 s/p MVR/LA ligation and MAZE for severe MR/afib   - Case and plan discussed with CTU Intensivist and CT Surgeon - Dr. Blakely  - Continue CTU supportive care and ongoing plan of care as per continuing CTU rounds.    - Continue DVT/GI prophylaxis  - Incentive Spirometry 10 times an hour  - Continue to advance physical activity as tolerated and continue PT/OT as directed  1. CAD: Continue ASA, statin, BB  2. Heart failure (diastolic, EF 60%): increased bumex to q8hr, continue metolazone, daily standing weights, fluid restriction to 1L, goal of -2L fluid balance  3. Pre-op hx of A. Fib: s/p KERON and SHEILA, monitor electrolytes, eliquis currently held in the setting of lower GI bleed   4. Madelung disease: continue bipap, NC2L, respiratory treatment   5. DM/Glucose Control (A1c 6.2): insulin sliding scale, lantus 40, admelog 10, DASH/TLC diet    Social Service Disposition: SW to f/u placement in [facility]    OPERATIVE PROCEDURE(s):                POD #15 s/p MVR, LA ligation, MAZE  SURGEON(s): ISELA Blakely  SUBJECTIVE ASSESSMENT:64yMale patient seen and examined at bedside. Pt states that he has been restricting in his fluid intake and does not feel improvement. Pt still with dyspnea at rest and discomfort from the LE swelling. Pt has been ambulating with PT. Pt denies fever, chills, HA, chest pain, palpitations.     Vital Signs Last 24 Hrs  T(F): 98.6 (12 Feb 2025 08:00), Max: 99 (12 Feb 2025 04:00)  HR: 82 (12 Feb 2025 10:00) (73 - 93)  BP: 132/72 (12 Feb 2025 10:00) (98/53 - 151/68)  BP(mean): 88 (12 Feb 2025 10:00) (74 - 101)  ABP: --  ABP(mean): --  RR: 23 (12 Feb 2025 10:00) (13 - 32)  SpO2: 96% (12 Feb 2025 06:00) (93% - 97%)  CVP(mm Hg): --  CVP(cm H2O): --  CO: --  CI: --  PA: --  SVR: --    I&O's Detail    11 Feb 2025 07:01  -  12 Feb 2025 07:00  --------------------------------------------------------  IN:    IV PiggyBack: 100 mL    Oral Fluid: 1290 mL  Total IN: 1390 mL    OUT:    Voided (mL): 3295 mL  Total OUT: 3295 mL        Net: I&O's Detail    10 Feb 2025 07:01  -  11 Feb 2025 07:00  --------------------------------------------------------  Total NET: -2350 mL      11 Feb 2025 07:01  -  12 Feb 2025 07:00  --------------------------------------------------------  Total NET: -1905 mL        CAPILLARY BLOOD GLUCOSE      POCT Blood Glucose.: 153 mg/dL (12 Feb 2025 05:02)  POCT Blood Glucose.: 134 mg/dL (11 Feb 2025 21:20)  POCT Blood Glucose.: 120 mg/dL (11 Feb 2025 16:31)  POCT Blood Glucose.: 188 mg/dL (11 Feb 2025 13:33)      Physical Exam:  General: NAD; A&Ox3  Cardiac: S1/S2, RRR, no murmur, no rubs  Lungs: unlabored shallow respirations, decreased at B/L bases  Abdomen: Soft/NT/ND  Sternum: Intact, no click, incision healing well with no drainage  Incisions: Incisions clean/dry/intact, healing well  Extremities: Pitting edema in B/L LE; good capillary refill; no cyanosis; palpable 1+ pedal pulses B/L  Scrotal edema noted          LABS:                        9.3[L]  10.12 )-----------( 207      ( 12 Feb 2025 05:30 )             31.7[L]                        8.7[L]  9.71  )-----------( 186      ( 11 Feb 2025 04:25 )             30.3[L]    02-12    145  |  100  |  37[H]  ----------------------------<  160[H]  4.6   |  36[H]  |  1.0  02-11    143  |  101  |  39[H]  ----------------------------<  110[H]  4.1   |  33[H]  |  0.9    Ca    9.3      12 Feb 2025 05:30  Mg     1.7     02-12    TPro  6.2 [6.0 - 8.0]  /  Alb  3.8 [3.5 - 5.2]  /  TBili  0.5 [0.2 - 1.2]  /  DBili  x   /  AST  50[H] [0 - 41]  /  ALT  29 [0 - 41]  /  AlkPhos  163[H] [30 - 115]  02-12      Urinalysis Basic - ( 12 Feb 2025 05:30 )    Color: x / Appearance: x / SG: x / pH: x  Gluc: 160 mg/dL / Ketone: x  / Bili: x / Urobili: x   Blood: x / Protein: x / Nitrite: x   Leuk Esterase: x / RBC: x / WBC x   Sq Epi: x / Non Sq Epi: x / Bacteria: x        RADIOLOGY & ADDITIONAL TESTS:  CXR:   < from: Xray Chest 1 View- PORTABLE-Routine (Xray Chest 1 View- PORTABLE-Routine in AM.) (02.12.25 @ 06:48) >  IMPRESSION:    Support devices: None.    Cardiac/mediastinum/hilum: Stable.    Lung parenchyma/Pleura: Unchanged bilateral pleural effusions and   bibasilar opacities. No pneumothorax.    Skeleton/softtissues: Stable.    < end of copied text >      Allergies    No Known Allergies    Intolerances      MEDICATIONS  (STANDING):  albuterol/ipratropium for Nebulization 3 milliLiter(s) Nebulizer every 6 hours  aspirin enteric coated 81 milliGRAM(s) Oral daily  buMETAnide Injectable 1 milliGRAM(s) IV Push every 8 hours  chlorhexidine 2% Cloths 1 Application(s) Topical daily  dextrose 5%. 1000 milliLiter(s) (50 mL/Hr) IV Continuous <Continuous>  dextrose 5%. 1000 milliLiter(s) (100 mL/Hr) IV Continuous <Continuous>  dextrose 50% Injectable 50 milliLiter(s) IV Push every 15 minutes  dextrose 50% Injectable 25 milliLiter(s) IV Push every 15 minutes  finasteride 5 milliGRAM(s) Oral daily  fluticasone propionate 50 MICROgram(s)/spray Nasal Spray 1 Spray(s) Both Nostrils every 12 hours  fluticasone propionate/ salmeterol 250-50 MICROgram(s) Diskus 1 Dose(s) Inhalation two times a day  folic acid 1 milliGRAM(s) Oral daily  gabapentin 100 milliGRAM(s) Oral every 8 hours  glucagon  Injectable 1 milliGRAM(s) IntraMuscular once  guaiFENesin  milliGRAM(s) Oral every 12 hours  heparin   Injectable 5000 Unit(s) SubCutaneous every 8 hours  influenza  Vaccine (HIGH DOSE) 0.5 milliLiter(s) IntraMuscular once  insulin glargine Injectable (LANTUS) 40 Unit(s) SubCutaneous before breakfast  insulin lispro (ADMELOG) corrective regimen sliding scale   SubCutaneous three times a day before meals  insulin lispro (ADMELOG) corrective regimen sliding scale   SubCutaneous at bedtime  insulin lispro Injectable (ADMELOG) 10 Unit(s) SubCutaneous three times a day before meals  metolazone 5 milliGRAM(s) Oral daily  metoprolol tartrate 12.5 milliGRAM(s) Oral every 12 hours  mupirocin 2% Nasal 1 Application(s) Both Nostrils every 12 hours  pantoprazole    Tablet 40 milliGRAM(s) Oral before breakfast  psyllium Powder 1 Packet(s) Oral every 12 hours  rosuvastatin 20 milliGRAM(s) Oral at bedtime  senna 2 Tablet(s) Oral at bedtime  sertraline 25 milliGRAM(s) Oral daily  tamsulosin 0.4 milliGRAM(s) Oral at bedtime    MEDICATIONS  (PRN):  dextrose Oral Gel 15 Gram(s) Oral once PRN Blood Glucose LESS THAN 70 milliGRAM(s)/deciliter  hydrALAZINE Injectable 5 milliGRAM(s) IV Push every 4 hours PRN Systolic > 160  labetalol Injectable 10 milliGRAM(s) IV Push every 6 hours PRN Diastolic blood pressure >155  melatonin 5 milliGRAM(s) Oral at bedtime PRN Insomnia  ondansetron Injectable 4 milliGRAM(s) IV Push every 4 hours PRN Nausea and/or Vomiting  oxyCODONE    IR 5 milliGRAM(s) Oral every 6 hours PRN Moderate Pain (4 - 6)      Ambulation/Activity Status: Ambulate with assistance    Assessment/Plan:  64y Male POD#15 s/p MVR/LA ligation and MAZE for severe MR/afib   - Case and plan discussed with CTU Intensivist and CT Surgeon - Dr. Blakely  - Continue CTU supportive care and ongoing plan of care as per continuing CTU rounds.    - Continue DVT/GI prophylaxis  - Incentive Spirometry 10 times an hour  - Continue to advance physical activity as tolerated and continue PT/OT as directed  1. CAD: Continue ASA, statin, BB  2. Heart failure (diastolic, EF 60%): increased bumex to q8hr, continue metolazone, daily standing weights, fluid restriction to 1L, goal of -2L fluid balance  3. Pre-op hx of A. Fib: s/p KERON and SHEILA, monitor electrolytes, eliquis currently held in the setting of lower GI bleed   4. Madelung disease: continue bipap, NC2L, respiratory treatment   5. DM/Glucose Control (A1c 6.2): insulin sliding scale, lantus 40, admelog 10, DASH/TLC diet    Social Service Disposition: pt was accepted to [facility] inpatient rehab and anticipate d/c tomorrow or friday

## 2025-02-12 NOTE — CHART NOTE - NSCHARTNOTEFT_GEN_A_CORE
Registered Dietitian Follow-Up     Patient Profile Reviewed                           Yes [x]   No []     Nutrition History Previously Obtained        Yes [x]  No []       Pertinent Subjective Information: Pt reports having good appetite; consumed 100% of breakfast this morning. Tolerating diet texture/consistency well. No nausea or vomiting reported.      Pertinent Medical Information: 63 y/o male w/ afib, nonobstructive CAD, CHF class 3, severe MR, DM, HTN, JIMMY on CPAP, asthma, severe obesity, HL, Madelung's disease, osteoarthritis s/p bilateral hip replacement, peripheral neuropathy presented with increasing dyspnea with exertion found to have severe MR.  He was evaluated for mitral clip and not a candidate.  On prior admission he had FLAKITO with creat up to 2. Was optimized with diuresis.  He was on AC and developed hemorrhoidal bleeding and was taken off AC.  He was discharged and returned for his procedure on . s/p MV Replacement Maze La Ligation.    Diet order: Diet, DASH/TLC:   Sodium & Cholesterol Restricted  Consistent Carbohydrate {Evening Snack} (CSTCHOSN)  1000mL Fluid Restriction ([id number]) (25 @ 08:31) [Active]    Anthropometrics:  Weight: 114 KG  Height: 157 cm  BMI: 46.3  IBW: 53.6 KG     Daily Weight in k.9 (), Weight in k.5 (), Weight in k.3 (), Weight in k.5 (02-10), Weight in k.7 (), Weight in k.8 (), Weight in k.6 ()    MEDICATIONS  (STANDING):  acetaminophen   IVPB .. 1000 milliGRAM(s) IV Intermittent once  acetaminophen   IVPB .. 1000 milliGRAM(s) IV Intermittent once  albuterol/ipratropium for Nebulization 3 milliLiter(s) Nebulizer every 6 hours  aspirin enteric coated 81 milliGRAM(s) Oral daily  buMETAnide Injectable 1 milliGRAM(s) IV Push every 8 hours  chlorhexidine 2% Cloths 1 Application(s) Topical daily  dextrose 5%. 1000 milliLiter(s) (50 mL/Hr) IV Continuous <Continuous>  dextrose 5%. 1000 milliLiter(s) (100 mL/Hr) IV Continuous <Continuous>  dextrose 50% Injectable 50 milliLiter(s) IV Push every 15 minutes  dextrose 50% Injectable 25 milliLiter(s) IV Push every 15 minutes  finasteride 5 milliGRAM(s) Oral daily  fluticasone propionate 50 MICROgram(s)/spray Nasal Spray 1 Spray(s) Both Nostrils every 12 hours  fluticasone propionate/ salmeterol 250-50 MICROgram(s) Diskus 1 Dose(s) Inhalation two times a day  folic acid 1 milliGRAM(s) Oral daily  gabapentin 100 milliGRAM(s) Oral every 8 hours  glucagon  Injectable 1 milliGRAM(s) IntraMuscular once  guaiFENesin  milliGRAM(s) Oral every 12 hours  heparin   Injectable 5000 Unit(s) SubCutaneous every 8 hours  influenza  Vaccine (HIGH DOSE) 0.5 milliLiter(s) IntraMuscular once  insulin glargine Injectable (LANTUS) 40 Unit(s) SubCutaneous before breakfast  insulin lispro (ADMELOG) corrective regimen sliding scale   SubCutaneous three times a day before meals  insulin lispro (ADMELOG) corrective regimen sliding scale   SubCutaneous at bedtime  insulin lispro Injectable (ADMELOG) 10 Unit(s) SubCutaneous three times a day before meals  lidocaine   4% Patch 1 Patch Transdermal every 24 hours  lidocaine   4% Patch 1 Patch Transdermal every 24 hours  metolazone 5 milliGRAM(s) Oral daily  metoprolol tartrate 12.5 milliGRAM(s) Oral every 12 hours  mupirocin 2% Nasal 1 Application(s) Both Nostrils every 12 hours  pantoprazole    Tablet 40 milliGRAM(s) Oral before breakfast  psyllium Powder 1 Packet(s) Oral every 12 hours  rosuvastatin 20 milliGRAM(s) Oral at bedtime  senna 2 Tablet(s) Oral at bedtime  sertraline 25 milliGRAM(s) Oral daily  tamsulosin 0.4 milliGRAM(s) Oral at bedtime    MEDICATIONS  (PRN):  dextrose Oral Gel 15 Gram(s) Oral once PRN Blood Glucose LESS THAN 70 milliGRAM(s)/deciliter  hydrALAZINE Injectable 5 milliGRAM(s) IV Push every 4 hours PRN Systolic > 160  labetalol Injectable 10 milliGRAM(s) IV Push every 6 hours PRN Diastolic blood pressure >155  melatonin 5 milliGRAM(s) Oral at bedtime PRN Insomnia  ondansetron Injectable 4 milliGRAM(s) IV Push every 4 hours PRN Nausea and/or Vomiting  oxyCODONE    IR 5 milliGRAM(s) Oral every 6 hours PRN Moderate Pain (4 - 6)    Pertinent Labs:  @ 05:30: Na 145, BUN 37[H], Cr 1.0, [H], K+ 4.6, Phos --, Mg 1.7[L], Alk Phos 163[H], ALT/SGPT 29, AST/SGOT 50[H], HbA1c --   @ 17:10: Na 143, BUN 39[H], Cr 0.9, [H], K+ 4.1, Phos --, Mg --, Alk Phos --, ALT/SGPT --, AST/SGOT --, HbA1c --    Finger Sticks:  POCT Blood Glucose.: 94 mg/dL ( @ 11:50)  POCT Blood Glucose.: 259 mg/dL ( @ 08:17)  POCT Blood Glucose.: 153 mg/dL ( @ 05:02)  POCT Blood Glucose.: 134 mg/dL ( @ 21:20)  POCT Blood Glucose.: 120 mg/dL ( @ 16:31)    Physical Findings:  - Appearance: alert  - GI function: last BM on   - Tubes: n/a  - Oral/Mouth cavity: regular with thins  - Skin: excoriation; surgical incision; no pressure injuries noted   - Edema: edema 3+     Nutrition Requirements:  Weight Used: IBW 53.6 KG - with consideration for age, BMI, acuity of care     Estimated Energy Needs    Continue [x]  Adjust []  ENERGY: 5230-8412 kcal/day (25-30 mL/kg)     Estimated Protein Needs    Continue [x]  Adjust []  PROTEIN: 107.2-134 g/day (2-2.5 g/kg IBW 53.6 KG)     Estimated Fluid Needs        Continue [x]  Adjust []  FLUID: 8495-0337 mL/day (20-25 mL/kg)    [x] Previous Nutrition Diagnosis: Inadequate Oral Intake              [x] Ongoing          [] Resolved     Nutrition Education: Discussed current diet order      Goal/Expected Outcome: Pt to meet >75% of estimated needs within 5-7 days     Indicator/Monitoring: Diet order, PO intake, weights, labs, NFPF, body composition, BM    Recommendation:  1. Continue with current diet order   2. Encourage PO intake    Moderate nutrition f/u    RD to remain available: Ronit Benavides [phone] or TEAMS

## 2025-02-13 LAB
ALBUMIN SERPL ELPH-MCNC: 3.4 G/DL — LOW (ref 3.5–5.2)
ALBUMIN SERPL ELPH-MCNC: 4.1 G/DL — SIGNIFICANT CHANGE UP (ref 3.5–5.2)
ALP SERPL-CCNC: 112 U/L — SIGNIFICANT CHANGE UP (ref 30–115)
ALP SERPL-CCNC: 128 U/L — HIGH (ref 30–115)
ALT FLD-CCNC: 17 U/L — SIGNIFICANT CHANGE UP (ref 0–41)
ALT FLD-CCNC: 23 U/L — SIGNIFICANT CHANGE UP (ref 0–41)
ANION GAP SERPL CALC-SCNC: 10 MMOL/L — SIGNIFICANT CHANGE UP (ref 7–14)
ANION GAP SERPL CALC-SCNC: 10 MMOL/L — SIGNIFICANT CHANGE UP (ref 7–14)
AST SERPL-CCNC: 21 U/L — SIGNIFICANT CHANGE UP (ref 0–41)
AST SERPL-CCNC: 30 U/L — SIGNIFICANT CHANGE UP (ref 0–41)
BASE EXCESS BLDV CALC-SCNC: 16.6 MMOL/L — HIGH (ref -2–3)
BILIRUB SERPL-MCNC: 0.4 MG/DL — SIGNIFICANT CHANGE UP (ref 0.2–1.2)
BILIRUB SERPL-MCNC: 0.5 MG/DL — SIGNIFICANT CHANGE UP (ref 0.2–1.2)
BUN SERPL-MCNC: 38 MG/DL — HIGH (ref 10–20)
BUN SERPL-MCNC: 39 MG/DL — HIGH (ref 10–20)
CALCIUM SERPL-MCNC: 8.7 MG/DL — SIGNIFICANT CHANGE UP (ref 8.4–10.5)
CALCIUM SERPL-MCNC: 8.9 MG/DL — SIGNIFICANT CHANGE UP (ref 8.4–10.5)
CHLORIDE SERPL-SCNC: 94 MMOL/L — LOW (ref 98–110)
CHLORIDE SERPL-SCNC: 95 MMOL/L — LOW (ref 98–110)
CO2 SERPL-SCNC: 35 MMOL/L — HIGH (ref 17–32)
CO2 SERPL-SCNC: 35 MMOL/L — HIGH (ref 17–32)
CREAT SERPL-MCNC: 1.2 MG/DL — SIGNIFICANT CHANGE UP (ref 0.7–1.5)
CREAT SERPL-MCNC: 1.2 MG/DL — SIGNIFICANT CHANGE UP (ref 0.7–1.5)
EGFR: 67 ML/MIN/1.73M2 — SIGNIFICANT CHANGE UP
EGFR: 67 ML/MIN/1.73M2 — SIGNIFICANT CHANGE UP
GAS PNL BLDV: SIGNIFICANT CHANGE UP
GLUCOSE BLDC GLUCOMTR-MCNC: 132 MG/DL — HIGH (ref 70–99)
GLUCOSE BLDC GLUCOMTR-MCNC: 144 MG/DL — HIGH (ref 70–99)
GLUCOSE BLDC GLUCOMTR-MCNC: 151 MG/DL — HIGH (ref 70–99)
GLUCOSE BLDC GLUCOMTR-MCNC: 167 MG/DL — HIGH (ref 70–99)
GLUCOSE BLDC GLUCOMTR-MCNC: 221 MG/DL — HIGH (ref 70–99)
GLUCOSE BLDC GLUCOMTR-MCNC: 256 MG/DL — HIGH (ref 70–99)
GLUCOSE SERPL-MCNC: 126 MG/DL — HIGH (ref 70–99)
GLUCOSE SERPL-MCNC: 198 MG/DL — HIGH (ref 70–99)
HCO3 BLDV-SCNC: 44 MMOL/L — HIGH (ref 22–29)
HCT VFR BLD CALC: 26.7 % — LOW (ref 42–52)
HCT VFR BLD CALC: 28.5 % — LOW (ref 42–52)
HGB BLD-MCNC: 7.8 G/DL — LOW (ref 14–18)
HGB BLD-MCNC: 8.5 G/DL — LOW (ref 14–18)
LACTATE BLDV-MCNC: 1 MMOL/L — SIGNIFICANT CHANGE UP (ref 0.5–2)
MAGNESIUM SERPL-MCNC: 1.7 MG/DL — LOW (ref 1.8–2.4)
MAGNESIUM SERPL-MCNC: 1.9 MG/DL — SIGNIFICANT CHANGE UP (ref 1.8–2.4)
MCHC RBC-ENTMCNC: 26.8 PG — LOW (ref 27–31)
MCHC RBC-ENTMCNC: 27.2 PG — SIGNIFICANT CHANGE UP (ref 27–31)
MCHC RBC-ENTMCNC: 29.2 G/DL — LOW (ref 32–37)
MCHC RBC-ENTMCNC: 29.8 G/DL — LOW (ref 32–37)
MCV RBC AUTO: 91.1 FL — SIGNIFICANT CHANGE UP (ref 80–94)
MCV RBC AUTO: 91.8 FL — SIGNIFICANT CHANGE UP (ref 80–94)
NRBC BLD AUTO-RTO: 0 /100 WBCS — SIGNIFICANT CHANGE UP (ref 0–0)
NRBC BLD AUTO-RTO: 0 /100 WBCS — SIGNIFICANT CHANGE UP (ref 0–0)
PCO2 BLDV: 67 MMHG — HIGH (ref 42–55)
PH BLDV: 7.43 — SIGNIFICANT CHANGE UP (ref 7.32–7.43)
PHOSPHATE SERPL-MCNC: 3.7 MG/DL — SIGNIFICANT CHANGE UP (ref 2.1–4.9)
PLATELET # BLD AUTO: 175 K/UL — SIGNIFICANT CHANGE UP (ref 130–400)
PLATELET # BLD AUTO: 175 K/UL — SIGNIFICANT CHANGE UP (ref 130–400)
PMV BLD: 10.5 FL — HIGH (ref 7.4–10.4)
PMV BLD: 11.2 FL — HIGH (ref 7.4–10.4)
PO2 BLDV: 40 MMHG — SIGNIFICANT CHANGE UP (ref 25–45)
POTASSIUM SERPL-MCNC: 3.6 MMOL/L — SIGNIFICANT CHANGE UP (ref 3.5–5)
POTASSIUM SERPL-MCNC: 4 MMOL/L — SIGNIFICANT CHANGE UP (ref 3.5–5)
POTASSIUM SERPL-SCNC: 3.6 MMOL/L — SIGNIFICANT CHANGE UP (ref 3.5–5)
POTASSIUM SERPL-SCNC: 4 MMOL/L — SIGNIFICANT CHANGE UP (ref 3.5–5)
PROT SERPL-MCNC: 5.8 G/DL — LOW (ref 6–8)
PROT SERPL-MCNC: 6 G/DL — SIGNIFICANT CHANGE UP (ref 6–8)
RBC # BLD: 2.91 M/UL — LOW (ref 4.7–6.1)
RBC # BLD: 3.13 M/UL — LOW (ref 4.7–6.1)
RBC # FLD: 19.8 % — HIGH (ref 11.5–14.5)
RBC # FLD: 20.3 % — HIGH (ref 11.5–14.5)
SAO2 % BLDV: 66.1 % — LOW (ref 67–88)
SODIUM SERPL-SCNC: 139 MMOL/L — SIGNIFICANT CHANGE UP (ref 135–146)
SODIUM SERPL-SCNC: 140 MMOL/L — SIGNIFICANT CHANGE UP (ref 135–146)
WBC # BLD: 8.19 K/UL — SIGNIFICANT CHANGE UP (ref 4.8–10.8)
WBC # BLD: 8.7 K/UL — SIGNIFICANT CHANGE UP (ref 4.8–10.8)
WBC # FLD AUTO: 8.19 K/UL — SIGNIFICANT CHANGE UP (ref 4.8–10.8)
WBC # FLD AUTO: 8.7 K/UL — SIGNIFICANT CHANGE UP (ref 4.8–10.8)

## 2025-02-13 PROCEDURE — 99291 CRITICAL CARE FIRST HOUR: CPT

## 2025-02-13 PROCEDURE — 71045 X-RAY EXAM CHEST 1 VIEW: CPT | Mod: 26

## 2025-02-13 RX ORDER — PHENYLEPHRINE HCL IN 0.9% NACL 0.5 MG/5ML
200 SYRINGE (ML) INTRAVENOUS ONCE
Refills: 0 | Status: COMPLETED | OUTPATIENT
Start: 2025-02-13 | End: 2025-02-13

## 2025-02-13 RX ORDER — AMIODARONE HYDROCHLORIDE 50 MG/ML
150 INJECTION, SOLUTION INTRAVENOUS ONCE
Refills: 0 | Status: COMPLETED | OUTPATIENT
Start: 2025-02-13 | End: 2025-02-13

## 2025-02-13 RX ORDER — GABAPENTIN 400 MG/1
300 CAPSULE ORAL AT BEDTIME
Refills: 0 | Status: DISCONTINUED | OUTPATIENT
Start: 2025-02-13 | End: 2025-02-24

## 2025-02-13 RX ORDER — ALBUMIN (HUMAN) 12.5 G/50ML
250 INJECTION, SOLUTION INTRAVENOUS ONCE
Refills: 0 | Status: COMPLETED | OUTPATIENT
Start: 2025-02-13 | End: 2025-02-13

## 2025-02-13 RX ORDER — OXYCODONE HYDROCHLORIDE 30 MG/1
10 TABLET ORAL EVERY 6 HOURS
Refills: 0 | Status: DISCONTINUED | OUTPATIENT
Start: 2025-02-13 | End: 2025-02-20

## 2025-02-13 RX ORDER — SPIRONOLACTONE 25 MG
25 TABLET ORAL
Refills: 0 | Status: DISCONTINUED | OUTPATIENT
Start: 2025-02-13 | End: 2025-02-13

## 2025-02-13 RX ORDER — ACETAZOLAMIDE 250 MG/1
250 TABLET ORAL ONCE
Refills: 0 | Status: COMPLETED | OUTPATIENT
Start: 2025-02-13 | End: 2025-02-13

## 2025-02-13 RX ORDER — ALBUMIN (HUMAN) 12.5 G/50ML
250 INJECTION, SOLUTION INTRAVENOUS
Refills: 0 | Status: COMPLETED | OUTPATIENT
Start: 2025-02-13 | End: 2025-02-13

## 2025-02-13 RX ORDER — PHENYLEPHRINE HCL IN 0.9% NACL 0.5 MG/5ML
0.05 SYRINGE (ML) INTRAVENOUS
Qty: 40 | Refills: 0 | Status: DISCONTINUED | OUTPATIENT
Start: 2025-02-13 | End: 2025-02-17

## 2025-02-13 RX ORDER — BUMETANIDE 1 MG/1
1 TABLET ORAL
Refills: 0 | Status: DISCONTINUED | OUTPATIENT
Start: 2025-02-13 | End: 2025-02-13

## 2025-02-13 RX ORDER — MAGNESIUM SULFATE 500 MG/ML
2 SYRINGE (ML) INJECTION ONCE
Refills: 0 | Status: COMPLETED | OUTPATIENT
Start: 2025-02-13 | End: 2025-02-13

## 2025-02-13 RX ORDER — IRON SUCROSE 20 MG/ML
200 INJECTION, SOLUTION INTRAVENOUS EVERY 24 HOURS
Refills: 0 | Status: DISCONTINUED | OUTPATIENT
Start: 2025-02-13 | End: 2025-02-24

## 2025-02-13 RX ORDER — MAGNESIUM SULFATE 500 MG/ML
1 SYRINGE (ML) INJECTION ONCE
Refills: 0 | Status: COMPLETED | OUTPATIENT
Start: 2025-02-13 | End: 2025-02-13

## 2025-02-13 RX ADMIN — Medication 200 MICROGRAM(S): at 02:17

## 2025-02-13 RX ADMIN — Medication 20 MILLIEQUIVALENT(S): at 16:54

## 2025-02-13 RX ADMIN — Medication 100 GRAM(S): at 16:54

## 2025-02-13 RX ADMIN — Medication 25 GRAM(S): at 01:25

## 2025-02-13 RX ADMIN — IPRATROPIUM BROMIDE AND ALBUTEROL SULFATE 3 MILLILITER(S): .5; 2.5 SOLUTION RESPIRATORY (INHALATION) at 20:28

## 2025-02-13 RX ADMIN — Medication 1 PACKET(S): at 06:35

## 2025-02-13 RX ADMIN — IPRATROPIUM BROMIDE AND ALBUTEROL SULFATE 3 MILLILITER(S): .5; 2.5 SOLUTION RESPIRATORY (INHALATION) at 08:40

## 2025-02-13 RX ADMIN — Medication 2.14 MICROGRAM(S)/KG/MIN: at 03:18

## 2025-02-13 RX ADMIN — INSULIN LISPRO 4: 100 INJECTION, SOLUTION INTRAVENOUS; SUBCUTANEOUS at 16:51

## 2025-02-13 RX ADMIN — GABAPENTIN 100 MILLIGRAM(S): 400 CAPSULE ORAL at 06:04

## 2025-02-13 RX ADMIN — Medication 1 DOSE(S): at 09:57

## 2025-02-13 RX ADMIN — LIDOCAINE HYDROCHLORIDE 1 PATCH: 20 JELLY TOPICAL at 15:49

## 2025-02-13 RX ADMIN — OXYCODONE HYDROCHLORIDE 10 MILLIGRAM(S): 30 TABLET ORAL at 22:10

## 2025-02-13 RX ADMIN — OXYCODONE HYDROCHLORIDE 5 MILLIGRAM(S): 30 TABLET ORAL at 10:25

## 2025-02-13 RX ADMIN — INSULIN LISPRO 2: 100 INJECTION, SOLUTION INTRAVENOUS; SUBCUTANEOUS at 00:17

## 2025-02-13 RX ADMIN — ACETAZOLAMIDE 250 MILLIGRAM(S): 250 TABLET ORAL at 17:07

## 2025-02-13 RX ADMIN — LIDOCAINE HYDROCHLORIDE 1 PATCH: 20 JELLY TOPICAL at 15:50

## 2025-02-13 RX ADMIN — ALBUMIN (HUMAN) 125 MILLILITER(S): 12.5 INJECTION, SOLUTION INTRAVENOUS at 04:30

## 2025-02-13 RX ADMIN — ALBUMIN (HUMAN) 125 MILLILITER(S): 12.5 INJECTION, SOLUTION INTRAVENOUS at 02:10

## 2025-02-13 RX ADMIN — FOLIC ACID 1 MILLIGRAM(S): 1 TABLET ORAL at 12:22

## 2025-02-13 RX ADMIN — DEXTROMETHORPHAN HBR, GUAIFENESIN 600 MILLIGRAM(S): 200 LIQUID ORAL at 06:04

## 2025-02-13 RX ADMIN — ALBUMIN (HUMAN) 125 MILLILITER(S): 12.5 INJECTION, SOLUTION INTRAVENOUS at 04:15

## 2025-02-13 RX ADMIN — LIDOCAINE HYDROCHLORIDE 1 PATCH: 20 JELLY TOPICAL at 06:23

## 2025-02-13 RX ADMIN — HEPARIN SODIUM 5000 UNIT(S): 1000 INJECTION INTRAVENOUS; SUBCUTANEOUS at 15:51

## 2025-02-13 RX ADMIN — INSULIN LISPRO 10 UNIT(S): 100 INJECTION, SOLUTION INTRAVENOUS; SUBCUTANEOUS at 16:52

## 2025-02-13 RX ADMIN — Medication 50 MILLIEQUIVALENT(S): at 04:32

## 2025-02-13 RX ADMIN — Medication 1000 MILLIGRAM(S): at 22:00

## 2025-02-13 RX ADMIN — Medication 400 MILLIGRAM(S): at 09:57

## 2025-02-13 RX ADMIN — FLUTICASONE PROPIONATE 1 SPRAY(S): 50 SPRAY, METERED NASAL at 17:08

## 2025-02-13 RX ADMIN — INSULIN GLARGINE-YFGN 40 UNIT(S): 100 INJECTION, SOLUTION SUBCUTANEOUS at 09:56

## 2025-02-13 RX ADMIN — Medication 40 MILLIGRAM(S): at 06:05

## 2025-02-13 RX ADMIN — Medication 5 MILLIGRAM(S): at 21:42

## 2025-02-13 RX ADMIN — Medication 1000 MILLIGRAM(S): at 15:00

## 2025-02-13 RX ADMIN — Medication 400 MILLIGRAM(S): at 21:27

## 2025-02-13 RX ADMIN — LIDOCAINE HYDROCHLORIDE 1 PATCH: 20 JELLY TOPICAL at 21:07

## 2025-02-13 RX ADMIN — IPRATROPIUM BROMIDE AND ALBUTEROL SULFATE 3 MILLILITER(S): .5; 2.5 SOLUTION RESPIRATORY (INHALATION) at 01:47

## 2025-02-13 RX ADMIN — Medication 50 MILLIEQUIVALENT(S): at 01:26

## 2025-02-13 RX ADMIN — Medication 1000 MILLIGRAM(S): at 02:00

## 2025-02-13 RX ADMIN — INSULIN LISPRO 10 UNIT(S): 100 INJECTION, SOLUTION INTRAVENOUS; SUBCUTANEOUS at 12:20

## 2025-02-13 RX ADMIN — Medication 81 MILLIGRAM(S): at 12:25

## 2025-02-13 RX ADMIN — INSULIN LISPRO 2: 100 INJECTION, SOLUTION INTRAVENOUS; SUBCUTANEOUS at 12:04

## 2025-02-13 RX ADMIN — Medication 400 MILLIGRAM(S): at 14:26

## 2025-02-13 RX ADMIN — IPRATROPIUM BROMIDE AND ALBUTEROL SULFATE 3 MILLILITER(S): .5; 2.5 SOLUTION RESPIRATORY (INHALATION) at 15:10

## 2025-02-13 RX ADMIN — HEPARIN SODIUM 5000 UNIT(S): 1000 INJECTION INTRAVENOUS; SUBCUTANEOUS at 21:28

## 2025-02-13 RX ADMIN — DEXTROMETHORPHAN HBR, GUAIFENESIN 600 MILLIGRAM(S): 200 LIQUID ORAL at 17:07

## 2025-02-13 RX ADMIN — OXYCODONE HYDROCHLORIDE 10 MILLIGRAM(S): 30 TABLET ORAL at 21:41

## 2025-02-13 RX ADMIN — GABAPENTIN 100 MILLIGRAM(S): 400 CAPSULE ORAL at 15:49

## 2025-02-13 RX ADMIN — ALBUMIN (HUMAN) 125 MILLILITER(S): 12.5 INJECTION, SOLUTION INTRAVENOUS at 06:34

## 2025-02-13 RX ADMIN — SERTRALINE 25 MILLIGRAM(S): 100 TABLET, FILM COATED ORAL at 12:21

## 2025-02-13 RX ADMIN — GABAPENTIN 300 MILLIGRAM(S): 400 CAPSULE ORAL at 21:28

## 2025-02-13 RX ADMIN — Medication 400 MILLIGRAM(S): at 01:27

## 2025-02-13 RX ADMIN — ROSUVASTATIN CALCIUM 20 MILLIGRAM(S): 20 TABLET, FILM COATED ORAL at 21:27

## 2025-02-13 RX ADMIN — Medication 1 APPLICATION(S): at 15:00

## 2025-02-13 RX ADMIN — INSULIN LISPRO 10 UNIT(S): 100 INJECTION, SOLUTION INTRAVENOUS; SUBCUTANEOUS at 09:56

## 2025-02-13 RX ADMIN — TAMSULOSIN HYDROCHLORIDE 0.4 MILLIGRAM(S): 0.4 CAPSULE ORAL at 21:27

## 2025-02-13 RX ADMIN — FINASTERIDE 5 MILLIGRAM(S): 1 TABLET, FILM COATED ORAL at 12:22

## 2025-02-13 NOTE — PROGRESS NOTE ADULT - SUBJECTIVE AND OBJECTIVE BOX
OPERATIVE PROCEDURE(s):      s/p MVR, LA ligation, MAZE           POD #    16                   64yMale  SURGEON(s): ISELA Blakely  SUBJECTIVE ASSESSMENT: pt seen and examined.   Vital Signs Last 24 Hrs  T(F): 98.5 (12 Feb 2025 22:00), Max: 98.9 (12 Feb 2025 12:00)  HR: 77 (13 Feb 2025 06:30) (76 - 120)  BP: 107/54 (13 Feb 2025 06:30) (71/37 - 155/67)  BP(mean): 77 (13 Feb 2025 06:30) (49 - 97)  RR: 16 (13 Feb 2025 06:30) (12 - 53)  SpO2: 98% (13 Feb 2025 06:30) (90% - 100%)      I&O's Detail    12 Feb 2025 07:01  -  13 Feb 2025 07:00  --------------------------------------------------------  IN:    Albumin 5%  - 500 mL: 250 mL    IV PiggyBack: 200 mL    Oral Fluid: 980 mL    Phenylephrine: 78.9 mL  Total IN: 1508.9 mL    OUT:    Voided (mL): 4350 mL  Total OUT: 4350 mL        Net:   I&O's Detail    11 Feb 2025 07:01  -  12 Feb 2025 07:00  --------------------------------------------------------  Total NET: -1905 mL      12 Feb 2025 07:01  -  13 Feb 2025 07:00  --------------------------------------------------------  Total NET: -2841.1 mL        CAPILLARY BLOOD GLUCOSE      POCT Blood Glucose.: 144 mg/dL (13 Feb 2025 06:18)  POCT Blood Glucose.: 256 mg/dL (13 Feb 2025 00:11)  POCT Blood Glucose.: 127 mg/dL (12 Feb 2025 17:00)  POCT Blood Glucose.: 94 mg/dL (12 Feb 2025 11:50)  POCT Blood Glucose.: 259 mg/dL (12 Feb 2025 08:17)    Physical Exam:  General: NAD; A&Ox3/Patient is intubated and sedated  Cardiac: S1/S2, RRR, no murmur, no rubs  Lungs: unlabored respirations, CTA b/l, no wheeze, no rales, no crackles  Abdomen: Soft/NT/ND; positive bowel sounds x 4  Sternum: Intact, no click, incision healing well with no drainage  Incisions: Incisions clean/dry/intact  Extremities: No edema b/l lower extremities; good capillary refill; no cyanosis; palpable 1+ pedal pulses b/l    Central Venous Catheter: Yes[]  No[] , If Yes indication:           Day #  Caceres Catheter: Yes  [] , No  [] , If yes indication:                      Day #  NGT: Yes [] No [] ,    If Yes Placement:                                     Day #  EPICARDIAL WIRES:  [] YES [] NO                                              Day #  BOWEL MOVEMENT:  [] YES [] NO, If No, Timing since last BM:      Day #  CHEST TUBE(Left/Right):  [] YES [] NO, If yes -  AIR LEAKS:  [] YES [] NO        LABS:                        8.5[L]  8.70  )-----------( 175      ( 13 Feb 2025 01:00 )             28.5[L]                        9.3[L]  10.12 )-----------( 207      ( 12 Feb 2025 05:30 )             31.7[L]    02-13    140  |  95[L]  |  39[H]  ----------------------------<  198[H]  3.6   |  35[H]  |  1.2  02-12    142  |  98  |  39[H]  ----------------------------<  126[H]  4.2   |  35[H]  |  1.1    Ca    8.9      13 Feb 2025 01:35  Phos  3.7     02-13  Mg     1.7     02-13    TPro  5.8[L] [6.0 - 8.0]  /  Alb  3.4[L] [3.5 - 5.2]  /  TBili  0.4 [0.2 - 1.2]  /  DBili  x   /  AST  30 [0 - 41]  /  ALT  23 [0 - 41]  /  AlkPhos  128[H] [30 - 115]  02-13      Urinalysis Basic - ( 13 Feb 2025 01:35 )    Color: x / Appearance: x / SG: x / pH: x  Gluc: 198 mg/dL / Ketone: x  / Bili: x / Urobili: x   Blood: x / Protein: x / Nitrite: x   Leuk Esterase: x / RBC: x / WBC x   Sq Epi: x / Non Sq Epi: x / Bacteria: x        RADIOLOGY & ADDITIONAL TESTS:  CXR:  EKG:  MEDICATIONS  (STANDING):  acetaminophen   IVPB .. 1000 milliGRAM(s) IV Intermittent once  acetaminophen   IVPB .. 1000 milliGRAM(s) IV Intermittent once  acetaminophen   IVPB .. 1000 milliGRAM(s) IV Intermittent once  albuterol/ipratropium for Nebulization 3 milliLiter(s) Nebulizer every 6 hours  aspirin enteric coated 81 milliGRAM(s) Oral daily  chlorhexidine 2% Cloths 1 Application(s) Topical daily  dextrose 5%. 1000 milliLiter(s) (50 mL/Hr) IV Continuous <Continuous>  dextrose 5%. 1000 milliLiter(s) (100 mL/Hr) IV Continuous <Continuous>  dextrose 50% Injectable 50 milliLiter(s) IV Push every 15 minutes  dextrose 50% Injectable 25 milliLiter(s) IV Push every 15 minutes  finasteride 5 milliGRAM(s) Oral daily  fluticasone propionate 50 MICROgram(s)/spray Nasal Spray 1 Spray(s) Both Nostrils every 12 hours  fluticasone propionate/ salmeterol 250-50 MICROgram(s) Diskus 1 Dose(s) Inhalation two times a day  folic acid 1 milliGRAM(s) Oral daily  gabapentin 100 milliGRAM(s) Oral every 8 hours  glucagon  Injectable 1 milliGRAM(s) IntraMuscular once  guaiFENesin  milliGRAM(s) Oral every 12 hours  heparin   Injectable 5000 Unit(s) SubCutaneous every 8 hours  influenza  Vaccine (HIGH DOSE) 0.5 milliLiter(s) IntraMuscular once  insulin glargine Injectable (LANTUS) 40 Unit(s) SubCutaneous before breakfast  insulin lispro (ADMELOG) corrective regimen sliding scale   SubCutaneous three times a day before meals  insulin lispro (ADMELOG) corrective regimen sliding scale   SubCutaneous at bedtime  insulin lispro Injectable (ADMELOG) 10 Unit(s) SubCutaneous three times a day before meals  lidocaine   4% Patch 1 Patch Transdermal every 24 hours  lidocaine   4% Patch 1 Patch Transdermal every 24 hours  mupirocin 2% Nasal 1 Application(s) Both Nostrils every 12 hours  pantoprazole    Tablet 40 milliGRAM(s) Oral before breakfast  phenylephrine    Infusion 0.05 MICROgram(s)/kG/Min (2.14 mL/Hr) IV Continuous <Continuous>  psyllium Powder 1 Packet(s) Oral every 12 hours  rosuvastatin 20 milliGRAM(s) Oral at bedtime  senna 2 Tablet(s) Oral at bedtime  sertraline 25 milliGRAM(s) Oral daily  tamsulosin 0.4 milliGRAM(s) Oral at bedtime    MEDICATIONS  (PRN):  dextrose Oral Gel 15 Gram(s) Oral once PRN Blood Glucose LESS THAN 70 milliGRAM(s)/deciliter  hydrALAZINE Injectable 5 milliGRAM(s) IV Push every 4 hours PRN Systolic > 160  labetalol Injectable 10 milliGRAM(s) IV Push every 6 hours PRN Diastolic blood pressure >155  melatonin 5 milliGRAM(s) Oral at bedtime PRN Insomnia  ondansetron Injectable 4 milliGRAM(s) IV Push every 4 hours PRN Nausea and/or Vomiting  oxyCODONE    IR 5 milliGRAM(s) Oral every 6 hours PRN Moderate Pain (4 - 6)    HEPARIN:  [] YES [] NO  Dose: XX UNITS/HR UNITS Q8H  LOVENOX:[] YES [] NO  Dose: XX mg Q24H  COUMADIN: []  YES [] NO  Dose: XX mg  Q24H  SCD's: YES b/l  GI Prophylaxis: Protonix [], Pepcid [], None [], (Contra-indication:.....)    Post-Op Beta-Blockers: Yes [], No[], If No, then contraindication:  Post-Op Aspirin: Yes [],  No [], If No, then contraindication:  Post-Op Statin: Yes [], No[], If No, then contraindication:  Allergies    No Known Allergies    Intolerances      Ambulation/Activity Status:    Assessment/Plan:  64y Male status-post .....  - Case and plan discussed with CTU Intensivist and CT Surgeon - Dr. Blakely/Mauri/Laron  - Continue CTU supportive care    - Continue DVT/GI prophylaxis  - Incentive Spirometry 10 times an hour  - Continue to advance physical activity as tolerated and continue PT/OT as directed  1. CAD: Continue ASA, statin, BB  2. HTN:   3. A. Fib:   4. COPD/Hypoxia:   5. DM/Glucose Control:     Social Service Disposition:     OPERATIVE PROCEDURE(s):      s/p MVR, LA ligation, MAZE           POD #    16                   64yMale  SURGEON(s): ISELA Blakely  SUBJECTIVE ASSESSMENT: pt seen and examined. Pt hypotensive and tachycardic with a brief run of afib overnight, started on pressors. Pt is now stable off pressors.   Vital Signs Last 24 Hrs  T(F): 98.5 (12 Feb 2025 22:00), Max: 98.9 (12 Feb 2025 12:00)  HR: 77 (13 Feb 2025 06:30) (76 - 120)  BP: 107/54 (13 Feb 2025 06:30) (71/37 - 155/67)  BP(mean): 77 (13 Feb 2025 06:30) (49 - 97)  RR: 16 (13 Feb 2025 06:30) (12 - 53)  SpO2: 98% (13 Feb 2025 06:30) (90% - 100%)      I&O's Detail    12 Feb 2025 07:01  -  13 Feb 2025 07:00  --------------------------------------------------------  IN:    Albumin 5%  - 500 mL: 250 mL    IV PiggyBack: 200 mL    Oral Fluid: 980 mL    Phenylephrine: 78.9 mL  Total IN: 1508.9 mL    OUT:    Voided (mL): 4350 mL  Total OUT: 4350 mL        Net:   I&O's Detail    11 Feb 2025 07:01  -  12 Feb 2025 07:00  --------------------------------------------------------  Total NET: -1905 mL      12 Feb 2025 07:01  -  13 Feb 2025 07:00  --------------------------------------------------------  Total NET: -2841.1 mL        CAPILLARY BLOOD GLUCOSE      POCT Blood Glucose.: 144 mg/dL (13 Feb 2025 06:18)  POCT Blood Glucose.: 256 mg/dL (13 Feb 2025 00:11)  POCT Blood Glucose.: 127 mg/dL (12 Feb 2025 17:00)  POCT Blood Glucose.: 94 mg/dL (12 Feb 2025 11:50)  POCT Blood Glucose.: 259 mg/dL (12 Feb 2025 08:17)    Physical Exam:  General: NAD; A&Ox3  Cardiac: S1/S2, RRR, no murmur, no rubs  Lungs: shallow respirations, decreased at B/L bases  Abdomen: Soft/NT/ND  Sternum: Intact, no click, incision healing well with no drainage  Incisions: Incisions clean/dry/intact, healing well  Extremities: Pitting edema b/l lower extremities; good capillary refill; no cyanosis; palpable 1+ pedal pulses b/l          LABS:                        8.5[L]  8.70  )-----------( 175      ( 13 Feb 2025 01:00 )             28.5[L]                        9.3[L]  10.12 )-----------( 207      ( 12 Feb 2025 05:30 )             31.7[L]    02-13    140  |  95[L]  |  39[H]  ----------------------------<  198[H]  3.6   |  35[H]  |  1.2  02-12    142  |  98  |  39[H]  ----------------------------<  126[H]  4.2   |  35[H]  |  1.1    Ca    8.9      13 Feb 2025 01:35  Phos  3.7     02-13  Mg     1.7     02-13    TPro  5.8[L] [6.0 - 8.0]  /  Alb  3.4[L] [3.5 - 5.2]  /  TBili  0.4 [0.2 - 1.2]  /  DBili  x   /  AST  30 [0 - 41]  /  ALT  23 [0 - 41]  /  AlkPhos  128[H] [30 - 115]  02-13      Urinalysis Basic - ( 13 Feb 2025 01:35 )    Color: x / Appearance: x / SG: x / pH: x  Gluc: 198 mg/dL / Ketone: x  / Bili: x / Urobili: x   Blood: x / Protein: x / Nitrite: x   Leuk Esterase: x / RBC: x / WBC x   Sq Epi: x / Non Sq Epi: x / Bacteria: x        RADIOLOGY & ADDITIONAL TESTS:  CXR:  < from: Xray Chest 1 View- PORTABLE-Routine (Xray Chest 1 View- PORTABLE-Routine in AM.) (02.13.25 @ 06:40) >    IMPRESSION:    1. Unchanged patchy bilateral opacities and pleural effusions.    < end of copied text >    MEDICATIONS  (STANDING):  acetaminophen   IVPB .. 1000 milliGRAM(s) IV Intermittent once  acetaminophen   IVPB .. 1000 milliGRAM(s) IV Intermittent once  acetaminophen   IVPB .. 1000 milliGRAM(s) IV Intermittent once  albuterol/ipratropium for Nebulization 3 milliLiter(s) Nebulizer every 6 hours  aspirin enteric coated 81 milliGRAM(s) Oral daily  chlorhexidine 2% Cloths 1 Application(s) Topical daily  dextrose 5%. 1000 milliLiter(s) (50 mL/Hr) IV Continuous <Continuous>  dextrose 5%. 1000 milliLiter(s) (100 mL/Hr) IV Continuous <Continuous>  dextrose 50% Injectable 50 milliLiter(s) IV Push every 15 minutes  dextrose 50% Injectable 25 milliLiter(s) IV Push every 15 minutes  finasteride 5 milliGRAM(s) Oral daily  fluticasone propionate 50 MICROgram(s)/spray Nasal Spray 1 Spray(s) Both Nostrils every 12 hours  fluticasone propionate/ salmeterol 250-50 MICROgram(s) Diskus 1 Dose(s) Inhalation two times a day  folic acid 1 milliGRAM(s) Oral daily  gabapentin 100 milliGRAM(s) Oral every 8 hours  glucagon  Injectable 1 milliGRAM(s) IntraMuscular once  guaiFENesin  milliGRAM(s) Oral every 12 hours  heparin   Injectable 5000 Unit(s) SubCutaneous every 8 hours  influenza  Vaccine (HIGH DOSE) 0.5 milliLiter(s) IntraMuscular once  insulin glargine Injectable (LANTUS) 40 Unit(s) SubCutaneous before breakfast  insulin lispro (ADMELOG) corrective regimen sliding scale   SubCutaneous three times a day before meals  insulin lispro (ADMELOG) corrective regimen sliding scale   SubCutaneous at bedtime  insulin lispro Injectable (ADMELOG) 10 Unit(s) SubCutaneous three times a day before meals  lidocaine   4% Patch 1 Patch Transdermal every 24 hours  lidocaine   4% Patch 1 Patch Transdermal every 24 hours  mupirocin 2% Nasal 1 Application(s) Both Nostrils every 12 hours  pantoprazole    Tablet 40 milliGRAM(s) Oral before breakfast  phenylephrine    Infusion 0.05 MICROgram(s)/kG/Min (2.14 mL/Hr) IV Continuous <Continuous>  psyllium Powder 1 Packet(s) Oral every 12 hours  rosuvastatin 20 milliGRAM(s) Oral at bedtime  senna 2 Tablet(s) Oral at bedtime  sertraline 25 milliGRAM(s) Oral daily  tamsulosin 0.4 milliGRAM(s) Oral at bedtime    MEDICATIONS  (PRN):  dextrose Oral Gel 15 Gram(s) Oral once PRN Blood Glucose LESS THAN 70 milliGRAM(s)/deciliter  hydrALAZINE Injectable 5 milliGRAM(s) IV Push every 4 hours PRN Systolic > 160  labetalol Injectable 10 milliGRAM(s) IV Push every 6 hours PRN Diastolic blood pressure >155  melatonin 5 milliGRAM(s) Oral at bedtime PRN Insomnia  ondansetron Injectable 4 milliGRAM(s) IV Push every 4 hours PRN Nausea and/or Vomiting  oxyCODONE    IR 5 milliGRAM(s) Oral every 6 hours PRN Moderate Pain (4 - 6)    Allergies    No Known Allergies    Intolerances      Ambulation/Activity Status: Ambulate with assistance    Assessment/Plan:  64y Male POD#16 s/p MVR, LA ligation, MAZE  - Case and plan discussed with CTU Intensivist and CT Surgeon - Dr. Blakely  - Continue CTU supportive care    - Continue DVT/GI prophylaxis  - Incentive Spirometry 10 times an hour  - Continue to advance physical activity as tolerated and continue PT/OT as directed  1. CAD: Continue ASA, statin, BB  2. Heart failure (Diastolic, EF 60%): hold diuretics for now f/u CMP, Diamox if needed. Improved LE edema. Fluid restriction to 1L, daily standing weights.   3. A. Fib: s/p MAZE and SHEILA, monitor electrolytes, Eliquis currently held in the setting of lower GI bleed  4. Madelung disease: continue bipap, NC2L, respiratory treatment  5. DM/Glucose Control (A1c 6.2): insulin sliding scale, lantus 40, lispro 10, DASH/TLC diet     Social Service Disposition: Accepted to [facility] inpatient rehab, d/c if medically cleared   OPERATIVE PROCEDURE(s):      s/p MVR, LA ligation, MAZE           POD #    16                   64yMale  SURGEON(s): ISELA Blakely  SUBJECTIVE ASSESSMENT: pt seen and examined. Pt hypotensive and tachycardic with a brief run of afib overnight, started on pressors. Pt is now stable off pressors.   Vital Signs Last 24 Hrs  T(F): 98.5 (12 Feb 2025 22:00), Max: 98.9 (12 Feb 2025 12:00)  HR: 77 (13 Feb 2025 06:30) (76 - 120)  BP: 107/54 (13 Feb 2025 06:30) (71/37 - 155/67)  BP(mean): 77 (13 Feb 2025 06:30) (49 - 97)  RR: 16 (13 Feb 2025 06:30) (12 - 53)  SpO2: 98% (13 Feb 2025 06:30) (90% - 100%)      I&O's Detail    12 Feb 2025 07:01  -  13 Feb 2025 07:00  --------------------------------------------------------  IN:    Albumin 5%  - 500 mL: 250 mL    IV PiggyBack: 200 mL    Oral Fluid: 980 mL    Phenylephrine: 78.9 mL  Total IN: 1508.9 mL    OUT:    Voided (mL): 4350 mL  Total OUT: 4350 mL        Net:   I&O's Detail    11 Feb 2025 07:01  -  12 Feb 2025 07:00  --------------------------------------------------------  Total NET: -1905 mL      12 Feb 2025 07:01  -  13 Feb 2025 07:00  --------------------------------------------------------  Total NET: -2841.1 mL        CAPILLARY BLOOD GLUCOSE      POCT Blood Glucose.: 144 mg/dL (13 Feb 2025 06:18)  POCT Blood Glucose.: 256 mg/dL (13 Feb 2025 00:11)  POCT Blood Glucose.: 127 mg/dL (12 Feb 2025 17:00)  POCT Blood Glucose.: 94 mg/dL (12 Feb 2025 11:50)  POCT Blood Glucose.: 259 mg/dL (12 Feb 2025 08:17)    Physical Exam:  General: NAD; A&Ox3  Neuro: pupils equal and reactive, speech clear, no overt sensory or motor deficts  Cardiac: S1/S2, RRR, no murmur, no rubs  Lungs: unlabored shallow respirations, bilateral bases decreased  Abdomen: Soft/NT/ND; positive bowel sounds x 4  Sternum: Intact, no click, incision healing well with no drainage  Incisions: Incisions clean/dry/intact  Extremities: Pitting edema b/l lower extremities; b/l feet red, closed blisters noted, good capillary refill; no cyanosis; palpable 1+ pedal pulses b/l  - Testicular edema noted            LABS:                        8.5[L]  8.70  )-----------( 175      ( 13 Feb 2025 01:00 )             28.5[L]                        9.3[L]  10.12 )-----------( 207      ( 12 Feb 2025 05:30 )             31.7[L]    02-13    140  |  95[L]  |  39[H]  ----------------------------<  198[H]  3.6   |  35[H]  |  1.2  02-12    142  |  98  |  39[H]  ----------------------------<  126[H]  4.2   |  35[H]  |  1.1    Ca    8.9      13 Feb 2025 01:35  Phos  3.7     02-13  Mg     1.7     02-13    TPro  5.8[L] [6.0 - 8.0]  /  Alb  3.4[L] [3.5 - 5.2]  /  TBili  0.4 [0.2 - 1.2]  /  DBili  x   /  AST  30 [0 - 41]  /  ALT  23 [0 - 41]  /  AlkPhos  128[H] [30 - 115]  02-13      Urinalysis Basic - ( 13 Feb 2025 01:35 )    Color: x / Appearance: x / SG: x / pH: x  Gluc: 198 mg/dL / Ketone: x  / Bili: x / Urobili: x   Blood: x / Protein: x / Nitrite: x   Leuk Esterase: x / RBC: x / WBC x   Sq Epi: x / Non Sq Epi: x / Bacteria: x        RADIOLOGY & ADDITIONAL TESTS:  CXR:  < from: Xray Chest 1 View- PORTABLE-Routine (Xray Chest 1 View- PORTABLE-Routine in AM.) (02.13.25 @ 06:40) >    IMPRESSION:    1. Unchanged patchy bilateral opacities and pleural effusions.    < end of copied text >    MEDICATIONS  (STANDING):  acetaminophen   IVPB .. 1000 milliGRAM(s) IV Intermittent once  acetaminophen   IVPB .. 1000 milliGRAM(s) IV Intermittent once  acetaminophen   IVPB .. 1000 milliGRAM(s) IV Intermittent once  albuterol/ipratropium for Nebulization 3 milliLiter(s) Nebulizer every 6 hours  aspirin enteric coated 81 milliGRAM(s) Oral daily  chlorhexidine 2% Cloths 1 Application(s) Topical daily  dextrose 5%. 1000 milliLiter(s) (50 mL/Hr) IV Continuous <Continuous>  dextrose 5%. 1000 milliLiter(s) (100 mL/Hr) IV Continuous <Continuous>  dextrose 50% Injectable 50 milliLiter(s) IV Push every 15 minutes  dextrose 50% Injectable 25 milliLiter(s) IV Push every 15 minutes  finasteride 5 milliGRAM(s) Oral daily  fluticasone propionate 50 MICROgram(s)/spray Nasal Spray 1 Spray(s) Both Nostrils every 12 hours  fluticasone propionate/ salmeterol 250-50 MICROgram(s) Diskus 1 Dose(s) Inhalation two times a day  folic acid 1 milliGRAM(s) Oral daily  gabapentin 100 milliGRAM(s) Oral every 8 hours  glucagon  Injectable 1 milliGRAM(s) IntraMuscular once  guaiFENesin  milliGRAM(s) Oral every 12 hours  heparin   Injectable 5000 Unit(s) SubCutaneous every 8 hours  influenza  Vaccine (HIGH DOSE) 0.5 milliLiter(s) IntraMuscular once  insulin glargine Injectable (LANTUS) 40 Unit(s) SubCutaneous before breakfast  insulin lispro (ADMELOG) corrective regimen sliding scale   SubCutaneous three times a day before meals  insulin lispro (ADMELOG) corrective regimen sliding scale   SubCutaneous at bedtime  insulin lispro Injectable (ADMELOG) 10 Unit(s) SubCutaneous three times a day before meals  lidocaine   4% Patch 1 Patch Transdermal every 24 hours  lidocaine   4% Patch 1 Patch Transdermal every 24 hours  mupirocin 2% Nasal 1 Application(s) Both Nostrils every 12 hours  pantoprazole    Tablet 40 milliGRAM(s) Oral before breakfast  phenylephrine    Infusion 0.05 MICROgram(s)/kG/Min (2.14 mL/Hr) IV Continuous <Continuous>  psyllium Powder 1 Packet(s) Oral every 12 hours  rosuvastatin 20 milliGRAM(s) Oral at bedtime  senna 2 Tablet(s) Oral at bedtime  sertraline 25 milliGRAM(s) Oral daily  tamsulosin 0.4 milliGRAM(s) Oral at bedtime    MEDICATIONS  (PRN):  dextrose Oral Gel 15 Gram(s) Oral once PRN Blood Glucose LESS THAN 70 milliGRAM(s)/deciliter  hydrALAZINE Injectable 5 milliGRAM(s) IV Push every 4 hours PRN Systolic > 160  labetalol Injectable 10 milliGRAM(s) IV Push every 6 hours PRN Diastolic blood pressure >155  melatonin 5 milliGRAM(s) Oral at bedtime PRN Insomnia  ondansetron Injectable 4 milliGRAM(s) IV Push every 4 hours PRN Nausea and/or Vomiting  oxyCODONE    IR 5 milliGRAM(s) Oral every 6 hours PRN Moderate Pain (4 - 6)    Pharmacologic DVT Prophylaxis [X] YES, [] NO: Contraindication:  SCD's: YES b/l    GI Prophylaxis: protonix    Post-Op Beta-Blockers: [X] Yes, [] No: contraindication:  Post-Op Aspirin:  [X] Yes, [] No: contraindication:  Post-Op Statin:  [X] Yes, [] No: contraindication:  Allergies    No Known Allergies    Intolerances      Assessment/Plan:  64y Male POD#16 s/p MVR, LA ligation, MAZE POS#16   - Case and plan discussed with CTU Intensivist and CT Surgeon - Dr. Blakely  - Continue CTU supportive care    - Continue DVT/GI prophylaxis  - Incentive Spirometry 10 times an hour  - Continue to advance physical activity as tolerated and continue PT/OT as directed  1. CAD: Continue ASA, statin, BB   2. Heart failure (Diastolic, EF 60%): hold diuretics for now f/u CMP, Diamox if needed. Improved LE edema. Fluid restriction to 1L, daily standing weights.   3. A. Fib: s/p MAZE and SHEILA, monitor electrolytes, Eliquis currently held in the setting of lower GI bleed  4. Madelung disease: continue bipap, NC2L, respiratory treatment  5. DM/Glucose Control (A1c 6.2): insulin sliding scale, lantus 40, lispro 10,    Social Service Disposition: Accepted to [facility] inpatient rehab, d/c if medically cleared

## 2025-02-13 NOTE — PROGRESS NOTE ADULT - SUBJECTIVE AND OBJECTIVE BOX
Subjective and Objective:   HPI: 64M w/ afib, nonobstructive CAD, CHF class 3, severe MR, DM, HTN, JIMMY on CPAP, asthma, severe obesity, HL, Madelung's disease, osteoarthritis s/p bilateral hip replacement, peripheral neuropathy presented with increasing dyspnea with exertion found to have severe MR.  He was evaluated for mitral clip and not a candidate.  On prior admission he had FLAKITO with creat up to 2.  Was optimized with diuresis.  He was on AC and developed hemorrhoidal bleeding and was taken off AC.  He was discharged and returned for his procedure on 1/28    OR Data  Procedure: Procedure: MVReplacement (bioprosthetic), SHEILA ligation, MAZE  Bypass: 202  Cross Clamp: 158  Pre LV Fxn: 50-55%      RV Fxn: normal  Post LV Fxn: 45-50%     RV Fxn: normal    moderate TR, MV gradient 3mmHg  Blood Products: 1uPRBC, ddavp 39mcq  Cell Saver: 698  Fluids: NS 2000, albumin 2000  Pacing Wires: V pacing. sinus rhythm  UO: 1100    1/28 - OP day :  Fany and poor oxygenation in OR. Milrinone, norepi, vaso. Extubation in evening.  1/29 - start aspirin, diuresis, heparin dvt prophylaxis  1/30- d/c pleural ct if drainage dec; wean to dc primacor; wean high flow o2  1/31 - FLAKITO worsening, bradyarrythmias - amandanchebach - ep consulted  2/2 - Chest tube removed, ambulating, creat improving, dobutamine weaned  2/3 - Dobut to 1.5, BUN elevated  2/4 - Confused this AM, normal neuro exam, prince removed  2/5 - FLAKITO improving, off dobutamine, mil decreased   2/6 - milrinone stopped, bumex 1mg BID  2/13 hypotension on neosynephrine overnight, off this morning    Procedure: MV Replacemant Maze  LA Ligation  POD#: 16    He has history of Diabetes HTN (hypertension)   Obstructive sleep apnea on CPAP  Asthma  Obesity  Peripheral neuropathy  Arthritis  Hypercholesteremia  Madelung's disease  FH: mitral regurgitation  CHF NYHA class III  Atrial fibrillation    OBJECTIVE:  ICU Vital Signs Last 24 Hrs  T(C): 36.9 (12 Feb 2025 22:00), Max: 37.2 (12 Feb 2025 12:00)  T(F): 98.5 (12 Feb 2025 22:00), Max: 98.9 (12 Feb 2025 12:00)  HR: 82 (13 Feb 2025 10:00) (71 - 120)  BP: 119/60 (13 Feb 2025 10:00) (71/37 - 155/67)  BP(mean): 86 (13 Feb 2025 10:00) (49 - 97)  ABP: --  ABP(mean): --  RR: 19 (13 Feb 2025 10:00) (12 - 53)  SpO2: 94% (13 Feb 2025 10:00) (90% - 100%)    O2 Parameters below as of 13 Feb 2025 10:00  Patient On (Oxygen Delivery Method): nasal cannula  O2 Flow (L/min): 4        I&O's Summary    12 Feb 2025 07:01  -  13 Feb 2025 07:00  --------------------------------------------------------  IN: 1508.9 mL / OUT: 4450 mL / NET: -2941.1 mL    13 Feb 2025 07:01  -  13 Feb 2025 10:49  --------------------------------------------------------  IN: 350 mL / OUT: 350 mL / NET: 0 mL      I&O's Detail    12 Feb 2025 07:01  -  13 Feb 2025 07:00  --------------------------------------------------------  IN:    Albumin 5%  - 500 mL: 250 mL    IV PiggyBack: 200 mL    Oral Fluid: 980 mL    Phenylephrine: 78.9 mL  Total IN: 1508.9 mL    OUT:    Indwelling Catheter - Urethral (mL): 100 mL    Voided (mL): 4350 mL  Total OUT: 4450 mL    Total NET: -2941.1 mL      13 Feb 2025 07:01  -  13 Feb 2025 10:49  --------------------------------------------------------  IN:    Albumin 5%  - 500 mL: 250 mL    IV PiggyBack: 100 mL  Total IN: 350 mL    OUT:    Indwelling Catheter - Urethral (mL): 350 mL    Phenylephrine: 0 mL  Total OUT: 350 mL    Total NET: 0 mL        Adult Advanced Hemodynamics Last 24 Hrs  CVP(mm Hg): --  CVP(cm H2O): --  CO: --  CI: --  PA: --  PA(mean): --  PCWP: --  SVR: --  SVRI: --  PVR: --  PVRI: --    CAPILLARY BLOOD GLUCOSE      POCT Blood Glucose.: 132 mg/dL (13 Feb 2025 09:50)  POCT Blood Glucose.: 144 mg/dL (13 Feb 2025 06:18)  POCT Blood Glucose.: 256 mg/dL (13 Feb 2025 00:11)  POCT Blood Glucose.: 127 mg/dL (12 Feb 2025 17:00)  POCT Blood Glucose.: 94 mg/dL (12 Feb 2025 11:50)    LABS:                          8.5    8.70  )-----------( 175      ( 13 Feb 2025 01:00 )             28.5     02-13    140  |  95[L]  |  39[H]  ----------------------------<  198[H]  3.6   |  35[H]  |  1.2    Ca    8.9      13 Feb 2025 01:35  Phos  3.7     02-13  Mg     1.7     02-13    TPro  5.8[L]  /  Alb  3.4[L]  /  TBili  0.4  /  DBili  x   /  AST  30  /  ALT  23  /  AlkPhos  128[H]  02-13      Urinalysis Basic - ( 13 Feb 2025 01:35 )    Color: x / Appearance: x / SG: x / pH: x  Gluc: 198 mg/dL / Ketone: x  / Bili: x / Urobili: x   Blood: x / Protein: x / Nitrite: x   Leuk Esterase: x / RBC: x / WBC x   Sq Epi: x / Non Sq Epi: x / Bacteria: x        Home Medications:  Breo Ellipta 200 mcg-25 mcg/inh inhalation powder: 1 inhaled once a day (28 Jan 2025 06:52)  HYDROcodone 10 mg oral capsule, extended release: 1 cap(s) orally 2 times a day (28 Jan 2025 06:52)  losartan 50 mg oral tablet: 1 tab(s) orally 2 times a day (28 Jan 2025 06:52)  losartan-hydrochlorothiazide 50 mg-12.5 mg oral tablet: 1 tab(s) orally (28 Jan 2025 06:52)  ProAir HFA 90 mcg/inh inhalation aerosol: 2 puff(s) inhaled 2 times a day (28 Jan 2025 06:52)  tujeo: 80 unit(s) subcutaneous once a day (28 Jan 2025 06:52)    HOSPITAL MEDICATIONS:  MEDICATIONS  (STANDING):  acetaminophen   IVPB .. 1000 milliGRAM(s) IV Intermittent once  acetaminophen   IVPB .. 1000 milliGRAM(s) IV Intermittent once  albuterol/ipratropium for Nebulization 3 milliLiter(s) Nebulizer every 6 hours  aspirin enteric coated 81 milliGRAM(s) Oral daily  chlorhexidine 2% Cloths 1 Application(s) Topical daily  dextrose 5%. 1000 milliLiter(s) (50 mL/Hr) IV Continuous <Continuous>  dextrose 5%. 1000 milliLiter(s) (100 mL/Hr) IV Continuous <Continuous>  dextrose 50% Injectable 50 milliLiter(s) IV Push every 15 minutes  dextrose 50% Injectable 25 milliLiter(s) IV Push every 15 minutes  finasteride 5 milliGRAM(s) Oral daily  fluticasone propionate 50 MICROgram(s)/spray Nasal Spray 1 Spray(s) Both Nostrils every 12 hours  fluticasone propionate/ salmeterol 250-50 MICROgram(s) Diskus 1 Dose(s) Inhalation two times a day  folic acid 1 milliGRAM(s) Oral daily  gabapentin 100 milliGRAM(s) Oral every 8 hours  glucagon  Injectable 1 milliGRAM(s) IntraMuscular once  guaiFENesin  milliGRAM(s) Oral every 12 hours  heparin   Injectable 5000 Unit(s) SubCutaneous every 8 hours  influenza  Vaccine (HIGH DOSE) 0.5 milliLiter(s) IntraMuscular once  insulin glargine Injectable (LANTUS) 40 Unit(s) SubCutaneous before breakfast  insulin lispro (ADMELOG) corrective regimen sliding scale   SubCutaneous three times a day before meals  insulin lispro (ADMELOG) corrective regimen sliding scale   SubCutaneous at bedtime  insulin lispro Injectable (ADMELOG) 10 Unit(s) SubCutaneous three times a day before meals  lidocaine   4% Patch 1 Patch Transdermal every 24 hours  lidocaine   4% Patch 1 Patch Transdermal every 24 hours  mupirocin 2% Nasal 1 Application(s) Both Nostrils every 12 hours  pantoprazole    Tablet 40 milliGRAM(s) Oral before breakfast  phenylephrine    Infusion 0.05 MICROgram(s)/kG/Min (2.14 mL/Hr) IV Continuous <Continuous>  psyllium Powder 1 Packet(s) Oral every 12 hours  rosuvastatin 20 milliGRAM(s) Oral at bedtime  senna 2 Tablet(s) Oral at bedtime  sertraline 25 milliGRAM(s) Oral daily  tamsulosin 0.4 milliGRAM(s) Oral at bedtime    MEDICATIONS  (PRN):  dextrose Oral Gel 15 Gram(s) Oral once PRN Blood Glucose LESS THAN 70 milliGRAM(s)/deciliter  hydrALAZINE Injectable 5 milliGRAM(s) IV Push every 4 hours PRN Systolic > 160  labetalol Injectable 10 milliGRAM(s) IV Push every 6 hours PRN Diastolic blood pressure >155  melatonin 5 milliGRAM(s) Oral at bedtime PRN Insomnia  ondansetron Injectable 4 milliGRAM(s) IV Push every 4 hours PRN Nausea and/or Vomiting  oxyCODONE    IR 5 milliGRAM(s) Oral every 6 hours PRN Moderate Pain (4 - 6)    RADIOLOGY:  Chest X-ray Reviewed    REVIEW OF SYSTEMS:  CONSTITUTIONAL: [X] all negative; [ ] weakness, [ ] fevers, [ ] chills  EYES/ENT: [X] all negative; [ ] visual changes, [ ] vertigo, [ ] throat pain   NECK: [X] all negative; [ ] pain, [ ] stiffness  RESPIRATORY: [x] all negative, [ ] cough, [ ] wheezing, [ ] hemoptysis, [ ] shortness of breath  CARDIOVASCULAR: [x] all negative; [ ] chest pain, [ ] palpitations, [ ] orthopnea  GASTROINTESTINAL: [X] all negative; [ ]abdominal pain, [ ] nausea, [ ] vomiting, [ ] hematemesis, [ ] diarrhea, [ ] constipation, [ ] melena, [ ] hematochezia.  GENITOURINARY: [X] all negative; [ ] dysuria, [ ] frequency, [ ] hematuria  NEUROLOGICAL: [X] all negative; [ ] numbness, [ ] weakness  SKIN: [X] all negative; [ ] itching, [ ] burning, [ ] rashes, [ ] lesions   All other review of systems is negative unless indicated above.  [  ] Unable to assess ROS because     PHYSICAL EXAM:          CONSTITUTIONAL: Well-developed; well-nourished; in no acute distress.   	SKIN: warm, dry  	HEAD: Normocephalic; atraumatic.  	EYES: PERRL, EOM, no conj injection, sclera clear  	ENT: No nasal discharge; airway clear.  	NECK: Supple; non tender.   	CARD: S1, S2 normal; no murmurs, gallops, or rubs. Regular rate and rhythm.  no carotid bruits  	RESP: CTA B/L; good air movement No wheezes, rales or rhonchi.  	ABD: Soft, not tender, not distended,  no rebound or guarding, bowel sounds present  	EXT:+ Edema  	NEURO: Alert, awake, motor 5/5 R, 5/5 L

## 2025-02-13 NOTE — CONSULT NOTE ADULT - NSCONSULTADDITIONALINFOA_GEN_ALL_CORE
Confidential Drug Utilization Report  Search Terms: torey perez, 1960Search Date: 02/13/2025 12:00:34 PM  The Drug Utilization Report below displays all of the controlled substance prescriptions, if any, that your patient has filled in the last twelve months. The information displayed on this report is compiled from pharmacy submissions to the Department, and accurately reflects the information as submitted by the pharmacies.    This report was requested by: Gayle Berumen | Reference #: [id number]    You have not added a ELISHA number. Keeping your ELISHA number(s) up to date on the My ELISHA # tab will enable the separation of your prescriptions from others in the search results.    Practitioner Count: 2  Pharmacy Count: 1  Current Opioid Prescriptions: 1  Current Benzodiazepine Prescriptions: 0  Current Stimulant Prescriptions: 0      Patient Demographic Information (PDI)       PDI	First Name	Last Name	Birth Date	Gender	Street Address	[facility] Code  CARMINE Perez	1960	Male	[address] [facility]	[id number]    Prescription Information      PDI Filter:    PDI	Current Rx	Drug Type	Rx Written	Rx Dispensed	Drug	Quantity	Days Supply	Prescriber Name	Prescriber ELISHA #	Payment Method	Dispenser  A	N	B	01/24/2025	01/26/2025	alprazolam 0.25 mg tablet	4	4	[place]	[id number]	Medicare	[id number]  A	Y	O	01/10/2025	01/22/2025	hydrocodone-acetaminophen  mg tablet	60	30	Danielle Lambert	[id number]	Medicare	[id number]  A	N	O	11/05/2024	11/07/2024	hydrocodone-acetaminophen  mg tablet	60	30	Danielle Lambert	[id number]	Medicare	[id number]  A	N	O	09/23/2024	09/30/2024	hydrocodone-acetaminophen  mg tablet	60	30	Danielle Lambert	[id number]	Medicare	[id number]  A	N	O	08/29/2024	09/02/2024	hydrocodone-acetaminophen  mg tablet	60	30	Danielle Lambert	[id number]	Medicare	[id number]  A	N	O	07/08/2024	07/09/2024	hydrocodone-acetaminophen  mg tablet	60	30	OAlma Reveles	[id number]	Medicare	[id number]  A	N	O	06/07/2024	06/11/2024	hydrocodone-acetaminophen  mg tablet	60	30	Danielle Lambert	[id number]	Medicare	[id number]  A	N	O	05/03/2024	05/05/2024	hydrocodone-acetaminophen  mg tablet	60	30	Danielle Lambert	[id number]	Medicare	[id number]  A	N	B	04/15/2024	04/16/2024	alprazolam 0.5 mg tablet	20	20	Sukh Webb MD	[id number]	Medicare	[id number]  A	N	O	04/12/2024	04/16/2024	hydrocodone-acetaminophen 7.5-325 mg tablet	60	30	Danielle Lambert	[id number]	Medicare	[id number]  A	N		04/12/2024	04/15/2024	pregabalin 100 mg capsule	60	30	Danielle Lambert	[id number]	Medicare	[id number]

## 2025-02-13 NOTE — OCCUPATIONAL THERAPY INITIAL EVALUATION ADULT - LIVES WITH, PROFILE
How Severe Is It?: moderate Is This A New Presentation, Or A Follow-Up?: Follow Up Isotretinoin Display Cumulative Dose In The Note?: Yes Pt lives in pvt home with son. Pt reports ex wife will come stay to assist upon discharge

## 2025-02-13 NOTE — OCCUPATIONAL THERAPY INITIAL EVALUATION ADULT - PERTINENT HX OF CURRENT PROBLEM, REHAB EVAL
64M w/ afib, nonobstructive CAD, CHF class 3, severe MR, DM, HTN, JIMMY on CPAP, asthma, severe obesity, HL, Madelung's disease, osteoarthritis s/p bilateral hip replacement, peripheral neuropathy presented with increasing dyspnea with exertion found to have severe MR.  He was evaluated for mitral clip and not a candidate. S/P  MVR/SHEILA ligation and MAZE for severe MR / afib  on 1/28/25.

## 2025-02-13 NOTE — CONSULT NOTE ADULT - SUBJECTIVE AND OBJECTIVE BOX
HPI: Patient is a 64M with PMH of  afib, nonobstructive CAD, CHF class 3, severe MR, DM, HTN, JIMMY on CPAP, asthma, severe obesity, HL, Madelung's disease, osteoarthritis s/p bilateral hip replacement, peripheral neuropathy presented with increasing dyspnea with exertion found to have severe MR.  He was evaluated for mitral clip and not a candidate.  On prior admission he had FLAKITO with creat up to 2.  Was optimized with diuresis.  He was on AC and developed hemorrhoidal bleeding and was taken off AC.  He was discharged and returned for his procedure on 1/28. Patient now s/p MVR, LAAC, MAZE POD #16  Post op complicated by acute pulmonary insufficiency, FLAKITO, cardiogenic shock req inotrope    Current Inpatient Medication Regimen:  acetaminophen   IVPB .. 1000 milliGRAM(s) IV Intermittent once  acetaminophen   IVPB .. 1000 milliGRAM(s) IV Intermittent once  albuterol/ipratropium for Nebulization 3 milliLiter(s) Nebulizer every 6 hours  aspirin enteric coated 81 milliGRAM(s) Oral daily  chlorhexidine 2% Cloths 1 Application(s) Topical daily  dextrose 5%. 1000 milliLiter(s) IV Continuous <Continuous>  dextrose 5%. 1000 milliLiter(s) IV Continuous <Continuous>  dextrose 50% Injectable 50 milliLiter(s) IV Push every 15 minutes  dextrose 50% Injectable 25 milliLiter(s) IV Push every 15 minutes  dextrose Oral Gel 15 Gram(s) Oral once PRN  finasteride 5 milliGRAM(s) Oral daily  fluticasone propionate 50 MICROgram(s)/spray Nasal Spray 1 Spray(s) Both Nostrils every 12 hours  fluticasone propionate/ salmeterol 250-50 MICROgram(s) Diskus 1 Dose(s) Inhalation two times a day  folic acid 1 milliGRAM(s) Oral daily  gabapentin 100 milliGRAM(s) Oral every 8 hours  glucagon  Injectable 1 milliGRAM(s) IntraMuscular once  guaiFENesin  milliGRAM(s) Oral every 12 hours  heparin   Injectable 5000 Unit(s) SubCutaneous every 8 hours  hydrALAZINE Injectable 5 milliGRAM(s) IV Push every 4 hours PRN  influenza  Vaccine (HIGH DOSE) 0.5 milliLiter(s) IntraMuscular once  insulin glargine Injectable (LANTUS) 40 Unit(s) SubCutaneous before breakfast  insulin lispro (ADMELOG) corrective regimen sliding scale   SubCutaneous three times a day before meals  insulin lispro (ADMELOG) corrective regimen sliding scale   SubCutaneous at bedtime  insulin lispro Injectable (ADMELOG) 10 Unit(s) SubCutaneous three times a day before meals  labetalol Injectable 10 milliGRAM(s) IV Push every 6 hours PRN  lidocaine   4% Patch 1 Patch Transdermal every 24 hours  lidocaine   4% Patch 1 Patch Transdermal every 24 hours  melatonin 5 milliGRAM(s) Oral at bedtime PRN  mupirocin 2% Nasal 1 Application(s) Both Nostrils every 12 hours  ondansetron Injectable 4 milliGRAM(s) IV Push every 4 hours PRN  oxyCODONE    IR 5 milliGRAM(s) Oral every 6 hours PRN  pantoprazole    Tablet 40 milliGRAM(s) Oral before breakfast  phenylephrine    Infusion 0.05 MICROgram(s)/kG/Min IV Continuous <Continuous>  psyllium Powder 1 Packet(s) Oral every 12 hours  rosuvastatin 20 milliGRAM(s) Oral at bedtime  senna 2 Tablet(s) Oral at bedtime  sertraline 25 milliGRAM(s) Oral daily  tamsulosin 0.4 milliGRAM(s) Oral at bedtime      Home Analgesic Regimen:  Hydrocodone/acetaminophen 10mg PRN  Gabapentin 400mg q8h       Allergies:  No Known Allergies      Past Medical History:  Nonrheumatic mitral valve prolapse    Nonrheumatic mitral valve regurgitation    Nonrheumatic mitral (valve) insufficiency    Nonrheumatic mitral (valve) prolapse-I34.1    Other tear of lateral meniscus, current injury, right knee, initial encounter-S83.281A    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    Family history of diabetes mellitus (Mother, Sibling)    CAD (coronary artery disease) (Mother, Father)    Diabetes    HTN (hypertension)    Obstructive sleep apnea on CPAP    Asthma    Obstructive sleep apnea on CPAP    Obesity    Peripheral neuropathy    Arthritis    Hypercholesteremia    Madelung's disease    Madelung's disease    FH: mitral regurgitation    CHF NYHA class III    Atrial fibrillation    JIMMY on CPAP    Mitral regurgitation    Mitral regurgitation    Adjustment disorder with anxiety    Replacement, mitral valve, with JASMINA, adult    Mitral regurgitation    Delirium    Replacement, mitral valve, with JASMINA, adult    Modified maze procedure    Ligation, left atrial appendage    H/O lipoma    History of hip replacement, total, bilateral    Fracture of orbital floor    Encounter for screening colonoscopy    History of neck surgery      Review of Systems:  General: no fevers or chills  Eyes: no diplopia or blurred vision  ENT: no rhinorrhea  CV: no chest pain  Resp: no cough or dyspnea  GI: no abdominal pain, constipation, or diarrhea  Neuro: no focal weakness or numbness  Physical Exam:  T(C): 36.9 (02-12-25 @ 22:00), Max: 37.2 (02-12-25 @ 12:00)  HR: 82 (02-13-25 @ 10:00) (71 - 120)  BP: 119/60 (02-13-25 @ 10:00) (71/37 - 155/67)  RR: 19 (02-13-25 @ 10:00) (12 - 53)  SpO2: 94% (02-13-25 @ 10:00) (90% - 100%)  Gen: NAD  Head: Normocephalic / Atraumatic  CV: no JVD  Lungs: nonlabored breathing on O2  Abdomen: obese  Back: tenderness to palpation  Neuro: AOx3  Extremities: full ROM in upper/lower extremities  Psych: normal affect      Labs:  CBC                        8.5    8.70  )-----------( 175      ( 13 Feb 2025 01:00 )             28.5       BMP  140 mmol/L [135 - 146] | 95 mmol/L[L] [98 - 110] | 39 mg/dL[H] [10 - 20]  3.6 mmol/L [3.5 - 5.0] | 35 mmol/L[H] [17 - 32] | 1.2 mg/dL [0.7 - 1.5]    198 mg/dL[H] [70 - 99]  CBC  8.70 K/uL [4.80 - 10.80] > 8.5 g/dL[L] [14.0 - 18.0] / 28.5 %[L] [42.0 - 52.0] < 175 K/uL [130 - 400]        BMP  142 mmol/L [135 - 146] | 98 mmol/L [98 - 110] | 39 mg/dL[H] [10 - 20]  4.2 mmol/L [3.5 - 5.0] | 35 mmol/L[H] [17 - 32] | 1.1 mg/dL [0.7 - 1.5]    126 mg/dL[H] [70 - 99]

## 2025-02-13 NOTE — PROGRESS NOTE ADULT - ASSESSMENT
Assessment  64M s/p MVR, LAAC, MAZE POD #16  Post op complicated by acute pulmonary insufficiency, FLAKITO, cardiogenic shock req inotropes    Plan:    Neuro:  Multimodal pain management - no toradol, minimize opiates  100 TID gabapentin  0.25 xanax PRN at night  Tylenol, Lidoderm patch, opiates as needed  Monitor neuro status in the post op period      CV:  S/P MVR, LAAC, MAZE  Maintain MAP > 65  ASA,statin lopressor on hold due to hypotension overnight      Resp:  Supplemental oxygen to maintain sats > 92%  RA 93%  Continuous pulse oximetry monitoring  BiPAP at night  NC during the day  IS / Chest PT  OOBTC and Ambulate  Nebulizers as needed - albuterol/atrovent  Advair    GI:  Heart healthy diet  Bowel Regimen - escalate as needed  PUD ppx per protocol    Renal  FLAKITO post surgery resolved   Baseline CKD  Monitor UOP, lytes, creatinine  Diamox for metabolic alkalosis - can use aldactone instead if hypokalemia  Keep K > 4, Mg > 2    Endo:  Tight glucose control per CTICU protocol  Lantus 40 / premeal 10  SSI    Heme:  Acute blood loss anemia post surgery  High risk for thrombocytopenia  DVT ppx with HSQ  Iron / FA / MVI    ID:  Monitor fever curve and WBC count    Patient requires continuous monitoring with:  bedside rhythm monitoring, continuous  pulse oximetry monitoring, O2 supplement titrate for O2 sat above 90%  ;   Care plan discussed with CT ICU care team and attending surgeon Dr Zhao                         .  patient remain critical, at risk for life threatening decompensation; required more than usual post op care;   I have spent 35 minutes providing non routine post op care, revaluated multiple times.

## 2025-02-13 NOTE — CONSULT NOTE ADULT - ASSESSMENT
A/P: Patient is a 64M with PMH of  afib, nonobstructive CAD, CHF class 3, severe MR, DM, HTN, JIMMY on CPAP, asthma, severe obesity, HL, Madelung's disease, osteoarthritis s/p bilateral hip replacement, peripheral neuropathy presented with increasing dyspnea with exertion found to have severe MR.  He was evaluated for mitral clip and not a candidate.  On prior admission he had FLAKITO with creat up to 2.  Was optimized with diuresis.  He was on AC and developed hemorrhoidal bleeding and was taken off AC.  He was discharged and returned for his procedure on 1/28. Patient now s/p MVR, LAAC, MAZE POD #16  Post op complicated by acute pulmonary insufficiency, FLAKITO, cardiogenic shock req inotrope    PLAN  #Chronic Back pain  #Acute post operative pain  - Patient endorses following pain management out patient with Dr. Lambert, has received injections in back and bilateral knees for chronic pain. Currently prescribed Hydrocodone/acetaminophen 10/325mg PRN. Also endorses taking meloxicam but now with FLAKITO.   - Please increase Oxycodone 10mg q6h PRN for severe pain  - Start Acetaminophen 975mg q8h scheduled  - Gabapentin 300mg at bedtime - max dose  - Consider adding Cyclobenzaprine 5mg TID scheduled  - Physical therapy/ambulation promotion    #Opioid induced constipation  - Bowel regimen as per primary team

## 2025-02-14 LAB
ALBUMIN SERPL ELPH-MCNC: 3.8 G/DL — SIGNIFICANT CHANGE UP (ref 3.5–5.2)
ALP SERPL-CCNC: 109 U/L — SIGNIFICANT CHANGE UP (ref 30–115)
ALT FLD-CCNC: 18 U/L — SIGNIFICANT CHANGE UP (ref 0–41)
ANION GAP SERPL CALC-SCNC: 9 MMOL/L — SIGNIFICANT CHANGE UP (ref 7–14)
AST SERPL-CCNC: 25 U/L — SIGNIFICANT CHANGE UP (ref 0–41)
BILIRUB SERPL-MCNC: 0.4 MG/DL — SIGNIFICANT CHANGE UP (ref 0.2–1.2)
BUN SERPL-MCNC: 37 MG/DL — HIGH (ref 10–20)
CALCIUM SERPL-MCNC: 8.7 MG/DL — SIGNIFICANT CHANGE UP (ref 8.4–10.5)
CHLORIDE SERPL-SCNC: 95 MMOL/L — LOW (ref 98–110)
CO2 SERPL-SCNC: 35 MMOL/L — HIGH (ref 17–32)
CREAT SERPL-MCNC: 1.1 MG/DL — SIGNIFICANT CHANGE UP (ref 0.7–1.5)
EGFR: 74 ML/MIN/1.73M2 — SIGNIFICANT CHANGE UP
GLUCOSE BLDC GLUCOMTR-MCNC: 135 MG/DL — HIGH (ref 70–99)
GLUCOSE BLDC GLUCOMTR-MCNC: 141 MG/DL — HIGH (ref 70–99)
GLUCOSE BLDC GLUCOMTR-MCNC: 175 MG/DL — HIGH (ref 70–99)
GLUCOSE BLDC GLUCOMTR-MCNC: 272 MG/DL — HIGH (ref 70–99)
GLUCOSE SERPL-MCNC: 125 MG/DL — HIGH (ref 70–99)
HCT VFR BLD CALC: 25.4 % — LOW (ref 42–52)
HGB BLD-MCNC: 7.5 G/DL — LOW (ref 14–18)
MAGNESIUM SERPL-MCNC: 2.1 MG/DL — SIGNIFICANT CHANGE UP (ref 1.8–2.4)
MCHC RBC-ENTMCNC: 27 PG — SIGNIFICANT CHANGE UP (ref 27–31)
MCHC RBC-ENTMCNC: 29.5 G/DL — LOW (ref 32–37)
MCV RBC AUTO: 91.4 FL — SIGNIFICANT CHANGE UP (ref 80–94)
NRBC BLD AUTO-RTO: 0 /100 WBCS — SIGNIFICANT CHANGE UP (ref 0–0)
PLATELET # BLD AUTO: 151 K/UL — SIGNIFICANT CHANGE UP (ref 130–400)
PMV BLD: 11.1 FL — HIGH (ref 7.4–10.4)
POTASSIUM SERPL-MCNC: 3.6 MMOL/L — SIGNIFICANT CHANGE UP (ref 3.5–5)
POTASSIUM SERPL-SCNC: 3.6 MMOL/L — SIGNIFICANT CHANGE UP (ref 3.5–5)
PROT SERPL-MCNC: 5.7 G/DL — LOW (ref 6–8)
RBC # BLD: 2.78 M/UL — LOW (ref 4.7–6.1)
RBC # FLD: 19.5 % — HIGH (ref 11.5–14.5)
SODIUM SERPL-SCNC: 139 MMOL/L — SIGNIFICANT CHANGE UP (ref 135–146)
WBC # BLD: 6.61 K/UL — SIGNIFICANT CHANGE UP (ref 4.8–10.8)
WBC # FLD AUTO: 6.61 K/UL — SIGNIFICANT CHANGE UP (ref 4.8–10.8)

## 2025-02-14 PROCEDURE — 99291 CRITICAL CARE FIRST HOUR: CPT

## 2025-02-14 PROCEDURE — 71045 X-RAY EXAM CHEST 1 VIEW: CPT | Mod: 26

## 2025-02-14 RX ORDER — MAGNESIUM SULFATE 500 MG/ML
2 SYRINGE (ML) INJECTION ONCE
Refills: 0 | Status: COMPLETED | OUTPATIENT
Start: 2025-02-14 | End: 2025-02-14

## 2025-02-14 RX ORDER — OXYCODONE HYDROCHLORIDE 30 MG/1
5 TABLET ORAL EVERY 4 HOURS
Refills: 0 | Status: DISCONTINUED | OUTPATIENT
Start: 2025-02-14 | End: 2025-02-21

## 2025-02-14 RX ORDER — ALPRAZOLAM 0.5 MG
0.25 TABLET, EXTENDED RELEASE 24 HR ORAL ONCE
Refills: 0 | Status: DISCONTINUED | OUTPATIENT
Start: 2025-02-14 | End: 2025-02-14

## 2025-02-14 RX ORDER — SPIRONOLACTONE 25 MG
25 TABLET ORAL
Refills: 0 | Status: DISCONTINUED | OUTPATIENT
Start: 2025-02-14 | End: 2025-02-17

## 2025-02-14 RX ADMIN — Medication 400 MILLIGRAM(S): at 03:18

## 2025-02-14 RX ADMIN — ROSUVASTATIN CALCIUM 20 MILLIGRAM(S): 20 TABLET, FILM COATED ORAL at 23:10

## 2025-02-14 RX ADMIN — Medication 81 MILLIGRAM(S): at 11:18

## 2025-02-14 RX ADMIN — OXYCODONE HYDROCHLORIDE 10 MILLIGRAM(S): 30 TABLET ORAL at 03:51

## 2025-02-14 RX ADMIN — DEXTROMETHORPHAN HBR, GUAIFENESIN 600 MILLIGRAM(S): 200 LIQUID ORAL at 05:18

## 2025-02-14 RX ADMIN — FOLIC ACID 1 MILLIGRAM(S): 1 TABLET ORAL at 11:19

## 2025-02-14 RX ADMIN — Medication 0.25 MILLIGRAM(S): at 23:43

## 2025-02-14 RX ADMIN — Medication 2 TABLET(S): at 23:05

## 2025-02-14 RX ADMIN — IPRATROPIUM BROMIDE AND ALBUTEROL SULFATE 3 MILLILITER(S): .5; 2.5 SOLUTION RESPIRATORY (INHALATION) at 14:37

## 2025-02-14 RX ADMIN — Medication 25 MILLIGRAM(S): at 12:16

## 2025-02-14 RX ADMIN — INSULIN LISPRO 10 UNIT(S): 100 INJECTION, SOLUTION INTRAVENOUS; SUBCUTANEOUS at 08:11

## 2025-02-14 RX ADMIN — Medication 1 APPLICATION(S): at 11:19

## 2025-02-14 RX ADMIN — HEPARIN SODIUM 5000 UNIT(S): 1000 INJECTION INTRAVENOUS; SUBCUTANEOUS at 05:18

## 2025-02-14 RX ADMIN — TAMSULOSIN HYDROCHLORIDE 0.4 MILLIGRAM(S): 0.4 CAPSULE ORAL at 23:10

## 2025-02-14 RX ADMIN — Medication 50 MILLIEQUIVALENT(S): at 12:16

## 2025-02-14 RX ADMIN — Medication 40 MILLIEQUIVALENT(S): at 05:18

## 2025-02-14 RX ADMIN — OXYCODONE HYDROCHLORIDE 10 MILLIGRAM(S): 30 TABLET ORAL at 09:40

## 2025-02-14 RX ADMIN — LIDOCAINE HYDROCHLORIDE 1 PATCH: 20 JELLY TOPICAL at 04:25

## 2025-02-14 RX ADMIN — IPRATROPIUM BROMIDE AND ALBUTEROL SULFATE 3 MILLILITER(S): .5; 2.5 SOLUTION RESPIRATORY (INHALATION) at 07:32

## 2025-02-14 RX ADMIN — OXYCODONE HYDROCHLORIDE 10 MILLIGRAM(S): 30 TABLET ORAL at 23:16

## 2025-02-14 RX ADMIN — OXYCODONE HYDROCHLORIDE 10 MILLIGRAM(S): 30 TABLET ORAL at 04:30

## 2025-02-14 RX ADMIN — OXYCODONE HYDROCHLORIDE 10 MILLIGRAM(S): 30 TABLET ORAL at 16:39

## 2025-02-14 RX ADMIN — GABAPENTIN 300 MILLIGRAM(S): 400 CAPSULE ORAL at 23:10

## 2025-02-14 RX ADMIN — INSULIN LISPRO 10 UNIT(S): 100 INJECTION, SOLUTION INTRAVENOUS; SUBCUTANEOUS at 11:41

## 2025-02-14 RX ADMIN — LIDOCAINE HYDROCHLORIDE 1 PATCH: 20 JELLY TOPICAL at 13:12

## 2025-02-14 RX ADMIN — INSULIN LISPRO 2: 100 INJECTION, SOLUTION INTRAVENOUS; SUBCUTANEOUS at 23:30

## 2025-02-14 RX ADMIN — IRON SUCROSE 100 MILLIGRAM(S): 20 INJECTION, SOLUTION INTRAVENOUS at 18:52

## 2025-02-14 RX ADMIN — LIDOCAINE HYDROCHLORIDE 1 PATCH: 20 JELLY TOPICAL at 20:02

## 2025-02-14 RX ADMIN — HEPARIN SODIUM 5000 UNIT(S): 1000 INJECTION INTRAVENOUS; SUBCUTANEOUS at 23:09

## 2025-02-14 RX ADMIN — Medication 1000 MILLIGRAM(S): at 03:20

## 2025-02-14 RX ADMIN — INSULIN GLARGINE-YFGN 40 UNIT(S): 100 INJECTION, SOLUTION SUBCUTANEOUS at 08:12

## 2025-02-14 RX ADMIN — Medication 25 GRAM(S): at 10:51

## 2025-02-14 RX ADMIN — LIDOCAINE HYDROCHLORIDE 1 PATCH: 20 JELLY TOPICAL at 13:13

## 2025-02-14 RX ADMIN — FLUTICASONE PROPIONATE 1 SPRAY(S): 50 SPRAY, METERED NASAL at 05:18

## 2025-02-14 RX ADMIN — FLUTICASONE PROPIONATE 1 SPRAY(S): 50 SPRAY, METERED NASAL at 17:47

## 2025-02-14 RX ADMIN — Medication 5 MILLIGRAM(S): at 23:10

## 2025-02-14 RX ADMIN — FINASTERIDE 5 MILLIGRAM(S): 1 TABLET, FILM COATED ORAL at 11:19

## 2025-02-14 RX ADMIN — INSULIN LISPRO 2: 100 INJECTION, SOLUTION INTRAVENOUS; SUBCUTANEOUS at 16:56

## 2025-02-14 RX ADMIN — INSULIN LISPRO 10 UNIT(S): 100 INJECTION, SOLUTION INTRAVENOUS; SUBCUTANEOUS at 16:55

## 2025-02-14 RX ADMIN — OXYCODONE HYDROCHLORIDE 10 MILLIGRAM(S): 30 TABLET ORAL at 16:09

## 2025-02-14 RX ADMIN — OXYCODONE HYDROCHLORIDE 5 MILLIGRAM(S): 30 TABLET ORAL at 13:12

## 2025-02-14 RX ADMIN — OXYCODONE HYDROCHLORIDE 10 MILLIGRAM(S): 30 TABLET ORAL at 10:40

## 2025-02-14 RX ADMIN — SERTRALINE 25 MILLIGRAM(S): 100 TABLET, FILM COATED ORAL at 11:19

## 2025-02-14 RX ADMIN — Medication 40 MILLIGRAM(S): at 05:17

## 2025-02-14 RX ADMIN — OXYCODONE HYDROCHLORIDE 5 MILLIGRAM(S): 30 TABLET ORAL at 13:42

## 2025-02-14 RX ADMIN — Medication 25 MILLIGRAM(S): at 17:46

## 2025-02-14 RX ADMIN — DEXTROMETHORPHAN HBR, GUAIFENESIN 600 MILLIGRAM(S): 200 LIQUID ORAL at 17:46

## 2025-02-14 RX ADMIN — HEPARIN SODIUM 5000 UNIT(S): 1000 INJECTION INTRAVENOUS; SUBCUTANEOUS at 13:12

## 2025-02-14 NOTE — PROGRESS NOTE ADULT - ASSESSMENT
Assessment  64M s/p MVR, LAAC, MAZE POD #16  Post op complicated by acute pulmonary insufficiency, FLAKITO, cardiogenic shock req inotropes    Plan:    Neuro:  Multimodal pain management - no toradol, minimize opiates  100 TID gabapentin  0.25 xanax PRN at night  Tylenol, Lidoderm patch, opiates as needed  Monitor neuro status in the post op period      CV:  S/P MVR, LAAC, MAZE  Maintain MAP > 65  ASA,statin lopressor on hold due to hypotension overnight      Resp:  Supplemental oxygen to maintain sats > 92%  RA 93%  Continuous pulse oximetry monitoring  BiPAP at night  NC during the day  IS / Chest PT  OOBTC and Ambulate  Nebulizers as needed - albuterol/atrovent  Advair    GI:  Heart healthy diet  Bowel Regimen - escalate as needed  PUD ppx per protocol    Renal  FLAKITO post surgery resolved   Baseline CKD  Monitor UOP, lytes, creatinine  Diamox for metabolic alkalosis - can use aldactone instead if hypokalemia  Keep K > 4, Mg > 2    Endo:  Tight glucose control per [facility] protocol  Lantus 40 / premeal 10  SSI    Heme:  Acute blood loss anemia post surgery  High risk for thrombocytopenia  DVT ppx with HSQ  Iron / FA / MVI    ID:  Monitor fever curve and WBC count    Patient requires continuous monitoring with:  bedside rhythm monitoring, continuous  pulse oximetry monitoring, O2 supplement titrate for O2 sat above 90%  ;   Care plan discussed with CT ICU care team and attending surgeon Dr Zhao                         .  patient remain critical, at risk for life threatening decompensation; required more than usual post op care;   I have spent 35 minutes providing non routine post op care, revaluated multiple times. Assessment  64M s/p MVR, LAAC, MAZE POD #17  Post op complicated by acute pulmonary insufficiency, FLAKITO, cardiogenic shock req inotropes    Plan:    Neuro:  Multimodal pain management - no toradol, minimize opiates  100 TID gabapentin  0.25 xanax PRN at night  Tylenol, Lidoderm patch, opiates as needed  Monitor neuro status in the post op period      CV:  S/P MVR, LAAC, MAZE  Maintain MAP > 65  ASA,statin lopressor on hold due to hypotension overnight      Resp:  Supplemental oxygen to maintain sats > 92%  RA 93%  Continuous pulse oximetry monitoring  BiPAP at night  NC during the day  IS / Chest PT  OOBTC and Ambulate  Nebulizers as needed - albuterol/atrovent  Advair    GI:  Heart healthy diet  Bowel Regimen - escalate as needed  PUD ppx per protocol    Renal  FLAKITO post surgery resolved   Baseline CKD  Monitor UOP, lytes, creatinine  Diamox for metabolic alkalosis - can use aldactone instead if hypokalemia  Keep K > 4, Mg > 2    Endo:  Tight glucose control per [facility] protocol  Lantus 40 / premeal 10  SSI    Heme:  Acute blood loss anemia post surgery  High risk for thrombocytopenia  DVT ppx with HSQ  Iron / FA / MVI    ID:  Monitor fever curve and WBC count    Patient requires continuous monitoring with:  bedside rhythm monitoring, continuous  pulse oximetry monitoring, O2 supplement titrate for O2 sat above 90%  ;   Care plan discussed with CT ICU care team and attending surgeon Dr Zhao                         .  patient remain critical, at risk for life threatening decompensation; required more than usual post op care;   I have spent 35 minutes providing non routine post op care, revaluated multiple times.

## 2025-02-14 NOTE — PROGRESS NOTE ADULT - SUBJECTIVE AND OBJECTIVE BOX
OPERATIVE PROCEDURE(s):                POD #                       64yMale  SURGEON(s): ISELA Blakely  SUBJECTIVE ASSESSMENT:     Vital Signs Last 24 Hrs  T(F): 97.3 (14 Feb 2025 04:00), Max: 97.8 (13 Feb 2025 12:00)  HR: 88 (14 Feb 2025 08:00) (70 - 90)  BP: 129/82 (14 Feb 2025 08:00) (97/46 - 149/67)  BP(mean): 96 (14 Feb 2025 08:00) (67 - 97)  ABP: --  ABP(mean): --  RR: 20 (14 Feb 2025 08:00) (11 - 25)  SpO2: 95% (14 Feb 2025 08:00) (89% - 100%)  CVP(mm Hg): --  CVP(cm H2O): --  CO: --  CI: --  PA: --  SVR: --    I&O's Detail    13 Feb 2025 07:01  -  14 Feb 2025 07:00  --------------------------------------------------------  IN:    Albumin 5%  - 500 mL: 250 mL    IV PiggyBack: 100 mL    IV PiggyBack: 400 mL    Oral Fluid: 1040 mL  Total IN: 1790 mL    OUT:    Indwelling Catheter - Urethral (mL): 3255 mL    Phenylephrine: 0 mL  Total OUT: 3255 mL        Net:   I&O's Detail    12 Feb 2025 07:01  -  13 Feb 2025 07:00  --------------------------------------------------------  Total NET: -2941.1 mL      13 Feb 2025 07:01  -  14 Feb 2025 07:00  --------------------------------------------------------  Total NET: -1465 mL        CAPILLARY BLOOD GLUCOSE      POCT Blood Glucose.: 135 mg/dL (14 Feb 2025 05:36)  POCT Blood Glucose.: 151 mg/dL (13 Feb 2025 21:46)  POCT Blood Glucose.: 221 mg/dL (13 Feb 2025 16:48)  POCT Blood Glucose.: 167 mg/dL (13 Feb 2025 11:50)  POCT Blood Glucose.: 132 mg/dL (13 Feb 2025 09:50)    Physical Exam:  General: NAD; A&Ox3  Cardiac: S1/S2, RRR, no murmur, no rubs  Lungs: unlabored shallow respirations, bilateral bs decreased at bases  Abdomen: Soft/NT/protuberant  Sternum: Intact, no click, incision healing well with no drainage  Incisions: Incisions clean/dry/intact  Extremities: No edema b/l lower extremities    Central Venous Catheter: Yes[]  critical patient   Caceres Catheter: Yes  [] , critical patient strict I&O  NGT: Yes []   EPICARDIAL WIRES:  [] YES  BOWEL MOVEMENT:  [] YES [] NO,   CHEST TUBE(Left/Right):  [] YES [] NO      LABS:                        7.5[L]  6.61  )-----------( 151      ( 14 Feb 2025 04:15 )             25.4[L]                        7.8[L]  8.19  )-----------( 175      ( 13 Feb 2025 15:11 )             26.7[L]    02-14    139  |  95[L]  |  37[H]  ----------------------------<  125[H]  3.6   |  35[H]  |  1.1  02-13    139  |  94[L]  |  38[H]  ----------------------------<  126[H]  4.0   |  35[H]  |  1.2    Ca    8.7      14 Feb 2025 04:15  Phos  3.7     02-13  Mg     2.1     02-14    TPro  5.7[L] [6.0 - 8.0]  /  Alb  3.8 [3.5 - 5.2]  /  TBili  0.4 [0.2 - 1.2]  /  DBili  x   /  AST  25 [0 - 41]  /  ALT  18 [0 - 41]  /  AlkPhos  109 [30 - 115]  02-14    RADIOLOGY & ADDITIONAL TESTS:  CXR:  EKG:  MEDICATIONS  (STANDING):  albuterol/ipratropium for Nebulization 3 milliLiter(s) Nebulizer every 6 hours  aspirin enteric coated 81 milliGRAM(s) Oral daily  chlorhexidine 2% Cloths 1 Application(s) Topical daily  dextrose 5%. 1000 milliLiter(s) (50 mL/Hr) IV Continuous <Continuous>  dextrose 5%. 1000 milliLiter(s) (100 mL/Hr) IV Continuous <Continuous>  dextrose 50% Injectable 50 milliLiter(s) IV Push every 15 minutes  dextrose 50% Injectable 25 milliLiter(s) IV Push every 15 minutes  finasteride 5 milliGRAM(s) Oral daily  fluticasone propionate 50 MICROgram(s)/spray Nasal Spray 1 Spray(s) Both Nostrils every 12 hours  fluticasone propionate/ salmeterol 250-50 MICROgram(s) Diskus 1 Dose(s) Inhalation two times a day  folic acid 1 milliGRAM(s) Oral daily  gabapentin 300 milliGRAM(s) Oral at bedtime  glucagon  Injectable 1 milliGRAM(s) IntraMuscular once  guaiFENesin  milliGRAM(s) Oral every 12 hours  heparin   Injectable 5000 Unit(s) SubCutaneous every 8 hours  influenza  Vaccine (HIGH DOSE) 0.5 milliLiter(s) IntraMuscular once  insulin glargine Injectable (LANTUS) 40 Unit(s) SubCutaneous before breakfast  insulin lispro (ADMELOG) corrective regimen sliding scale   SubCutaneous three times a day before meals  insulin lispro (ADMELOG) corrective regimen sliding scale   SubCutaneous at bedtime  insulin lispro Injectable (ADMELOG) 10 Unit(s) SubCutaneous three times a day before meals  iron sucrose IVPB 200 milliGRAM(s) IV Intermittent every 24 hours  lidocaine   4% Patch 1 Patch Transdermal every 24 hours  lidocaine   4% Patch 1 Patch Transdermal every 24 hours  mupirocin 2% Nasal 1 Application(s) Both Nostrils every 12 hours  pantoprazole    Tablet 40 milliGRAM(s) Oral before breakfast  phenylephrine    Infusion 0.05 MICROgram(s)/kG/Min (2.14 mL/Hr) IV Continuous <Continuous>  psyllium Powder 1 Packet(s) Oral every 12 hours  rosuvastatin 20 milliGRAM(s) Oral at bedtime  senna 2 Tablet(s) Oral at bedtime  sertraline 25 milliGRAM(s) Oral daily  tamsulosin 0.4 milliGRAM(s) Oral at bedtime    MEDICATIONS  (PRN):  dextrose Oral Gel 15 Gram(s) Oral once PRN Blood Glucose LESS THAN 70 milliGRAM(s)/deciliter  hydrALAZINE Injectable 5 milliGRAM(s) IV Push every 4 hours PRN Systolic > 160  labetalol Injectable 10 milliGRAM(s) IV Push every 6 hours PRN Diastolic blood pressure >155  melatonin 5 milliGRAM(s) Oral at bedtime PRN Insomnia  ondansetron Injectable 4 milliGRAM(s) IV Push every 4 hours PRN Nausea and/or Vomiting  oxyCODONE    IR 10 milliGRAM(s) Oral every 6 hours PRN Severe Pain (7 - 10)    Allergies    No Known Allergies    Intolerances      Ambulation/Activity Status: ambulate with assistance    Assessment/Plan:  64y Male status-post MVR/SHEILA ligation and MAZE for severe MR / afib               POD # 17  - Case and plan discussed with CTU Intensivist and CT Surgeon - Dr. Blakely  - Continue CTU supportive care and ongoing plan of care as per continuing CTU rounds.   - Continue DVT/GI prophylaxis  - Incentive Spirometry 10 times an hour  - Continue to advance physical activity as tolerated and continue PT/OT as directed  1. Continue ASA, statin,low dose B Blocker-- pt was seen by EP and has no conduction problems; hence, no need for PPM as per Dr. barker  2. madelung disease: cont bipap, respiratory tx's, NC 2LPM  3. Pre-op h/o A. Fib cont to monitor electrolytes - hold AC at this time prior lower GI bleed  and in SR- s/p MAZE and SHEILA closure  4. DM/Glucose A1c 6.2 Control: continue lantus 40/humalog 10 and sliding scale. Reiterate  carb consistent diet   5. Heart Failure - diastolic (EF 60%) - acute on chronic (secondary to severe MR) continue IV bumex 1mg BID and metolazone, 1000mL fluid restriction. (patient noncompliant)  6.. flakito on CKD3a- FLAKITO resolved  Cr0.9 Renal following- diuresis PRN, avoid hypotension   7. psych consult for anxiety- Zoloft 25mg daily, cont xanax at night  8.0 [place] patch for pain    Social Service Disposition: Pt physically deconditioned, agreed to review SNF facilities, possible [facility] tomorrow   OPERATIVE PROCEDURE(s):                POD # 17 s/p MVR, LA ligation, MAZE                      64yMale  SURGEON(s): ISELA Blakely  SUBJECTIVE ASSESSMENT: Pt is seen and examined at bedside. Pt is ambulating, currently denies complaints.     Vital Signs Last 24 Hrs  T(F): 97.3 (14 Feb 2025 04:00), Max: 97.8 (13 Feb 2025 12:00)  HR: 88 (14 Feb 2025 08:00) (70 - 90)  BP: 129/82 (14 Feb 2025 08:00) (97/46 - 149/67)  BP(mean): 96 (14 Feb 2025 08:00) (67 - 97)  ABP: --  ABP(mean): --  RR: 20 (14 Feb 2025 08:00) (11 - 25)  SpO2: 95% (14 Feb 2025 08:00) (89% - 100%)  CVP(mm Hg): --  CVP(cm H2O): --  CO: --  CI: --  PA: --  SVR: --    I&O's Detail    13 Feb 2025 07:01  -  14 Feb 2025 07:00  --------------------------------------------------------  IN:    Albumin 5%  - 500 mL: 250 mL    IV PiggyBack: 100 mL    IV PiggyBack: 400 mL    Oral Fluid: 1040 mL  Total IN: 1790 mL    OUT:    Indwelling Catheter - Urethral (mL): 3255 mL    Phenylephrine: 0 mL  Total OUT: 3255 mL        Net:   I&O's Detail    12 Feb 2025 07:01  -  13 Feb 2025 07:00  --------------------------------------------------------  Total NET: -2941.1 mL      13 Feb 2025 07:01  -  14 Feb 2025 07:00  --------------------------------------------------------  Total NET: -1465 mL        CAPILLARY BLOOD GLUCOSE      POCT Blood Glucose.: 135 mg/dL (14 Feb 2025 05:36)  POCT Blood Glucose.: 151 mg/dL (13 Feb 2025 21:46)  POCT Blood Glucose.: 221 mg/dL (13 Feb 2025 16:48)  POCT Blood Glucose.: 167 mg/dL (13 Feb 2025 11:50)  POCT Blood Glucose.: 132 mg/dL (13 Feb 2025 09:50)    Physical Exam:  General: NAD; A&Ox3  Cardiac: S1/S2, RRR, no murmur, no rubs  Lungs: unlabored shallow respirations, bilateral bs decreased at bases  Abdomen: Soft/NT/protuberant  Sternum: Intact, no click, incision healing well with no drainage  Incisions: Incisions clean/dry/intact  Extremities: Mild pitting edema of B/L LE. B/L feet erythematous with closed blisters on the L foot. Good capillary refill; no cyanosis; palpable 1+ pedal pulses B/L    Central Venous Catheter: Yes[]  critical patient   Caceres Catheter: Yes  [X] , critical patient strict I&O  NGT: Yes []   EPICARDIAL WIRES:  [X] YES  BOWEL MOVEMENT:  [X] YES [] NO,   CHEST TUBE(Left/Right):  [] YES [X] NO      LABS:                        7.5[L]  6.61  )-----------( 151      ( 14 Feb 2025 04:15 )             25.4[L]                        7.8[L]  8.19  )-----------( 175      ( 13 Feb 2025 15:11 )             26.7[L]    02-14    139  |  95[L]  |  37[H]  ----------------------------<  125[H]  3.6   |  35[H]  |  1.1  02-13    139  |  94[L]  |  38[H]  ----------------------------<  126[H]  4.0   |  35[H]  |  1.2    Ca    8.7      14 Feb 2025 04:15  Phos  3.7     02-13  Mg     2.1     02-14    TPro  5.7[L] [6.0 - 8.0]  /  Alb  3.8 [3.5 - 5.2]  /  TBili  0.4 [0.2 - 1.2]  /  DBili  x   /  AST  25 [0 - 41]  /  ALT  18 [0 - 41]  /  AlkPhos  109 [30 - 115]  02-14    RADIOLOGY & ADDITIONAL TESTS:  CXR:  EKG:  MEDICATIONS  (STANDING):  albuterol/ipratropium for Nebulization 3 milliLiter(s) Nebulizer every 6 hours  aspirin enteric coated 81 milliGRAM(s) Oral daily  chlorhexidine 2% Cloths 1 Application(s) Topical daily  dextrose 5%. 1000 milliLiter(s) (50 mL/Hr) IV Continuous <Continuous>  dextrose 5%. 1000 milliLiter(s) (100 mL/Hr) IV Continuous <Continuous>  dextrose 50% Injectable 50 milliLiter(s) IV Push every 15 minutes  dextrose 50% Injectable 25 milliLiter(s) IV Push every 15 minutes  finasteride 5 milliGRAM(s) Oral daily  fluticasone propionate 50 MICROgram(s)/spray Nasal Spray 1 Spray(s) Both Nostrils every 12 hours  fluticasone propionate/ salmeterol 250-50 MICROgram(s) Diskus 1 Dose(s) Inhalation two times a day  folic acid 1 milliGRAM(s) Oral daily  gabapentin 300 milliGRAM(s) Oral at bedtime  glucagon  Injectable 1 milliGRAM(s) IntraMuscular once  guaiFENesin  milliGRAM(s) Oral every 12 hours  heparin   Injectable 5000 Unit(s) SubCutaneous every 8 hours  influenza  Vaccine (HIGH DOSE) 0.5 milliLiter(s) IntraMuscular once  insulin glargine Injectable (LANTUS) 40 Unit(s) SubCutaneous before breakfast  insulin lispro (ADMELOG) corrective regimen sliding scale   SubCutaneous three times a day before meals  insulin lispro (ADMELOG) corrective regimen sliding scale   SubCutaneous at bedtime  insulin lispro Injectable (ADMELOG) 10 Unit(s) SubCutaneous three times a day before meals  iron sucrose IVPB 200 milliGRAM(s) IV Intermittent every 24 hours  lidocaine   4% Patch 1 Patch Transdermal every 24 hours  lidocaine   4% Patch 1 Patch Transdermal every 24 hours  mupirocin 2% Nasal 1 Application(s) Both Nostrils every 12 hours  pantoprazole    Tablet 40 milliGRAM(s) Oral before breakfast  phenylephrine    Infusion 0.05 MICROgram(s)/kG/Min (2.14 mL/Hr) IV Continuous <Continuous>  psyllium Powder 1 Packet(s) Oral every 12 hours  rosuvastatin 20 milliGRAM(s) Oral at bedtime  senna 2 Tablet(s) Oral at bedtime  sertraline 25 milliGRAM(s) Oral daily  tamsulosin 0.4 milliGRAM(s) Oral at bedtime    MEDICATIONS  (PRN):  dextrose Oral Gel 15 Gram(s) Oral once PRN Blood Glucose LESS THAN 70 milliGRAM(s)/deciliter  hydrALAZINE Injectable 5 milliGRAM(s) IV Push every 4 hours PRN Systolic > 160  labetalol Injectable 10 milliGRAM(s) IV Push every 6 hours PRN Diastolic blood pressure >155  melatonin 5 milliGRAM(s) Oral at bedtime PRN Insomnia  ondansetron Injectable 4 milliGRAM(s) IV Push every 4 hours PRN Nausea and/or Vomiting  oxyCODONE    IR 10 milliGRAM(s) Oral every 6 hours PRN Severe Pain (7 - 10)    Allergies    No Known Allergies    Intolerances      Ambulation/Activity Status: ambulate with assistance    Assessment/Plan:  64y Male status-post MVR/SHEILA ligation and MAZE for severe MR / afib               POD # 17  - Case and plan discussed with CTU Intensivist and CT Surgeon - Dr. Blakely  - Continue CTU supportive care and ongoing plan of care as per continuing CTU rounds.   - Continue DVT/GI prophylaxis  - Incentive Spirometry 10 times an hour  - Continue to advance physical activity as tolerated and continue PT/OT as directed  1. Continue ASA, statin,low dose B Blocker  2. madelung disease: cont bipap, respiratory tx's, NC 2LPM  3. Pre-op h/o A. Fib cont to monitor electrolytes - hold AC at this time prior lower GI bleed  and in SR- s/p MAZE and SHEILA closure  4. DM/Glucose A1c 6.2 Control: continue lantus 40/humalog 10 and sliding scale, DASH/TLC diet  5. Heart Failure - diastolic (EF 60%) - acute on chronic (secondary to severe MR) fluid restriction to 1L, daily standing weights, diuresis with Aldactone 25 mg bid  6.. flakito on CKD3a- FLAKITO resolved  7. Oxy 5 mg prn, lidocaine patch for pain    Social Service Disposition: Pt is considering SNF upon d/c   OPERATIVE PROCEDURE(s):                POD # 17 s/p MVR, LA ligation, MAZE                      64yMale  SURGEON(s): ISELA Blakely  SUBJECTIVE ASSESSMENT: Pt is seen and examined at bedside. Pt is ambulating, currently denies complaints.     Vital Signs Last 24 Hrs  T(F): 97.3 (14 Feb 2025 04:00), Max: 97.8 (13 Feb 2025 12:00)  HR: 88 (14 Feb 2025 08:00) (70 - 90)  BP: 129/82 (14 Feb 2025 08:00) (97/46 - 149/67)  BP(mean): 96 (14 Feb 2025 08:00) (67 - 97)  ABP: --  ABP(mean): --  RR: 20 (14 Feb 2025 08:00) (11 - 25)  SpO2: 95% (14 Feb 2025 08:00) (89% - 100%)  CVP(mm Hg): --  CVP(cm H2O): --  CO: --  CI: --  PA: --  SVR: --    I&O's Detail    13 Feb 2025 07:01  -  14 Feb 2025 07:00  --------------------------------------------------------  IN:    Albumin 5%  - 500 mL: 250 mL    IV PiggyBack: 100 mL    IV PiggyBack: 400 mL    Oral Fluid: 1040 mL  Total IN: 1790 mL    OUT:    Indwelling Catheter - Urethral (mL): 3255 mL    Phenylephrine: 0 mL  Total OUT: 3255 mL        Net:   I&O's Detail    12 Feb 2025 07:01  -  13 Feb 2025 07:00  --------------------------------------------------------  Total NET: -2941.1 mL      13 Feb 2025 07:01  -  14 Feb 2025 07:00  --------------------------------------------------------  Total NET: -1465 mL        CAPILLARY BLOOD GLUCOSE      POCT Blood Glucose.: 135 mg/dL (14 Feb 2025 05:36)  POCT Blood Glucose.: 151 mg/dL (13 Feb 2025 21:46)  POCT Blood Glucose.: 221 mg/dL (13 Feb 2025 16:48)  POCT Blood Glucose.: 167 mg/dL (13 Feb 2025 11:50)  POCT Blood Glucose.: 132 mg/dL (13 Feb 2025 09:50)    Physical Exam:  General: NAD; A&Ox3  Cardiac: S1/S2, RRR, no murmur, no rubs  Lungs: unlabored shallow respirations, bilateral bs decreased at bases  Abdomen: Soft/NT/protuberant  Sternum: Intact, no click, incision healing well with no drainage  Incisions: Incisions clean/dry/intact  Extremities: Mild pitting edema of B/L LE. B/L feet erythematous with closed blisters on the L foot. Good capillary refill; no cyanosis; palpable 1+ pedal pulses B/L    Central Venous Catheter: Yes[]  critical patient   Caceres Catheter: Yes  [X] , critical patient strict I&O  NGT: Yes []   EPICARDIAL WIRES:  [X] YES  BOWEL MOVEMENT:  [X] YES [] NO,   CHEST TUBE(Left/Right):  [] YES [X] NO      LABS:                        7.5[L]  6.61  )-----------( 151      ( 14 Feb 2025 04:15 )             25.4[L]                        7.8[L]  8.19  )-----------( 175      ( 13 Feb 2025 15:11 )             26.7[L]    02-14    139  |  95[L]  |  37[H]  ----------------------------<  125[H]  3.6   |  35[H]  |  1.1  02-13    139  |  94[L]  |  38[H]  ----------------------------<  126[H]  4.0   |  35[H]  |  1.2    Ca    8.7      14 Feb 2025 04:15  Phos  3.7     02-13  Mg     2.1     02-14    TPro  5.7[L] [6.0 - 8.0]  /  Alb  3.8 [3.5 - 5.2]  /  TBili  0.4 [0.2 - 1.2]  /  DBili  x   /  AST  25 [0 - 41]  /  ALT  18 [0 - 41]  /  AlkPhos  109 [30 - 115]  02-14    RADIOLOGY & ADDITIONAL TESTS:  CXR:    < from: Xray Chest 1 View- PORTABLE-Routine (Xray Chest 1 View- PORTABLE-Routine in AM.) (02.14.25 @ 06:42) >  IMPRESSION:    No radiographic evidence of acute cardiopulmonary disease.   Redemonstration bilateral opacities/pleural effusions    < end of copied text >    MEDICATIONS  (STANDING):  albuterol/ipratropium for Nebulization 3 milliLiter(s) Nebulizer every 6 hours  aspirin enteric coated 81 milliGRAM(s) Oral daily  chlorhexidine 2% Cloths 1 Application(s) Topical daily  dextrose 5%. 1000 milliLiter(s) (50 mL/Hr) IV Continuous <Continuous>  dextrose 5%. 1000 milliLiter(s) (100 mL/Hr) IV Continuous <Continuous>  dextrose 50% Injectable 50 milliLiter(s) IV Push every 15 minutes  dextrose 50% Injectable 25 milliLiter(s) IV Push every 15 minutes  finasteride 5 milliGRAM(s) Oral daily  fluticasone propionate 50 MICROgram(s)/spray Nasal Spray 1 Spray(s) Both Nostrils every 12 hours  fluticasone propionate/ salmeterol 250-50 MICROgram(s) Diskus 1 Dose(s) Inhalation two times a day  folic acid 1 milliGRAM(s) Oral daily  gabapentin 300 milliGRAM(s) Oral at bedtime  glucagon  Injectable 1 milliGRAM(s) IntraMuscular once  guaiFENesin  milliGRAM(s) Oral every 12 hours  heparin   Injectable 5000 Unit(s) SubCutaneous every 8 hours  influenza  Vaccine (HIGH DOSE) 0.5 milliLiter(s) IntraMuscular once  insulin glargine Injectable (LANTUS) 40 Unit(s) SubCutaneous before breakfast  insulin lispro (ADMELOG) corrective regimen sliding scale   SubCutaneous three times a day before meals  insulin lispro (ADMELOG) corrective regimen sliding scale   SubCutaneous at bedtime  insulin lispro Injectable (ADMELOG) 10 Unit(s) SubCutaneous three times a day before meals  iron sucrose IVPB 200 milliGRAM(s) IV Intermittent every 24 hours  lidocaine   4% Patch 1 Patch Transdermal every 24 hours  lidocaine   4% Patch 1 Patch Transdermal every 24 hours  mupirocin 2% Nasal 1 Application(s) Both Nostrils every 12 hours  pantoprazole    Tablet 40 milliGRAM(s) Oral before breakfast  phenylephrine    Infusion 0.05 MICROgram(s)/kG/Min (2.14 mL/Hr) IV Continuous <Continuous>  psyllium Powder 1 Packet(s) Oral every 12 hours  rosuvastatin 20 milliGRAM(s) Oral at bedtime  senna 2 Tablet(s) Oral at bedtime  sertraline 25 milliGRAM(s) Oral daily  tamsulosin 0.4 milliGRAM(s) Oral at bedtime    MEDICATIONS  (PRN):  dextrose Oral Gel 15 Gram(s) Oral once PRN Blood Glucose LESS THAN 70 milliGRAM(s)/deciliter  hydrALAZINE Injectable 5 milliGRAM(s) IV Push every 4 hours PRN Systolic > 160  labetalol Injectable 10 milliGRAM(s) IV Push every 6 hours PRN Diastolic blood pressure >155  melatonin 5 milliGRAM(s) Oral at bedtime PRN Insomnia  ondansetron Injectable 4 milliGRAM(s) IV Push every 4 hours PRN Nausea and/or Vomiting  oxyCODONE    IR 10 milliGRAM(s) Oral every 6 hours PRN Severe Pain (7 - 10)    Allergies    No Known Allergies    Intolerances      Ambulation/Activity Status: ambulate with assistance    Assessment/Plan:  64y Male status-post MVR/SHEILA ligation and MAZE for severe MR / afib               POD # 17  - Case and plan discussed with CTU Intensivist and CT Surgeon - Dr. Blakely  - Continue CTU supportive care and ongoing plan of care as per continuing CTU rounds.   - Continue DVT/GI prophylaxis  - Incentive Spirometry 10 times an hour  - Continue to advance physical activity as tolerated and continue PT/OT as directed  1. Continue ASA, statin,low dose B Blocker  2. madelung disease: cont bipap, respiratory tx's, NC 2LPM  3. Pre-op h/o A. Fib cont to monitor electrolytes - hold AC at this time prior lower GI bleed  and in SR- s/p MAZE and SHEILA closure  4. DM/Glucose A1c 6.2 Control: continue lantus 40/humalog 10 and sliding scale, DASH/TLC diet  5. Heart Failure - diastolic (EF 60%) - acute on chronic (secondary to severe MR) fluid restriction to 1L, daily standing weights, diuresis with Aldactone 25 mg bid  6.. flakito on CKD3a- FLAKITO resolved  7. Oxy 5 mg prn, lidocaine patch for pain    Social Service Disposition: Pt is considering SNF upon d/c   OPERATIVE PROCEDURE(s):                POD # 17 s/p MVR, LA ligation, MAZE                      64yMale  SURGEON(s): ISELA Blakely  SUBJECTIVE ASSESSMENT: Pt is seen and examined at bedside. Pt is ambulating, currently denies complaints.     Vital Signs Last 24 Hrs  T(F): 97.3 (14 Feb 2025 04:00), Max: 97.8 (13 Feb 2025 12:00)  HR: 88 (14 Feb 2025 08:00) (70 - 90)  BP: 129/82 (14 Feb 2025 08:00) (97/46 - 149/67)  BP(mean): 96 (14 Feb 2025 08:00) (67 - 97)  RR: 20 (14 Feb 2025 08:00) (11 - 25)  SpO2: 95% (14 Feb 2025 08:00) (89% - 100%)    I&O's Detail    13 Feb 2025 07:01  -  14 Feb 2025 07:00  --------------------------------------------------------  IN:    Albumin 5%  - 500 mL: 250 mL    IV PiggyBack: 100 mL    IV PiggyBack: 400 mL    Oral Fluid: 1040 mL  Total IN: 1790 mL    OUT:    Indwelling Catheter - Urethral (mL): 3255 mL    Phenylephrine: 0 mL  Total OUT: 3255 mL    Net:   I&O's Detail    12 Feb 2025 07:01  -  13 Feb 2025 07:00  --------------------------------------------------------  Total NET: -2941.1 mL    13 Feb 2025 07:01  -  14 Feb 2025 07:00  --------------------------------------------------------  Total NET: -1465 mL    CAPILLARY BLOOD GLUCOSE    POCT Blood Glucose.: 135 mg/dL (14 Feb 2025 05:36)  POCT Blood Glucose.: 151 mg/dL (13 Feb 2025 21:46)  POCT Blood Glucose.: 221 mg/dL (13 Feb 2025 16:48)  POCT Blood Glucose.: 167 mg/dL (13 Feb 2025 11:50)  POCT Blood Glucose.: 132 mg/dL (13 Feb 2025 09:50)    Physical Exam:  General: NAD; A&Ox3  Cardiac: S1/S2, RRR, no murmur, no rubs  Lungs: unlabored shallow respirations, bilateral bs decreased at bases  Abdomen: Soft/NT/protuberant  Sternum: Intact, no click, incision healing well with no drainage  Extremities: edema lower extremities improved, swelling anterior left foot with blistering. Will take aces off for next 2 days then reapply       [place] Catheter: Yes  [X] , critical patient strict I&O  EPICARDIAL WIRES:  [X] YES       LABS:                        7.5[L]  6.61  )-----------( 151      ( 14 Feb 2025 04:15 )             25.4[L]                        7.8[L]  8.19  )-----------( 175      ( 13 Feb 2025 15:11 )             26.7[L]    02-14    139  |  95[L]  |  37[H]  ----------------------------<  125[H]  3.6   |  35[H]  |  1.1  02-13    139  |  94[L]  |  38[H]  ----------------------------<  126[H]  4.0   |  35[H]  |  1.2    Ca    8.7      14 Feb 2025 04:15  Phos  3.7     02-13  Mg     2.1     02-14    TPro  5.7[L] [6.0 - 8.0]  /  Alb  3.8 [3.5 - 5.2]  /  TBili  0.4 [0.2 - 1.2]  /  DBili  x   /  AST  25 [0 - 41]  /  ALT  18 [0 - 41]  /  AlkPhos  109 [30 - 115]  02-14    RADIOLOGY & ADDITIONAL TESTS:  CXR:    < from: Xray Chest 1 View- PORTABLE-Routine (Xray Chest 1 View- PORTABLE-Routine in AM.) (02.14.25 @ 06:42) >  IMPRESSION:    No radiographic evidence of acute cardiopulmonary disease.   Redemonstration bilateral opacities/pleural effusions    < end of copied text >    MEDICATIONS  (STANDING):  albuterol/ipratropium for Nebulization 3 milliLiter(s) Nebulizer every 6 hours  aspirin enteric coated 81 milliGRAM(s) Oral daily  chlorhexidine 2% Cloths 1 Application(s) Topical daily  dextrose 5%. 1000 milliLiter(s) (50 mL/Hr) IV Continuous <Continuous>  dextrose 5%. 1000 milliLiter(s) (100 mL/Hr) IV Continuous <Continuous>  dextrose 50% Injectable 50 milliLiter(s) IV Push every 15 minutes  dextrose 50% Injectable 25 milliLiter(s) IV Push every 15 minutes  finasteride 5 milliGRAM(s) Oral daily  fluticasone propionate 50 MICROgram(s)/spray Nasal Spray 1 Spray(s) Both Nostrils every 12 hours  fluticasone propionate/ salmeterol 250-50 MICROgram(s) Diskus 1 Dose(s) Inhalation two times a day  folic acid 1 milliGRAM(s) Oral daily  gabapentin 300 milliGRAM(s) Oral at bedtime  glucagon  Injectable 1 milliGRAM(s) IntraMuscular once  guaiFENesin  milliGRAM(s) Oral every 12 hours  heparin   Injectable 5000 Unit(s) SubCutaneous every 8 hours  influenza  Vaccine (HIGH DOSE) 0.5 milliLiter(s) IntraMuscular once  insulin glargine Injectable (LANTUS) 40 Unit(s) SubCutaneous before breakfast  insulin lispro (ADMELOG) corrective regimen sliding scale   SubCutaneous three times a day before meals  insulin lispro (ADMELOG) corrective regimen sliding scale   SubCutaneous at bedtime  insulin lispro Injectable (ADMELOG) 10 Unit(s) SubCutaneous three times a day before meals  iron sucrose IVPB 200 milliGRAM(s) IV Intermittent every 24 hours  lidocaine   4% Patch 1 Patch Transdermal every 24 hours  lidocaine   4% Patch 1 Patch Transdermal every 24 hours  mupirocin 2% Nasal 1 Application(s) Both Nostrils every 12 hours  pantoprazole    Tablet 40 milliGRAM(s) Oral before breakfast  phenylephrine    Infusion 0.05 MICROgram(s)/kG/Min (2.14 mL/Hr) IV Continuous <Continuous>  psyllium Powder 1 Packet(s) Oral every 12 hours  rosuvastatin 20 milliGRAM(s) Oral at bedtime  senna 2 Tablet(s) Oral at bedtime  sertraline 25 milliGRAM(s) Oral daily  tamsulosin 0.4 milliGRAM(s) Oral at bedtime    MEDICATIONS  (PRN):  dextrose Oral Gel 15 Gram(s) Oral once PRN Blood Glucose LESS THAN 70 milliGRAM(s)/deciliter  hydrALAZINE Injectable 5 milliGRAM(s) IV Push every 4 hours PRN Systolic > 160  labetalol Injectable 10 milliGRAM(s) IV Push every 6 hours PRN Diastolic blood pressure >155  melatonin 5 milliGRAM(s) Oral at bedtime PRN Insomnia  ondansetron Injectable 4 milliGRAM(s) IV Push every 4 hours PRN Nausea and/or Vomiting  oxyCODONE    IR 10 milliGRAM(s) Oral every 6 hours PRN Severe Pain (7 - 10)    Allergies    No Known Allergies    Intolerances      Ambulation/Activity Status: ambulate with assistance    Assessment/Plan:  64y Male status-post MVR/SHEILA ligation and MAZE for severe MR / afib               POD # 17  - Case and plan discussed with CTU Intensivist and CT Surgeon - Dr. Blakely  - Continue CTU supportive care and ongoing plan of care as per continuing CTU rounds.   - Continue DVT/GI prophylaxis  - Incentive Spirometry 10 times an hour  - Continue to advance physical activity as tolerated and continue PT/OT as directed  1. Continue ASA, statin, B Blocker continues to be held due to low BP  2. Madelung disease: cont bipap, respiratory tx's, NC 2LPM  3. Pre-op h/o A. Fib cont to monitor electrolytes - hold AC at this time prior lower GI bleed  and in SR- s/p MAZE and SHEILA closure  4. DM/Glucose A1c 6.2 Control: continue lantus 40/humalog 10 and sliding scale, DASH/TLC diet  5. Heart Failure - diastolic (EF 60%) - acute on chronic (secondary to severe MR) fluid restriction to 1L, daily standing weights, diuresis with Aldactone 25 mg bid  6.. flakito on CKD3a- FLAKITO resolved  7. Oxy 5 mg prn, lidocaine patch for pain  8. remove prince and do voiding trial    Social Service Disposition: Pt is considering SNF upon d/c

## 2025-02-14 NOTE — PROGRESS NOTE ADULT - SUBJECTIVE AND OBJECTIVE BOX
Subjective and Objective:   HPI: 64M w/ afib, nonobstructive CAD, CHF class 3, severe MR, DM, HTN, JIMMY on CPAP, asthma, severe obesity, HL, Madelung's disease, osteoarthritis s/p bilateral hip replacement, peripheral neuropathy presented with increasing dyspnea with exertion found to have severe MR.  He was evaluated for mitral clip and not a candidate.  On prior admission he had FLAKITO with creat up to 2.  Was optimized with diuresis.  He was on AC and developed hemorrhoidal bleeding and was taken off AC.  He was discharged and returned for his procedure on 1/28    OR Data  Procedure: Procedure: MVReplacement (bioprosthetic), SHEILA ligation, MAZE  Bypass: 202  Cross Clamp: 158  Pre LV Fxn: 50-55%      RV Fxn: normal  Post LV Fxn: 45-50%     RV Fxn: normal    moderate TR, MV gradient 3mmHg  Blood Products: 1uPRBC, ddavp 39mcq  Cell Saver: 698  Fluids: NS 2000, albumin 2000  Pacing Wires: V pacing. sinus rhythm  UO: 1100    1/28 - OP day :  Fany and poor oxygenation in OR. Milrinone, norepi, vaso. Extubation in evening.  1/29 - start aspirin, diuresis, heparin dvt prophylaxis  1/30- d/c pleural ct if drainage dec; wean to dc primacor; wean high flow o2  1/31 - FLAKITO worsening, bradyarrythmias - amandanchebach - ep consulted  2/2 - Chest tube removed, ambulating, creat improving, dobutamine weaned  2/3 - Dobut to 1.5, BUN elevated  2/4 - Confused this AM, normal neuro exam, prince removed  2/5 - FLAKITO improving, off dobutamine, mil decreased   2/6 - milrinone stopped, bumex 1mg BID  2/13 hypotension on neosynephrine overnight, off this morning    Procedure: MV Replacemant Maze  LA Ligation  POD#: 16    He has history of Diabetes HTN (hypertension)   Obstructive sleep apnea on CPAP  Asthma  Obesity  Peripheral neuropathy  Arthritis  Hypercholesteremia  Madelung's disease  FH: mitral regurgitation  CHF NYHA class III  Atrial fibrillation    OBJECTIVE:  ICU Vital Signs Last 24 Hrs  T(C): 36.9 (12 Feb 2025 22:00), Max: 37.2 (12 Feb 2025 12:00)  T(F): 98.5 (12 Feb 2025 22:00), Max: 98.9 (12 Feb 2025 12:00)  HR: 82 (13 Feb 2025 10:00) (71 - 120)  BP: 119/60 (13 Feb 2025 10:00) (71/37 - 155/67)  BP(mean): 86 (13 Feb 2025 10:00) (49 - 97)  ABP: --  ABP(mean): --  RR: 19 (13 Feb 2025 10:00) (12 - 53)  SpO2: 94% (13 Feb 2025 10:00) (90% - 100%)    O2 Parameters below as of 13 Feb 2025 10:00  Patient On (Oxygen Delivery Method): nasal cannula  O2 Flow (L/min): 4        I&O's Summary    12 Feb 2025 07:01  -  13 Feb 2025 07:00  --------------------------------------------------------  IN: 1508.9 mL / OUT: 4450 mL / NET: -2941.1 mL    13 Feb 2025 07:01  -  13 Feb 2025 10:49  --------------------------------------------------------  IN: 350 mL / OUT: 350 mL / NET: 0 mL      I&O's Detail    12 Feb 2025 07:01  -  13 Feb 2025 07:00  --------------------------------------------------------  IN:    Albumin 5%  - 500 mL: 250 mL    IV PiggyBack: 200 mL    Oral Fluid: 980 mL    Phenylephrine: 78.9 mL  Total IN: 1508.9 mL    OUT:    Indwelling Catheter - Urethral (mL): 100 mL    Voided (mL): 4350 mL  Total OUT: 4450 mL    Total NET: -2941.1 mL      13 Feb 2025 07:01  -  13 Feb 2025 10:49  --------------------------------------------------------  IN:    Albumin 5%  - 500 mL: 250 mL    IV PiggyBack: 100 mL  Total IN: 350 mL    OUT:    Indwelling Catheter - Urethral (mL): 350 mL    Phenylephrine: 0 mL  Total OUT: 350 mL    Total NET: 0 mL        Adult Advanced Hemodynamics Last 24 Hrs  CVP(mm Hg): --  CVP(cm H2O): --  CO: --  CI: --  PA: --  PA(mean): --  PCWP: --  SVR: --  SVRI: --  PVR: --  PVRI: --    CAPILLARY BLOOD GLUCOSE      POCT Blood Glucose.: 132 mg/dL (13 Feb 2025 09:50)  POCT Blood Glucose.: 144 mg/dL (13 Feb 2025 06:18)  POCT Blood Glucose.: 256 mg/dL (13 Feb 2025 00:11)  POCT Blood Glucose.: 127 mg/dL (12 Feb 2025 17:00)  POCT Blood Glucose.: 94 mg/dL (12 Feb 2025 11:50)    LABS:                          8.5    8.70  )-----------( 175      ( 13 Feb 2025 01:00 )             28.5     02-13    140  |  95[L]  |  39[H]  ----------------------------<  198[H]  3.6   |  35[H]  |  1.2    Ca    8.9      13 Feb 2025 01:35  Phos  3.7     02-13  Mg     1.7     02-13    TPro  5.8[L]  /  Alb  3.4[L]  /  TBili  0.4  /  DBili  x   /  AST  30  /  ALT  23  /  AlkPhos  128[H]  02-13      Urinalysis Basic - ( 13 Feb 2025 01:35 )    Color: x / Appearance: x / SG: x / pH: x  Gluc: 198 mg/dL / Ketone: x  / Bili: x / Urobili: x   Blood: x / Protein: x / Nitrite: x   Leuk Esterase: x / RBC: x / WBC x   Sq Epi: x / Non Sq Epi: x / Bacteria: x        Home Medications:  Breo Ellipta 200 mcg-25 mcg/inh inhalation powder: 1 inhaled once a day (28 Jan 2025 06:52)  HYDROcodone 10 mg oral capsule, extended release: 1 cap(s) orally 2 times a day (28 Jan 2025 06:52)  losartan 50 mg oral tablet: 1 tab(s) orally 2 times a day (28 Jan 2025 06:52)  losartan-hydrochlorothiazide 50 mg-12.5 mg oral tablet: 1 tab(s) orally (28 Jan 2025 06:52)  ProAir HFA 90 mcg/inh inhalation aerosol: 2 puff(s) inhaled 2 times a day (28 Jan 2025 06:52)  tujeo: 80 unit(s) subcutaneous once a day (28 Jan 2025 06:52)    HOSPITAL MEDICATIONS:  MEDICATIONS  (STANDING):  acetaminophen   IVPB .. 1000 milliGRAM(s) IV Intermittent once  acetaminophen   IVPB .. 1000 milliGRAM(s) IV Intermittent once  albuterol/ipratropium for Nebulization 3 milliLiter(s) Nebulizer every 6 hours  aspirin enteric coated 81 milliGRAM(s) Oral daily  chlorhexidine 2% Cloths 1 Application(s) Topical daily  dextrose 5%. 1000 milliLiter(s) (50 mL/Hr) IV Continuous <Continuous>  dextrose 5%. 1000 milliLiter(s) (100 mL/Hr) IV Continuous <Continuous>  dextrose 50% Injectable 50 milliLiter(s) IV Push every 15 minutes  dextrose 50% Injectable 25 milliLiter(s) IV Push every 15 minutes  finasteride 5 milliGRAM(s) Oral daily  fluticasone propionate 50 MICROgram(s)/spray Nasal Spray 1 Spray(s) Both Nostrils every 12 hours  fluticasone propionate/ salmeterol 250-50 MICROgram(s) Diskus 1 Dose(s) Inhalation two times a day  folic acid 1 milliGRAM(s) Oral daily  gabapentin 100 milliGRAM(s) Oral every 8 hours  glucagon  Injectable 1 milliGRAM(s) IntraMuscular once  guaiFENesin  milliGRAM(s) Oral every 12 hours  heparin   Injectable 5000 Unit(s) SubCutaneous every 8 hours  influenza  Vaccine (HIGH DOSE) 0.5 milliLiter(s) IntraMuscular once  insulin glargine Injectable (LANTUS) 40 Unit(s) SubCutaneous before breakfast  insulin lispro (ADMELOG) corrective regimen sliding scale   SubCutaneous three times a day before meals  insulin lispro (ADMELOG) corrective regimen sliding scale   SubCutaneous at bedtime  insulin lispro Injectable (ADMELOG) 10 Unit(s) SubCutaneous three times a day before meals  lidocaine   4% Patch 1 Patch Transdermal every 24 hours  lidocaine   4% Patch 1 Patch Transdermal every 24 hours  mupirocin 2% Nasal 1 Application(s) Both Nostrils every 12 hours  pantoprazole    Tablet 40 milliGRAM(s) Oral before breakfast  phenylephrine    Infusion 0.05 MICROgram(s)/kG/Min (2.14 mL/Hr) IV Continuous <Continuous>  psyllium Powder 1 Packet(s) Oral every 12 hours  rosuvastatin 20 milliGRAM(s) Oral at bedtime  senna 2 Tablet(s) Oral at bedtime  sertraline 25 milliGRAM(s) Oral daily  tamsulosin 0.4 milliGRAM(s) Oral at bedtime    MEDICATIONS  (PRN):  dextrose Oral Gel 15 Gram(s) Oral once PRN Blood Glucose LESS THAN 70 milliGRAM(s)/deciliter  hydrALAZINE Injectable 5 milliGRAM(s) IV Push every 4 hours PRN Systolic > 160  labetalol Injectable 10 milliGRAM(s) IV Push every 6 hours PRN Diastolic blood pressure >155  melatonin 5 milliGRAM(s) Oral at bedtime PRN Insomnia  ondansetron Injectable 4 milliGRAM(s) IV Push every 4 hours PRN Nausea and/or Vomiting  oxyCODONE    IR 5 milliGRAM(s) Oral every 6 hours PRN Moderate Pain (4 - 6)    RADIOLOGY:  Chest X-ray Reviewed    REVIEW OF SYSTEMS:  CONSTITUTIONAL: [X] all negative; [ ] weakness, [ ] fevers, [ ] chills  EYES/ENT: [X] all negative; [ ] visual changes, [ ] vertigo, [ ] throat pain   NECK: [X] all negative; [ ] pain, [ ] stiffness  RESPIRATORY: [x] all negative, [ ] cough, [ ] wheezing, [ ] hemoptysis, [ ] shortness of breath  CARDIOVASCULAR: [x] all negative; [ ] chest pain, [ ] palpitations, [ ] orthopnea  GASTROINTESTINAL: [X] all negative; [ ]abdominal pain, [ ] nausea, [ ] vomiting, [ ] hematemesis, [ ] diarrhea, [ ] constipation, [ ] melena, [ ] hematochezia.  GENITOURINARY: [X] all negative; [ ] dysuria, [ ] frequency, [ ] hematuria  NEUROLOGICAL: [X] all negative; [ ] numbness, [ ] weakness  SKIN: [X] all negative; [ ] itching, [ ] burning, [ ] rashes, [ ] lesions   All other review of systems is negative unless indicated above.  [  ] Unable to assess ROS because     PHYSICAL EXAM:          CONSTITUTIONAL: Well-developed; well-nourished; in no acute distress.   	SKIN: warm, dry  	HEAD: Normocephalic; atraumatic.  	EYES: PERRL, EOM, no conj injection, sclera clear  	ENT: No nasal discharge; airway clear.  	NECK: Supple; non tender.   	CARD: S1, S2 normal; no murmurs, gallops, or rubs. Regular rate and rhythm.  no carotid bruits  	RESP: CTA B/L; good air movement No wheezes, rales or rhonchi.  	ABD: Soft, not tender, not distended,  no rebound or guarding, bowel sounds present  	EXT:+ Edema  	NEURO: Alert, awake, motor 5/5 R, 5/5 L     Subjective and Objective:   HPI: 64M w/ afib, nonobstructive CAD, CHF class 3, severe MR, DM, HTN, JIMMY on CPAP, asthma, severe obesity, HL, Madelung's disease, osteoarthritis s/p bilateral hip replacement, peripheral neuropathy presented with increasing dyspnea with exertion found to have severe MR.  He was evaluated for mitral clip and not a candidate.  On prior admission he had FLAKITO with creat up to 2.  Was optimized with diuresis.  He was on AC and developed hemorrhoidal bleeding and was taken off AC.  He was discharged and returned for his procedure on 1/28    OR Data  Procedure: Procedure: MVReplacement (bioprosthetic), SHEILA ligation, MAZE  Bypass: 202  Cross Clamp: 158  Pre LV Fxn: 50-55%      RV Fxn: normal  Post LV Fxn: 45-50%     RV Fxn: normal    moderate TR, MV gradient 3mmHg  Blood Products: 1uPRBC, ddavp 39mcq  Cell Saver: 698  Fluids: NS 2000, albumin 2000  Pacing Wires: V pacing. sinus rhythm  UO: 1100    1/28 - OP day :  Fany and poor oxygenation in OR. Milrinone, norepi, vaso. Extubation in evening.  1/29 - start aspirin, diuresis, heparin dvt prophylaxis  1/30- d/c pleural ct if drainage dec; wean to dc primacor; wean high flow o2  1/31 - FLAKITO worsening, bradyarrythmias - nigel - ep consulted  2/2 - Chest tube removed, ambulating, creat improving, dobutamine weaned  2/3 - Dobut to 1.5, BUN elevated  2/4 - Confused this AM, normal neuro exam, prince removed  2/5 - FLAKITO improving, off dobutamine, mil decreased   2/6 - milrinone stopped, bumex 1mg BID  2/13 hypotension on neosynephrine overnight, off this morning  2/14 off pressors, off amio , off metoprolol    Procedure: MV Replacemant Maze  LA Ligation  POD#: 17    He has history of Diabetes HTN (hypertension)   Obstructive sleep apnea on CPAP  Asthma  Obesity  Peripheral neuropathy  Arthritis  Hypercholesteremia  Madelung's disease  FH: mitral regurgitation  CHF NYHA class III  Atrial fibrillation    OBJECTIVE:  ICU Vital Signs Last 24 Hrs  T(C): 36.9 (12 Feb 2025 22:00), Max: 37.2 (12 Feb 2025 12:00)  T(F): 98.5 (12 Feb 2025 22:00), Max: 98.9 (12 Feb 2025 12:00)  HR: 82 (13 Feb 2025 10:00) (71 - 120)  BP: 119/60 (13 Feb 2025 10:00) (71/37 - 155/67)  BP(mean): 86 (13 Feb 2025 10:00) (49 - 97)  ABP: --  ABP(mean): --  RR: 19 (13 Feb 2025 10:00) (12 - 53)  SpO2: 94% (13 Feb 2025 10:00) (90% - 100%)    O2 Parameters below as of 13 Feb 2025 10:00  Patient On (Oxygen Delivery Method): nasal cannula  O2 Flow (L/min): 4        I&O's Summary    12 Feb 2025 07:01  -  13 Feb 2025 07:00  --------------------------------------------------------  IN: 1508.9 mL / OUT: 4450 mL / NET: -2941.1 mL    13 Feb 2025 07:01  -  13 Feb 2025 10:49  --------------------------------------------------------  IN: 350 mL / OUT: 350 mL / NET: 0 mL      I&O's Detail    12 Feb 2025 07:01  -  13 Feb 2025 07:00  --------------------------------------------------------  IN:    Albumin 5%  - 500 mL: 250 mL    IV PiggyBack: 200 mL    Oral Fluid: 980 mL    Phenylephrine: 78.9 mL  Total IN: 1508.9 mL    OUT:    Indwelling Catheter - Urethral (mL): 100 mL    Voided (mL): 4350 mL  Total OUT: 4450 mL    Total NET: -2941.1 mL      13 Feb 2025 07:01  -  13 Feb 2025 10:49  --------------------------------------------------------  IN:    Albumin 5%  - 500 mL: 250 mL    IV PiggyBack: 100 mL  Total IN: 350 mL    OUT:    Indwelling Catheter - Urethral (mL): 350 mL    Phenylephrine: 0 mL  Total OUT: 350 mL    Total NET: 0 mL    APILLARY BLOOD GLUCOSE      POCT Blood Glucose.: 132 mg/dL (13 Feb 2025 09:50)  POCT Blood Glucose.: 144 mg/dL (13 Feb 2025 06:18)  POCT Blood Glucose.: 256 mg/dL (13 Feb 2025 00:11)  POCT Blood Glucose.: 127 mg/dL (12 Feb 2025 17:00)  POCT Blood Glucose.: 94 mg/dL (12 Feb 2025 11:50)    LABS:                          8.5    8.70  )-----------( 175      ( 13 Feb 2025 01:00 )             28.5     02-13    140  |  95[L]  |  39[H]  ----------------------------<  198[H]  3.6   |  35[H]  |  1.2    Ca    8.9      13 Feb 2025 01:35  Phos  3.7     02-13  Mg     1.7     02-13    TPro  5.8[L]  /  Alb  3.4[L]  /  TBili  0.4  /  DBili  x   /  AST  30  /  ALT  23  /  AlkPhos  128[H]  02-13      Urinalysis Basic - ( 13 Feb 2025 01:35 )    Color: x / Appearance: x / SG: x / pH: x  Gluc: 198 mg/dL / Ketone: x  / Bili: x / Urobili: x   Blood: x / Protein: x / Nitrite: x   Leuk Esterase: x / RBC: x / WBC x   Sq Epi: x / Non Sq Epi: x / Bacteria: x        Home Medications:  Breo Ellipta 200 mcg-25 mcg/inh inhalation powder: 1 inhaled once a day (28 Jan 2025 06:52)  HYDROcodone 10 mg oral capsule, extended release: 1 cap(s) orally 2 times a day (28 Jan 2025 06:52)  losartan 50 mg oral tablet: 1 tab(s) orally 2 times a day (28 Jan 2025 06:52)  losartan-hydrochlorothiazide 50 mg-12.5 mg oral tablet: 1 tab(s) orally (28 Jan 2025 06:52)  ProAir HFA 90 mcg/inh inhalation aerosol: 2 puff(s) inhaled 2 times a day (28 Jan 2025 06:52)  tujeo: 80 unit(s) subcutaneous once a day (28 Jan 2025 06:52)    HOSPITAL MEDICATIONS:  MEDICATIONS  (STANDING):  acetaminophen   IVPB .. 1000 milliGRAM(s) IV Intermittent once  acetaminophen   IVPB .. 1000 milliGRAM(s) IV Intermittent once  albuterol/ipratropium for Nebulization 3 milliLiter(s) Nebulizer every 6 hours  aspirin enteric coated 81 milliGRAM(s) Oral daily  chlorhexidine 2% Cloths 1 Application(s) Topical daily  dextrose 5%. 1000 milliLiter(s) (50 mL/Hr) IV Continuous <Continuous>  dextrose 5%. 1000 milliLiter(s) (100 mL/Hr) IV Continuous <Continuous>  dextrose 50% Injectable 50 milliLiter(s) IV Push every 15 minutes  dextrose 50% Injectable 25 milliLiter(s) IV Push every 15 minutes  finasteride 5 milliGRAM(s) Oral daily  fluticasone propionate 50 MICROgram(s)/spray Nasal Spray 1 Spray(s) Both Nostrils every 12 hours  fluticasone propionate/ salmeterol 250-50 MICROgram(s) Diskus 1 Dose(s) Inhalation two times a day  folic acid 1 milliGRAM(s) Oral daily  gabapentin 100 milliGRAM(s) Oral every 8 hours  glucagon  Injectable 1 milliGRAM(s) IntraMuscular once  guaiFENesin  milliGRAM(s) Oral every 12 hours  heparin   Injectable 5000 Unit(s) SubCutaneous every 8 hours  influenza  Vaccine (HIGH DOSE) 0.5 milliLiter(s) IntraMuscular once  insulin glargine Injectable (LANTUS) 40 Unit(s) SubCutaneous before breakfast  insulin lispro (ADMELOG) corrective regimen sliding scale   SubCutaneous three times a day before meals  insulin lispro (ADMELOG) corrective regimen sliding scale   SubCutaneous at bedtime  insulin lispro Injectable (ADMELOG) 10 Unit(s) SubCutaneous three times a day before meals  lidocaine   4% Patch 1 Patch Transdermal every 24 hours  lidocaine   4% Patch 1 Patch Transdermal every 24 hours  mupirocin 2% Nasal 1 Application(s) Both Nostrils every 12 hours  pantoprazole    Tablet 40 milliGRAM(s) Oral before breakfast  phenylephrine    Infusion 0.05 MICROgram(s)/kG/Min (2.14 mL/Hr) IV Continuous <Continuous>  psyllium Powder 1 Packet(s) Oral every 12 hours  rosuvastatin 20 milliGRAM(s) Oral at bedtime  senna 2 Tablet(s) Oral at bedtime  sertraline 25 milliGRAM(s) Oral daily  tamsulosin 0.4 milliGRAM(s) Oral at bedtime    MEDICATIONS  (PRN):  dextrose Oral Gel 15 Gram(s) Oral once PRN Blood Glucose LESS THAN 70 milliGRAM(s)/deciliter  hydrALAZINE Injectable 5 milliGRAM(s) IV Push every 4 hours PRN Systolic > 160  labetalol Injectable 10 milliGRAM(s) IV Push every 6 hours PRN Diastolic blood pressure >155  melatonin 5 milliGRAM(s) Oral at bedtime PRN Insomnia  ondansetron Injectable 4 milliGRAM(s) IV Push every 4 hours PRN Nausea and/or Vomiting  oxyCODONE    IR 5 milliGRAM(s) Oral every 6 hours PRN Moderate Pain (4 - 6)    RADIOLOGY:  Chest X-ray Reviewed    REVIEW OF SYSTEMS:  CONSTITUTIONAL: [X] all negative; [ ] weakness, [ ] fevers, [ ] chills  EYES/ENT: [X] all negative; [ ] visual changes, [ ] vertigo, [ ] throat pain   NECK: [X] all negative; [ ] pain, [ ] stiffness  RESPIRATORY: [x] all negative, [ ] cough, [ ] wheezing, [ ] hemoptysis, [ ] shortness of breath  CARDIOVASCULAR: [x] all negative; [ ] chest pain, [ ] palpitations, [ ] orthopnea  GASTROINTESTINAL: [X] all negative; [ ]abdominal pain, [ ] nausea, [ ] vomiting, [ ] hematemesis, [ ] diarrhea, [ ] constipation, [ ] melena, [ ] hematochezia.  GENITOURINARY: [X] all negative; [ ] dysuria, [ ] frequency, [ ] hematuria  NEUROLOGICAL: [X] all negative; [ ] numbness, [ ] weakness  SKIN: [X] all negative; [ ] itching, [ ] burning, [ ] rashes, [ ] lesions   All other review of systems is negative unless indicated above.  [  ] Unable to assess ROS because     PHYSICAL EXAM:          CONSTITUTIONAL: Well-developed; well-nourished; in no acute distress.   	SKIN: warm, dry  	HEAD: Normocephalic; atraumatic.  	EYES: PERRL, EOM, no conj injection, sclera clear  	ENT: No nasal discharge; airway clear.  	NECK: Supple; non tender.   	CARD: S1, S2 normal; no murmurs, gallops, or rubs. Regular rate and rhythm.  no carotid bruits  	RESP: CTA B/L; good air movement No wheezes, rales or rhonchi.  	ABD: Soft, not tender, not distended,  no rebound or guarding, bowel sounds present  	EXT:+ Edema  	NEURO: Alert, awake, motor 5/5 R, 5/5 L

## 2025-02-15 LAB
ALBUMIN SERPL ELPH-MCNC: 3.8 G/DL — SIGNIFICANT CHANGE UP (ref 3.5–5.2)
ALP SERPL-CCNC: 105 U/L — SIGNIFICANT CHANGE UP (ref 30–115)
ALT FLD-CCNC: 17 U/L — SIGNIFICANT CHANGE UP (ref 0–41)
ANION GAP SERPL CALC-SCNC: 8 MMOL/L — SIGNIFICANT CHANGE UP (ref 7–14)
AST SERPL-CCNC: 19 U/L — SIGNIFICANT CHANGE UP (ref 0–41)
BILIRUB SERPL-MCNC: 0.3 MG/DL — SIGNIFICANT CHANGE UP (ref 0.2–1.2)
BUN SERPL-MCNC: 34 MG/DL — HIGH (ref 10–20)
CALCIUM SERPL-MCNC: 8.9 MG/DL — SIGNIFICANT CHANGE UP (ref 8.4–10.5)
CHLORIDE SERPL-SCNC: 99 MMOL/L — SIGNIFICANT CHANGE UP (ref 98–110)
CO2 SERPL-SCNC: 31 MMOL/L — SIGNIFICANT CHANGE UP (ref 17–32)
CREAT SERPL-MCNC: 1 MG/DL — SIGNIFICANT CHANGE UP (ref 0.7–1.5)
EGFR: 83 ML/MIN/1.73M2 — SIGNIFICANT CHANGE UP
GLUCOSE BLDC GLUCOMTR-MCNC: 128 MG/DL — HIGH (ref 70–99)
GLUCOSE BLDC GLUCOMTR-MCNC: 137 MG/DL — HIGH (ref 70–99)
GLUCOSE BLDC GLUCOMTR-MCNC: 165 MG/DL — HIGH (ref 70–99)
GLUCOSE BLDC GLUCOMTR-MCNC: 193 MG/DL — HIGH (ref 70–99)
GLUCOSE SERPL-MCNC: 189 MG/DL — HIGH (ref 70–99)
HCT VFR BLD CALC: 26.8 % — LOW (ref 42–52)
HGB BLD-MCNC: 7.9 G/DL — LOW (ref 14–18)
MAGNESIUM SERPL-MCNC: 2.2 MG/DL — SIGNIFICANT CHANGE UP (ref 1.8–2.4)
MCHC RBC-ENTMCNC: 27.1 PG — SIGNIFICANT CHANGE UP (ref 27–31)
MCHC RBC-ENTMCNC: 29.5 G/DL — LOW (ref 32–37)
MCV RBC AUTO: 91.8 FL — SIGNIFICANT CHANGE UP (ref 80–94)
NRBC BLD AUTO-RTO: 0 /100 WBCS — SIGNIFICANT CHANGE UP (ref 0–0)
PLATELET # BLD AUTO: 148 K/UL — SIGNIFICANT CHANGE UP (ref 130–400)
PMV BLD: 11.5 FL — HIGH (ref 7.4–10.4)
POTASSIUM SERPL-MCNC: 4.1 MMOL/L — SIGNIFICANT CHANGE UP (ref 3.5–5)
POTASSIUM SERPL-SCNC: 4.1 MMOL/L — SIGNIFICANT CHANGE UP (ref 3.5–5)
PROT SERPL-MCNC: 6 G/DL — SIGNIFICANT CHANGE UP (ref 6–8)
RBC # BLD: 2.92 M/UL — LOW (ref 4.7–6.1)
RBC # FLD: 19.4 % — HIGH (ref 11.5–14.5)
SODIUM SERPL-SCNC: 138 MMOL/L — SIGNIFICANT CHANGE UP (ref 135–146)
WBC # BLD: 7.26 K/UL — SIGNIFICANT CHANGE UP (ref 4.8–10.8)
WBC # FLD AUTO: 7.26 K/UL — SIGNIFICANT CHANGE UP (ref 4.8–10.8)

## 2025-02-15 PROCEDURE — 99291 CRITICAL CARE FIRST HOUR: CPT

## 2025-02-15 PROCEDURE — 71045 X-RAY EXAM CHEST 1 VIEW: CPT | Mod: 26

## 2025-02-15 RX ORDER — ALPRAZOLAM 0.5 MG
0.25 TABLET, EXTENDED RELEASE 24 HR ORAL AT BEDTIME
Refills: 0 | Status: DISCONTINUED | OUTPATIENT
Start: 2025-02-15 | End: 2025-02-22

## 2025-02-15 RX ADMIN — Medication 1 PACKET(S): at 17:28

## 2025-02-15 RX ADMIN — HEPARIN SODIUM 5000 UNIT(S): 1000 INJECTION INTRAVENOUS; SUBCUTANEOUS at 13:18

## 2025-02-15 RX ADMIN — SERTRALINE 25 MILLIGRAM(S): 100 TABLET, FILM COATED ORAL at 11:27

## 2025-02-15 RX ADMIN — OXYCODONE HYDROCHLORIDE 5 MILLIGRAM(S): 30 TABLET ORAL at 13:18

## 2025-02-15 RX ADMIN — FLUTICASONE PROPIONATE 1 SPRAY(S): 50 SPRAY, METERED NASAL at 06:46

## 2025-02-15 RX ADMIN — INSULIN LISPRO 10 UNIT(S): 100 INJECTION, SOLUTION INTRAVENOUS; SUBCUTANEOUS at 17:25

## 2025-02-15 RX ADMIN — LIDOCAINE HYDROCHLORIDE 1 PATCH: 20 JELLY TOPICAL at 01:21

## 2025-02-15 RX ADMIN — DEXTROMETHORPHAN HBR, GUAIFENESIN 600 MILLIGRAM(S): 200 LIQUID ORAL at 17:28

## 2025-02-15 RX ADMIN — HEPARIN SODIUM 5000 UNIT(S): 1000 INJECTION INTRAVENOUS; SUBCUTANEOUS at 22:07

## 2025-02-15 RX ADMIN — LABETALOL HYDROCHLORIDE 10 MILLIGRAM(S): 200 TABLET, FILM COATED ORAL at 18:33

## 2025-02-15 RX ADMIN — Medication 40 MILLIGRAM(S): at 06:52

## 2025-02-15 RX ADMIN — IRON SUCROSE 100 MILLIGRAM(S): 20 INJECTION, SOLUTION INTRAVENOUS at 18:31

## 2025-02-15 RX ADMIN — OXYCODONE HYDROCHLORIDE 10 MILLIGRAM(S): 30 TABLET ORAL at 00:00

## 2025-02-15 RX ADMIN — Medication 0.25 MILLIGRAM(S): at 22:07

## 2025-02-15 RX ADMIN — INSULIN GLARGINE-YFGN 40 UNIT(S): 100 INJECTION, SOLUTION SUBCUTANEOUS at 07:38

## 2025-02-15 RX ADMIN — LIDOCAINE HYDROCHLORIDE 1 PATCH: 20 JELLY TOPICAL at 19:00

## 2025-02-15 RX ADMIN — Medication 25 MILLIGRAM(S): at 06:53

## 2025-02-15 RX ADMIN — IPRATROPIUM BROMIDE AND ALBUTEROL SULFATE 3 MILLILITER(S): .5; 2.5 SOLUTION RESPIRATORY (INHALATION) at 14:00

## 2025-02-15 RX ADMIN — INSULIN LISPRO 10 UNIT(S): 100 INJECTION, SOLUTION INTRAVENOUS; SUBCUTANEOUS at 07:39

## 2025-02-15 RX ADMIN — FOLIC ACID 1 MILLIGRAM(S): 1 TABLET ORAL at 11:27

## 2025-02-15 RX ADMIN — Medication 1 APPLICATION(S): at 11:25

## 2025-02-15 RX ADMIN — TAMSULOSIN HYDROCHLORIDE 0.4 MILLIGRAM(S): 0.4 CAPSULE ORAL at 22:00

## 2025-02-15 RX ADMIN — ROSUVASTATIN CALCIUM 20 MILLIGRAM(S): 20 TABLET, FILM COATED ORAL at 22:00

## 2025-02-15 RX ADMIN — DEXTROMETHORPHAN HBR, GUAIFENESIN 600 MILLIGRAM(S): 200 LIQUID ORAL at 06:45

## 2025-02-15 RX ADMIN — FINASTERIDE 5 MILLIGRAM(S): 1 TABLET, FILM COATED ORAL at 11:27

## 2025-02-15 RX ADMIN — Medication 81 MILLIGRAM(S): at 11:28

## 2025-02-15 RX ADMIN — OXYCODONE HYDROCHLORIDE 5 MILLIGRAM(S): 30 TABLET ORAL at 14:00

## 2025-02-15 RX ADMIN — INSULIN LISPRO 10 UNIT(S): 100 INJECTION, SOLUTION INTRAVENOUS; SUBCUTANEOUS at 11:23

## 2025-02-15 RX ADMIN — Medication 1 PACKET(S): at 06:52

## 2025-02-15 RX ADMIN — INSULIN LISPRO 2: 100 INJECTION, SOLUTION INTRAVENOUS; SUBCUTANEOUS at 07:38

## 2025-02-15 RX ADMIN — FLUTICASONE PROPIONATE 1 SPRAY(S): 50 SPRAY, METERED NASAL at 17:29

## 2025-02-15 RX ADMIN — OXYCODONE HYDROCHLORIDE 5 MILLIGRAM(S): 30 TABLET ORAL at 22:06

## 2025-02-15 RX ADMIN — GABAPENTIN 300 MILLIGRAM(S): 400 CAPSULE ORAL at 22:06

## 2025-02-15 RX ADMIN — LIDOCAINE HYDROCHLORIDE 1 PATCH: 20 JELLY TOPICAL at 13:18

## 2025-02-15 RX ADMIN — IPRATROPIUM BROMIDE AND ALBUTEROL SULFATE 3 MILLILITER(S): .5; 2.5 SOLUTION RESPIRATORY (INHALATION) at 20:26

## 2025-02-15 RX ADMIN — OXYCODONE HYDROCHLORIDE 5 MILLIGRAM(S): 30 TABLET ORAL at 22:30

## 2025-02-15 RX ADMIN — LIDOCAINE HYDROCHLORIDE 1 PATCH: 20 JELLY TOPICAL at 13:21

## 2025-02-15 RX ADMIN — HEPARIN SODIUM 5000 UNIT(S): 1000 INJECTION INTRAVENOUS; SUBCUTANEOUS at 06:45

## 2025-02-15 RX ADMIN — Medication 25 MILLIGRAM(S): at 17:28

## 2025-02-15 NOTE — PROGRESS NOTE ADULT - ASSESSMENT
Assessment  64M s/p MVR, LAAC, MAZE POD #18  Post op complicated by acute pulmonary insufficiency, FLAKITO, cardiogenic shock req inotropes    Plan:    Neuro:  Multimodal pain management - no toradol, minimize opiates  100 TID gabapentin  0.25 xanax PRN at night  Tylenol, Lidoderm patch, opiates as needed  Monitor neuro status in the post op period      CV:  S/P MVR, LAAC, MAZE  Maintain MAP > 65  ASA,statin lopressor on hold due to hypotension overnight      Resp:  Supplemental oxygen to maintain sats > 92%  RA 93%  Continuous pulse oximetry monitoring  BiPAP at night  NC during the day  IS / Chest PT  OOBTC and Ambulate  Nebulizers as needed - albuterol/atrovent  Advair    GI:  Heart healthy diet  Bowel Regimen - escalate as needed  PUD ppx per protocol    Renal  FLAKITO post surgery resolved   Baseline CKD  Monitor UOP, lytes, creatinine  Diamox for metabolic alkalosis - can use aldactone instead if hypokalemia  Keep K > 4, Mg > 2    Endo:  Tight glucose control per CTICU protocol  Lantus 40 / premeal 10  SSI    Heme:  Acute blood loss anemia post surgery  High risk for thrombocytopenia  DVT ppx with HSQ  Iron / FA / MVI    ID:  Monitor fever curve and WBC count    Patient requires continuous monitoring with:  bedside rhythm monitoring, continuous  pulse oximetry monitoring, O2 supplement titrate for O2 sat above 90%  ;   Care plan discussed with CT ICU care team and attending surgeon Dr Zhao                         .  patient remain critical, at risk for life threatening decompensation; required more than usual post op care;   I have spent 35 minutes providing non routine post op care, revaluated multiple times.

## 2025-02-15 NOTE — PROGRESS NOTE ADULT - SUBJECTIVE AND OBJECTIVE BOX
OPERATIVE PROCEDURE(s):                POD #                       64yMale  SURGEON(s): ISELA Blakely  SUBJECTIVE ASSESSMENT:     Vital Signs Last 24 Hrs  T(F): 97.8 (15 Feb 2025 00:00), Max: 98 (14 Feb 2025 20:00)  HR: 80 (15 Feb 2025 08:00) (74 - 88)  BP: 109/56 (15 Feb 2025 08:00) (78/45 - 171/77)  BP(mean): 78 (15 Feb 2025 08:00) (57 - 110)  ABP: --  ABP(mean): --  RR: 16 (15 Feb 2025 08:00) (13 - 36)  SpO2: 96% (15 Feb 2025 08:00) (90% - 100%)  CVP(mm Hg): --  CVP(cm H2O): --  CO: --  CI: --  PA: --  SVR: --    I&O's Detail    14 Feb 2025 07:01  -  15 Feb 2025 07:00  --------------------------------------------------------  IN:    IV PiggyBack: 150 mL    Oral Fluid: 960 mL  Total IN: 1110 mL    OUT:    Indwelling Catheter - Urethral (mL): 3815 mL  Total OUT: 3815 mL        Net:   I&O's Detail    13 Feb 2025 07:01  -  14 Feb 2025 07:00  --------------------------------------------------------  Total NET: -1465 mL      14 Feb 2025 07:01  -  15 Feb 2025 07:00  --------------------------------------------------------  Total NET: -2705 mL        CAPILLARY BLOOD GLUCOSE      POCT Blood Glucose.: 165 mg/dL (15 Feb 2025 07:08)  POCT Blood Glucose.: 272 mg/dL (14 Feb 2025 23:25)  POCT Blood Glucose.: 175 mg/dL (14 Feb 2025 16:12)  POCT Blood Glucose.: 141 mg/dL (14 Feb 2025 11:17)  Physical Exam:  General: NAD; A&Ox3  Cardiac: S1/S2, RRR, no murmur, no rubs  Lungs: unlabored shallow respirations, bilateral bs decreased at bases  Abdomen: Soft/NT/protuberant  Sternum: Intact, no click, incision healing well with no drainage  Extremities: edema lower extremities improved, swelling anterior left foot with blistering. Will take aces off for next 2 days then reapply       Prince Catheter: Yes  [X] , critical patient strict I&O  EPICARDIAL WIRES:  [X] YES  BM - yes          LABS:                        7.9[L]  7.26  )-----------( 148      ( 15 Feb 2025 02:15 )             26.8[L]                        7.5[L]  6.61  )-----------( 151      ( 14 Feb 2025 04:15 )             25.4[L]    02-15    138  |  99  |  34[H]  ----------------------------<  189[H]  4.1   |  31  |  1.0  02-14    139  |  95[L]  |  37[H]  ----------------------------<  125[H]  3.6   |  35[H]  |  1.1    Ca    8.9      15 Feb 2025 02:15  Phos  3.7     02-13  Mg     2.2     02-15    TPro  6.0 [6.0 - 8.0]  /  Alb  3.8 [3.5 - 5.2]  /  TBili  0.3 [0.2 - 1.2]  /  DBili  x   /  AST  19 [0 - 41]  /  ALT  17 [0 - 41]  /  AlkPhos  105 [30 - 115]  02-15          RADIOLOGY & ADDITIONAL TESTS:  CXR:  EKG:  MEDICATIONS  (STANDING):  albuterol/ipratropium for Nebulization 3 milliLiter(s) Nebulizer every 6 hours  aspirin enteric coated 81 milliGRAM(s) Oral daily  chlorhexidine 2% Cloths 1 Application(s) Topical daily  dextrose 5%. 1000 milliLiter(s) (50 mL/Hr) IV Continuous <Continuous>  dextrose 5%. 1000 milliLiter(s) (100 mL/Hr) IV Continuous <Continuous>  dextrose 50% Injectable 50 milliLiter(s) IV Push every 15 minutes  dextrose 50% Injectable 25 milliLiter(s) IV Push every 15 minutes  finasteride 5 milliGRAM(s) Oral daily  fluticasone propionate 50 MICROgram(s)/spray Nasal Spray 1 Spray(s) Both Nostrils every 12 hours  fluticasone propionate/ salmeterol 250-50 MICROgram(s) Diskus 1 Dose(s) Inhalation two times a day  folic acid 1 milliGRAM(s) Oral daily  gabapentin 300 milliGRAM(s) Oral at bedtime  glucagon  Injectable 1 milliGRAM(s) IntraMuscular once  guaiFENesin  milliGRAM(s) Oral every 12 hours  heparin   Injectable 5000 Unit(s) SubCutaneous every 8 hours  influenza  Vaccine (HIGH DOSE) 0.5 milliLiter(s) IntraMuscular once  insulin glargine Injectable (LANTUS) 40 Unit(s) SubCutaneous before breakfast  insulin lispro (ADMELOG) corrective regimen sliding scale   SubCutaneous three times a day before meals  insulin lispro (ADMELOG) corrective regimen sliding scale   SubCutaneous at bedtime  insulin lispro Injectable (ADMELOG) 10 Unit(s) SubCutaneous three times a day before meals  iron sucrose IVPB 200 milliGRAM(s) IV Intermittent every 24 hours  lidocaine   4% Patch 1 Patch Transdermal every 24 hours  lidocaine   4% Patch 1 Patch Transdermal every 24 hours  mupirocin 2% Nasal 1 Application(s) Both Nostrils every 12 hours  pantoprazole    Tablet 40 milliGRAM(s) Oral before breakfast  phenylephrine    Infusion 0.05 MICROgram(s)/kG/Min (2.14 mL/Hr) IV Continuous <Continuous>  psyllium Powder 1 Packet(s) Oral every 12 hours  rosuvastatin 20 milliGRAM(s) Oral at bedtime  senna 2 Tablet(s) Oral at bedtime  sertraline 25 milliGRAM(s) Oral daily  spironolactone 25 milliGRAM(s) Oral two times a day  tamsulosin 0.4 milliGRAM(s) Oral at bedtime    MEDICATIONS  (PRN):  dextrose Oral Gel 15 Gram(s) Oral once PRN Blood Glucose LESS THAN 70 milliGRAM(s)/deciliter  hydrALAZINE Injectable 5 milliGRAM(s) IV Push every 4 hours PRN Systolic > 160  labetalol Injectable 10 milliGRAM(s) IV Push every 6 hours PRN Diastolic blood pressure >155  melatonin 5 milliGRAM(s) Oral at bedtime PRN Insomnia  ondansetron Injectable 4 milliGRAM(s) IV Push every 4 hours PRN Nausea and/or Vomiting  oxyCODONE    IR 5 milliGRAM(s) Oral every 4 hours PRN Moderate Pain (4 - 6)  oxyCODONE    IR 10 milliGRAM(s) Oral every 6 hours PRN Severe Pain (7 - 10)    Allergies    No Known Allergies    Intolerances      Ambulation/Activity Status: ambulate with assistance    Assessment/Plan:  64y Male status-post MVR/SHEILA ligation and MAZE for severe MR / afib               POD # 18  - Case and plan discussed with CTU Intensivist and CT Surgeon - Dr. Blakely  - Continue CTU supportive care and ongoing plan of care as per continuing CTU rounds.   - Continue DVT/GI prophylaxis  - Incentive Spirometry 10 times an hour  - Continue to advance physical activity as tolerated and continue PT/OT as directed  1. Continue ASA, statin, B Blocker continues to be held due to low BP  2. Madelung disease: cont bipap, respiratory tx's, NC 2LPM  3. Pre-op h/o A. Fib cont to monitor electrolytes - hold AC at this time prior lower GI bleed  and in SR- s/p MAZE and SHEILA closure  4. DM/Glucose A1c 6.2 Control: continue lantus 40/humalog 10 and sliding scale, DASH/TLC diet  5. Heart Failure - diastolic (EF 60%) - acute on chronic (secondary to severe MR) fluid restriction to 1L, daily standing weights, diuresis with Aldactone 25 mg bid  6.. flakito on CKD3a- FLAKITO resolved  7. Oxy 5 mg prn, lidocaine patch for pain  8. remove prince and do voiding trial    Social Service Disposition: Pt is considering SNF upon d/c OPERATIVE PROCEDURE(s):                POD #    18 MVR replacement / MAZE / SHEILA closure                   64yMale  SURGEON(s): ISELA Blakely  SUBJECTIVE ASSESSMENT: feeling better, walking more    Vital Signs Last 24 Hrs  T(F): 97.8 (15 Feb 2025 00:00), Max: 98 (14 Feb 2025 20:00)  HR: 80 (15 Feb 2025 08:00) (74 - 88)  BP: 109/56 (15 Feb 2025 08:00) (78/45 - 171/77)  BP(mean): 78 (15 Feb 2025 08:00) (57 - 110)  RR: 16 (15 Feb 2025 08:00) (13 - 36)  SpO2: 96% (15 Feb 2025 08:00) (90% - 100%)    I&O's Detail    14 Feb 2025 07:01  -  15 Feb 2025 07:00  --------------------------------------------------------  IN:    IV PiggyBack: 150 mL    Oral Fluid: 960 mL  Total IN: 1110 mL    OUT:    Indwelling Catheter - Urethral (mL): 3815 mL  Total OUT: 3815 mL    Net:   I&O's Detail    13 Feb 2025 07:01  -  14 Feb 2025 07:00  --------------------------------------------------------  Total NET: -1465 mL    14 Feb 2025 07:01  -  15 Feb 2025 07:00  --------------------------------------------------------  Total NET: -2705 mL    CAPILLARY BLOOD GLUCOSE    POCT Blood Glucose.: 165 mg/dL (15 Feb 2025 07:08)  POCT Blood Glucose.: 272 mg/dL (14 Feb 2025 23:25)  POCT Blood Glucose.: 175 mg/dL (14 Feb 2025 16:12)  POCT Blood Glucose.: 141 mg/dL (14 Feb 2025 11:17)  Physical Exam:  General: NAD; A&Ox3  Cardiac: S1/S2, RRR, no murmur, no rubs  Lungs: unlabored shallow respirations, bilateral bs decreased at bases  Abdomen: Soft/NT/protuberant  Sternum: Intact, no click, incision healing well with no drainage  Extremities: edema lower extremities improved, swelling anterior left foot with blistering. ace reapplied due to progressive swelling  Scrotum_ significant edema      Prince Catheter: Yes  [X] , critical patient strict I&O - groin maceration   EPICARDIAL WIRES:  [X] YES  BM - yes       LABS:                        7.9[L]  7.26  )-----------( 148      ( 15 Feb 2025 02:15 )             26.8[L]                        7.5[L]  6.61  )-----------( 151      ( 14 Feb 2025 04:15 )             25.4[L]    02-15    138  |  99  |  34[H]  ----------------------------<  189[H]  4.1   |  31  |  1.0  02-14    139  |  95[L]  |  37[H]  ----------------------------<  125[H]  3.6   |  35[H]  |  1.1    Ca    8.9      15 Feb 2025 02:15  Phos  3.7     02-13  Mg     2.2     02-15    TPro  6.0 [6.0 - 8.0]  /  Alb  3.8 [3.5 - 5.2]  /  TBili  0.3 [0.2 - 1.2]  /  DBili  x   /  AST  19 [0 - 41]  /  ALT  17 [0 - 41]  /  AlkPhos  105 [30 - 115]  02-15    RADIOLOGY & ADDITIONAL TESTS:  CXR: < from: Xray Chest 1 View- PORTABLE-Routine (Xray Chest 1 View- PORTABLE-Routine in AM.) (02.15.25 @ 06:38) >  Stable bilateral pulmonary opacities and left pleural effusion.    < end of copied text >  < from: 12 Lead ECG (02.11.25 @ 07:05) >  Ventricular Rate 78 BPM    Atrial Rate 78 BPM    P-R Interval 200 ms    QRS Duration 96 ms    Q-T Interval 366 ms    QTC Calculation(Bazett) 417 ms    P Axis 57 degrees    R Axis -16 degrees    T Axis 140 degrees    Diagnosis Line Normal sinus rhythm  Poor R wave progression  ST & T wave abnormality, consider lateral ischemia    < end of copied text >    EKG:  MEDICATIONS  (STANDING):  albuterol/ipratropium for Nebulization 3 milliLiter(s) Nebulizer every 6 hours  aspirin enteric coated 81 milliGRAM(s) Oral daily  chlorhexidine 2% Cloths 1 Application(s) Topical daily  dextrose 5%. 1000 milliLiter(s) (50 mL/Hr) IV Continuous <Continuous>  dextrose 5%. 1000 milliLiter(s) (100 mL/Hr) IV Continuous <Continuous>  dextrose 50% Injectable 50 milliLiter(s) IV Push every 15 minutes  dextrose 50% Injectable 25 milliLiter(s) IV Push every 15 minutes  finasteride 5 milliGRAM(s) Oral daily  fluticasone propionate 50 MICROgram(s)/spray Nasal Spray 1 Spray(s) Both Nostrils every 12 hours  fluticasone propionate/ salmeterol 250-50 MICROgram(s) Diskus 1 Dose(s) Inhalation two times a day  folic acid 1 milliGRAM(s) Oral daily  gabapentin 300 milliGRAM(s) Oral at bedtime  glucagon  Injectable 1 milliGRAM(s) IntraMuscular once  guaiFENesin  milliGRAM(s) Oral every 12 hours  heparin   Injectable 5000 Unit(s) SubCutaneous every 8 hours  influenza  Vaccine (HIGH DOSE) 0.5 milliLiter(s) IntraMuscular once  insulin glargine Injectable (LANTUS) 40 Unit(s) SubCutaneous before breakfast  insulin lispro (ADMELOG) corrective regimen sliding scale   SubCutaneous three times a day before meals  insulin lispro (ADMELOG) corrective regimen sliding scale   SubCutaneous at bedtime  insulin lispro Injectable (ADMELOG) 10 Unit(s) SubCutaneous three times a day before meals  iron sucrose IVPB 200 milliGRAM(s) IV Intermittent every 24 hours  lidocaine   4% Patch 1 Patch Transdermal every 24 hours  lidocaine   4% Patch 1 Patch Transdermal every 24 hours  mupirocin 2% Nasal 1 Application(s) Both Nostrils every 12 hours  pantoprazole    Tablet 40 milliGRAM(s) Oral before breakfast  phenylephrine    Infusion 0.05 MICROgram(s)/kG/Min (2.14 mL/Hr) IV Continuous <Continuous>  psyllium Powder 1 Packet(s) Oral every 12 hours  rosuvastatin 20 milliGRAM(s) Oral at bedtime  senna 2 Tablet(s) Oral at bedtime  sertraline 25 milliGRAM(s) Oral daily  spironolactone 25 milliGRAM(s) Oral two times a day  tamsulosin 0.4 milliGRAM(s) Oral at bedtime    MEDICATIONS  (PRN):  dextrose Oral Gel 15 Gram(s) Oral once PRN Blood Glucose LESS THAN 70 milliGRAM(s)/deciliter  hydrALAZINE Injectable 5 milliGRAM(s) IV Push every 4 hours PRN Systolic > 160  labetalol Injectable 10 milliGRAM(s) IV Push every 6 hours PRN Diastolic blood pressure >155  melatonin 5 milliGRAM(s) Oral at bedtime PRN Insomnia  ondansetron Injectable 4 milliGRAM(s) IV Push every 4 hours PRN Nausea and/or Vomiting  oxyCODONE    IR 5 milliGRAM(s) Oral every 4 hours PRN Moderate Pain (4 - 6)  oxyCODONE    IR 10 milliGRAM(s) Oral every 6 hours PRN Severe Pain (7 - 10)    Allergies    No Known Allergies    Intolerances      Ambulation/Activity Status: ambulate with assistance    Assessment/Plan:  64y Male status-post MVR/SHEILA ligation and MAZE for severe MR / afib               POD # 18  - Case and plan discussed with CTU Intensivist and CT Surgeon - Dr. Blakely  - Continue CTU supportive care and ongoing plan of care as per continuing CTU rounds.   - Continue DVT/GI prophylaxis  - Incentive Spirometry 10 times an hour  - Continue to advance physical activity as tolerated and continue PT/OT as directed  1. Continue ASA, statin, B Blocker continues to be held due to low BP  2. Madelung disease: cont bipap, respiratory tx's, NC 2LPM  3. Pre-op h/o A. Fib cont to monitor electrolytes - hold AC at this time prior lower GI bleed  and in SR- s/p MAZE and SHEILA closure  4. DM/Glucose A1c 6.2 Control: continue lantus 40/humalog 10 and sliding scale, DASH/TLC diet  5. Heart Failure - diastolic (EF 60%) - acute on chronic (secondary to severe MR) fluid restriction to 1L, daily standing weights, diuresis with Aldactone 25 mg bid  6.. flakito on CKD3a- FLAKITO resolved  7. Oxy 5 mg prn, lidocaine patch for pain  8. continue prince due to groin area maceration and scrotal edema    Social Service Disposition: Pt is considering SNF upon d/c

## 2025-02-15 NOTE — PROGRESS NOTE ADULT - SUBJECTIVE AND OBJECTIVE BOX
Subjective and Objective:   HPI: 64M w/ afib, nonobstructive CAD, CHF class 3, severe MR, DM, HTN, JIMMY on CPAP, asthma, severe obesity, HL, Madelung's disease, osteoarthritis s/p bilateral hip replacement, peripheral neuropathy presented with increasing dyspnea with exertion found to have severe MR.  He was evaluated for mitral clip and not a candidate.  On prior admission he had FLAKITO with creat up to 2.  Was optimized with diuresis.  He was on AC and developed hemorrhoidal bleeding and was taken off AC.  He was discharged and returned for his procedure on 1/28    OR Data  Procedure: Procedure: MVReplacement (bioprosthetic), SHEILA ligation, MAZE  Bypass: 202  Cross Clamp: 158  Pre LV Fxn: 50-55%      RV Fxn: normal  Post LV Fxn: 45-50%     RV Fxn: normal    moderate TR, MV gradient 3mmHg  Blood Products: 1uPRBC, ddavp 39mcq  Cell Saver: 698  Fluids: NS 2000, albumin 2000  Pacing Wires: V pacing. sinus rhythm  UO: 1100    1/28 - OP day :  Fany and poor oxygenation in OR. Milrinone, norepi, vaso. Extubation in evening.  1/29 - start aspirin, diuresis, heparin dvt prophylaxis  1/30- d/c pleural ct if drainage dec; wean to dc primacor; wean high flow o2  1/31 - FLAKITO worsening, bradyarrythmias - amandanchebach - ep consulted  2/2 - Chest tube removed, ambulating, creat improving, dobutamine weaned  2/3 - Dobut to 1.5, BUN elevated  2/4 - Confused this AM, normal neuro exam, prince removed  2/5 - FLAKITO improving, off dobutamine, mil decreased   2/6 - milrinone stopped, bumex 1mg BID  2/13 hypotension on neosynephrine overnight, off this morning    Procedure: MV Replacemant Maze  LA Ligation  POD#: 18    He has history of Diabetes HTN (hypertension)   Obstructive sleep apnea on CPAP  Asthma  Obesity  Peripheral neuropathy  Arthritis  Hypercholesteremia  Madelung's disease  FH: mitral regurgitation  CHF NYHA class III  Atrial fibrillation      OBJECTIVE:  ICU Vital Signs Last 24 Hrs  T(C): 36.6 (15 Feb 2025 00:00), Max: 36.7 (14 Feb 2025 20:00)  T(F): 97.8 (15 Feb 2025 00:00), Max: 98 (14 Feb 2025 20:00)  HR: 75 (15 Feb 2025 11:00) (74 - 87)  BP: 115/56 (15 Feb 2025 11:00) (78/45 - 171/77)  BP(mean): 80 (15 Feb 2025 11:00) (57 - 110)  ABP: --  ABP(mean): --  RR: 16 (15 Feb 2025 11:00) (13 - 36)  SpO2: 99% (15 Feb 2025 11:00) (90% - 100%)    O2 Parameters below as of 15 Feb 2025 08:00  Patient On (Oxygen Delivery Method): nasal cannula  O2 Flow (L/min): 2        I&O's Summary    14 Feb 2025 07:01  -  15 Feb 2025 07:00  --------------------------------------------------------  IN: 1110 mL / OUT: 3815 mL / NET: -2705 mL    15 Feb 2025 07:01  -  15 Feb 2025 11:27  --------------------------------------------------------  IN: 0 mL / OUT: 475 mL / NET: -475 mL      I&O's Detail    14 Feb 2025 07:01  -  15 Feb 2025 07:00  --------------------------------------------------------  IN:    IV PiggyBack: 150 mL    Oral Fluid: 960 mL  Total IN: 1110 mL    OUT:    Indwelling Catheter - Urethral (mL): 3815 mL  Total OUT: 3815 mL    Total NET: -2705 mL      15 Feb 2025 07:01  -  15 Feb 2025 11:27  --------------------------------------------------------  IN:  Total IN: 0 mL    OUT:    Indwelling Catheter - Urethral (mL): 475 mL  Total OUT: 475 mL    Total NET: -475 mL        Adult Advanced Hemodynamics Last 24 Hrs  CVP(mm Hg): --  CVP(cm H2O): --  CO: --  CI: --  PA: --  PA(mean): --  PCWP: --  SVR: --  SVRI: --  PVR: --  PVRI: --    CAPILLARY BLOOD GLUCOSE      POCT Blood Glucose.: 128 mg/dL (15 Feb 2025 11:19)  POCT Blood Glucose.: 165 mg/dL (15 Feb 2025 07:08)  POCT Blood Glucose.: 272 mg/dL (14 Feb 2025 23:25)  POCT Blood Glucose.: 175 mg/dL (14 Feb 2025 16:12)    LABS:                          7.9    7.26  )-----------( 148      ( 15 Feb 2025 02:15 )             26.8     02-15    138  |  99  |  34[H]  ----------------------------<  189[H]  4.1   |  31  |  1.0    Ca    8.9      15 Feb 2025 02:15  Mg     2.2     02-15    TPro  6.0  /  Alb  3.8  /  TBili  0.3  /  DBili  x   /  AST  19  /  ALT  17  /  AlkPhos  105  02-15      Urinalysis Basic - ( 15 Feb 2025 02:15 )    Color: x / Appearance: x / SG: x / pH: x  Gluc: 189 mg/dL / Ketone: x  / Bili: x / Urobili: x   Blood: x / Protein: x / Nitrite: x   Leuk Esterase: x / RBC: x / WBC x   Sq Epi: x / Non Sq Epi: x / Bacteria: x        Home Medications:  Breo Ellipta 200 mcg-25 mcg/inh inhalation powder: 1 inhaled once a day (28 Jan 2025 06:52)  HYDROcodone 10 mg oral capsule, extended release: 1 cap(s) orally 2 times a day (28 Jan 2025 06:52)  losartan 50 mg oral tablet: 1 tab(s) orally 2 times a day (28 Jan 2025 06:52)  losartan-hydrochlorothiazide 50 mg-12.5 mg oral tablet: 1 tab(s) orally (28 Jan 2025 06:52)  ProAir HFA 90 mcg/inh inhalation aerosol: 2 puff(s) inhaled 2 times a day (28 Jan 2025 06:52)  tujeo: 80 unit(s) subcutaneous once a day (28 Jan 2025 06:52)    HOSPITAL MEDICATIONS:  MEDICATIONS  (STANDING):  albuterol/ipratropium for Nebulization 3 milliLiter(s) Nebulizer every 6 hours  ALPRAZolam 0.25 milliGRAM(s) Oral at bedtime  aspirin enteric coated 81 milliGRAM(s) Oral daily  chlorhexidine 2% Cloths 1 Application(s) Topical daily  dextrose 5%. 1000 milliLiter(s) (50 mL/Hr) IV Continuous <Continuous>  dextrose 5%. 1000 milliLiter(s) (100 mL/Hr) IV Continuous <Continuous>  dextrose 50% Injectable 50 milliLiter(s) IV Push every 15 minutes  dextrose 50% Injectable 25 milliLiter(s) IV Push every 15 minutes  finasteride 5 milliGRAM(s) Oral daily  fluticasone propionate 50 MICROgram(s)/spray Nasal Spray 1 Spray(s) Both Nostrils every 12 hours  fluticasone propionate/ salmeterol 250-50 MICROgram(s) Diskus 1 Dose(s) Inhalation two times a day  folic acid 1 milliGRAM(s) Oral daily  gabapentin 300 milliGRAM(s) Oral at bedtime  glucagon  Injectable 1 milliGRAM(s) IntraMuscular once  guaiFENesin  milliGRAM(s) Oral every 12 hours  heparin   Injectable 5000 Unit(s) SubCutaneous every 8 hours  influenza  Vaccine (HIGH DOSE) 0.5 milliLiter(s) IntraMuscular once  insulin glargine Injectable (LANTUS) 40 Unit(s) SubCutaneous before breakfast  insulin lispro (ADMELOG) corrective regimen sliding scale   SubCutaneous three times a day before meals  insulin lispro (ADMELOG) corrective regimen sliding scale   SubCutaneous at bedtime  insulin lispro Injectable (ADMELOG) 10 Unit(s) SubCutaneous three times a day before meals  iron sucrose IVPB 200 milliGRAM(s) IV Intermittent every 24 hours  lidocaine   4% Patch 1 Patch Transdermal every 24 hours  lidocaine   4% Patch 1 Patch Transdermal every 24 hours  mupirocin 2% Nasal 1 Application(s) Both Nostrils every 12 hours  pantoprazole    Tablet 40 milliGRAM(s) Oral before breakfast  phenylephrine    Infusion 0.05 MICROgram(s)/kG/Min (2.14 mL/Hr) IV Continuous <Continuous>  psyllium Powder 1 Packet(s) Oral every 12 hours  rosuvastatin 20 milliGRAM(s) Oral at bedtime  senna 2 Tablet(s) Oral at bedtime  sertraline 25 milliGRAM(s) Oral daily  spironolactone 25 milliGRAM(s) Oral two times a day  tamsulosin 0.4 milliGRAM(s) Oral at bedtime    MEDICATIONS  (PRN):  dextrose Oral Gel 15 Gram(s) Oral once PRN Blood Glucose LESS THAN 70 milliGRAM(s)/deciliter  hydrALAZINE Injectable 5 milliGRAM(s) IV Push every 4 hours PRN Systolic > 160  labetalol Injectable 10 milliGRAM(s) IV Push every 6 hours PRN Diastolic blood pressure >155  melatonin 5 milliGRAM(s) Oral at bedtime PRN Insomnia  ondansetron Injectable 4 milliGRAM(s) IV Push every 4 hours PRN Nausea and/or Vomiting  oxyCODONE    IR 10 milliGRAM(s) Oral every 6 hours PRN Severe Pain (7 - 10)  oxyCODONE    IR 5 milliGRAM(s) Oral every 4 hours PRN Moderate Pain (4 - 6)    RADIOLOGY:  Chest X-ray Reviewed    REVIEW OF SYSTEMS:  CONSTITUTIONAL: [X] all negative; [ ] weakness, [ ] fevers, [ ] chills  EYES/ENT: [X] all negative; [ ] visual changes, [ ] vertigo, [ ] throat pain   NECK: [X] all negative; [ ] pain, [ ] stiffness  RESPIRATORY: [x] all negative, [ ] cough, [ ] wheezing, [ ] hemoptysis, [ ] shortness of breath  CARDIOVASCULAR: [x] all negative; [ ] chest pain, [ ] palpitations, [ ] orthopnea  GASTROINTESTINAL: [X] all negative; [ ]abdominal pain, [ ] nausea, [ ] vomiting, [ ] hematemesis, [ ] diarrhea, [ ] constipation, [ ] melena, [ ] hematochezia.  GENITOURINARY: [X] all negative; [ ] dysuria, [ ] frequency, [ ] hematuria  NEUROLOGICAL: [X] all negative; [ ] numbness, [ ] weakness  SKIN: [X] all negative; [ ] itching, [ ] burning, [ ] rashes, [ ] lesions   All other review of systems is negative unless indicated above.  [  ] Unable to assess ROS because       PHYSICAL EXAM:          CONSTITUTIONAL: Well-developed; well-nourished; in no acute distress.   	SKIN: warm, dry  	HEAD: Normocephalic; atraumatic.  	EYES: PERRL, EOM, no conj injection, sclera clear  	ENT: No nasal discharge; airway clear.  	NECK: Supple; non tender.   	CARD: S1, S2 normal; no murmurs, gallops, or rubs. Regular rate and rhythm.  no carotid bruits  	RESP: CTA B/L; good air movement No wheezes, rales or rhonchi.  	ABD: Soft, not tender, not distended,  no rebound or guarding, bowel sounds present  	EXT:+ Edema  	NEURO: Alert, awake, motor 5/5 R, 5/5 L

## 2025-02-16 LAB
ALBUMIN SERPL ELPH-MCNC: 3.5 G/DL — SIGNIFICANT CHANGE UP (ref 3.5–5.2)
ALBUMIN SERPL ELPH-MCNC: 3.6 G/DL — SIGNIFICANT CHANGE UP (ref 3.5–5.2)
ALBUMIN SERPL ELPH-MCNC: 3.8 G/DL — SIGNIFICANT CHANGE UP (ref 3.5–5.2)
ALP SERPL-CCNC: 107 U/L — SIGNIFICANT CHANGE UP (ref 30–115)
ALP SERPL-CCNC: 107 U/L — SIGNIFICANT CHANGE UP (ref 30–115)
ALP SERPL-CCNC: 109 U/L — SIGNIFICANT CHANGE UP (ref 30–115)
ALT FLD-CCNC: 15 U/L — SIGNIFICANT CHANGE UP (ref 0–41)
ALT FLD-CCNC: 16 U/L — SIGNIFICANT CHANGE UP (ref 0–41)
ALT FLD-CCNC: 17 U/L — SIGNIFICANT CHANGE UP (ref 0–41)
ANION GAP SERPL CALC-SCNC: 7 MMOL/L — SIGNIFICANT CHANGE UP (ref 7–14)
ANION GAP SERPL CALC-SCNC: 8 MMOL/L — SIGNIFICANT CHANGE UP (ref 7–14)
ANION GAP SERPL CALC-SCNC: 9 MMOL/L — SIGNIFICANT CHANGE UP (ref 7–14)
AST SERPL-CCNC: 17 U/L — SIGNIFICANT CHANGE UP (ref 0–41)
AST SERPL-CCNC: 18 U/L — SIGNIFICANT CHANGE UP (ref 0–41)
AST SERPL-CCNC: 19 U/L — SIGNIFICANT CHANGE UP (ref 0–41)
BILIRUB SERPL-MCNC: 0.3 MG/DL — SIGNIFICANT CHANGE UP (ref 0.2–1.2)
BILIRUB SERPL-MCNC: 0.3 MG/DL — SIGNIFICANT CHANGE UP (ref 0.2–1.2)
BILIRUB SERPL-MCNC: 0.4 MG/DL — SIGNIFICANT CHANGE UP (ref 0.2–1.2)
BUN SERPL-MCNC: 29 MG/DL — HIGH (ref 10–20)
BUN SERPL-MCNC: 31 MG/DL — HIGH (ref 10–20)
BUN SERPL-MCNC: 31 MG/DL — HIGH (ref 10–20)
CALCIUM SERPL-MCNC: 8.8 MG/DL — SIGNIFICANT CHANGE UP (ref 8.4–10.4)
CALCIUM SERPL-MCNC: 9 MG/DL — SIGNIFICANT CHANGE UP (ref 8.4–10.4)
CALCIUM SERPL-MCNC: 9.2 MG/DL — SIGNIFICANT CHANGE UP (ref 8.4–10.5)
CHLORIDE SERPL-SCNC: 102 MMOL/L — SIGNIFICANT CHANGE UP (ref 98–110)
CHLORIDE SERPL-SCNC: 102 MMOL/L — SIGNIFICANT CHANGE UP (ref 98–110)
CHLORIDE SERPL-SCNC: 103 MMOL/L — SIGNIFICANT CHANGE UP (ref 98–110)
CO2 SERPL-SCNC: 28 MMOL/L — SIGNIFICANT CHANGE UP (ref 17–32)
CO2 SERPL-SCNC: 30 MMOL/L — SIGNIFICANT CHANGE UP (ref 17–32)
CO2 SERPL-SCNC: 31 MMOL/L — SIGNIFICANT CHANGE UP (ref 17–32)
CREAT SERPL-MCNC: 0.9 MG/DL — SIGNIFICANT CHANGE UP (ref 0.7–1.5)
EGFR: 94 ML/MIN/1.73M2 — SIGNIFICANT CHANGE UP
GLUCOSE BLDC GLUCOMTR-MCNC: 148 MG/DL — HIGH (ref 70–99)
GLUCOSE BLDC GLUCOMTR-MCNC: 178 MG/DL — HIGH (ref 70–99)
GLUCOSE BLDC GLUCOMTR-MCNC: 189 MG/DL — HIGH (ref 70–99)
GLUCOSE BLDC GLUCOMTR-MCNC: 98 MG/DL — SIGNIFICANT CHANGE UP (ref 70–99)
GLUCOSE SERPL-MCNC: 157 MG/DL — HIGH (ref 70–99)
GLUCOSE SERPL-MCNC: 166 MG/DL — HIGH (ref 70–99)
GLUCOSE SERPL-MCNC: 94 MG/DL — SIGNIFICANT CHANGE UP (ref 70–99)
HCT VFR BLD CALC: 27.2 % — LOW (ref 42–52)
HGB BLD-MCNC: 7.9 G/DL — LOW (ref 14–18)
MAGNESIUM SERPL-MCNC: 2 MG/DL — SIGNIFICANT CHANGE UP (ref 1.8–2.4)
MAGNESIUM SERPL-MCNC: 2.1 MG/DL — SIGNIFICANT CHANGE UP (ref 1.8–2.4)
MAGNESIUM SERPL-MCNC: 2.2 MG/DL — SIGNIFICANT CHANGE UP (ref 1.8–2.4)
MCHC RBC-ENTMCNC: 27.1 PG — SIGNIFICANT CHANGE UP (ref 27–31)
MCHC RBC-ENTMCNC: 29 G/DL — LOW (ref 32–37)
MCV RBC AUTO: 93.2 FL — SIGNIFICANT CHANGE UP (ref 80–94)
NRBC BLD AUTO-RTO: 0 /100 WBCS — SIGNIFICANT CHANGE UP (ref 0–0)
PHOSPHATE SERPL-MCNC: 3.2 MG/DL — SIGNIFICANT CHANGE UP (ref 2.1–4.9)
PLATELET # BLD AUTO: 146 K/UL — SIGNIFICANT CHANGE UP (ref 130–400)
PMV BLD: 11.5 FL — HIGH (ref 7.4–10.4)
POTASSIUM SERPL-MCNC: 4.1 MMOL/L — SIGNIFICANT CHANGE UP (ref 3.5–5)
POTASSIUM SERPL-MCNC: 4.2 MMOL/L — SIGNIFICANT CHANGE UP (ref 3.5–5)
POTASSIUM SERPL-MCNC: 4.8 MMOL/L — SIGNIFICANT CHANGE UP (ref 3.5–5)
POTASSIUM SERPL-SCNC: 4.1 MMOL/L — SIGNIFICANT CHANGE UP (ref 3.5–5)
POTASSIUM SERPL-SCNC: 4.2 MMOL/L — SIGNIFICANT CHANGE UP (ref 3.5–5)
POTASSIUM SERPL-SCNC: 4.8 MMOL/L — SIGNIFICANT CHANGE UP (ref 3.5–5)
PROT SERPL-MCNC: 5.9 G/DL — LOW (ref 6–8)
PROT SERPL-MCNC: 6.1 G/DL — SIGNIFICANT CHANGE UP (ref 6–8)
PROT SERPL-MCNC: 6.6 G/DL — SIGNIFICANT CHANGE UP (ref 6–8)
RBC # BLD: 2.92 M/UL — LOW (ref 4.7–6.1)
RBC # FLD: 19.4 % — HIGH (ref 11.5–14.5)
SODIUM SERPL-SCNC: 139 MMOL/L — SIGNIFICANT CHANGE UP (ref 135–146)
SODIUM SERPL-SCNC: 139 MMOL/L — SIGNIFICANT CHANGE UP (ref 135–146)
SODIUM SERPL-SCNC: 142 MMOL/L — SIGNIFICANT CHANGE UP (ref 135–146)
WBC # BLD: 6.17 K/UL — SIGNIFICANT CHANGE UP (ref 4.8–10.8)
WBC # FLD AUTO: 6.17 K/UL — SIGNIFICANT CHANGE UP (ref 4.8–10.8)

## 2025-02-16 PROCEDURE — 99291 CRITICAL CARE FIRST HOUR: CPT

## 2025-02-16 PROCEDURE — 71045 X-RAY EXAM CHEST 1 VIEW: CPT | Mod: 26

## 2025-02-16 RX ORDER — MAGNESIUM SULFATE 500 MG/ML
2 SYRINGE (ML) INJECTION ONCE
Refills: 0 | Status: COMPLETED | OUTPATIENT
Start: 2025-02-16 | End: 2025-02-16

## 2025-02-16 RX ORDER — METOPROLOL SUCCINATE 50 MG/1
12.5 TABLET, EXTENDED RELEASE ORAL EVERY 12 HOURS
Refills: 0 | Status: DISCONTINUED | OUTPATIENT
Start: 2025-02-16 | End: 2025-02-24

## 2025-02-16 RX ADMIN — HEPARIN SODIUM 5000 UNIT(S): 1000 INJECTION INTRAVENOUS; SUBCUTANEOUS at 22:06

## 2025-02-16 RX ADMIN — LIDOCAINE HYDROCHLORIDE 1 PATCH: 20 JELLY TOPICAL at 01:00

## 2025-02-16 RX ADMIN — Medication 50 MILLIEQUIVALENT(S): at 13:53

## 2025-02-16 RX ADMIN — FLUTICASONE PROPIONATE 1 SPRAY(S): 50 SPRAY, METERED NASAL at 18:09

## 2025-02-16 RX ADMIN — IRON SUCROSE 100 MILLIGRAM(S): 20 INJECTION, SOLUTION INTRAVENOUS at 18:04

## 2025-02-16 RX ADMIN — Medication 1 APPLICATION(S): at 11:31

## 2025-02-16 RX ADMIN — FINASTERIDE 5 MILLIGRAM(S): 1 TABLET, FILM COATED ORAL at 11:31

## 2025-02-16 RX ADMIN — INSULIN LISPRO 10 UNIT(S): 100 INJECTION, SOLUTION INTRAVENOUS; SUBCUTANEOUS at 07:29

## 2025-02-16 RX ADMIN — Medication 1 PACKET(S): at 06:54

## 2025-02-16 RX ADMIN — Medication 0.25 MILLIGRAM(S): at 22:06

## 2025-02-16 RX ADMIN — METOPROLOL SUCCINATE 12.5 MILLIGRAM(S): 50 TABLET, EXTENDED RELEASE ORAL at 12:30

## 2025-02-16 RX ADMIN — Medication 1 PACKET(S): at 18:09

## 2025-02-16 RX ADMIN — HEPARIN SODIUM 5000 UNIT(S): 1000 INJECTION INTRAVENOUS; SUBCUTANEOUS at 05:57

## 2025-02-16 RX ADMIN — INSULIN LISPRO 2: 100 INJECTION, SOLUTION INTRAVENOUS; SUBCUTANEOUS at 11:56

## 2025-02-16 RX ADMIN — INSULIN LISPRO 10 UNIT(S): 100 INJECTION, SOLUTION INTRAVENOUS; SUBCUTANEOUS at 16:59

## 2025-02-16 RX ADMIN — IPRATROPIUM BROMIDE AND ALBUTEROL SULFATE 3 MILLILITER(S): .5; 2.5 SOLUTION RESPIRATORY (INHALATION) at 08:51

## 2025-02-16 RX ADMIN — DEXTROMETHORPHAN HBR, GUAIFENESIN 600 MILLIGRAM(S): 200 LIQUID ORAL at 18:04

## 2025-02-16 RX ADMIN — SERTRALINE 25 MILLIGRAM(S): 100 TABLET, FILM COATED ORAL at 11:31

## 2025-02-16 RX ADMIN — HEPARIN SODIUM 5000 UNIT(S): 1000 INJECTION INTRAVENOUS; SUBCUTANEOUS at 13:53

## 2025-02-16 RX ADMIN — DEXTROMETHORPHAN HBR, GUAIFENESIN 600 MILLIGRAM(S): 200 LIQUID ORAL at 05:57

## 2025-02-16 RX ADMIN — OXYCODONE HYDROCHLORIDE 10 MILLIGRAM(S): 30 TABLET ORAL at 18:34

## 2025-02-16 RX ADMIN — Medication 25 MILLIGRAM(S): at 18:04

## 2025-02-16 RX ADMIN — OXYCODONE HYDROCHLORIDE 5 MILLIGRAM(S): 30 TABLET ORAL at 22:50

## 2025-02-16 RX ADMIN — LIDOCAINE HYDROCHLORIDE 1 PATCH: 20 JELLY TOPICAL at 20:00

## 2025-02-16 RX ADMIN — OXYCODONE HYDROCHLORIDE 10 MILLIGRAM(S): 30 TABLET ORAL at 12:32

## 2025-02-16 RX ADMIN — FLUTICASONE PROPIONATE 1 SPRAY(S): 50 SPRAY, METERED NASAL at 06:03

## 2025-02-16 RX ADMIN — Medication 25 MILLIGRAM(S): at 05:50

## 2025-02-16 RX ADMIN — GABAPENTIN 300 MILLIGRAM(S): 400 CAPSULE ORAL at 22:05

## 2025-02-16 RX ADMIN — Medication 81 MILLIGRAM(S): at 11:30

## 2025-02-16 RX ADMIN — TAMSULOSIN HYDROCHLORIDE 0.4 MILLIGRAM(S): 0.4 CAPSULE ORAL at 22:06

## 2025-02-16 RX ADMIN — LIDOCAINE HYDROCHLORIDE 1 PATCH: 20 JELLY TOPICAL at 12:43

## 2025-02-16 RX ADMIN — INSULIN GLARGINE-YFGN 40 UNIT(S): 100 INJECTION, SOLUTION SUBCUTANEOUS at 07:29

## 2025-02-16 RX ADMIN — OXYCODONE HYDROCHLORIDE 5 MILLIGRAM(S): 30 TABLET ORAL at 22:07

## 2025-02-16 RX ADMIN — INSULIN LISPRO 10 UNIT(S): 100 INJECTION, SOLUTION INTRAVENOUS; SUBCUTANEOUS at 11:55

## 2025-02-16 RX ADMIN — Medication 40 MILLIGRAM(S): at 06:54

## 2025-02-16 RX ADMIN — Medication 25 GRAM(S): at 12:30

## 2025-02-16 RX ADMIN — ROSUVASTATIN CALCIUM 20 MILLIGRAM(S): 20 TABLET, FILM COATED ORAL at 22:05

## 2025-02-16 RX ADMIN — OXYCODONE HYDROCHLORIDE 10 MILLIGRAM(S): 30 TABLET ORAL at 05:50

## 2025-02-16 RX ADMIN — OXYCODONE HYDROCHLORIDE 10 MILLIGRAM(S): 30 TABLET ORAL at 18:04

## 2025-02-16 RX ADMIN — FOLIC ACID 1 MILLIGRAM(S): 1 TABLET ORAL at 11:31

## 2025-02-16 RX ADMIN — OXYCODONE HYDROCHLORIDE 10 MILLIGRAM(S): 30 TABLET ORAL at 11:54

## 2025-02-16 RX ADMIN — OXYCODONE HYDROCHLORIDE 10 MILLIGRAM(S): 30 TABLET ORAL at 06:55

## 2025-02-16 NOTE — PROGRESS NOTE ADULT - SUBJECTIVE AND OBJECTIVE BOX
OPERATIVE PROCEDURE(s):                POD # 19 MV replacement / MAZE / SHEILA closure                      64yMale  SURGEON(s): ISELA Blakely  SUBJECTIVE ASSESSMENT: little better    Vital Signs Last 24 Hrs  T(F): 98.6 (16 Feb 2025 04:00), Max: 98.7 (16 Feb 2025 00:00)  HR: 76 (16 Feb 2025 07:00) (73 - 86)  BP: 124/68 (16 Feb 2025 07:00) (109/56 - 158/68)  BP(mean): 90 (16 Feb 2025 07:00) (78 - 98)  RR: 16 (16 Feb 2025 07:00) (13 - 28)  SpO2: 99% (16 Feb 2025 07:00) (90% - 100%)    I&O's Detail    15 Feb 2025 07:01  -  16 Feb 2025 07:00  --------------------------------------------------------  IN:  Total IN: 0 mL    OUT:    Blood Loss (mL): 1 mL    Indwelling Catheter - Urethral (mL): 1520 mL  Total OUT: 1521 mL        Net:   I&O's Detail    14 Feb 2025 07:01  -  15 Feb 2025 07:00  --------------------------------------------------------  Total NET: -2705 mL      15 Feb 2025 07:01  -  16 Feb 2025 07:00  --------------------------------------------------------  Total NET: -1521 mL        CAPILLARY BLOOD GLUCOSE      POCT Blood Glucose.: 148 mg/dL (16 Feb 2025 06:44)  POCT Blood Glucose.: 193 mg/dL (15 Feb 2025 21:45)  POCT Blood Glucose.: 137 mg/dL (15 Feb 2025 16:46)  POCT Blood Glucose.: 128 mg/dL (15 Feb 2025 11:19)    Physical Exam:  General: NAD; A&Ox3  Cardiac: S1/S2, RRR, no murmur, no rubs  Lungs: unlabored shallow respirations, bilateral bs decreased at bases  Abdomen: Soft/NT/protuberant  Sternum: Intact, no click, incision healing well with no drainage  Extremities: edema lower extremities improved, swelling anterior left foot with blistering. ace reapplied, unchanged form yesterday  Scrotum_ significant edema      Prince Catheter: Yes  [X] , critical patient strict I&O - groin maceration   EPICARDIAL WIRES:  [X] YES  BM - yes      LABS:                        7.9[L]  6.17  )-----------( 146      ( 16 Feb 2025 04:48 )             27.2[L]                        7.9[L]  7.26  )-----------( 148      ( 15 Feb 2025 02:15 )             26.8[L]    02-16    142  |  103  |  29[H]  ----------------------------<  157[H]  4.1   |  31  |  0.9  02-15    138  |  99  |  34[H]  ----------------------------<  189[H]  4.1   |  31  |  1.0    Ca    9.0      16 Feb 2025 04:48  Mg     2.0     02-16    TPro  5.9[L] [6.0 - 8.0]  /  Alb  3.5 [3.5 - 5.2]  /  TBili  0.3 [0.2 - 1.2]  /  DBili  x   /  AST  19 [0 - 41]  /  ALT  16 [0 - 41]  /  AlkPhos  107 [30 - 115]  02-16          RADIOLOGY & ADDITIONAL TESTS:  CXR:  EKG:  MEDICATIONS  (STANDING):  albuterol/ipratropium for Nebulization 3 milliLiter(s) Nebulizer every 6 hours  ALPRAZolam 0.25 milliGRAM(s) Oral at bedtime  aspirin enteric coated 81 milliGRAM(s) Oral daily  chlorhexidine 2% Cloths 1 Application(s) Topical daily  dextrose 5%. 1000 milliLiter(s) (50 mL/Hr) IV Continuous <Continuous>  dextrose 5%. 1000 milliLiter(s) (100 mL/Hr) IV Continuous <Continuous>  dextrose 50% Injectable 50 milliLiter(s) IV Push every 15 minutes  dextrose 50% Injectable 25 milliLiter(s) IV Push every 15 minutes  finasteride 5 milliGRAM(s) Oral daily  fluticasone propionate 50 MICROgram(s)/spray Nasal Spray 1 Spray(s) Both Nostrils every 12 hours  fluticasone propionate/ salmeterol 250-50 MICROgram(s) Diskus 1 Dose(s) Inhalation two times a day  folic acid 1 milliGRAM(s) Oral daily  gabapentin 300 milliGRAM(s) Oral at bedtime  glucagon  Injectable 1 milliGRAM(s) IntraMuscular once  guaiFENesin  milliGRAM(s) Oral every 12 hours  heparin   Injectable 5000 Unit(s) SubCutaneous every 8 hours  influenza  Vaccine (HIGH DOSE) 0.5 milliLiter(s) IntraMuscular once  insulin glargine Injectable (LANTUS) 40 Unit(s) SubCutaneous before breakfast  insulin lispro (ADMELOG) corrective regimen sliding scale   SubCutaneous three times a day before meals  insulin lispro (ADMELOG) corrective regimen sliding scale   SubCutaneous at bedtime  insulin lispro Injectable (ADMELOG) 10 Unit(s) SubCutaneous three times a day before meals  iron sucrose IVPB 200 milliGRAM(s) IV Intermittent every 24 hours  lidocaine   4% Patch 1 Patch Transdermal every 24 hours  lidocaine   4% Patch 1 Patch Transdermal every 24 hours  mupirocin 2% Nasal 1 Application(s) Both Nostrils every 12 hours  pantoprazole    Tablet 40 milliGRAM(s) Oral before breakfast  phenylephrine    Infusion 0.05 MICROgram(s)/kG/Min (2.14 mL/Hr) IV Continuous <Continuous>  psyllium Powder 1 Packet(s) Oral every 12 hours  rosuvastatin 20 milliGRAM(s) Oral at bedtime  senna 2 Tablet(s) Oral at bedtime  sertraline 25 milliGRAM(s) Oral daily  spironolactone 25 milliGRAM(s) Oral two times a day  tamsulosin 0.4 milliGRAM(s) Oral at bedtime    MEDICATIONS  (PRN):  dextrose Oral Gel 15 Gram(s) Oral once PRN Blood Glucose LESS THAN 70 milliGRAM(s)/deciliter  hydrALAZINE Injectable 5 milliGRAM(s) IV Push every 4 hours PRN Systolic > 160  labetalol Injectable 10 milliGRAM(s) IV Push every 6 hours PRN Diastolic blood pressure >155  melatonin 5 milliGRAM(s) Oral at bedtime PRN Insomnia  ondansetron Injectable 4 milliGRAM(s) IV Push every 4 hours PRN Nausea and/or Vomiting  oxyCODONE    IR 10 milliGRAM(s) Oral every 6 hours PRN Severe Pain (7 - 10)  oxyCODONE    IR 5 milliGRAM(s) Oral every 4 hours PRN Moderate Pain (4 - 6)    Allergies    No Known Allergies    Intolerances      Ambulation/Activity Status: ambulate with assistance    Assessment/Plan:  64y Male status-post MVR/SHEILA ligation and MAZE for severe MR / afib               POD # 19  - Case and plan discussed with CTU Intensivist and CT Surgeon - Dr. Blakely  - Continue CTU supportive care and ongoing plan of care as per continuing CTU rounds.   - Continue DVT/GI prophylaxis  - Incentive Spirometry 10 times an hour  - Continue to advance physical activity as tolerated and continue PT/OT as directed  1. Continue ASA, statin, B Blocker continues to be held due to low BP  2. Madelung disease: cont bipap, respiratory tx's, NC 2LPM  3. Pre-op h/o A. Fib cont to monitor electrolytes - hold AC at this time prior lower GI bleed  and in SR- s/p MAZE and SHEILA closure  4. DM/Glucose A1c 6.2 Control: continue lantus 40/humalog 10 and sliding scale, DASH/TLC diet  5. Heart Failure - diastolic (EF 60%) - acute on chronic (secondary to severe MR) fluid restriction to 1L, daily standing weights, diuresis with Aldactone 25 mg bid  6.. flakito on CKD3a- FLKAITO resolved  7. Oxy 5 mg prn, lidocaine patch for pain  8. continue prince due to groin area maceration and scrotal edema    Social Service Disposition: Pt is considering SNF upon d/c   OPERATIVE PROCEDURE(s):                POD # 19 MV replacement / MAZE / SHEILA closure                      64yMale  SURGEON(s): ISELA Blakely  SUBJECTIVE ASSESSMENT: little better    Vital Signs Last 24 Hrs  T(F): 98.6 (16 Feb 2025 04:00), Max: 98.7 (16 Feb 2025 00:00)  HR: 76 (16 Feb 2025 07:00) (73 - 86)  BP: 124/68 (16 Feb 2025 07:00) (109/56 - 158/68)  BP(mean): 90 (16 Feb 2025 07:00) (78 - 98)  RR: 16 (16 Feb 2025 07:00) (13 - 28)  SpO2: 99% (16 Feb 2025 07:00) (90% - 100%)    I&O's Detail    15 Feb 2025 07:01  -  16 Feb 2025 07:00  --------------------------------------------------------  IN:  Total IN: 0 mL    OUT:    Blood Loss (mL): 1 mL    Indwelling Catheter - Urethral (mL): 1520 mL  Total OUT: 1521 mL        Net:   I&O's Detail    14 Feb 2025 07:01  -  15 Feb 2025 07:00  --------------------------------------------------------  Total NET: -2705 mL      15 Feb 2025 07:01  -  16 Feb 2025 07:00  --------------------------------------------------------  Total NET: -1521 mL        CAPILLARY BLOOD GLUCOSE      POCT Blood Glucose.: 148 mg/dL (16 Feb 2025 06:44)  POCT Blood Glucose.: 193 mg/dL (15 Feb 2025 21:45)  POCT Blood Glucose.: 137 mg/dL (15 Feb 2025 16:46)  POCT Blood Glucose.: 128 mg/dL (15 Feb 2025 11:19)    Physical Exam:  General: NAD; A&Ox3  Cardiac: S1/S2, RRR, no murmur, no rubs  Lungs: unlabored shallow respirations, bilateral bs decreased at bases, L>R  Abdomen: Soft/NT/protuberant  Sternum: Intact, no click, incision healing well with no drainage  Extremities: edema lower extremities improved, swelling anterior left foot with blistering. ace reapplied, unchanged form yesterday  Scrotum_ significant edema      Prince Catheter: Yes  [X] , critical patient strict I&O - groin maceration   EPICARDIAL WIRES:  [X] YES  BM - yes      LABS:                        7.9[L]  6.17  )-----------( 146      ( 16 Feb 2025 04:48 )             27.2[L]                        7.9[L]  7.26  )-----------( 148      ( 15 Feb 2025 02:15 )             26.8[L]    02-16    142  |  103  |  29[H]  ----------------------------<  157[H]  4.1   |  31  |  0.9  02-15    138  |  99  |  34[H]  ----------------------------<  189[H]  4.1   |  31  |  1.0    Ca    9.0      16 Feb 2025 04:48  Mg     2.0     02-16    TPro  5.9[L] [6.0 - 8.0]  /  Alb  3.5 [3.5 - 5.2]  /  TBili  0.3 [0.2 - 1.2]  /  DBili  x   /  AST  19 [0 - 41]  /  ALT  16 [0 - 41]  /  AlkPhos  107 [30 - 115]  02-16    RADIOLOGY & ADDITIONAL TESTS:  CXR: < from: Xray Chest 1 View- PORTABLE-Routine (Xray Chest 1 View- PORTABLE-Routine in AM.) (02.16.25 @ 06:42) >  Comparison: Chest x-raychest x-ray 2/15/2025  Findings/  impression:    EKG leads overlie thorax, obscuring anatomy.    Support devices: None    Cardiac/ Mediastinum: Stable cardiac silhouette. Poststernotomy, mitral   valve replacement. Left atrial appendage clip.    Lungs/ Pleura: Stable bilateral opacities and left greater than right   effusions. No pneumothorax    < end of copied text >    EKG:  MEDICATIONS  (STANDING):  albuterol/ipratropium for Nebulization 3 milliLiter(s) Nebulizer every 6 hours  ALPRAZolam 0.25 milliGRAM(s) Oral at bedtime  aspirin enteric coated 81 milliGRAM(s) Oral daily  chlorhexidine 2% Cloths 1 Application(s) Topical daily  dextrose 5%. 1000 milliLiter(s) (50 mL/Hr) IV Continuous <Continuous>  dextrose 5%. 1000 milliLiter(s) (100 mL/Hr) IV Continuous <Continuous>  dextrose 50% Injectable 50 milliLiter(s) IV Push every 15 minutes  dextrose 50% Injectable 25 milliLiter(s) IV Push every 15 minutes  finasteride 5 milliGRAM(s) Oral daily  fluticasone propionate 50 MICROgram(s)/spray Nasal Spray 1 Spray(s) Both Nostrils every 12 hours  fluticasone propionate/ salmeterol 250-50 MICROgram(s) Diskus 1 Dose(s) Inhalation two times a day  folic acid 1 milliGRAM(s) Oral daily  gabapentin 300 milliGRAM(s) Oral at bedtime  glucagon  Injectable 1 milliGRAM(s) IntraMuscular once  guaiFENesin  milliGRAM(s) Oral every 12 hours  heparin   Injectable 5000 Unit(s) SubCutaneous every 8 hours  influenza  Vaccine (HIGH DOSE) 0.5 milliLiter(s) IntraMuscular once  insulin glargine Injectable (LANTUS) 40 Unit(s) SubCutaneous before breakfast  insulin lispro (ADMELOG) corrective regimen sliding scale   SubCutaneous three times a day before meals  insulin lispro (ADMELOG) corrective regimen sliding scale   SubCutaneous at bedtime  insulin lispro Injectable (ADMELOG) 10 Unit(s) SubCutaneous three times a day before meals  iron sucrose IVPB 200 milliGRAM(s) IV Intermittent every 24 hours  lidocaine   4% Patch 1 Patch Transdermal every 24 hours  lidocaine   4% Patch 1 Patch Transdermal every 24 hours  mupirocin 2% Nasal 1 Application(s) Both Nostrils every 12 hours  pantoprazole    Tablet 40 milliGRAM(s) Oral before breakfast  phenylephrine    Infusion 0.05 MICROgram(s)/kG/Min (2.14 mL/Hr) IV Continuous <Continuous>  psyllium Powder 1 Packet(s) Oral every 12 hours  rosuvastatin 20 milliGRAM(s) Oral at bedtime  senna 2 Tablet(s) Oral at bedtime  sertraline 25 milliGRAM(s) Oral daily  spironolactone 25 milliGRAM(s) Oral two times a day  tamsulosin 0.4 milliGRAM(s) Oral at bedtime    MEDICATIONS  (PRN):  dextrose Oral Gel 15 Gram(s) Oral once PRN Blood Glucose LESS THAN 70 milliGRAM(s)/deciliter  hydrALAZINE Injectable 5 milliGRAM(s) IV Push every 4 hours PRN Systolic > 160  labetalol Injectable 10 milliGRAM(s) IV Push every 6 hours PRN Diastolic blood pressure >155  melatonin 5 milliGRAM(s) Oral at bedtime PRN Insomnia  ondansetron Injectable 4 milliGRAM(s) IV Push every 4 hours PRN Nausea and/or Vomiting  oxyCODONE    IR 10 milliGRAM(s) Oral every 6 hours PRN Severe Pain (7 - 10)  oxyCODONE    IR 5 milliGRAM(s) Oral every 4 hours PRN Moderate Pain (4 - 6)    Allergies    No Known Allergies    Intolerances      Ambulation/Activity Status: ambulate with assistance    Assessment/Plan:  64y Male status-post MVR/SHEILA ligation and MAZE for severe MR / afib               POD # 19  - Case and plan discussed with CTU Intensivist and CT Surgeon - Dr. Blakely  - Continue CTU supportive care and ongoing plan of care as per continuing CTU rounds.   - Continue DVT/GI prophylaxis  - Incentive Spirometry 10 times an hour  - Continue to advance physical activity as tolerated and continue PT/OT as directed  1. Continue ASA, statin, B Blocker continues to be held due to low BP  2. Madelung disease: cont bipap, respiratory tx's, NC 2LPM  3. Pre-op h/o A. Fib cont to monitor electrolytes - hold AC at this time prior lower GI bleed  and in SR- s/p MAZE and SHEILA closure  4. DM/Glucose A1c 6.2 Control: continue lantus 40/humalog 10 and sliding scale, DASH/TLC diet  5. Heart Failure - diastolic (EF 60%) - acute on chronic (secondary to severe MR) fluid restriction to 1L, daily standing weights, diuresis with Aldactone 25 mg bid  6.. flakito on CKD3a- FLAKITO resolved  7. Oxy 5 mg prn, lidocaine patch for pain  8. discontinue prince     Social Service Disposition: Pt is considering SNF upon d/c

## 2025-02-16 NOTE — CHART NOTE - NSCHARTNOTEFT_GEN_A_CORE
Electrophysiology PA Note    tele reviewed over the whole hospital stay  no significant events since 2/7,   on Metoprolol  occasional NSVT,   willl sign off  if extended postop monitoring is still considered upon discharge please recall

## 2025-02-16 NOTE — PROGRESS NOTE ADULT - SUBJECTIVE AND OBJECTIVE BOX
Subjective and Objective:   HPI: 64M w/ afib, nonobstructive CAD, CHF class 3, severe MR, DM, HTN, JIMMY on CPAP, asthma, severe obesity, HL, Madelung's disease, osteoarthritis s/p bilateral hip replacement, peripheral neuropathy presented with increasing dyspnea with exertion found to have severe MR.  He was evaluated for mitral clip and not a candidate.  On prior admission he had FLAKITO with creat up to 2.  Was optimized with diuresis.  He was on AC and developed hemorrhoidal bleeding and was taken off AC.  He was discharged and returned for his procedure on 1/28    OR Data  Procedure: Procedure: MVReplacement (bioprosthetic), SHEILA ligation, MAZE  Bypass: 202  Cross Clamp: 158  Pre LV Fxn: 50-55%      RV Fxn: normal  Post LV Fxn: 45-50%     RV Fxn: normal    moderate TR, MV gradient 3mmHg  Blood Products: 1uPRBC, ddavp 39mcq  Cell Saver: 698  Fluids: NS 2000, albumin 2000  Pacing Wires: V pacing. sinus rhythm  UO: 1100    1/28 - OP day :  Fany and poor oxygenation in OR. Milrinone, norepi, vaso. Extubation in evening.  1/29 - start aspirin, diuresis, heparin dvt prophylaxis  1/30- d/c pleural ct if drainage dec; wean to dc primacor; wean high flow o2  1/31 - FLAKITO worsening, bradyarrythmias - amandanchebach - ep consulted  2/2 - Chest tube removed, ambulating, creat improving, dobutamine weaned  2/3 - Dobut to 1.5, BUN elevated  2/4 - Confused this AM, normal neuro exam, prince removed  2/5 - FLAKITO improving, off dobutamine, mil decreased   2/6 - milrinone stopped, bumex 1mg BID  2/13 hypotension on neosynephrine overnight, off this morning    Procedure: MV Replacemant Maze  LA Ligation  POD#: 18    He has history of Diabetes HTN (hypertension)   Obstructive sleep apnea on CPAP  Asthma  Obesity  Peripheral neuropathy  Arthritis  Hypercholesteremia  Madelung's disease  FH: mitral regurgitation  CHF NYHA class III  Atrial fibrillation      OBJECTIVE:  ICU Vital Signs Last 24 Hrs  T(C): 36.6 (15 Feb 2025 00:00), Max: 36.7 (14 Feb 2025 20:00)  T(F): 97.8 (15 Feb 2025 00:00), Max: 98 (14 Feb 2025 20:00)  HR: 75 (15 Feb 2025 11:00) (74 - 87)  BP: 115/56 (15 Feb 2025 11:00) (78/45 - 171/77)  BP(mean): 80 (15 Feb 2025 11:00) (57 - 110)  ABP: --  ABP(mean): --  RR: 16 (15 Feb 2025 11:00) (13 - 36)  SpO2: 99% (15 Feb 2025 11:00) (90% - 100%)    O2 Parameters below as of 15 Feb 2025 08:00  Patient On (Oxygen Delivery Method): nasal cannula  O2 Flow (L/min): 2        I&O's Summary    14 Feb 2025 07:01  -  15 Feb 2025 07:00  --------------------------------------------------------  IN: 1110 mL / OUT: 3815 mL / NET: -2705 mL    15 Feb 2025 07:01  -  15 Feb 2025 11:27  --------------------------------------------------------  IN: 0 mL / OUT: 475 mL / NET: -475 mL      I&O's Detail    14 Feb 2025 07:01  -  15 Feb 2025 07:00  --------------------------------------------------------  IN:    IV PiggyBack: 150 mL    Oral Fluid: 960 mL  Total IN: 1110 mL    OUT:    Indwelling Catheter - Urethral (mL): 3815 mL  Total OUT: 3815 mL    Total NET: -2705 mL      15 Feb 2025 07:01  -  15 Feb 2025 11:27  --------------------------------------------------------  IN:  Total IN: 0 mL    OUT:    Indwelling Catheter - Urethral (mL): 475 mL  Total OUT: 475 mL    Total NET: -475 mL        Adult Advanced Hemodynamics Last 24 Hrs  CVP(mm Hg): --  CVP(cm H2O): --  CO: --  CI: --  PA: --  PA(mean): --  PCWP: --  SVR: --  SVRI: --  PVR: --  PVRI: --    CAPILLARY BLOOD GLUCOSE      POCT Blood Glucose.: 128 mg/dL (15 Feb 2025 11:19)  POCT Blood Glucose.: 165 mg/dL (15 Feb 2025 07:08)  POCT Blood Glucose.: 272 mg/dL (14 Feb 2025 23:25)  POCT Blood Glucose.: 175 mg/dL (14 Feb 2025 16:12)    LABS:                          7.9    7.26  )-----------( 148      ( 15 Feb 2025 02:15 )             26.8     02-15    138  |  99  |  34[H]  ----------------------------<  189[H]  4.1   |  31  |  1.0    Ca    8.9      15 Feb 2025 02:15  Mg     2.2     02-15    TPro  6.0  /  Alb  3.8  /  TBili  0.3  /  DBili  x   /  AST  19  /  ALT  17  /  AlkPhos  105  02-15      Urinalysis Basic - ( 15 Feb 2025 02:15 )    Color: x / Appearance: x / SG: x / pH: x  Gluc: 189 mg/dL / Ketone: x  / Bili: x / Urobili: x   Blood: x / Protein: x / Nitrite: x   Leuk Esterase: x / RBC: x / WBC x   Sq Epi: x / Non Sq Epi: x / Bacteria: x        Home Medications:  Breo Ellipta 200 mcg-25 mcg/inh inhalation powder: 1 inhaled once a day (28 Jan 2025 06:52)  HYDROcodone 10 mg oral capsule, extended release: 1 cap(s) orally 2 times a day (28 Jan 2025 06:52)  losartan 50 mg oral tablet: 1 tab(s) orally 2 times a day (28 Jan 2025 06:52)  losartan-hydrochlorothiazide 50 mg-12.5 mg oral tablet: 1 tab(s) orally (28 Jan 2025 06:52)  ProAir HFA 90 mcg/inh inhalation aerosol: 2 puff(s) inhaled 2 times a day (28 Jan 2025 06:52)  tujeo: 80 unit(s) subcutaneous once a day (28 Jan 2025 06:52)    HOSPITAL MEDICATIONS:  MEDICATIONS  (STANDING):  albuterol/ipratropium for Nebulization 3 milliLiter(s) Nebulizer every 6 hours  ALPRAZolam 0.25 milliGRAM(s) Oral at bedtime  aspirin enteric coated 81 milliGRAM(s) Oral daily  chlorhexidine 2% Cloths 1 Application(s) Topical daily  dextrose 5%. 1000 milliLiter(s) (50 mL/Hr) IV Continuous <Continuous>  dextrose 5%. 1000 milliLiter(s) (100 mL/Hr) IV Continuous <Continuous>  dextrose 50% Injectable 50 milliLiter(s) IV Push every 15 minutes  dextrose 50% Injectable 25 milliLiter(s) IV Push every 15 minutes  finasteride 5 milliGRAM(s) Oral daily  fluticasone propionate 50 MICROgram(s)/spray Nasal Spray 1 Spray(s) Both Nostrils every 12 hours  fluticasone propionate/ salmeterol 250-50 MICROgram(s) Diskus 1 Dose(s) Inhalation two times a day  folic acid 1 milliGRAM(s) Oral daily  gabapentin 300 milliGRAM(s) Oral at bedtime  glucagon  Injectable 1 milliGRAM(s) IntraMuscular once  guaiFENesin  milliGRAM(s) Oral every 12 hours  heparin   Injectable 5000 Unit(s) SubCutaneous every 8 hours  influenza  Vaccine (HIGH DOSE) 0.5 milliLiter(s) IntraMuscular once  insulin glargine Injectable (LANTUS) 40 Unit(s) SubCutaneous before breakfast  insulin lispro (ADMELOG) corrective regimen sliding scale   SubCutaneous three times a day before meals  insulin lispro (ADMELOG) corrective regimen sliding scale   SubCutaneous at bedtime  insulin lispro Injectable (ADMELOG) 10 Unit(s) SubCutaneous three times a day before meals  iron sucrose IVPB 200 milliGRAM(s) IV Intermittent every 24 hours  lidocaine   4% Patch 1 Patch Transdermal every 24 hours  lidocaine   4% Patch 1 Patch Transdermal every 24 hours  mupirocin 2% Nasal 1 Application(s) Both Nostrils every 12 hours  pantoprazole    Tablet 40 milliGRAM(s) Oral before breakfast  phenylephrine    Infusion 0.05 MICROgram(s)/kG/Min (2.14 mL/Hr) IV Continuous <Continuous>  psyllium Powder 1 Packet(s) Oral every 12 hours  rosuvastatin 20 milliGRAM(s) Oral at bedtime  senna 2 Tablet(s) Oral at bedtime  sertraline 25 milliGRAM(s) Oral daily  spironolactone 25 milliGRAM(s) Oral two times a day  tamsulosin 0.4 milliGRAM(s) Oral at bedtime    MEDICATIONS  (PRN):  dextrose Oral Gel 15 Gram(s) Oral once PRN Blood Glucose LESS THAN 70 milliGRAM(s)/deciliter  hydrALAZINE Injectable 5 milliGRAM(s) IV Push every 4 hours PRN Systolic > 160  labetalol Injectable 10 milliGRAM(s) IV Push every 6 hours PRN Diastolic blood pressure >155  melatonin 5 milliGRAM(s) Oral at bedtime PRN Insomnia  ondansetron Injectable 4 milliGRAM(s) IV Push every 4 hours PRN Nausea and/or Vomiting  oxyCODONE    IR 10 milliGRAM(s) Oral every 6 hours PRN Severe Pain (7 - 10)  oxyCODONE    IR 5 milliGRAM(s) Oral every 4 hours PRN Moderate Pain (4 - 6)    RADIOLOGY:  Chest X-ray Reviewed    REVIEW OF SYSTEMS:  CONSTITUTIONAL: [X] all negative; [ ] weakness, [ ] fevers, [ ] chills  EYES/ENT: [X] all negative; [ ] visual changes, [ ] vertigo, [ ] throat pain   NECK: [X] all negative; [ ] pain, [ ] stiffness  RESPIRATORY: [x] all negative, [ ] cough, [ ] wheezing, [ ] hemoptysis, [ ] shortness of breath  CARDIOVASCULAR: [x] all negative; [ ] chest pain, [ ] palpitations, [ ] orthopnea  GASTROINTESTINAL: [X] all negative; [ ]abdominal pain, [ ] nausea, [ ] vomiting, [ ] hematemesis, [ ] diarrhea, [ ] constipation, [ ] melena, [ ] hematochezia.  GENITOURINARY: [X] all negative; [ ] dysuria, [ ] frequency, [ ] hematuria  NEUROLOGICAL: [X] all negative; [ ] numbness, [ ] weakness  SKIN: [X] all negative; [ ] itching, [ ] burning, [ ] rashes, [ ] lesions   All other review of systems is negative unless indicated above.  [  ] Unable to assess ROS because       PHYSICAL EXAM:          CONSTITUTIONAL: Well-developed; well-nourished; in no acute distress.   	SKIN: warm, dry  	HEAD: Normocephalic; atraumatic.  	EYES: PERRL, EOM, no conj injection, sclera clear  	ENT: No nasal discharge; airway clear.  	NECK: Supple; non tender.   	CARD: S1, S2 normal; no murmurs, gallops, or rubs. Regular rate and rhythm.  no carotid bruits  	RESP: CTA B/L; good air movement No wheezes, rales or rhonchi.  	ABD: Soft, not tender, not distended,  no rebound or guarding, bowel sounds present  	EXT:+ Edema  	NEURO: Alert, awake, motor 5/5 R, 5/5 L     Subjective and Objective:   HPI: 64M w/ afib, nonobstructive CAD, CHF class 3, severe MR, DM, HTN, JIMMY on CPAP, asthma, severe obesity, HL, Madelung's disease, osteoarthritis s/p bilateral hip replacement, peripheral neuropathy presented with increasing dyspnea with exertion found to have severe MR.  He was evaluated for mitral clip and not a candidate.  On prior admission he had FLAKITO with creat up to 2.  Was optimized with diuresis.  He was on AC and developed hemorrhoidal bleeding and was taken off AC.  He was discharged and returned for his procedure on 1/28    OR Data  Procedure: Procedure: MVReplacement (bioprosthetic), SHEILA ligation, MAZE  Bypass: 202  Cross Clamp: 158  Pre LV Fxn: 50-55%      RV Fxn: normal  Post LV Fxn: 45-50%     RV Fxn: normal    moderate TR, MV gradient 3mmHg  Blood Products: 1uPRBC, ddavp 39mcq  Cell Saver: 698  Fluids: NS 2000, albumin 2000  Pacing Wires: V pacing. sinus rhythm  UO: 1100    1/28 - OP day :  Fany and poor oxygenation in OR. Milrinone, norepi, vaso. Extubation in evening.  1/29 - start aspirin, diuresis, heparin dvt prophylaxis  1/30- d/c pleural ct if drainage dec; wean to dc primacor; wean high flow o2  1/31 - FLAKITO worsening, bradyarrythmias - amandanchebach - ep consulted  2/2 - Chest tube removed, ambulating, creat improving, dobutamine weaned  2/3 - Dobut to 1.5, BUN elevated  2/4 - Confused this AM, normal neuro exam, prince removed  2/5 - FLAKITO improving, off dobutamine, mil decreased   2/6 - milrinone stopped, bumex 1mg BID  2/13 hypotension on neosynephrine overnight, off this morning      Procedure: MV Replacemant Maze  LA Ligation  POD#: 18    He has history of Diabetes HTN (hypertension)   Obstructive sleep apnea on CPAP  Asthma  Obesity  Peripheral neuropathy  Arthritis  Hypercholesteremia  Madelung's disease  FH: mitral regurgitation  CHF NYHA class III  Atrial fibrillation    ================================  OBJECTIVE:  ICU Vital Signs Last 24 Hrs  T(C): 36.7 (16 Feb 2025 20:00), Max: 37 (16 Feb 2025 04:00)  T(F): 98 (16 Feb 2025 20:00), Max: 98.6 (16 Feb 2025 04:00)  HR: 76 (17 Feb 2025 00:14) (73 - 87)  BP: 157/72 (17 Feb 2025 00:14) (105/54 - 168/79)  BP(mean): 103 (17 Feb 2025 00:14) (74 - 113)  ABP: --  ABP(mean): --  RR: 19 (17 Feb 2025 00:14) (13 - 31)  SpO2: 96% (17 Feb 2025 00:14) (89% - 100%)    O2 Parameters below as of 17 Feb 2025 00:14  Patient On (Oxygen Delivery Method): BiPAP/CPAP          I&O's Summary    15 Feb 2025 07:01  -  16 Feb 2025 07:00  --------------------------------------------------------  IN: 0 mL / OUT: 1521 mL / NET: -1521 mL    16 Feb 2025 07:01  -  17 Feb 2025 02:56  --------------------------------------------------------  IN: 1170 mL / OUT: 1355 mL / NET: -185 mL      I&O's Detail    15 Feb 2025 07:01  -  16 Feb 2025 07:00  --------------------------------------------------------  IN:  Total IN: 0 mL    OUT:    Blood Loss (mL): 1 mL    Indwelling Catheter - Urethral (mL): 1520 mL  Total OUT: 1521 mL    Total NET: -1521 mL      16 Feb 2025 07:01  -  17 Feb 2025 02:56  --------------------------------------------------------  IN:    IV PiggyBack: 100 mL    IV PiggyBack: 150 mL    Oral Fluid: 920 mL  Total IN: 1170 mL    OUT:    Indwelling Catheter - Urethral (mL): 705 mL    Voided (mL): 650 mL  Total OUT: 1355 mL    Total NET: -185 mL    CAPILLARY BLOOD GLUCOSE  POCT Blood Glucose.: 189 mg/dL (16 Feb 2025 22:03)  POCT Blood Glucose.: 98 mg/dL (16 Feb 2025 16:42)  POCT Blood Glucose.: 178 mg/dL (16 Feb 2025 11:35)  POCT Blood Glucose.: 148 mg/dL (16 Feb 2025 06:44)    LABS:                          7.9    6.17  )-----------( 146      ( 16 Feb 2025 04:48 )             27.2     02-16    139  |  102  |  31[H]  ----------------------------<  94  4.8   |  30  |  0.9    Ca    8.8      16 Feb 2025 16:43  Phos  3.2     02-16  Mg     2.2     02-16    TPro  6.1  /  Alb  3.6  /  TBili  0.3  /  DBili  x   /  AST  17  /  ALT  15  /  AlkPhos  107  02-16        Home Medications:  Breo Ellipta 200 mcg-25 mcg/inh inhalation powder: 1 inhaled once a day (28 Jan 2025 06:52)  HYDROcodone 10 mg oral capsule, extended release: 1 cap(s) orally 2 times a day (28 Jan 2025 06:52)  losartan 50 mg oral tablet: 1 tab(s) orally 2 times a day (28 Jan 2025 06:52)  losartan-hydrochlorothiazide 50 mg-12.5 mg oral tablet: 1 tab(s) orally (28 Jan 2025 06:52)  ProAir HFA 90 mcg/inh inhalation aerosol: 2 puff(s) inhaled 2 times a day (28 Jan 2025 06:52)  tujeo: 80 unit(s) subcutaneous once a day (28 Jan 2025 06:52)    HOSPITAL MEDICATIONS:  MEDICATIONS  (STANDING):  albuterol/ipratropium for Nebulization 3 milliLiter(s) Nebulizer every 6 hours  ALPRAZolam 0.25 milliGRAM(s) Oral at bedtime  aspirin enteric coated 81 milliGRAM(s) Oral daily  chlorhexidine 2% Cloths 1 Application(s) Topical daily  dextrose 5%. 1000 milliLiter(s) (50 mL/Hr) IV Continuous <Continuous>  dextrose 5%. 1000 milliLiter(s) (100 mL/Hr) IV Continuous <Continuous>  dextrose 50% Injectable 50 milliLiter(s) IV Push every 15 minutes  dextrose 50% Injectable 25 milliLiter(s) IV Push every 15 minutes  finasteride 5 milliGRAM(s) Oral daily  fluticasone propionate 50 MICROgram(s)/spray Nasal Spray 1 Spray(s) Both Nostrils every 12 hours  fluticasone propionate/ salmeterol 250-50 MICROgram(s) Diskus 1 Dose(s) Inhalation two times a day  folic acid 1 milliGRAM(s) Oral daily  gabapentin 300 milliGRAM(s) Oral at bedtime  glucagon  Injectable 1 milliGRAM(s) IntraMuscular once  guaiFENesin  milliGRAM(s) Oral every 12 hours  heparin   Injectable 5000 Unit(s) SubCutaneous every 8 hours  influenza  Vaccine (HIGH DOSE) 0.5 milliLiter(s) IntraMuscular once  insulin glargine Injectable (LANTUS) 40 Unit(s) SubCutaneous before breakfast  insulin lispro (ADMELOG) corrective regimen sliding scale   SubCutaneous three times a day before meals  insulin lispro (ADMELOG) corrective regimen sliding scale   SubCutaneous at bedtime  insulin lispro Injectable (ADMELOG) 10 Unit(s) SubCutaneous three times a day before meals  iron sucrose IVPB 200 milliGRAM(s) IV Intermittent every 24 hours  lidocaine   4% Patch 1 Patch Transdermal every 24 hours  lidocaine   4% Patch 1 Patch Transdermal every 24 hours  metoprolol tartrate 12.5 milliGRAM(s) Oral every 12 hours  mupirocin 2% Nasal 1 Application(s) Both Nostrils every 12 hours  pantoprazole    Tablet 40 milliGRAM(s) Oral before breakfast  phenylephrine    Infusion 0.05 MICROgram(s)/kG/Min (2.14 mL/Hr) IV Continuous <Continuous>  psyllium Powder 1 Packet(s) Oral every 12 hours  rosuvastatin 20 milliGRAM(s) Oral at bedtime  senna 2 Tablet(s) Oral at bedtime  sertraline 25 milliGRAM(s) Oral daily  spironolactone 25 milliGRAM(s) Oral two times a day  tamsulosin 0.4 milliGRAM(s) Oral at bedtime    MEDICATIONS  (PRN):  dextrose Oral Gel 15 Gram(s) Oral once PRN Blood Glucose LESS THAN 70 milliGRAM(s)/deciliter  hydrALAZINE Injectable 5 milliGRAM(s) IV Push every 4 hours PRN Systolic > 160  labetalol Injectable 10 milliGRAM(s) IV Push every 6 hours PRN Diastolic blood pressure >155  melatonin 5 milliGRAM(s) Oral at bedtime PRN Insomnia  ondansetron Injectable 4 milliGRAM(s) IV Push every 4 hours PRN Nausea and/or Vomiting  oxyCODONE    IR 5 milliGRAM(s) Oral every 4 hours PRN Moderate Pain (4 - 6)  oxyCODONE    IR 10 milliGRAM(s) Oral every 6 hours PRN Severe Pain (7 - 10)    RADIOLOGY:  Chest X-ray Reviewed    REVIEW OF SYSTEMS:  CONSTITUTIONAL: [X] all negative; [ ] weakness, [ ] fevers, [ ] chills  EYES/ENT: [X] all negative; [ ] visual changes, [ ] vertigo, [ ] throat pain   NECK: [X] all negative; [ ] pain, [ ] stiffness  RESPIRATORY: [] all negative, [ ] cough, [ ] wheezing, [ ] hemoptysis, [ ] shortness of breath  CARDIOVASCULAR: [] all negative; [ ] chest pain, [ ] palpitations, [ ] orthopnea  GASTROINTESTINAL: [X] all negative; [ ]abdominal pain, [ ] nausea, [ ] vomiting, [ ] hematemesis, [ ] diarrhea, [ ] constipation, [ ] melena, [ ] hematochezia.  GENITOURINARY: [X] all negative; [ ] dysuria, [ ] frequency, [ ] hematuria  NEUROLOGICAL: [X] all negative; [ ] numbness, [ ] weakness  SKIN: [X] all negative; [ ] itching, [ ] burning, [ ] rashes, [ ] lesions   All other review of systems is negative unless indicated above.  [  ] Unable to assess ROS because   PHYSICAL EXAM:          CONSTITUTIONAL: Well-developed; well-nourished; in no acute distress.   	SKIN: warm, dry  	HEAD: Normocephalic; atraumatic.  	EYES: PERRL, EOM, no conj injection, sclera clear  	ENT: No nasal discharge; airway clear.  	NECK: Supple; non tender.   	CARD: S1, S2 normal; no murmurs, gallops, or rubs. Regular rate and rhythm.  no carotid bruits  	RESP: CTA B/L; good air movement No wheezes, rales or rhonchi.  	ABD: Soft, not tender, not distended,  no rebound or guarding, bowel sounds present  	EXT:+ Edema  	NEURO: Alert, awake, motor 5/5 R, 5/5 L

## 2025-02-16 NOTE — PROGRESS NOTE ADULT - ASSESSMENT
Assessment  64M s/p MVR, LAAC, MAZE POD #18  Post op complicated by acute pulmonary insufficiency, FLAKITO, cardiogenic shock req inotropes    Plan:    Neuro:  Multimodal pain management - no toradol, minimize opiates  100 TID gabapentin  0.25 xanax PRN at night  Tylenol, Lidoderm patch, opiates as needed  Monitor neuro status in the post op period      CV:  S/P MVR, LAAC, MAZE  Maintain MAP > 65  ASA,statin lopressor on hold due to hypotension overnight      Resp:  Supplemental oxygen to maintain sats > 92%  RA 93%  Continuous pulse oximetry monitoring  BiPAP at night  NC during the day  IS / Chest PT  OOBTC and Ambulate  Nebulizers as needed - albuterol/atrovent  Advair    GI:  Heart healthy diet  Bowel Regimen - escalate as needed  PUD ppx per protocol    Renal  FLAKITO post surgery resolved   Baseline CKD  Monitor UOP, lytes, creatinine  Diamox for metabolic alkalosis - can use aldactone instead if hypokalemia  Keep K > 4, Mg > 2    Endo:  Tight glucose control per [facility] protocol  Lantus 40 / premeal 10  SSI    Heme:  Acute blood loss anemia post surgery  High risk for thrombocytopenia  DVT ppx with HSQ  Iron / FA / MVI    ID:  Monitor fever curve and WBC count    Patient requires continuous monitoring with:  bedside rhythm monitoring, continuous  pulse oximetry monitoring, O2 supplement titrate for O2 sat above 90%  ;   Care plan discussed with CT ICU care team and attending surgeon Dr Zhao                         .  patient remain critical, at risk for life threatening decompensation; required more than usual post op care;   I have spent 35 minutes providing non routine post op care, revaluated multiple times. Assessment  64M s/p MVR, LAAC, MAZE POD #18  Post op complicated by acute pulmonary insufficiency, FLAKITO, cardiogenic shock req inotropes    Plan:    Neuro:  Multimodal pain management - no toradol, minimize opiates  100 TID gabapentin  0.25 xanax PRN at night  Tylenol, Lidoderm patch, opiates as needed  Monitor neuro status in the post op period      CV:  S/P MVR, LAAC, MAZE  one episode of NSVT ( 6 beats) , replaced k, mg,started lopressor 12.5 mg bid   Maintain MAP > 65  ASA,statin lopressor       Resp:  Supplemental oxygen to maintain sats > 92%  RA 93%  Continuous pulse oximetry monitoring  BiPAP at night  NC during the day  IS / Chest PT  OOBTC and Ambulate  Nebulizers as needed - albuterol/atrovent  Advair    GI:  Heart healthy diet  Bowel Regimen - escalate as needed  PUD ppx per protocol    Renal  FLAKITO post surgery resolved   Baseline CKD  Monitor UOP, lytes, creatinine  aldactone for hypokalemia  Keep K > 4, Mg > 2  d/c Caceres     Endo:  Tight glucose control per [facility] protocol  Lantus 40 / premeal 10  SSI    Heme:  Acute blood loss anemia post surgery  High risk for thrombocytopenia  DVT ppx with HSQ  Iron / FA / MVI    ID:  Monitor fever curve and WBC count    Patient requires continuous monitoring with:  bedside rhythm monitoring, continuous  pulse oximetry monitoring, O2 supplement titrate for O2 sat above 90%  ;   Care plan discussed with CT ICU care team and attending surgeon Dr Zhao                         .  patient remain critical, at risk for life threatening decompensation; required more than usual post op care;   I have spent 35 minutes providing non routine post op care, revaluated multiple times.

## 2025-02-17 LAB
ALBUMIN SERPL ELPH-MCNC: 3.7 G/DL — SIGNIFICANT CHANGE UP (ref 3.5–5.2)
ALP SERPL-CCNC: 98 U/L — SIGNIFICANT CHANGE UP (ref 30–115)
ALT FLD-CCNC: 14 U/L — SIGNIFICANT CHANGE UP (ref 0–41)
ANION GAP SERPL CALC-SCNC: 8 MMOL/L — SIGNIFICANT CHANGE UP (ref 7–14)
AST SERPL-CCNC: 15 U/L — SIGNIFICANT CHANGE UP (ref 0–41)
BILIRUB SERPL-MCNC: 0.3 MG/DL — SIGNIFICANT CHANGE UP (ref 0.2–1.2)
BUN SERPL-MCNC: 36 MG/DL — HIGH (ref 10–20)
CALCIUM SERPL-MCNC: 8.8 MG/DL — SIGNIFICANT CHANGE UP (ref 8.4–10.5)
CHLORIDE SERPL-SCNC: 102 MMOL/L — SIGNIFICANT CHANGE UP (ref 98–110)
CO2 SERPL-SCNC: 29 MMOL/L — SIGNIFICANT CHANGE UP (ref 17–32)
CREAT SERPL-MCNC: 1.1 MG/DL — SIGNIFICANT CHANGE UP (ref 0.7–1.5)
EGFR: 74 ML/MIN/1.73M2 — SIGNIFICANT CHANGE UP
GLUCOSE BLDC GLUCOMTR-MCNC: 157 MG/DL — HIGH (ref 70–99)
GLUCOSE BLDC GLUCOMTR-MCNC: 158 MG/DL — HIGH (ref 70–99)
GLUCOSE BLDC GLUCOMTR-MCNC: 174 MG/DL — HIGH (ref 70–99)
GLUCOSE BLDC GLUCOMTR-MCNC: 95 MG/DL — SIGNIFICANT CHANGE UP (ref 70–99)
GLUCOSE SERPL-MCNC: 177 MG/DL — HIGH (ref 70–99)
HCT VFR BLD CALC: 28.6 % — LOW (ref 42–52)
HGB BLD-MCNC: 8.1 G/DL — LOW (ref 14–18)
MAGNESIUM SERPL-MCNC: 2.3 MG/DL — SIGNIFICANT CHANGE UP (ref 1.8–2.4)
MCHC RBC-ENTMCNC: 26.9 PG — LOW (ref 27–31)
MCHC RBC-ENTMCNC: 28.3 G/DL — LOW (ref 32–37)
MCV RBC AUTO: 95 FL — HIGH (ref 80–94)
NRBC BLD AUTO-RTO: 0 /100 WBCS — SIGNIFICANT CHANGE UP (ref 0–0)
PLATELET # BLD AUTO: 143 K/UL — SIGNIFICANT CHANGE UP (ref 130–400)
PMV BLD: 11.6 FL — HIGH (ref 7.4–10.4)
POTASSIUM SERPL-MCNC: 4.6 MMOL/L — SIGNIFICANT CHANGE UP (ref 3.5–5)
POTASSIUM SERPL-SCNC: 4.6 MMOL/L — SIGNIFICANT CHANGE UP (ref 3.5–5)
PROT SERPL-MCNC: 6.2 G/DL — SIGNIFICANT CHANGE UP (ref 6–8)
RBC # BLD: 3.01 M/UL — LOW (ref 4.7–6.1)
RBC # FLD: 20.1 % — HIGH (ref 11.5–14.5)
SODIUM SERPL-SCNC: 139 MMOL/L — SIGNIFICANT CHANGE UP (ref 135–146)
WBC # BLD: 6.92 K/UL — SIGNIFICANT CHANGE UP (ref 4.8–10.8)
WBC # FLD AUTO: 6.92 K/UL — SIGNIFICANT CHANGE UP (ref 4.8–10.8)

## 2025-02-17 PROCEDURE — 93010 ELECTROCARDIOGRAM REPORT: CPT

## 2025-02-17 PROCEDURE — 71045 X-RAY EXAM CHEST 1 VIEW: CPT | Mod: 26

## 2025-02-17 PROCEDURE — 99291 CRITICAL CARE FIRST HOUR: CPT | Mod: 24

## 2025-02-17 RX ORDER — FUROSEMIDE 10 MG/ML
20 INJECTION INTRAMUSCULAR; INTRAVENOUS ONCE
Refills: 0 | Status: COMPLETED | OUTPATIENT
Start: 2025-02-17 | End: 2025-02-17

## 2025-02-17 RX ORDER — LIDOCAINE HYDROCHLORIDE 20 MG/ML
1 JELLY TOPICAL DAILY
Refills: 0 | Status: DISCONTINUED | OUTPATIENT
Start: 2025-02-17 | End: 2025-02-24

## 2025-02-17 RX ORDER — SODIUM CHLORIDE 0.65 %
1 AEROSOL, SPRAY (ML) NASAL
Refills: 0 | Status: DISCONTINUED | OUTPATIENT
Start: 2025-02-17 | End: 2025-02-24

## 2025-02-17 RX ORDER — SPIRONOLACTONE 25 MG
25 TABLET ORAL DAILY
Refills: 0 | Status: DISCONTINUED | OUTPATIENT
Start: 2025-02-18 | End: 2025-02-24

## 2025-02-17 RX ORDER — MAGNESIUM OXIDE 400 MG
400 TABLET ORAL
Refills: 0 | Status: DISCONTINUED | OUTPATIENT
Start: 2025-02-17 | End: 2025-02-24

## 2025-02-17 RX ORDER — FUROSEMIDE 10 MG/ML
20 INJECTION INTRAMUSCULAR; INTRAVENOUS DAILY
Refills: 0 | Status: DISCONTINUED | OUTPATIENT
Start: 2025-02-17 | End: 2025-02-18

## 2025-02-17 RX ADMIN — HEPARIN SODIUM 5000 UNIT(S): 1000 INJECTION INTRAVENOUS; SUBCUTANEOUS at 06:14

## 2025-02-17 RX ADMIN — FINASTERIDE 5 MILLIGRAM(S): 1 TABLET, FILM COATED ORAL at 11:49

## 2025-02-17 RX ADMIN — Medication 1 APPLICATION(S): at 12:11

## 2025-02-17 RX ADMIN — Medication 400 MILLIGRAM(S): at 12:11

## 2025-02-17 RX ADMIN — ROSUVASTATIN CALCIUM 20 MILLIGRAM(S): 20 TABLET, FILM COATED ORAL at 21:03

## 2025-02-17 RX ADMIN — TAMSULOSIN HYDROCHLORIDE 0.4 MILLIGRAM(S): 0.4 CAPSULE ORAL at 21:05

## 2025-02-17 RX ADMIN — Medication 81 MILLIGRAM(S): at 11:49

## 2025-02-17 RX ADMIN — Medication 1 PACKET(S): at 17:30

## 2025-02-17 RX ADMIN — FLUTICASONE PROPIONATE 1 SPRAY(S): 50 SPRAY, METERED NASAL at 06:22

## 2025-02-17 RX ADMIN — OXYCODONE HYDROCHLORIDE 10 MILLIGRAM(S): 30 TABLET ORAL at 12:17

## 2025-02-17 RX ADMIN — LIDOCAINE HYDROCHLORIDE 1 PATCH: 20 JELLY TOPICAL at 18:42

## 2025-02-17 RX ADMIN — INSULIN LISPRO 10 UNIT(S): 100 INJECTION, SOLUTION INTRAVENOUS; SUBCUTANEOUS at 07:17

## 2025-02-17 RX ADMIN — INSULIN LISPRO 2: 100 INJECTION, SOLUTION INTRAVENOUS; SUBCUTANEOUS at 06:22

## 2025-02-17 RX ADMIN — HEPARIN SODIUM 5000 UNIT(S): 1000 INJECTION INTRAVENOUS; SUBCUTANEOUS at 13:40

## 2025-02-17 RX ADMIN — LIDOCAINE HYDROCHLORIDE 1 PATCH: 20 JELLY TOPICAL at 21:00

## 2025-02-17 RX ADMIN — INSULIN LISPRO 0: 100 INJECTION, SOLUTION INTRAVENOUS; SUBCUTANEOUS at 11:48

## 2025-02-17 RX ADMIN — FUROSEMIDE 20 MILLIGRAM(S): 10 INJECTION INTRAMUSCULAR; INTRAVENOUS at 00:32

## 2025-02-17 RX ADMIN — INSULIN LISPRO 10 UNIT(S): 100 INJECTION, SOLUTION INTRAVENOUS; SUBCUTANEOUS at 17:29

## 2025-02-17 RX ADMIN — Medication 40 MILLIGRAM(S): at 06:22

## 2025-02-17 RX ADMIN — HEPARIN SODIUM 5000 UNIT(S): 1000 INJECTION INTRAVENOUS; SUBCUTANEOUS at 21:03

## 2025-02-17 RX ADMIN — Medication 20 MILLIEQUIVALENT(S): at 11:49

## 2025-02-17 RX ADMIN — SERTRALINE 25 MILLIGRAM(S): 100 TABLET, FILM COATED ORAL at 11:49

## 2025-02-17 RX ADMIN — IRON SUCROSE 100 MILLIGRAM(S): 20 INJECTION, SOLUTION INTRAVENOUS at 18:06

## 2025-02-17 RX ADMIN — OXYCODONE HYDROCHLORIDE 10 MILLIGRAM(S): 30 TABLET ORAL at 22:00

## 2025-02-17 RX ADMIN — OXYCODONE HYDROCHLORIDE 10 MILLIGRAM(S): 30 TABLET ORAL at 21:01

## 2025-02-17 RX ADMIN — LIDOCAINE HYDROCHLORIDE 1 PATCH: 20 JELLY TOPICAL at 23:24

## 2025-02-17 RX ADMIN — LIDOCAINE HYDROCHLORIDE 1 PATCH: 20 JELLY TOPICAL at 00:30

## 2025-02-17 RX ADMIN — IPRATROPIUM BROMIDE AND ALBUTEROL SULFATE 3 MILLILITER(S): .5; 2.5 SOLUTION RESPIRATORY (INHALATION) at 13:17

## 2025-02-17 RX ADMIN — OXYCODONE HYDROCHLORIDE 10 MILLIGRAM(S): 30 TABLET ORAL at 06:00

## 2025-02-17 RX ADMIN — IPRATROPIUM BROMIDE AND ALBUTEROL SULFATE 3 MILLILITER(S): .5; 2.5 SOLUTION RESPIRATORY (INHALATION) at 08:09

## 2025-02-17 RX ADMIN — METOPROLOL SUCCINATE 12.5 MILLIGRAM(S): 50 TABLET, EXTENDED RELEASE ORAL at 06:14

## 2025-02-17 RX ADMIN — LIDOCAINE HYDROCHLORIDE 1 PATCH: 20 JELLY TOPICAL at 12:39

## 2025-02-17 RX ADMIN — Medication 400 MILLIGRAM(S): at 17:30

## 2025-02-17 RX ADMIN — Medication 1 SPRAY(S): at 17:31

## 2025-02-17 RX ADMIN — Medication 0.25 MILLIGRAM(S): at 21:05

## 2025-02-17 RX ADMIN — Medication 5 MILLIGRAM(S): at 04:05

## 2025-02-17 RX ADMIN — GABAPENTIN 300 MILLIGRAM(S): 400 CAPSULE ORAL at 21:03

## 2025-02-17 RX ADMIN — INSULIN LISPRO 2: 100 INJECTION, SOLUTION INTRAVENOUS; SUBCUTANEOUS at 17:29

## 2025-02-17 RX ADMIN — FLUTICASONE PROPIONATE 1 SPRAY(S): 50 SPRAY, METERED NASAL at 17:30

## 2025-02-17 RX ADMIN — FUROSEMIDE 20 MILLIGRAM(S): 10 INJECTION INTRAMUSCULAR; INTRAVENOUS at 10:23

## 2025-02-17 RX ADMIN — Medication 1 PACKET(S): at 06:15

## 2025-02-17 RX ADMIN — INSULIN LISPRO 10 UNIT(S): 100 INJECTION, SOLUTION INTRAVENOUS; SUBCUTANEOUS at 11:48

## 2025-02-17 RX ADMIN — FOLIC ACID 1 MILLIGRAM(S): 1 TABLET ORAL at 11:49

## 2025-02-17 RX ADMIN — OXYCODONE HYDROCHLORIDE 10 MILLIGRAM(S): 30 TABLET ORAL at 04:42

## 2025-02-17 RX ADMIN — DEXTROMETHORPHAN HBR, GUAIFENESIN 600 MILLIGRAM(S): 200 LIQUID ORAL at 06:14

## 2025-02-17 RX ADMIN — INSULIN GLARGINE-YFGN 40 UNIT(S): 100 INJECTION, SOLUTION SUBCUTANEOUS at 07:17

## 2025-02-17 RX ADMIN — METOPROLOL SUCCINATE 12.5 MILLIGRAM(S): 50 TABLET, EXTENDED RELEASE ORAL at 17:30

## 2025-02-17 RX ADMIN — OXYCODONE HYDROCHLORIDE 10 MILLIGRAM(S): 30 TABLET ORAL at 13:08

## 2025-02-17 RX ADMIN — Medication 25 MILLIGRAM(S): at 06:14

## 2025-02-17 NOTE — PROGRESS NOTE ADULT - SUBJECTIVE AND OBJECTIVE BOX
CTU Attending Progress Daily Note     17 Feb 2025 13:01  Procedure: CABG   POD#: 19  ambulating more      He has history of Diabetes    HTN (hypertension)    Obstructive sleep apnea on CPAP    Asthma    Obesity    Peripheral neuropathy    Arthritis    Hypercholesteremia    Madelung's disease    FH: mitral regurgitation    CHF NYHA class III    Atrial fibrillation    JIMMY on CPAP      HPI:      Hospital Course:    OBJECTIVE:  ICU Vital Signs Last 24 Hrs  T(C): 36.9 (17 Feb 2025 08:00), Max: 36.9 (17 Feb 2025 08:00)  T(F): 98.5 (17 Feb 2025 08:00), Max: 98.5 (17 Feb 2025 08:00)  HR: 82 (17 Feb 2025 12:00) (70 - 84)  BP: 140/60 (17 Feb 2025 12:00) (105/54 - 170/72)  BP(mean): 87 (17 Feb 2025 12:00) (72 - 113)  ABP: --  ABP(mean): --  RR: 20 (17 Feb 2025 12:00) (13 - 31)  SpO2: 93% (17 Feb 2025 11:00) (89% - 100%)    O2 Parameters below as of 17 Feb 2025 12:00  Patient On (Oxygen Delivery Method): room air          I&O's Summary    16 Feb 2025 07:01  -  17 Feb 2025 07:00  --------------------------------------------------------  IN: 1270 mL / OUT: 2305 mL / NET: -1035 mL    17 Feb 2025 07:01  -  17 Feb 2025 13:01  --------------------------------------------------------  IN: 360 mL / OUT: 400 mL / NET: -40 mL      I&O's Detail    16 Feb 2025 07:01  -  17 Feb 2025 07:00  --------------------------------------------------------  IN:    IV PiggyBack: 100 mL    IV PiggyBack: 150 mL    Oral Fluid: 1020 mL  Total IN: 1270 mL    OUT:    Indwelling Catheter - Urethral (mL): 705 mL    Voided (mL): 1600 mL  Total OUT: 2305 mL    Total NET: -1035 mL      17 Feb 2025 07:01  -  17 Feb 2025 13:01  --------------------------------------------------------  IN:    Oral Fluid: 360 mL  Total IN: 360 mL    OUT:    Voided (mL): 400 mL  Total OUT: 400 mL    Total NET: -40 mL        Adult Advanced Hemodynamics Last 24 Hrs  CVP(mm Hg): --  CVP(cm H2O): --  CO: --  CI: --  PA: --  PA(mean): --  PCWP: --  SVR: --  SVRI: --  PVR: --  PVRI: --    CAPILLARY BLOOD GLUCOSE      POCT Blood Glucose.: 95 mg/dL (17 Feb 2025 11:26)  POCT Blood Glucose.: 174 mg/dL (17 Feb 2025 06:17)  POCT Blood Glucose.: 189 mg/dL (16 Feb 2025 22:03)  POCT Blood Glucose.: 98 mg/dL (16 Feb 2025 16:42)    LABS:                          8.1    6.92  )-----------( 143      ( 17 Feb 2025 05:50 )             28.6     02-17    139  |  102  |  36[H]  ----------------------------<  177[H]  4.6   |  29  |  1.1    Ca    8.8      17 Feb 2025 05:50  Phos  3.2     02-16  Mg     2.3     02-17    TPro  6.2  /  Alb  3.7  /  TBili  0.3  /  DBili  x   /  AST  15  /  ALT  14  /  AlkPhos  98  02-17      Urinalysis Basic - ( 17 Feb 2025 05:50 )    Color: x / Appearance: x / SG: x / pH: x  Gluc: 177 mg/dL / Ketone: x  / Bili: x / Urobili: x   Blood: x / Protein: x / Nitrite: x   Leuk Esterase: x / RBC: x / WBC x   Sq Epi: x / Non Sq Epi: x / Bacteria: x        Home Medications:  Breo Ellipta 200 mcg-25 mcg/inh inhalation powder: 1 inhaled once a day (28 Jan 2025 06:52)  HYDROcodone 10 mg oral capsule, extended release: 1 cap(s) orally 2 times a day (28 Jan 2025 06:52)  losartan 50 mg oral tablet: 1 tab(s) orally 2 times a day (28 Jan 2025 06:52)  losartan-hydrochlorothiazide 50 mg-12.5 mg oral tablet: 1 tab(s) orally (28 Jan 2025 06:52)  ProAir HFA 90 mcg/inh inhalation aerosol: 2 puff(s) inhaled 2 times a day (28 Jan 2025 06:52)  tujeo: 80 unit(s) subcutaneous once a day (28 Jan 2025 06:52)    HOSPITAL MEDICATIONS:  MEDICATIONS  (STANDING):  albuterol/ipratropium for Nebulization 3 milliLiter(s) Nebulizer every 6 hours  ALPRAZolam 0.25 milliGRAM(s) Oral at bedtime  aspirin enteric coated 81 milliGRAM(s) Oral daily  chlorhexidine 2% Cloths 1 Application(s) Topical daily  dextrose 5%. 1000 milliLiter(s) (50 mL/Hr) IV Continuous <Continuous>  dextrose 5%. 1000 milliLiter(s) (100 mL/Hr) IV Continuous <Continuous>  dextrose 50% Injectable 50 milliLiter(s) IV Push every 15 minutes  dextrose 50% Injectable 25 milliLiter(s) IV Push every 15 minutes  finasteride 5 milliGRAM(s) Oral daily  fluticasone propionate 50 MICROgram(s)/spray Nasal Spray 1 Spray(s) Both Nostrils every 12 hours  fluticasone propionate/ salmeterol 250-50 MICROgram(s) Diskus 1 Dose(s) Inhalation two times a day  folic acid 1 milliGRAM(s) Oral daily  furosemide    Tablet 20 milliGRAM(s) Oral daily  gabapentin 300 milliGRAM(s) Oral at bedtime  glucagon  Injectable 1 milliGRAM(s) IntraMuscular once  heparin   Injectable 5000 Unit(s) SubCutaneous every 8 hours  influenza  Vaccine (HIGH DOSE) 0.5 milliLiter(s) IntraMuscular once  insulin glargine Injectable (LANTUS) 40 Unit(s) SubCutaneous before breakfast  insulin lispro (ADMELOG) corrective regimen sliding scale   SubCutaneous three times a day before meals  insulin lispro (ADMELOG) corrective regimen sliding scale   SubCutaneous at bedtime  insulin lispro Injectable (ADMELOG) 10 Unit(s) SubCutaneous three times a day before meals  iron sucrose IVPB 200 milliGRAM(s) IV Intermittent every 24 hours  lidocaine   4% Patch 1 Patch Transdermal every 24 hours  magnesium oxide 400 milliGRAM(s) Oral three times a day with meals  metoprolol tartrate 12.5 milliGRAM(s) Oral every 12 hours  pantoprazole    Tablet 40 milliGRAM(s) Oral before breakfast  potassium chloride    Tablet ER 20 milliEquivalent(s) Oral daily  psyllium Powder 1 Packet(s) Oral every 12 hours  rosuvastatin 20 milliGRAM(s) Oral at bedtime  senna 2 Tablet(s) Oral at bedtime  sertraline 25 milliGRAM(s) Oral daily  tamsulosin 0.4 milliGRAM(s) Oral at bedtime    MEDICATIONS  (PRN):  dextrose Oral Gel 15 Gram(s) Oral once PRN Blood Glucose LESS THAN 70 milliGRAM(s)/deciliter  hydrALAZINE Injectable 5 milliGRAM(s) IV Push every 4 hours PRN Systolic > 160  labetalol Injectable 10 milliGRAM(s) IV Push every 6 hours PRN Diastolic blood pressure >155  melatonin 5 milliGRAM(s) Oral at bedtime PRN Insomnia  oxyCODONE    IR 5 milliGRAM(s) Oral every 4 hours PRN Moderate Pain (4 - 6)  oxyCODONE    IR 10 milliGRAM(s) Oral every 6 hours PRN Severe Pain (7 - 10)  sodium chloride 0.65% Nasal 1 Spray(s) Both Nostrils four times a day PRN Nasal Congestion    RADIOLOGY:  Chest X-ray Reviewed    REVIEW OF SYSTEMS:  CONSTITUTIONAL: [X] all negative; [ ] weakness, [ ] fevers, [ ] chills  EYES/ENT: [X] all negative; [ ] visual changes, [ ] vertigo, [ ] throat pain   NECK: [X] all negative; [ ] pain, [ ] stiffness  RESPIRATORY: [] all negative, [ ] cough, [ ] wheezing, [ ] hemoptysis, [ ] shortness of breath  CARDIOVASCULAR: [] all negative; [ ] chest pain, [ ] palpitations, [ ] orthopnea  GASTROINTESTINAL: [X] all negative; [ ]abdominal pain, [ ] nausea, [ ] vomiting, [ ] hematemesis, [ ] diarrhea, [ ] constipation, [ ] melena, [ ] hematochezia.  GENITOURINARY: [X] all negative; [ ] dysuria, [ ] frequency, [ ] hematuria  NEUROLOGICAL: [X] all negative; [ ] numbness, [ ] weakness  SKIN: [X] all negative; [ ] itching, [ ] burning, [ ] rashes, [ ] lesions   All other review of systems is negative unless indicated above.  [  ] Unable to assess ROS because     PHYSICAL EXAM:          CONSTITUTIONAL: Well-developed; well-nourished; in no acute distress.   	SKIN: warm, dry  	HEAD: Normocephalic; atraumatic.  	EYES: PERRL, EOM, no conj injection, sclera clear  	ENT: No nasal discharge; airway clear.  	NECK: Supple; non tender.   	CARD: S1, S2 normal; no murmurs, gallops, or rubs. Regular rate and rhythm.  no carotid bruits  	RESP: CTA B/L; good air movement No wheezes, rales or rhonchi.  	ABD: Soft, not tender, not distended,  no rebound or guarding, bowel sounds present  	EXT: Normal ROM.  No clubbing, cyanosis or edema.   warm and well perfused.  pulses palpable  	NEURO: Alert, awake, motor 5/5 R, 5/5 L                  Upon my evaluation, the above patient has a high probabillity of imminent or life threatening deterioration due to a high risk for Shock, Cardiogenic shock, arrythmias, acute blood loss, acute kidney injury and acute pulmonary insufficiency which required my direct attention, intervention, and personal management.  Total time was 55 minutes which includes review of radiology results, ordering, interpreting and reviewing diagnostic studies / lab tests with immediate assessment and treatment, consultant collaboration on findings and treatment options, monitoring for potential decompensation, obtaining history from family, EMT, nursing home staff and/or treating physicians; directing the titration of pressors, oxygen support devices, fluid resucitation, interpretation of cardiac output measurements, interpretation of radiological assessments, interpretation of EKG / rhythm strips, telemetry.  This time did not overlap with the management of other patients or performing separately reportable procedures.      Management of this patient was discussed with the CT surgery attending and mid-level providers.    I ackknowledge the use of copied documentation.  All copy forward documentation is my own and has been reviewed and revised as appropriate.  If no changes were made, it is because that information remains the same.

## 2025-02-17 NOTE — PROGRESS NOTE ADULT - ASSESSMENT
Assessment  64M s/p MVR, LAAC, MAZE POD #19  Post op complicated by acute pulmonary insufficiency, FLAKITO, cardiogenic shock req inotropes    Plan:    Neuro:  Multimodal pain management - no toradol, minimize opiates  100 TID gabapentin  0.25 xanax PRN at night  Tylenol, Lidoderm patch, opiates as needed  Monitor neuro status in the post op period      CV:  S/P MVR, LAAC, MAZE   HTN lopressor 12.5 mg bid aldacton 25 once a day  Maintain MAP > 65  ASA, statin lopressor   lasix 20 iv daily    Resp:  Supplemental oxygen to maintain sats > 92%  RA 93%  Continuous pulse oximetry monitoring  BiPAP at night  NC during the day  IS / Chest PT  OOBTC and Ambulate  Nebulizers as needed - albuterol/atrovent  Advair    GI:  Heart healthy diet  Bowel Regimen - escalate as needed  PUD ppx per protocol    Renal  FLAKITO post surgery resolved   Baseline CKD  Monitor UOP, lytes, creatinine  aldactone for hypokalemia  Keep K > 4, Mg > 2  d/c Caceres     Endo:  Tight glucose control per CTICU protocol  Lantus 40 / premeal 10  SSI    Heme:  Acute blood loss anemia post surgery  High risk for thrombocytopenia  DVT ppx with HSQ  Iron / FA / MVI    ID:  Monitor fever curve and WBC count    Patient requires continuous monitoring with:  bedside rhythm monitoring, continuous  pulse oximetry monitoring, O2 supplement titrate for O2 sat above 90%  ;   Care plan discussed with CT ICU care team and attending surgeon Dr Zhao                         .  patient remain critical, at risk for life threatening decompensation; required more than usual post op care;   I have spent 35 minutes providing non routine post op care, revaluated multiple times.

## 2025-02-17 NOTE — PROGRESS NOTE ADULT - SUBJECTIVE AND OBJECTIVE BOX
OPERATIVE PROCEDURE(s):                POD # 20 MVR / MAZE / SHEILA closure                       64yMale  SURGEON(s): ISELA Blakely  SUBJECTIVE ASSESSMENT: feeling better    Vital Signs Last 24 Hrs  T(F): 98 (17 Feb 2025 04:00), Max: 98 (16 Feb 2025 08:00)  HR: 74 (17 Feb 2025 06:00) (73 - 87)  BP: 122/56 (17 Feb 2025 06:00) (105/54 - 170/72)  BP(mean): 81 (17 Feb 2025 06:00) (74 - 113)  RR: 16 (17 Feb 2025 06:00) (13 - 31)  SpO2: 97% (17 Feb 2025 06:00) (89% - 100%)      I&O's Detail    15 Feb 2025 07:01  -  16 Feb 2025 07:00  --------------------------------------------------------  IN:  Total IN: 0 mL    OUT:    Blood Loss (mL): 1 mL    Indwelling Catheter - Urethral (mL): 1520 mL  Total OUT: 1521 mL        Net:   I&O's Detail    14 Feb 2025 07:01  -  15 Feb 2025 07:00  --------------------------------------------------------  Total NET: -2705 mL      15 Feb 2025 07:01  -  16 Feb 2025 07:00  --------------------------------------------------------  Total NET: -1521 mL        CAPILLARY BLOOD GLUCOSE      POCT Blood Glucose.: 174 mg/dL (17 Feb 2025 06:17)  POCT Blood Glucose.: 189 mg/dL (16 Feb 2025 22:03)  POCT Blood Glucose.: 98 mg/dL (16 Feb 2025 16:42)  POCT Blood Glucose.: 178 mg/dL (16 Feb 2025 11:35)    Physical Exam:  General: NAD; A&Ox3  Cardiac: S1/S2, RRR, no murmur, no rubs  Lungs: unlabored shallow respirations, bilateral bs decreased at bases, L>R  Abdomen: Soft/NT/protuberant  Sternum: Intact, no click, incision healing well with no drainage  Extremities: edema lower extremities improved, swelling anterior left foot with blistering. ace reapplied, unchanged form yesterday  Scrotum_ significant edema    EPICARDIAL WIRES:  [X] YES  BM - yes      LABS:                        8.1[L]  6.92  )-----------( 143      ( 17 Feb 2025 05:50 )             28.6[L]                        7.9[L]  6.17  )-----------( 146      ( 16 Feb 2025 04:48 )             27.2[L]    02-17    139  |  102  |  36[H]  ----------------------------<  177[H]  4.6   |  29  |  1.1  02-16    139  |  102  |  31[H]  ----------------------------<  94  4.8   |  30  |  0.9    Ca    8.8      17 Feb 2025 05:50  Phos  3.2     02-16  Mg     2.3     02-17    TPro  6.2 [6.0 - 8.0]  /  Alb  3.7 [3.5 - 5.2]  /  TBili  0.3 [0.2 - 1.2]  /  DBili  x   /  AST  15 [0 - 41]  /  ALT  14 [0 - 41]  /  AlkPhos  98 [30 - 115]  02-17    RADIOLOGY & ADDITIONAL TESTS:  CXR:  EKG:  MEDICATIONS  (STANDING):  albuterol/ipratropium for Nebulization 3 milliLiter(s) Nebulizer every 6 hours  ALPRAZolam 0.25 milliGRAM(s) Oral at bedtime  aspirin enteric coated 81 milliGRAM(s) Oral daily  chlorhexidine 2% Cloths 1 Application(s) Topical daily  dextrose 5%. 1000 milliLiter(s) (50 mL/Hr) IV Continuous <Continuous>  dextrose 5%. 1000 milliLiter(s) (100 mL/Hr) IV Continuous <Continuous>  dextrose 50% Injectable 50 milliLiter(s) IV Push every 15 minutes  dextrose 50% Injectable 25 milliLiter(s) IV Push every 15 minutes  finasteride 5 milliGRAM(s) Oral daily  fluticasone propionate 50 MICROgram(s)/spray Nasal Spray 1 Spray(s) Both Nostrils every 12 hours  fluticasone propionate/ salmeterol 250-50 MICROgram(s) Diskus 1 Dose(s) Inhalation two times a day  folic acid 1 milliGRAM(s) Oral daily  gabapentin 300 milliGRAM(s) Oral at bedtime  glucagon  Injectable 1 milliGRAM(s) IntraMuscular once  guaiFENesin  milliGRAM(s) Oral every 12 hours  heparin   Injectable 5000 Unit(s) SubCutaneous every 8 hours  influenza  Vaccine (HIGH DOSE) 0.5 milliLiter(s) IntraMuscular once  insulin glargine Injectable (LANTUS) 40 Unit(s) SubCutaneous before breakfast  insulin lispro (ADMELOG) corrective regimen sliding scale   SubCutaneous three times a day before meals  insulin lispro (ADMELOG) corrective regimen sliding scale   SubCutaneous at bedtime  insulin lispro Injectable (ADMELOG) 10 Unit(s) SubCutaneous three times a day before meals  iron sucrose IVPB 200 milliGRAM(s) IV Intermittent every 24 hours  lidocaine   4% Patch 1 Patch Transdermal every 24 hours  lidocaine   4% Patch 1 Patch Transdermal every 24 hours  metoprolol tartrate 12.5 milliGRAM(s) Oral every 12 hours  mupirocin 2% Nasal 1 Application(s) Both Nostrils every 12 hours  pantoprazole    Tablet 40 milliGRAM(s) Oral before breakfast  phenylephrine    Infusion 0.05 MICROgram(s)/kG/Min (2.14 mL/Hr) IV Continuous <Continuous>  psyllium Powder 1 Packet(s) Oral every 12 hours  rosuvastatin 20 milliGRAM(s) Oral at bedtime  senna 2 Tablet(s) Oral at bedtime  sertraline 25 milliGRAM(s) Oral daily  spironolactone 25 milliGRAM(s) Oral two times a day  tamsulosin 0.4 milliGRAM(s) Oral at bedtime    MEDICATIONS  (PRN):  dextrose Oral Gel 15 Gram(s) Oral once PRN Blood Glucose LESS THAN 70 milliGRAM(s)/deciliter  hydrALAZINE Injectable 5 milliGRAM(s) IV Push every 4 hours PRN Systolic > 160  labetalol Injectable 10 milliGRAM(s) IV Push every 6 hours PRN Diastolic blood pressure >155  melatonin 5 milliGRAM(s) Oral at bedtime PRN Insomnia  ondansetron Injectable 4 milliGRAM(s) IV Push every 4 hours PRN Nausea and/or Vomiting  oxyCODONE    IR 5 milliGRAM(s) Oral every 4 hours PRN Moderate Pain (4 - 6)  oxyCODONE    IR 10 milliGRAM(s) Oral every 6 hours PRN Severe Pain (7 - 10)    Allergies    No Known Allergies    Intolerances      Ambulation/Activity Status: ambulate with assistance    Assessment/Plan:  64y Male status-post MVR/SHEILA ligation and MAZE for severe MR / afib               POD # 20  - Case and plan discussed with CTU Intensivist and CT Surgeon - Dr. Blakely/ Mauri  - Continue CTU supportive care and ongoing plan of care as per continuing CTU rounds.   - Continue DVT/GI prophylaxis  - Incentive Spirometry 10 times an hour  - Continue to advance physical activity as tolerated and continue PT/OT as directed  1. 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 Pre-op h/o A. Fib cont to monitor electrolytes - hold AC at this time prior lower GI bleed  and continue to be in SR- s/p MAZE and SHEILA closure  4. DM/Glucose A1c 6.2 Control: continue lantus 40/humalog 10 and sliding scale, DASH/TLC diet  5. Heart Failure - diastolic (EF 60%) - acute on chronic (secondary to severe MR) fluid restriction to 1L, daily standing weights, diuresis with Aldactone 25 mg   6.. flakito on CKD3a- FLAKITO resolved  7. Oxy 5 mg prn, lidocaine patch for pain      Social Service Disposition: Pt is considering SNF upon d/c     OPERATIVE PROCEDURE(s):                POD # 20 MVR / MAZE / SHEILA closure                       64yMale  SURGEON(s): ISELA Blakely  SUBJECTIVE ASSESSMENT: feeling better    Vital Signs Last 24 Hrs  T(F): 98 (17 Feb 2025 04:00), Max: 98 (16 Feb 2025 08:00)  HR: 74 (17 Feb 2025 06:00) (73 - 87)  BP: 122/56 (17 Feb 2025 06:00) (105/54 - 170/72)  BP(mean): 81 (17 Feb 2025 06:00) (74 - 113)  RR: 16 (17 Feb 2025 06:00) (13 - 31)  SpO2: 97% (17 Feb 2025 06:00) (89% - 100%)      I&O's Detail    15 Feb 2025 07:01  -  16 Feb 2025 07:00  --------------------------------------------------------  IN:  Total IN: 0 mL    OUT:    Blood Loss (mL): 1 mL    Indwelling Catheter - Urethral (mL): 1520 mL  Total OUT: 1521 mL        Net:   I&O's Detail    14 Feb 2025 07:01  -  15 Feb 2025 07:00  --------------------------------------------------------  Total NET: -2705 mL      15 Feb 2025 07:01  -  16 Feb 2025 07:00  --------------------------------------------------------  Total NET: -1521 mL        CAPILLARY BLOOD GLUCOSE      POCT Blood Glucose.: 174 mg/dL (17 Feb 2025 06:17)  POCT Blood Glucose.: 189 mg/dL (16 Feb 2025 22:03)  POCT Blood Glucose.: 98 mg/dL (16 Feb 2025 16:42)  POCT Blood Glucose.: 178 mg/dL (16 Feb 2025 11:35)    Physical Exam:  General: NAD; A&Ox3  Cardiac: S1/S2, RRR, no murmur, no rubs  Lungs: unlabored shallow respirations, bilateral bs decreased at bases, L>R  Abdomen: Soft/NT/protuberant  Sternum: Intact, no click, incision healing well with no drainage  Extremities: edema lower extremities improved, swelling anterior left foot with blistering. ace reapplied, unchanged form yesterday  Scrotum_ significant edema    EPICARDIAL WIRES:  [X] YES  BM - yes      LABS:                        8.1[L]  6.92  )-----------( 143      ( 17 Feb 2025 05:50 )             28.6[L]                        7.9[L]  6.17  )-----------( 146      ( 16 Feb 2025 04:48 )             27.2[L]    02-17    139  |  102  |  36[H]  ----------------------------<  177[H]  4.6   |  29  |  1.1  02-16    139  |  102  |  31[H]  ----------------------------<  94  4.8   |  30  |  0.9    Ca    8.8      17 Feb 2025 05:50  Phos  3.2     02-16  Mg     2.3     02-17    TPro  6.2 [6.0 - 8.0]  /  Alb  3.7 [3.5 - 5.2]  /  TBili  0.3 [0.2 - 1.2]  /  DBili  x   /  AST  15 [0 - 41]  /  ALT  14 [0 - 41]  /  AlkPhos  98 [30 - 115]  02-17    RADIOLOGY & ADDITIONAL TESTS:  CXR:  EKG:  MEDICATIONS  (STANDING):  albuterol/ipratropium for Nebulization 3 milliLiter(s) Nebulizer every 6 hours  ALPRAZolam 0.25 milliGRAM(s) Oral at bedtime  aspirin enteric coated 81 milliGRAM(s) Oral daily  chlorhexidine 2% Cloths 1 Application(s) Topical daily  dextrose 5%. 1000 milliLiter(s) (50 mL/Hr) IV Continuous <Continuous>  dextrose 5%. 1000 milliLiter(s) (100 mL/Hr) IV Continuous <Continuous>  dextrose 50% Injectable 50 milliLiter(s) IV Push every 15 minutes  dextrose 50% Injectable 25 milliLiter(s) IV Push every 15 minutes  finasteride 5 milliGRAM(s) Oral daily  fluticasone propionate 50 MICROgram(s)/spray Nasal Spray 1 Spray(s) Both Nostrils every 12 hours  fluticasone propionate/ salmeterol 250-50 MICROgram(s) Diskus 1 Dose(s) Inhalation two times a day  folic acid 1 milliGRAM(s) Oral daily  gabapentin 300 milliGRAM(s) Oral at bedtime  glucagon  Injectable 1 milliGRAM(s) IntraMuscular once  guaiFENesin  milliGRAM(s) Oral every 12 hours  heparin   Injectable 5000 Unit(s) SubCutaneous every 8 hours  influenza  Vaccine (HIGH DOSE) 0.5 milliLiter(s) IntraMuscular once  insulin glargine Injectable (LANTUS) 40 Unit(s) SubCutaneous before breakfast  insulin lispro (ADMELOG) corrective regimen sliding scale   SubCutaneous three times a day before meals  insulin lispro (ADMELOG) corrective regimen sliding scale   SubCutaneous at bedtime  insulin lispro Injectable (ADMELOG) 10 Unit(s) SubCutaneous three times a day before meals  iron sucrose IVPB 200 milliGRAM(s) IV Intermittent every 24 hours  lidocaine   4% Patch 1 Patch Transdermal every 24 hours  lidocaine   4% Patch 1 Patch Transdermal every 24 hours  metoprolol tartrate 12.5 milliGRAM(s) Oral every 12 hours  mupirocin 2% Nasal 1 Application(s) Both Nostrils every 12 hours  pantoprazole    Tablet 40 milliGRAM(s) Oral before breakfast  phenylephrine    Infusion 0.05 MICROgram(s)/kG/Min (2.14 mL/Hr) IV Continuous <Continuous>  psyllium Powder 1 Packet(s) Oral every 12 hours  rosuvastatin 20 milliGRAM(s) Oral at bedtime  senna 2 Tablet(s) Oral at bedtime  sertraline 25 milliGRAM(s) Oral daily  spironolactone 25 milliGRAM(s) Oral two times a day  tamsulosin 0.4 milliGRAM(s) Oral at bedtime    MEDICATIONS  (PRN):  dextrose Oral Gel 15 Gram(s) Oral once PRN Blood Glucose LESS THAN 70 milliGRAM(s)/deciliter  hydrALAZINE Injectable 5 milliGRAM(s) IV Push every 4 hours PRN Systolic > 160  labetalol Injectable 10 milliGRAM(s) IV Push every 6 hours PRN Diastolic blood pressure >155  melatonin 5 milliGRAM(s) Oral at bedtime PRN Insomnia  ondansetron Injectable 4 milliGRAM(s) IV Push every 4 hours PRN Nausea and/or Vomiting  oxyCODONE    IR 5 milliGRAM(s) Oral every 4 hours PRN Moderate Pain (4 - 6)  oxyCODONE    IR 10 milliGRAM(s) Oral every 6 hours PRN Severe Pain (7 - 10)    Allergies    No Known Allergies    Intolerances      Ambulation/Activity Status: ambulate with assistance    Assessment/Plan:  64y Male status-post MVR/SHEILA ligation and MAZE for severe MR / afib               POD # 20  - Case and plan discussed with CTU Intensivist and CT Surgeon - Dr. Blakely/ Mauri  - Continue CTU supportive care and ongoing plan of care as per continuing CTU rounds.   - Continue DVT/GI prophylaxis  - Incentive Spirometry 10 times an hour  - Continue to advance physical activity as tolerated and continue PT/OT as directed  1. 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 Pre-op h/o A. Fib cont to monitor electrolytes - hold AC at this time prior lower GI bleed  and continue to be in SR- s/p MAZE and SHEILA closure, started low dose BB yesterday HR stable  4. DM/Glucose A1c 6.2 Control: continue lantus 40/humalog 10 and sliding scale, DASH/TLC diet  5. Heart Failure - diastolic (EF 60%) - acute on chronic (secondary to severe MR) fluid restriction to 1L, daily standing weights, diuresis with Aldactone 25 mg   6.. flakito on CKD3a- FLAKITO resolved  7. Oxy  prn, lidocaine patch for pain      Social Service Disposition: Pt is considering SNF upon d/c     OPERATIVE PROCEDURE(s):                POD # 20 MVR / MAZE / SHEILA closure                       64yMale  SURGEON(s): ISELA Blakely  SUBJECTIVE ASSESSMENT: feeling better    Vital Signs Last 24 Hrs  T(F): 98 (17 Feb 2025 04:00), Max: 98 (16 Feb 2025 08:00)  HR: 74 (17 Feb 2025 06:00) (73 - 87)  BP: 122/56 (17 Feb 2025 06:00) (105/54 - 170/72)  BP(mean): 81 (17 Feb 2025 06:00) (74 - 113)  RR: 16 (17 Feb 2025 06:00) (13 - 31)  SpO2: 97% (17 Feb 2025 06:00) (89% - 100%)      I&O's Detail    15 Feb 2025 07:01  -  16 Feb 2025 07:00  --------------------------------------------------------  IN:  Total IN: 0 mL    OUT:    Blood Loss (mL): 1 mL    Indwelling Catheter - Urethral (mL): 1520 mL  Total OUT: 1521 mL        Net:   I&O's Detail    14 Feb 2025 07:01  -  15 Feb 2025 07:00  --------------------------------------------------------  Total NET: -2705 mL      15 Feb 2025 07:01  -  16 Feb 2025 07:00  --------------------------------------------------------  Total NET: -1521 mL        CAPILLARY BLOOD GLUCOSE      POCT Blood Glucose.: 174 mg/dL (17 Feb 2025 06:17)  POCT Blood Glucose.: 189 mg/dL (16 Feb 2025 22:03)  POCT Blood Glucose.: 98 mg/dL (16 Feb 2025 16:42)  POCT Blood Glucose.: 178 mg/dL (16 Feb 2025 11:35)    Physical Exam:  General: NAD; A&Ox3  Cardiac: S1/S2, RRR, no murmur, no rubs  Lungs: unlabored shallow respirations, bilateral bs decreased at bases, L>R  Abdomen: Soft/NT/protuberant  Sternum: Intact, no click, incision healing well with no drainage  Extremities: edema lower extremities improved, swelling anterior left foot with blistering. ace reapplied, unchanged from yesterday  Scrotum_ significant edema    EPICARDIAL WIRES:  [X] YES  BM - yes      LABS:                        8.1[L]  6.92  )-----------( 143      ( 17 Feb 2025 05:50 )             28.6[L]                        7.9[L]  6.17  )-----------( 146      ( 16 Feb 2025 04:48 )             27.2[L]    02-17    139  |  102  |  36[H]  ----------------------------<  177[H]  4.6   |  29  |  1.1  02-16    139  |  102  |  31[H]  ----------------------------<  94  4.8   |  30  |  0.9    Ca    8.8      17 Feb 2025 05:50  Phos  3.2     02-16  Mg     2.3     02-17    TPro  6.2 [6.0 - 8.0]  /  Alb  3.7 [3.5 - 5.2]  /  TBili  0.3 [0.2 - 1.2]  /  DBili  x   /  AST  15 [0 - 41]  /  ALT  14 [0 - 41]  /  AlkPhos  98 [30 - 115]  02-17    RADIOLOGY & ADDITIONAL TESTS:  CXR:  < from: Xray Chest 1 View- PORTABLE-Routine (Xray Chest 1 View- PORTABLE-Routine in AM.) (02.17.25 @ 04:56) >  IMPRESSION:    Slightly improving bilateral opacities/effusions.    --- End of Report ---    < end of copied text >      MEDICATIONS  (STANDING):  albuterol/ipratropium for Nebulization 3 milliLiter(s) Nebulizer every 6 hours  ALPRAZolam 0.25 milliGRAM(s) Oral at bedtime  aspirin enteric coated 81 milliGRAM(s) Oral daily  chlorhexidine 2% Cloths 1 Application(s) Topical daily  dextrose 5%. 1000 milliLiter(s) (50 mL/Hr) IV Continuous <Continuous>  dextrose 5%. 1000 milliLiter(s) (100 mL/Hr) IV Continuous <Continuous>  dextrose 50% Injectable 50 milliLiter(s) IV Push every 15 minutes  dextrose 50% Injectable 25 milliLiter(s) IV Push every 15 minutes  finasteride 5 milliGRAM(s) Oral daily  fluticasone propionate 50 MICROgram(s)/spray Nasal Spray 1 Spray(s) Both Nostrils every 12 hours  fluticasone propionate/ salmeterol 250-50 MICROgram(s) Diskus 1 Dose(s) Inhalation two times a day  folic acid 1 milliGRAM(s) Oral daily  gabapentin 300 milliGRAM(s) Oral at bedtime  glucagon  Injectable 1 milliGRAM(s) IntraMuscular once  guaiFENesin  milliGRAM(s) Oral every 12 hours  heparin   Injectable 5000 Unit(s) SubCutaneous every 8 hours  influenza  Vaccine (HIGH DOSE) 0.5 milliLiter(s) IntraMuscular once  insulin glargine Injectable (LANTUS) 40 Unit(s) SubCutaneous before breakfast  insulin lispro (ADMELOG) corrective regimen sliding scale   SubCutaneous three times a day before meals  insulin lispro (ADMELOG) corrective regimen sliding scale   SubCutaneous at bedtime  insulin lispro Injectable (ADMELOG) 10 Unit(s) SubCutaneous three times a day before meals  iron sucrose IVPB 200 milliGRAM(s) IV Intermittent every 24 hours  lidocaine   4% Patch 1 Patch Transdermal every 24 hours  lidocaine   4% Patch 1 Patch Transdermal every 24 hours  metoprolol tartrate 12.5 milliGRAM(s) Oral every 12 hours  mupirocin 2% Nasal 1 Application(s) Both Nostrils every 12 hours  pantoprazole    Tablet 40 milliGRAM(s) Oral before breakfast  phenylephrine    Infusion 0.05 MICROgram(s)/kG/Min (2.14 mL/Hr) IV Continuous <Continuous>  psyllium Powder 1 Packet(s) Oral every 12 hours  rosuvastatin 20 milliGRAM(s) Oral at bedtime  senna 2 Tablet(s) Oral at bedtime  sertraline 25 milliGRAM(s) Oral daily  spironolactone 25 milliGRAM(s) Oral two times a day  tamsulosin 0.4 milliGRAM(s) Oral at bedtime    MEDICATIONS  (PRN):  dextrose Oral Gel 15 Gram(s) Oral once PRN Blood Glucose LESS THAN 70 milliGRAM(s)/deciliter  hydrALAZINE Injectable 5 milliGRAM(s) IV Push every 4 hours PRN Systolic > 160  labetalol Injectable 10 milliGRAM(s) IV Push every 6 hours PRN Diastolic blood pressure >155  melatonin 5 milliGRAM(s) Oral at bedtime PRN Insomnia  ondansetron Injectable 4 milliGRAM(s) IV Push every 4 hours PRN Nausea and/or Vomiting  oxyCODONE    IR 5 milliGRAM(s) Oral every 4 hours PRN Moderate Pain (4 - 6)  oxyCODONE    IR 10 milliGRAM(s) Oral every 6 hours PRN Severe Pain (7 - 10)    Allergies    No Known Allergies    Intolerances      Ambulation/Activity Status: ambulate with assistance    Assessment/Plan:  64y Male status-post MVR/SHEILA ligation and MAZE for severe MR / afib               POD # 20  - Case and plan discussed with CTU Intensivist and CT Surgeon - Dr. Blakely  - Continue CTU supportive care and ongoing plan of care as per continuing CTU rounds.   - Continue DVT/GI prophylaxis  - Incentive Spirometry 10 times an hour  - Continue to advance physical activity as tolerated and continue PT/OT as directed  1. Continue ASA, statin.   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 Pre-op h/o A. Fib cont to monitor electrolytes - hold AC at this time prior lower GI bleed  and continue to be in SR- s/p MAZE and SHEILA closure, started low dose BB yesterday for unsustained vtach, HR now stable  4. DM/Glucose A1c 6.2 Control: continue lantus 40/humalog 10 and sliding scale, DASH/TLC diet  5. Heart Failure - diastolic (EF 60%) - acute on chronic (secondary to severe MR) fluid restriction to 1L, daily standing weights, diuresis with Aldactone 25 mg, Lasix 20 mg qd with K 20 mg. F/u repeat CMP   6.. flakito on CKD3a- FLAKITO resolved  7. Oxy  prn, lidocaine patch for pain  8. Magnesium 1g today for electrolyte repletion      Social Service Disposition: Pt is considering SNF vs home upon d/c     OPERATIVE PROCEDURE(s):                POD # 20 MVR / MAZE / SHEILA closure                       64yMale  SURGEON(s): ISELA Blakely  SUBJECTIVE ASSESSMENT: feeling better    Vital Signs Last 24 Hrs  T(F): 98 (17 Feb 2025 04:00), Max: 98 (16 Feb 2025 08:00)  HR: 74 (17 Feb 2025 06:00) (73 - 87)  BP: 122/56 (17 Feb 2025 06:00) (105/54 - 170/72)  BP(mean): 81 (17 Feb 2025 06:00) (74 - 113)  RR: 16 (17 Feb 2025 06:00) (13 - 31)  SpO2: 97% (17 Feb 2025 06:00) (89% - 100%)      I&O's Detail    15 Feb 2025 07:01  -  16 Feb 2025 07:00  --------------------------------------------------------  IN:  Total IN: 0 mL    OUT:    Blood Loss (mL): 1 mL    Indwelling Catheter - Urethral (mL): 1520 mL  Total OUT: 1521 mL        Net:   I&O's Detail    14 Feb 2025 07:01  -  15 Feb 2025 07:00  --------------------------------------------------------  Total NET: -2705 mL      15 Feb 2025 07:01  -  16 Feb 2025 07:00  --------------------------------------------------------  Total NET: -1521 mL        CAPILLARY BLOOD GLUCOSE      POCT Blood Glucose.: 174 mg/dL (17 Feb 2025 06:17)  POCT Blood Glucose.: 189 mg/dL (16 Feb 2025 22:03)  POCT Blood Glucose.: 98 mg/dL (16 Feb 2025 16:42)  POCT Blood Glucose.: 178 mg/dL (16 Feb 2025 11:35)    Physical Exam:  General: NAD; A&Ox3  Cardiac: S1/S2, RRR, no murmur, no rubs  Lungs: unlabored shallow respirations, bilateral bs decreased at bases, L>R  Abdomen: Soft/NT/protuberant  Sternum: Intact, no click, incision healing well with no drainage  Extremities: edema lower extremities improved, swelling anterior left foot with blistering. ace reapplied  Scrotum_ significant edema    EPICARDIAL WIRES:  [X] YES  BM - yes      LABS:                        8.1[L]  6.92  )-----------( 143      ( 17 Feb 2025 05:50 )             28.6[L]                        7.9[L]  6.17  )-----------( 146      ( 16 Feb 2025 04:48 )             27.2[L]    02-17    139  |  102  |  36[H]  ----------------------------<  177[H]  4.6   |  29  |  1.1  02-16    139  |  102  |  31[H]  ----------------------------<  94  4.8   |  30  |  0.9    Ca    8.8      17 Feb 2025 05:50  Phos  3.2     02-16  Mg     2.3     02-17    TPro  6.2 [6.0 - 8.0]  /  Alb  3.7 [3.5 - 5.2]  /  TBili  0.3 [0.2 - 1.2]  /  DBili  x   /  AST  15 [0 - 41]  /  ALT  14 [0 - 41]  /  AlkPhos  98 [30 - 115]  02-17    RADIOLOGY & ADDITIONAL TESTS:  CXR:  < from: Xray Chest 1 View- PORTABLE-Routine (Xray Chest 1 View- PORTABLE-Routine in AM.) (02.17.25 @ 04:56) >  IMPRESSION:    Slightly improving bilateral opacities/effusions.    --- End of Report ---    < end of copied text >      MEDICATIONS  (STANDING):  albuterol/ipratropium for Nebulization 3 milliLiter(s) Nebulizer every 6 hours  ALPRAZolam 0.25 milliGRAM(s) Oral at bedtime  aspirin enteric coated 81 milliGRAM(s) Oral daily  chlorhexidine 2% Cloths 1 Application(s) Topical daily  dextrose 5%. 1000 milliLiter(s) (50 mL/Hr) IV Continuous <Continuous>  dextrose 5%. 1000 milliLiter(s) (100 mL/Hr) IV Continuous <Continuous>  dextrose 50% Injectable 50 milliLiter(s) IV Push every 15 minutes  dextrose 50% Injectable 25 milliLiter(s) IV Push every 15 minutes  finasteride 5 milliGRAM(s) Oral daily  fluticasone propionate 50 MICROgram(s)/spray Nasal Spray 1 Spray(s) Both Nostrils every 12 hours  fluticasone propionate/ salmeterol 250-50 MICROgram(s) Diskus 1 Dose(s) Inhalation two times a day  folic acid 1 milliGRAM(s) Oral daily  gabapentin 300 milliGRAM(s) Oral at bedtime  glucagon  Injectable 1 milliGRAM(s) IntraMuscular once  guaiFENesin  milliGRAM(s) Oral every 12 hours  heparin   Injectable 5000 Unit(s) SubCutaneous every 8 hours  influenza  Vaccine (HIGH DOSE) 0.5 milliLiter(s) IntraMuscular once  insulin glargine Injectable (LANTUS) 40 Unit(s) SubCutaneous before breakfast  insulin lispro (ADMELOG) corrective regimen sliding scale   SubCutaneous three times a day before meals  insulin lispro (ADMELOG) corrective regimen sliding scale   SubCutaneous at bedtime  insulin lispro Injectable (ADMELOG) 10 Unit(s) SubCutaneous three times a day before meals  iron sucrose IVPB 200 milliGRAM(s) IV Intermittent every 24 hours  lidocaine   4% Patch 1 Patch Transdermal every 24 hours  lidocaine   4% Patch 1 Patch Transdermal every 24 hours  metoprolol tartrate 12.5 milliGRAM(s) Oral every 12 hours  mupirocin 2% Nasal 1 Application(s) Both Nostrils every 12 hours  pantoprazole    Tablet 40 milliGRAM(s) Oral before breakfast  phenylephrine    Infusion 0.05 MICROgram(s)/kG/Min (2.14 mL/Hr) IV Continuous <Continuous>  psyllium Powder 1 Packet(s) Oral every 12 hours  rosuvastatin 20 milliGRAM(s) Oral at bedtime  senna 2 Tablet(s) Oral at bedtime  sertraline 25 milliGRAM(s) Oral daily  spironolactone 25 milliGRAM(s) Oral two times a day  tamsulosin 0.4 milliGRAM(s) Oral at bedtime    MEDICATIONS  (PRN):  dextrose Oral Gel 15 Gram(s) Oral once PRN Blood Glucose LESS THAN 70 milliGRAM(s)/deciliter  hydrALAZINE Injectable 5 milliGRAM(s) IV Push every 4 hours PRN Systolic > 160  labetalol Injectable 10 milliGRAM(s) IV Push every 6 hours PRN Diastolic blood pressure >155  melatonin 5 milliGRAM(s) Oral at bedtime PRN Insomnia  ondansetron Injectable 4 milliGRAM(s) IV Push every 4 hours PRN Nausea and/or Vomiting  oxyCODONE    IR 5 milliGRAM(s) Oral every 4 hours PRN Moderate Pain (4 - 6)  oxyCODONE    IR 10 milliGRAM(s) Oral every 6 hours PRN Severe Pain (7 - 10)    Allergies    No Known Allergies    Intolerances      Ambulation/Activity Status: ambulate with assistance    Assessment/Plan:  64y Male status-post MVR/SHEILA ligation and MAZE for severe MR / afib               POD # 20  - Case and plan discussed with CTU Intensivist and CT Surgeon - Dr. Blakely  - Continue CTU supportive care and ongoing plan of care as per continuing CTU rounds.   - Continue DVT/GI prophylaxis  - Incentive Spirometry 10 times an hour  - Continue to advance physical activity as tolerated and continue PT/OT as directed  1. Continue ASA, statin.   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 Pre-op h/o A. Fib cont to monitor electrolytes - hold AC at this time prior lower GI bleed  and continue to be in SR- s/p MAZE and SHEILA closure, started low dose BB yesterday for unsustained vtach 5 beats, HR now stable  4. DM/Glucose A1c 6.2 Control: continue lantus 40/humalog 10 and sliding scale, DASH/TLC diet  5. Heart Failure - diastolic (EF 60%) - acute on chronic (secondary to severe MR) fluid restriction to 1L, daily standing weights, diuresis daily with Aldactone 25 mg, Lasix 20 mg qd with K 20 mg. F/u repeat CMP   6.. flakito on CKD3a- FLAKITO resolved  7. Oxy  prn, lidocaine patch for pain  8. Magnesium 1g today for electrolyte repletion, start mg oxide tid      Social Service Disposition: Pt is considering SNF vs home upon d/c

## 2025-02-18 LAB
ALBUMIN SERPL ELPH-MCNC: 3.8 G/DL — SIGNIFICANT CHANGE UP (ref 3.5–5.2)
ALP SERPL-CCNC: 103 U/L — SIGNIFICANT CHANGE UP (ref 30–115)
ALT FLD-CCNC: 15 U/L — SIGNIFICANT CHANGE UP (ref 0–41)
ANION GAP SERPL CALC-SCNC: 8 MMOL/L — SIGNIFICANT CHANGE UP (ref 7–14)
AST SERPL-CCNC: 16 U/L — SIGNIFICANT CHANGE UP (ref 0–41)
BASOPHILS # BLD AUTO: 0.04 K/UL — SIGNIFICANT CHANGE UP (ref 0–0.2)
BASOPHILS NFR BLD AUTO: 0.6 % — SIGNIFICANT CHANGE UP (ref 0–1)
BILIRUB SERPL-MCNC: 0.3 MG/DL — SIGNIFICANT CHANGE UP (ref 0.2–1.2)
BUN SERPL-MCNC: 34 MG/DL — HIGH (ref 10–20)
CALCIUM SERPL-MCNC: 9.2 MG/DL — SIGNIFICANT CHANGE UP (ref 8.4–10.5)
CHLORIDE SERPL-SCNC: 105 MMOL/L — SIGNIFICANT CHANGE UP (ref 98–110)
CO2 SERPL-SCNC: 29 MMOL/L — SIGNIFICANT CHANGE UP (ref 17–32)
CREAT SERPL-MCNC: 0.9 MG/DL — SIGNIFICANT CHANGE UP (ref 0.7–1.5)
EGFR: 94 ML/MIN/1.73M2 — SIGNIFICANT CHANGE UP
EOSINOPHIL # BLD AUTO: 0.4 K/UL — SIGNIFICANT CHANGE UP (ref 0–0.7)
EOSINOPHIL NFR BLD AUTO: 5.9 % — SIGNIFICANT CHANGE UP (ref 0–8)
GLUCOSE BLDC GLUCOMTR-MCNC: 130 MG/DL — HIGH (ref 70–99)
GLUCOSE BLDC GLUCOMTR-MCNC: 164 MG/DL — HIGH (ref 70–99)
GLUCOSE BLDC GLUCOMTR-MCNC: 171 MG/DL — HIGH (ref 70–99)
GLUCOSE BLDC GLUCOMTR-MCNC: 253 MG/DL — HIGH (ref 70–99)
GLUCOSE SERPL-MCNC: 186 MG/DL — HIGH (ref 70–99)
HCT VFR BLD CALC: 29.4 % — LOW (ref 42–52)
HGB BLD-MCNC: 8.6 G/DL — LOW (ref 14–18)
IMM GRANULOCYTES NFR BLD AUTO: 0.6 % — HIGH (ref 0.1–0.3)
LYMPHOCYTES # BLD AUTO: 1.21 K/UL — SIGNIFICANT CHANGE UP (ref 1.2–3.4)
LYMPHOCYTES # BLD AUTO: 17.7 % — LOW (ref 20.5–51.1)
MAGNESIUM SERPL-MCNC: 2.1 MG/DL — SIGNIFICANT CHANGE UP (ref 1.8–2.4)
MCHC RBC-ENTMCNC: 27.4 PG — SIGNIFICANT CHANGE UP (ref 27–31)
MCHC RBC-ENTMCNC: 29.3 G/DL — LOW (ref 32–37)
MCV RBC AUTO: 93.6 FL — SIGNIFICANT CHANGE UP (ref 80–94)
MONOCYTES # BLD AUTO: 0.7 K/UL — HIGH (ref 0.1–0.6)
MONOCYTES NFR BLD AUTO: 10.3 % — HIGH (ref 1.7–9.3)
NEUTROPHILS # BLD AUTO: 4.43 K/UL — SIGNIFICANT CHANGE UP (ref 1.4–6.5)
NEUTROPHILS NFR BLD AUTO: 64.9 % — SIGNIFICANT CHANGE UP (ref 42.2–75.2)
NRBC BLD AUTO-RTO: 0 /100 WBCS — SIGNIFICANT CHANGE UP (ref 0–0)
PLATELET # BLD AUTO: 167 K/UL — SIGNIFICANT CHANGE UP (ref 130–400)
PMV BLD: 11.1 FL — HIGH (ref 7.4–10.4)
POTASSIUM SERPL-MCNC: 4.7 MMOL/L — SIGNIFICANT CHANGE UP (ref 3.5–5)
POTASSIUM SERPL-SCNC: 4.7 MMOL/L — SIGNIFICANT CHANGE UP (ref 3.5–5)
PROT SERPL-MCNC: 6.5 G/DL — SIGNIFICANT CHANGE UP (ref 6–8)
RBC # BLD: 3.14 M/UL — LOW (ref 4.7–6.1)
RBC # FLD: 20.3 % — HIGH (ref 11.5–14.5)
SODIUM SERPL-SCNC: 142 MMOL/L — SIGNIFICANT CHANGE UP (ref 135–146)
WBC # BLD: 6.82 K/UL — SIGNIFICANT CHANGE UP (ref 4.8–10.8)
WBC # FLD AUTO: 6.82 K/UL — SIGNIFICANT CHANGE UP (ref 4.8–10.8)

## 2025-02-18 PROCEDURE — 99291 CRITICAL CARE FIRST HOUR: CPT | Mod: 24

## 2025-02-18 PROCEDURE — 71045 X-RAY EXAM CHEST 1 VIEW: CPT | Mod: 26

## 2025-02-18 PROCEDURE — 93010 ELECTROCARDIOGRAM REPORT: CPT

## 2025-02-18 RX ORDER — FUROSEMIDE 10 MG/ML
40 INJECTION INTRAMUSCULAR; INTRAVENOUS DAILY
Refills: 0 | Status: DISCONTINUED | OUTPATIENT
Start: 2025-02-18 | End: 2025-02-24

## 2025-02-18 RX ADMIN — LIDOCAINE HYDROCHLORIDE 1 PATCH: 20 JELLY TOPICAL at 18:06

## 2025-02-18 RX ADMIN — INSULIN LISPRO 2: 100 INJECTION, SOLUTION INTRAVENOUS; SUBCUTANEOUS at 23:20

## 2025-02-18 RX ADMIN — FLUTICASONE PROPIONATE 1 SPRAY(S): 50 SPRAY, METERED NASAL at 07:02

## 2025-02-18 RX ADMIN — Medication 81 MILLIGRAM(S): at 12:09

## 2025-02-18 RX ADMIN — METOPROLOL SUCCINATE 12.5 MILLIGRAM(S): 50 TABLET, EXTENDED RELEASE ORAL at 18:07

## 2025-02-18 RX ADMIN — Medication 25 MILLIGRAM(S): at 06:12

## 2025-02-18 RX ADMIN — FLUTICASONE PROPIONATE 1 SPRAY(S): 50 SPRAY, METERED NASAL at 18:07

## 2025-02-18 RX ADMIN — INSULIN LISPRO 10 UNIT(S): 100 INJECTION, SOLUTION INTRAVENOUS; SUBCUTANEOUS at 07:36

## 2025-02-18 RX ADMIN — Medication 1 APPLICATION(S): at 12:17

## 2025-02-18 RX ADMIN — Medication 10 MILLIGRAM(S): at 06:20

## 2025-02-18 RX ADMIN — FUROSEMIDE 40 MILLIGRAM(S): 10 INJECTION INTRAMUSCULAR; INTRAVENOUS at 06:20

## 2025-02-18 RX ADMIN — Medication 1 PACKET(S): at 06:12

## 2025-02-18 RX ADMIN — HEPARIN SODIUM 5000 UNIT(S): 1000 INJECTION INTRAVENOUS; SUBCUTANEOUS at 13:00

## 2025-02-18 RX ADMIN — LIDOCAINE HYDROCHLORIDE 1 PATCH: 20 JELLY TOPICAL at 12:10

## 2025-02-18 RX ADMIN — LIDOCAINE HYDROCHLORIDE 1 PATCH: 20 JELLY TOPICAL at 18:11

## 2025-02-18 RX ADMIN — GABAPENTIN 300 MILLIGRAM(S): 400 CAPSULE ORAL at 23:02

## 2025-02-18 RX ADMIN — Medication 2 TABLET(S): at 23:01

## 2025-02-18 RX ADMIN — HEPARIN SODIUM 5000 UNIT(S): 1000 INJECTION INTRAVENOUS; SUBCUTANEOUS at 23:01

## 2025-02-18 RX ADMIN — LIDOCAINE HYDROCHLORIDE 1 PATCH: 20 JELLY TOPICAL at 18:13

## 2025-02-18 RX ADMIN — OXYCODONE HYDROCHLORIDE 10 MILLIGRAM(S): 30 TABLET ORAL at 15:09

## 2025-02-18 RX ADMIN — Medication 400 MILLIGRAM(S): at 18:07

## 2025-02-18 RX ADMIN — TAMSULOSIN HYDROCHLORIDE 0.4 MILLIGRAM(S): 0.4 CAPSULE ORAL at 23:02

## 2025-02-18 RX ADMIN — IPRATROPIUM BROMIDE AND ALBUTEROL SULFATE 3 MILLILITER(S): .5; 2.5 SOLUTION RESPIRATORY (INHALATION) at 21:53

## 2025-02-18 RX ADMIN — LIDOCAINE HYDROCHLORIDE 1 PATCH: 20 JELLY TOPICAL at 09:00

## 2025-02-18 RX ADMIN — OXYCODONE HYDROCHLORIDE 10 MILLIGRAM(S): 30 TABLET ORAL at 16:00

## 2025-02-18 RX ADMIN — Medication 400 MILLIGRAM(S): at 07:39

## 2025-02-18 RX ADMIN — Medication 400 MILLIGRAM(S): at 12:09

## 2025-02-18 RX ADMIN — INSULIN LISPRO 10 UNIT(S): 100 INJECTION, SOLUTION INTRAVENOUS; SUBCUTANEOUS at 12:09

## 2025-02-18 RX ADMIN — INSULIN GLARGINE-YFGN 40 UNIT(S): 100 INJECTION, SOLUTION SUBCUTANEOUS at 07:37

## 2025-02-18 RX ADMIN — INSULIN LISPRO 2: 100 INJECTION, SOLUTION INTRAVENOUS; SUBCUTANEOUS at 07:36

## 2025-02-18 RX ADMIN — OXYCODONE HYDROCHLORIDE 10 MILLIGRAM(S): 30 TABLET ORAL at 06:32

## 2025-02-18 RX ADMIN — Medication 40 MILLIGRAM(S): at 06:13

## 2025-02-18 RX ADMIN — INSULIN LISPRO 10 UNIT(S): 100 INJECTION, SOLUTION INTRAVENOUS; SUBCUTANEOUS at 17:07

## 2025-02-18 RX ADMIN — LIDOCAINE HYDROCHLORIDE 1 PATCH: 20 JELLY TOPICAL at 08:01

## 2025-02-18 RX ADMIN — Medication 1 PACKET(S): at 18:06

## 2025-02-18 RX ADMIN — ROSUVASTATIN CALCIUM 20 MILLIGRAM(S): 20 TABLET, FILM COATED ORAL at 23:02

## 2025-02-18 RX ADMIN — FINASTERIDE 5 MILLIGRAM(S): 1 TABLET, FILM COATED ORAL at 12:09

## 2025-02-18 RX ADMIN — IPRATROPIUM BROMIDE AND ALBUTEROL SULFATE 3 MILLILITER(S): .5; 2.5 SOLUTION RESPIRATORY (INHALATION) at 13:48

## 2025-02-18 RX ADMIN — OXYCODONE HYDROCHLORIDE 5 MILLIGRAM(S): 30 TABLET ORAL at 23:09

## 2025-02-18 RX ADMIN — FOLIC ACID 1 MILLIGRAM(S): 1 TABLET ORAL at 12:09

## 2025-02-18 RX ADMIN — SERTRALINE 25 MILLIGRAM(S): 100 TABLET, FILM COATED ORAL at 12:09

## 2025-02-18 RX ADMIN — METOPROLOL SUCCINATE 12.5 MILLIGRAM(S): 50 TABLET, EXTENDED RELEASE ORAL at 06:12

## 2025-02-18 RX ADMIN — Medication 20 MILLIEQUIVALENT(S): at 12:10

## 2025-02-18 RX ADMIN — Medication 5 MILLIGRAM(S): at 23:09

## 2025-02-18 RX ADMIN — Medication 0.25 MILLIGRAM(S): at 23:01

## 2025-02-18 RX ADMIN — INSULIN LISPRO 2: 100 INJECTION, SOLUTION INTRAVENOUS; SUBCUTANEOUS at 17:07

## 2025-02-18 RX ADMIN — HEPARIN SODIUM 5000 UNIT(S): 1000 INJECTION INTRAVENOUS; SUBCUTANEOUS at 06:12

## 2025-02-18 NOTE — PROGRESS NOTE ADULT - ASSESSMENT
64M w/ afib, nonobstructive CAD, CHF class 3, severe MR, DM, HTN, JIMMY on CPAP, asthma, severe obesity, HL, Madelung's disease, arthritis, arthritis, peripheral neuropathy s/p MVReplacement, SHEILA ligation, MAZE 1/28/25    Assessment/Plan    Neuro:  #acute postoperative pain  - pain controlled  #at risk for postoperative/ICU delirium  - frequent re-orientation, good sleep hygiene, avoid medications which can increase delirium risk  - CAM-ICU qShift    CV: MVR s/p MVReplacement, SHEILA ligation, MAZE  - continue aspirin, beta blocker, statin    Pulm:  #acute postoperative pulmonary insufficiency  #atelectasis  - aggressive PT and ambulation to address atelectasis   #acute pulmonary edema  - continue diuresis, may increase dose frequency    Renal/Fluid/Lytes:  #acute fluid overload  - continue diuresis  - replete/optimize electrolytes  - preload and after optimization    Heme:  #Acute blood loss anemia from surgery  - transfuse for hemoglobin < 7 or hemodynamic instability due to anemia  #s/p MVReplacement surgery, anticoagulation/antiplatelet plan  - continue aspirin    ID:  - no signs of acute infection    Endocrine:   #acute postoperative stress hyperglycemia  - insulin therapy to achieve tight glucose control    GI:  #at risk for malnutrition  - enteral diet  #at risk for post-operative ileus and opioid-induced constipation  - bowel regimen. +BM last evening    Prophylaxis  #at risk of VTE  - SCDs. VTE chemoprophylaxis  #at risk of GI stress ulcer  - GI prophylaxis per cardiac surgery    PT  - out of bed to chair  - ambulation as much as possible    Medication list reviewed.    This patient is critically ill and required my dedicated time to evaluate, manage and maintain or prevent deterioration of the organ systems and problems noted above and provide documentation.    Due to a high probability of clinically significant, life threatening deterioration due to high risk of acute pulmonary insufficiency, the patient required my highest level of preparedness to intervene emergently and I personally spent this critical care time directly and personally managing the patient. This critical care time included obtaining a history when possible; examining the patient; review pulse oximetry; order / interpreting / review of laboratory and radiology studies with immediate assessment and treatment as needed; directing the titration of pressors, oxygen support devices, fluid resuscitation, interpretation of cardiac output measurements; interpretation of EKG / rhythm strips / telemetry; arranging urgent treatment with development of a management plan; evaluation of patient's response to treatment; frequent reassessment and monitor for potential decompensation; and discussion with other providers/consultants.  This critical care time was performed to assess and manage the high probability of imminent life threatening deterioration that could result in organ(s) failure. It was exclusive of separately billable procedures and treating other patients and teaching time. please see MDM (medical decision making) / Assessment / Plans sections and the rest of the note for further information on patient assessment and treatment.    Time I spent with family or surrogate(s) is included only if the patient was incapable of providing the necessary information or participating in medical decision making. Time devoted to teaching and to any procedures I billed separately is not included.     Management of this patient was discussed with the surgical attending / cardiologist and mid-level providers.    I acknowledge the use of copied documentation.  All copy forward documentation is my own and has been reviewed and revised as appropriate.  If no changes were made, it is because that information remains the same.    Fab Celis MD  Critical Care Medicine

## 2025-02-18 NOTE — DISCHARGE NOTE PROVIDER - NSDCQMSTAIRS_GEN_ALL_CORE
Patient's last menstrual period was 10/27/2024.  Please reference prenatal and OB flow chart for further information  PT here today for routine prenatal care  Pt endorses fetal movement and denies loss of fluid, contractions or vaginal bleeding  Patient with increased frequency of migraine headaches.  Panorama was normal  ROS:  Pt denies headache, vision changes, right upper quadrant pain, dysuria, or nausea/vomiting,     PE:  /60   Pulse (!) 104   Wt 54 kg (119 lb)   LMP 10/27/2024   BMI 22.48 kg/m²   Gen - Alert and oriented x 3  HEENT- NC/AT, CVS - RRR, Lungs - CTAB  Abd - FH         ASSESSMENT AND PLAN:   IUP at 16 weeks and 4 days  Previous  section  Migraine cephalgia  Patient to return here in 4 weeks  Repeat ultrasound at 20 weeks for anatomy.  Fioricet for the headaches.   Yes

## 2025-02-18 NOTE — PROGRESS NOTE ADULT - SUBJECTIVE AND OBJECTIVE BOX
OPERATIVE PROCEDURE(s):                POD #  21 s/p MVR/MAZE,SHEILA closure                 SURGEON(s): ISELA Blakely  SUBJECTIVE ASSESSMENT:64yMale patient seen and examined at bedside. Pt is feeling better, currently denies acute complaints.    Vital Signs Last 24 Hrs  T(F): 97.7 (18 Feb 2025 06:00), Max: 98.4 (18 Feb 2025 00:00)  HR: 78 (18 Feb 2025 07:00) (75 - 86)  BP: 126/58 (18 Feb 2025 07:00) (110/54 - 164/77)  BP(mean): 84 (18 Feb 2025 07:00) (77 - 110)  ABP: --  ABP(mean): --  RR: 15 (18 Feb 2025 07:00) (13 - 42)  SpO2: 98% (18 Feb 2025 07:00) (89% - 100%)  CVP(mm Hg): --  CVP(cm H2O): --  CO: --  CI: --  PA: --  SVR: --    I&O's Detail    17 Feb 2025 07:01  -  18 Feb 2025 07:00  --------------------------------------------------------  IN:    IV PiggyBack: 100 mL    Oral Fluid: 700 mL  Total IN: 800 mL    OUT:    Voided (mL): 1500 mL  Total OUT: 1500 mL        Net: I&O's Detail    16 Feb 2025 07:01  -  17 Feb 2025 07:00  --------------------------------------------------------  Total NET: -1035 mL      17 Feb 2025 07:01  -  18 Feb 2025 07:00  --------------------------------------------------------  Total NET: -700 mL        CAPILLARY BLOOD GLUCOSE      POCT Blood Glucose.: 164 mg/dL (18 Feb 2025 06:54)  POCT Blood Glucose.: 157 mg/dL (17 Feb 2025 20:57)  POCT Blood Glucose.: 158 mg/dL (17 Feb 2025 17:13)  POCT Blood Glucose.: 95 mg/dL (17 Feb 2025 11:26)      Physical Exam:  General: NAD; A&Ox3  Cardiac: S1/S2, RRR, no murmur, no rubs  Lungs: unlabored shallow respirations breath sounds decreased at B/L bases  Abdomen: Soft/NT/ND  Sternum: Intact, no click, incision healing well with no drainage  Extremities: Improved mild edema B/L LE.     Central Venous Catheter: Yes[]  No[X] , If Yes indication:                    Day #  Caceres Catheter: Yes  [] , No  [X] , If yes indication:                                 Day #  NGT: Yes [] No [] ,    If Yes Placement:                                                   Day #  EPICARDIAL WIRES:  [X] YES [] NO                                                            Day #21  BOWEL MOVEMENT:  [X] YES [] NO, If No, Timing since last BM Day #  CHEST TUBE(Left/Right):  [] YES [X] NO, If yes -  AIR LEAKS:  [] YES [] NO        LABS:                        8.6[L]  6.82  )-----------( 167      ( 18 Feb 2025 05:50 )             29.4[L]                        8.1[L]  6.92  )-----------( 143      ( 17 Feb 2025 05:50 )             28.6[L]    02-18    142  |  105  |  34[H]  ----------------------------<  186[H]  4.7   |  29  |  0.9  02-17    139  |  102  |  36[H]  ----------------------------<  177[H]  4.6   |  29  |  1.1    Ca    9.2      18 Feb 2025 05:50  Phos  3.2     02-16  Mg     2.1     02-18    TPro  6.5 [6.0 - 8.0]  /  Alb  3.8 [3.5 - 5.2]  /  TBili  0.3 [0.2 - 1.2]  /  DBili  x   /  AST  16 [0 - 41]  /  ALT  15 [0 - 41]  /  AlkPhos  103 [30 - 115]  02-18      Urinalysis Basic - ( 18 Feb 2025 05:50 )    Color: x / Appearance: x / SG: x / pH: x  Gluc: 186 mg/dL / Ketone: x  / Bili: x / Urobili: x   Blood: x / Protein: x / Nitrite: x   Leuk Esterase: x / RBC: x / WBC x   Sq Epi: x / Non Sq Epi: x / Bacteria: x        RADIOLOGY & ADDITIONAL TESTS:  CXR:   < from: Xray Chest 1 View- PORTABLE-Urgent (Xray Chest 1 View- PORTABLE-Urgent .) (02.18.25 @ 06:34) >    IMPRESSION:    Increased bibasilar opacities/effusions.    < end of copied text >    Allergies    No Known Allergies    Intolerances      MEDICATIONS  (STANDING):  albuterol/ipratropium for Nebulization 3 milliLiter(s) Nebulizer every 6 hours  ALPRAZolam 0.25 milliGRAM(s) Oral at bedtime  aspirin enteric coated 81 milliGRAM(s) Oral daily  chlorhexidine 2% Cloths 1 Application(s) Topical daily  dextrose 5%. 1000 milliLiter(s) (50 mL/Hr) IV Continuous <Continuous>  dextrose 5%. 1000 milliLiter(s) (100 mL/Hr) IV Continuous <Continuous>  dextrose 50% Injectable 50 milliLiter(s) IV Push every 15 minutes  dextrose 50% Injectable 25 milliLiter(s) IV Push every 15 minutes  finasteride 5 milliGRAM(s) Oral daily  fluticasone propionate 50 MICROgram(s)/spray Nasal Spray 1 Spray(s) Both Nostrils every 12 hours  fluticasone propionate/ salmeterol 250-50 MICROgram(s) Diskus 1 Dose(s) Inhalation two times a day  folic acid 1 milliGRAM(s) Oral daily  furosemide   Injectable 40 milliGRAM(s) IV Push daily  gabapentin 300 milliGRAM(s) Oral at bedtime  glucagon  Injectable 1 milliGRAM(s) IntraMuscular once  heparin   Injectable 5000 Unit(s) SubCutaneous every 8 hours  influenza  Vaccine (HIGH DOSE) 0.5 milliLiter(s) IntraMuscular once  insulin glargine Injectable (LANTUS) 40 Unit(s) SubCutaneous before breakfast  insulin lispro (ADMELOG) corrective regimen sliding scale   SubCutaneous three times a day before meals  insulin lispro (ADMELOG) corrective regimen sliding scale   SubCutaneous at bedtime  insulin lispro Injectable (ADMELOG) 10 Unit(s) SubCutaneous three times a day before meals  iron sucrose IVPB 200 milliGRAM(s) IV Intermittent every 24 hours  lidocaine   4% Patch 1 Patch Transdermal every 24 hours  lidocaine   4% Patch 1 Patch Transdermal daily  magnesium oxide 400 milliGRAM(s) Oral three times a day with meals  metoprolol tartrate 12.5 milliGRAM(s) Oral every 12 hours  pantoprazole    Tablet 40 milliGRAM(s) Oral before breakfast  potassium chloride    Tablet ER 20 milliEquivalent(s) Oral daily  psyllium Powder 1 Packet(s) Oral every 12 hours  rosuvastatin 20 milliGRAM(s) Oral at bedtime  senna 2 Tablet(s) Oral at bedtime  sertraline 25 milliGRAM(s) Oral daily  spironolactone 25 milliGRAM(s) Oral daily  tamsulosin 0.4 milliGRAM(s) Oral at bedtime    MEDICATIONS  (PRN):  dextrose Oral Gel 15 Gram(s) Oral once PRN Blood Glucose LESS THAN 70 milliGRAM(s)/deciliter  hydrALAZINE Injectable 5 milliGRAM(s) IV Push every 4 hours PRN Systolic > 160  labetalol Injectable 10 milliGRAM(s) IV Push every 6 hours PRN Diastolic blood pressure >155  melatonin 5 milliGRAM(s) Oral at bedtime PRN Insomnia  oxyCODONE    IR 5 milliGRAM(s) Oral every 4 hours PRN Moderate Pain (4 - 6)  oxyCODONE    IR 10 milliGRAM(s) Oral every 6 hours PRN Severe Pain (7 - 10)  sodium chloride 0.65% Nasal 1 Spray(s) Both Nostrils four times a day PRN Nasal Congestion      Pharmacologic DVT Prophylaxis: [X] YES, []NO: Contraindication:   [X] HEPARIN: Dose: 5000 units Q8H    [] LOVENOX: Dose: XX mg  Q24H                 SCD's: YES b/l    GI Prophylaxis: Protonix [X], Pepcid []    Post-Op Beta-Blockers: [X]Yes, []No: contraindication:  Post-Op Aspirin: [X]Yes,  []No: contraindication:  Post-Op Statin: [X]Yes, []No: contraindication:      Ambulation/Activity Status: Ambulate with assistance    Assessment/Plan:  64y Male POD#21 s/p MVR/MAZE/SHEILA closure  - Case and plan discussed with CTU Intensivist and CT Surgeon - Dr. Blakely/Mauri/Laron  - Continue CTU supportive care and ongoing plan of care as per continuing CTU rounds.    - Continue DVT/GI prophylaxis  - Incentive Spirometry 10 times an hour  - Continue to advance physical activity as tolerated and continue PT/OT as directed  1. Continue ASA, statin, BB.   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 Pre-op h/o A. Fib cont to monitor electrolytes - hold AC at this time prior lower GI bleed  and continue to be in SR- s/p MAZE and SHEILA closure  4. DM/Glucose A1c 6.2 Control: continue lantus 40/humalog 10 and sliding scale, DASH/TLC diet  5. Heart Failure - diastolic (EF 60%) - acute on chronic (secondary to severe MR) fluid restriction to 1L, daily standing weights, aim for fluid balance -1L/day. Diuresis daily with Aldactone 25 mg, Lasix changed from 20 to 40 mg qd with K 20 mg.  Mg oxide tid for electrolyte repletion. F/u repeat CMP.   6.. flakito on CKD3a- FLAKITO resolved  7. Oxy  prn, lidocaine patch for pain    Social Service Disposition:  pt is considering home vs SNF upon d/c   OPERATIVE PROCEDURE(s):                POD #  21 s/p MVR/MAZE,SHEILA closure                 SURGEON(s): ISELA Blakely  SUBJECTIVE ASSESSMENT:64yMale patient seen and examined at bedside. Pt is feeling better, currently denies acute complaints.    Vital Signs Last 24 Hrs  T(F): 97.7 (18 Feb 2025 06:00), Max: 98.4 (18 Feb 2025 00:00)  HR: 78 (18 Feb 2025 07:00) (75 - 86)  BP: 126/58 (18 Feb 2025 07:00) (110/54 - 164/77)  BP(mean): 84 (18 Feb 2025 07:00) (77 - 110)  RR: 15 (18 Feb 2025 07:00) (13 - 42)  SpO2: 98% (18 Feb 2025 07:00) (89% - 100%)      I&O's Detail    17 Feb 2025 07:01  -  18 Feb 2025 07:00  --------------------------------------------------------  IN:    IV PiggyBack: 100 mL    Oral Fluid: 700 mL  Total IN: 800 mL    OUT:    Voided (mL): 1500 mL  Total OUT: 1500 mL    Net: I&O's Detail    16 Feb 2025 07:01  -  17 Feb 2025 07:00  --------------------------------------------------------  Total NET: -1035 mL    17 Feb 2025 07:01  -  18 Feb 2025 07:00  --------------------------------------------------------  Total NET: -700 mL        CAPILLARY BLOOD GLUCOSE      POCT Blood Glucose.: 164 mg/dL (18 Feb 2025 06:54)  POCT Blood Glucose.: 157 mg/dL (17 Feb 2025 20:57)  POCT Blood Glucose.: 158 mg/dL (17 Feb 2025 17:13)  POCT Blood Glucose.: 95 mg/dL (17 Feb 2025 11:26)      Physical Exam:  General: NAD; A&Ox3  Cardiac: S1/S2, RRR, no murmur, no rubs  Lungs: unlabored shallow respirations breath sounds decreased at B/L bases  Abdomen: Soft/NT/ND  Sternum: Intact, no click, incision healing well with no drainage  Extremities: Improved mild edema B/L LE, significant resolution of lower extremity erythema with consistent ace wrapping, left foot anterior blister unchanged    Central Venous Catheter: Yes[]  No[X] , If Yes indication:                    Day #  Caceres Catheter: Yes  [] , No  [X] , If yes indication:                                 Day #  NGT: Yes [] No [ x] ,    If Yes Placement:                                                   Day #  EPICARDIAL WIRES:  [X] YES [] NO                                                            Day #21  BOWEL MOVEMENT:  [X] YES [] NO, If No, Timing since last BM Day # 1  CHEST TUBE(Left/Right):  [] YES [X] NO, If yes -  AIR LEAKS:  [] YES [] NO        LABS:                        8.6[L]  6.82  )-----------( 167      ( 18 Feb 2025 05:50 )             29.4[L]                        8.1[L]  6.92  )-----------( 143      ( 17 Feb 2025 05:50 )             28.6[L]    02-18    142  |  105  |  34[H]  ----------------------------<  186[H]  4.7   |  29  |  0.9  02-17    139  |  102  |  36[H]  ----------------------------<  177[H]  4.6   |  29  |  1.1    Ca    9.2      18 Feb 2025 05:50  Phos  3.2     02-16  Mg     2.1     02-18    TPro  6.5 [6.0 - 8.0]  /  Alb  3.8 [3.5 - 5.2]  /  TBili  0.3 [0.2 - 1.2]  /  DBili  x   /  AST  16 [0 - 41]  /  ALT  15 [0 - 41]  /  AlkPhos  103 [30 - 115]  02-18      RADIOLOGY & ADDITIONAL TESTS:  CXR:   < from: Xray Chest 1 View- PORTABLE-Urgent (Xray Chest 1 View- PORTABLE-Urgent .) (02.18.25 @ 06:34) >    IMPRESSION:    Increased bibasilar opacities/effusions.    Allergies    No Known Allergies    Intolerances      MEDICATIONS  (STANDING):  albuterol/ipratropium for Nebulization 3 milliLiter(s) Nebulizer every 6 hours  ALPRAZolam 0.25 milliGRAM(s) Oral at bedtime  aspirin enteric coated 81 milliGRAM(s) Oral daily  chlorhexidine 2% Cloths 1 Application(s) Topical daily  dextrose 5%. 1000 milliLiter(s) (50 mL/Hr) IV Continuous <Continuous>  dextrose 5%. 1000 milliLiter(s) (100 mL/Hr) IV Continuous <Continuous>  dextrose 50% Injectable 50 milliLiter(s) IV Push every 15 minutes  dextrose 50% Injectable 25 milliLiter(s) IV Push every 15 minutes  finasteride 5 milliGRAM(s) Oral daily  fluticasone propionate 50 MICROgram(s)/spray Nasal Spray 1 Spray(s) Both Nostrils every 12 hours  fluticasone propionate/ salmeterol 250-50 MICROgram(s) Diskus 1 Dose(s) Inhalation two times a day  folic acid 1 milliGRAM(s) Oral daily  furosemide   Injectable 40 milliGRAM(s) IV Push daily  gabapentin 300 milliGRAM(s) Oral at bedtime  glucagon  Injectable 1 milliGRAM(s) IntraMuscular once  heparin   Injectable 5000 Unit(s) SubCutaneous every 8 hours  influenza  Vaccine (HIGH DOSE) 0.5 milliLiter(s) IntraMuscular once  insulin glargine Injectable (LANTUS) 40 Unit(s) SubCutaneous before breakfast  insulin lispro (ADMELOG) corrective regimen sliding scale   SubCutaneous three times a day before meals  insulin lispro (ADMELOG) corrective regimen sliding scale   SubCutaneous at bedtime  insulin lispro Injectable (ADMELOG) 10 Unit(s) SubCutaneous three times a day before meals  iron sucrose IVPB 200 milliGRAM(s) IV Intermittent every 24 hours  lidocaine   4% Patch 1 Patch Transdermal every 24 hours  lidocaine   4% Patch 1 Patch Transdermal daily  magnesium oxide 400 milliGRAM(s) Oral three times a day with meals  metoprolol tartrate 12.5 milliGRAM(s) Oral every 12 hours  pantoprazole    Tablet 40 milliGRAM(s) Oral before breakfast  potassium chloride    Tablet ER 20 milliEquivalent(s) Oral daily  psyllium Powder 1 Packet(s) Oral every 12 hours  rosuvastatin 20 milliGRAM(s) Oral at bedtime  senna 2 Tablet(s) Oral at bedtime  sertraline 25 milliGRAM(s) Oral daily  spironolactone 25 milliGRAM(s) Oral daily  tamsulosin 0.4 milliGRAM(s) Oral at bedtime    MEDICATIONS  (PRN):  dextrose Oral Gel 15 Gram(s) Oral once PRN Blood Glucose LESS THAN 70 milliGRAM(s)/deciliter  hydrALAZINE Injectable 5 milliGRAM(s) IV Push every 4 hours PRN Systolic > 160  labetalol Injectable 10 milliGRAM(s) IV Push every 6 hours PRN Diastolic blood pressure >155  melatonin 5 milliGRAM(s) Oral at bedtime PRN Insomnia  oxyCODONE    IR 5 milliGRAM(s) Oral every 4 hours PRN Moderate Pain (4 - 6)  oxyCODONE    IR 10 milliGRAM(s) Oral every 6 hours PRN Severe Pain (7 - 10)  sodium chloride 0.65% Nasal 1 Spray(s) Both Nostrils four times a day PRN Nasal Congestion      Pharmacologic DVT Prophylaxis: [X] YES, []NO: Contraindication:   [X] HEPARIN: Dose: 5000 units Q8H    [] LOVENOX: Dose: XX mg  Q24H                 SCD's: YES b/l    GI Prophylaxis: Protonix [X], Pepcid []    Post-Op Beta-Blockers: [X]Yes, []No: contraindication:  Post-Op Aspirin: [X]Yes,  []No: contraindication:  Post-Op Statin: [X]Yes, []No: contraindication:      Ambulation/Activity Status: Ambulate with assistance    Assessment/Plan:  64y Male POD#21 s/p MVR/MAZE/SHEILA closure  - Case and plan discussed with CTU Intensivist and CT Surgeon - Dr. Blakely/Mauri/Laron  - Continue CTU supportive care and ongoing plan of care as per continuing CTU rounds.    - Continue DVT/GI prophylaxis  - Incentive Spirometry 10 times an hour  - Continue to advance physical activity as tolerated and continue PT/OT as directed  1. Continue ASA, statin, BB.   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 Pre-op h/o A. Fib cont to monitor electrolytes - hold AC at this time prior lower GI bleed  and continue to be in SR- s/p MAZE and SHEILA closure  4. DM/Glucose A1c 6.2 Control: continue lantus 40/humalog 10 and sliding scale, DASH/TLC diet  5. Heart Failure - diastolic (EF 60%) - acute on chronic (secondary to severe MR) fluid restriction to 1L, daily standing weights, aim for fluid balance -1L/day. Diuresis daily with Aldactone 25 mg, Lasix changed from 20 to 40 mg qd with K 20 mg.  Mg oxide tid for electrolyte repletion. F/u repeat CMP.   6.. flakito on CKD3a- FLAKITO resolved  7. Oxy prn, lidocaine patch for pain    Social Service Disposition:  pt is considering home vs SNF upon d/c
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
## 2025-02-19 LAB
ALBUMIN SERPL ELPH-MCNC: 3.6 G/DL — SIGNIFICANT CHANGE UP (ref 3.5–5.2)
ALP SERPL-CCNC: 94 U/L — SIGNIFICANT CHANGE UP (ref 30–115)
ALT FLD-CCNC: 13 U/L — SIGNIFICANT CHANGE UP (ref 0–41)
ANION GAP SERPL CALC-SCNC: 9 MMOL/L — SIGNIFICANT CHANGE UP (ref 7–14)
AST SERPL-CCNC: 14 U/L — SIGNIFICANT CHANGE UP (ref 0–41)
BILIRUB SERPL-MCNC: 0.4 MG/DL — SIGNIFICANT CHANGE UP (ref 0.2–1.2)
BUN SERPL-MCNC: 34 MG/DL — HIGH (ref 10–20)
CALCIUM SERPL-MCNC: 8.7 MG/DL — SIGNIFICANT CHANGE UP (ref 8.4–10.5)
CHLORIDE SERPL-SCNC: 107 MMOL/L — SIGNIFICANT CHANGE UP (ref 98–110)
CO2 SERPL-SCNC: 28 MMOL/L — SIGNIFICANT CHANGE UP (ref 17–32)
CREAT SERPL-MCNC: 0.9 MG/DL — SIGNIFICANT CHANGE UP (ref 0.7–1.5)
EGFR: 94 ML/MIN/1.73M2 — SIGNIFICANT CHANGE UP
GLUCOSE BLDC GLUCOMTR-MCNC: 126 MG/DL — HIGH (ref 70–99)
GLUCOSE BLDC GLUCOMTR-MCNC: 141 MG/DL — HIGH (ref 70–99)
GLUCOSE BLDC GLUCOMTR-MCNC: 167 MG/DL — HIGH (ref 70–99)
GLUCOSE BLDC GLUCOMTR-MCNC: 179 MG/DL — HIGH (ref 70–99)
GLUCOSE SERPL-MCNC: 167 MG/DL — HIGH (ref 70–99)
HCT VFR BLD CALC: 29.6 % — LOW (ref 42–52)
HGB BLD-MCNC: 8.6 G/DL — LOW (ref 14–18)
MAGNESIUM SERPL-MCNC: 2.1 MG/DL — SIGNIFICANT CHANGE UP (ref 1.8–2.4)
MCHC RBC-ENTMCNC: 27.1 PG — SIGNIFICANT CHANGE UP (ref 27–31)
MCHC RBC-ENTMCNC: 29.1 G/DL — LOW (ref 32–37)
MCV RBC AUTO: 93.4 FL — SIGNIFICANT CHANGE UP (ref 80–94)
NRBC BLD AUTO-RTO: 0 /100 WBCS — SIGNIFICANT CHANGE UP (ref 0–0)
PLATELET # BLD AUTO: 169 K/UL — SIGNIFICANT CHANGE UP (ref 130–400)
PMV BLD: 11.7 FL — HIGH (ref 7.4–10.4)
POTASSIUM SERPL-MCNC: 5.2 MMOL/L — HIGH (ref 3.5–5)
POTASSIUM SERPL-SCNC: 5.2 MMOL/L — HIGH (ref 3.5–5)
PROT SERPL-MCNC: 6 G/DL — SIGNIFICANT CHANGE UP (ref 6–8)
RBC # BLD: 3.17 M/UL — LOW (ref 4.7–6.1)
RBC # FLD: 20.4 % — HIGH (ref 11.5–14.5)
SODIUM SERPL-SCNC: 144 MMOL/L — SIGNIFICANT CHANGE UP (ref 135–146)
WBC # BLD: 6.46 K/UL — SIGNIFICANT CHANGE UP (ref 4.8–10.8)
WBC # FLD AUTO: 6.46 K/UL — SIGNIFICANT CHANGE UP (ref 4.8–10.8)

## 2025-02-19 PROCEDURE — 71046 X-RAY EXAM CHEST 2 VIEWS: CPT | Mod: 26

## 2025-02-19 PROCEDURE — 99232 SBSQ HOSP IP/OBS MODERATE 35: CPT | Mod: 24

## 2025-02-19 PROCEDURE — 71045 X-RAY EXAM CHEST 1 VIEW: CPT | Mod: 26,XE

## 2025-02-19 PROCEDURE — 99233 SBSQ HOSP IP/OBS HIGH 50: CPT

## 2025-02-19 RX ORDER — ALBUTEROL SULFATE 2.5 MG/3ML
2 VIAL, NEBULIZER (ML) INHALATION EVERY 6 HOURS
Refills: 0 | Status: DISCONTINUED | OUTPATIENT
Start: 2025-02-19 | End: 2025-02-24

## 2025-02-19 RX ORDER — AMLODIPINE BESYLATE 10 MG/1
5 TABLET ORAL DAILY
Refills: 0 | Status: DISCONTINUED | OUTPATIENT
Start: 2025-02-19 | End: 2025-02-24

## 2025-02-19 RX ADMIN — HEPARIN SODIUM 5000 UNIT(S): 1000 INJECTION INTRAVENOUS; SUBCUTANEOUS at 22:38

## 2025-02-19 RX ADMIN — HEPARIN SODIUM 5000 UNIT(S): 1000 INJECTION INTRAVENOUS; SUBCUTANEOUS at 05:22

## 2025-02-19 RX ADMIN — Medication 400 MILLIGRAM(S): at 18:04

## 2025-02-19 RX ADMIN — Medication 1 PUFF(S): at 14:16

## 2025-02-19 RX ADMIN — INSULIN LISPRO 2: 100 INJECTION, SOLUTION INTRAVENOUS; SUBCUTANEOUS at 07:48

## 2025-02-19 RX ADMIN — Medication 2 PUFF(S): at 14:16

## 2025-02-19 RX ADMIN — INSULIN GLARGINE-YFGN 40 UNIT(S): 100 INJECTION, SOLUTION SUBCUTANEOUS at 07:51

## 2025-02-19 RX ADMIN — LIDOCAINE HYDROCHLORIDE 1 PATCH: 20 JELLY TOPICAL at 11:35

## 2025-02-19 RX ADMIN — INSULIN LISPRO 2: 100 INJECTION, SOLUTION INTRAVENOUS; SUBCUTANEOUS at 11:32

## 2025-02-19 RX ADMIN — OXYCODONE HYDROCHLORIDE 10 MILLIGRAM(S): 30 TABLET ORAL at 22:43

## 2025-02-19 RX ADMIN — AMLODIPINE BESYLATE 5 MILLIGRAM(S): 10 TABLET ORAL at 09:42

## 2025-02-19 RX ADMIN — OXYCODONE HYDROCHLORIDE 5 MILLIGRAM(S): 30 TABLET ORAL at 00:00

## 2025-02-19 RX ADMIN — GABAPENTIN 300 MILLIGRAM(S): 400 CAPSULE ORAL at 22:37

## 2025-02-19 RX ADMIN — FLUTICASONE PROPIONATE 1 SPRAY(S): 50 SPRAY, METERED NASAL at 05:23

## 2025-02-19 RX ADMIN — LIDOCAINE HYDROCHLORIDE 1 PATCH: 20 JELLY TOPICAL at 13:03

## 2025-02-19 RX ADMIN — FLUTICASONE PROPIONATE 1 SPRAY(S): 50 SPRAY, METERED NASAL at 18:04

## 2025-02-19 RX ADMIN — Medication 0.25 MILLIGRAM(S): at 22:38

## 2025-02-19 RX ADMIN — Medication 1 DOSE(S): at 09:42

## 2025-02-19 RX ADMIN — INSULIN LISPRO 10 UNIT(S): 100 INJECTION, SOLUTION INTRAVENOUS; SUBCUTANEOUS at 07:50

## 2025-02-19 RX ADMIN — TAMSULOSIN HYDROCHLORIDE 0.4 MILLIGRAM(S): 0.4 CAPSULE ORAL at 22:37

## 2025-02-19 RX ADMIN — METOPROLOL SUCCINATE 12.5 MILLIGRAM(S): 50 TABLET, EXTENDED RELEASE ORAL at 18:03

## 2025-02-19 RX ADMIN — Medication 25 MILLIGRAM(S): at 05:22

## 2025-02-19 RX ADMIN — INSULIN LISPRO 10 UNIT(S): 100 INJECTION, SOLUTION INTRAVENOUS; SUBCUTANEOUS at 11:33

## 2025-02-19 RX ADMIN — HEPARIN SODIUM 5000 UNIT(S): 1000 INJECTION INTRAVENOUS; SUBCUTANEOUS at 13:04

## 2025-02-19 RX ADMIN — FUROSEMIDE 40 MILLIGRAM(S): 10 INJECTION INTRAMUSCULAR; INTRAVENOUS at 05:22

## 2025-02-19 RX ADMIN — LIDOCAINE HYDROCHLORIDE 1 PATCH: 20 JELLY TOPICAL at 21:38

## 2025-02-19 RX ADMIN — Medication 1 PACKET(S): at 18:03

## 2025-02-19 RX ADMIN — Medication 20 MILLIEQUIVALENT(S): at 11:34

## 2025-02-19 RX ADMIN — ROSUVASTATIN CALCIUM 20 MILLIGRAM(S): 20 TABLET, FILM COATED ORAL at 22:37

## 2025-02-19 RX ADMIN — Medication 1 PACKET(S): at 05:22

## 2025-02-19 RX ADMIN — FOLIC ACID 1 MILLIGRAM(S): 1 TABLET ORAL at 11:34

## 2025-02-19 RX ADMIN — METOPROLOL SUCCINATE 12.5 MILLIGRAM(S): 50 TABLET, EXTENDED RELEASE ORAL at 05:22

## 2025-02-19 RX ADMIN — Medication 81 MILLIGRAM(S): at 11:34

## 2025-02-19 RX ADMIN — Medication 400 MILLIGRAM(S): at 11:55

## 2025-02-19 RX ADMIN — INSULIN LISPRO 10 UNIT(S): 100 INJECTION, SOLUTION INTRAVENOUS; SUBCUTANEOUS at 16:16

## 2025-02-19 RX ADMIN — Medication 400 MILLIGRAM(S): at 07:52

## 2025-02-19 RX ADMIN — Medication 5 MILLIGRAM(S): at 13:13

## 2025-02-19 RX ADMIN — LIDOCAINE HYDROCHLORIDE 1 PATCH: 20 JELLY TOPICAL at 00:15

## 2025-02-19 RX ADMIN — LIDOCAINE HYDROCHLORIDE 1 PATCH: 20 JELLY TOPICAL at 06:00

## 2025-02-19 RX ADMIN — FINASTERIDE 5 MILLIGRAM(S): 1 TABLET, FILM COATED ORAL at 11:34

## 2025-02-19 RX ADMIN — Medication 40 MILLIGRAM(S): at 07:48

## 2025-02-19 RX ADMIN — SERTRALINE 25 MILLIGRAM(S): 100 TABLET, FILM COATED ORAL at 11:34

## 2025-02-19 NOTE — PROGRESS NOTE ADULT - SUBJECTIVE AND OBJECTIVE BOX
OPERATIVE PROCEDURE(s): MVR/MAZE,SHEILA closure                POD # 22                      SURGEON(s): ISELA Blakely    SUBJECTIVE ASSESSMENT:64yMale patient seen and examined at bedside.     Vital Signs Last 24 Hrs  T(F): 97.9 (19 Feb 2025 04:00), Max: 97.9 (19 Feb 2025 04:00)  HR: 67 (19 Feb 2025 07:00) (67 - 92)  BP: 113/56 (19 Feb 2025 07:00) (113/56 - 164/67)  BP(mean): 80 (19 Feb 2025 07:00) (80 - 106)  RR: 17 (19 Feb 2025 07:00) (14 - 28)  SpO2: 99% (19 Feb 2025 07:00) (91% - 99%)      I&O's Detail    18 Feb 2025 07:01  -  19 Feb 2025 07:00  --------------------------------------------------------  IN:    Oral Fluid: 940 mL  Total IN: 940 mL    OUT:    Voided (mL): 3050 mL  Total OUT: 3050 mL        Net: I&O's Detail    17 Feb 2025 07:01  -  18 Feb 2025 07:00  --------------------------------------------------------  Total NET: -700 mL      18 Feb 2025 07:01  -  19 Feb 2025 07:00  --------------------------------------------------------  Total NET: -2110 mL        CAPILLARY BLOOD GLUCOSE      POCT Blood Glucose.: 167 mg/dL (19 Feb 2025 06:08)  POCT Blood Glucose.: 253 mg/dL (18 Feb 2025 23:13)  POCT Blood Glucose.: 171 mg/dL (18 Feb 2025 16:45)  POCT Blood Glucose.: 130 mg/dL (18 Feb 2025 11:31)    A1C with Estimated Average Glucose Result: 6.2 % (01-23-25 @ 09:45)  A1C with Estimated Average Glucose Result: 7.1 % (12-04-24 @ 04:45)      Physical Exam:  General: NAD; A&Ox3  Neuro: pupils equal and reactive, speech clear, no overt sensory or motor deficits  Cardiac: S1/S2, RRR, no murmur, no rubs  Lungs: unlabored shallow respirations, bilateral bases decreased  Abdomen: Soft/NT/ND; positive bowel sounds x 4  Sternum: Intact, no click, incision healing well with no drainage  Incisions: Incisions clean/dry/intact  Extremities: Moderate edema b/l lower extremities; good capillary refill; no cyanosis; palpable 1+ pedal pulses b/l      EPICARDIAL WIRES:  YES                                                                         Day # 22  BOWEL MOVEMENT:  [X] YES [] NO, If No, Timing since last BM Day #         LABS:                        8.6[L]  6.46  )-----------( 169      ( 19 Feb 2025 06:22 )             29.6[L]                        8.6[L]  6.82  )-----------( 167      ( 18 Feb 2025 05:50 )             29.4[L]    02-19    144  |  107  |  34[H]  ----------------------------<  167[H]  5.2[H]   |  28  |  0.9  02-18    142  |  105  |  34[H]  ----------------------------<  186[H]  4.7   |  29  |  0.9    Ca    8.7      19 Feb 2025 06:22  Phos  3.2     02-16  Mg     2.1     02-19    TPro  6.0 [6.0 - 8.0]  /  Alb  3.6 [3.5 - 5.2]  /  TBili  0.4 [0.2 - 1.2]  /  DBili  x   /  AST  14 [0 - 41]  /  ALT  13 [0 - 41]  /  AlkPhos  94 [30 - 115]  02-19        RADIOLOGY & ADDITIONAL TESTS:  CXR:   < from: Xray Chest 1 View- PORTABLE-Routine (Xray Chest 1 View- PORTABLE-Routine in AM.) (02.19.25 @ 06:41) >    INTERPRETATION:  CLINICAL HISTORY: Mitral valve replacement    COMPARISON: Prior day.    TECHNIQUE: Portable frontal chest radiograph.    FINDINGS/  IMPRESSION:    Support devices: None.    Cardiac/mediastinum/hilum: Stable.    Lung parenchyma/Pleura: Stable bilateral opacities and effusions.    Skeleton/soft tissues: Stable.      EKG:  < from: 12 Lead ECG (02.17.25 @ 07:30) >  Ventricular Rate 77 BPM    Atrial Rate 77 BPM    P-R Interval 188 ms    QRS Duration 96 ms    Q-T Interval 376 ms    QTC Calculation(Bazett) 425 ms    P Axis 49 degrees    R Axis -19 degrees    T Axis 115 degrees    Diagnosis Line Normal sinus rhythm  Possible Septal infarct , age undetermined  ST & T wave abnormality, consider lateral ischemia  Abnormal ECG      Allergies    No Known Allergies    Intolerances      MEDICATIONS  (STANDING):  albuterol/ipratropium for Nebulization 3 milliLiter(s) Nebulizer every 6 hours  ALPRAZolam 0.25 milliGRAM(s) Oral at bedtime  amLODIPine   Tablet 5 milliGRAM(s) Oral daily  aspirin enteric coated 81 milliGRAM(s) Oral daily  chlorhexidine 2% Cloths 1 Application(s) Topical daily  dextrose 5%. 1000 milliLiter(s) (50 mL/Hr) IV Continuous <Continuous>  dextrose 5%. 1000 milliLiter(s) (100 mL/Hr) IV Continuous <Continuous>  dextrose 50% Injectable 50 milliLiter(s) IV Push every 15 minutes  dextrose 50% Injectable 25 milliLiter(s) IV Push every 15 minutes  finasteride 5 milliGRAM(s) Oral daily  fluticasone propionate 50 MICROgram(s)/spray Nasal Spray 1 Spray(s) Both Nostrils every 12 hours  fluticasone propionate/ salmeterol 250-50 MICROgram(s) Diskus 1 Dose(s) Inhalation two times a day  folic acid 1 milliGRAM(s) Oral daily  furosemide   Injectable 40 milliGRAM(s) IV Push daily  gabapentin 300 milliGRAM(s) Oral at bedtime  glucagon  Injectable 1 milliGRAM(s) IntraMuscular once  heparin   Injectable 5000 Unit(s) SubCutaneous every 8 hours  influenza  Vaccine (HIGH DOSE) 0.5 milliLiter(s) IntraMuscular once  insulin glargine Injectable (LANTUS) 40 Unit(s) SubCutaneous before breakfast  insulin lispro (ADMELOG) corrective regimen sliding scale   SubCutaneous three times a day before meals  insulin lispro (ADMELOG) corrective regimen sliding scale   SubCutaneous at bedtime  insulin lispro Injectable (ADMELOG) 10 Unit(s) SubCutaneous three times a day before meals  iron sucrose IVPB 200 milliGRAM(s) IV Intermittent every 24 hours  lidocaine   4% Patch 1 Patch Transdermal every 24 hours  lidocaine   4% Patch 1 Patch Transdermal daily  magnesium oxide 400 milliGRAM(s) Oral three times a day with meals  metoprolol tartrate 12.5 milliGRAM(s) Oral every 12 hours  pantoprazole    Tablet 40 milliGRAM(s) Oral before breakfast  potassium chloride    Tablet ER 20 milliEquivalent(s) Oral daily  psyllium Powder 1 Packet(s) Oral every 12 hours  rosuvastatin 20 milliGRAM(s) Oral at bedtime  senna 2 Tablet(s) Oral at bedtime  sertraline 25 milliGRAM(s) Oral daily  spironolactone 25 milliGRAM(s) Oral daily  tamsulosin 0.4 milliGRAM(s) Oral at bedtime    MEDICATIONS  (PRN):  dextrose Oral Gel 15 Gram(s) Oral once PRN Blood Glucose LESS THAN 70 milliGRAM(s)/deciliter  hydrALAZINE Injectable 5 milliGRAM(s) IV Push every 4 hours PRN Systolic > 160  labetalol Injectable 10 milliGRAM(s) IV Push every 6 hours PRN Diastolic blood pressure >155  melatonin 5 milliGRAM(s) Oral at bedtime PRN Insomnia  oxyCODONE    IR 5 milliGRAM(s) Oral every 4 hours PRN Moderate Pain (4 - 6)  oxyCODONE    IR 10 milliGRAM(s) Oral every 6 hours PRN Severe Pain (7 - 10)  sodium chloride 0.65% Nasal 1 Spray(s) Both Nostrils four times a day PRN Nasal Congestion    Home Medications:  Breo Ellipta 200 mcg-25 mcg/inh inhalation powder: 1 inhaled once a day (28 Jan 2025 06:52)  HYDROcodone 10 mg oral capsule, extended release: 1 cap(s) orally 2 times a day (28 Jan 2025 06:52)  losartan 50 mg oral tablet: 1 tab(s) orally 2 times a day (28 Jan 2025 06:52)  losartan-hydrochlorothiazide 50 mg-12.5 mg oral tablet: 1 tab(s) orally (28 Jan 2025 06:52)  ProAir HFA 90 mcg/inh inhalation aerosol: 2 puff(s) inhaled 2 times a day (28 Jan 2025 06:52)  tujeo: 80 unit(s) subcutaneous once a day (28 Jan 2025 06:52)      Pharmacologic DVT Prophylaxis [] YES, [] NO: Contraindication:  SCD's: YES b/l    GI Prophylaxis:     Post-Op Beta-Blockers: [] Yes, [] No: contraindication:  Post-Op CCB: [] Yes, [] No: contraindication:  Post-Op Aspirin:  [] Yes, [] No: contraindication:  Post-Op Statin:  [] Yes, [] No: contraindication:    Ambulation/Activity Status: ambulate with assiatance    Assessment/Plan:  64y Male status-post MVR/MAZE,SHEILA closure                POD # 22  - Case and plan discussed with CTU Intensivist and CT Surgeon - Dr. Blakely/Mauri/Laron/Rian  - Continue CTU supportive care and ongoing plan of care as per continuing CTU rounds.   - Continue DVT/GI prophylaxis  - Incentive Spirometry 10 times an hour  - Continue to advance physical activity as tolerated and continue PT/OT as directed  1. CAD s/p CABG: Continue ASA, statin, BB  2. HTN:   3. Post-op A. Fib ppx:   4. COPD/Hypoxia:   5. DM/Glucose Control:     Social Service Disposition:     OPERATIVE PROCEDURE(s): MVR/MAZE,SHEILA closure                POD # 22                      SURGEON(s): ISELA Blakely    SUBJECTIVE ASSESSMENT:64yMale patient seen and examined at bedside.     Vital Signs Last 24 Hrs  T(F): 97.9 (19 Feb 2025 04:00), Max: 97.9 (19 Feb 2025 04:00)  HR: 67 (19 Feb 2025 07:00) (67 - 92)  BP: 113/56 (19 Feb 2025 07:00) (113/56 - 164/67)  BP(mean): 80 (19 Feb 2025 07:00) (80 - 106)  RR: 17 (19 Feb 2025 07:00) (14 - 28)  SpO2: 99% (19 Feb 2025 07:00) (91% - 99%)      I&O's Detail    18 Feb 2025 07:01  -  19 Feb 2025 07:00  --------------------------------------------------------  IN:    Oral Fluid: 940 mL  Total IN: 940 mL    OUT:    Voided (mL): 3050 mL  Total OUT: 3050 mL        Net: I&O's Detail    17 Feb 2025 07:01  -  18 Feb 2025 07:00  --------------------------------------------------------  Total NET: -700 mL      18 Feb 2025 07:01  -  19 Feb 2025 07:00  --------------------------------------------------------  Total NET: -2110 mL        CAPILLARY BLOOD GLUCOSE      POCT Blood Glucose.: 167 mg/dL (19 Feb 2025 06:08)  POCT Blood Glucose.: 253 mg/dL (18 Feb 2025 23:13)  POCT Blood Glucose.: 171 mg/dL (18 Feb 2025 16:45)  POCT Blood Glucose.: 130 mg/dL (18 Feb 2025 11:31)    A1C with Estimated Average Glucose Result: 6.2 % (01-23-25 @ 09:45)  A1C with Estimated Average Glucose Result: 7.1 % (12-04-24 @ 04:45)      Physical Exam:  General: NAD; A&Ox3  Cardiac: S1/S2, RRR, no murmur, no rubs  Lungs: unlabored shallow respirations, bilateral bases decreased  Abdomen: Soft/NT/ND  Sternum: Intact, no click, incision healng well with no drainage  Incisions: Incisions clean/dry/intact  Extremities: Moderate edema b/l lower extremities, significant resolution of LE erythema with ace wrapping, L foot anterior blister      EPICARDIAL WIRES:  YES                                                                         Day # 22  BOWEL MOVEMENT:  [X] YES [] NO, If No, Timing since last BM Day #         LABS:                        8.6[L]  6.46  )-----------( 169      ( 19 Feb 2025 06:22 )             29.6[L]                        8.6[L]  6.82  )-----------( 167      ( 18 Feb 2025 05:50 )             29.4[L]    02-19    144  |  107  |  34[H]  ----------------------------<  167[H]  5.2[H]   |  28  |  0.9  02-18    142  |  105  |  34[H]  ----------------------------<  186[H]  4.7   |  29  |  0.9    Ca    8.7      19 Feb 2025 06:22  Phos  3.2     02-16  Mg     2.1     02-19    TPro  6.0 [6.0 - 8.0]  /  Alb  3.6 [3.5 - 5.2]  /  TBili  0.4 [0.2 - 1.2]  /  DBili  x   /  AST  14 [0 - 41]  /  ALT  13 [0 - 41]  /  AlkPhos  94 [30 - 115]  02-19        RADIOLOGY & ADDITIONAL TESTS:  CXR:   < from: Xray Chest 1 View- PORTABLE-Routine (Xray Chest 1 View- PORTABLE-Routine in AM.) (02.19.25 @ 06:41) >    INTERPRETATION:  CLINICAL HISTORY: Mitral valve replacement    COMPARISON: Prior day.    TECHNIQUE: Portable frontal chest radiograph.    FINDINGS/  IMPRESSION:    Support devices: None.    Cardiac/mediastinum/hilum: Stable.    Lung parenchyma/Pleura: Stable bilateral opacities and effusions.    Skeleton/soft tissues: Stable.      EKG:  < from: 12 Lead ECG (02.17.25 @ 07:30) >  Ventricular Rate 77 BPM    Atrial Rate 77 BPM    P-R Interval 188 ms    QRS Duration 96 ms    Q-T Interval 376 ms    QTC Calculation(Bazett) 425 ms    P Axis 49 degrees    R Axis -19 degrees    T Axis 115 degrees    Diagnosis Line Normal sinus rhythm  Possible Septal infarct , age undetermined  ST & T wave abnormality, consider lateral ischemia  Abnormal ECG      Allergies    No Known Allergies    Intolerances      MEDICATIONS  (STANDING):  albuterol/ipratropium for Nebulization 3 milliLiter(s) Nebulizer every 6 hours  ALPRAZolam 0.25 milliGRAM(s) Oral at bedtime  amLODIPine   Tablet 5 milliGRAM(s) Oral daily  aspirin enteric coated 81 milliGRAM(s) Oral daily  chlorhexidine 2% Cloths 1 Application(s) Topical daily  dextrose 5%. 1000 milliLiter(s) (50 mL/Hr) IV Continuous <Continuous>  dextrose 5%. 1000 milliLiter(s) (100 mL/Hr) IV Continuous <Continuous>  dextrose 50% Injectable 50 milliLiter(s) IV Push every 15 minutes  dextrose 50% Injectable 25 milliLiter(s) IV Push every 15 minutes  finasteride 5 milliGRAM(s) Oral daily  fluticasone propionate 50 MICROgram(s)/spray Nasal Spray 1 Spray(s) Both Nostrils every 12 hours  fluticasone propionate/ salmeterol 250-50 MICROgram(s) Diskus 1 Dose(s) Inhalation two times a day  folic acid 1 milliGRAM(s) Oral daily  furosemide   Injectable 40 milliGRAM(s) IV Push daily  gabapentin 300 milliGRAM(s) Oral at bedtime  glucagon  Injectable 1 milliGRAM(s) IntraMuscular once  heparin   Injectable 5000 Unit(s) SubCutaneous every 8 hours  influenza  Vaccine (HIGH DOSE) 0.5 milliLiter(s) IntraMuscular once  insulin glargine Injectable (LANTUS) 40 Unit(s) SubCutaneous before breakfast  insulin lispro (ADMELOG) corrective regimen sliding scale   SubCutaneous three times a day before meals  insulin lispro (ADMELOG) corrective regimen sliding scale   SubCutaneous at bedtime  insulin lispro Injectable (ADMELOG) 10 Unit(s) SubCutaneous three times a day before meals  iron sucrose IVPB 200 milliGRAM(s) IV Intermittent every 24 hours  lidocaine   4% Patch 1 Patch Transdermal every 24 hours  lidocaine   4% Patch 1 Patch Transdermal daily  magnesium oxide 400 milliGRAM(s) Oral three times a day with meals  metoprolol tartrate 12.5 milliGRAM(s) Oral every 12 hours  pantoprazole    Tablet 40 milliGRAM(s) Oral before breakfast  potassium chloride    Tablet ER 20 milliEquivalent(s) Oral daily  psyllium Powder 1 Packet(s) Oral every 12 hours  rosuvastatin 20 milliGRAM(s) Oral at bedtime  senna 2 Tablet(s) Oral at bedtime  sertraline 25 milliGRAM(s) Oral daily  spironolactone 25 milliGRAM(s) Oral daily  tamsulosin 0.4 milliGRAM(s) Oral at bedtime    MEDICATIONS  (PRN):  dextrose Oral Gel 15 Gram(s) Oral once PRN Blood Glucose LESS THAN 70 milliGRAM(s)/deciliter  hydrALAZINE Injectable 5 milliGRAM(s) IV Push every 4 hours PRN Systolic > 160  labetalol Injectable 10 milliGRAM(s) IV Push every 6 hours PRN Diastolic blood pressure >155  melatonin 5 milliGRAM(s) Oral at bedtime PRN Insomnia  oxyCODONE    IR 5 milliGRAM(s) Oral every 4 hours PRN Moderate Pain (4 - 6)  oxyCODONE    IR 10 milliGRAM(s) Oral every 6 hours PRN Severe Pain (7 - 10)  sodium chloride 0.65% Nasal 1 Spray(s) Both Nostrils four times a day PRN Nasal Congestion    Home Medications:  Breo Ellipta 200 mcg-25 mcg/inh inhalation powder: 1 inhaled once a day (28 Jan 2025 06:52)  HYDROcodone 10 mg oral capsule, extended release: 1 cap(s) orally 2 times a day (28 Jan 2025 06:52)  losartan 50 mg oral tablet: 1 tab(s) orally 2 times a day (28 Jan 2025 06:52)  losartan-hydrochlorothiazide 50 mg-12.5 mg oral tablet: 1 tab(s) orally (28 Jan 2025 06:52)  ProAir HFA 90 mcg/inh inhalation aerosol: 2 puff(s) inhaled 2 times a day (28 Jan 2025 06:52)  tujeo: 80 unit(s) subcutaneous once a day (28 Jan 2025 06:52)      Pharmacologic DVT Prophylaxis [X] YES, [] NO: Contraindication:  Heparin 5000 units q8hr  SCD's: YES b/l    GI Prophylaxis: Protonix    Post-Op Beta-Blockers: [X] Yes, [] No: contraindication:  Post-Op CCB: [X] Yes, [] No: contraindication:  Post-Op Aspirin:  [X] Yes, [] No: contraindication:  Post-Op Statin:  [X] Yes, [] No: contraindication:    Ambulation/Activity Status: ambulate with assistance    Assessment/Plan:  64y Male status-post MVR/MAZE,SHEILA closure                POD # 22  - Case and plan discussed with CTU Intensivist and CT Surgeon - Dr. Blakely  - Continue CTU supportive care and ongoing plan of care as per continuing CTU rounds.   - Continue DVT/GI prophylaxis  - Incentive Spirometry 10 times an hour  - Continue to advance physical activity as tolerated and continue PT/OT as directed  1. CAD s/p CABG: Continue ASA, statin, BB.   2. HTN: Started Norvasc 5 mg qd today.  3. Post-op A. Fib ppx: S/p MAZE and SHEILA closure. Keep epicardial wires until d/c. Monitor electrolytes, AC held at this time due to prior lower GI bleeding. Pt continues to be in SR.  4. 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 Keep O2Sat >92%.  5. Heart Failure - diastolic (EF 60%) - acute on chronic (secondary to severe MR): fluid restriction to 1L, daily standing weights, aim for fluid balance -1L/day. Diuresis daily with Aldactone 25 mg, Lasix 40 mg qd with K 20 mg.  Mg oxide tid for electrolyte repletion. F/u repeat CMP.   6. DM/Glucose Control (A1c 6.2): Lantus 40, Lispro 10, insulin sliding scale, TLC/DASH diet.  7. CKD on CKD3a: FLAKITO resolved.  8. Oxy prn, lidocaine patch for pain.    Social Service Disposition: Pt considering d/c to home vs rehab   OPERATIVE PROCEDURE(s): MVR/MAZE,SHEILA closure                POD # 22                      SURGEON(s): ISELA Blakely    SUBJECTIVE ASSESSMENT:64yMale patient seen and examined at bedside. Complaining of fatigue.     Vital Signs Last 24 Hrs  T(F): 97.9 (19 Feb 2025 04:00), Max: 97.9 (19 Feb 2025 04:00)  HR: 67 (19 Feb 2025 07:00) (67 - 92)  BP: 113/56 (19 Feb 2025 07:00) (113/56 - 164/67)  BP(mean): 80 (19 Feb 2025 07:00) (80 - 106)  RR: 17 (19 Feb 2025 07:00) (14 - 28)  SpO2: 99% (19 Feb 2025 07:00) (91% - 99%)      I&O's Detail    18 Feb 2025 07:01  -  19 Feb 2025 07:00  --------------------------------------------------------  IN:    Oral Fluid: 940 mL  Total IN: 940 mL    OUT:    Voided (mL): 3050 mL  Total OUT: 3050 mL        Net: I&O's Detail    17 Feb 2025 07:01  -  18 Feb 2025 07:00  --------------------------------------------------------  Total NET: -700 mL      18 Feb 2025 07:01  -  19 Feb 2025 07:00  --------------------------------------------------------  Total NET: -2110 mL        CAPILLARY BLOOD GLUCOSE      POCT Blood Glucose.: 167 mg/dL (19 Feb 2025 06:08)  POCT Blood Glucose.: 253 mg/dL (18 Feb 2025 23:13)  POCT Blood Glucose.: 171 mg/dL (18 Feb 2025 16:45)  POCT Blood Glucose.: 130 mg/dL (18 Feb 2025 11:31)    A1C with Estimated Average Glucose Result: 6.2 % (01-23-25 @ 09:45)  A1C with Estimated Average Glucose Result: 7.1 % (12-04-24 @ 04:45)      Physical Exam:  General: NAD; A&Ox3  Cardiac: S1/S2, RRR, no murmur, no rubs  Lungs: unlabored shallow respirations, bilateral bases decreased  Abdomen: Soft/NT/ND  Sternum: Intact, no click, incision healng well with no drainage  Incisions: Incisions clean/dry/intact  Extremities: Moderate edema b/l lower extremities, significant resolution of LE erythema with ace wrapping, L foot anterior blister      EPICARDIAL WIRES:  YES                                                                         Day # 22  BOWEL MOVEMENT:  [X] YES [] NO, If No, Timing since last BM Day #         LABS:                        8.6[L]  6.46  )-----------( 169      ( 19 Feb 2025 06:22 )             29.6[L]                        8.6[L]  6.82  )-----------( 167      ( 18 Feb 2025 05:50 )             29.4[L]    02-19    144  |  107  |  34[H]  ----------------------------<  167[H]  5.2[H]   |  28  |  0.9  02-18    142  |  105  |  34[H]  ----------------------------<  186[H]  4.7   |  29  |  0.9    Ca    8.7      19 Feb 2025 06:22  Phos  3.2     02-16  Mg     2.1     02-19    TPro  6.0 [6.0 - 8.0]  /  Alb  3.6 [3.5 - 5.2]  /  TBili  0.4 [0.2 - 1.2]  /  DBili  x   /  AST  14 [0 - 41]  /  ALT  13 [0 - 41]  /  AlkPhos  94 [30 - 115]  02-19        RADIOLOGY & ADDITIONAL TESTS:  CXR:   < from: Xray Chest 1 View- PORTABLE-Routine (Xray Chest 1 View- PORTABLE-Routine in AM.) (02.19.25 @ 06:41) >    INTERPRETATION:  CLINICAL HISTORY: Mitral valve replacement    COMPARISON: Prior day.    TECHNIQUE: Portable frontal chest radiograph.    FINDINGS/  IMPRESSION:    Support devices: None.    Cardiac/mediastinum/hilum: Stable.    Lung parenchyma/Pleura: Stable bilateral opacities and effusions.    Skeleton/soft tissues: Stable.      EKG:  < from: 12 Lead ECG (02.17.25 @ 07:30) >  Ventricular Rate 77 BPM    Atrial Rate 77 BPM    P-R Interval 188 ms    QRS Duration 96 ms    Q-T Interval 376 ms    QTC Calculation(Bazett) 425 ms    P Axis 49 degrees    R Axis -19 degrees    T Axis 115 degrees    Diagnosis Line Normal sinus rhythm  Possible Septal infarct , age undetermined  ST & T wave abnormality, consider lateral ischemia  Abnormal ECG      Allergies    No Known Allergies    Intolerances      MEDICATIONS  (STANDING):  albuterol/ipratropium for Nebulization 3 milliLiter(s) Nebulizer every 6 hours  ALPRAZolam 0.25 milliGRAM(s) Oral at bedtime  amLODIPine   Tablet 5 milliGRAM(s) Oral daily  aspirin enteric coated 81 milliGRAM(s) Oral daily  chlorhexidine 2% Cloths 1 Application(s) Topical daily  dextrose 5%. 1000 milliLiter(s) (50 mL/Hr) IV Continuous <Continuous>  dextrose 5%. 1000 milliLiter(s) (100 mL/Hr) IV Continuous <Continuous>  dextrose 50% Injectable 50 milliLiter(s) IV Push every 15 minutes  dextrose 50% Injectable 25 milliLiter(s) IV Push every 15 minutes  finasteride 5 milliGRAM(s) Oral daily  fluticasone propionate 50 MICROgram(s)/spray Nasal Spray 1 Spray(s) Both Nostrils every 12 hours  fluticasone propionate/ salmeterol 250-50 MICROgram(s) Diskus 1 Dose(s) Inhalation two times a day  folic acid 1 milliGRAM(s) Oral daily  furosemide   Injectable 40 milliGRAM(s) IV Push daily  gabapentin 300 milliGRAM(s) Oral at bedtime  glucagon  Injectable 1 milliGRAM(s) IntraMuscular once  heparin   Injectable 5000 Unit(s) SubCutaneous every 8 hours  influenza  Vaccine (HIGH DOSE) 0.5 milliLiter(s) IntraMuscular once  insulin glargine Injectable (LANTUS) 40 Unit(s) SubCutaneous before breakfast  insulin lispro (ADMELOG) corrective regimen sliding scale   SubCutaneous three times a day before meals  insulin lispro (ADMELOG) corrective regimen sliding scale   SubCutaneous at bedtime  insulin lispro Injectable (ADMELOG) 10 Unit(s) SubCutaneous three times a day before meals  iron sucrose IVPB 200 milliGRAM(s) IV Intermittent every 24 hours  lidocaine   4% Patch 1 Patch Transdermal every 24 hours  lidocaine   4% Patch 1 Patch Transdermal daily  magnesium oxide 400 milliGRAM(s) Oral three times a day with meals  metoprolol tartrate 12.5 milliGRAM(s) Oral every 12 hours  pantoprazole    Tablet 40 milliGRAM(s) Oral before breakfast  potassium chloride    Tablet ER 20 milliEquivalent(s) Oral daily  psyllium Powder 1 Packet(s) Oral every 12 hours  rosuvastatin 20 milliGRAM(s) Oral at bedtime  senna 2 Tablet(s) Oral at bedtime  sertraline 25 milliGRAM(s) Oral daily  spironolactone 25 milliGRAM(s) Oral daily  tamsulosin 0.4 milliGRAM(s) Oral at bedtime    MEDICATIONS  (PRN):  dextrose Oral Gel 15 Gram(s) Oral once PRN Blood Glucose LESS THAN 70 milliGRAM(s)/deciliter  hydrALAZINE Injectable 5 milliGRAM(s) IV Push every 4 hours PRN Systolic > 160  labetalol Injectable 10 milliGRAM(s) IV Push every 6 hours PRN Diastolic blood pressure >155  melatonin 5 milliGRAM(s) Oral at bedtime PRN Insomnia  oxyCODONE    IR 5 milliGRAM(s) Oral every 4 hours PRN Moderate Pain (4 - 6)  oxyCODONE    IR 10 milliGRAM(s) Oral every 6 hours PRN Severe Pain (7 - 10)  sodium chloride 0.65% Nasal 1 Spray(s) Both Nostrils four times a day PRN Nasal Congestion    Home Medications:  Breo Ellipta 200 mcg-25 mcg/inh inhalation powder: 1 inhaled once a day (28 Jan 2025 06:52)  HYDROcodone 10 mg oral capsule, extended release: 1 cap(s) orally 2 times a day (28 Jan 2025 06:52)  losartan 50 mg oral tablet: 1 tab(s) orally 2 times a day (28 Jan 2025 06:52)  losartan-hydrochlorothiazide 50 mg-12.5 mg oral tablet: 1 tab(s) orally (28 Jan 2025 06:52)  ProAir HFA 90 mcg/inh inhalation aerosol: 2 puff(s) inhaled 2 times a day (28 Jan 2025 06:52)  tujeo: 80 unit(s) subcutaneous once a day (28 Jan 2025 06:52)      Pharmacologic DVT Prophylaxis [X] YES, [] NO: Contraindication:  Heparin 5000 units q8hr  SCD's: YES b/l    GI Prophylaxis: Protonix    Post-Op Beta-Blockers: [X] Yes, [] No: contraindication:  Post-Op CCB: [X] Yes, [] No: contraindication:  Post-Op Aspirin:  [X] Yes, [] No: contraindication:  Post-Op Statin:  [X] Yes, [] No: contraindication:    Ambulation/Activity Status: ambulate with assistance    Assessment/Plan:  64y Male status-post MVR/MAZE,SHEILA closure                POD # 22  - Case and plan discussed with CTU Intensivist and CT Surgeon - Dr. Blakely  - Continue CTU supportive care and ongoing plan of care as per continuing CTU rounds.   - Continue DVT/GI prophylaxis  - Incentive Spirometry 10 times an hour  - Continue to advance physical activity as tolerated and continue PT/OT as directed  1. CAD s/p CABG: Continue ASA, statin, BB.   2. HTN: Started Norvasc 5 mg qd today, continue BB  3. Post-op A. Fib ppx: S/p MAZE and SHEILA closure. Keep epicardial wires until d/c. Monitor electrolytes, AC held at this time due to prior lower GI bleeding. Pt continues to be in SR.  4. 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 Keep O2Sat >92%.  5. Heart Failure - diastolic (EF 60%) - acute on chronic (secondary to severe MR): fluid restriction to 1L, daily standing weights, aim for fluid balance -1L/day. Diuresis daily with Aldactone 25 mg, Lasix 40 mg qd with K 20 mg.  Mg oxide tid for electrolyte repletion. F/u repeat CMP.   6. DM/Glucose Control (A1c 6.2): Lantus 40, Lispro 10, insulin sliding scale, TLC/DASH diet.  7. CKD on CKD3a: FLAKITO resolved.  8. Oxy prn, lidocaine patch for pain.    Social Service Disposition: Pt considering d/c to home vs rehab

## 2025-02-19 NOTE — PROGRESS NOTE ADULT - ASSESSMENT
Assessment/Plan:  Mitral Valve Insufficiency/A-Fib-s/p MVR/MAZE-POD #22  1-BP control-metoprolol  2-serum glucose control-Lantus/Lispro with insulin sliding scale correction regimen  3-fluid overload-diuresis  4-A-Fib, s/p MAZE, now NSR  5-chronic systolic heart failure-off inotropes  9-VDQ-kepcglvl, avoid nephrotoxins, monitor renal failure daily  7-JIMMY-BIPAP support  6-JMI-kvsyfxle statin  3-gsnmucujeyonbcq-zbmbwbtv, monitor daily  10-acute blood loss anemia-stable, monitor Hb/Hct daily  08-kgrzltspqmlhvybj-wpdsbjeky,monitor plts daily  12-BPH-finasteride, tamsulosin    40 minutes of critical care services provided

## 2025-02-19 NOTE — PROGRESS NOTE ADULT - SUBJECTIVE AND OBJECTIVE BOX
JAZ GAVIN  MRN#: [id number]  Subjective:  Patient was seen and evalauted on AM rounds offerring no specific compliants at this time.    OBJECTIVE:  ICU Vital Signs Last 24 Hrs  T(C): 36.2 (19 Feb 2025 08:00), Max: 36.6 (19 Feb 2025 00:00)  T(F): 97.2 (19 Feb 2025 08:00), Max: 97.9 (19 Feb 2025 04:00)  HR: 72 (19 Feb 2025 08:00) (67 - 92)  BP: 119/61 (19 Feb 2025 08:00) (113/56 - 164/67)  BP(mean): 84 (19 Feb 2025 08:00) (80 - 106)  ABP: --  ABP(mean): --  RR: 22 (19 Feb 2025 08:00) (14 - 28)  SpO2: 96% (19 Feb 2025 08:00) (91% - 99%)    O2 Parameters below as of 19 Feb 2025 09:00  Patient On (Oxygen Delivery Method): nasal cannula  O2 Flow (L/min): 2          02-18 @ 07:01  -  02-19 @ 07:00  --------------------------------------------------------  IN: 940 mL / OUT: 3050 mL / NET: -2110 mL    02-19 @ 07:01 - 02-19 @ 10:25  --------------------------------------------------------  IN: 60 mL / OUT: 400 mL / NET: -340 mL      CAPILLARY BLOOD GLUCOSE      POCT Blood Glucose.: 167 mg/dL (19 Feb 2025 06:08)      PHYSICAL EXAM:Daily     Daily   General: WN/WD NAD    HEENT:     + NCAT  + EOMI  - Conjuctival edema   - Icterus   - Thrush   - ETT  - NGT/OGT    Neck:         + FROM    - JVD     - Nodes     - Masses    + Mid-line trachea   - Tracheostomy    Chest:         - Sternal click  - Sternal drainage  + Pacing wires  - Chest tubes  - SubQ emphysema    Lungs:          + CTA   - Rhonchi    - Rales    - Wheezing     - Decreased BS   - Dullness R L    Cardiac:       + S1 + S2    + RRR   - Irregular   - S3  - S4    - Murmurs   - Rub   - Hamman’s sign     Abdomen:    + BS     + Soft    + Non-tender     - Distended    - Organomegaly  - PEG    Extremities:   - Cyanosis U/L   - Clubbing  U/L  + LE Edema   + Capillary refill    + Pulses     Neuro:        + Awake   +  Alert   - Confused   - Lethargic   - Sedated   - Generalized Weakness    Skin:        - Rashes    - Erythema   + Normal incisions   + IV sites intact  - Sacral decubitus    HOSPITAL MEDICATIONS:  MEDICATIONS  (STANDING):  albuterol    90 MICROgram(s) HFA Inhaler 2 Puff(s) Inhalation every 6 hours  ALPRAZolam 0.25 milliGRAM(s) Oral at bedtime  amLODIPine   Tablet 5 milliGRAM(s) Oral daily  aspirin enteric coated 81 milliGRAM(s) Oral daily  chlorhexidine 2% Cloths 1 Application(s) Topical daily  dextrose 5%. 1000 milliLiter(s) (50 mL/Hr) IV Continuous <Continuous>  dextrose 5%. 1000 milliLiter(s) (100 mL/Hr) IV Continuous <Continuous>  dextrose 50% Injectable 50 milliLiter(s) IV Push every 15 minutes  dextrose 50% Injectable 25 milliLiter(s) IV Push every 15 minutes  finasteride 5 milliGRAM(s) Oral daily  fluticasone propionate 50 MICROgram(s)/spray Nasal Spray 1 Spray(s) Both Nostrils every 12 hours  fluticasone propionate/ salmeterol 250-50 MICROgram(s) Diskus 1 Dose(s) Inhalation two times a day  folic acid 1 milliGRAM(s) Oral daily  furosemide   Injectable 40 milliGRAM(s) IV Push daily  gabapentin 300 milliGRAM(s) Oral at bedtime  glucagon  Injectable 1 milliGRAM(s) IntraMuscular once  heparin   Injectable 5000 Unit(s) SubCutaneous every 8 hours  influenza  Vaccine (HIGH DOSE) 0.5 milliLiter(s) IntraMuscular once  insulin glargine Injectable (LANTUS) 40 Unit(s) SubCutaneous before breakfast  insulin lispro (ADMELOG) corrective regimen sliding scale   SubCutaneous three times a day before meals  insulin lispro (ADMELOG) corrective regimen sliding scale   SubCutaneous at bedtime  insulin lispro Injectable (ADMELOG) 10 Unit(s) SubCutaneous three times a day before meals  ipratropium 17 MICROgram(s) HFA Inhaler 1 Puff(s) Inhalation every 6 hours  iron sucrose IVPB 200 milliGRAM(s) IV Intermittent every 24 hours  lidocaine   4% Patch 1 Patch Transdermal every 24 hours  lidocaine   4% Patch 1 Patch Transdermal daily  magnesium oxide 400 milliGRAM(s) Oral three times a day with meals  metoprolol tartrate 12.5 milliGRAM(s) Oral every 12 hours  pantoprazole    Tablet 40 milliGRAM(s) Oral before breakfast  potassium chloride    Tablet ER 20 milliEquivalent(s) Oral daily  psyllium Powder 1 Packet(s) Oral every 12 hours  rosuvastatin 20 milliGRAM(s) Oral at bedtime  senna 2 Tablet(s) Oral at bedtime  sertraline 25 milliGRAM(s) Oral daily  spironolactone 25 milliGRAM(s) Oral daily  tamsulosin 0.4 milliGRAM(s) Oral at bedtime    MEDICATIONS  (PRN):  dextrose Oral Gel 15 Gram(s) Oral once PRN Blood Glucose LESS THAN 70 milliGRAM(s)/deciliter  hydrALAZINE Injectable 5 milliGRAM(s) IV Push every 4 hours PRN Systolic > 160  labetalol Injectable 10 milliGRAM(s) IV Push every 6 hours PRN Diastolic blood pressure >155  melatonin 5 milliGRAM(s) Oral at bedtime PRN Insomnia  oxyCODONE    IR 10 milliGRAM(s) Oral every 6 hours PRN Severe Pain (7 - 10)  oxyCODONE    IR 5 milliGRAM(s) Oral every 4 hours PRN Moderate Pain (4 - 6)  sodium chloride 0.65% Nasal 1 Spray(s) Both Nostrils four times a day PRN Nasal Congestion      LABS:                        8.6    6.46  )-----------( 169      ( 19 Feb 2025 06:22 )             29.6    02-19    144  |  107  |  34[H]  ----------------------------<  167[H]  5.2[H]   |  28  |  0.9    Ca    8.7      19 Feb 2025 06:22  Mg     2.1     02-19    TPro  6.0  /  Alb  3.6  /  TBili  0.4  /  DBili  x   /  AST  14  /  ALT  13  /  AlkPhos  94  02-19     LIVER FUNCTIONS - ( 19 Feb 2025 06:22 )  Alb: 3.6 g/dL / Pro: 6.0 g/dL / ALK PHOS: 94 U/L / ALT: 13 U/L / AST: 14 U/L / GGT: x           Urinalysis Basic - ( 19 Feb 2025 06:22 )    Color: x / Appearance: x / SG: x / pH: x  Gluc: 167 mg/dL / Ketone: x  / Bili: x / Urobili: x   Blood: x / Protein: x / Nitrite: x   Leuk Esterase: x / RBC: x / WBC x   Sq Epi: x / Non Sq Epi: x / Bacteria: x        RADIOLOGY:  X Reviewed and interpreted by me: bilateral opacities/effusions    CARDIOPULMONARY DYSFUNCTION  - Respiratory status required supplemental oxygen & the following of continuous pulse oximetry for support & to prevent decompensation  - Continued early mobilization as tolerated  - Addressed analgesic regimen to optimize function    PREVENTION-PROPHYLAXIS  - ASA continued for thromboembolism prophylaxis  - Rosuvastatin was also started  - Heparin continued for VTE prophylaxis in addition to Venodyne boots  - Protonix maintained for GI bleeding prophylaxis  - Lopressor continued for atrial fibrillation prophylaxis  - Metabolic stability & infection prophylaxis required review and adjustment of regular Insulin sliding scale and gylcemic regimen while following serial glucose levels to help achieve & maintain euglycemia  - Reviewed & addressed surgical site infection prophylaxis regimen

## 2025-02-19 NOTE — ADVANCED PRACTICE NURSE CONSULT - RECOMMEDATIONS
Cleanse wound to gluteal cleft and right posterior thigh with soap and water.   Pat dry, apply triad and Allevyn twice a day and prn for soiling.     Maintain pressure injury prevention.   Keep skin clean.   Maintain incontinence care.   Monitor skin for changes and notify provider   Case discussed with primary RN

## 2025-02-19 NOTE — ADVANCED PRACTICE NURSE CONSULT - ASSESSMENT
Patient received OOB to chair. Alert and oriented. Limited mobility. High BMI. High risk for pressure injury development and progression.    Type of Wound: Friction/Moisture Associated Skin Damage  Location: Gluteal cleft and right posterior thigh  Wound bed: dry hyperpigmentation with dry flaky tissue (patient states has improved)  Periwound: Intact  Wound exudate: None  Wound odor: No  Induration, erythema, warmth: No  Wound pain: Tender to palpation

## 2025-02-20 LAB
ALBUMIN SERPL ELPH-MCNC: 3.7 G/DL — SIGNIFICANT CHANGE UP (ref 3.5–5.2)
ALP SERPL-CCNC: 101 U/L — SIGNIFICANT CHANGE UP (ref 30–115)
ALT FLD-CCNC: 15 U/L — SIGNIFICANT CHANGE UP (ref 0–41)
ANION GAP SERPL CALC-SCNC: 8 MMOL/L — SIGNIFICANT CHANGE UP (ref 7–14)
AST SERPL-CCNC: 19 U/L — SIGNIFICANT CHANGE UP (ref 0–41)
BASOPHILS # BLD AUTO: 0.02 K/UL — SIGNIFICANT CHANGE UP (ref 0–0.2)
BASOPHILS NFR BLD AUTO: 0.3 % — SIGNIFICANT CHANGE UP (ref 0–1)
BILIRUB SERPL-MCNC: 0.4 MG/DL — SIGNIFICANT CHANGE UP (ref 0.2–1.2)
BUN SERPL-MCNC: 28 MG/DL — HIGH (ref 10–20)
CALCIUM SERPL-MCNC: 8.9 MG/DL — SIGNIFICANT CHANGE UP (ref 8.4–10.5)
CHLORIDE SERPL-SCNC: 105 MMOL/L — SIGNIFICANT CHANGE UP (ref 98–110)
CO2 SERPL-SCNC: 28 MMOL/L — SIGNIFICANT CHANGE UP (ref 17–32)
CREAT SERPL-MCNC: 0.8 MG/DL — SIGNIFICANT CHANGE UP (ref 0.7–1.5)
EGFR: 98 ML/MIN/1.73M2 — SIGNIFICANT CHANGE UP
EOSINOPHIL # BLD AUTO: 0.28 K/UL — SIGNIFICANT CHANGE UP (ref 0–0.7)
EOSINOPHIL NFR BLD AUTO: 4.3 % — SIGNIFICANT CHANGE UP (ref 0–8)
GLUCOSE BLDC GLUCOMTR-MCNC: 133 MG/DL — HIGH (ref 70–99)
GLUCOSE BLDC GLUCOMTR-MCNC: 166 MG/DL — HIGH (ref 70–99)
GLUCOSE BLDC GLUCOMTR-MCNC: 177 MG/DL — HIGH (ref 70–99)
GLUCOSE BLDC GLUCOMTR-MCNC: 199 MG/DL — HIGH (ref 70–99)
GLUCOSE SERPL-MCNC: 128 MG/DL — HIGH (ref 70–99)
HCT VFR BLD CALC: 29.7 % — LOW (ref 42–52)
HGB BLD-MCNC: 8.6 G/DL — LOW (ref 14–18)
IMM GRANULOCYTES NFR BLD AUTO: 0.6 % — HIGH (ref 0.1–0.3)
LYMPHOCYTES # BLD AUTO: 1.21 K/UL — SIGNIFICANT CHANGE UP (ref 1.2–3.4)
LYMPHOCYTES # BLD AUTO: 18.5 % — LOW (ref 20.5–51.1)
MAGNESIUM SERPL-MCNC: 2.2 MG/DL — SIGNIFICANT CHANGE UP (ref 1.8–2.4)
MCHC RBC-ENTMCNC: 27.2 PG — SIGNIFICANT CHANGE UP (ref 27–31)
MCHC RBC-ENTMCNC: 29 G/DL — LOW (ref 32–37)
MCV RBC AUTO: 94 FL — SIGNIFICANT CHANGE UP (ref 80–94)
MONOCYTES # BLD AUTO: 0.58 K/UL — SIGNIFICANT CHANGE UP (ref 0.1–0.6)
MONOCYTES NFR BLD AUTO: 8.9 % — SIGNIFICANT CHANGE UP (ref 1.7–9.3)
NEUTROPHILS # BLD AUTO: 4.41 K/UL — SIGNIFICANT CHANGE UP (ref 1.4–6.5)
NEUTROPHILS NFR BLD AUTO: 67.4 % — SIGNIFICANT CHANGE UP (ref 42.2–75.2)
NRBC BLD AUTO-RTO: 0 /100 WBCS — SIGNIFICANT CHANGE UP (ref 0–0)
PLATELET # BLD AUTO: 152 K/UL — SIGNIFICANT CHANGE UP (ref 130–400)
PMV BLD: 11 FL — HIGH (ref 7.4–10.4)
POTASSIUM SERPL-MCNC: 4.9 MMOL/L — SIGNIFICANT CHANGE UP (ref 3.5–5)
POTASSIUM SERPL-SCNC: 4.9 MMOL/L — SIGNIFICANT CHANGE UP (ref 3.5–5)
PROT SERPL-MCNC: 6.1 G/DL — SIGNIFICANT CHANGE UP (ref 6–8)
RBC # BLD: 3.16 M/UL — LOW (ref 4.7–6.1)
RBC # FLD: 20.5 % — HIGH (ref 11.5–14.5)
SODIUM SERPL-SCNC: 141 MMOL/L — SIGNIFICANT CHANGE UP (ref 135–146)
WBC # BLD: 6.54 K/UL — SIGNIFICANT CHANGE UP (ref 4.8–10.8)
WBC # FLD AUTO: 6.54 K/UL — SIGNIFICANT CHANGE UP (ref 4.8–10.8)

## 2025-02-20 PROCEDURE — 71045 X-RAY EXAM CHEST 1 VIEW: CPT | Mod: 26

## 2025-02-20 PROCEDURE — 99233 SBSQ HOSP IP/OBS HIGH 50: CPT

## 2025-02-20 RX ORDER — PHENYLEPHRINE HCL 1 %
1 SPRAY, NON-AEROSOL (ML) NASAL EVERY 12 HOURS
Refills: 0 | Status: DISCONTINUED | OUTPATIENT
Start: 2025-02-20 | End: 2025-02-24

## 2025-02-20 RX ADMIN — Medication 40 MILLIGRAM(S): at 06:04

## 2025-02-20 RX ADMIN — HEPARIN SODIUM 5000 UNIT(S): 1000 INJECTION INTRAVENOUS; SUBCUTANEOUS at 05:16

## 2025-02-20 RX ADMIN — Medication 400 MILLIGRAM(S): at 12:48

## 2025-02-20 RX ADMIN — SERTRALINE 25 MILLIGRAM(S): 100 TABLET, FILM COATED ORAL at 12:48

## 2025-02-20 RX ADMIN — FINASTERIDE 5 MILLIGRAM(S): 1 TABLET, FILM COATED ORAL at 12:48

## 2025-02-20 RX ADMIN — FOLIC ACID 1 MILLIGRAM(S): 1 TABLET ORAL at 12:48

## 2025-02-20 RX ADMIN — OXYCODONE HYDROCHLORIDE 10 MILLIGRAM(S): 30 TABLET ORAL at 21:10

## 2025-02-20 RX ADMIN — HEPARIN SODIUM 5000 UNIT(S): 1000 INJECTION INTRAVENOUS; SUBCUTANEOUS at 15:35

## 2025-02-20 RX ADMIN — Medication 400 MILLIGRAM(S): at 09:01

## 2025-02-20 RX ADMIN — LIDOCAINE HYDROCHLORIDE 1 PATCH: 20 JELLY TOPICAL at 16:54

## 2025-02-20 RX ADMIN — ROSUVASTATIN CALCIUM 20 MILLIGRAM(S): 20 TABLET, FILM COATED ORAL at 21:09

## 2025-02-20 RX ADMIN — Medication 400 MILLIGRAM(S): at 16:55

## 2025-02-20 RX ADMIN — INSULIN LISPRO 10 UNIT(S): 100 INJECTION, SOLUTION INTRAVENOUS; SUBCUTANEOUS at 06:46

## 2025-02-20 RX ADMIN — Medication 25 MILLIGRAM(S): at 05:16

## 2025-02-20 RX ADMIN — FLUTICASONE PROPIONATE 1 SPRAY(S): 50 SPRAY, METERED NASAL at 17:15

## 2025-02-20 RX ADMIN — Medication 1 PACKET(S): at 17:19

## 2025-02-20 RX ADMIN — INSULIN LISPRO 10 UNIT(S): 100 INJECTION, SOLUTION INTRAVENOUS; SUBCUTANEOUS at 11:39

## 2025-02-20 RX ADMIN — Medication 1 PUFF(S): at 19:45

## 2025-02-20 RX ADMIN — LIDOCAINE HYDROCHLORIDE 1 PATCH: 20 JELLY TOPICAL at 01:00

## 2025-02-20 RX ADMIN — METOPROLOL SUCCINATE 12.5 MILLIGRAM(S): 50 TABLET, EXTENDED RELEASE ORAL at 05:16

## 2025-02-20 RX ADMIN — Medication 0.25 MILLIGRAM(S): at 21:09

## 2025-02-20 RX ADMIN — GABAPENTIN 300 MILLIGRAM(S): 400 CAPSULE ORAL at 21:09

## 2025-02-20 RX ADMIN — INSULIN GLARGINE-YFGN 40 UNIT(S): 100 INJECTION, SOLUTION SUBCUTANEOUS at 11:38

## 2025-02-20 RX ADMIN — Medication 81 MILLIGRAM(S): at 12:48

## 2025-02-20 RX ADMIN — Medication 1 SPRAY(S): at 21:15

## 2025-02-20 RX ADMIN — OXYCODONE HYDROCHLORIDE 10 MILLIGRAM(S): 30 TABLET ORAL at 11:30

## 2025-02-20 RX ADMIN — INSULIN LISPRO 10 UNIT(S): 100 INJECTION, SOLUTION INTRAVENOUS; SUBCUTANEOUS at 16:46

## 2025-02-20 RX ADMIN — Medication 1 APPLICATION(S): at 21:10

## 2025-02-20 RX ADMIN — HEPARIN SODIUM 5000 UNIT(S): 1000 INJECTION INTRAVENOUS; SUBCUTANEOUS at 21:09

## 2025-02-20 RX ADMIN — LIDOCAINE HYDROCHLORIDE 1 PATCH: 20 JELLY TOPICAL at 19:21

## 2025-02-20 RX ADMIN — METOPROLOL SUCCINATE 12.5 MILLIGRAM(S): 50 TABLET, EXTENDED RELEASE ORAL at 17:19

## 2025-02-20 RX ADMIN — FUROSEMIDE 40 MILLIGRAM(S): 10 INJECTION INTRAMUSCULAR; INTRAVENOUS at 05:16

## 2025-02-20 RX ADMIN — Medication 1 SPRAY(S): at 11:31

## 2025-02-20 RX ADMIN — Medication 2 PUFF(S): at 15:09

## 2025-02-20 RX ADMIN — INSULIN LISPRO 2: 100 INJECTION, SOLUTION INTRAVENOUS; SUBCUTANEOUS at 16:46

## 2025-02-20 RX ADMIN — Medication 1 PUFF(S): at 15:09

## 2025-02-20 RX ADMIN — OXYCODONE HYDROCHLORIDE 10 MILLIGRAM(S): 30 TABLET ORAL at 22:46

## 2025-02-20 RX ADMIN — TAMSULOSIN HYDROCHLORIDE 0.4 MILLIGRAM(S): 0.4 CAPSULE ORAL at 21:09

## 2025-02-20 RX ADMIN — INSULIN LISPRO 2: 100 INJECTION, SOLUTION INTRAVENOUS; SUBCUTANEOUS at 11:39

## 2025-02-20 RX ADMIN — Medication 2 PUFF(S): at 19:45

## 2025-02-20 RX ADMIN — Medication 1 PACKET(S): at 05:17

## 2025-02-20 RX ADMIN — Medication 2 PUFF(S): at 09:37

## 2025-02-20 RX ADMIN — Medication 1 PUFF(S): at 09:37

## 2025-02-20 RX ADMIN — LIDOCAINE HYDROCHLORIDE 1 PATCH: 20 JELLY TOPICAL at 19:22

## 2025-02-20 RX ADMIN — Medication 20 MILLIEQUIVALENT(S): at 12:48

## 2025-02-20 RX ADMIN — Medication 2 TABLET(S): at 21:09

## 2025-02-20 RX ADMIN — FLUTICASONE PROPIONATE 1 SPRAY(S): 50 SPRAY, METERED NASAL at 05:17

## 2025-02-20 RX ADMIN — AMLODIPINE BESYLATE 5 MILLIGRAM(S): 10 TABLET ORAL at 05:16

## 2025-02-20 RX ADMIN — Medication 5 MILLIGRAM(S): at 21:10

## 2025-02-20 NOTE — PROGRESS NOTE ADULT - SUBJECTIVE AND OBJECTIVE BOX
OPERATIVE PROCEDURE(s):                POD #                       SURGEON(s): ISELA Blakely    HPI:      SUBJECTIVE ASSESSMENT:64yMale patient seen and examined at bedside.    Vital Signs Last 24 Hrs  T(F): 98.1 (20 Feb 2025 04:00), Max: 98.2 (20 Feb 2025 00:00)  HR: 80 (20 Feb 2025 06:00) (72 - 92)  BP: 152/70 (20 Feb 2025 06:00) (111/51 - 167/71)  BP(mean): 100 (20 Feb 2025 06:00) (74 - 102)  ABP: --  ABP(mean): --  RR: 13 (20 Feb 2025 06:00) (12 - 31)  SpO2: 100% (20 Feb 2025 06:00) (92% - 100%)  CVP(mm Hg): --  CVP(cm H2O): --  CO: --  CI: --  PA: --  SVR: --    I&O's Detail    19 Feb 2025 07:01  -  20 Feb 2025 07:00  --------------------------------------------------------  IN:    Oral Fluid: 610 mL  Total IN: 610 mL    OUT:    Voided (mL): 2615 mL  Total OUT: 2615 mL        Net: I&O's Detail    18 Feb 2025 07:01  -  19 Feb 2025 07:00  --------------------------------------------------------  Total NET: -2110 mL      19 Feb 2025 07:01  -  20 Feb 2025 07:00  --------------------------------------------------------  Total NET: -2005 mL        CAPILLARY BLOOD GLUCOSE      POCT Blood Glucose.: 133 mg/dL (20 Feb 2025 06:38)  POCT Blood Glucose.: 141 mg/dL (19 Feb 2025 21:47)  POCT Blood Glucose.: 126 mg/dL (19 Feb 2025 16:12)  POCT Blood Glucose.: 179 mg/dL (19 Feb 2025 11:31)    A1C with Estimated Average Glucose Result: 6.2 % (01-23-25 @ 09:45)  A1C with Estimated Average Glucose Result: 7.1 % (12-04-24 @ 04:45)      Physical Exam:  General: NAD; A&Ox3  Neuro: pupils equal and reactive, speech clear, no overt sensory or motor deficts  Cardiac: S1/S2, RRR, no murmur, no rubs  Lungs: unlabored respirations, CTA b/l, no wheeze, no rales, no crackles  Abdomen: Soft/NT/ND; positive bowel sounds x 4  Sternum: Intact, no click, incision healing well with no drainage  Incisions: Incisions clean/dry/intact  Extremities: No edema b/l lower extremities; good capillary refill; no cyanosis; palpable 1+ pedal pulses b/l    Central Venous Catheter: Yes: critical illness, intravenous access  Day #  Caceres Catheter: Yes: critical illness; monitor strict i/o's                    Day #  EPICARDIAL WIRES:  YES                                                                         Day #  BOWEL MOVEMENT:  [] YES [] NO, If No, Timing since last BM Day #  CHEST TUBE(MS/Left/Right):  [] YES [] NO, If yes -  AIR LEAKS:  YES/NO        LABS:                        8.6[L]  6.54  )-----------( 152      ( 20 Feb 2025 04:25 )             29.7[L]                        8.6[L]  6.46  )-----------( 169      ( 19 Feb 2025 06:22 )             29.6[L]    02-20    141  |  105  |  28[H]  ----------------------------<  128[H]  4.9   |  28  |  0.8  02-19    144  |  107  |  34[H]  ----------------------------<  167[H]  5.2[H]   |  28  |  0.9    Ca    8.9      20 Feb 2025 04:25  Mg     2.2     02-20    TPro  6.1 [6.0 - 8.0]  /  Alb  3.7 [3.5 - 5.2]  /  TBili  0.4 [0.2 - 1.2]  /  DBili  x   /  AST  19 [0 - 41]  /  ALT  15 [0 - 41]  /  AlkPhos  101 [30 - 115]  02-20      Urinalysis Basic - ( 20 Feb 2025 04:25 )    Color: x / Appearance: x / SG: x / pH: x  Gluc: 128 mg/dL / Ketone: x  / Bili: x / Urobili: x   Blood: x / Protein: x / Nitrite: x   Leuk Esterase: x / RBC: x / WBC x   Sq Epi: x / Non Sq Epi: x / Bacteria: x        RADIOLOGY & ADDITIONAL TESTS:  CXR:   EKG:    Allergies    No Known Allergies    Intolerances      MEDICATIONS  (STANDING):  albuterol    90 MICROgram(s) HFA Inhaler 2 Puff(s) Inhalation every 6 hours  ALPRAZolam 0.25 milliGRAM(s) Oral at bedtime  amLODIPine   Tablet 5 milliGRAM(s) Oral daily  aspirin enteric coated 81 milliGRAM(s) Oral daily  chlorhexidine 2% Cloths 1 Application(s) Topical daily  dextrose 5%. 1000 milliLiter(s) (50 mL/Hr) IV Continuous <Continuous>  dextrose 5%. 1000 milliLiter(s) (100 mL/Hr) IV Continuous <Continuous>  dextrose 50% Injectable 50 milliLiter(s) IV Push every 15 minutes  dextrose 50% Injectable 25 milliLiter(s) IV Push every 15 minutes  finasteride 5 milliGRAM(s) Oral daily  fluticasone propionate 50 MICROgram(s)/spray Nasal Spray 1 Spray(s) Both Nostrils every 12 hours  fluticasone propionate/ salmeterol 250-50 MICROgram(s) Diskus 1 Dose(s) Inhalation two times a day  folic acid 1 milliGRAM(s) Oral daily  furosemide   Injectable 40 milliGRAM(s) IV Push daily  gabapentin 300 milliGRAM(s) Oral at bedtime  glucagon  Injectable 1 milliGRAM(s) IntraMuscular once  heparin   Injectable 5000 Unit(s) SubCutaneous every 8 hours  influenza  Vaccine (HIGH DOSE) 0.5 milliLiter(s) IntraMuscular once  insulin glargine Injectable (LANTUS) 40 Unit(s) SubCutaneous before breakfast  insulin lispro (ADMELOG) corrective regimen sliding scale   SubCutaneous three times a day before meals  insulin lispro (ADMELOG) corrective regimen sliding scale   SubCutaneous at bedtime  insulin lispro Injectable (ADMELOG) 10 Unit(s) SubCutaneous three times a day before meals  ipratropium 17 MICROgram(s) HFA Inhaler 1 Puff(s) Inhalation every 6 hours  iron sucrose IVPB 200 milliGRAM(s) IV Intermittent every 24 hours  lidocaine   4% Patch 1 Patch Transdermal every 24 hours  lidocaine   4% Patch 1 Patch Transdermal daily  magnesium oxide 400 milliGRAM(s) Oral three times a day with meals  metoprolol tartrate 12.5 milliGRAM(s) Oral every 12 hours  pantoprazole    Tablet 40 milliGRAM(s) Oral before breakfast  potassium chloride    Tablet ER 20 milliEquivalent(s) Oral daily  psyllium Powder 1 Packet(s) Oral every 12 hours  rosuvastatin 20 milliGRAM(s) Oral at bedtime  senna 2 Tablet(s) Oral at bedtime  sertraline 25 milliGRAM(s) Oral daily  spironolactone 25 milliGRAM(s) Oral daily  tamsulosin 0.4 milliGRAM(s) Oral at bedtime    MEDICATIONS  (PRN):  dextrose Oral Gel 15 Gram(s) Oral once PRN Blood Glucose LESS THAN 70 milliGRAM(s)/deciliter  hydrALAZINE Injectable 5 milliGRAM(s) IV Push every 4 hours PRN Systolic > 160  labetalol Injectable 10 milliGRAM(s) IV Push every 6 hours PRN Diastolic blood pressure >155  melatonin 5 milliGRAM(s) Oral at bedtime PRN Insomnia  oxyCODONE    IR 10 milliGRAM(s) Oral every 6 hours PRN Severe Pain (7 - 10)  oxyCODONE    IR 5 milliGRAM(s) Oral every 4 hours PRN Moderate Pain (4 - 6)  phenylephrine 0.5% Nasal 1 Spray(s) Nasal every 12 hours PRN Nasal Congestion  sodium chloride 0.65% Nasal 1 Spray(s) Both Nostrils four times a day PRN Nasal Congestion    Home Medications:  Breo Ellipta 200 mcg-25 mcg/inh inhalation powder: 1 inhaled once a day (28 Jan 2025 06:52)  HYDROcodone 10 mg oral capsule, extended release: 1 cap(s) orally 2 times a day (28 Jan 2025 06:52)  losartan 50 mg oral tablet: 1 tab(s) orally 2 times a day (28 Jan 2025 06:52)  losartan-hydrochlorothiazide 50 mg-12.5 mg oral tablet: 1 tab(s) orally (28 Jan 2025 06:52)  ProAir HFA 90 mcg/inh inhalation aerosol: 2 puff(s) inhaled 2 times a day (28 Jan 2025 06:52)  tujeo: 80 unit(s) subcutaneous once a day (28 Jan 2025 06:52)      Pharmacologic DVT Prophylaxis [] YES, [] NO: Contraindication:  SCD's: YES b/l    GI Prophylaxis:     Post-Op Beta-Blockers: [] Yes, [] No: contraindication:  Post-Op CCB: [] Yes, [] No: contraindication:  Post-Op Aspirin:  [] Yes, [] No: contraindication:  Post-Op Statin:  [] Yes, [] No: contraindication:    Ambulation/Activity Status:    Assessment/Plan:  64y Male status-post  - Case and plan discussed with CTU Intensivist and CT Surgeon - Dr. Blakely/Mauri/Laron/Rian  - Continue CTU supportive care and ongoing plan of care as per continuing CTU rounds.   - Continue DVT/GI prophylaxis  - Incentive Spirometry 10 times an hour  - Continue to advance physical activity as tolerated and continue PT/OT as directed  1. CAD s/p CABG: Continue ASA, statin, BB  2. HTN:   3. Post-op A. Fib ppx:   4. COPD/Hypoxia:   5. DM/Glucose Control:     Social Service Disposition:     OPERATIVE PROCEDURE(s):                POD # 23 s/p MVR/MAZE/SHEILA closure                     SURGEON(s): ISELA Blakely    SUBJECTIVE ASSESSMENT:64yMale patient seen and examined at bedside. Pt feeling better, denies acute complaints.    Vital Signs Last 24 Hrs  T(F): 98.1 (20 Feb 2025 04:00), Max: 98.2 (20 Feb 2025 00:00)  HR: 80 (20 Feb 2025 06:00) (72 - 92)  BP: 152/70 (20 Feb 2025 06:00) (111/51 - 167/71)  BP(mean): 100 (20 Feb 2025 06:00) (74 - 102)  ABP: --  ABP(mean): --  RR: 13 (20 Feb 2025 06:00) (12 - 31)  SpO2: 100% (20 Feb 2025 06:00) (92% - 100%)  CVP(mm Hg): --  CVP(cm H2O): --  CO: --  CI: --  PA: --  SVR: --    I&O's Detail    19 Feb 2025 07:01  -  20 Feb 2025 07:00  --------------------------------------------------------  IN:    Oral Fluid: 610 mL  Total IN: 610 mL    OUT:    Voided (mL): 2615 mL  Total OUT: 2615 mL        Net: I&O's Detail    18 Feb 2025 07:01  -  19 Feb 2025 07:00  --------------------------------------------------------  Total NET: -2110 mL      19 Feb 2025 07:01  -  20 Feb 2025 07:00  --------------------------------------------------------  Total NET: -2005 mL        CAPILLARY BLOOD GLUCOSE      POCT Blood Glucose.: 133 mg/dL (20 Feb 2025 06:38)  POCT Blood Glucose.: 141 mg/dL (19 Feb 2025 21:47)  POCT Blood Glucose.: 126 mg/dL (19 Feb 2025 16:12)  POCT Blood Glucose.: 179 mg/dL (19 Feb 2025 11:31)    A1C with Estimated Average Glucose Result: 6.2 % (01-23-25 @ 09:45)  A1C with Estimated Average Glucose Result: 7.1 % (12-04-24 @ 04:45)      Physical Exam:  General: NAD; A&Ox3  Cardiac: S1/S2, RRR, no murmur, no rubs  Lungs: unlabored shallow respirations, bilateral bases decreased  Abdomen: Soft/NT/ND  Sternum: Intact, no click, incision healng well with no drainage  Incisions: Incisions clean/dry/intact  Extremities: Moderate edema b/l lower extremities, significant resolution of LE erythema with ace wrapping, L foot anterior blister    EPICARDIAL WIRES:  YES                                                                         Day #23  BOWEL MOVEMENT:  [X] YES [] NO, If No, Timing since last BM Day #      LABS:                        8.6[L]  6.54  )-----------( 152      ( 20 Feb 2025 04:25 )             29.7[L]                        8.6[L]  6.46  )-----------( 169      ( 19 Feb 2025 06:22 )             29.6[L]    02-20    141  |  105  |  28[H]  ----------------------------<  128[H]  4.9   |  28  |  0.8  02-19    144  |  107  |  34[H]  ----------------------------<  167[H]  5.2[H]   |  28  |  0.9    Ca    8.9      20 Feb 2025 04:25  Mg     2.2     02-20    TPro  6.1 [6.0 - 8.0]  /  Alb  3.7 [3.5 - 5.2]  /  TBili  0.4 [0.2 - 1.2]  /  DBili  x   /  AST  19 [0 - 41]  /  ALT  15 [0 - 41]  /  AlkPhos  101 [30 - 115]  02-20      Urinalysis Basic - ( 20 Feb 2025 04:25 )    Color: x / Appearance: x / SG: x / pH: x  Gluc: 128 mg/dL / Ketone: x  / Bili: x / Urobili: x   Blood: x / Protein: x / Nitrite: x   Leuk Esterase: x / RBC: x / WBC x   Sq Epi: x / Non Sq Epi: x / Bacteria: x        RADIOLOGY & ADDITIONAL TESTS:  CXR:   < from: Xray Chest 1 View- PORTABLE-Routine (Xray Chest 1 View- PORTABLE-Routine in AM.) (02.20.25 @ 05:19) >   Findings/  impression:        Support devices: EKG leads overlie thorax, obscuring anatomy.  Or      Cardiac/ Mediastinum: Stable cardiomegaly. Poststernotomy. Mitral valve   replacement.    Atrial appendage clips.    Lungs/ Pleura: Diminishing pulmonary congestion bilateral effusions. No   pneumothorax    Skeletal/ soft tissues: Stable    < end of copied text >      Allergies    No Known Allergies    Intolerances      MEDICATIONS  (STANDING):  albuterol    90 MICROgram(s) HFA Inhaler 2 Puff(s) Inhalation every 6 hours  ALPRAZolam 0.25 milliGRAM(s) Oral at bedtime  amLODIPine   Tablet 5 milliGRAM(s) Oral daily  aspirin enteric coated 81 milliGRAM(s) Oral daily  chlorhexidine 2% Cloths 1 Application(s) Topical daily  dextrose 5%. 1000 milliLiter(s) (50 mL/Hr) IV Continuous <Continuous>  dextrose 5%. 1000 milliLiter(s) (100 mL/Hr) IV Continuous <Continuous>  dextrose 50% Injectable 50 milliLiter(s) IV Push every 15 minutes  dextrose 50% Injectable 25 milliLiter(s) IV Push every 15 minutes  finasteride 5 milliGRAM(s) Oral daily  fluticasone propionate 50 MICROgram(s)/spray Nasal Spray 1 Spray(s) Both Nostrils every 12 hours  fluticasone propionate/ salmeterol 250-50 MICROgram(s) Diskus 1 Dose(s) Inhalation two times a day  folic acid 1 milliGRAM(s) Oral daily  furosemide   Injectable 40 milliGRAM(s) IV Push daily  gabapentin 300 milliGRAM(s) Oral at bedtime  glucagon  Injectable 1 milliGRAM(s) IntraMuscular once  heparin   Injectable 5000 Unit(s) SubCutaneous every 8 hours  influenza  Vaccine (HIGH DOSE) 0.5 milliLiter(s) IntraMuscular once  insulin glargine Injectable (LANTUS) 40 Unit(s) SubCutaneous before breakfast  insulin lispro (ADMELOG) corrective regimen sliding scale   SubCutaneous three times a day before meals  insulin lispro (ADMELOG) corrective regimen sliding scale   SubCutaneous at bedtime  insulin lispro Injectable (ADMELOG) 10 Unit(s) SubCutaneous three times a day before meals  ipratropium 17 MICROgram(s) HFA Inhaler 1 Puff(s) Inhalation every 6 hours  iron sucrose IVPB 200 milliGRAM(s) IV Intermittent every 24 hours  lidocaine   4% Patch 1 Patch Transdermal every 24 hours  lidocaine   4% Patch 1 Patch Transdermal daily  magnesium oxide 400 milliGRAM(s) Oral three times a day with meals  metoprolol tartrate 12.5 milliGRAM(s) Oral every 12 hours  pantoprazole    Tablet 40 milliGRAM(s) Oral before breakfast  potassium chloride    Tablet ER 20 milliEquivalent(s) Oral daily  psyllium Powder 1 Packet(s) Oral every 12 hours  rosuvastatin 20 milliGRAM(s) Oral at bedtime  senna 2 Tablet(s) Oral at bedtime  sertraline 25 milliGRAM(s) Oral daily  spironolactone 25 milliGRAM(s) Oral daily  tamsulosin 0.4 milliGRAM(s) Oral at bedtime    MEDICATIONS  (PRN):  dextrose Oral Gel 15 Gram(s) Oral once PRN Blood Glucose LESS THAN 70 milliGRAM(s)/deciliter  hydrALAZINE Injectable 5 milliGRAM(s) IV Push every 4 hours PRN Systolic > 160  labetalol Injectable 10 milliGRAM(s) IV Push every 6 hours PRN Diastolic blood pressure >155  melatonin 5 milliGRAM(s) Oral at bedtime PRN Insomnia  oxyCODONE    IR 10 milliGRAM(s) Oral every 6 hours PRN Severe Pain (7 - 10)  oxyCODONE    IR 5 milliGRAM(s) Oral every 4 hours PRN Moderate Pain (4 - 6)  phenylephrine 0.5% Nasal 1 Spray(s) Nasal every 12 hours PRN Nasal Congestion  sodium chloride 0.65% Nasal 1 Spray(s) Both Nostrils four times a day PRN Nasal Congestion    Home Medications:  Breo Ellipta 200 mcg-25 mcg/inh inhalation powder: 1 inhaled once a day (28 Jan 2025 06:52)  HYDROcodone 10 mg oral capsule, extended release: 1 cap(s) orally 2 times a day (28 Jan 2025 06:52)  losartan 50 mg oral tablet: 1 tab(s) orally 2 times a day (28 Jan 2025 06:52)  losartan-hydrochlorothiazide 50 mg-12.5 mg oral tablet: 1 tab(s) orally (28 Jan 2025 06:52)  ProAir HFA 90 mcg/inh inhalation aerosol: 2 puff(s) inhaled 2 times a day (28 Jan 2025 06:52)  tujeo: 80 unit(s) subcutaneous once a day (28 Jan 2025 06:52)      Pharmacologic DVT Prophylaxis [X] YES, [] NO: Contraindication:  Heparin 5000 units q8hrs  SCD's: YES b/l    GI Prophylaxis: Protonix    Post-Op Beta-Blockers: [X] Yes, [] No: contraindication:  Post-Op CCB: [X] Yes, [] No: contraindication:  Post-Op Aspirin:  [X] Yes, [] No: contraindication:  Post-Op Statin:  [X] Yes, [] No: contraindication:    Ambulation/Activity Status: Ambulate with assistance    Assessment/Plan:  64y Male POD#23 s/p MVR/MAZE/SHEILA closure  - Case and plan discussed with CTU Intensivist and CT Surgeon - Dr. Blakely  - Continue CTU supportive care and ongoing plan of care as per continuing CTU rounds.   - Continue DVT/GI prophylaxis  - Incentive Spirometry 10 times an hour  - Continue to advance physical activity as tolerated and continue PT/OT as directed  1. CAD s/p CABG: Continue ASA, statin, BB.   2. HTN: Continue Norvasc, BB.  3. Post-op A. Fib ppx: S/p MAZE and SHEILA closure. Keep epicardial wires until d/c. Monitor electrolytes, AC held at this time due to prior lower GI bleeding. Pt continues to be in SR.  4. 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 Keep O2Sat >92%.  5. Heart Failure - diastolic (EF 60%) - acute on chronic (secondary to severe MR): fluid restriction to 1L, daily standing weights, aim for fluid balance -1L/day. Diuresis daily with Aldactone 25 mg, Lasix 40 mg qd with K 20 mg.  Mg oxide tid for electrolyte repletion. F/u repeat CMP.   6. DM/Glucose Control (A1c 6.2): Lantus 40, Lispro 10, insulin sliding scale, TLC/DASH diet.  7. CKD on CKD3a: FLAKITO resolved.  8. Oxy prn, lidocaine patch for pain.    Social Service Disposition: Anticipate d/c home to rehab

## 2025-02-20 NOTE — PROGRESS NOTE ADULT - ASSESSMENT
Assessment/Plan:  Mitral Valve Insufficiency/A-Fib-s/p MVR/MAZE-POD #23  1-BP control-metoprolol, amlodipine  2-serum glucose control-Lantus/Lispro with insulin sliding scale correction regimen  3-fluid overload-diuresis  4-A-Fib, s/p MAZE, now NSR  5-chronic systolic heart failure-off inotropes  0-FVC-zyloduxu, avoid nephrotoxins, monitor renal failure daily  7-JIMMY-BIPAP support  0-FJT-mvonycku statin  4-jjxqjsalvhpwoue-qfpvwrag, monitor daily  10-acute blood loss anemia-stable, monitor Hb/Hct daily  66-mkofxevoftjcgwgx-ruaabttnb,monitor plts daily  12-BPH-finasteride, tamsulosin    40 minutes of critical care services provided

## 2025-02-20 NOTE — PROGRESS NOTE ADULT - SUBJECTIVE AND OBJECTIVE BOX
JAZ GAVIN  MRN#: [id number]  Subjective:  Patient was seen and evalauted on AM rounds offerring no specific compliants at this time.    OBJECTIVE:  ICU Vital Signs Last 24 Hrs  T(C): 36.8 (2025 08:00), Max: 36.8 (2025 00:00)  T(F): 98.2 (2025 08:00), Max: 98.2 (2025 00:00)  HR: 74 (2025 09:00) (72 - 92)  BP: 108/54 (2025 09:00) (108/54 - 167/77)  BP(mean): 78 (2025 09:) (74 - 110)  ABP: --  ABP(mean): --  RR: 19 (2025 09:00) (12 - 31)  SpO2: 97% (:) (92% - 100%)    O2 Parameters below as of 2025 08:00  Patient On (Oxygen Delivery Method): room air             @ 07: @ 07:00  --------------------------------------------------------  IN: 610 mL / OUT: 2615 mL / NET: -2005 mL    02 @ 07:01  20 @ 10:03  --------------------------------------------------------  IN: 240 mL / OUT: 575 mL / NET: -335 mL      CAPILLARY BLOOD GLUCOSE      POCT Blood Glucose.: 133 mg/dL (2025 06:38)      PHYSICAL EXAM:Daily     Daily Weight in k (2025 05:00)  General: WN/WD NAD    HEENT:     + NCAT  + EOMI  - Conjuctival edema   - Icterus   - Thrush   - ETT  - NGT/OGT    Neck:         + FROM    - JVD     - Nodes     - Masses    + Mid-line trachea   - Tracheostomy    Chest:         - Sternal click  - Sternal drainage  + Pacing wires  - Chest tubes  - SubQ emphysema    Lungs:          + CTA   - Rhonchi    - Rales    - Wheezing     - Decreased BS   - Dullness R L    Cardiac:       + S1 + S2    + RRR   - Irregular   - S3  - S4    - Murmurs   - Rub   - Hamman’s sign     Abdomen:    + BS     + Soft    + Non-tender     - Distended    - Organomegaly  - PEG    Extremities:   - Cyanosis U/L   - Clubbing  U/L  + LE Edema   + Capillary refill    + Pulses     Neuro:        + Awake   +  Alert   - Confused   - Lethargic   - Sedated   - Generalized Weakness    Skin:        - Rashes    - Erythema   + Normal incisions   + IV sites intact  - Sacral decubitus    HOSPITAL MEDICATIONS:  MEDICATIONS  (STANDING):  albuterol    90 MICROgram(s) HFA Inhaler 2 Puff(s) Inhalation every 6 hours  ALPRAZolam 0.25 milliGRAM(s) Oral at bedtime  amLODIPine   Tablet 5 milliGRAM(s) Oral daily  aspirin enteric coated 81 milliGRAM(s) Oral daily  chlorhexidine 2% Cloths 1 Application(s) Topical daily  dextrose 5%. 1000 milliLiter(s) (50 mL/Hr) IV Continuous <Continuous>  dextrose 5%. 1000 milliLiter(s) (100 mL/Hr) IV Continuous <Continuous>  dextrose 50% Injectable 50 milliLiter(s) IV Push every 15 minutes  dextrose 50% Injectable 25 milliLiter(s) IV Push every 15 minutes  finasteride 5 milliGRAM(s) Oral daily  fluticasone propionate 50 MICROgram(s)/spray Nasal Spray 1 Spray(s) Both Nostrils every 12 hours  fluticasone propionate/ salmeterol 250-50 MICROgram(s) Diskus 1 Dose(s) Inhalation two times a day  folic acid 1 milliGRAM(s) Oral daily  furosemide   Injectable 40 milliGRAM(s) IV Push daily  gabapentin 300 milliGRAM(s) Oral at bedtime  glucagon  Injectable 1 milliGRAM(s) IntraMuscular once  heparin   Injectable 5000 Unit(s) SubCutaneous every 8 hours  influenza  Vaccine (HIGH DOSE) 0.5 milliLiter(s) IntraMuscular once  insulin glargine Injectable (LANTUS) 40 Unit(s) SubCutaneous before breakfast  insulin lispro (ADMELOG) corrective regimen sliding scale   SubCutaneous three times a day before meals  insulin lispro (ADMELOG) corrective regimen sliding scale   SubCutaneous at bedtime  insulin lispro Injectable (ADMELOG) 10 Unit(s) SubCutaneous three times a day before meals  ipratropium 17 MICROgram(s) HFA Inhaler 1 Puff(s) Inhalation every 6 hours  iron sucrose IVPB 200 milliGRAM(s) IV Intermittent every 24 hours  lidocaine   4% Patch 1 Patch Transdermal every 24 hours  lidocaine   4% Patch 1 Patch Transdermal daily  magnesium oxide 400 milliGRAM(s) Oral three times a day with meals  metoprolol tartrate 12.5 milliGRAM(s) Oral every 12 hours  pantoprazole    Tablet 40 milliGRAM(s) Oral before breakfast  potassium chloride    Tablet ER 20 milliEquivalent(s) Oral daily  psyllium Powder 1 Packet(s) Oral every 12 hours  rosuvastatin 20 milliGRAM(s) Oral at bedtime  senna 2 Tablet(s) Oral at bedtime  sertraline 25 milliGRAM(s) Oral daily  spironolactone 25 milliGRAM(s) Oral daily  tamsulosin 0.4 milliGRAM(s) Oral at bedtime    MEDICATIONS  (PRN):  dextrose Oral Gel 15 Gram(s) Oral once PRN Blood Glucose LESS THAN 70 milliGRAM(s)/deciliter  hydrALAZINE Injectable 5 milliGRAM(s) IV Push every 4 hours PRN Systolic > 160  labetalol Injectable 10 milliGRAM(s) IV Push every 6 hours PRN Diastolic blood pressure >155  melatonin 5 milliGRAM(s) Oral at bedtime PRN Insomnia  oxyCODONE    IR 5 milliGRAM(s) Oral every 4 hours PRN Moderate Pain (4 - 6)  oxyCODONE    IR 10 milliGRAM(s) Oral every 6 hours PRN Severe Pain (7 - 10)  phenylephrine 0.5% Nasal 1 Spray(s) Nasal every 12 hours PRN Nasal Congestion  sodium chloride 0.65% Nasal 1 Spray(s) Both Nostrils four times a day PRN Nasal Congestion      LABS:                        8.6    6.54  )-----------( 152      ( 2025 04:25 )             29.7    02-20    141  |  105  |  28[H]  ----------------------------<  128[H]  4.9   |  28  |  0.8    Ca    8.9      2025 04:25  Mg     2.2     02-20    TPro  6.1  /  Alb  3.7  /  TBili  0.4  /  DBili  x   /  AST  19  /  ALT  15  /  AlkPhos  101  02-20     LIVER FUNCTIONS - ( 2025 04:25 )  Alb: 3.7 g/dL / Pro: 6.1 g/dL / ALK PHOS: 101 U/L / ALT: 15 U/L / AST: 19 U/L / GGT: x           Urinalysis Basic - ( 2025 04:25 )    Color: x / Appearance: x / SG: x / pH: x  Gluc: 128 mg/dL / Ketone: x  / Bili: x / Urobili: x   Blood: x / Protein: x / Nitrite: x   Leuk Esterase: x / RBC: x / WBC x   Sq Epi: x / Non Sq Epi: x / Bacteria: x        RADIOLOGY:  X Reviewed and interpreted by me: pulmonary vascular congestion, bilateral effusions    CARDIOPULMONARY DYSFUNCTION  - Respiratory status required supplemental oxygen & the following of continuous pulse oximetry for support & to prevent decompensation  - Continued early mobilization as tolerated  - Addressed analgesic regimen to optimize function    PREVENTION-PROPHYLAXIS  - ASA continued for graft occlusion-thromboembolism prophylaxis  - Rosuvastatin  was also started for long term graft patency  - Heparin continued for VTE prophylaxis in addition to Venodyne boots  - Protonix maintained for GI bleeding prophylaxis  - Lopressor continued for atrial fibrillation prophylaxis  - Metabolic stability & infection prophylaxis required review and adjustment of regular Insulin sliding scale and gylcemic regimen while following serial glucose levels to help achieve & maintain euglycemia  - Reviewed & addressed surgical site infection prophylaxis regimen

## 2025-02-21 ENCOUNTER — TRANSCRIPTION ENCOUNTER (OUTPATIENT)
Age: 65
End: 2025-02-21

## 2025-02-21 LAB
GLUCOSE BLDC GLUCOMTR-MCNC: 141 MG/DL — HIGH (ref 70–99)
GLUCOSE BLDC GLUCOMTR-MCNC: 149 MG/DL — HIGH (ref 70–99)
GLUCOSE BLDC GLUCOMTR-MCNC: 159 MG/DL — HIGH (ref 70–99)
GLUCOSE BLDC GLUCOMTR-MCNC: 226 MG/DL — HIGH (ref 70–99)

## 2025-02-21 PROCEDURE — 99291 CRITICAL CARE FIRST HOUR: CPT | Mod: 24

## 2025-02-21 PROCEDURE — 99232 SBSQ HOSP IP/OBS MODERATE 35: CPT | Mod: 24

## 2025-02-21 PROCEDURE — 71045 X-RAY EXAM CHEST 1 VIEW: CPT | Mod: 26

## 2025-02-21 RX ORDER — NAPROXEN SODIUM 275 MG
500 TABLET ORAL EVERY 12 HOURS
Refills: 0 | Status: COMPLETED | OUTPATIENT
Start: 2025-02-21 | End: 2025-02-24

## 2025-02-21 RX ORDER — COLCHICINE 0.6 MG/1
0.6 TABLET, FILM COATED ORAL EVERY 12 HOURS
Refills: 0 | Status: COMPLETED | OUTPATIENT
Start: 2025-02-21 | End: 2025-02-23

## 2025-02-21 RX ADMIN — Medication 25 MILLIGRAM(S): at 05:32

## 2025-02-21 RX ADMIN — TAMSULOSIN HYDROCHLORIDE 0.4 MILLIGRAM(S): 0.4 CAPSULE ORAL at 21:42

## 2025-02-21 RX ADMIN — Medication 2 PUFF(S): at 14:40

## 2025-02-21 RX ADMIN — Medication 2 PUFF(S): at 20:49

## 2025-02-21 RX ADMIN — Medication 1 PUFF(S): at 20:49

## 2025-02-21 RX ADMIN — Medication 1 SPRAY(S): at 23:05

## 2025-02-21 RX ADMIN — LIDOCAINE HYDROCHLORIDE 1 PATCH: 20 JELLY TOPICAL at 03:46

## 2025-02-21 RX ADMIN — LIDOCAINE HYDROCHLORIDE 1 PATCH: 20 JELLY TOPICAL at 23:00

## 2025-02-21 RX ADMIN — Medication 1 PUFF(S): at 14:41

## 2025-02-21 RX ADMIN — Medication 1 PACKET(S): at 18:20

## 2025-02-21 RX ADMIN — INSULIN LISPRO 10 UNIT(S): 100 INJECTION, SOLUTION INTRAVENOUS; SUBCUTANEOUS at 11:55

## 2025-02-21 RX ADMIN — Medication 500 MILLIGRAM(S): at 13:09

## 2025-02-21 RX ADMIN — Medication 81 MILLIGRAM(S): at 11:55

## 2025-02-21 RX ADMIN — LIDOCAINE HYDROCHLORIDE 1 PATCH: 20 JELLY TOPICAL at 21:53

## 2025-02-21 RX ADMIN — Medication 500 MILLIGRAM(S): at 19:00

## 2025-02-21 RX ADMIN — OXYCODONE HYDROCHLORIDE 5 MILLIGRAM(S): 30 TABLET ORAL at 05:58

## 2025-02-21 RX ADMIN — INSULIN LISPRO 10 UNIT(S): 100 INJECTION, SOLUTION INTRAVENOUS; SUBCUTANEOUS at 07:59

## 2025-02-21 RX ADMIN — LIDOCAINE HYDROCHLORIDE 1 PATCH: 20 JELLY TOPICAL at 13:07

## 2025-02-21 RX ADMIN — Medication 400 MILLIGRAM(S): at 11:56

## 2025-02-21 RX ADMIN — Medication 40 MILLIGRAM(S): at 05:32

## 2025-02-21 RX ADMIN — FOLIC ACID 1 MILLIGRAM(S): 1 TABLET ORAL at 11:55

## 2025-02-21 RX ADMIN — AMLODIPINE BESYLATE 5 MILLIGRAM(S): 10 TABLET ORAL at 05:32

## 2025-02-21 RX ADMIN — Medication 400 MILLIGRAM(S): at 08:00

## 2025-02-21 RX ADMIN — METOPROLOL SUCCINATE 12.5 MILLIGRAM(S): 50 TABLET, EXTENDED RELEASE ORAL at 05:32

## 2025-02-21 RX ADMIN — SERTRALINE 25 MILLIGRAM(S): 100 TABLET, FILM COATED ORAL at 11:56

## 2025-02-21 RX ADMIN — FINASTERIDE 5 MILLIGRAM(S): 1 TABLET, FILM COATED ORAL at 11:55

## 2025-02-21 RX ADMIN — Medication 2 TABLET(S): at 21:42

## 2025-02-21 RX ADMIN — Medication 1 PACKET(S): at 05:58

## 2025-02-21 RX ADMIN — LIDOCAINE HYDROCHLORIDE 1 PATCH: 20 JELLY TOPICAL at 21:54

## 2025-02-21 RX ADMIN — COLCHICINE 0.6 MILLIGRAM(S): 0.6 TABLET, FILM COATED ORAL at 11:54

## 2025-02-21 RX ADMIN — FLUTICASONE PROPIONATE 1 SPRAY(S): 50 SPRAY, METERED NASAL at 05:58

## 2025-02-21 RX ADMIN — INSULIN LISPRO 2: 100 INJECTION, SOLUTION INTRAVENOUS; SUBCUTANEOUS at 07:59

## 2025-02-21 RX ADMIN — Medication 400 MILLIGRAM(S): at 18:44

## 2025-02-21 RX ADMIN — Medication 5 MILLIGRAM(S): at 19:15

## 2025-02-21 RX ADMIN — FLUTICASONE PROPIONATE 1 SPRAY(S): 50 SPRAY, METERED NASAL at 18:20

## 2025-02-21 RX ADMIN — INSULIN LISPRO 10 UNIT(S): 100 INJECTION, SOLUTION INTRAVENOUS; SUBCUTANEOUS at 15:52

## 2025-02-21 RX ADMIN — Medication 2 PUFF(S): at 08:49

## 2025-02-21 RX ADMIN — GABAPENTIN 300 MILLIGRAM(S): 400 CAPSULE ORAL at 21:42

## 2025-02-21 RX ADMIN — OXYCODONE HYDROCHLORIDE 5 MILLIGRAM(S): 30 TABLET ORAL at 23:04

## 2025-02-21 RX ADMIN — FUROSEMIDE 40 MILLIGRAM(S): 10 INJECTION INTRAMUSCULAR; INTRAVENOUS at 05:32

## 2025-02-21 RX ADMIN — Medication 500 MILLIGRAM(S): at 14:00

## 2025-02-21 RX ADMIN — OXYCODONE HYDROCHLORIDE 5 MILLIGRAM(S): 30 TABLET ORAL at 23:30

## 2025-02-21 RX ADMIN — ROSUVASTATIN CALCIUM 20 MILLIGRAM(S): 20 TABLET, FILM COATED ORAL at 21:42

## 2025-02-21 RX ADMIN — Medication 1 PUFF(S): at 08:49

## 2025-02-21 RX ADMIN — HEPARIN SODIUM 5000 UNIT(S): 1000 INJECTION INTRAVENOUS; SUBCUTANEOUS at 05:32

## 2025-02-21 RX ADMIN — Medication 0.25 MILLIGRAM(S): at 21:44

## 2025-02-21 RX ADMIN — METOPROLOL SUCCINATE 12.5 MILLIGRAM(S): 50 TABLET, EXTENDED RELEASE ORAL at 18:19

## 2025-02-21 RX ADMIN — Medication 1 DOSE(S): at 08:01

## 2025-02-21 RX ADMIN — LIDOCAINE HYDROCHLORIDE 1 PATCH: 20 JELLY TOPICAL at 11:56

## 2025-02-21 RX ADMIN — INSULIN GLARGINE-YFGN 40 UNIT(S): 100 INJECTION, SOLUTION SUBCUTANEOUS at 08:00

## 2025-02-21 RX ADMIN — HEPARIN SODIUM 5000 UNIT(S): 1000 INJECTION INTRAVENOUS; SUBCUTANEOUS at 21:43

## 2025-02-21 RX ADMIN — Medication 500 MILLIGRAM(S): at 18:19

## 2025-02-21 RX ADMIN — HEPARIN SODIUM 5000 UNIT(S): 1000 INJECTION INTRAVENOUS; SUBCUTANEOUS at 13:09

## 2025-02-21 RX ADMIN — Medication 20 MILLIEQUIVALENT(S): at 11:55

## 2025-02-21 RX ADMIN — OXYCODONE HYDROCHLORIDE 5 MILLIGRAM(S): 30 TABLET ORAL at 05:46

## 2025-02-21 RX ADMIN — COLCHICINE 0.6 MILLIGRAM(S): 0.6 TABLET, FILM COATED ORAL at 18:19

## 2025-02-21 NOTE — DISCHARGE NOTE NURSING/CASE MANAGEMENT/SOCIAL WORK - PATIENT PORTAL LINK FT
You can access the FollowMyHealth Patient Portal offered by [facility] by registering at the following website: [email or web address]. By joining [facility]’s FollowMyHealth portal, you will also be able to view your health information using other applications (apps) compatible with our system.
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
## 2025-02-21 NOTE — PROGRESS NOTE ADULT - ASSESSMENT
64M w/ afib,   nonobstructive CAD, CHF class 3, severe MR, DM, HTN, JIMMY on CPAP, asthma, severe obesity, HL, Madelung's disease, arthritis, arthritis, peripheral neuropathy s/p MVReplacement, SHEILA ligation, MAZE 1/28/25    Assessment/Plan    Neuro:  #acute postoperative pain  - pain controlled  #at risk for postoperative/ICU delirium  - frequent re-orientation, good sleep hygiene, avoid medications which can increase delirium risk  - CAM-ICU qShift    CV: MVR s/p MVReplacement, SHEILA ligation, MAZE  - continue aspirin, beta blocker, statin  HTN beta blockers and amlodipib 5 and aldacton 25    Pulm:    #atelectasis improved  - aggressive PT and ambulation to address atelectasis   #acute pulmonary edema  - continue diuresis,lasix 40 iv daily    Renal/Fluid/Lytes:  #acute fluid overload  - continue diuresis  - replete/optimize electrolytes  - preload and after optimization    Heme:  #Acute blood loss anemia from surgery  - transfuse for hemoglobin < 7 or hemodynamic instability due to anemia  #s/p MVReplacement surgery, anticoagulation/antiplatelet plan  - continue aspirin    ID:  - no signs of acute infection    Endocrine:   #acute postoperative stress hyperglycemia  - insulin therapy to achieve tight glucose control    GI:  #at risk for malnutrition  - enteral diet  #at risk for post-operative ileus and opioid-induced constipation  - bowel regimen. +BM last evening    Prophylaxis  #at risk of VTE  - SCDs. VTE chemoprophylaxis  #at risk of GI stress ulcer  - GI prophylaxis per cardiac surgery    Left elbow tender and painful  probbaly gout Naprosyn and colchicine started  PT  - out of bed to chair  - ambulation as much as possible    Medication list reviewed.    This patient is critically ill and required my dedicated time to evaluate, manage and maintain or prevent deterioration of the organ systems and problems noted above and provide documentation.    Due to a high probability of clinically significant, life threatening deterioration due to high risk of acute pulmonary insufficiency, the patient required my highest level of preparedness to intervene emergently and I personally spent this critical care time directly and personally managing the patient. This critical care time included obtaining a history when possible; examining the patient; review pulse oximetry; order / interpreting / review of laboratory and radiology studies with immediate assessment and treatment as needed; directing the titration of pressors, oxygen support devices, fluid resuscitation, interpretation of cardiac output measurements; interpretation of EKG / rhythm strips / telemetry; arranging urgent treatment with development of a management plan; evaluation of patient's response to treatment; frequent reassessment and monitor for potential decompensation; and discussion with other providers/consultants.  This critical care time was performed to assess and manage the high probability of imminent life threatening deterioration that could result in organ(s) failure. It was exclusive of separately billable procedures and treating other patients and teaching time. please see MDM (medical decision making) / Assessment / Plans sections and the rest of the note for further information on patient assessment and treatment.    Time I spent with family or surrogate(s) is included only if the patient was incapable of providing the necessary information or participating in medical decision making. Time devoted to teaching and to any procedures I billed separately is not included.     Management of this patient was discussed with the surgical attending / cardiologist and mid-level providers.    I acknowledge the use of copied documentation.  All copy forward documentation is my own and has been reviewed and revised as appropriate.  If no changes were made, it is because that information remains the same.    Fab Celis MD  Critical Care Medicine

## 2025-02-21 NOTE — DISCHARGE NOTE NURSING/CASE MANAGEMENT/SOCIAL WORK - NSDCVIVACCINE_GEN_ALL_CORE_FT
Tdap; 30-Sep-2019 01:33; Adryan Armstrong); [employer]; [id number] (Exp. Date: 22-Oct-2021); IntraMuscular; Deltoid Right.; 0.5 milliLiter(s); VIS (VIS Published: 09-May-2013, VIS Presented: 30-Sep-2019);

## 2025-02-21 NOTE — CHART NOTE - NSCHARTNOTEFT_GEN_A_CORE
The patient is s/p mv replacement and needs a rolling walker for ambulation assistance.  The patient's mobility deficit can be sufficiently resolved with the use of the walker.  The patient is able to safely use the walker.

## 2025-02-21 NOTE — DISCHARGE NOTE NURSING/CASE MANAGEMENT/SOCIAL WORK - NSDCFUADDAPPT_GEN_ALL_CORE_FT
Please follow up with Dr. Blakely on Thurs 2/27 @10am and he is supposed to have repeat bloodwork    Please follow up with cardiology in 2 weeks and with Endocrine in 1-2 weeks/    Please follow up with psych in 2-4 weeks  [facility] Psychiatry Outpatient Department (OPD), [address] [place] [address], phone number : [phone]

## 2025-02-21 NOTE — DISCHARGE NOTE NURSING/CASE MANAGEMENT/SOCIAL WORK - FINANCIAL ASSISTANCE
[facility] provides services at a reduced cost to those who are determined to be eligible through [facility]’s financial assistance program. Information regarding [facility]’s financial assistance program can be found by going to [email or web address] or by calling [phone].

## 2025-02-21 NOTE — PROGRESS NOTE ADULT - SUBJECTIVE AND OBJECTIVE BOX
OPERATIVE PROCEDURE(s):    mv replacement            POD # 24    SURGEON(s): Zora    SUBJECTIVE ASSESSMENT: pt is without complaints at the present time    Vital Signs Last 24 Hrs  T(C): 36.4 (21 Feb 2025 12:00), Max: 36.7 (20 Feb 2025 19:00)  T(F): 97.6 (21 Feb 2025 12:00), Max: 98 (20 Feb 2025 19:00)  HR: 83 (21 Feb 2025 14:00) (68 - 86)  BP: 148/67 (21 Feb 2025 14:00) (122/64 - 171/80)  BP(mean): 97 (21 Feb 2025 14:00) (87 - 118)  RR: 33 (21 Feb 2025 14:00) (13 - 34)  SpO2: 89% (21 Feb 2025 14:00) (89% - 97%)    Parameters below as of 21 Feb 2025 15:00  Patient On (Oxygen Delivery Method): room air      02-20-25 @ 07:01  -  02-21-25 @ 07:00  --------------------------------------------------------  IN: 720 mL / OUT: 1700 mL / NET: -980 mL    02-21-25 @ 07:01  -  02-21-25 @ 14:38  --------------------------------------------------------  IN: 420 mL / OUT: 1015 mL / NET: -595 mL      Physical Exam:  General: NAD; A&Ox3  Cardiac: S1/S2, RRR, no murmur, no rubs  Lungs: unlabored shallow respirations, bilateral bases decreased  Abdomen: Soft/NT/ND  Sternum: Intact, no click, incision healng well with no drainage  Incisions: Incisions clean/dry/intact  Extremities: Moderate edema b/l lower extremities, significant resolution of LE erythema with ace wrapping, L foot anterior blister      LABS:                        8.6    6.54  )-----------( 152      ( 20 Feb 2025 04:25 )             29.7     COUMADIN:   [ ] YES [x ] NO      02-20    141  |  105  |  28[H]  ----------------------------<  128[H]  4.9   |  28  |  0.8    Ca    8.9      20 Feb 2025 04:25  Mg     2.2     02-20    TPro  6.1  /  Alb  3.7  /  TBili  0.4  /  DBili  x   /  AST  19  /  ALT  15  /  AlkPhos  101  02-20    Urinalysis Basic - ( 20 Feb 2025 04:25 )    Color: x / Appearance: x / SG: x / pH: x  Gluc: 128 mg/dL / Ketone: x  / Bili: x / Urobili: x   Blood: x / Protein: x / Nitrite: x   Leuk Esterase: x / RBC: x / WBC x   Sq Epi: x / Non Sq Epi: x / Bacteria: x        MEDICATIONS  (STANDING):  albuterol    90 MICROgram(s) HFA Inhaler 2 Puff(s) Inhalation every 6 hours  ALPRAZolam 0.25 milliGRAM(s) Oral at bedtime  amLODIPine   Tablet 5 milliGRAM(s) Oral daily  aspirin enteric coated 81 milliGRAM(s) Oral daily  chlorhexidine 2% Cloths 1 Application(s) Topical daily  colchicine 0.6 milliGRAM(s) Oral every 12 hours  dextrose 5%. 1000 milliLiter(s) (50 mL/Hr) IV Continuous <Continuous>  dextrose 5%. 1000 milliLiter(s) (100 mL/Hr) IV Continuous <Continuous>  dextrose 50% Injectable 50 milliLiter(s) IV Push every 15 minutes  dextrose 50% Injectable 25 milliLiter(s) IV Push every 15 minutes  finasteride 5 milliGRAM(s) Oral daily  fluticasone propionate 50 MICROgram(s)/spray Nasal Spray 1 Spray(s) Both Nostrils every 12 hours  fluticasone propionate/ salmeterol 250-50 MICROgram(s) Diskus 1 Dose(s) Inhalation two times a day  folic acid 1 milliGRAM(s) Oral daily  furosemide   Injectable 40 milliGRAM(s) IV Push daily  gabapentin 300 milliGRAM(s) Oral at bedtime  glucagon  Injectable 1 milliGRAM(s) IntraMuscular once  heparin   Injectable 5000 Unit(s) SubCutaneous every 8 hours  influenza  Vaccine (HIGH DOSE) 0.5 milliLiter(s) IntraMuscular once  insulin glargine Injectable (LANTUS) 40 Unit(s) SubCutaneous before breakfast  insulin lispro (ADMELOG) corrective regimen sliding scale   SubCutaneous three times a day before meals  insulin lispro (ADMELOG) corrective regimen sliding scale   SubCutaneous at bedtime  insulin lispro Injectable (ADMELOG) 10 Unit(s) SubCutaneous three times a day before meals  ipratropium 17 MICROgram(s) HFA Inhaler 1 Puff(s) Inhalation every 6 hours  iron sucrose IVPB 200 milliGRAM(s) IV Intermittent every 24 hours  lidocaine   4% Patch 1 Patch Transdermal every 24 hours  lidocaine   4% Patch 1 Patch Transdermal daily  magnesium oxide 400 milliGRAM(s) Oral three times a day with meals  metoprolol tartrate 12.5 milliGRAM(s) Oral every 12 hours  naproxen 500 milliGRAM(s) Oral every 12 hours  pantoprazole    Tablet 40 milliGRAM(s) Oral before breakfast  potassium chloride    Tablet ER 20 milliEquivalent(s) Oral daily  psyllium Powder 1 Packet(s) Oral every 12 hours  rosuvastatin 20 milliGRAM(s) Oral at bedtime  senna 2 Tablet(s) Oral at bedtime  sertraline 25 milliGRAM(s) Oral daily  spironolactone 25 milliGRAM(s) Oral daily  tamsulosin 0.4 milliGRAM(s) Oral at bedtime    MEDICATIONS  (PRN):  dextrose Oral Gel 15 Gram(s) Oral once PRN Blood Glucose LESS THAN 70 milliGRAM(s)/deciliter  hydrALAZINE Injectable 5 milliGRAM(s) IV Push every 4 hours PRN Systolic > 160  labetalol Injectable 10 milliGRAM(s) IV Push every 6 hours PRN Diastolic blood pressure >155  melatonin 5 milliGRAM(s) Oral at bedtime PRN Insomnia  oxyCODONE    IR 5 milliGRAM(s) Oral every 4 hours PRN Moderate Pain (4 - 6)  phenylephrine 0.5% Nasal 1 Spray(s) Nasal every 12 hours PRN Nasal Congestion  sodium chloride 0.65% Nasal 1 Spray(s) Both Nostrils four times a day PRN Nasal Congestion      Allergies    No Known Allergies    Intolerances        Ambulation/Activity Status:        RADIOLOGY & ADDITIONAL TESTS:  Xray Chest 1 View- PORTABLE-Routine: AM   Indication: Shortness of Breath  Transport: Portable  Provider's Contact #: [phone] (02-21-25 @ 08:42)    ACC: [id number] EXAM:  XR CHEST PORTABLE ROUTINE 1V   ORDERED BY: ARCELIA SELLERS     PROCEDURE DATE:  02/21/2025          INTERPRETATION:  CLINICAL HISTORY: Shortness of breath    COMPARISON: February 20, 2025,    TECHNIQUE: Portable frontal chest radiograph.    FINDINGS:        Support devices: None.    Cardiac/mediastinum/hilum: Cardiomegaly, thoracic aortic calcification,   status post median sternotomy, mitral valve replacement, left atrial   appendage clip.    Lung parenchyma/Pleura: Pulmonary vascular congestion/bilateral pleural   effusions.    Skeleton/soft tissues: Stable.    IMPRESSION:    Pulmonary vascular congestion and bilateral pleural effusions, decreased.   Redemonstration cardiomegaly.    --- End of Report ---    Assessment/Plan:  64y Male POD#24 s/p MVR/MAZE/SHEILA closure  - Case and plan discussed with CTU Intensivist and CT Surgeon - Dr. Blakely  - Continue CTU supportive care and ongoing plan of care as per continuing CTU rounds.   - Continue DVT/GI prophylaxis  - Incentive Spirometry 10 times an hour  - Continue to advance physical activity as tolerated and continue PT/OT as directed  1. CAD s/p CABG: Continue ASA, statin, BB.   2. HTN: Continue Norvasc, BB.  3. Post-op A. Fib ppx: S/p MAZE and SHEILA closure. Keep epicardial wires until d/c. Monitor electrolytes, AC held at this time due to prior lower GI bleeding. Pt continues to be in SR.  4. 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 Keep O2Sat >92%.  5. Heart Failure - diastolic (EF 60%) - acute on chronic (secondary to severe MR): fluid restriction to 1L, daily standing weights, aim for fluid balance -1L/day. Diuresis daily with Aldactone 25 mg, Lasix 40 mg qd with K 20 mg.  Mg oxide tid for electrolyte repletion. F/u repeat CMP.   6. DM/Glucose Control (A1c 6.2): Lantus 40, Lispro 10, insulin sliding scale, TLC/DASH diet.  7. CKD on CKD3a: FLAKITO resolved.  8. Oxy prn, lidocaine patch for pain.    Social Service Disposition: Anticipate d/c to home on Monday

## 2025-02-22 LAB
ALBUMIN SERPL ELPH-MCNC: 3.7 G/DL — SIGNIFICANT CHANGE UP (ref 3.5–5.2)
ALP SERPL-CCNC: 90 U/L — SIGNIFICANT CHANGE UP (ref 30–115)
ALT FLD-CCNC: 15 U/L — SIGNIFICANT CHANGE UP (ref 0–41)
ANION GAP SERPL CALC-SCNC: 8 MMOL/L — SIGNIFICANT CHANGE UP (ref 7–14)
AST SERPL-CCNC: 14 U/L — SIGNIFICANT CHANGE UP (ref 0–41)
BASOPHILS # BLD AUTO: 0.03 K/UL — SIGNIFICANT CHANGE UP (ref 0–0.2)
BASOPHILS NFR BLD AUTO: 0.5 % — SIGNIFICANT CHANGE UP (ref 0–1)
BILIRUB SERPL-MCNC: 0.3 MG/DL — SIGNIFICANT CHANGE UP (ref 0.2–1.2)
BUN SERPL-MCNC: 31 MG/DL — HIGH (ref 10–20)
CALCIUM SERPL-MCNC: 8.9 MG/DL — SIGNIFICANT CHANGE UP (ref 8.4–10.5)
CHLORIDE SERPL-SCNC: 104 MMOL/L — SIGNIFICANT CHANGE UP (ref 98–110)
CO2 SERPL-SCNC: 28 MMOL/L — SIGNIFICANT CHANGE UP (ref 17–32)
CREAT SERPL-MCNC: 1 MG/DL — SIGNIFICANT CHANGE UP (ref 0.7–1.5)
EGFR: 83 ML/MIN/1.73M2 — SIGNIFICANT CHANGE UP
EOSINOPHIL # BLD AUTO: 0.4 K/UL — SIGNIFICANT CHANGE UP (ref 0–0.7)
EOSINOPHIL NFR BLD AUTO: 6.6 % — SIGNIFICANT CHANGE UP (ref 0–8)
GLUCOSE BLDC GLUCOMTR-MCNC: 101 MG/DL — HIGH (ref 70–99)
GLUCOSE BLDC GLUCOMTR-MCNC: 131 MG/DL — HIGH (ref 70–99)
GLUCOSE BLDC GLUCOMTR-MCNC: 152 MG/DL — HIGH (ref 70–99)
GLUCOSE BLDC GLUCOMTR-MCNC: 217 MG/DL — HIGH (ref 70–99)
GLUCOSE BLDC GLUCOMTR-MCNC: 219 MG/DL — HIGH (ref 70–99)
GLUCOSE SERPL-MCNC: 125 MG/DL — HIGH (ref 70–99)
HCT VFR BLD CALC: 29.4 % — LOW (ref 42–52)
HGB BLD-MCNC: 8.7 G/DL — LOW (ref 14–18)
IMM GRANULOCYTES NFR BLD AUTO: 0.8 % — HIGH (ref 0.1–0.3)
LYMPHOCYTES # BLD AUTO: 1.47 K/UL — SIGNIFICANT CHANGE UP (ref 1.2–3.4)
LYMPHOCYTES # BLD AUTO: 24.4 % — SIGNIFICANT CHANGE UP (ref 20.5–51.1)
MAGNESIUM SERPL-MCNC: 2 MG/DL — SIGNIFICANT CHANGE UP (ref 1.8–2.4)
MCHC RBC-ENTMCNC: 26.9 PG — LOW (ref 27–31)
MCHC RBC-ENTMCNC: 29.6 G/DL — LOW (ref 32–37)
MCV RBC AUTO: 91 FL — SIGNIFICANT CHANGE UP (ref 80–94)
MONOCYTES # BLD AUTO: 0.67 K/UL — HIGH (ref 0.1–0.6)
MONOCYTES NFR BLD AUTO: 11.1 % — HIGH (ref 1.7–9.3)
NEUTROPHILS # BLD AUTO: 3.41 K/UL — SIGNIFICANT CHANGE UP (ref 1.4–6.5)
NEUTROPHILS NFR BLD AUTO: 56.6 % — SIGNIFICANT CHANGE UP (ref 42.2–75.2)
NRBC BLD AUTO-RTO: 0 /100 WBCS — SIGNIFICANT CHANGE UP (ref 0–0)
PLATELET # BLD AUTO: 159 K/UL — SIGNIFICANT CHANGE UP (ref 130–400)
PMV BLD: 10.6 FL — HIGH (ref 7.4–10.4)
POTASSIUM SERPL-MCNC: 4.5 MMOL/L — SIGNIFICANT CHANGE UP (ref 3.5–5)
POTASSIUM SERPL-SCNC: 4.5 MMOL/L — SIGNIFICANT CHANGE UP (ref 3.5–5)
PROT SERPL-MCNC: 6.3 G/DL — SIGNIFICANT CHANGE UP (ref 6–8)
RBC # BLD: 3.23 M/UL — LOW (ref 4.7–6.1)
RBC # FLD: 20.2 % — HIGH (ref 11.5–14.5)
SODIUM SERPL-SCNC: 140 MMOL/L — SIGNIFICANT CHANGE UP (ref 135–146)
WBC # BLD: 6.03 K/UL — SIGNIFICANT CHANGE UP (ref 4.8–10.8)
WBC # FLD AUTO: 6.03 K/UL — SIGNIFICANT CHANGE UP (ref 4.8–10.8)

## 2025-02-22 PROCEDURE — 99233 SBSQ HOSP IP/OBS HIGH 50: CPT

## 2025-02-22 PROCEDURE — 71045 X-RAY EXAM CHEST 1 VIEW: CPT | Mod: 26

## 2025-02-22 PROCEDURE — 99232 SBSQ HOSP IP/OBS MODERATE 35: CPT | Mod: 24

## 2025-02-22 RX ORDER — OXYCODONE HYDROCHLORIDE 30 MG/1
5 TABLET ORAL ONCE
Refills: 0 | Status: DISCONTINUED | OUTPATIENT
Start: 2025-02-22 | End: 2025-02-22

## 2025-02-22 RX ADMIN — HEPARIN SODIUM 5000 UNIT(S): 1000 INJECTION INTRAVENOUS; SUBCUTANEOUS at 21:28

## 2025-02-22 RX ADMIN — INSULIN GLARGINE-YFGN 40 UNIT(S): 100 INJECTION, SOLUTION SUBCUTANEOUS at 07:46

## 2025-02-22 RX ADMIN — GABAPENTIN 300 MILLIGRAM(S): 400 CAPSULE ORAL at 21:25

## 2025-02-22 RX ADMIN — Medication 2 PUFF(S): at 14:37

## 2025-02-22 RX ADMIN — Medication 81 MILLIGRAM(S): at 13:25

## 2025-02-22 RX ADMIN — LIDOCAINE HYDROCHLORIDE 1 PATCH: 20 JELLY TOPICAL at 01:00

## 2025-02-22 RX ADMIN — COLCHICINE 0.6 MILLIGRAM(S): 0.6 TABLET, FILM COATED ORAL at 06:07

## 2025-02-22 RX ADMIN — Medication 1 PUFF(S): at 14:37

## 2025-02-22 RX ADMIN — Medication 500 MILLIGRAM(S): at 10:54

## 2025-02-22 RX ADMIN — Medication 25 MILLIGRAM(S): at 09:51

## 2025-02-22 RX ADMIN — Medication 1 PACKET(S): at 06:08

## 2025-02-22 RX ADMIN — Medication 1 PUFF(S): at 21:40

## 2025-02-22 RX ADMIN — Medication 2 PUFF(S): at 09:02

## 2025-02-22 RX ADMIN — Medication 400 MILLIGRAM(S): at 18:44

## 2025-02-22 RX ADMIN — Medication 500 MILLIGRAM(S): at 18:44

## 2025-02-22 RX ADMIN — Medication 2 TABLET(S): at 21:27

## 2025-02-22 RX ADMIN — Medication 1 PUFF(S): at 09:01

## 2025-02-22 RX ADMIN — Medication 40 MILLIGRAM(S): at 09:48

## 2025-02-22 RX ADMIN — ROSUVASTATIN CALCIUM 20 MILLIGRAM(S): 20 TABLET, FILM COATED ORAL at 21:27

## 2025-02-22 RX ADMIN — Medication 20 MILLIEQUIVALENT(S): at 12:24

## 2025-02-22 RX ADMIN — INSULIN LISPRO 4: 100 INJECTION, SOLUTION INTRAVENOUS; SUBCUTANEOUS at 11:58

## 2025-02-22 RX ADMIN — Medication 400 MILLIGRAM(S): at 09:52

## 2025-02-22 RX ADMIN — LIDOCAINE HYDROCHLORIDE 1 PATCH: 20 JELLY TOPICAL at 13:20

## 2025-02-22 RX ADMIN — SERTRALINE 25 MILLIGRAM(S): 100 TABLET, FILM COATED ORAL at 12:25

## 2025-02-22 RX ADMIN — HEPARIN SODIUM 5000 UNIT(S): 1000 INJECTION INTRAVENOUS; SUBCUTANEOUS at 13:20

## 2025-02-22 RX ADMIN — METOPROLOL SUCCINATE 12.5 MILLIGRAM(S): 50 TABLET, EXTENDED RELEASE ORAL at 18:44

## 2025-02-22 RX ADMIN — OXYCODONE HYDROCHLORIDE 5 MILLIGRAM(S): 30 TABLET ORAL at 22:45

## 2025-02-22 RX ADMIN — TAMSULOSIN HYDROCHLORIDE 0.4 MILLIGRAM(S): 0.4 CAPSULE ORAL at 21:26

## 2025-02-22 RX ADMIN — Medication 1 DOSE(S): at 22:30

## 2025-02-22 RX ADMIN — FLUTICASONE PROPIONATE 1 SPRAY(S): 50 SPRAY, METERED NASAL at 18:50

## 2025-02-22 RX ADMIN — Medication 500 MILLIGRAM(S): at 09:52

## 2025-02-22 RX ADMIN — Medication 1 SPRAY(S): at 18:43

## 2025-02-22 RX ADMIN — AMLODIPINE BESYLATE 5 MILLIGRAM(S): 10 TABLET ORAL at 06:07

## 2025-02-22 RX ADMIN — INSULIN LISPRO 10 UNIT(S): 100 INJECTION, SOLUTION INTRAVENOUS; SUBCUTANEOUS at 11:59

## 2025-02-22 RX ADMIN — Medication 0.25 MILLIGRAM(S): at 21:26

## 2025-02-22 RX ADMIN — INSULIN LISPRO 10 UNIT(S): 100 INJECTION, SOLUTION INTRAVENOUS; SUBCUTANEOUS at 18:45

## 2025-02-22 RX ADMIN — Medication 5 MILLIGRAM(S): at 21:26

## 2025-02-22 RX ADMIN — METOPROLOL SUCCINATE 12.5 MILLIGRAM(S): 50 TABLET, EXTENDED RELEASE ORAL at 06:07

## 2025-02-22 RX ADMIN — INSULIN LISPRO 10 UNIT(S): 100 INJECTION, SOLUTION INTRAVENOUS; SUBCUTANEOUS at 09:50

## 2025-02-22 RX ADMIN — Medication 1 PACKET(S): at 18:44

## 2025-02-22 RX ADMIN — Medication 400 MILLIGRAM(S): at 12:25

## 2025-02-22 RX ADMIN — LIDOCAINE HYDROCHLORIDE 1 PATCH: 20 JELLY TOPICAL at 12:25

## 2025-02-22 RX ADMIN — COLCHICINE 0.6 MILLIGRAM(S): 0.6 TABLET, FILM COATED ORAL at 18:45

## 2025-02-22 RX ADMIN — FINASTERIDE 5 MILLIGRAM(S): 1 TABLET, FILM COATED ORAL at 12:24

## 2025-02-22 RX ADMIN — FUROSEMIDE 40 MILLIGRAM(S): 10 INJECTION INTRAMUSCULAR; INTRAVENOUS at 09:49

## 2025-02-22 RX ADMIN — HEPARIN SODIUM 5000 UNIT(S): 1000 INJECTION INTRAVENOUS; SUBCUTANEOUS at 06:08

## 2025-02-22 RX ADMIN — FOLIC ACID 1 MILLIGRAM(S): 1 TABLET ORAL at 12:25

## 2025-02-22 RX ADMIN — Medication 2 PUFF(S): at 21:40

## 2025-02-22 RX ADMIN — Medication 1 APPLICATION(S): at 13:35

## 2025-02-22 NOTE — PROGRESS NOTE ADULT - SUBJECTIVE AND OBJECTIVE BOX
OPERATIVE PROCEDURE(s):    mvr            POD # 25    SURGEON(s): rihc      SUBJECTIVE ASSESSMENT: pt is without complaints today and is eager to go home    Vital Signs Last 24 Hrs  T(C): 36.3 (21 Feb 2025 20:00), Max: 36.4 (21 Feb 2025 16:00)  T(F): 97.4 (21 Feb 2025 20:00), Max: 97.6 (21 Feb 2025 16:00)  HR: 76 (22 Feb 2025 13:38) (70 - 81)  BP: 141/69 (22 Feb 2025 13:38) (131/65 - 184/82)  BP(mean): 96 (22 Feb 2025 13:38) (88 - 118)  RR: 26 (22 Feb 2025 11:21) (14 - 47)  SpO2: 96% (22 Feb 2025 13:38) (88% - 100%)    Parameters below as of 22 Feb 2025 11:21  Patient On (Oxygen Delivery Method): room air      02-21-25 @ 07:01  -  02-22-25 @ 07:00  --------------------------------------------------------  IN: 1060 mL / OUT: 2435 mL / NET: -1375 mL    02-22-25 @ 07:01  -  02-22-25 @ 14:48  --------------------------------------------------------  IN: 897 mL / OUT: 1650 mL / NET: -753 mL      Physical Exam:  General: NAD; A&Ox3  Cardiac: S1/S2, RRR, no murmur, no rubs  Lungs: unlabored shallow respirations, bilateral bases decreased  Abdomen: Soft/NT/ND  Sternum: Intact, no click, incision healng well with no drainage  Incisions: Incisions clean/dry/intact  Extremities: trace bl edema      LABS:                        8.7    6.03  )-----------( 159      ( 22 Feb 2025 05:04 )             29.4     COUMADIN:   [ ] YES [x ] NO      02-22    140  |  104  |  31[H]  ----------------------------<  125[H]  4.5   |  28  |  1.0    Ca    8.9      22 Feb 2025 05:04  Mg     2.0     02-22    TPro  6.3  /  Alb  3.7  /  TBili  0.3  /  DBili  x   /  AST  14  /  ALT  15  /  AlkPhos  90  02-22    Urinalysis Basic - ( 22 Feb 2025 05:04 )    Color: x / Appearance: x / SG: x / pH: x  Gluc: 125 mg/dL / Ketone: x  / Bili: x / Urobili: x   Blood: x / Protein: x / Nitrite: x   Leuk Esterase: x / RBC: x / WBC x   Sq Epi: x / Non Sq Epi: x / Bacteria: x        MEDICATIONS  (STANDING):  albuterol    90 MICROgram(s) HFA Inhaler 2 Puff(s) Inhalation every 6 hours  ALPRAZolam 0.25 milliGRAM(s) Oral at bedtime  amLODIPine   Tablet 5 milliGRAM(s) Oral daily  aspirin enteric coated 81 milliGRAM(s) Oral daily  chlorhexidine 2% Cloths 1 Application(s) Topical daily  colchicine 0.6 milliGRAM(s) Oral every 12 hours  dextrose 5%. 1000 milliLiter(s) (50 mL/Hr) IV Continuous <Continuous>  dextrose 5%. 1000 milliLiter(s) (100 mL/Hr) IV Continuous <Continuous>  dextrose 50% Injectable 50 milliLiter(s) IV Push every 15 minutes  dextrose 50% Injectable 25 milliLiter(s) IV Push every 15 minutes  finasteride 5 milliGRAM(s) Oral daily  fluticasone propionate 50 MICROgram(s)/spray Nasal Spray 1 Spray(s) Both Nostrils every 12 hours  fluticasone propionate/ salmeterol 250-50 MICROgram(s) Diskus 1 Dose(s) Inhalation two times a day  folic acid 1 milliGRAM(s) Oral daily  furosemide   Injectable 40 milliGRAM(s) IV Push daily  gabapentin 300 milliGRAM(s) Oral at bedtime  glucagon  Injectable 1 milliGRAM(s) IntraMuscular once  heparin   Injectable 5000 Unit(s) SubCutaneous every 8 hours  influenza  Vaccine (HIGH DOSE) 0.5 milliLiter(s) IntraMuscular once  insulin glargine Injectable (LANTUS) 40 Unit(s) SubCutaneous before breakfast  insulin lispro (ADMELOG) corrective regimen sliding scale   SubCutaneous three times a day before meals  insulin lispro (ADMELOG) corrective regimen sliding scale   SubCutaneous at bedtime  insulin lispro Injectable (ADMELOG) 10 Unit(s) SubCutaneous three times a day before meals  ipratropium 17 MICROgram(s) HFA Inhaler 1 Puff(s) Inhalation every 6 hours  iron sucrose IVPB 200 milliGRAM(s) IV Intermittent every 24 hours  lidocaine   4% Patch 1 Patch Transdermal daily  lidocaine   4% Patch 1 Patch Transdermal every 24 hours  magnesium oxide 400 milliGRAM(s) Oral three times a day with meals  metoprolol tartrate 12.5 milliGRAM(s) Oral every 12 hours  naproxen 500 milliGRAM(s) Oral every 12 hours  pantoprazole    Tablet 40 milliGRAM(s) Oral before breakfast  potassium chloride    Tablet ER 20 milliEquivalent(s) Oral daily  psyllium Powder 1 Packet(s) Oral every 12 hours  rosuvastatin 20 milliGRAM(s) Oral at bedtime  senna 2 Tablet(s) Oral at bedtime  sertraline 25 milliGRAM(s) Oral daily  spironolactone 25 milliGRAM(s) Oral daily  tamsulosin 0.4 milliGRAM(s) Oral at bedtime    MEDICATIONS  (PRN):  dextrose Oral Gel 15 Gram(s) Oral once PRN Blood Glucose LESS THAN 70 milliGRAM(s)/deciliter  hydrALAZINE Injectable 5 milliGRAM(s) IV Push every 4 hours PRN Systolic > 160  labetalol Injectable 10 milliGRAM(s) IV Push every 6 hours PRN Diastolic blood pressure >155  melatonin 5 milliGRAM(s) Oral at bedtime PRN Insomnia  phenylephrine 0.5% Nasal 1 Spray(s) Nasal every 12 hours PRN Nasal Congestion  sodium chloride 0.65% Nasal 1 Spray(s) Both Nostrils four times a day PRN Nasal Congestion      Allergies    No Known Allergies    Intolerances        Ambulation/Activity Status:  amb well with pt      RADIOLOGY & ADDITIONAL TESTS:  Xray Chest 1 View- PORTABLE-Routine: AM   Indication: Shortness of Breath  Transport: Portable  Provider's Contact #: [phone] (02-22-25 @ 09:03)    ACC: [id number] EXAM:  XR CHEST PORTABLE ROUTINE 1V   ORDERED BY: FITZ DANIELS     PROCEDURE DATE:  02/22/2025      INTERPRETATION:  CLINICAL HISTORY: Shortness of breath    COMPARISON: February 21, 2025    TECHNIQUE: Portable frontal chest radiograph.    FINDINGS:    Support devices: None.    Cardiac/mediastinum/hilum: Cardiomegaly, thoracic aortic calcification,   status post median sternotomy, mitral valve replacement, left atrial   appendage clip..    Lung parenchyma/Pleura: Pulmonary vascular congestion/bilateral pleural   effusions.    Skeleton/soft tissues: Stable.      IMPRESSION:    Redemonstration pulmonary vascular congestion, bilateral pleural   effusions and cardiomegaly.    --- End of Report ---      64y Male POD#25 s/p MVR/MAZE/SHEILA closure  - Case and plan discussed with CTU Intensivist and CT Surgeon - Dr. Blakely  - Continue CTU supportive care and ongoing plan of care as per continuing CTU rounds.   - Continue DVT/GI prophylaxis  - Incentive Spirometry 10 times an hour  - Continue to advance physical activity as tolerated and continue PT/OT as directed  1. CAD s/p CABG: Continue ASA, statin, BB.   2. HTN: Continue Norvasc, BB.  3. Post-op A. Fib ppx: S/p MAZE and SHEILA closure. Keep epicardial wires until d/c. Monitor electrolytes, AC held at this time due to prior lower GI bleeding. Pt continues to be in SR.  4. 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 Keep O2Sat >92%.  5. Heart Failure - diastolic (EF 60%) - acute on chronic (secondary to severe MR): fluid restriction to 1L, daily standing weights, aim for fluid balance -1L/day. Diuresis daily with Aldactone 25 mg, Lasix 40 mg qd with K 20 mg.  Mg oxide tid for electrolyte repletion. F/u repeat CMP.   6. DM/Glucose Control (A1c 6.2): Lantus 40, Lispro 10, insulin sliding scale, TLC/DASH diet.  7. CKD on CKD3a: FLAKITO resolved.  8. Oxy prn, lidocaine patch for pain.    Social Service Disposition: Anticipate d/c to home on Monday

## 2025-02-22 NOTE — PROGRESS NOTE ADULT - SUBJECTIVE AND OBJECTIVE BOX
JAZ GAVIN  MRN#: [id number]  Subjective:  Patient was seen and evalauted on AM rounds offerring no specific compliants at this time.    OBJECTIVE:  ICU Vital Signs Last 24 Hrs  T(C): 36.3 (21 Feb 2025 20:00), Max: 36.4 (21 Feb 2025 12:00)  T(F): 97.4 (21 Feb 2025 20:00), Max: 97.6 (21 Feb 2025 12:00)  HR: 70 (22 Feb 2025 08:00) (70 - 83)  BP: 158/79 (22 Feb 2025 08:00) (131/65 - 184/82)  BP(mean): 109 (22 Feb 2025 08:00) (88 - 118)  ABP: --  ABP(mean): --  RR: 24 (22 Feb 2025 08:00) (14 - 47)  SpO2: 97% (22 Feb 2025 08:00) (88% - 99%)    O2 Parameters below as of 22 Feb 2025 08:00  Patient On (Oxygen Delivery Method): room air            02-21 @ 07:01  -  02-22 @ 07:00  --------------------------------------------------------  IN: 1060 mL / OUT: 2435 mL / NET: -1375 mL      CAPILLARY BLOOD GLUCOSE      POCT Blood Glucose.: 217 mg/dL (22 Feb 2025 11:17)      PHYSICAL EXAM:Daily     Daily   General: WN/WD NAD    HEENT:     + NCAT  + EOMI  - Conjuctival edema   - Icterus   - Thrush   - ETT  - NGT/OGT    Neck:         + FROM    - JVD     - Nodes     - Masses    + Mid-line trachea   - Tracheostomy    Chest:         - Sternal click  - Sternal drainage  + Pacing wires  - Chest tubes  - SubQ emphysema    Lungs:          + CTA   - Rhonchi    - Rales    - Wheezing     - Decreased BS   - Dullness R L    Cardiac:       + S1 + S2    + RRR   - Irregular   - S3  - S4    - Murmurs   - Rub   - Hamman’s sign     Abdomen:    + BS     + Soft    + Non-tender     - Distended    - Organomegaly  - PEG    Extremities:   - Cyanosis U/L   - Clubbing  U/L  + LE Edema   + Capillary refill    + Pulses     Neuro:        + Awake   +  Alert   - Confused   - Lethargic   - Sedated   - Generalized Weakness    Skin:        - Rashes    - Erythema   + Normal incisions   + IV sites intact  - Sacral decubitus    HOSPITAL MEDICATIONS:  MEDICATIONS  (STANDING):  albuterol    90 MICROgram(s) HFA Inhaler 2 Puff(s) Inhalation every 6 hours  ALPRAZolam 0.25 milliGRAM(s) Oral at bedtime  amLODIPine   Tablet 5 milliGRAM(s) Oral daily  aspirin enteric coated 81 milliGRAM(s) Oral daily  chlorhexidine 2% Cloths 1 Application(s) Topical daily  colchicine 0.6 milliGRAM(s) Oral every 12 hours  dextrose 5%. 1000 milliLiter(s) (50 mL/Hr) IV Continuous <Continuous>  dextrose 5%. 1000 milliLiter(s) (100 mL/Hr) IV Continuous <Continuous>  dextrose 50% Injectable 50 milliLiter(s) IV Push every 15 minutes  dextrose 50% Injectable 25 milliLiter(s) IV Push every 15 minutes  finasteride 5 milliGRAM(s) Oral daily  fluticasone propionate 50 MICROgram(s)/spray Nasal Spray 1 Spray(s) Both Nostrils every 12 hours  fluticasone propionate/ salmeterol 250-50 MICROgram(s) Diskus 1 Dose(s) Inhalation two times a day  folic acid 1 milliGRAM(s) Oral daily  furosemide   Injectable 40 milliGRAM(s) IV Push daily  gabapentin 300 milliGRAM(s) Oral at bedtime  glucagon  Injectable 1 milliGRAM(s) IntraMuscular once  heparin   Injectable 5000 Unit(s) SubCutaneous every 8 hours  influenza  Vaccine (HIGH DOSE) 0.5 milliLiter(s) IntraMuscular once  insulin glargine Injectable (LANTUS) 40 Unit(s) SubCutaneous before breakfast  insulin lispro (ADMELOG) corrective regimen sliding scale   SubCutaneous three times a day before meals  insulin lispro (ADMELOG) corrective regimen sliding scale   SubCutaneous at bedtime  insulin lispro Injectable (ADMELOG) 10 Unit(s) SubCutaneous three times a day before meals  ipratropium 17 MICROgram(s) HFA Inhaler 1 Puff(s) Inhalation every 6 hours  iron sucrose IVPB 200 milliGRAM(s) IV Intermittent every 24 hours  lidocaine   4% Patch 1 Patch Transdermal daily  lidocaine   4% Patch 1 Patch Transdermal every 24 hours  magnesium oxide 400 milliGRAM(s) Oral three times a day with meals  metoprolol tartrate 12.5 milliGRAM(s) Oral every 12 hours  naproxen 500 milliGRAM(s) Oral every 12 hours  pantoprazole    Tablet 40 milliGRAM(s) Oral before breakfast  potassium chloride    Tablet ER 20 milliEquivalent(s) Oral daily  psyllium Powder 1 Packet(s) Oral every 12 hours  rosuvastatin 20 milliGRAM(s) Oral at bedtime  senna 2 Tablet(s) Oral at bedtime  sertraline 25 milliGRAM(s) Oral daily  spironolactone 25 milliGRAM(s) Oral daily  tamsulosin 0.4 milliGRAM(s) Oral at bedtime    MEDICATIONS  (PRN):  dextrose Oral Gel 15 Gram(s) Oral once PRN Blood Glucose LESS THAN 70 milliGRAM(s)/deciliter  hydrALAZINE Injectable 5 milliGRAM(s) IV Push every 4 hours PRN Systolic > 160  labetalol Injectable 10 milliGRAM(s) IV Push every 6 hours PRN Diastolic blood pressure >155  melatonin 5 milliGRAM(s) Oral at bedtime PRN Insomnia  phenylephrine 0.5% Nasal 1 Spray(s) Nasal every 12 hours PRN Nasal Congestion  sodium chloride 0.65% Nasal 1 Spray(s) Both Nostrils four times a day PRN Nasal Congestion      LABS:                        8.7    6.03  )-----------( 159      ( 22 Feb 2025 05:04 )             29.4    02-22    140  |  104  |  31[H]  ----------------------------<  125[H]  4.5   |  28  |  1.0    Ca    8.9      22 Feb 2025 05:04  Mg     2.0     02-22    TPro  6.3  /  Alb  3.7  /  TBili  0.3  /  DBili  x   /  AST  14  /  ALT  15  /  AlkPhos  90  02-22     LIVER FUNCTIONS - ( 22 Feb 2025 05:04 )  Alb: 3.7 g/dL / Pro: 6.3 g/dL / ALK PHOS: 90 U/L / ALT: 15 U/L / AST: 14 U/L / GGT: x           Urinalysis Basic - ( 22 Feb 2025 05:04 )    Color: x / Appearance: x / SG: x / pH: x  Gluc: 125 mg/dL / Ketone: x  / Bili: x / Urobili: x   Blood: x / Protein: x / Nitrite: x   Leuk Esterase: x / RBC: x / WBC x   Sq Epi: x / Non Sq Epi: x / Bacteria: x        RADIOLOGY:  X Reviewed and interpreted by me: bilateral opacities    CARDIOPULMONARY DYSFUNCTION  - Respiratory status required supplemental oxygen & the following of continuous pulse oximetry for support & to prevent decompensation  - Continued early mobilization as tolerated  - Addressed analgesic regimen to optimize function    PREVENTION-PROPHYLAXIS  - ASA continued for thromboembolism prophylaxis  - Statin was also started   - Heparin continued for VTE prophylaxis in addition to Venodyne boots  - Protonix maintained for GI bleeding prophylaxis  - Lopressor continued for atrial fibrillation prophylaxis  - Metabolic stability & infection prophylaxis required review and adjustment of regular Insulin sliding scale and gylcemic regimen while following serial glucose levels to help achieve & maintain euglycemia  - Reviewed & addressed surgical site infection prophylaxis regimen

## 2025-02-22 NOTE — PROGRESS NOTE ADULT - ASSESSMENT
Assessment/Plan:  Mitral Valve Insufficiency/A-Fib-s/p MVR/MAZE-POD #24  1-BP control-metoprolol, amlodipine  2-serum glucose control-Lantus/Lispro with insulin sliding scale correction regimen  3-fluid overload-diuresis  4-A-Fib, s/p MAZE, now NSR  5-chronic systolic heart failure-off inotropes  3-GYH-cvgpuchy, avoid nephrotoxins, monitor renal failure daily  7-JIMMY-BIPAP support  5-WDQ-hawuuzzc statin  1-apxzxpnuhlingvt-wuiutdqs, monitor daily  10-acute blood loss anemia-stable, monitor Hb/Hct daily  04-fmayljvccdgypgpn-dmdtlkevo,monitor plts daily  12-BPH-finasteride, tamsulosin    40 minutes of critical care services provided

## 2025-02-23 LAB
ALBUMIN SERPL ELPH-MCNC: 3.7 G/DL — SIGNIFICANT CHANGE UP (ref 3.5–5.2)
ALP SERPL-CCNC: 92 U/L — SIGNIFICANT CHANGE UP (ref 30–115)
ALT FLD-CCNC: 17 U/L — SIGNIFICANT CHANGE UP (ref 0–41)
ANION GAP SERPL CALC-SCNC: 12 MMOL/L — SIGNIFICANT CHANGE UP (ref 7–14)
AST SERPL-CCNC: 19 U/L — SIGNIFICANT CHANGE UP (ref 0–41)
BILIRUB SERPL-MCNC: 0.3 MG/DL — SIGNIFICANT CHANGE UP (ref 0.2–1.2)
BUN SERPL-MCNC: 32 MG/DL — HIGH (ref 10–20)
CALCIUM SERPL-MCNC: 8.8 MG/DL — SIGNIFICANT CHANGE UP (ref 8.4–10.5)
CHLORIDE SERPL-SCNC: 103 MMOL/L — SIGNIFICANT CHANGE UP (ref 98–110)
CO2 SERPL-SCNC: 27 MMOL/L — SIGNIFICANT CHANGE UP (ref 17–32)
CREAT SERPL-MCNC: 1.1 MG/DL — SIGNIFICANT CHANGE UP (ref 0.7–1.5)
EGFR: 74 ML/MIN/1.73M2 — SIGNIFICANT CHANGE UP
GLUCOSE BLDC GLUCOMTR-MCNC: 113 MG/DL — HIGH (ref 70–99)
GLUCOSE BLDC GLUCOMTR-MCNC: 128 MG/DL — HIGH (ref 70–99)
GLUCOSE BLDC GLUCOMTR-MCNC: 154 MG/DL — HIGH (ref 70–99)
GLUCOSE BLDC GLUCOMTR-MCNC: 179 MG/DL — HIGH (ref 70–99)
GLUCOSE BLDC GLUCOMTR-MCNC: 182 MG/DL — HIGH (ref 70–99)
GLUCOSE SERPL-MCNC: 140 MG/DL — HIGH (ref 70–99)
HCT VFR BLD CALC: 30.7 % — LOW (ref 42–52)
HGB BLD-MCNC: 9 G/DL — LOW (ref 14–18)
MAGNESIUM SERPL-MCNC: 2 MG/DL — SIGNIFICANT CHANGE UP (ref 1.8–2.4)
MCHC RBC-ENTMCNC: 27 PG — SIGNIFICANT CHANGE UP (ref 27–31)
MCHC RBC-ENTMCNC: 29.3 G/DL — LOW (ref 32–37)
MCV RBC AUTO: 92.2 FL — SIGNIFICANT CHANGE UP (ref 80–94)
NRBC BLD AUTO-RTO: 0 /100 WBCS — SIGNIFICANT CHANGE UP (ref 0–0)
PLATELET # BLD AUTO: 164 K/UL — SIGNIFICANT CHANGE UP (ref 130–400)
PMV BLD: 11.1 FL — HIGH (ref 7.4–10.4)
POTASSIUM SERPL-MCNC: 4.5 MMOL/L — SIGNIFICANT CHANGE UP (ref 3.5–5)
POTASSIUM SERPL-SCNC: 4.5 MMOL/L — SIGNIFICANT CHANGE UP (ref 3.5–5)
PROT SERPL-MCNC: 6.4 G/DL — SIGNIFICANT CHANGE UP (ref 6–8)
RBC # BLD: 3.33 M/UL — LOW (ref 4.7–6.1)
RBC # FLD: 20.1 % — HIGH (ref 11.5–14.5)
SODIUM SERPL-SCNC: 142 MMOL/L — SIGNIFICANT CHANGE UP (ref 135–146)
WBC # BLD: 5.88 K/UL — SIGNIFICANT CHANGE UP (ref 4.8–10.8)
WBC # FLD AUTO: 5.88 K/UL — SIGNIFICANT CHANGE UP (ref 4.8–10.8)

## 2025-02-23 PROCEDURE — 99291 CRITICAL CARE FIRST HOUR: CPT | Mod: 24

## 2025-02-23 PROCEDURE — 71045 X-RAY EXAM CHEST 1 VIEW: CPT | Mod: 26

## 2025-02-23 RX ORDER — ALPRAZOLAM 0.5 MG
0.25 TABLET, EXTENDED RELEASE 24 HR ORAL ONCE
Refills: 0 | Status: DISCONTINUED | OUTPATIENT
Start: 2025-02-23 | End: 2025-02-23

## 2025-02-23 RX ORDER — OXYCODONE HYDROCHLORIDE 30 MG/1
5 TABLET ORAL EVERY 4 HOURS
Refills: 0 | Status: DISCONTINUED | OUTPATIENT
Start: 2025-02-23 | End: 2025-02-24

## 2025-02-23 RX ADMIN — Medication 500 MILLIGRAM(S): at 05:43

## 2025-02-23 RX ADMIN — Medication 25 MILLIGRAM(S): at 05:44

## 2025-02-23 RX ADMIN — Medication 400 MILLIGRAM(S): at 12:44

## 2025-02-23 RX ADMIN — HEPARIN SODIUM 5000 UNIT(S): 1000 INJECTION INTRAVENOUS; SUBCUTANEOUS at 21:05

## 2025-02-23 RX ADMIN — Medication 2 PUFF(S): at 20:45

## 2025-02-23 RX ADMIN — LIDOCAINE HYDROCHLORIDE 1 PATCH: 20 JELLY TOPICAL at 12:45

## 2025-02-23 RX ADMIN — METOPROLOL SUCCINATE 12.5 MILLIGRAM(S): 50 TABLET, EXTENDED RELEASE ORAL at 05:43

## 2025-02-23 RX ADMIN — Medication 81 MILLIGRAM(S): at 12:44

## 2025-02-23 RX ADMIN — INSULIN LISPRO 2: 100 INJECTION, SOLUTION INTRAVENOUS; SUBCUTANEOUS at 12:39

## 2025-02-23 RX ADMIN — FUROSEMIDE 40 MILLIGRAM(S): 10 INJECTION INTRAMUSCULAR; INTRAVENOUS at 06:44

## 2025-02-23 RX ADMIN — METOPROLOL SUCCINATE 12.5 MILLIGRAM(S): 50 TABLET, EXTENDED RELEASE ORAL at 17:59

## 2025-02-23 RX ADMIN — COLCHICINE 0.6 MILLIGRAM(S): 0.6 TABLET, FILM COATED ORAL at 05:44

## 2025-02-23 RX ADMIN — Medication 40 MILLIGRAM(S): at 05:45

## 2025-02-23 RX ADMIN — Medication 1 PUFF(S): at 08:46

## 2025-02-23 RX ADMIN — HEPARIN SODIUM 5000 UNIT(S): 1000 INJECTION INTRAVENOUS; SUBCUTANEOUS at 05:44

## 2025-02-23 RX ADMIN — ROSUVASTATIN CALCIUM 20 MILLIGRAM(S): 20 TABLET, FILM COATED ORAL at 22:06

## 2025-02-23 RX ADMIN — Medication 1 APPLICATION(S): at 12:40

## 2025-02-23 RX ADMIN — LIDOCAINE HYDROCHLORIDE 1 PATCH: 20 JELLY TOPICAL at 18:32

## 2025-02-23 RX ADMIN — Medication 2 PUFF(S): at 08:46

## 2025-02-23 RX ADMIN — FOLIC ACID 1 MILLIGRAM(S): 1 TABLET ORAL at 12:44

## 2025-02-23 RX ADMIN — FLUTICASONE PROPIONATE 1 SPRAY(S): 50 SPRAY, METERED NASAL at 06:45

## 2025-02-23 RX ADMIN — FINASTERIDE 5 MILLIGRAM(S): 1 TABLET, FILM COATED ORAL at 12:44

## 2025-02-23 RX ADMIN — OXYCODONE HYDROCHLORIDE 5 MILLIGRAM(S): 30 TABLET ORAL at 21:06

## 2025-02-23 RX ADMIN — LIDOCAINE HYDROCHLORIDE 1 PATCH: 20 JELLY TOPICAL at 12:46

## 2025-02-23 RX ADMIN — INSULIN LISPRO 10 UNIT(S): 100 INJECTION, SOLUTION INTRAVENOUS; SUBCUTANEOUS at 17:57

## 2025-02-23 RX ADMIN — INSULIN LISPRO 10 UNIT(S): 100 INJECTION, SOLUTION INTRAVENOUS; SUBCUTANEOUS at 08:01

## 2025-02-23 RX ADMIN — Medication 2 PUFF(S): at 14:12

## 2025-02-23 RX ADMIN — Medication 1 PUFF(S): at 20:45

## 2025-02-23 RX ADMIN — Medication 400 MILLIGRAM(S): at 08:00

## 2025-02-23 RX ADMIN — TAMSULOSIN HYDROCHLORIDE 0.4 MILLIGRAM(S): 0.4 CAPSULE ORAL at 22:06

## 2025-02-23 RX ADMIN — Medication 20 MILLIEQUIVALENT(S): at 12:44

## 2025-02-23 RX ADMIN — AMLODIPINE BESYLATE 5 MILLIGRAM(S): 10 TABLET ORAL at 05:43

## 2025-02-23 RX ADMIN — FLUTICASONE PROPIONATE 1 SPRAY(S): 50 SPRAY, METERED NASAL at 18:02

## 2025-02-23 RX ADMIN — INSULIN GLARGINE-YFGN 40 UNIT(S): 100 INJECTION, SOLUTION SUBCUTANEOUS at 08:00

## 2025-02-23 RX ADMIN — INSULIN LISPRO 10 UNIT(S): 100 INJECTION, SOLUTION INTRAVENOUS; SUBCUTANEOUS at 12:40

## 2025-02-23 RX ADMIN — Medication 1 PACKET(S): at 06:52

## 2025-02-23 RX ADMIN — Medication 500 MILLIGRAM(S): at 18:29

## 2025-02-23 RX ADMIN — Medication 1 PUFF(S): at 14:12

## 2025-02-23 RX ADMIN — Medication 500 MILLIGRAM(S): at 06:47

## 2025-02-23 RX ADMIN — SERTRALINE 25 MILLIGRAM(S): 100 TABLET, FILM COATED ORAL at 12:44

## 2025-02-23 RX ADMIN — GABAPENTIN 300 MILLIGRAM(S): 400 CAPSULE ORAL at 21:06

## 2025-02-23 RX ADMIN — Medication 400 MILLIGRAM(S): at 17:59

## 2025-02-23 RX ADMIN — Medication 500 MILLIGRAM(S): at 17:59

## 2025-02-23 NOTE — PROGRESS NOTE ADULT - SUBJECTIVE AND OBJECTIVE BOX
OPERATIVE PROCEDURE(s):                POD #                       64yMale  SURGEON(s): ISELA Blakely  SUBJECTIVE ASSESSMENT:   Vital Signs Last 24 Hrs  T(F): 98.6 (23 Feb 2025 04:00), Max: 98.6 (23 Feb 2025 04:00)  HR: 75 (23 Feb 2025 07:00) (68 - 85)  BP: 146/77 (23 Feb 2025 07:00) (118/55 - 163/95)  BP(mean): 104 (23 Feb 2025 07:00) (77 - 123)  RR: 28 (23 Feb 2025 07:00) (12 - 35)  SpO2: 96% (23 Feb 2025 07:00) (91% - 100%)      I&O's Detail    22 Feb 2025 07:01  -  23 Feb 2025 07:00  --------------------------------------------------------  IN:    Oral Fluid: 1077 mL  Total IN: 1077 mL    OUT:    Voided (mL): 2650 mL  Total OUT: 2650 mL        Net:   I&O's Detail    21 Feb 2025 07:01  -  22 Feb 2025 07:00  --------------------------------------------------------  Total NET: -1375 mL      22 Feb 2025 07:01  -  23 Feb 2025 07:00  --------------------------------------------------------  Total NET: -1573 mL        CAPILLARY BLOOD GLUCOSE      POCT Blood Glucose.: 182 mg/dL (23 Feb 2025 07:58)  POCT Blood Glucose.: 128 mg/dL (23 Feb 2025 06:25)  POCT Blood Glucose.: 152 mg/dL (22 Feb 2025 22:21)  POCT Blood Glucose.: 101 mg/dL (22 Feb 2025 17:59)  POCT Blood Glucose.: 217 mg/dL (22 Feb 2025 11:17)  POCT Blood Glucose.: 219 mg/dL (22 Feb 2025 09:44)    Physical Exam:  General: NAD; A&Ox3/Patient is intubated and sedated  Cardiac: S1/S2, RRR, no murmur, no rubs  Lungs: unlabored respirations, CTA b/l, no wheeze, no rales, no crackles  Abdomen: Soft/NT/ND; positive bowel sounds x 4  Sternum: Intact, no click, incision healing well with no drainage  Incisions: Incisions clean/dry/intact  Extremities: No edema b/l lower extremities; good capillary refill; no cyanosis; palpable 1+ pedal pulses b/l    Central Venous Catheter: Yes[]  No[] , If Yes indication:           Day #  Caceres Catheter: Yes  [] , No  [] , If yes indication:                      Day #  NGT: Yes [] No [] ,    If Yes Placement:                                     Day #  EPICARDIAL WIRES:  [] YES [] NO                                              Day #  BOWEL MOVEMENT:  [] YES [] NO, If No, Timing since last BM:      Day #  CHEST TUBE(Left/Right):  [] YES [] NO, If yes -  AIR LEAKS:  [] YES [] NO        LABS:                        9.0[L]  5.88  )-----------( 164      ( 23 Feb 2025 06:11 )             30.7[L]                        8.7[L]  6.03  )-----------( 159      ( 22 Feb 2025 05:04 )             29.4[L]    02-23    142  |  103  |  32[H]  ----------------------------<  140[H]  4.5   |  27  |  1.1  02-22    140  |  104  |  31[H]  ----------------------------<  125[H]  4.5   |  28  |  1.0    Ca    8.8      23 Feb 2025 06:11  Mg     2.0     02-23    TPro  6.4 [6.0 - 8.0]  /  Alb  3.7 [3.5 - 5.2]  /  TBili  0.3 [0.2 - 1.2]  /  DBili  x   /  AST  19 [0 - 41]  /  ALT  17 [0 - 41]  /  AlkPhos  92 [30 - 115]  02-23      Urinalysis Basic - ( 23 Feb 2025 06:11 )    Color: x / Appearance: x / SG: x / pH: x  Gluc: 140 mg/dL / Ketone: x  / Bili: x / Urobili: x   Blood: x / Protein: x / Nitrite: x   Leuk Esterase: x / RBC: x / WBC x   Sq Epi: x / Non Sq Epi: x / Bacteria: x        RADIOLOGY & ADDITIONAL TESTS:  CXR:  EKG:  MEDICATIONS  (STANDING):  albuterol    90 MICROgram(s) HFA Inhaler 2 Puff(s) Inhalation every 6 hours  amLODIPine   Tablet 5 milliGRAM(s) Oral daily  aspirin enteric coated 81 milliGRAM(s) Oral daily  chlorhexidine 2% Cloths 1 Application(s) Topical daily  dextrose 5%. 1000 milliLiter(s) (50 mL/Hr) IV Continuous <Continuous>  dextrose 5%. 1000 milliLiter(s) (100 mL/Hr) IV Continuous <Continuous>  dextrose 50% Injectable 50 milliLiter(s) IV Push every 15 minutes  dextrose 50% Injectable 25 milliLiter(s) IV Push every 15 minutes  finasteride 5 milliGRAM(s) Oral daily  fluticasone propionate 50 MICROgram(s)/spray Nasal Spray 1 Spray(s) Both Nostrils every 12 hours  fluticasone propionate/ salmeterol 250-50 MICROgram(s) Diskus 1 Dose(s) Inhalation two times a day  folic acid 1 milliGRAM(s) Oral daily  furosemide   Injectable 40 milliGRAM(s) IV Push daily  gabapentin 300 milliGRAM(s) Oral at bedtime  glucagon  Injectable 1 milliGRAM(s) IntraMuscular once  heparin   Injectable 5000 Unit(s) SubCutaneous every 8 hours  influenza  Vaccine (HIGH DOSE) 0.5 milliLiter(s) IntraMuscular once  insulin glargine Injectable (LANTUS) 40 Unit(s) SubCutaneous before breakfast  insulin lispro (ADMELOG) corrective regimen sliding scale   SubCutaneous three times a day before meals  insulin lispro (ADMELOG) corrective regimen sliding scale   SubCutaneous at bedtime  insulin lispro Injectable (ADMELOG) 10 Unit(s) SubCutaneous three times a day before meals  ipratropium 17 MICROgram(s) HFA Inhaler 1 Puff(s) Inhalation every 6 hours  iron sucrose IVPB 200 milliGRAM(s) IV Intermittent every 24 hours  lidocaine   4% Patch 1 Patch Transdermal every 24 hours  lidocaine   4% Patch 1 Patch Transdermal daily  magnesium oxide 400 milliGRAM(s) Oral three times a day with meals  metoprolol tartrate 12.5 milliGRAM(s) Oral every 12 hours  naproxen 500 milliGRAM(s) Oral every 12 hours  pantoprazole    Tablet 40 milliGRAM(s) Oral before breakfast  potassium chloride    Tablet ER 20 milliEquivalent(s) Oral daily  psyllium Powder 1 Packet(s) Oral every 12 hours  rosuvastatin 20 milliGRAM(s) Oral at bedtime  senna 2 Tablet(s) Oral at bedtime  sertraline 25 milliGRAM(s) Oral daily  spironolactone 25 milliGRAM(s) Oral daily  tamsulosin 0.4 milliGRAM(s) Oral at bedtime    MEDICATIONS  (PRN):  dextrose Oral Gel 15 Gram(s) Oral once PRN Blood Glucose LESS THAN 70 milliGRAM(s)/deciliter  hydrALAZINE Injectable 5 milliGRAM(s) IV Push every 4 hours PRN Systolic > 160  labetalol Injectable 10 milliGRAM(s) IV Push every 6 hours PRN Diastolic blood pressure >155  melatonin 5 milliGRAM(s) Oral at bedtime PRN Insomnia  phenylephrine 0.5% Nasal 1 Spray(s) Nasal every 12 hours PRN Nasal Congestion  sodium chloride 0.65% Nasal 1 Spray(s) Both Nostrils four times a day PRN Nasal Congestion    HEPARIN:  [] YES [] NO  Dose: XX UNITS/HR UNITS Q8H  LOVENOX:[] YES [] NO  Dose: XX mg Q24H  COUMADIN: []  YES [] NO  Dose: XX mg  Q24H  SCD's: YES b/l  GI Prophylaxis: Protonix [], Pepcid [], None [], (Contra-indication:.....)    Post-Op Beta-Blockers: Yes [], No[], If No, then contraindication:  Post-Op Aspirin: Yes [],  No [], If No, then contraindication:  Post-Op Statin: Yes [], No[], If No, then contraindication:  Allergies    No Known Allergies    Intolerances      Ambulation/Activity Status:    Assessment/Plan:  64y Male status-post .....  - Case and plan discussed with CTU Intensivist and CT Surgeon - Dr. Blakely/Mauri/Laron  - Continue CTU supportive care    - Continue DVT/GI prophylaxis  - Incentive Spirometry 10 times an hour  - Continue to advance physical activity as tolerated and continue PT/OT as directed  1. CAD: Continue ASA, statin, BB  2. HTN:   3. A. Fib:   4. COPD/Hypoxia:   5. DM/Glucose Control:     Social Service Disposition:     OPERATIVE PROCEDURE(s):    mvr            POD # 26                64yMale  SURGEON(s): ISELA Blakely  SUBJECTIVE ASSESSMENT: pt seen and examined. no acute complaints at this time.   Vital Signs Last 24 Hrs  T(F): 98.6 (23 Feb 2025 04:00), Max: 98.6 (23 Feb 2025 04:00)  HR: 75 (23 Feb 2025 07:00) (68 - 85)  BP: 146/77 (23 Feb 2025 07:00) (118/55 - 163/95)  BP(mean): 104 (23 Feb 2025 07:00) (77 - 123)  RR: 28 (23 Feb 2025 07:00) (12 - 35)  SpO2: 96% (23 Feb 2025 07:00) (91% - 100%)      I&O's Detail    22 Feb 2025 07:01  -  23 Feb 2025 07:00  --------------------------------------------------------  IN:    Oral Fluid: 1077 mL  Total IN: 1077 mL    OUT:    Voided (mL): 2650 mL  Total OUT: 2650 mL        Net:   I&O's Detail    21 Feb 2025 07:01  -  22 Feb 2025 07:00  --------------------------------------------------------  Total NET: -1375 mL      22 Feb 2025 07:01  -  23 Feb 2025 07:00  --------------------------------------------------------  Total NET: -1573 mL        CAPILLARY BLOOD GLUCOSE      POCT Blood Glucose.: 182 mg/dL (23 Feb 2025 07:58)  POCT Blood Glucose.: 128 mg/dL (23 Feb 2025 06:25)  POCT Blood Glucose.: 152 mg/dL (22 Feb 2025 22:21)  POCT Blood Glucose.: 101 mg/dL (22 Feb 2025 17:59)  POCT Blood Glucose.: 217 mg/dL (22 Feb 2025 11:17)  POCT Blood Glucose.: 219 mg/dL (22 Feb 2025 09:44)      Physical Exam:  General: NAD; A&Ox3, no focal deficits, clear speech   Cardiac: S1/S2, RRR, no murmur, no rubs  Lungs: unlabored shallow respirations, bilateral bases decreased  Abdomen: Soft/NT/ND  Sternum: Intact, no click, incision healng well with no drainage  Incisions: Incisions clean/dry/intact  Extremities: trace bl edema      BOWEL MOVEMENT:  [x] YES [] NO, If No, Timing since last BM:      Day #         LABS:                        9.0[L]  5.88  )-----------( 164      ( 23 Feb 2025 06:11 )             30.7[L]                        8.7[L]  6.03  )-----------( 159      ( 22 Feb 2025 05:04 )             29.4[L]    02-23    142  |  103  |  32[H]  ----------------------------<  140[H]  4.5   |  27  |  1.1  02-22    140  |  104  |  31[H]  ----------------------------<  125[H]  4.5   |  28  |  1.0    Ca    8.8      23 Feb 2025 06:11  Mg     2.0     02-23    TPro  6.4 [6.0 - 8.0]  /  Alb  3.7 [3.5 - 5.2]  /  TBili  0.3 [0.2 - 1.2]  /  DBili  x   /  AST  19 [0 - 41]  /  ALT  17 [0 - 41]  /  AlkPhos  92 [30 - 115]  02-23      Urinalysis Basic - ( 23 Feb 2025 06:11 )    Color: x / Appearance: x / SG: x / pH: x  Gluc: 140 mg/dL / Ketone: x  / Bili: x / Urobili: x   Blood: x / Protein: x / Nitrite: x   Leuk Esterase: x / RBC: x / WBC x   Sq Epi: x / Non Sq Epi: x / Bacteria: x        RADIOLOGY & ADDITIONAL TESTS:  CXR: < from: Xray Chest 1 View- PORTABLE-Routine (Xray Chest 1 View- PORTABLE-Routine in AM.) (02.22.25 @ 06:41) >  IMPRESSION:    Redemonstration pulmonary vascular congestion, bilateral pleural   effusions and cardiomegaly.    < end of copied text >    EKG: < from: 12 Lead ECG (02.18.25 @ 09:10) >  Ventricular Rate 67 BPM    Atrial Rate 67 BPM    P-R Interval 182 ms    QRS Duration 96 ms    Q-T Interval 408 ms    QTC Calculation(Bazett) 431 ms    P Axis 36 degrees    R Axis -16 degrees    T Axis 93 degrees    Diagnosis Line Sinus rhythm with sinus arrhythmia  Anteroseptal infarct , age undetermined  Abnormal ECG    < end of copied text >    MEDICATIONS  (STANDING):  albuterol    90 MICROgram(s) HFA Inhaler 2 Puff(s) Inhalation every 6 hours  amLODIPine   Tablet 5 milliGRAM(s) Oral daily  aspirin enteric coated 81 milliGRAM(s) Oral daily  chlorhexidine 2% Cloths 1 Application(s) Topical daily  dextrose 5%. 1000 milliLiter(s) (50 mL/Hr) IV Continuous <Continuous>  dextrose 5%. 1000 milliLiter(s) (100 mL/Hr) IV Continuous <Continuous>  dextrose 50% Injectable 50 milliLiter(s) IV Push every 15 minutes  dextrose 50% Injectable 25 milliLiter(s) IV Push every 15 minutes  finasteride 5 milliGRAM(s) Oral daily  fluticasone propionate 50 MICROgram(s)/spray Nasal Spray 1 Spray(s) Both Nostrils every 12 hours  fluticasone propionate/ salmeterol 250-50 MICROgram(s) Diskus 1 Dose(s) Inhalation two times a day  folic acid 1 milliGRAM(s) Oral daily  furosemide   Injectable 40 milliGRAM(s) IV Push daily  gabapentin 300 milliGRAM(s) Oral at bedtime  glucagon  Injectable 1 milliGRAM(s) IntraMuscular once  heparin   Injectable 5000 Unit(s) SubCutaneous every 8 hours  influenza  Vaccine (HIGH DOSE) 0.5 milliLiter(s) IntraMuscular once  insulin glargine Injectable (LANTUS) 40 Unit(s) SubCutaneous before breakfast  insulin lispro (ADMELOG) corrective regimen sliding scale   SubCutaneous three times a day before meals  insulin lispro (ADMELOG) corrective regimen sliding scale   SubCutaneous at bedtime  insulin lispro Injectable (ADMELOG) 10 Unit(s) SubCutaneous three times a day before meals  ipratropium 17 MICROgram(s) HFA Inhaler 1 Puff(s) Inhalation every 6 hours  iron sucrose IVPB 200 milliGRAM(s) IV Intermittent every 24 hours  lidocaine   4% Patch 1 Patch Transdermal every 24 hours  lidocaine   4% Patch 1 Patch Transdermal daily  magnesium oxide 400 milliGRAM(s) Oral three times a day with meals  metoprolol tartrate 12.5 milliGRAM(s) Oral every 12 hours  naproxen 500 milliGRAM(s) Oral every 12 hours  pantoprazole    Tablet 40 milliGRAM(s) Oral before breakfast  potassium chloride    Tablet ER 20 milliEquivalent(s) Oral daily  psyllium Powder 1 Packet(s) Oral every 12 hours  rosuvastatin 20 milliGRAM(s) Oral at bedtime  senna 2 Tablet(s) Oral at bedtime  sertraline 25 milliGRAM(s) Oral daily  spironolactone 25 milliGRAM(s) Oral daily  tamsulosin 0.4 milliGRAM(s) Oral at bedtime    MEDICATIONS  (PRN):  dextrose Oral Gel 15 Gram(s) Oral once PRN Blood Glucose LESS THAN 70 milliGRAM(s)/deciliter  hydrALAZINE Injectable 5 milliGRAM(s) IV Push every 4 hours PRN Systolic > 160  labetalol Injectable 10 milliGRAM(s) IV Push every 6 hours PRN Diastolic blood pressure >155  melatonin 5 milliGRAM(s) Oral at bedtime PRN Insomnia  phenylephrine 0.5% Nasal 1 Spray(s) Nasal every 12 hours PRN Nasal Congestion  sodium chloride 0.65% Nasal 1 Spray(s) Both Nostrils four times a day PRN Nasal Congestion    HEPARIN:  [x] YES [] NO  Dose: 5000 UNITS Q8H  SCD's: YES b/l  GI Prophylaxis: Protonix [x], Pepcid [], None [], (Contra-indication:.....)    Post-Op Beta-Blockers: Yes [x], No[], If No, then contraindication:  Post-Op Aspirin: Yes [x],  No [], If No, then contraindication:  Post-Op Statin: Yes [x], No[], If No, then contraindication:  Allergies    No Known Allergies    Intolerances      Ambulation/Activity Status: ambulate     Assessment/Plan:  64y Male POD#26 s/p MVR/MAZE/SHEILA closure  - Case and plan discussed with CTU Intensivist and CT Surgeon - Dr. Blakely  - Continue CTU supportive care and ongoing plan of care as per continuing CTU rounds.   - Continue DVT/GI prophylaxis  - Incentive Spirometry 10 times an hour  - Continue to advance physical activity as tolerated and continue PT/OT as directed  1. Continue ASA, statin, BB.   2. HTN: Continue Norvasc, BB.  3. Post-op A. Fib ppx: S/p MAZE and SHEILA closure. d/c pacing wires. Monitor electrolytes, AC held at this time due to prior lower GI bleeding. Pt continues to be in SR.  4. 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 Keep O2Sat >92%.  5. Heart Failure - diastolic (EF 60%) - acute on chronic (secondary to severe MR): fluid restriction to 1L, daily standing weights, aim for fluid balance -1L/day. Diuresis daily with Aldactone 25 mg, Lasix 40 mg qd with K 20 mg.  Mg oxide tid for electrolyte repletion. F/u repeat CMP.   6. DM/Glucose Control (A1c 6.2): Lantus 40, Lispro 10, insulin sliding scale, TLC/DASH diet.  7. CKD on CKD3a: FLAKITO resolved.  8. Oxy prn, lidocaine patch for pain.    Social Service Disposition:  home tomorrow
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
## 2025-02-23 NOTE — PROGRESS NOTE ADULT - ASSESSMENT
64M w/ afib,   nonobstructive CAD, CHF class 3, severe MR, DM, HTN, JIMMY on CPAP, asthma, severe obesity, HL, Madelung's disease, arthritis, arthritis, peripheral neuropathy s/p MVReplacement, SHEILA ligation, MAZE 1/28/25    Assessment/Plan    Neuro:  #acute postoperative pain  - pain controlled  #at risk for postoperative/ICU delirium  - frequent re-orientation, good sleep hygiene, avoid medications which can increase delirium risk  - CAM-ICU qShift    CV: MVR s/p MVReplacement, SHEILA ligation, MAZE  - continue aspirin, beta blocker, statin  HTN beta blockers and amlodipib 5 and aldacton 25    Pulm:    #atelectasis improved  - aggressive PT and ambulation to address atelectasis   #acute pulmonary edema  - continue diuresis,lasix 40 iv daily    Renal/Fluid/Lytes:  #acute fluid overload  - continue diuresis  - replete/optimize electrolytes  - preload and after optimization    Heme:  #Acute blood loss anemia from surgery  - transfuse for hemoglobin < 7 or hemodynamic instability due to anemia  #s/p MVReplacement surgery, anticoagulation/antiplatelet plan  - continue aspirin    ID:  - no signs of acute infection    Endocrine:   #acute postoperative stress hyperglycemia  - insulin therapy to achieve tight glucose control    GI:  #at risk for malnutrition  - enteral diet  #at risk for post-operative ileus and opioid-induced constipation  - bowel regimen. +BM last evening    Prophylaxis  #at risk of VTE  - SCDs. VTE chemoprophylaxis  #at risk of GI stress ulcer  - GI prophylaxis per cardiac surgery    Left elbow tender and painful  probbaly gout Naprosyn and colchicine started  PT  - out of bed to chair  - ambulation as much as possible      continue lasix 40 daily goal negative 500-1000 ml daily  Medication list reviewed.    This patient is critically ill and required my dedicated time to evaluate, manage and maintain or prevent deterioration of the organ systems and problems noted above and provide documentation.    Due to a high probability of clinically significant, life threatening deterioration due to high risk of acute pulmonary insufficiency, the patient required my highest level of preparedness to intervene emergently and I personally spent this critical care time directly and personally managing the patient. This critical care time included obtaining a history when possible; examining the patient; review pulse oximetry; order / interpreting / review of laboratory and radiology studies with immediate assessment and treatment as needed; directing the titration of pressors, oxygen support devices, fluid resuscitation, interpretation of cardiac output measurements; interpretation of EKG / rhythm strips / telemetry; arranging urgent treatment with development of a management plan; evaluation of patient's response to treatment; frequent reassessment and monitor for potential decompensation; and discussion with other providers/consultants.  This critical care time was performed to assess and manage the high probability of imminent life threatening deterioration that could result in organ(s) failure. It was exclusive of separately billable procedures and treating other patients and teaching time. please see MDM (medical decision making) / Assessment / Plans sections and the rest of the note for further information on patient assessment and treatment.    Time I spent with family or surrogate(s) is included only if the patient was incapable of providing the necessary information or participating in medical decision making. Time devoted to teaching and to any procedures I billed separately is not included.     Management of this patient was discussed with the surgical attending / cardiologist and mid-level providers.    I acknowledge the use of copied documentation.  All copy forward documentation is my own and has been reviewed and revised as appropriate.  If no changes were made, it is because that information remains the same.      Critical Care Medicine   Pilonidal Cyst    A pilonidal cyst is a fluid-filled sac that forms beneath the skin near the tailbone, at the top of the crease of the buttocks (pilonidal area). If the cyst is not large and not infected, it may not cause any problems.    If the cyst becomes irritated or infected, it may get larger and fill with pus. An infected cyst is called an abscess. A pilonidal abscess may cause pain and swelling, and it may need to be drained or removed.    What are the causes?  The cause of this condition is not always known. In some cases, a hair that grows into your skin (ingrown hair) may be the cause.    What increases the risk?  You are more likely to get a pilonidal cyst if you:  Are male.  Have lots of hair near the crease of the buttocks.  Are overweight.  Have a dimple near the crease of the buttocks.  Wear tight clothing.  Do not bathe or shower often.  Sit for long periods of time.  What are the signs or symptoms?  Signs and symptoms of a pilonidal cyst may include pain, swelling, redness, and warmth in the pilonidal area. Depending on how big the cyst is, you may be able to feel a lump near your tailbone. If your cyst becomes infected, symptoms may include:  Pus or fluid drainage.  Fever.  Pain, swelling, and redness getting worse.  The lump getting bigger.  How is this diagnosed?  This condition may be diagnosed based on:  Your symptoms and medical history.  A physical exam.  A blood test to check for infection.  Testing a pus sample, if applicable.  How is this treated?  If your cyst does not cause symptoms, you may not need any treatment. If your cyst bothers you or is infected, you may need a procedure to drain or remove the cyst. Depending on the size, location, and severity of your cyst, your health care provider may:  Make an incision in the cyst and drain it (incision and drainage).  Open and drain the cyst, and then stitch the wound so that it stays open while it heals (marsupialization). You will be given instructions about how to care for your open wound while it heals.  Remove all or part of the cyst, and then close the wound (cyst removal).  You may need to take antibiotic medicines before your procedure.    Follow these instructions at home:  Medicines     Take over-the-counter and prescription medicines only as told by your health care provider.  If you were prescribed an antibiotic medicine, take it as told by your health care provider. Do not stop taking the antibiotic even if you start to feel better.  General instructions     Keep the area around your pilonidal cyst clean and dry.  If there is fluid or pus draining from your cyst:  Cover the area with a clean bandage (dressing) as needed.  Wash the area gently with soap and water. Pat the area dry with a clean towel. Do not rub the area because that may cause bleeding.  Remove hair from the area around the cyst only if your health care provider tells you to do this.  Do not wear tight pants or sit in one position for long periods at a time.  Perform sitz baths at home with warm water, with change to the outside dressing 2x per day and to leave internal packing in place. Do not remove internal packing, however if packing falls out, leave it alone.   Return in 2-3days for follow up.   Keep all follow-up visits as told by your health care provider. This is important.  Contact a health care provider if you have:  New redness, swelling, or pain.  A fever.  Severe pain.    Summary  A pilonidal cyst is a fluid-filled sac that forms beneath the skin near the tailbone, at the top of the crease of the buttocks (pilonidal area).  If the cyst becomes irritated or infected, it may get larger and fill with pus. An infected cyst is called an abscess.  The cause of this condition is not always known. In some cases, a hair that grows into your skin (ingrown hair) may be the cause.  If your cyst does not cause symptoms, you may not need any treatment. If your cyst bothers you or is infected, you may need a procedure to drain or remove the cyst.

## 2025-02-24 VITALS
HEART RATE: 80 BPM | OXYGEN SATURATION: 96 % | RESPIRATION RATE: 20 BRPM | SYSTOLIC BLOOD PRESSURE: 132 MMHG | DIASTOLIC BLOOD PRESSURE: 62 MMHG

## 2025-02-24 PROBLEM — I50.9 HEART FAILURE, UNSPECIFIED: Chronic | Status: ACTIVE | Noted: 2025-01-23

## 2025-02-24 LAB
ALBUMIN SERPL ELPH-MCNC: 4.1 G/DL — SIGNIFICANT CHANGE UP (ref 3.5–5.2)
ALP SERPL-CCNC: 93 U/L — SIGNIFICANT CHANGE UP (ref 30–115)
ALT FLD-CCNC: 17 U/L — SIGNIFICANT CHANGE UP (ref 0–41)
ANION GAP SERPL CALC-SCNC: 10 MMOL/L — SIGNIFICANT CHANGE UP (ref 7–14)
AST SERPL-CCNC: 17 U/L — SIGNIFICANT CHANGE UP (ref 0–41)
BASOPHILS # BLD AUTO: 0.04 K/UL — SIGNIFICANT CHANGE UP (ref 0–0.2)
BASOPHILS NFR BLD AUTO: 0.7 % — SIGNIFICANT CHANGE UP (ref 0–1)
BILIRUB SERPL-MCNC: 0.3 MG/DL — SIGNIFICANT CHANGE UP (ref 0.2–1.2)
BUN SERPL-MCNC: 33 MG/DL — HIGH (ref 10–20)
CALCIUM SERPL-MCNC: 8.9 MG/DL — SIGNIFICANT CHANGE UP (ref 8.4–10.5)
CHLORIDE SERPL-SCNC: 103 MMOL/L — SIGNIFICANT CHANGE UP (ref 98–110)
CO2 SERPL-SCNC: 26 MMOL/L — SIGNIFICANT CHANGE UP (ref 17–32)
CREAT SERPL-MCNC: 1 MG/DL — SIGNIFICANT CHANGE UP (ref 0.7–1.5)
EGFR: 83 ML/MIN/1.73M2 — SIGNIFICANT CHANGE UP
EOSINOPHIL # BLD AUTO: 0.45 K/UL — SIGNIFICANT CHANGE UP (ref 0–0.7)
EOSINOPHIL NFR BLD AUTO: 7.8 % — SIGNIFICANT CHANGE UP (ref 0–8)
GLUCOSE BLDC GLUCOMTR-MCNC: 156 MG/DL — HIGH (ref 70–99)
GLUCOSE SERPL-MCNC: 155 MG/DL — HIGH (ref 70–99)
HCT VFR BLD CALC: 31 % — LOW (ref 42–52)
HGB BLD-MCNC: 9.5 G/DL — LOW (ref 14–18)
IMM GRANULOCYTES NFR BLD AUTO: 0.7 % — HIGH (ref 0.1–0.3)
LYMPHOCYTES # BLD AUTO: 1.41 K/UL — SIGNIFICANT CHANGE UP (ref 1.2–3.4)
LYMPHOCYTES # BLD AUTO: 24.5 % — SIGNIFICANT CHANGE UP (ref 20.5–51.1)
MAGNESIUM SERPL-MCNC: 2 MG/DL — SIGNIFICANT CHANGE UP (ref 1.8–2.4)
MCHC RBC-ENTMCNC: 27.9 PG — SIGNIFICANT CHANGE UP (ref 27–31)
MCHC RBC-ENTMCNC: 30.6 G/DL — LOW (ref 32–37)
MCV RBC AUTO: 91.2 FL — SIGNIFICANT CHANGE UP (ref 80–94)
MONOCYTES # BLD AUTO: 0.58 K/UL — SIGNIFICANT CHANGE UP (ref 0.1–0.6)
MONOCYTES NFR BLD AUTO: 10.1 % — HIGH (ref 1.7–9.3)
NEUTROPHILS # BLD AUTO: 3.24 K/UL — SIGNIFICANT CHANGE UP (ref 1.4–6.5)
NEUTROPHILS NFR BLD AUTO: 56.2 % — SIGNIFICANT CHANGE UP (ref 42.2–75.2)
NRBC BLD AUTO-RTO: 0 /100 WBCS — SIGNIFICANT CHANGE UP (ref 0–0)
PLATELET # BLD AUTO: 174 K/UL — SIGNIFICANT CHANGE UP (ref 130–400)
PMV BLD: 10.9 FL — HIGH (ref 7.4–10.4)
POTASSIUM SERPL-MCNC: 4.4 MMOL/L — SIGNIFICANT CHANGE UP (ref 3.5–5)
POTASSIUM SERPL-SCNC: 4.4 MMOL/L — SIGNIFICANT CHANGE UP (ref 3.5–5)
PROT SERPL-MCNC: 6.8 G/DL — SIGNIFICANT CHANGE UP (ref 6–8)
RBC # BLD: 3.4 M/UL — LOW (ref 4.7–6.1)
RBC # FLD: 20 % — HIGH (ref 11.5–14.5)
SODIUM SERPL-SCNC: 139 MMOL/L — SIGNIFICANT CHANGE UP (ref 135–146)
WBC # BLD: 5.76 K/UL — SIGNIFICANT CHANGE UP (ref 4.8–10.8)
WBC # FLD AUTO: 5.76 K/UL — SIGNIFICANT CHANGE UP (ref 4.8–10.8)

## 2025-02-24 PROCEDURE — 71045 X-RAY EXAM CHEST 1 VIEW: CPT | Mod: 26

## 2025-02-24 PROCEDURE — 99232 SBSQ HOSP IP/OBS MODERATE 35: CPT | Mod: 24

## 2025-02-24 PROCEDURE — 99232 SBSQ HOSP IP/OBS MODERATE 35: CPT

## 2025-02-24 RX ORDER — OXYCODONE HYDROCHLORIDE AND ACETAMINOPHEN 10; 325 MG/1; MG/1
1 TABLET ORAL
Qty: 30 | Refills: 0
Start: 2025-02-24

## 2025-02-24 RX ORDER — TAMSULOSIN HYDROCHLORIDE 0.4 MG/1
1 CAPSULE ORAL
Qty: 30 | Refills: 0
Start: 2025-02-24

## 2025-02-24 RX ORDER — FUROSEMIDE 10 MG/ML
1 INJECTION INTRAMUSCULAR; INTRAVENOUS
Qty: 30 | Refills: 0
Start: 2025-02-24

## 2025-02-24 RX ORDER — FERROUS SULFATE 137(45) MG
1 TABLET, EXTENDED RELEASE ORAL
Qty: 30 | Refills: 0
Start: 2025-02-24

## 2025-02-24 RX ORDER — FLUTICASONE PROPIONATE 50 UG/1
1 SPRAY, METERED NASAL
Qty: 0 | Refills: 0 | DISCHARGE
Start: 2025-02-24

## 2025-02-24 RX ORDER — METOPROLOL SUCCINATE 50 MG/1
0.5 TABLET, EXTENDED RELEASE ORAL
Qty: 30 | Refills: 0
Start: 2025-02-24

## 2025-02-24 RX ORDER — ALBUTEROL SULFATE 2.5 MG/3ML
2 VIAL, NEBULIZER (ML) INHALATION
Qty: 0 | Refills: 0 | DISCHARGE
Start: 2025-02-24

## 2025-02-24 RX ORDER — TIRZEPATIDE 7.5 MG/.5ML
7.5 INJECTION, SOLUTION SUBCUTANEOUS
Qty: 2 | Refills: 0
Start: 2025-02-24 | End: 2025-03-25

## 2025-02-24 RX ORDER — MELATONIN 5 MG
1 TABLET ORAL
Qty: 0 | Refills: 0 | DISCHARGE
Start: 2025-02-24

## 2025-02-24 RX ORDER — FOLIC ACID 1 MG/1
1 TABLET ORAL
Qty: 30 | Refills: 0
Start: 2025-02-24

## 2025-02-24 RX ORDER — AMLODIPINE BESYLATE 10 MG/1
1 TABLET ORAL
Qty: 30 | Refills: 0
Start: 2025-02-24

## 2025-02-24 RX ORDER — ASPIRIN 325 MG
1 TABLET ORAL
Qty: 30 | Refills: 0
Start: 2025-02-24

## 2025-02-24 RX ORDER — FINASTERIDE 1 MG/1
1 TABLET, FILM COATED ORAL
Qty: 30 | Refills: 0
Start: 2025-02-24

## 2025-02-24 RX ORDER — ALPRAZOLAM 0.5 MG
1 TABLET, EXTENDED RELEASE 24 HR ORAL
Qty: 15 | Refills: 0
Start: 2025-02-24

## 2025-02-24 RX ORDER — SPIRONOLACTONE 25 MG
1 TABLET ORAL
Qty: 30 | Refills: 0
Start: 2025-02-24

## 2025-02-24 RX ORDER — SERTRALINE 100 MG/1
1 TABLET, FILM COATED ORAL
Qty: 30 | Refills: 0
Start: 2025-02-24

## 2025-02-24 RX ORDER — PSYLLIUM SEED (WITH DEXTROSE)
3.4 POWDER (GRAM) ORAL
Qty: 0 | Refills: 0 | DISCHARGE
Start: 2025-02-24

## 2025-02-24 RX ORDER — ALPRAZOLAM 0.5 MG
0.25 TABLET, EXTENDED RELEASE 24 HR ORAL ONCE
Refills: 0 | Status: DISCONTINUED | OUTPATIENT
Start: 2025-02-24 | End: 2025-02-24

## 2025-02-24 RX ORDER — GABAPENTIN 400 MG/1
1 CAPSULE ORAL
Qty: 30 | Refills: 0
Start: 2025-02-24

## 2025-02-24 RX ORDER — MAGNESIUM OXIDE 400 MG
1 TABLET ORAL
Qty: 90 | Refills: 0
Start: 2025-02-24

## 2025-02-24 RX ADMIN — SERTRALINE 25 MILLIGRAM(S): 100 TABLET, FILM COATED ORAL at 12:23

## 2025-02-24 RX ADMIN — Medication 1 APPLICATION(S): at 12:28

## 2025-02-24 RX ADMIN — FINASTERIDE 5 MILLIGRAM(S): 1 TABLET, FILM COATED ORAL at 12:19

## 2025-02-24 RX ADMIN — Medication 1 PUFF(S): at 08:39

## 2025-02-24 RX ADMIN — Medication 400 MILLIGRAM(S): at 12:23

## 2025-02-24 RX ADMIN — AMLODIPINE BESYLATE 5 MILLIGRAM(S): 10 TABLET ORAL at 06:20

## 2025-02-24 RX ADMIN — Medication 400 MILLIGRAM(S): at 08:39

## 2025-02-24 RX ADMIN — INSULIN LISPRO 10 UNIT(S): 100 INJECTION, SOLUTION INTRAVENOUS; SUBCUTANEOUS at 07:25

## 2025-02-24 RX ADMIN — LIDOCAINE HYDROCHLORIDE 1 PATCH: 20 JELLY TOPICAL at 12:20

## 2025-02-24 RX ADMIN — Medication 2 PUFF(S): at 08:40

## 2025-02-24 RX ADMIN — FOLIC ACID 1 MILLIGRAM(S): 1 TABLET ORAL at 12:20

## 2025-02-24 RX ADMIN — Medication 1 PACKET(S): at 06:20

## 2025-02-24 RX ADMIN — Medication 25 MILLIGRAM(S): at 06:20

## 2025-02-24 RX ADMIN — INSULIN GLARGINE-YFGN 40 UNIT(S): 100 INJECTION, SOLUTION SUBCUTANEOUS at 08:00

## 2025-02-24 RX ADMIN — Medication 500 MILLIGRAM(S): at 06:45

## 2025-02-24 RX ADMIN — Medication 0.25 MILLIGRAM(S): at 00:15

## 2025-02-24 RX ADMIN — Medication 40 MILLIGRAM(S): at 06:19

## 2025-02-24 RX ADMIN — INSULIN LISPRO 2: 100 INJECTION, SOLUTION INTRAVENOUS; SUBCUTANEOUS at 07:25

## 2025-02-24 RX ADMIN — OXYCODONE HYDROCHLORIDE 5 MILLIGRAM(S): 30 TABLET ORAL at 01:17

## 2025-02-24 RX ADMIN — Medication 500 MILLIGRAM(S): at 06:19

## 2025-02-24 RX ADMIN — Medication 81 MILLIGRAM(S): at 12:28

## 2025-02-24 RX ADMIN — METOPROLOL SUCCINATE 12.5 MILLIGRAM(S): 50 TABLET, EXTENDED RELEASE ORAL at 06:20

## 2025-02-24 RX ADMIN — Medication 20 MILLIEQUIVALENT(S): at 12:19

## 2025-02-24 RX ADMIN — FLUTICASONE PROPIONATE 1 SPRAY(S): 50 SPRAY, METERED NASAL at 06:15

## 2025-02-24 RX ADMIN — FUROSEMIDE 40 MILLIGRAM(S): 10 INJECTION INTRAMUSCULAR; INTRAVENOUS at 06:20

## 2025-02-24 RX ADMIN — HEPARIN SODIUM 5000 UNIT(S): 1000 INJECTION INTRAVENOUS; SUBCUTANEOUS at 06:19

## 2025-02-24 NOTE — PROGRESS NOTE ADULT - PROVIDER SPECIALTY LIST ADULT
CT Surgery
Critical Care
Electrophysiology
Electrophysiology
Nephrology
CT Surgery
Critical Care
Electrophysiology
CT Surgery
Critical Care
Electrophysiology
Nephrology
Critical Care
51
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
## 2025-02-24 NOTE — PROGRESS NOTE ADULT - ASSESSMENT
64M w/ afib, nonobstructive CAD, CHF class 3, severe MR, DM, HTN, JIMMY on CPAP, asthma, severe obesity, HL, Madelung's disease, arthritis, arthritis, peripheral neuropathy s/p MVReplacement, SHEILA ligation, MAZE 1/28/25    Assessment/Plan    Neuro:  #acute postoperative pain  - pain controlled  #at risk for postoperative/ICU delirium  - frequent re-orientation, good sleep hygiene, avoid medications which can increase delirium risk  - CAM-ICU qShift    CV: MVR s/p MVReplacement, SHEILA ligation, MAZE  - continue aspirin, beta blocker, statin    Pulm:  #acute postoperative pulmonary insufficiency  #atelectasis  - aggressive PT and ambulation to address atelectasis   #acute pulmonary edema  - continue diuresis    Renal/Fluid/Lytes:  #acute fluid overload  - continue diuresis  - replete/optimize electrolytes  - preload and after optimization    Heme:  #Acute blood loss anemia from surgery  - transfuse for hemoglobin < 7 or hemodynamic instability due to anemia  #s/p MVReplacement surgery, anticoagulation/antiplatelet plan  - continue aspirin    ID:  - no signs of acute infection    Endocrine:   #acute postoperative stress hyperglycemia  - insulin therapy to achieve tight glucose control    GI:  #at risk for malnutrition  - enteral diet  #at risk for post-operative ileus and opioid-induced constipation  - bowel regimen    Prophylaxis  #at risk of VTE  - SCDs. VTE chemoprophylaxis  #at risk of GI stress ulcer  - GI prophylaxis per cardiac surgery    PT  - out of bed to chair  - ambulation as much as possible    Medication list reviewed.      I acknowledge the use of copied documentation.  All copy forward documentation is my own and has been reviewed and revised as appropriate.  If no changes were made, it is because that information remains the same.    Fab Celis MD  Critical Care Medicine

## 2025-02-24 NOTE — PROGRESS NOTE ADULT - REASON FOR ADMISSION
Cardiac surgery
mv disease
post MVR surgery
mr
mv disease
post MVR surgery
mitral regurgitation

## 2025-02-24 NOTE — PROGRESS NOTE ADULT - SUBJECTIVE AND OBJECTIVE BOX
OPERATIVE PROCEDURE(s): mvr/maze/la ligation                POD # 27    SURGEON(s): rich      SUBJECTIVE ASSESSMENT: no complaints and is eager to go home    Vital Signs Last 24 Hrs  T(C): 36.5 (24 Feb 2025 04:00), Max: 36.8 (23 Feb 2025 16:00)  T(F): 97.7 (24 Feb 2025 04:00), Max: 98.2 (23 Feb 2025 16:00)  HR: 80 (24 Feb 2025 12:00) (70 - 80)  BP: 132/62 (24 Feb 2025 12:00) (121/62 - 178/77)  BP(mean): 92 (24 Feb 2025 07:00) (87 - 122)  RR: 20 (24 Feb 2025 12:00) (13 - 27)  SpO2: 96% (24 Feb 2025 12:00) (94% - 100%)    Parameters below as of 24 Feb 2025 12:00  Patient On (Oxygen Delivery Method): room air      02-23-25 @ 07:01  -  02-24-25 @ 07:00  --------------------------------------------------------  IN: 1180 mL / OUT: 3300 mL / NET: -2120 mL      Physical Exam:  General: NAD; A&Ox3, no focal deficits, clear speech   Cardiac: S1/S2, RRR, no murmur, no rubs  Lungs: unlabored shallow respirations, bilateral bases decreased  Abdomen: Soft/NT/ND  Sternum: Intact, no click, incision healng well with no drainage  Incisions: Incisions clean/dry/intact  Extremities: trace bl edema    LABS:                        9.5    5.76  )-----------( 174      ( 24 Feb 2025 06:20 )             31.0     COUMADIN:   [ ] YES [x ] NO      02-24    139  |  103  |  33[H]  ----------------------------<  155[H]  4.4   |  26  |  1.0    Ca    8.9      24 Feb 2025 06:20  Mg     2.0     02-24    TPro  6.8  /  Alb  4.1  /  TBili  0.3  /  DBili  x   /  AST  17  /  ALT  17  /  AlkPhos  93  02-24    Urinalysis Basic - ( 24 Feb 2025 06:20 )    Color: x / Appearance: x / SG: x / pH: x  Gluc: 155 mg/dL / Ketone: x  / Bili: x / Urobili: x   Blood: x / Protein: x / Nitrite: x   Leuk Esterase: x / RBC: x / WBC x   Sq Epi: x / Non Sq Epi: x / Bacteria: x    MEDICATIONS  (STANDING):  albuterol    90 MICROgram(s) HFA Inhaler 2 Puff(s) Inhalation every 6 hours  amLODIPine   Tablet 5 milliGRAM(s) Oral daily  aspirin enteric coated 81 milliGRAM(s) Oral daily  chlorhexidine 2% Cloths 1 Application(s) Topical daily  dextrose 5%. 1000 milliLiter(s) (50 mL/Hr) IV Continuous <Continuous>  dextrose 5%. 1000 milliLiter(s) (100 mL/Hr) IV Continuous <Continuous>  dextrose 50% Injectable 50 milliLiter(s) IV Push every 15 minutes  dextrose 50% Injectable 25 milliLiter(s) IV Push every 15 minutes  finasteride 5 milliGRAM(s) Oral daily  fluticasone propionate 50 MICROgram(s)/spray Nasal Spray 1 Spray(s) Both Nostrils every 12 hours  fluticasone propionate/ salmeterol 250-50 MICROgram(s) Diskus 1 Dose(s) Inhalation two times a day  folic acid 1 milliGRAM(s) Oral daily  furosemide   Injectable 40 milliGRAM(s) IV Push daily  gabapentin 300 milliGRAM(s) Oral at bedtime  glucagon  Injectable 1 milliGRAM(s) IntraMuscular once  heparin   Injectable 5000 Unit(s) SubCutaneous every 8 hours  influenza  Vaccine (HIGH DOSE) 0.5 milliLiter(s) IntraMuscular once  insulin glargine Injectable (LANTUS) 40 Unit(s) SubCutaneous before breakfast  insulin lispro (ADMELOG) corrective regimen sliding scale   SubCutaneous three times a day before meals  insulin lispro (ADMELOG) corrective regimen sliding scale   SubCutaneous at bedtime  insulin lispro Injectable (ADMELOG) 10 Unit(s) SubCutaneous three times a day before meals  ipratropium 17 MICROgram(s) HFA Inhaler 1 Puff(s) Inhalation every 6 hours  iron sucrose IVPB 200 milliGRAM(s) IV Intermittent every 24 hours  lidocaine   4% Patch 1 Patch Transdermal every 24 hours  lidocaine   4% Patch 1 Patch Transdermal daily  magnesium oxide 400 milliGRAM(s) Oral three times a day with meals  metoprolol tartrate 12.5 milliGRAM(s) Oral every 12 hours  pantoprazole    Tablet 40 milliGRAM(s) Oral before breakfast  potassium chloride    Tablet ER 20 milliEquivalent(s) Oral daily  psyllium Powder 1 Packet(s) Oral every 12 hours  rosuvastatin 20 milliGRAM(s) Oral at bedtime  senna 2 Tablet(s) Oral at bedtime  sertraline 25 milliGRAM(s) Oral daily  spironolactone 25 milliGRAM(s) Oral daily  tamsulosin 0.4 milliGRAM(s) Oral at bedtime    MEDICATIONS  (PRN):  dextrose Oral Gel 15 Gram(s) Oral once PRN Blood Glucose LESS THAN 70 milliGRAM(s)/deciliter  hydrALAZINE Injectable 5 milliGRAM(s) IV Push every 4 hours PRN Systolic > 160  labetalol Injectable 10 milliGRAM(s) IV Push every 6 hours PRN Diastolic blood pressure >155  melatonin 5 milliGRAM(s) Oral at bedtime PRN Insomnia  oxyCODONE    IR 5 milliGRAM(s) Oral every 4 hours PRN Severe Pain (7 - 10)  phenylephrine 0.5% Nasal 1 Spray(s) Nasal every 12 hours PRN Nasal Congestion  sodium chloride 0.65% Nasal 1 Spray(s) Both Nostrils four times a day PRN Nasal Congestion      Allergies    No Known Allergies    Intolerances        Ambulation/Activity Status:  amb well with pt      RADIOLOGY & ADDITIONAL TESTS:    ACC: [id number] EXAM:  XR CHEST PORTABLE ROUTINE 1V   ORDERED BY: ANMOL MENDEZ     PROCEDURE DATE:  02/24/2025          INTERPRETATION:  CLINICAL HISTORY: Postop    COMPARISON: February 23, 2025    TECHNIQUE: Portable frontal chest radiograph.    FINDINGS:    Support devices: None.    Cardiac/mediastinum/hilum: Stable    Lung parenchyma/Pleura: Bilateral opacities/pleural effusions.    Skeleton/soft tissues: Stable.    IMPRESSION:    Right opacity/pleural effusion, decreased. Redemonstration left   opacity/pleural effusion.    --- End of Report ---    Assessment/Plan:  64y Male POD#27 s/p MVR/MAZE/SHEILA closure  - Case and plan discussed with CTU Intensivist and CT Surgeon - Dr. Blakely  - Continue CTU supportive care and ongoing plan of care as per continuing CTU rounds.   - Continue DVT/GI prophylaxis  - Incentive Spirometry 10 times an hour  - Continue to advance physical activity as tolerated and continue PT/OT as directed  1. Continue ASA, statin, BB.   2. HTN: Continue Norvasc, BB.  3. Post-op A. Fib ppx: S/p MAZE and SHIELA closure. d/c pacing wires. Monitor electrolytes, AC held at this time due to prior lower GI bleeding. Pt continues to be in SR.  4. 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 Keep O2Sat >92%.  5. Heart Failure - diastolic (EF 60%) - acute on chronic (secondary to severe MR): fluid restriction to 1L, daily standing weights, aim for fluid balance -1L/day. Diuresis daily with Aldactone 25 mg, Lasix 40 mg qd with K 20 mg.  Mg oxide tid for electrolyte repletion. F/u repeat CMP.   6. DM/Glucose Control (A1c 6.2): Lantus 40, Lispro 10, insulin sliding scale, TLC/DASH diet- pt will resume diabetic regimen at home  7. CKD on CKD3a: FLAKITO resolved.  8. Oxy prn, lidocaine patch for pain.    Social Service Disposition:  home today

## 2025-02-25 ENCOUNTER — APPOINTMENT (OUTPATIENT)
Dept: CARE COORDINATION | Facility: HOME HEALTH | Age: 65
End: 2025-02-25
Payer: MEDICARE

## 2025-02-25 ENCOUNTER — NON-APPOINTMENT (OUTPATIENT)
Age: 65
End: 2025-02-25

## 2025-02-25 ENCOUNTER — LABORATORY RESULT (OUTPATIENT)
Age: 65
End: 2025-02-25

## 2025-02-25 VITALS
DIASTOLIC BLOOD PRESSURE: 78 MMHG | RESPIRATION RATE: 12 BRPM | OXYGEN SATURATION: 97 % | TEMPERATURE: 98.7 F | HEART RATE: 84 BPM | SYSTOLIC BLOOD PRESSURE: 142 MMHG

## 2025-02-25 DIAGNOSIS — Z98.890 OTHER SPECIFIED POSTPROCEDURAL STATES: ICD-10-CM

## 2025-02-25 PROBLEM — E88.89 OTHER SPECIFIED METABOLIC DISORDERS: Chronic | Status: ACTIVE | Noted: 2025-01-23

## 2025-02-25 PROBLEM — Z82.49 FAMILY HISTORY OF ISCHEMIC HEART DISEASE AND OTHER DISEASES OF THE CIRCULATORY SYSTEM: Chronic | Status: ACTIVE | Noted: 2025-01-23

## 2025-02-25 PROBLEM — I48.91 UNSPECIFIED ATRIAL FIBRILLATION: Chronic | Status: ACTIVE | Noted: 2025-01-23

## 2025-02-25 PROCEDURE — 99024 POSTOP FOLLOW-UP VISIT: CPT

## 2025-02-25 RX ORDER — FOLIC ACID 1 MG/1
1 TABLET ORAL
Refills: 0 | Status: ACTIVE | COMMUNITY
Start: 2025-02-25

## 2025-02-25 RX ORDER — CHLORHEXIDINE GLUCONATE 4 %
5 LIQUID (ML) TOPICAL
Refills: 0 | Status: ACTIVE | COMMUNITY
Start: 2025-02-25

## 2025-02-25 RX ORDER — FINASTERIDE 5 MG/1
5 TABLET, FILM COATED ORAL
Refills: 0 | Status: ACTIVE | COMMUNITY
Start: 2025-02-25

## 2025-02-25 RX ORDER — MAGNESIUM OXIDE 400 MG
400 CAPSULE ORAL
Refills: 0 | Status: ACTIVE | COMMUNITY
Start: 2025-02-25

## 2025-02-25 RX ORDER — EMPAGLIFLOZIN 25 MG/1
25 TABLET, FILM COATED ORAL
Refills: 0 | Status: ACTIVE | COMMUNITY
Start: 2025-02-25

## 2025-02-25 RX ORDER — FUROSEMIDE 40 MG/1
40 TABLET ORAL
Refills: 0 | Status: ACTIVE | COMMUNITY
Start: 2025-02-25

## 2025-02-25 RX ORDER — AMLODIPINE BESYLATE 5 MG/1
5 TABLET ORAL
Refills: 0 | Status: ACTIVE | COMMUNITY
Start: 2025-02-25

## 2025-02-25 RX ORDER — POTASSIUM CHLORIDE 1500 MG/1
20 TABLET, EXTENDED RELEASE ORAL
Refills: 0 | Status: ACTIVE | COMMUNITY
Start: 2025-02-25

## 2025-02-26 DIAGNOSIS — I13.0 HYPERTENSIVE HEART AND CHRONIC KIDNEY DISEASE WITH HEART FAILURE AND STAGE 1 THROUGH STAGE 4 CHRONIC KIDNEY DISEASE, OR UNSPECIFIED CHRONIC KIDNEY DISEASE: ICD-10-CM

## 2025-02-26 DIAGNOSIS — E11.22 TYPE 2 DIABETES MELLITUS WITH DIABETIC CHRONIC KIDNEY DISEASE: ICD-10-CM

## 2025-02-26 DIAGNOSIS — Z79.01 LONG TERM (CURRENT) USE OF ANTICOAGULANTS: ICD-10-CM

## 2025-02-26 DIAGNOSIS — N17.9 ACUTE KIDNEY FAILURE, UNSPECIFIED: ICD-10-CM

## 2025-02-26 DIAGNOSIS — R65.11 SYSTEMIC INFLAMMATORY RESPONSE SYNDROME (SIRS) OF NON-INFECTIOUS ORIGIN WITH ACUTE ORGAN DYSFUNCTION: ICD-10-CM

## 2025-02-26 DIAGNOSIS — I34.0 NONRHEUMATIC MITRAL (VALVE) INSUFFICIENCY: ICD-10-CM

## 2025-02-26 DIAGNOSIS — N40.0 BENIGN PROSTATIC HYPERPLASIA WITHOUT LOWER URINARY TRACT SYMPTOMS: ICD-10-CM

## 2025-02-26 DIAGNOSIS — T81.11XA POSTPROCEDURAL CARDIOGENIC SHOCK, INITIAL ENCOUNTER: ICD-10-CM

## 2025-02-26 DIAGNOSIS — J96.01 ACUTE RESPIRATORY FAILURE WITH HYPOXIA: ICD-10-CM

## 2025-02-26 DIAGNOSIS — R41.0 DISORIENTATION, UNSPECIFIED: ICD-10-CM

## 2025-02-26 DIAGNOSIS — I44.2 ATRIOVENTRICULAR BLOCK, COMPLETE: ICD-10-CM

## 2025-02-26 DIAGNOSIS — D69.6 THROMBOCYTOPENIA, UNSPECIFIED: ICD-10-CM

## 2025-02-26 DIAGNOSIS — J98.11 ATELECTASIS: ICD-10-CM

## 2025-02-26 DIAGNOSIS — R74.01 ELEVATION OF LEVELS OF LIVER TRANSAMINASE LEVELS: ICD-10-CM

## 2025-02-26 DIAGNOSIS — I48.91 UNSPECIFIED ATRIAL FIBRILLATION: ICD-10-CM

## 2025-02-26 DIAGNOSIS — K59.03 DRUG INDUCED CONSTIPATION: ICD-10-CM

## 2025-02-26 DIAGNOSIS — J96.02 ACUTE RESPIRATORY FAILURE WITH HYPERCAPNIA: ICD-10-CM

## 2025-02-26 DIAGNOSIS — E66.01 MORBID (SEVERE) OBESITY DUE TO EXCESS CALORIES: ICD-10-CM

## 2025-02-26 DIAGNOSIS — I47.20 VENTRICULAR TACHYCARDIA, UNSPECIFIED: ICD-10-CM

## 2025-02-26 DIAGNOSIS — E88.89 OTHER SPECIFIED METABOLIC DISORDERS: ICD-10-CM

## 2025-02-26 DIAGNOSIS — R00.1 BRADYCARDIA, UNSPECIFIED: ICD-10-CM

## 2025-02-26 DIAGNOSIS — I50.33 ACUTE ON CHRONIC DIASTOLIC (CONGESTIVE) HEART FAILURE: ICD-10-CM
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
## 2025-02-26 DIAGNOSIS — I95.81 POSTPROCEDURAL HYPOTENSION: ICD-10-CM

## 2025-02-26 DIAGNOSIS — Z79.84 LONG TERM (CURRENT) USE OF ORAL HYPOGLYCEMIC DRUGS: ICD-10-CM

## 2025-02-26 DIAGNOSIS — J45.909 UNSPECIFIED ASTHMA, UNCOMPLICATED: ICD-10-CM

## 2025-02-26 DIAGNOSIS — G89.29 OTHER CHRONIC PAIN: ICD-10-CM

## 2025-02-26 DIAGNOSIS — G47.33 OBSTRUCTIVE SLEEP APNEA (ADULT) (PEDIATRIC): ICD-10-CM
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
## 2025-02-26 DIAGNOSIS — I25.10 ATHEROSCLEROTIC HEART DISEASE OF NATIVE CORONARY ARTERY WITHOUT ANGINA PECTORIS: ICD-10-CM

## 2025-02-26 DIAGNOSIS — D62 ACUTE POSTHEMORRHAGIC ANEMIA: ICD-10-CM

## 2025-02-26 DIAGNOSIS — Z82.49 FAMILY HISTORY OF ISCHEMIC HEART DISEASE AND OTHER DISEASES OF THE CIRCULATORY SYSTEM: ICD-10-CM

## 2025-02-26 DIAGNOSIS — E87.1 HYPO-OSMOLALITY AND HYPONATREMIA: ICD-10-CM

## 2025-02-26 DIAGNOSIS — F43.22 ADJUSTMENT DISORDER WITH ANXIETY: ICD-10-CM

## 2025-02-26 DIAGNOSIS — Z96.643 PRESENCE OF ARTIFICIAL HIP JOINT, BILATERAL: ICD-10-CM

## 2025-02-26 DIAGNOSIS — F41.9 ANXIETY DISORDER, UNSPECIFIED: ICD-10-CM

## 2025-02-26 DIAGNOSIS — E78.00 PURE HYPERCHOLESTEROLEMIA, UNSPECIFIED: ICD-10-CM

## 2025-02-26 DIAGNOSIS — E11.42 TYPE 2 DIABETES MELLITUS WITH DIABETIC POLYNEUROPATHY: ICD-10-CM

## 2025-02-26 DIAGNOSIS — T40.2X5A ADVERSE EFFECT OF OTHER OPIOIDS, INITIAL ENCOUNTER: ICD-10-CM

## 2025-02-27 ENCOUNTER — APPOINTMENT (OUTPATIENT)
Dept: CARDIOTHORACIC SURGERY | Facility: CLINIC | Age: 65
End: 2025-02-27

## 2025-02-27 VITALS
OXYGEN SATURATION: 94 % | HEART RATE: 71 BPM | RESPIRATION RATE: 16 BRPM | HEIGHT: 65 IN | DIASTOLIC BLOOD PRESSURE: 63 MMHG | SYSTOLIC BLOOD PRESSURE: 100 MMHG | BODY MASS INDEX: 42.32 KG/M2 | TEMPERATURE: 97.8 F | WEIGHT: 254 LBS

## 2025-02-27 DIAGNOSIS — Z95.2 PRESENCE OF PROSTHETIC HEART VALVE: ICD-10-CM

## 2025-02-27 RX ORDER — TAMSULOSIN HCL 0.4 MG
0.4 CAPSULE ORAL
Refills: 0 | Status: ACTIVE | COMMUNITY

## 2025-02-27 RX ORDER — TAMSULOSIN HYDROCHLORIDE 0.4 MG/1
0.4 CAPSULE ORAL
Qty: 30 | Refills: 0 | Status: ACTIVE | COMMUNITY

## 2025-02-27 RX ORDER — ASPIRIN 81 MG
81 TABLET, DELAYED RELEASE (ENTERIC COATED) ORAL
Refills: 0 | Status: ACTIVE | COMMUNITY

## 2025-02-27 RX ORDER — CHLORHEXIDINE GLUCONATE 4 %
325 (65 FE) LIQUID (ML) TOPICAL DAILY
Refills: 0 | Status: ACTIVE | COMMUNITY

## 2025-02-27 RX ORDER — ALPRAZOLAM 0.25 MG/1
0.25 TABLET ORAL
Refills: 0 | Status: DISCONTINUED | COMMUNITY
End: 2025-02-27

## 2025-02-27 RX ORDER — HYDROCORTISONE ACETATE 25 MG/1
25 SUPPOSITORY RECTAL AS DIRECTED
Refills: 0 | Status: ACTIVE | COMMUNITY

## 2025-02-27 RX ORDER — SERTRALINE HYDROCHLORIDE 25 MG/1
25 TABLET, FILM COATED ORAL
Refills: 0 | Status: ACTIVE | COMMUNITY

## 2025-03-05 ENCOUNTER — APPOINTMENT (OUTPATIENT)
Dept: CARDIOLOGY | Facility: CLINIC | Age: 65
End: 2025-03-05

## 2025-03-05 PROBLEM — G47.33 OBSTRUCTIVE SLEEP APNEA (ADULT) (PEDIATRIC): Chronic | Status: ACTIVE | Noted: 2025-01-23

## 2025-03-11 ENCOUNTER — APPOINTMENT (OUTPATIENT)
Dept: ORTHOPEDIC SURGERY | Facility: CLINIC | Age: 65
End: 2025-03-11
Payer: MEDICARE

## 2025-03-11 PROCEDURE — 99213 OFFICE O/P EST LOW 20 MIN: CPT

## 2025-03-13 ENCOUNTER — APPOINTMENT (OUTPATIENT)
Dept: CARDIOTHORACIC SURGERY | Facility: CLINIC | Age: 65
End: 2025-03-13
Payer: MEDICARE

## 2025-03-13 VITALS
HEART RATE: 73 BPM | TEMPERATURE: 97.5 F | WEIGHT: 254 LBS | SYSTOLIC BLOOD PRESSURE: 112 MMHG | RESPIRATION RATE: 16 BRPM | HEIGHT: 65 IN | DIASTOLIC BLOOD PRESSURE: 52 MMHG | BODY MASS INDEX: 42.32 KG/M2 | OXYGEN SATURATION: 93 %

## 2025-03-13 DIAGNOSIS — Z95.2 PRESENCE OF PROSTHETIC HEART VALVE: ICD-10-CM

## 2025-03-13 PROCEDURE — 99024 POSTOP FOLLOW-UP VISIT: CPT

## 2025-03-13 RX ORDER — APIXABAN 5 MG/1
5 TABLET, FILM COATED ORAL
Qty: 60 | Refills: 0 | Status: ACTIVE | COMMUNITY
Start: 2025-03-13

## 2025-03-14 LAB
ALBUMIN SERPL ELPH-MCNC: 3.9 G/DL
ALP BLD-CCNC: 80 U/L
ALT SERPL-CCNC: 15 U/L
ANION GAP SERPL CALC-SCNC: 12 MMOL/L
AST SERPL-CCNC: 16 U/L
BILIRUB SERPL-MCNC: 0.3 MG/DL
BUN SERPL-MCNC: 31 MG/DL
CALCIUM SERPL-MCNC: 9.5 MG/DL
CHLORIDE SERPL-SCNC: 99 MMOL/L
CO2 SERPL-SCNC: 25 MMOL/L
CREAT SERPL-MCNC: 1.2 MG/DL
EGFRCR SERPLBLD CKD-EPI 2021: 67 ML/MIN/1.73M2
GLUCOSE SERPL-MCNC: 87 MG/DL
HCT VFR BLD CALC: 37.4 %
HGB BLD-MCNC: 11.3 G/DL
MCHC RBC-ENTMCNC: 27.5 PG
MCHC RBC-ENTMCNC: 30.2 G/DL
MCV RBC AUTO: 91 FL
PLATELET # BLD AUTO: 161 K/UL
PMV BLD AUTO: 0 /100 WBCS
PMV BLD: 11 FL
POTASSIUM SERPL-SCNC: 4.9 MMOL/L
PROT SERPL-MCNC: 7.1 G/DL
RBC # BLD: 4.11 M/UL
RBC # FLD: 18.6 %
SODIUM SERPL-SCNC: 136 MMOL/L
WBC # FLD AUTO: 8.68 K/UL

## 2025-03-17 ENCOUNTER — APPOINTMENT (OUTPATIENT)
Dept: ORTHOPEDIC SURGERY | Facility: CLINIC | Age: 65
End: 2025-03-17

## 2025-03-18 ENCOUNTER — APPOINTMENT (OUTPATIENT)
Dept: ORTHOPEDIC SURGERY | Facility: CLINIC | Age: 65
End: 2025-03-18
Payer: MEDICARE

## 2025-03-18 PROCEDURE — 20610 DRAIN/INJ JOINT/BURSA W/O US: CPT | Mod: 50

## 2025-03-24 NOTE — ED ADULT NURSE NOTE - NURSING MUSC EXTREMITY NORMAL ROM
Quality 47: Advance Care Plan: Advance care planning not documented, reason not otherwise specified.
Quality 226: Preventive Care And Screening: Tobacco Use: Screening And Cessation Intervention: Tobacco Screening not Performed
Detail Level: Detailed
Quality 130: Documentation Of Current Medications In The Medical Record: Current Medications Documented
left lower extremity/right lower extremity/right upper extremity

## 2025-03-25 ENCOUNTER — APPOINTMENT (OUTPATIENT)
Dept: ORTHOPEDIC SURGERY | Facility: CLINIC | Age: 65
End: 2025-03-25
Payer: MEDICARE

## 2025-03-25 DIAGNOSIS — M17.11 UNILATERAL PRIMARY OSTEOARTHRITIS, RIGHT KNEE: ICD-10-CM

## 2025-03-25 DIAGNOSIS — M17.12 UNILATERAL PRIMARY OSTEOARTHRITIS, LEFT KNEE: ICD-10-CM

## 2025-03-25 PROCEDURE — 20610 DRAIN/INJ JOINT/BURSA W/O US: CPT | Mod: 50

## 2025-03-25 RX ORDER — SPIRONOLACTONE 25 MG/1
25 TABLET ORAL DAILY
Qty: 30 | Refills: 0 | Status: ACTIVE | COMMUNITY
Start: 2025-03-25 | End: 1900-01-01

## 2025-03-28 ENCOUNTER — TRANSCRIPTION ENCOUNTER (OUTPATIENT)
Age: 65
End: 2025-03-28

## 2025-04-01 ENCOUNTER — APPOINTMENT (OUTPATIENT)
Dept: ORTHOPEDIC SURGERY | Facility: CLINIC | Age: 65
End: 2025-04-01

## 2025-04-15 RX ORDER — FLUTICASONE PROPIONATE 50 UG/1
50 SPRAY, METERED NASAL
Qty: 3 | Refills: 3 | Status: ACTIVE | COMMUNITY
Start: 2025-04-15 | End: 1900-01-01

## 2025-04-15 RX ORDER — AZELASTINE HYDROCHLORIDE 137 UG/1
0.1 SPRAY, METERED NASAL TWICE DAILY
Qty: 3 | Refills: 3 | Status: ACTIVE | COMMUNITY
Start: 2025-04-15 | End: 1900-01-01

## 2025-04-29 ENCOUNTER — APPOINTMENT (OUTPATIENT)
Dept: ORTHOPEDIC SURGERY | Facility: CLINIC | Age: 65
End: 2025-04-29
Payer: MEDICARE

## 2025-04-29 DIAGNOSIS — M25.512 PAIN IN LEFT SHOULDER: ICD-10-CM

## 2025-04-29 PROCEDURE — 20611 DRAIN/INJ JOINT/BURSA W/US: CPT | Mod: LT

## 2025-04-29 PROCEDURE — 99214 OFFICE O/P EST MOD 30 MIN: CPT | Mod: 25

## 2025-04-29 PROCEDURE — 73030 X-RAY EXAM OF SHOULDER: CPT | Mod: LT

## 2025-05-12 ENCOUNTER — APPOINTMENT (OUTPATIENT)
Dept: ELECTROPHYSIOLOGY | Facility: CLINIC | Age: 65
End: 2025-05-12

## 2025-05-12 VITALS
BODY MASS INDEX: 39.99 KG/M2 | HEIGHT: 65 IN | HEART RATE: 83 BPM | SYSTOLIC BLOOD PRESSURE: 124 MMHG | DIASTOLIC BLOOD PRESSURE: 76 MMHG | WEIGHT: 240 LBS

## 2025-05-12 PROCEDURE — 93000 ELECTROCARDIOGRAM COMPLETE: CPT

## 2025-05-12 PROCEDURE — 99215 OFFICE O/P EST HI 40 MIN: CPT

## 2025-05-12 RX ORDER — TIRZEPATIDE 12.5 MG/.5ML
12.5 INJECTION, SOLUTION SUBCUTANEOUS WEEKLY
Refills: 0 | Status: ACTIVE | COMMUNITY

## 2025-05-12 RX ORDER — PIOGLITAZONE HYDROCHLORIDE 45 MG/1
TABLET ORAL DAILY
Refills: 0 | Status: ACTIVE | COMMUNITY

## 2025-05-12 RX ORDER — METFORMIN HYDROCHLORIDE 1000 MG/1
1000 TABLET, COATED ORAL TWICE DAILY
Refills: 0 | Status: ACTIVE | COMMUNITY

## 2025-06-24 ENCOUNTER — APPOINTMENT (OUTPATIENT)
Dept: ORTHOPEDIC SURGERY | Facility: CLINIC | Age: 65
End: 2025-06-24

## 2025-07-07 ENCOUNTER — OUTPATIENT (OUTPATIENT)
Dept: OUTPATIENT SERVICES | Facility: HOSPITAL | Age: 65
LOS: 1 days | End: 2025-07-07
Payer: MEDICARE

## 2025-07-07 VITALS
HEIGHT: 65 IN | DIASTOLIC BLOOD PRESSURE: 67 MMHG | SYSTOLIC BLOOD PRESSURE: 114 MMHG | WEIGHT: 240.08 LBS | RESPIRATION RATE: 16 BRPM | OXYGEN SATURATION: 97 % | TEMPERATURE: 98 F | HEART RATE: 48 BPM

## 2025-07-07 DIAGNOSIS — I48.19 OTHER PERSISTENT ATRIAL FIBRILLATION: ICD-10-CM

## 2025-07-07 DIAGNOSIS — Z96.643 PRESENCE OF ARTIFICIAL HIP JOINT, BILATERAL: Chronic | ICD-10-CM

## 2025-07-07 DIAGNOSIS — Z98.890 OTHER SPECIFIED POSTPROCEDURAL STATES: Chronic | ICD-10-CM

## 2025-07-07 DIAGNOSIS — Z01.818 ENCOUNTER FOR OTHER PREPROCEDURAL EXAMINATION: ICD-10-CM

## 2025-07-07 DIAGNOSIS — S02.30XA FRACTURE OF ORBITAL FLOOR, UNSPECIFIED SIDE, INITIAL ENCOUNTER FOR CLOSED FRACTURE: Chronic | ICD-10-CM

## 2025-07-07 DIAGNOSIS — Z12.11 ENCOUNTER FOR SCREENING FOR MALIGNANT NEOPLASM OF COLON: Chronic | ICD-10-CM

## 2025-07-07 DIAGNOSIS — Z86.018 PERSONAL HISTORY OF OTHER BENIGN NEOPLASM: Chronic | ICD-10-CM

## 2025-07-07 LAB
ALBUMIN SERPL ELPH-MCNC: 4.3 G/DL — SIGNIFICANT CHANGE UP (ref 3.5–5.2)
ALP SERPL-CCNC: 58 U/L — SIGNIFICANT CHANGE UP (ref 30–115)
ALT FLD-CCNC: 12 U/L — SIGNIFICANT CHANGE UP (ref 0–41)
ANION GAP SERPL CALC-SCNC: 15 MMOL/L — HIGH (ref 7–14)
AST SERPL-CCNC: 12 U/L — SIGNIFICANT CHANGE UP (ref 0–41)
BASOPHILS # BLD AUTO: 0.05 K/UL — SIGNIFICANT CHANGE UP (ref 0–0.2)
BASOPHILS NFR BLD AUTO: 0.6 % — SIGNIFICANT CHANGE UP (ref 0–1)
BILIRUB SERPL-MCNC: 0.4 MG/DL — SIGNIFICANT CHANGE UP (ref 0.2–1.2)
BUN SERPL-MCNC: 37 MG/DL — HIGH (ref 10–20)
CALCIUM SERPL-MCNC: 9.3 MG/DL — SIGNIFICANT CHANGE UP (ref 8.4–10.5)
CHLORIDE SERPL-SCNC: 107 MMOL/L — SIGNIFICANT CHANGE UP (ref 98–110)
CO2 SERPL-SCNC: 19 MMOL/L — SIGNIFICANT CHANGE UP (ref 17–32)
CREAT SERPL-MCNC: 1.2 MG/DL — SIGNIFICANT CHANGE UP (ref 0.7–1.5)
EGFR: 66 ML/MIN/1.73M2 — SIGNIFICANT CHANGE UP
EGFR: 66 ML/MIN/1.73M2 — SIGNIFICANT CHANGE UP
EOSINOPHIL # BLD AUTO: 0.2 K/UL — SIGNIFICANT CHANGE UP (ref 0–0.7)
EOSINOPHIL NFR BLD AUTO: 2.3 % — SIGNIFICANT CHANGE UP (ref 0–8)
GLUCOSE SERPL-MCNC: 113 MG/DL — HIGH (ref 70–99)
HCT VFR BLD CALC: 40.7 % — LOW (ref 42–52)
HGB BLD-MCNC: 13.3 G/DL — LOW (ref 14–18)
IMM GRANULOCYTES NFR BLD AUTO: 0.5 % — HIGH (ref 0.1–0.3)
LYMPHOCYTES # BLD AUTO: 2.17 K/UL — SIGNIFICANT CHANGE UP (ref 1.2–3.4)
LYMPHOCYTES # BLD AUTO: 25.4 % — SIGNIFICANT CHANGE UP (ref 20.5–51.1)
MCHC RBC-ENTMCNC: 27.7 PG — SIGNIFICANT CHANGE UP (ref 27–31)
MCHC RBC-ENTMCNC: 32.7 G/DL — SIGNIFICANT CHANGE UP (ref 32–37)
MCV RBC AUTO: 84.8 FL — SIGNIFICANT CHANGE UP (ref 80–94)
MONOCYTES # BLD AUTO: 0.7 K/UL — HIGH (ref 0.1–0.6)
MONOCYTES NFR BLD AUTO: 8.2 % — SIGNIFICANT CHANGE UP (ref 1.7–9.3)
NEUTROPHILS # BLD AUTO: 5.38 K/UL — SIGNIFICANT CHANGE UP (ref 1.4–6.5)
NEUTROPHILS NFR BLD AUTO: 63 % — SIGNIFICANT CHANGE UP (ref 42.2–75.2)
NRBC BLD AUTO-RTO: 0 /100 WBCS — SIGNIFICANT CHANGE UP (ref 0–0)
PLATELET # BLD AUTO: 155 K/UL — SIGNIFICANT CHANGE UP (ref 130–400)
PMV BLD: 11.9 FL — HIGH (ref 7.4–10.4)
POTASSIUM SERPL-MCNC: 4.9 MMOL/L — SIGNIFICANT CHANGE UP (ref 3.5–5)
POTASSIUM SERPL-SCNC: 4.9 MMOL/L — SIGNIFICANT CHANGE UP (ref 3.5–5)
PROT SERPL-MCNC: 6.6 G/DL — SIGNIFICANT CHANGE UP (ref 6–8)
RBC # BLD: 4.8 M/UL — SIGNIFICANT CHANGE UP (ref 4.7–6.1)
RBC # FLD: 17.4 % — HIGH (ref 11.5–14.5)
SODIUM SERPL-SCNC: 141 MMOL/L — SIGNIFICANT CHANGE UP (ref 135–146)
WBC # BLD: 8.54 K/UL — SIGNIFICANT CHANGE UP (ref 4.8–10.8)
WBC # FLD AUTO: 8.54 K/UL — SIGNIFICANT CHANGE UP (ref 4.8–10.8)

## 2025-07-07 PROCEDURE — 93005 ELECTROCARDIOGRAM TRACING: CPT

## 2025-07-07 PROCEDURE — 93010 ELECTROCARDIOGRAM REPORT: CPT

## 2025-07-07 PROCEDURE — 85025 COMPLETE CBC W/AUTO DIFF WBC: CPT

## 2025-07-07 PROCEDURE — 99214 OFFICE O/P EST MOD 30 MIN: CPT | Mod: 25

## 2025-07-07 PROCEDURE — 36415 COLL VENOUS BLD VENIPUNCTURE: CPT

## 2025-07-07 PROCEDURE — 80053 COMPREHEN METABOLIC PANEL: CPT

## 2025-07-07 NOTE — H&P PST ADULT - HISTORY OF PRESENT ILLNESS
PATIENT CURRENTLY DENIES CHEST PAIN  SHORTNESS OF BREATH  PALPITATIONS,  DYSURIA, OR UPPER RESPIRATORY INFECTION IN PAST 2 WEEKS  65 YEARS OLD MALE WITH CAD, AFIB, HTN, OA, ASTHMA, MR, tHE PATIENT IS HERE FOR THE SCHEDULED SURGERY ABOVE.    Denies travel outside the [place] in the past 30 days  Patient denies any signs or symptoms of COVID 19 and denies contact with known positive individuals.         Anesthesia Alert  NO--Difficult Airway  NO--History of neck surgery or radiation  NO--Limited ROM of neck  NO--History of Malignant hyperthermia  NO--No personal or family history of Pseudocholinesterase deficiency.  NO--Prior Anesthesia Complication  NO--Latex Allergy  NO--Loose teeth  NO--History of Rheumatoid Arthritis  NO--Bleeding risk  NO--JIMMY  NO--Other_____    DASI    RCRI    PT DENIES ANY RASHES, ABRASION, OR OPEN WOUNDS OR CUTS    AS PER THE PT, THIS IS HIS/HER COMPLETE MEDICAL AND SURGICAL HX, INCLUDING MEDICATIONS PRESCRIBED AND OVER THE COUNTER    Patient/Guardian understands the instructions and was given the opportunity to ask questions and have them answered.    pt denies any suicidal ideation or thoughts, pt states not a threat to self or others   PATIENT CURRENTLY DENIES CHEST PAIN PALPITATIONS,  DYSURIA, OR UPPER RESPIRATORY INFECTION IN PAST 2 WEEKS, HAS SAAB  65 YEARS OLD MALE WITH CAD, AFIB ON ELIQUIS, JIMMY USES C-PAP, HTN, OA, ASTHMA, MR, tHE PATIENT IS HERE FOR THE SCHEDULED SURGERY ABOVE.    Denies travel outside the [place] in the past 30 days  Patient denies any signs or symptoms of COVID 19 and denies contact with known positive individuals.         Anesthesia Alert  YES--Difficult Airway  CLASS III  YES--History of neck surgery or radiation REMOVAL OF NONCANCEROUS LIPOMA  NO--Limited ROM of neck  VERY SHORT, THICK NECK  NO--History of Malignant hyperthermia  NO--No personal or family history of Pseudocholinesterase deficiency.  NO--Prior Anesthesia Complication  NO--Latex Allergy  NO--Loose teeth  NO--History of Rheumatoid Arthritis  NO--Bleeding risk  YES--JIMMY  ON C-PAP  NO--Other_____    DASI  4.86    RCRI 1    PT DENIES ANY RASHES, ABRASION, OR OPEN WOUNDS OR CUTS    AS PER THE PT, THIS IS HIS/HER COMPLETE MEDICAL AND SURGICAL HX, INCLUDING MEDICATIONS PRESCRIBED AND OVER THE COUNTER    Patient/Guardian understands the instructions and was given the opportunity to ask questions and have them answered.    pt denies any suicidal ideation or thoughts, pt states not a threat to self or others   PATIENT CURRENTLY DENIES CHEST PAIN PALPITATIONS,  DYSURIA, OR UPPER RESPIRATORY INFECTION IN PAST 2 WEEKS, HAS SAAB  65 YEARS OLD MALE WITH CAD, AFIB ON ELIQUIS, JIMMY USES C-PAP, HTN, OA, ASTHMA, MR, THE PATIENT IS HERE FOR THE SCHEDULED SURGERY ABOVE.    Den[place] travel outside the [place] in the past 30 days  Patient denies any signs or symptoms of COVID 19 and denies contact with known positive individuals.         Anesthesia Alert  YES--Difficult Airway  CLASS III  YES--History of neck surgery or radiation REMOVAL OF NONCANCEROUS LIPOMA  NO--Limited ROM of neck  VERY SHORT, THICK NECK  NO--History of Malignant hyperthermia  NO--No personal or family history of Pseudocholinesterase deficiency.  NO--Prior Anesthesia Complication  NO--Latex Allergy  NO--Loose teeth  NO--History of Rheumatoid Arthritis  NO--Bleeding risk  YES--JIMMY  ON C-PAP  NO--Other_____    DASI  4.86    RCRI 1    PT DENIES ANY RASHES, ABRASION, OR OPEN WOUNDS OR CUTS    AS PER THE PT, THIS IS HIS/HER COMPLETE MEDICAL AND SURGICAL HX, INCLUDING MEDICATIONS PRESCRIBED AND OVER THE COUNTER    Patient/Guardian understands the instructions and was given the opportunity to ask questions and have them answered.    pt denies any suicidal ideation or thoughts, pt states not a threat to self or others   PATIENT CURRENTLY DENIES CHEST PAIN PALPITATIONS,  DYSURIA, OR UPPER RESPIRATORY INFECTION IN PAST 2 WEEKS    65 YEARS OLD MALE WITH CAD, AFIB ON ELIQUIS, JIMMY USES C-PAP, HTN, OA, ASTHMA, MR, THE PATIENT IS HERE FOR THE SCHEDULED SURGERY ABOVE.    Denies travel outside the [place] in the past 30 days  Patient denies any signs or symptoms of COVID 19 and denies contact with known positive individuals.         Anesthesia Alert  YES--Difficult Airway  CLASS III  YES--History of neck surgery or radiation REMOVAL OF NONCANCEROUS LIPOMA  NO--Limited ROM of neck  VERY SHORT, THICK NECK  NO--History of Malignant hyperthermia  NO--No personal or family history of Pseudocholinesterase deficiency.  NO--Prior Anesthesia Complication  NO--Latex Allergy  NO--Loose teeth  NO--History of Rheumatoid Arthritis  NO--Bleeding risk  YES--JIMMY  ON C-PAP  NO--Other_____    DASI  4.86    RCRI 1    PT DENIES ANY RASHES, ABRASION, OR OPEN WOUNDS OR CUTS    AS PER THE PT, THIS IS HIS/HER COMPLETE MEDICAL AND SURGICAL HX, INCLUDING MEDICATIONS PRESCRIBED AND OVER THE COUNTER    Patient/Guardian understands the instructions and was given the opportunity to ask questions and have them answered.    pt denies any suicidal ideation or thoughts, pt states not a threat to self or others

## 2025-07-07 NOTE — H&P PST ADULT - REASON FOR ADMISSION
Case Type: OP Block TimeSuite: Cath LabProceduralist: Kiki Argueta  Confirmed Surgery DateTime: 07- - 0:00PAST DateTime: 07- - 10:30Procedure: JASMINA ASSESS ATRIAL CLIP  ERP?: UnavailableLaterality: N/ALength of Procedure: 180 Minutes  Anesthesia Type: Local Standby

## 2025-07-08 DIAGNOSIS — I48.19 OTHER PERSISTENT ATRIAL FIBRILLATION: ICD-10-CM

## 2025-07-08 DIAGNOSIS — Z01.818 ENCOUNTER FOR OTHER PREPROCEDURAL EXAMINATION: ICD-10-CM

## 2025-07-14 ENCOUNTER — OUTPATIENT (OUTPATIENT)
Dept: OUTPATIENT SERVICES | Facility: HOSPITAL | Age: 65
LOS: 1 days | End: 2025-07-14
Payer: MEDICARE

## 2025-07-14 ENCOUNTER — RESULT REVIEW (OUTPATIENT)
Age: 65
End: 2025-07-14

## 2025-07-14 ENCOUNTER — NON-APPOINTMENT (OUTPATIENT)
Age: 65
End: 2025-07-14

## 2025-07-14 DIAGNOSIS — I48.19 OTHER PERSISTENT ATRIAL FIBRILLATION: ICD-10-CM

## 2025-07-14 DIAGNOSIS — Z12.11 ENCOUNTER FOR SCREENING FOR MALIGNANT NEOPLASM OF COLON: Chronic | ICD-10-CM

## 2025-07-14 DIAGNOSIS — S02.30XA FRACTURE OF ORBITAL FLOOR, UNSPECIFIED SIDE, INITIAL ENCOUNTER FOR CLOSED FRACTURE: Chronic | ICD-10-CM

## 2025-07-14 DIAGNOSIS — Z96.643 PRESENCE OF ARTIFICIAL HIP JOINT, BILATERAL: Chronic | ICD-10-CM

## 2025-07-14 DIAGNOSIS — Z86.018 PERSONAL HISTORY OF OTHER BENIGN NEOPLASM: Chronic | ICD-10-CM

## 2025-07-14 DIAGNOSIS — Z98.890 OTHER SPECIFIED POSTPROCEDURAL STATES: Chronic | ICD-10-CM

## 2025-07-14 PROCEDURE — 93320 DOPPLER ECHO COMPLETE: CPT

## 2025-07-14 PROCEDURE — 93325 DOPPLER ECHO COLOR FLOW MAPG: CPT

## 2025-07-14 PROCEDURE — 93312 ECHO TRANSESOPHAGEAL: CPT | Mod: 26,XU

## 2025-07-14 PROCEDURE — 93320 DOPPLER ECHO COMPLETE: CPT | Mod: 26

## 2025-07-14 PROCEDURE — 93325 DOPPLER ECHO COLOR FLOW MAPG: CPT | Mod: 26

## 2025-07-14 PROCEDURE — 93312 ECHO TRANSESOPHAGEAL: CPT

## 2025-07-14 RX ORDER — APIXABAN 5 MG/1
1 TABLET, FILM COATED ORAL
Refills: 0 | DISCHARGE

## 2025-07-14 RX ORDER — METFORMIN HYDROCHLORIDE 850 MG/1
1 TABLET ORAL
Refills: 0 | DISCHARGE

## 2025-07-14 NOTE — CHART NOTE - NSCHARTNOTEFT_GEN_A_CORE
PACU ANESTHESIA ADMISSION NOTE      Procedure: JASMINA  Post op diagnosis:  atrial fibrillation    ____  Intubated  TV:______       Rate: ______      FiO2: ______    __x__  Patent Airway    __x__  Full return of protective reflexes    __x__  Full recovery from anesthesia / back to baseline     Vitals:   T:   98        R:  16               BP:   122/65               Sat:  99                 P: 74      Mental Status:  _x___ Awake   _____ Alert   __x___ Drowsy   _____ Sedated    Nausea/Vomiting:  _x___ NO  ______Yes,   See Post - Op Orders          Pain Scale (0-10):  ___0__    Treatment: ____ None    ___x_ See Post - Op/PCA Orders    Post - Operative Fluids:   ____ Oral   __x__ See Post - Op Orders    Plan: Discharge:   __x__Home       _____Floor     _____Critical Care    _____  Other:_________________    Comments:

## 2025-07-14 NOTE — CHART NOTE - NSCHARTNOTEFT_GEN_A_CORE
POST OPERATIVE PROCEDURAL DOCUMENTATION  PRE-OP DIAGNOSIS: MVR with atrial Clip    POST-OP DIAGNOSIS: SHEILA excluded with no evidence for intra-atrial flow    PROCEDURE: Transesophageal Echocardiogram    Primary Physician: Dr. Argueta  Cardiology Fellow: Ramy Grant    ANESTHESIA TYPE  [ x ] As per Anesthesia       CONDITION  [  ] Critical  [  ] Serious  [  ] Fair  [ x ] Good    SPECIMENS REMOVED (IF APPLICABLE): N/A    IMPLANTS (IF APPLICABLE): None    ESTIMATED BLOOD LOSS: None    COMPLICATIONS: None    After risks and benefits of procedures were explained, informed consent was obtained and placed in chart.   The patient received topical anesthetic to the oropharynx with viscous lidocaine and benzocaine spray.  Refer to Anesthesia note for sedation details.  The JASMINA probe was passed into the esophagus without difficulty.  Transesophageal and transgastric images were obtained.  The JASMINA probe was removed without difficulty and examined.  There was no evidence for bleeding.  The patient tolerated the procedure well without any immediate JASMINA-related complications.      Preliminary Findings:  LA: Mildly enlarged  SHEILA: SHEILA excluded with no evidence for intra-atrial flow  LV: LVEF was estimated at 55-60%  MV: S/p bioprosthetic mitral valve replacement with a 27mm Mitris valve with mild transvalvular regurgitation  AV: Mild AI  RA: Not well Visualized   RV: Not well Visualized   TV: Not well Visualized   PV: Not well Visualized   IAS: Not well Visualized   Aorta: There was mild, non-mobile atheroma seen in the thoracic aorta.      DIAGNOSIS/IMPRESSION:  SHEILA excluded with no evidence for intra-atrial flow    Full report to follow    PLAN OF CARE:  [x] Discharge home  [x] Continue eliquis  [x] Follow up with EP as OP    Results of procedure/ plan of care discussed with patient/  in detail. TRANSESOPHAGEAL ECHOCARDIOGRAM PROCEDURE NOTE    PRE-OP DIAGNOSIS:  Evaluate AtriClip    POST-OP DIAGNOSIS:  No residual flow    PROCEDURE:  Transesophageal echocardiogram    Primary Physician:  Kendall  Fellow:  Ramy Grant    ANESTHESIA TYPE  [ ]  General Anesthesia  [X] Conscious Sedation  [ ]  Local/Regional    CONDITION  [ ] Critical  [ ] Serious  [ ] Fair  [X] Good    SPECIMENS REMOVED (IF APPLICABLE):  N/A    IMPLANTS (IF APPLICABLE):  N/A    ESTIMATED BLOOD LOSS:  None    COMPLICATIONS:  None    Risks and benefits of procedures were explained and informed consent was obtained.   Topical anesthetic to the oropharynx with viscous lidocaine and benzocaine spray.  Refer to Anesthesia documentation for sedation details.  The JASMINA probe was passed into the esophagus and removed without difficulty.  No evidence of bleeding.  The patient tolerated the procedure well without any immediate complications.    Preliminary Findings:  Limited study  Surgical exclusion of the SHEILA without residual flow  Normal bioprosthetic MV function    Full report to follow.

## 2025-07-15 DIAGNOSIS — I48.19 OTHER PERSISTENT ATRIAL FIBRILLATION: ICD-10-CM

## 2025-07-22 DIAGNOSIS — Z98.890 OTHER SPECIFIED POSTPROCEDURAL STATES: ICD-10-CM

## 2025-07-22 RX ORDER — ASPIRIN 81 MG/1
81 TABLET, COATED ORAL
Refills: 0 | Status: ACTIVE | COMMUNITY
Start: 2025-07-22

## 2025-07-23 ENCOUNTER — APPOINTMENT (OUTPATIENT)
Dept: PULMONOLOGY | Facility: CLINIC | Age: 65
End: 2025-07-23

## 2025-07-31 ENCOUNTER — APPOINTMENT (OUTPATIENT)
Dept: PULMONOLOGY | Facility: CLINIC | Age: 65
End: 2025-07-31
Payer: MEDICARE

## 2025-07-31 VITALS
HEIGHT: 65 IN | DIASTOLIC BLOOD PRESSURE: 72 MMHG | SYSTOLIC BLOOD PRESSURE: 114 MMHG | WEIGHT: 240 LBS | BODY MASS INDEX: 39.99 KG/M2 | OXYGEN SATURATION: 95 % | HEART RATE: 85 BPM

## 2025-07-31 DIAGNOSIS — H60.90 UNSPECIFIED OTITIS EXTERNA, UNSPECIFIED EAR: ICD-10-CM

## 2025-07-31 PROCEDURE — 99213 OFFICE O/P EST LOW 20 MIN: CPT

## 2025-07-31 PROCEDURE — G2211 COMPLEX E/M VISIT ADD ON: CPT

## 2025-07-31 RX ORDER — FLUTICASONE FUROATE AND VILANTEROL TRIFENATATE 200; 25 UG/1; UG/1
200-25 POWDER RESPIRATORY (INHALATION)
Qty: 3 | Refills: 3 | Status: ACTIVE | COMMUNITY
Start: 2025-07-31 | End: 1900-01-01

## 2025-08-04 ENCOUNTER — APPOINTMENT (OUTPATIENT)
Dept: ORTHOPEDIC SURGERY | Facility: CLINIC | Age: 65
End: 2025-08-04
Payer: MEDICARE

## 2025-08-04 DIAGNOSIS — M17.11 UNILATERAL PRIMARY OSTEOARTHRITIS, RIGHT KNEE: ICD-10-CM

## 2025-08-04 DIAGNOSIS — Z96.641 PRESENCE OF RIGHT ARTIFICIAL HIP JOINT: ICD-10-CM

## 2025-08-04 DIAGNOSIS — Z96.642 PRESENCE OF LEFT ARTIFICIAL HIP JOINT: ICD-10-CM

## 2025-08-04 DIAGNOSIS — M17.12 UNILATERAL PRIMARY OSTEOARTHRITIS, LEFT KNEE: ICD-10-CM

## 2025-08-04 DIAGNOSIS — R79.0 ABNORMAL LVL OF BLOOD MINERAL: ICD-10-CM

## 2025-08-04 PROCEDURE — 73564 X-RAY EXAM KNEE 4 OR MORE: CPT | Mod: 50

## 2025-08-04 PROCEDURE — 73522 X-RAY EXAM HIPS BI 3-4 VIEWS: CPT

## 2025-08-04 PROCEDURE — 99213 OFFICE O/P EST LOW 20 MIN: CPT

## 2025-08-04 RX ORDER — CIPROFLOXACIN 0.5 MG/.25ML
0.2 SOLUTION/ DROPS AURICULAR (OTIC)
Qty: 1 | Refills: 0 | Status: ACTIVE | COMMUNITY
Start: 2025-07-31 | End: 1900-01-01

## 2025-08-12 ENCOUNTER — OUTPATIENT (OUTPATIENT)
Dept: OUTPATIENT SERVICES | Facility: HOSPITAL | Age: 65
LOS: 1 days | End: 2025-08-12
Payer: MEDICARE

## 2025-08-12 ENCOUNTER — RESULT REVIEW (OUTPATIENT)
Age: 65
End: 2025-08-12

## 2025-08-12 DIAGNOSIS — Z96.641 PRESENCE OF RIGHT ARTIFICIAL HIP JOINT: ICD-10-CM

## 2025-08-12 DIAGNOSIS — Z96.643 PRESENCE OF ARTIFICIAL HIP JOINT, BILATERAL: Chronic | ICD-10-CM

## 2025-08-12 DIAGNOSIS — Z12.11 ENCOUNTER FOR SCREENING FOR MALIGNANT NEOPLASM OF COLON: Chronic | ICD-10-CM

## 2025-08-12 DIAGNOSIS — Z98.890 OTHER SPECIFIED POSTPROCEDURAL STATES: Chronic | ICD-10-CM

## 2025-08-12 DIAGNOSIS — Z86.018 PERSONAL HISTORY OF OTHER BENIGN NEOPLASM: Chronic | ICD-10-CM

## 2025-08-12 DIAGNOSIS — S02.30XA FRACTURE OF ORBITAL FLOOR, UNSPECIFIED SIDE, INITIAL ENCOUNTER FOR CLOSED FRACTURE: Chronic | ICD-10-CM

## 2025-08-12 PROCEDURE — 73721 MRI JNT OF LWR EXTRE W/O DYE: CPT | Mod: RT

## 2025-08-12 PROCEDURE — 73721 MRI JNT OF LWR EXTRE W/O DYE: CPT | Mod: 26,RT

## 2025-08-13 DIAGNOSIS — Z96.641 PRESENCE OF RIGHT ARTIFICIAL HIP JOINT: ICD-10-CM

## 2025-08-15 RX ORDER — HYDROCORTISONE AND ACETIC ACID OTIC 20.75; 10.375 MG/ML; MG/ML
1-2 SOLUTION AURICULAR (OTIC) 4 TIMES DAILY
Qty: 1 | Refills: 2 | Status: ACTIVE | COMMUNITY
Start: 2025-08-15 | End: 1900-01-01

## 2025-09-15 ENCOUNTER — APPOINTMENT (OUTPATIENT)
Dept: ORTHOPEDIC SURGERY | Facility: CLINIC | Age: 65
End: 2025-09-15
Payer: MEDICARE

## 2025-09-15 PROCEDURE — 20610 DRAIN/INJ JOINT/BURSA W/O US: CPT | Mod: 50

## 2025-09-19 ENCOUNTER — APPOINTMENT (OUTPATIENT)
Dept: UROLOGY | Facility: CLINIC | Age: 65
End: 2025-09-19
Payer: MEDICARE

## 2025-09-19 DIAGNOSIS — N40.1 BENIGN PROSTATIC HYPERPLASIA WITH LOWER URINARY TRACT SYMPMS: ICD-10-CM

## 2025-09-19 DIAGNOSIS — N13.8 BENIGN PROSTATIC HYPERPLASIA WITH LOWER URINARY TRACT SYMPMS: ICD-10-CM

## 2025-09-19 DIAGNOSIS — R39.9 UNSPECIFIED SYMPTOMS AND SIGNS INVOLVING THE GENITOURINARY SYSTEM: ICD-10-CM

## 2025-09-19 DIAGNOSIS — N52.9 MALE ERECTILE DYSFUNCTION, UNSPECIFIED: ICD-10-CM

## 2025-09-19 LAB
BILIRUB UR QL STRIP: NORMAL
COLLECTION METHOD: NORMAL
GLUCOSE UR-MCNC: 1000
HCG UR QL: 0.2 EU/DL
HGB UR QL STRIP.AUTO: NORMAL
KETONES UR-MCNC: NORMAL
LEUKOCYTE ESTERASE UR QL STRIP: NORMAL
NITRITE UR QL STRIP: NORMAL
PH UR STRIP: 5.5
PROT UR STRIP-MCNC: 300
SP GR UR STRIP: 1.01

## 2025-09-19 PROCEDURE — G2211 COMPLEX E/M VISIT ADD ON: CPT

## 2025-09-19 PROCEDURE — 99214 OFFICE O/P EST MOD 30 MIN: CPT

## 2025-09-19 RX ORDER — SILDENAFIL 50 MG/1
50 TABLET ORAL
Qty: 20 | Refills: 5 | Status: ACTIVE | COMMUNITY
Start: 2025-09-19 | End: 1900-01-01